# Patient Record
Sex: FEMALE | Race: WHITE | NOT HISPANIC OR LATINO
[De-identification: names, ages, dates, MRNs, and addresses within clinical notes are randomized per-mention and may not be internally consistent; named-entity substitution may affect disease eponyms.]

---

## 2017-04-28 ENCOUNTER — APPOINTMENT (OUTPATIENT)
Dept: OTOLARYNGOLOGY | Facility: CLINIC | Age: 77
End: 2017-04-28

## 2017-04-28 VITALS
OXYGEN SATURATION: 98 % | DIASTOLIC BLOOD PRESSURE: 73 MMHG | HEART RATE: 71 BPM | SYSTOLIC BLOOD PRESSURE: 144 MMHG | TEMPERATURE: 98 F

## 2017-04-28 DIAGNOSIS — Z87.09 PERSONAL HISTORY OF OTHER DISEASES OF THE RESPIRATORY SYSTEM: ICD-10-CM

## 2017-04-28 DIAGNOSIS — J38.3 OTHER DISEASES OF VOCAL CORDS: ICD-10-CM

## 2017-04-28 DIAGNOSIS — R49.0 DYSPHONIA: ICD-10-CM

## 2018-04-06 ENCOUNTER — APPOINTMENT (OUTPATIENT)
Dept: OTOLARYNGOLOGY | Facility: CLINIC | Age: 78
End: 2018-04-06
Payer: MEDICARE

## 2018-04-06 VITALS
OXYGEN SATURATION: 100 % | HEART RATE: 65 BPM | TEMPERATURE: 97.4 F | SYSTOLIC BLOOD PRESSURE: 132 MMHG | DIASTOLIC BLOOD PRESSURE: 83 MMHG

## 2018-04-06 PROCEDURE — 31575 DIAGNOSTIC LARYNGOSCOPY: CPT

## 2018-04-06 PROCEDURE — 99213 OFFICE O/P EST LOW 20 MIN: CPT | Mod: 25

## 2018-07-23 ENCOUNTER — APPOINTMENT (OUTPATIENT)
Dept: ORTHOPEDIC SURGERY | Facility: CLINIC | Age: 78
End: 2018-07-23
Payer: MEDICARE

## 2018-07-23 VITALS
SYSTOLIC BLOOD PRESSURE: 122 MMHG | HEIGHT: 59 IN | HEART RATE: 63 BPM | BODY MASS INDEX: 32.25 KG/M2 | DIASTOLIC BLOOD PRESSURE: 74 MMHG | WEIGHT: 160 LBS

## 2018-07-23 DIAGNOSIS — M51.26 OTHER INTERVERTEBRAL DISC DISPLACEMENT, LUMBAR REGION: ICD-10-CM

## 2018-07-23 PROCEDURE — 72110 X-RAY EXAM L-2 SPINE 4/>VWS: CPT

## 2018-07-23 PROCEDURE — 72170 X-RAY EXAM OF PELVIS: CPT | Mod: 59

## 2018-07-23 PROCEDURE — 99204 OFFICE O/P NEW MOD 45 MIN: CPT

## 2018-08-02 ENCOUNTER — APPOINTMENT (OUTPATIENT)
Dept: INTERNAL MEDICINE | Facility: CLINIC | Age: 78
End: 2018-08-02
Payer: MEDICARE

## 2018-08-02 ENCOUNTER — NON-APPOINTMENT (OUTPATIENT)
Age: 78
End: 2018-08-02

## 2018-08-02 ENCOUNTER — LABORATORY RESULT (OUTPATIENT)
Age: 78
End: 2018-08-02

## 2018-08-02 VITALS
BODY MASS INDEX: 29.84 KG/M2 | WEIGHT: 148 LBS | TEMPERATURE: 97.8 F | HEIGHT: 59 IN | RESPIRATION RATE: 12 BRPM | OXYGEN SATURATION: 97 % | DIASTOLIC BLOOD PRESSURE: 75 MMHG | HEART RATE: 60 BPM | SYSTOLIC BLOOD PRESSURE: 137 MMHG

## 2018-08-02 DIAGNOSIS — Z86.79 PERSONAL HISTORY OF OTHER DISEASES OF THE CIRCULATORY SYSTEM: ICD-10-CM

## 2018-08-02 DIAGNOSIS — R60.0 LOCALIZED EDEMA: ICD-10-CM

## 2018-08-02 DIAGNOSIS — M81.0 AGE-RELATED OSTEOPOROSIS W/OUT CURRENT PATHOLOGICAL FRACTURE: ICD-10-CM

## 2018-08-02 DIAGNOSIS — K55.20 ANGIODYSPLASIA OF COLON W/OUT HEMORRHAGE: ICD-10-CM

## 2018-08-02 DIAGNOSIS — E78.00 PURE HYPERCHOLESTEROLEMIA, UNSPECIFIED: ICD-10-CM

## 2018-08-02 PROCEDURE — 93000 ELECTROCARDIOGRAM COMPLETE: CPT

## 2018-08-02 PROCEDURE — 99205 OFFICE O/P NEW HI 60 MIN: CPT

## 2018-08-02 RX ORDER — METOCLOPRAMIDE HYDROCHLORIDE 5 MG/1
5 TABLET ORAL
Refills: 0 | Status: DISCONTINUED | COMMUNITY
End: 2018-08-02

## 2018-08-02 NOTE — REVIEW OF SYSTEMS
[Heartburn] : heartburn [Joint Pain] : joint pain [Joint Stiffness] : joint stiffness [Back Pain] : back pain [Unsteady Walking] : ataxia [Negative] : Heme/Lymph [Abdominal Pain] : no abdominal pain [Nausea] : no nausea [Constipation] : no constipation [Diarrhea] : diarrhea [Vomiting] : no vomiting [Melena] : no melena [Joint Swelling] : no joint swelling [Muscle Weakness] : no muscle weakness [Muscle Pain] : no muscle pain [Headache] : no headache [Dizziness] : no dizziness [Memory Loss] : no memory loss

## 2018-08-02 NOTE — HEALTH RISK ASSESSMENT
[Poor] : ~his/her~ mood as  poor [No falls in past year] : Patient reported no falls in the past year [0] : 2) Feeling down, depressed, or hopeless: Not at all (0) [] : No

## 2018-08-03 LAB
25(OH)D3 SERPL-MCNC: 38.5 NG/ML
ALBUMIN SERPL ELPH-MCNC: 4.5 G/DL
ALP BLD-CCNC: 62 U/L
ALT SERPL-CCNC: 24 U/L
ANA PAT FLD IF-IMP: ABNORMAL
ANA SER IF-ACNC: ABNORMAL
ANION GAP SERPL CALC-SCNC: 14 MMOL/L
APPEARANCE: CLEAR
AST SERPL-CCNC: 28 U/L
B BURGDOR IGG+IGM SER QL IB: NORMAL
BILIRUB SERPL-MCNC: 0.4 MG/DL
BILIRUBIN URINE: NEGATIVE
BLOOD URINE: NEGATIVE
BUN SERPL-MCNC: 23 MG/DL
CALCIUM SERPL-MCNC: 9.8 MG/DL
CHLORIDE SERPL-SCNC: 106 MMOL/L
CHOLEST SERPL-MCNC: 203 MG/DL
CHOLEST/HDLC SERPL: 4.6 RATIO
CK SERPL-CCNC: 205 U/L
CO2 SERPL-SCNC: 24 MMOL/L
COLOR: YELLOW
CREAT SERPL-MCNC: 1.01 MG/DL
CREAT SPEC-SCNC: 179 MG/DL
CRP SERPL-MCNC: <0.1 MG/DL
ERYTHROCYTE [SEDIMENTATION RATE] IN BLOOD BY WESTERGREN METHOD: 10 MM/HR
FERRITIN SERPL-MCNC: 30 NG/ML
FOLATE SERPL-MCNC: >20 NG/ML
GLUCOSE QUALITATIVE U: NEGATIVE MG/DL
GLUCOSE SERPL-MCNC: 85 MG/DL
GLUCOSE SERPL-MCNC: 92 MG/DL
HBA1C MFR BLD HPLC: 5.3 %
HBV SURFACE AG SER QL: NONREACTIVE
HCV AB SER QL: NONREACTIVE
HCV S/CO RATIO: 0.09 S/CO
HDLC SERPL-MCNC: 44 MG/DL
IRON SERPL-MCNC: 90 UG/DL
KETONES URINE: NEGATIVE
LDLC SERPL CALC-MCNC: 138 MG/DL
LEUKOCYTE ESTERASE URINE: NEGATIVE
MAGNESIUM SERPL-MCNC: 2.1 MG/DL
MICROALBUMIN 24H UR DL<=1MG/L-MCNC: <1.2 MG/DL
MICROALBUMIN/CREAT 24H UR-RTO: NORMAL
NITRITE URINE: NEGATIVE
PH URINE: 5.5
POTASSIUM SERPL-SCNC: 4.3 MMOL/L
PROT SERPL-MCNC: 6.8 G/DL
PROTEIN URINE: ABNORMAL MG/DL
RHEUMATOID FACT SER QL: 13 IU/ML
SODIUM SERPL-SCNC: 144 MMOL/L
SPECIFIC GRAVITY URINE: 1.03
T PALLIDUM AB SER QL IA: NEGATIVE
T4 FREE SERPL-MCNC: 1.1 NG/DL
TRIGL SERPL-MCNC: 105 MG/DL
TSH SERPL-ACNC: 2.21 UIU/ML
URATE SERPL-MCNC: 6.2 MG/DL
UROBILINOGEN URINE: NEGATIVE MG/DL
VIT B12 SERPL-MCNC: 806 PG/ML

## 2018-08-13 LAB
BASOPHILS # BLD AUTO: 0.01 K/UL
BASOPHILS NFR BLD AUTO: 0.2 %
EOSINOPHIL # BLD AUTO: 0.08 K/UL
EOSINOPHIL NFR BLD AUTO: 1.9 %
HCT VFR BLD CALC: 37.3 %
HGB BLD-MCNC: 12.2 G/DL
IMM GRANULOCYTES NFR BLD AUTO: 0.2 %
LYMPHOCYTES # BLD AUTO: 1.47 K/UL
LYMPHOCYTES NFR BLD AUTO: 35.8 %
MAN DIFF?: NORMAL
MCHC RBC-ENTMCNC: 28.6 PG
MCHC RBC-ENTMCNC: 32.7 GM/DL
MCV RBC AUTO: 87.6 FL
MONOCYTES # BLD AUTO: 0.47 K/UL
MONOCYTES NFR BLD AUTO: 11.4 %
NEUTROPHILS # BLD AUTO: 2.07 K/UL
NEUTROPHILS NFR BLD AUTO: 50.5 %
PLATELET # BLD AUTO: 145 K/UL
RBC # BLD: 4.26 M/UL
RBC # FLD: 15 %
WBC # FLD AUTO: 4.11 K/UL

## 2018-08-15 ENCOUNTER — APPOINTMENT (OUTPATIENT)
Dept: SPINE | Facility: CLINIC | Age: 78
End: 2018-08-15
Payer: MEDICARE

## 2018-08-15 VITALS
BODY MASS INDEX: 29.84 KG/M2 | SYSTOLIC BLOOD PRESSURE: 126 MMHG | HEART RATE: 67 BPM | WEIGHT: 148 LBS | HEIGHT: 59 IN | DIASTOLIC BLOOD PRESSURE: 78 MMHG

## 2018-08-15 DIAGNOSIS — Z86.39 PERSONAL HISTORY OF OTHER ENDOCRINE, NUTRITIONAL AND METABOLIC DISEASE: ICD-10-CM

## 2018-08-15 PROCEDURE — 99204 OFFICE O/P NEW MOD 45 MIN: CPT

## 2018-08-27 ENCOUNTER — APPOINTMENT (OUTPATIENT)
Dept: MRI IMAGING | Facility: CLINIC | Age: 78
End: 2018-08-27
Payer: MEDICARE

## 2018-08-27 ENCOUNTER — OUTPATIENT (OUTPATIENT)
Dept: OUTPATIENT SERVICES | Facility: HOSPITAL | Age: 78
LOS: 1 days | End: 2018-08-27
Payer: MEDICARE

## 2018-08-27 ENCOUNTER — APPOINTMENT (OUTPATIENT)
Dept: CT IMAGING | Facility: CLINIC | Age: 78
End: 2018-08-27
Payer: MEDICARE

## 2018-08-27 DIAGNOSIS — Z00.8 ENCOUNTER FOR OTHER GENERAL EXAMINATION: ICD-10-CM

## 2018-08-27 PROCEDURE — 72148 MRI LUMBAR SPINE W/O DYE: CPT

## 2018-08-27 PROCEDURE — 72146 MRI CHEST SPINE W/O DYE: CPT | Mod: 26

## 2018-08-27 PROCEDURE — 72146 MRI CHEST SPINE W/O DYE: CPT

## 2018-08-27 PROCEDURE — 76376 3D RENDER W/INTRP POSTPROCES: CPT | Mod: 26

## 2018-08-27 PROCEDURE — 72148 MRI LUMBAR SPINE W/O DYE: CPT | Mod: 26

## 2018-08-27 PROCEDURE — 72131 CT LUMBAR SPINE W/O DYE: CPT | Mod: 26

## 2018-08-27 PROCEDURE — 76376 3D RENDER W/INTRP POSTPROCES: CPT

## 2018-08-27 PROCEDURE — 72131 CT LUMBAR SPINE W/O DYE: CPT

## 2018-09-05 ENCOUNTER — APPOINTMENT (OUTPATIENT)
Dept: SPINE | Facility: CLINIC | Age: 78
End: 2018-09-05
Payer: MEDICARE

## 2018-09-05 PROCEDURE — 99214 OFFICE O/P EST MOD 30 MIN: CPT

## 2018-09-25 ENCOUNTER — MESSAGE (OUTPATIENT)
Age: 78
End: 2018-09-25

## 2018-09-27 ENCOUNTER — MESSAGE (OUTPATIENT)
Age: 78
End: 2018-09-27

## 2018-10-10 ENCOUNTER — APPOINTMENT (OUTPATIENT)
Dept: RADIOLOGY | Facility: HOSPITAL | Age: 78
End: 2018-10-10

## 2018-10-10 ENCOUNTER — APPOINTMENT (OUTPATIENT)
Dept: SPEECH THERAPY | Facility: HOSPITAL | Age: 78
End: 2018-10-10

## 2018-10-11 ENCOUNTER — APPOINTMENT (OUTPATIENT)
Dept: SPEECH THERAPY | Facility: CLINIC | Age: 78
End: 2018-10-11

## 2018-10-12 ENCOUNTER — MESSAGE (OUTPATIENT)
Age: 78
End: 2018-10-12

## 2018-10-17 ENCOUNTER — INBOUND DOCUMENT (OUTPATIENT)
Age: 78
End: 2018-10-17

## 2018-10-23 ENCOUNTER — APPOINTMENT (OUTPATIENT)
Dept: PULMONOLOGY | Facility: CLINIC | Age: 78
End: 2018-10-23
Payer: MEDICARE

## 2018-10-23 VITALS
WEIGHT: 144 LBS | BODY MASS INDEX: 32.39 KG/M2 | HEART RATE: 62 BPM | SYSTOLIC BLOOD PRESSURE: 108 MMHG | HEIGHT: 56 IN | DIASTOLIC BLOOD PRESSURE: 60 MMHG | OXYGEN SATURATION: 98 %

## 2018-10-23 PROCEDURE — 99203 OFFICE O/P NEW LOW 30 MIN: CPT | Mod: 25

## 2018-10-23 PROCEDURE — 94060 EVALUATION OF WHEEZING: CPT

## 2018-10-23 PROCEDURE — 94727 GAS DIL/WSHOT DETER LNG VOL: CPT

## 2018-10-23 PROCEDURE — 94729 DIFFUSING CAPACITY: CPT

## 2018-11-16 ENCOUNTER — NON-APPOINTMENT (OUTPATIENT)
Age: 78
End: 2018-11-16

## 2018-11-16 ENCOUNTER — APPOINTMENT (OUTPATIENT)
Dept: INTERNAL MEDICINE | Facility: CLINIC | Age: 78
End: 2018-11-16
Payer: MEDICARE

## 2018-11-16 VITALS
DIASTOLIC BLOOD PRESSURE: 73 MMHG | OXYGEN SATURATION: 100 % | BODY MASS INDEX: 31.27 KG/M2 | TEMPERATURE: 97.6 F | HEART RATE: 62 BPM | RESPIRATION RATE: 12 BRPM | SYSTOLIC BLOOD PRESSURE: 136 MMHG | HEIGHT: 56 IN | WEIGHT: 139 LBS

## 2018-11-16 PROCEDURE — 93000 ELECTROCARDIOGRAM COMPLETE: CPT

## 2018-11-16 PROCEDURE — 99215 OFFICE O/P EST HI 40 MIN: CPT | Mod: 25

## 2018-11-16 NOTE — REVIEW OF SYSTEMS
[Negative] : Heme/Lymph [Abdominal Pain] : abdominal pain [Vomiting] : vomiting [Heartburn] : heartburn [Joint Stiffness] : joint stiffness [Back Pain] : back pain [Fainting] : fainting [Nausea] : no nausea [Constipation] : no constipation [Diarrhea] : diarrhea [Melena] : no melena [Joint Pain] : no joint pain [Joint Swelling] : no joint swelling [Muscle Weakness] : no muscle weakness [Muscle Pain] : no muscle pain [Headache] : no headache [Dizziness] : no dizziness [Confusion] : no confusion [Memory Loss] : no memory loss [Unsteady Walking] : no ataxia

## 2018-11-20 LAB
25(OH)D3 SERPL-MCNC: 52.7 NG/ML
ALBUMIN SERPL ELPH-MCNC: 4 G/DL
ALP BLD-CCNC: 69 U/L
ALT SERPL-CCNC: 25 U/L
ANION GAP SERPL CALC-SCNC: 10 MMOL/L
APPEARANCE: CLEAR
AST SERPL-CCNC: 26 U/L
BILIRUB SERPL-MCNC: 0.4 MG/DL
BILIRUBIN URINE: NEGATIVE
BLOOD URINE: NEGATIVE
BUN SERPL-MCNC: 16 MG/DL
CALCIUM SERPL-MCNC: 9.6 MG/DL
CHLORIDE SERPL-SCNC: 104 MMOL/L
CHOLEST SERPL-MCNC: 174 MG/DL
CHOLEST/HDLC SERPL: 4 RATIO
CO2 SERPL-SCNC: 26 MMOL/L
COLOR: YELLOW
CREAT SERPL-MCNC: 1.17 MG/DL
FERRITIN SERPL-MCNC: 83 NG/ML
GLUCOSE QUALITATIVE U: NEGATIVE MG/DL
GLUCOSE SERPL-MCNC: 81 MG/DL
GLUCOSE SERPL-MCNC: 83 MG/DL
HBA1C MFR BLD HPLC: 5.2 %
HDLC SERPL-MCNC: 43 MG/DL
KETONES URINE: NEGATIVE
LDLC SERPL CALC-MCNC: 108 MG/DL
LEUKOCYTE ESTERASE URINE: NEGATIVE
MAGNESIUM SERPL-MCNC: 2.2 MG/DL
NITRITE URINE: NEGATIVE
PH URINE: 5.5
POTASSIUM SERPL-SCNC: 4.1 MMOL/L
PROT SERPL-MCNC: 6.6 G/DL
PROTEIN URINE: NEGATIVE MG/DL
SODIUM SERPL-SCNC: 140 MMOL/L
SPECIFIC GRAVITY URINE: 1.02
TRIGL SERPL-MCNC: 114 MG/DL
TSH SERPL-ACNC: 3.01 UIU/ML
UROBILINOGEN URINE: NEGATIVE MG/DL
VIT B12 SERPL-MCNC: 1453 PG/ML

## 2018-11-23 LAB
BASOPHILS # BLD AUTO: 0.02 K/UL
BASOPHILS NFR BLD AUTO: 0.5 %
EOSINOPHIL # BLD AUTO: 0.07 K/UL
EOSINOPHIL NFR BLD AUTO: 1.7 %
HCT VFR BLD CALC: 36.2 %
HGB BLD-MCNC: 12.2 G/DL
IMM GRANULOCYTES NFR BLD AUTO: 0.2 %
LYMPHOCYTES # BLD AUTO: 1.3 K/UL
LYMPHOCYTES NFR BLD AUTO: 32.4 %
MAN DIFF?: NORMAL
MCHC RBC-ENTMCNC: 31.7 PG
MCHC RBC-ENTMCNC: 33.7 GM/DL
MCV RBC AUTO: 94 FL
MONOCYTES # BLD AUTO: 0.49 K/UL
MONOCYTES NFR BLD AUTO: 12.2 %
NEUTROPHILS # BLD AUTO: 2.12 K/UL
NEUTROPHILS NFR BLD AUTO: 53 %
PLATELET # BLD AUTO: 157 K/UL
RBC # BLD: 3.85 M/UL
RBC # FLD: 13.7 %
WBC # FLD AUTO: 4.01 K/UL

## 2019-01-02 ENCOUNTER — APPOINTMENT (OUTPATIENT)
Dept: SPINE | Facility: CLINIC | Age: 79
End: 2019-01-02
Payer: MEDICARE

## 2019-01-02 VITALS
SYSTOLIC BLOOD PRESSURE: 122 MMHG | HEART RATE: 67 BPM | RESPIRATION RATE: 14 BRPM | BODY MASS INDEX: 31.49 KG/M2 | DIASTOLIC BLOOD PRESSURE: 71 MMHG | WEIGHT: 140 LBS | HEIGHT: 56 IN

## 2019-01-02 PROCEDURE — 99214 OFFICE O/P EST MOD 30 MIN: CPT

## 2019-01-03 ENCOUNTER — OUTPATIENT (OUTPATIENT)
Dept: OUTPATIENT SERVICES | Facility: HOSPITAL | Age: 79
LOS: 1 days | End: 2019-01-03
Payer: MEDICARE

## 2019-01-03 ENCOUNTER — APPOINTMENT (OUTPATIENT)
Dept: CT IMAGING | Facility: CLINIC | Age: 79
End: 2019-01-03
Payer: MEDICARE

## 2019-01-03 DIAGNOSIS — M48.061 SPINAL STENOSIS, LUMBAR REGION WITHOUT NEUROGENIC CLAUDICATION: ICD-10-CM

## 2019-01-03 PROCEDURE — 76376 3D RENDER W/INTRP POSTPROCES: CPT

## 2019-01-03 PROCEDURE — 72131 CT LUMBAR SPINE W/O DYE: CPT

## 2019-01-03 PROCEDURE — 76376 3D RENDER W/INTRP POSTPROCES: CPT | Mod: 26

## 2019-01-03 PROCEDURE — 72131 CT LUMBAR SPINE W/O DYE: CPT | Mod: 26

## 2019-02-02 PROCEDURE — 93010 ELECTROCARDIOGRAM REPORT: CPT

## 2019-02-07 ENCOUNTER — RX RENEWAL (OUTPATIENT)
Age: 79
End: 2019-02-07

## 2019-02-12 ENCOUNTER — OUTPATIENT (OUTPATIENT)
Dept: OUTPATIENT SERVICES | Facility: HOSPITAL | Age: 79
LOS: 1 days | End: 2019-02-12
Payer: MEDICARE

## 2019-02-12 VITALS
HEART RATE: 63 BPM | HEIGHT: 56 IN | SYSTOLIC BLOOD PRESSURE: 115 MMHG | DIASTOLIC BLOOD PRESSURE: 67 MMHG | TEMPERATURE: 98 F | OXYGEN SATURATION: 98 % | RESPIRATION RATE: 16 BRPM | WEIGHT: 141.98 LBS

## 2019-02-12 DIAGNOSIS — G70.00 MYASTHENIA GRAVIS WITHOUT (ACUTE) EXACERBATION: ICD-10-CM

## 2019-02-12 DIAGNOSIS — I10 ESSENTIAL (PRIMARY) HYPERTENSION: ICD-10-CM

## 2019-02-12 DIAGNOSIS — Z01.818 ENCOUNTER FOR OTHER PREPROCEDURAL EXAMINATION: ICD-10-CM

## 2019-02-12 DIAGNOSIS — Z98.890 OTHER SPECIFIED POSTPROCEDURAL STATES: Chronic | ICD-10-CM

## 2019-02-12 DIAGNOSIS — K21.9 GASTRO-ESOPHAGEAL REFLUX DISEASE WITHOUT ESOPHAGITIS: ICD-10-CM

## 2019-02-12 DIAGNOSIS — Z29.9 ENCOUNTER FOR PROPHYLACTIC MEASURES, UNSPECIFIED: ICD-10-CM

## 2019-02-12 DIAGNOSIS — Z96.619 PRESENCE OF UNSPECIFIED ARTIFICIAL SHOULDER JOINT: Chronic | ICD-10-CM

## 2019-02-12 DIAGNOSIS — M54.16 RADICULOPATHY, LUMBAR REGION: ICD-10-CM

## 2019-02-12 DIAGNOSIS — M48.061 SPINAL STENOSIS, LUMBAR REGION WITHOUT NEUROGENIC CLAUDICATION: ICD-10-CM

## 2019-02-12 DIAGNOSIS — Z90.89 ACQUIRED ABSENCE OF OTHER ORGANS: Chronic | ICD-10-CM

## 2019-02-12 LAB
ANION GAP SERPL CALC-SCNC: 11 MMOL/L — SIGNIFICANT CHANGE UP (ref 5–17)
BLD GP AB SCN SERPL QL: NEGATIVE — SIGNIFICANT CHANGE UP
BUN SERPL-MCNC: 24 MG/DL — HIGH (ref 7–23)
CALCIUM SERPL-MCNC: 9 MG/DL — SIGNIFICANT CHANGE UP (ref 8.4–10.5)
CHLORIDE SERPL-SCNC: 104 MMOL/L — SIGNIFICANT CHANGE UP (ref 96–108)
CO2 SERPL-SCNC: 26 MMOL/L — SIGNIFICANT CHANGE UP (ref 22–31)
CREAT SERPL-MCNC: 0.87 MG/DL — SIGNIFICANT CHANGE UP (ref 0.5–1.3)
GLUCOSE SERPL-MCNC: 86 MG/DL — SIGNIFICANT CHANGE UP (ref 70–99)
HCT VFR BLD CALC: 36.7 % — SIGNIFICANT CHANGE UP (ref 34.5–45)
HGB BLD-MCNC: 11.6 G/DL — SIGNIFICANT CHANGE UP (ref 11.5–15.5)
MCHC RBC-ENTMCNC: 29.9 PG — SIGNIFICANT CHANGE UP (ref 27–34)
MCHC RBC-ENTMCNC: 31.6 GM/DL — LOW (ref 32–36)
MCV RBC AUTO: 94.6 FL — SIGNIFICANT CHANGE UP (ref 80–100)
MRSA PCR RESULT.: DETECTED
PLATELET # BLD AUTO: 155 K/UL — SIGNIFICANT CHANGE UP (ref 150–400)
POTASSIUM SERPL-MCNC: 3.8 MMOL/L — SIGNIFICANT CHANGE UP (ref 3.5–5.3)
POTASSIUM SERPL-SCNC: 3.8 MMOL/L — SIGNIFICANT CHANGE UP (ref 3.5–5.3)
RBC # BLD: 3.88 M/UL — SIGNIFICANT CHANGE UP (ref 3.8–5.2)
RBC # FLD: 13.2 % — SIGNIFICANT CHANGE UP (ref 10.3–14.5)
RH IG SCN BLD-IMP: NEGATIVE — SIGNIFICANT CHANGE UP
S AUREUS DNA NOSE QL NAA+PROBE: DETECTED
SODIUM SERPL-SCNC: 141 MMOL/L — SIGNIFICANT CHANGE UP (ref 135–145)
WBC # BLD: 4.75 K/UL — SIGNIFICANT CHANGE UP (ref 3.8–10.5)
WBC # FLD AUTO: 4.75 K/UL — SIGNIFICANT CHANGE UP (ref 3.8–10.5)

## 2019-02-12 PROCEDURE — 86900 BLOOD TYPING SEROLOGIC ABO: CPT

## 2019-02-12 PROCEDURE — 86901 BLOOD TYPING SEROLOGIC RH(D): CPT

## 2019-02-12 PROCEDURE — 80048 BASIC METABOLIC PNL TOTAL CA: CPT

## 2019-02-12 PROCEDURE — 86850 RBC ANTIBODY SCREEN: CPT

## 2019-02-12 PROCEDURE — 87640 STAPH A DNA AMP PROBE: CPT

## 2019-02-12 PROCEDURE — 87641 MR-STAPH DNA AMP PROBE: CPT

## 2019-02-12 PROCEDURE — G0463: CPT

## 2019-02-12 PROCEDURE — 85027 COMPLETE CBC AUTOMATED: CPT

## 2019-02-12 NOTE — H&P PST ADULT - ASSESSMENT
LEONI VTE 2.0 SCORE [CLOT updated 2019]    AGE RELATED RISK FACTORS                                                       MOBILITY RELATED FACTORS  [ ] Age 41-60 years                                            (1 Point)                    [ ] Bed rest                                                        (1 Point)  [ ] Age: 61-74 years                                           (2 Points)                  [ ] Plaster cast                                                   (2 Points)  [x ] Age= 75 years                                              (3 Points)                    [ ] Bed bound for more than 72 hours                 (2 Points)    DISEASE RELATED RISK FACTORS                                               GENDER SPECIFIC FACTORS  [ ] Edema in the lower extremities                       (1 Point)              [ ] Pregnancy                                                     (1 Point)  [ ] Varicose veins                                               (1 Point)                     [ ] Post-partum < 6 weeks                                   (1 Point)             [x ] BMI > 25 Kg/m2                                            (1 Point)                     [ ] Hormonal therapy  or oral contraception          (1 Point)                 [ ] Sepsis (in the previous month)                        (1 Point)               [ ] History of pregnancy complications                 (1 point)  [ ] Pneumonia or serious lung disease                                               [ ] Unexplained or recurrent                     (1 Point)           (in the previous month)                               (1 Point)  [ ] Abnormal pulmonary function test                     (1 Point)                 SURGERY RELATED RISK FACTORS  [ ] Acute myocardial infarction                              (1 Point)               [ ]  Section                                             (1 Point)  [ ] Congestive heart failure (in the previous month)  (1 Point)      [ ] Minor surgery                                                  (1 Point)   [ ] Inflammatory bowel disease                             (1 Point)               [ ] Arthroscopic surgery                                        (2 Points)  [ ] Central venous access                                      (2 Points)                [x ] General surgery lasting more than 45 minutes (2 points)  [ ] Malignancy- Present or previous                   (2 Points)                [ ] Elective arthroplasty                                         (5 points)    [ ] Stroke (in the previous month)                          (5 Points)                                                                                                                                                           HEMATOLOGY RELATED FACTORS                                                 TRAUMA RELATED RISK FACTORS  [ ] Prior episodes of VTE                                     (3 Points)                [ ] Fracture of the hip, pelvis, or leg                       (5 Points)  [ ] Positive family history for VTE                         (3 Points)             [ ] Acute spinal cord injury (in the previous month)  (5 Points)  [ ] Prothrombin 34506 A                                     (3 Points)               [ ] Paralysis  (less than 1 month)                             (5 Points)  [ ] Factor V Leiden                                             (3 Points)                  [ ] Multiple Trauma within 1 month                        (5 Points)  [ ] Lupus anticoagulants                                     (3 Points)                                                           [ ] Anticardiolipin antibodies                               (3 Points)                                                       [ ] High homocysteine in the blood                      (3 Points)                                             [ ] Other congenital or acquired thrombophilia      (3 Points)                                                [ ] Heparin induced thrombocytopenia                  (3 Points)                                     Total Score [       6   ]

## 2019-02-12 NOTE — H&P PST ADULT - HISTORY OF PRESENT ILLNESS
78 year old female with PMH of GERD, HTN, Myasthenia Gravis ( dx 10/2018 stable on Pyridostigmine), HLD with complaints of worsening lower back pain to groin found to have lumbar stenosis planned for L1-2 Lumbar laminectomy discectomy, L1-2 posterior lumbar fusion, L1-3 pedicle screws on 2/26/19.

## 2019-02-12 NOTE — H&P PST ADULT - PMH
Carpal Tunnel Syndrome Right  release 4/10  Gastric Ulcer  nissen fundoplication  Genital Ulcer, Female    GERD (Gastroesophageal Reflux Disease)    Hammertoe Right foot    Hyperlipidemia    Hypertension    Kidney Calculi    Lumbar Radiculopathy    Myasthenia gravis  intermittent voice changes, diagnosed 10/2018: resolved with med Aortic stenosis, mild  asper daughter  Carpal Tunnel Syndrome Right  release 4/10  Diverticulitis large intestine  denies any recent exacerbations  Genital Ulcer, Female    GERD (Gastroesophageal Reflux Disease)    Hammertoe Right foot    Hyperlipidemia    Hypertension    Kidney Calculi    Lumbar Radiculopathy    Lumbar stenosis    Myasthenia gravis  dx 10/2018 symptoms: intermittent loss of voice, diagnosed negative ENT studies, now stable on Pyridostigmine  Near syncope  8/2018 negative ENT work up, negative cardiac work ups as per daughter  Reactive airway disease that is not asthma  mild as per pulm note on [employer], patient and daughter deneis any inhaler use Aortic stenosis, mild  asper daughter  Carpal Tunnel Syndrome Right  release 4/10  Diverticulitis large intestine  denies any recent exacerbations  Genital Ulcer, Female    GERD (Gastroesophageal Reflux Disease)    Hammertoe Right foot    Hyperlipidemia    Hypertension    Kidney Calculi    Lumbar Radiculopathy    Lumbar stenosis    Myasthenia gravis  dx 10/2018 symptoms: intermittent loss of voice/ weakness, negative ENT studies, now stable on Pyridostigmine  Near syncope  8/2018 negative ENT work up, negative cardiac work ups as per daughter  Reactive airway disease that is not asthma  mild as per pulm note on [employer], patient and daughter deneis any inhaler use

## 2019-02-12 NOTE — H&P PST ADULT - PSH
Bilateral Cataracts  removed  Prolapse, Uterovaginal    S/P Appendectomy    S/P Cholecystectomy    S/P Hernia Repair Umbikical    S/P Hysterectomy Partial    S/P Laminectomy with Spinal Fusion cervcical and lumbar  cervical  1998  lumbar 1986,1998,2000  S/P Laparoscopic Fundoplication Bilateral Cataracts  removed  H/O laminectomy  cervical  1998  lumbar 1986,1998,2000  H/O shoulder replacement  b/l 2015/2016  History of tonsillectomy    Prolapse, Uterovaginal  s/p sling  S/P Appendectomy    S/P Cholecystectomy    S/P foot surgery    S/P Hernia Repair Umbikical    S/P Hysterectomy Partial  1974  S/P Laparoscopic Fundoplication Bilateral Cataracts  removed  H/O laminectomy  cervical  1998  lumbar 1986,1998,2000  H/O shoulder replacement  b/l 2015/2016  H/O umbilical hernia repair    History of tonsillectomy    Prolapse, Uterovaginal  s/p sling  S/P Appendectomy    S/P Cholecystectomy    S/P foot surgery    S/P Hysterectomy Partial  1974  S/P Laparoscopic Fundoplication

## 2019-02-12 NOTE — H&P PST ADULT - PROBLEM SELECTOR PLAN 1
planned for L1-2 Lumbar laminectomy discectomy, L1-2 posterior lumbar fusion, L1-3 pedicle screws on 2/26/19.   PST labs send  Preprocedure instructions discussed

## 2019-02-12 NOTE — H&P PST ADULT - PROBLEM SELECTOR PLAN 2
continue Hyzaar am of surgery  continue aspirin for prophylaxis, OK by Dr. Vick as per daughter  Will obtain recent EKG, ECHO, Stress report and cardiac note

## 2019-02-12 NOTE — H&P PST ADULT - NEGATIVE ENMT SYMPTOMS
no throat pain/no dysphagia/no nose bleeds/no hearing difficulty/no sinus symptoms no throat pain/no nose bleeds/no dysphagia/no vertigo/no sinus symptoms/no hearing difficulty

## 2019-02-13 PROBLEM — G70.00 MYASTHENIA GRAVIS WITHOUT (ACUTE) EXACERBATION: Chronic | Status: ACTIVE | Noted: 2019-02-12

## 2019-02-13 RX ORDER — VANCOMYCIN HCL 1 G
1000 VIAL (EA) INTRAVENOUS ONCE
Qty: 0 | Refills: 0 | Status: DISCONTINUED | OUTPATIENT
Start: 2019-02-26 | End: 2019-02-28

## 2019-02-14 ENCOUNTER — OTHER (OUTPATIENT)
Age: 79
End: 2019-02-14

## 2019-02-14 DIAGNOSIS — Z22.321 CARRIER OR SUSPECTED CARRIER OF METHICILLIN SUSCEPTIBLE STAPHYLOCOCCUS AUREUS: ICD-10-CM

## 2019-02-14 DIAGNOSIS — Z22.322 CARRIER OR SUSPECTED CARRIER OF METHICILLIN RESISTANT STAPHYLOCOCCUS AUREUS: ICD-10-CM

## 2019-02-19 ENCOUNTER — APPOINTMENT (OUTPATIENT)
Dept: INTERNAL MEDICINE | Facility: CLINIC | Age: 79
End: 2019-02-19
Payer: MEDICARE

## 2019-02-19 VITALS
WEIGHT: 145 LBS | BODY MASS INDEX: 32.62 KG/M2 | HEIGHT: 56 IN | RESPIRATION RATE: 12 BRPM | HEART RATE: 58 BPM | DIASTOLIC BLOOD PRESSURE: 75 MMHG | SYSTOLIC BLOOD PRESSURE: 135 MMHG | TEMPERATURE: 98.1 F | OXYGEN SATURATION: 99 %

## 2019-02-19 VITALS — DIASTOLIC BLOOD PRESSURE: 66 MMHG | SYSTOLIC BLOOD PRESSURE: 120 MMHG

## 2019-02-19 PROCEDURE — 99215 OFFICE O/P EST HI 40 MIN: CPT

## 2019-02-19 NOTE — REVIEW OF SYSTEMS
[Back Pain] : back pain [Negative] : Heme/Lymph [Unsteady Walking] : ataxia [Joint Pain] : no joint pain [Joint Stiffness] : no joint stiffness [Joint Swelling] : no joint swelling [Muscle Weakness] : no muscle weakness [Muscle Pain] : no muscle pain [Headache] : no headache [Dizziness] : no dizziness [Fainting] : no fainting [Confusion] : no confusion [Memory Loss] : no memory loss

## 2019-02-19 NOTE — ASSESSMENT
[Patient Optimized for Surgery] : Patient optimized for surgery [No Further Testing Recommended] : no further testing recommended [Modify medications prior to procedure] : Modify medications prior to procedure [As per surgery] : as per surgery [FreeTextEntry7] : With sips of water the morning of surgery, the patient was instructed to take Nexium, levothyroxine, and Losartan

## 2019-02-19 NOTE — HISTORY OF PRESENT ILLNESS
[Aortic Stenosis] : aortic stenosis [No Pertinent Pulmonary History] : no history of asthma, COPD, sleep apnea, or smoking [No Adverse Anesthesia Reaction] : no adverse anesthesia reaction in self or family member [(Patient denies any chest pain, claudication, dyspnea on exertion, orthopnea, palpitations or syncope)] : Patient denies any chest pain, claudication, dyspnea on exertion, orthopnea, palpitations or syncope [Atrial Fibrillation] : no atrial fibrillation [Coronary Artery Disease] : no coronary artery disease [Recent Myocardial Infarction] : no recent myocardial infarction [Implantable Device/Pacemaker] : no implantable device/pacemaker [Chronic Anticoagulation] : no chronic anticoagulation [Chronic Kidney Disease] : no chronic kidney disease [Diabetes] : no diabetes [FreeTextEntry1] : LAMINECTOMY [FreeTextEntry2] : 2/26/2019 [FreeTextEntry3] : DR MAYURI FOSTER [FreeTextEntry4] : CLEARED BY CARDIOLOGIST DR. ENGEL

## 2019-02-19 NOTE — PHYSICAL EXAM

## 2019-02-25 ENCOUNTER — TRANSCRIPTION ENCOUNTER (OUTPATIENT)
Age: 79
End: 2019-02-25

## 2019-02-26 ENCOUNTER — INPATIENT (INPATIENT)
Facility: HOSPITAL | Age: 79
LOS: 6 days | Discharge: HOME | DRG: 454 | End: 2019-03-05
Attending: NEUROLOGICAL SURGERY | Admitting: NEUROLOGICAL SURGERY
Payer: MEDICARE

## 2019-02-26 ENCOUNTER — APPOINTMENT (OUTPATIENT)
Dept: SPINE | Facility: HOSPITAL | Age: 79
End: 2019-02-26

## 2019-02-26 VITALS
TEMPERATURE: 97 F | WEIGHT: 141.98 LBS | HEART RATE: 64 BPM | HEIGHT: 56 IN | OXYGEN SATURATION: 100 % | RESPIRATION RATE: 18 BRPM | SYSTOLIC BLOOD PRESSURE: 150 MMHG | DIASTOLIC BLOOD PRESSURE: 80 MMHG

## 2019-02-26 DIAGNOSIS — Z90.89 ACQUIRED ABSENCE OF OTHER ORGANS: Chronic | ICD-10-CM

## 2019-02-26 DIAGNOSIS — M48.061 SPINAL STENOSIS, LUMBAR REGION WITHOUT NEUROGENIC CLAUDICATION: ICD-10-CM

## 2019-02-26 DIAGNOSIS — Z96.619 PRESENCE OF UNSPECIFIED ARTIFICIAL SHOULDER JOINT: Chronic | ICD-10-CM

## 2019-02-26 DIAGNOSIS — Z98.890 OTHER SPECIFIED POSTPROCEDURAL STATES: Chronic | ICD-10-CM

## 2019-02-26 LAB
ANION GAP SERPL CALC-SCNC: 17 MMOL/L — SIGNIFICANT CHANGE UP (ref 5–17)
BASE EXCESS BLDA CALC-SCNC: -4.5 MMOL/L — LOW (ref -2–2)
BASE EXCESS BLDA CALC-SCNC: -5.5 MMOL/L — LOW (ref -2–2)
BUN SERPL-MCNC: 17 MG/DL — SIGNIFICANT CHANGE UP (ref 7–23)
CALCIUM SERPL-MCNC: 7.9 MG/DL — LOW (ref 8.4–10.5)
CHLORIDE SERPL-SCNC: 107 MMOL/L — SIGNIFICANT CHANGE UP (ref 96–108)
CO2 BLDA-SCNC: 21 MMOL/L — LOW (ref 22–30)
CO2 BLDA-SCNC: 24 MMOL/L — SIGNIFICANT CHANGE UP (ref 22–30)
CO2 SERPL-SCNC: 18 MMOL/L — LOW (ref 22–31)
CREAT SERPL-MCNC: 0.78 MG/DL — SIGNIFICANT CHANGE UP (ref 0.5–1.3)
GLUCOSE SERPL-MCNC: 236 MG/DL — HIGH (ref 70–99)
HCO3 BLDA-SCNC: 20 MMOL/L — LOW (ref 21–29)
HCO3 BLDA-SCNC: 22 MMOL/L — SIGNIFICANT CHANGE UP (ref 21–29)
HCT VFR BLD CALC: 29.8 % — LOW (ref 34.5–45)
HGB BLD-MCNC: 10.5 G/DL — LOW (ref 11.5–15.5)
HOROWITZ INDEX BLDA+IHG-RTO: 44 — SIGNIFICANT CHANGE UP
MAGNESIUM SERPL-MCNC: 2 MG/DL — SIGNIFICANT CHANGE UP (ref 1.6–2.6)
MCHC RBC-ENTMCNC: 31.3 PG — SIGNIFICANT CHANGE UP (ref 27–34)
MCHC RBC-ENTMCNC: 35.3 GM/DL — SIGNIFICANT CHANGE UP (ref 32–36)
MCV RBC AUTO: 88.7 FL — SIGNIFICANT CHANGE UP (ref 80–100)
PCO2 BLDA: 39 MMHG — SIGNIFICANT CHANGE UP (ref 32–46)
PCO2 BLDA: 50 MMHG — HIGH (ref 32–46)
PH BLDA: 7.26 — LOW (ref 7.35–7.45)
PH BLDA: 7.32 — LOW (ref 7.35–7.45)
PLATELET # BLD AUTO: 114 K/UL — LOW (ref 150–400)
PO2 BLDA: 151 MMHG — HIGH (ref 74–108)
PO2 BLDA: 294 MMHG — HIGH (ref 74–108)
POTASSIUM SERPL-MCNC: 3.7 MMOL/L — SIGNIFICANT CHANGE UP (ref 3.5–5.3)
POTASSIUM SERPL-SCNC: 3.7 MMOL/L — SIGNIFICANT CHANGE UP (ref 3.5–5.3)
RBC # BLD: 3.36 M/UL — LOW (ref 3.8–5.2)
RBC # FLD: 12.5 % — SIGNIFICANT CHANGE UP (ref 10.3–14.5)
RH IG SCN BLD-IMP: NEGATIVE — SIGNIFICANT CHANGE UP
SAO2 % BLDA: 99 % — HIGH (ref 92–96)
SAO2 % BLDA: 99 % — HIGH (ref 92–96)
SODIUM SERPL-SCNC: 142 MMOL/L — SIGNIFICANT CHANGE UP (ref 135–145)
WBC # BLD: 6.8 K/UL — SIGNIFICANT CHANGE UP (ref 3.8–10.5)
WBC # FLD AUTO: 6.8 K/UL — SIGNIFICANT CHANGE UP (ref 3.8–10.5)

## 2019-02-26 PROCEDURE — 99291 CRITICAL CARE FIRST HOUR: CPT

## 2019-02-26 PROCEDURE — 22842 INSERT SPINE FIXATION DEVICE: CPT

## 2019-02-26 PROCEDURE — 22853 INSJ BIOMECHANICAL DEVICE: CPT

## 2019-02-26 PROCEDURE — 22633 ARTHRD CMBN 1NTRSPC LUMBAR: CPT

## 2019-02-26 PROCEDURE — 61783 SCAN PROC SPINAL: CPT

## 2019-02-26 PROCEDURE — 22614 ARTHRD PST TQ 1NTRSPC EA ADD: CPT

## 2019-02-26 RX ORDER — DEXAMETHASONE 0.5 MG/5ML
4 ELIXIR ORAL EVERY 6 HOURS
Qty: 0 | Refills: 0 | Status: COMPLETED | OUTPATIENT
Start: 2019-02-26 | End: 2019-02-27

## 2019-02-26 RX ORDER — GABAPENTIN 400 MG/1
300 CAPSULE ORAL ONCE
Qty: 0 | Refills: 0 | Status: COMPLETED | OUTPATIENT
Start: 2019-02-26 | End: 2019-02-26

## 2019-02-26 RX ORDER — ATORVASTATIN CALCIUM 80 MG/1
40 TABLET, FILM COATED ORAL AT BEDTIME
Qty: 0 | Refills: 0 | Status: DISCONTINUED | OUTPATIENT
Start: 2019-02-26 | End: 2019-03-05

## 2019-02-26 RX ORDER — ASCORBIC ACID 60 MG
500 TABLET,CHEWABLE ORAL
Qty: 0 | Refills: 0 | Status: COMPLETED | OUTPATIENT
Start: 2019-02-26 | End: 2019-03-01

## 2019-02-26 RX ORDER — IPRATROPIUM/ALBUTEROL SULFATE 18-103MCG
3 AEROSOL WITH ADAPTER (GRAM) INHALATION ONCE
Qty: 0 | Refills: 0 | Status: COMPLETED | OUTPATIENT
Start: 2019-02-26 | End: 2019-02-26

## 2019-02-26 RX ORDER — ACETAMINOPHEN 500 MG
1000 TABLET ORAL ONCE
Qty: 0 | Refills: 0 | Status: COMPLETED | OUTPATIENT
Start: 2019-02-26 | End: 2019-02-26

## 2019-02-26 RX ORDER — PYRIDOSTIGMINE BROMIDE 60 MG/5ML
60 SOLUTION ORAL ONCE
Qty: 0 | Refills: 0 | Status: COMPLETED | OUTPATIENT
Start: 2019-02-26 | End: 2019-02-26

## 2019-02-26 RX ORDER — LEVOTHYROXINE SODIUM 125 MCG
50 TABLET ORAL DAILY
Qty: 0 | Refills: 0 | Status: DISCONTINUED | OUTPATIENT
Start: 2019-02-26 | End: 2019-03-05

## 2019-02-26 RX ORDER — MUPIROCIN 20 MG/G
1 OINTMENT TOPICAL
Qty: 0 | Refills: 0 | Status: DISCONTINUED | OUTPATIENT
Start: 2019-02-26 | End: 2019-03-05

## 2019-02-26 RX ORDER — FAMOTIDINE 10 MG/ML
20 INJECTION INTRAVENOUS
Qty: 0 | Refills: 0 | Status: DISCONTINUED | OUTPATIENT
Start: 2019-02-26 | End: 2019-03-05

## 2019-02-26 RX ORDER — INSULIN LISPRO 100/ML
VIAL (ML) SUBCUTANEOUS EVERY 6 HOURS
Qty: 0 | Refills: 0 | Status: DISCONTINUED | OUTPATIENT
Start: 2019-02-26 | End: 2019-02-27

## 2019-02-26 RX ORDER — ENOXAPARIN SODIUM 100 MG/ML
40 INJECTION SUBCUTANEOUS AT BEDTIME
Qty: 0 | Refills: 0 | Status: DISCONTINUED | OUTPATIENT
Start: 2019-02-27 | End: 2019-03-05

## 2019-02-26 RX ORDER — VANCOMYCIN HCL 1 G
1000 VIAL (EA) INTRAVENOUS EVERY 12 HOURS
Qty: 0 | Refills: 0 | Status: COMPLETED | OUTPATIENT
Start: 2019-02-26 | End: 2019-02-27

## 2019-02-26 RX ORDER — PANTOPRAZOLE SODIUM 20 MG/1
40 TABLET, DELAYED RELEASE ORAL
Qty: 0 | Refills: 0 | Status: DISCONTINUED | OUTPATIENT
Start: 2019-02-26 | End: 2019-03-05

## 2019-02-26 RX ORDER — ACETAMINOPHEN 500 MG
975 TABLET ORAL ONCE
Qty: 0 | Refills: 0 | Status: COMPLETED | OUTPATIENT
Start: 2019-02-26 | End: 2019-02-26

## 2019-02-26 RX ORDER — TRAMADOL HYDROCHLORIDE 50 MG/1
25 TABLET ORAL AT BEDTIME
Qty: 0 | Refills: 0 | Status: DISCONTINUED | OUTPATIENT
Start: 2019-02-26 | End: 2019-02-26

## 2019-02-26 RX ORDER — PYRIDOSTIGMINE BROMIDE 60 MG/5ML
180 SOLUTION ORAL AT BEDTIME
Qty: 0 | Refills: 0 | Status: DISCONTINUED | OUTPATIENT
Start: 2019-02-26 | End: 2019-03-05

## 2019-02-26 RX ORDER — SODIUM CHLORIDE 9 MG/ML
1000 INJECTION, SOLUTION INTRAVENOUS
Qty: 0 | Refills: 0 | Status: DISCONTINUED | OUTPATIENT
Start: 2019-02-26 | End: 2019-02-27

## 2019-02-26 RX ORDER — TRAMADOL HYDROCHLORIDE 50 MG/1
25 TABLET ORAL ONCE
Qty: 0 | Refills: 0 | Status: DISCONTINUED | OUTPATIENT
Start: 2019-02-26 | End: 2019-02-26

## 2019-02-26 RX ORDER — HYDROMORPHONE HYDROCHLORIDE 2 MG/ML
0.25 INJECTION INTRAMUSCULAR; INTRAVENOUS; SUBCUTANEOUS
Qty: 0 | Refills: 0 | Status: DISCONTINUED | OUTPATIENT
Start: 2019-02-26 | End: 2019-02-26

## 2019-02-26 RX ORDER — CELECOXIB 200 MG/1
200 CAPSULE ORAL
Qty: 0 | Refills: 0 | Status: DISCONTINUED | OUTPATIENT
Start: 2019-02-26 | End: 2019-03-05

## 2019-02-26 RX ORDER — HYDROCHLOROTHIAZIDE 25 MG
12.5 TABLET ORAL DAILY
Qty: 0 | Refills: 0 | Status: DISCONTINUED | OUTPATIENT
Start: 2019-02-26 | End: 2019-02-28

## 2019-02-26 RX ORDER — SODIUM CHLORIDE 9 MG/ML
3 INJECTION INTRAMUSCULAR; INTRAVENOUS; SUBCUTANEOUS EVERY 8 HOURS
Qty: 0 | Refills: 0 | Status: DISCONTINUED | OUTPATIENT
Start: 2019-02-26 | End: 2019-02-26

## 2019-02-26 RX ORDER — SENNA PLUS 8.6 MG/1
2 TABLET ORAL AT BEDTIME
Qty: 0 | Refills: 0 | Status: DISCONTINUED | OUTPATIENT
Start: 2019-02-26 | End: 2019-03-05

## 2019-02-26 RX ORDER — DEXTROSE 50 % IN WATER 50 %
15 SYRINGE (ML) INTRAVENOUS ONCE
Qty: 0 | Refills: 0 | Status: DISCONTINUED | OUTPATIENT
Start: 2019-02-26 | End: 2019-02-27

## 2019-02-26 RX ORDER — GABAPENTIN 400 MG/1
300 CAPSULE ORAL THREE TIMES A DAY
Qty: 0 | Refills: 0 | Status: DISCONTINUED | OUTPATIENT
Start: 2019-02-26 | End: 2019-03-05

## 2019-02-26 RX ORDER — GLUCAGON INJECTION, SOLUTION 0.5 MG/.1ML
1 INJECTION, SOLUTION SUBCUTANEOUS ONCE
Qty: 0 | Refills: 0 | Status: DISCONTINUED | OUTPATIENT
Start: 2019-02-26 | End: 2019-02-27

## 2019-02-26 RX ORDER — CEFAZOLIN SODIUM 1 G
2000 VIAL (EA) INJECTION ONCE
Qty: 0 | Refills: 0 | Status: DISCONTINUED | OUTPATIENT
Start: 2019-02-26 | End: 2019-02-26

## 2019-02-26 RX ORDER — PYRIDOSTIGMINE BROMIDE 60 MG/5ML
60 SOLUTION ORAL
Qty: 0 | Refills: 0 | Status: DISCONTINUED | OUTPATIENT
Start: 2019-02-26 | End: 2019-03-05

## 2019-02-26 RX ORDER — HYDROCORTISONE 20 MG
100 TABLET ORAL ONCE
Qty: 0 | Refills: 0 | Status: DISCONTINUED | OUTPATIENT
Start: 2019-02-26 | End: 2019-02-26

## 2019-02-26 RX ORDER — ONDANSETRON 8 MG/1
4 TABLET, FILM COATED ORAL ONCE
Qty: 0 | Refills: 0 | Status: DISCONTINUED | OUTPATIENT
Start: 2019-02-26 | End: 2019-02-27

## 2019-02-26 RX ORDER — DOCUSATE SODIUM 100 MG
100 CAPSULE ORAL THREE TIMES A DAY
Qty: 0 | Refills: 0 | Status: DISCONTINUED | OUTPATIENT
Start: 2019-02-26 | End: 2019-03-05

## 2019-02-26 RX ORDER — OXYCODONE HYDROCHLORIDE 5 MG/1
5 TABLET ORAL ONCE
Qty: 0 | Refills: 0 | Status: DISCONTINUED | OUTPATIENT
Start: 2019-02-26 | End: 2019-02-26

## 2019-02-26 RX ORDER — DEXTROSE 50 % IN WATER 50 %
12.5 SYRINGE (ML) INTRAVENOUS ONCE
Qty: 0 | Refills: 0 | Status: DISCONTINUED | OUTPATIENT
Start: 2019-02-26 | End: 2019-02-27

## 2019-02-26 RX ORDER — ACETAMINOPHEN 500 MG
1000 TABLET ORAL ONCE
Qty: 0 | Refills: 0 | Status: COMPLETED | OUTPATIENT
Start: 2019-02-27 | End: 2019-02-27

## 2019-02-26 RX ORDER — DEXTROSE 50 % IN WATER 50 %
25 SYRINGE (ML) INTRAVENOUS ONCE
Qty: 0 | Refills: 0 | Status: DISCONTINUED | OUTPATIENT
Start: 2019-02-26 | End: 2019-02-27

## 2019-02-26 RX ORDER — LOSARTAN POTASSIUM 100 MG/1
50 TABLET, FILM COATED ORAL DAILY
Qty: 0 | Refills: 0 | Status: DISCONTINUED | OUTPATIENT
Start: 2019-02-26 | End: 2019-02-28

## 2019-02-26 RX ORDER — ONDANSETRON 8 MG/1
4 TABLET, FILM COATED ORAL EVERY 4 HOURS
Qty: 0 | Refills: 0 | Status: DISCONTINUED | OUTPATIENT
Start: 2019-02-26 | End: 2019-03-05

## 2019-02-26 RX ORDER — LIDOCAINE HCL 20 MG/ML
0.2 VIAL (ML) INJECTION ONCE
Qty: 0 | Refills: 0 | Status: DISCONTINUED | OUTPATIENT
Start: 2019-02-26 | End: 2019-02-26

## 2019-02-26 RX ADMIN — Medication 250 MILLIGRAM(S): at 21:14

## 2019-02-26 RX ADMIN — GABAPENTIN 300 MILLIGRAM(S): 400 CAPSULE ORAL at 08:11

## 2019-02-26 RX ADMIN — SODIUM CHLORIDE 75 MILLILITER(S): 9 INJECTION, SOLUTION INTRAVENOUS at 17:49

## 2019-02-26 RX ADMIN — CELECOXIB 200 MILLIGRAM(S): 200 CAPSULE ORAL at 21:38

## 2019-02-26 RX ADMIN — PYRIDOSTIGMINE BROMIDE 60 MILLIGRAM(S): 60 SOLUTION ORAL at 08:22

## 2019-02-26 RX ADMIN — Medication 400 MILLIGRAM(S): at 17:44

## 2019-02-26 RX ADMIN — MUPIROCIN 1 APPLICATION(S): 20 OINTMENT TOPICAL at 19:37

## 2019-02-26 RX ADMIN — Medication 975 MILLIGRAM(S): at 08:11

## 2019-02-26 RX ADMIN — FAMOTIDINE 20 MILLIGRAM(S): 10 INJECTION INTRAVENOUS at 21:08

## 2019-02-26 RX ADMIN — TRAMADOL HYDROCHLORIDE 25 MILLIGRAM(S): 50 TABLET ORAL at 19:18

## 2019-02-26 RX ADMIN — CELECOXIB 200 MILLIGRAM(S): 200 CAPSULE ORAL at 21:08

## 2019-02-26 RX ADMIN — GABAPENTIN 300 MILLIGRAM(S): 400 CAPSULE ORAL at 21:09

## 2019-02-26 RX ADMIN — Medication 3 MILLILITER(S): at 15:50

## 2019-02-26 RX ADMIN — Medication 1000 MILLIGRAM(S): at 18:14

## 2019-02-26 RX ADMIN — PYRIDOSTIGMINE BROMIDE 60 MILLIGRAM(S): 60 SOLUTION ORAL at 19:37

## 2019-02-26 RX ADMIN — Medication 4 MILLIGRAM(S): at 16:45

## 2019-02-26 NOTE — BRIEF OPERATIVE NOTE - OPERATION/FINDINGS
L1, L2 laminectomy   L1-2 TLIF (left sided approach)  Stereotactically guided L1-L3 pedicle screw fixation  Posterolateral fusion

## 2019-02-26 NOTE — PHYSICAL THERAPY INITIAL EVALUATION ADULT - PLANNED THERAPY INTERVENTIONS, PT EVAL
balance training/bed mobility training/strengthening/transfer training/Stair training... GOAL: In 2 weeks pt will negotiate 5 stairs independently with least restrictive device./gait training

## 2019-02-26 NOTE — PHYSICAL THERAPY INITIAL EVALUATION ADULT - GAIT DEVIATIONS NOTED, PT EVAL
decreased step length/decreased stride length/increased time in double stance/increased B/L shift/decreased isaiah/decreased velocity of limb motion/decreased weight-shifting ability

## 2019-02-26 NOTE — CHART NOTE - NSCHARTNOTEFT_GEN_A_CORE
CAPRINI SCORE [CLOT] Score on Admission for     AGE RELATED RISK FACTORS                                                       MOBILITY RELATED FACTORS  [ ] Age 41-60 years                                            (1 Point)                  [ ] Bed rest                                                        (1 Point)  [ ] Age: 61-74 years                                           (2 Points)                 [ ] Plaster cast                                                   (2 Points)  [X ] Age= 75 years                                              (3 Points)               [ ] Bed bound for more than 72 hours                 (2 Points)    DISEASE RELATED RISK FACTORS                                               GENDER SPECIFIC FACTORS  [ ] Edema in the lower extremities                       (1 Point)                  [ ] Pregnancy                                                     (1 Point)  [ ] Varicose veins                                               (1 Point)                  [ ] Post-partum < 6 weeks                                   (1 Point)             [X] BMI > 25 Kg/m2                                            (1 Point)                  [ ] Hormonal therapy  or oral contraception          (1 Point)                 [ ] Sepsis (in the previous month)                        (1 Point)                  [ ] History of pregnancy complications                 (1 point)  [ ] Pneumonia or serious lung disease                                               [ ] Unexplained or recurrent                     (1 Point)           (in the previous month)                               (1 Point)  [ ] Abnormal pulmonary function test                     (1 Point)                 SURGERY RELATED RISK FACTORS (include planned surgeries)  [ ] Acute myocardial infarction                              (1 Point)                 [ ]  Section                                             (1 Point)  [ ] Congestive heart failure (in the previous month)  (1 Point)         [ ] Minor surgery                                                  (1 Point)   [ ] Inflammatory bowel disease                             (1 Point)                 [ ] Arthroscopic surgery                                        (2 Points)  [ ] Central venous access                                      (2 Points)                [X ] General surgery lasting more than 45 minutes   (2 Points)       [ ] Stroke (in the previous month)                          (5 Points)               [ ] Elective arthroplasty                                         (5 Points)            [ ] current or past malignancy                              (2 Points)                                                                                                       HEMATOLOGY RELATED FACTORS                                                 TRAUMA RELATED RISK FACTORS  [ ] Prior episodes of VTE                                     (3 Points)                [ ] Fracture of the hip, pelvis, or leg                       (5 Points)  [ ] Positive family history for VTE                         (3 Points)                 [ ] Acute spinal cord injury (in the previous month)  (5 Points)  [ ] Prothrombin 04823 A                                     (3 Points)                 [ ] Paralysis  (less than 1 month)                             (5 Points)  [ ] Factor V Leiden                                             (3 Points)                  [ ] Multiple Trauma within 1 month                        (5 Points)  [ ] Lupus anticoagulants                                     (3 Points)                                                           [ ] Anticardiolipin antibodies                               (3 Points)                                                       [ ] High homocysteine in the blood                      (3 Points)                                             [ ] Other congenital or acquired thrombophilia      (3 Points)                                                [ ] Heparin induced thrombocytopenia                  (3 Points)                                          Total Score [    6      ]    Risk:  Very low 0   Low 1 to 2   Moderate 3 to 4   High =5       VTE Prophylasix Recommednations:  [X ] mechanical pneumatic compression devices                                      [ ] contraindicated: _____________________  [ ] chemo prophylasix                                                                                   [X] contraindicated _Fresh post operative patient, team will reevaluate Chemical  prophylaxis in AM ____________________    **** HIGH LIKELIHOOD DVT PRESENT ON ADMISSION  [ ] (please order LE dopplers within 24 hours of admission)

## 2019-02-26 NOTE — PRE-ANESTHESIA EVALUATION ADULT - NSRADCARDRESULTSFT_GEN_ALL_CORE
Stress Test (12/12/18): EF=69%, fixed defect, no ischemia    ECHO: EF=55-65%, mild AS, mild MR/TR, diastolic dysfunction    EKG: pending Stress Test (12/12/18): EF=69%, fixed defect, no ischemia    ECHO: EF=55-65%, mild AS, mild MR/TR, diastolic dysfunction    EKG: sinus bob @ 59 bpm, ?anterior infarct    PFTs: not available

## 2019-02-26 NOTE — PRE-ANESTHESIA EVALUATION ADULT - NSANTHADDINFOFT_GEN_ALL_CORE
-will avoid NMDAs  -d/w pt & fam r/b/a of GA in the setting of MG (e.g. continued post-op mechanical ventilation)  -A-line -will avoid NMDAs  -d/w pt & fam r/b/a of GA in the setting of MG (e.g. continued post-op mechanical ventilation)  -disease duration is <6 yrs and pyridostigmine dosage <750 mg (360mg).    -does have unknown reactive airway disease   -is not on steroids.  -pre-op PIP and VC unknown  -pre-op A-line  -recommend post-op PACU o/n then transfer to NSICU vs. Step-Down w/ continuous pulse ox

## 2019-02-26 NOTE — PHYSICAL THERAPY INITIAL EVALUATION ADULT - PERTINENT HX OF CURRENT PROBLEM, REHAB EVAL
79 y/o F with PMH Myasthenia Gravis (dx 20/2018 stable on Pyridostigmine), GERD, presents with c/o worsening lower back pain to groin. Pt found to have lumbar stenosis. Pt now presents s/p L1-L2 laminectomy, L1-L2 TLIF.

## 2019-02-26 NOTE — PRE-ANESTHESIA EVALUATION ADULT - NSANTHPMHFT_GEN_ALL_CORE
PMHx: hypothyroidism  PSHx: s/p C-spine sx x 1, s/p lumbar sx x 3 (last in 2000)  myasthenia gravis - bulbar symptoms - loss of voice, dysphagia, muscle weakness (unknown last exacerbation)  Denies SOB or CP.  <4 METS.  Denies recent URI symptoms. PMHx: hypothyroidism  PSHx: s/p C-spine sx x 1, s/p lumbar sx x 3 (last in 2000)  myasthenia gravis - bulbar symptoms - loss of voice, dysphagia, muscle weakness (unknown last exacerbation - believes shortly after dx in October 2018)  Denies SOB or CP.  <4 METS.  Denies recent URI symptoms.

## 2019-02-26 NOTE — PHYSICAL THERAPY INITIAL EVALUATION ADULT - ADDITIONAL COMMENTS
Pt reports she lives with her  and adult working son in a private ranch-style home with 4-5 steps to enter. Pt reports she was independent with all mobility prior to admit. Pt has no ADs at home

## 2019-02-26 NOTE — PATIENT PROFILE ADULT - NSPRONUTRITIONRISK_GEN_A_NUR
Patient : Keegan Mcgrath Age: 1 year old Sex: male   MRN: [id number] Encounter Date: 4/21/2018      History     Chief Complaint   Patient presents with   • Ear Pain     HPI   Patent is a 1-year-old  male presents to the emergency department with mother who is currently caring for the child. The child's mother is at a wedding shower and sent for treatment was obtained verbally from mother on the phone. Grandmother is concerned regarding sudden onset significant ear pain over the past few hours. Child is cutting molars. He has also had recent URI symptoms with cough and congestion which has been resolving over the past week. Grandmother reports the child has not had a fever and is taking fluids readily without any problems swallowing or managing secretions.    No Known Allergies    Prior to Admission Medications    ALBUTEROL 108 (90 BASE) MCG/ACT INHALER    Inhale 2 puffs into the lungs every 4 hours as needed for Wheezing (OR COUGH).    PREDNISOLONE SOD-PHOS (ORAPRED) 15 MG/5ML SOLUTION    Take 4 ml po daily for 5 days       New Prescriptions    AMOXICILLIN (AMOXIL) 400 MG/5ML SUSPENSION    Take 7 mLs by mouth 2 times daily for 10 days.       No past medical history on file.    Past Surgical History:   Procedure Laterality Date   • CIRCUMCISION, PRIMARY         No family history on file.    Social History   Substance Use Topics   • Smoking status: Never Smoker   • Smokeless tobacco: Never Used   • Alcohol use Not on file       Review of Systems  A complete review of systems is negative or non-contributory except as noted above.      Physical Exam     ED Triage Vitals [04/21/18 1700]   ED Triage Vitals Group      Temp 98.1 °F (36.7 °C)      Heart Rate 129      Resp (!) 32      BP       SpO2 100 %      EtCO2 mmHg       Height       Weight 27 lb 9.6 oz (12.5 kg)      Weight Scale Used ED Actual       Physical Exam   Constitutional: Vital signs are normal. He appears well-developed and well-nourished. He is active.   Non-toxic appearance. No distress.   HENT:   Head: Normocephalic and atraumatic. No facial anomaly.   Right Ear: External ear, pinna and canal normal. No drainage, swelling or tenderness. No mastoid tenderness. Tympanic membrane is abnormal.   Left Ear: External ear, pinna and canal normal. No drainage, swelling or tenderness. No mastoid tenderness. Tympanic membrane is abnormal.   Nose: Nose normal. No nasal discharge.   Mouth/Throat: Mucous membranes are moist. Dentition is normal. No dental caries. No tonsillar exudate. Oropharynx is clear. Pharynx is normal.   Erythematous TMs bilaterally with loss of bony landmark.   Eyes: Conjunctivae, EOM and lids are normal. Pupils are equal, round, and reactive to light.   Neck: Normal range of motion and phonation normal. Neck supple. No neck rigidity or neck adenopathy. No Brudzinski's sign and no Kernig's sign noted.   Pulmonary/Chest: Effort normal and breath sounds normal. No accessory muscle usage, nasal flaring, stridor or grunting. No respiratory distress. He has no wheezes. He has no rhonchi. He has no rales. He exhibits no retraction.   Abdominal: There is no rigidity.   Musculoskeletal: Normal range of motion.   Lymphadenopathy: No anterior cervical adenopathy or posterior cervical adenopathy.   Neurological: He is alert and oriented for age. He has normal strength.   Skin: Skin is warm. Capillary refill takes less than 3 seconds. No petechiae, no purpura and no rash noted. He is not diaphoretic. No cyanosis. No jaundice or pallor.   Nursing note and vitals reviewed.      ED Course     Procedures    Lab Results     No results found for this visit on 04/21/18.      Radiology Results     Imaging Results    None         ED Medication Orders     Start Ordered     Status Ordering Provider    04/21/18 1715 04/21/18 1714  ibuprofen (MOTRIN,ADVIL) 100 MG/5ML suspension 126 mg  ONCE      Acknowledged SEE ALBERTS   TMs are markedly erythematous  bilaterally. Child appears very uncomfortable. He is treated here in the emergency department with ibuprofen. Child is given a prescription for amoxicillin. He has no previous history of ear infections per grandmother. Child is discharged in stable condition with instructions to Administer children's Tylenol or ibuprofen as needed and as directed for fever and discomfort. Amoxicillin as directed until gone and follow-up closely with his pediatrician for reevaluation. Return to the emergency department as needed for new or worsening symptoms.    Clinical Impression     ED Diagnosis   1. Acute suppurative otitis media of both ears without spontaneous rupture of tympanic membranes, recurrence not specified         Disposition        Discharge 4/21/2018  5:14 PM  Keegan Mcgrath discharge to home/self care.                    Anamaria Carter PA-C  04/21/18 1819     No indicators present

## 2019-02-26 NOTE — BRIEF OPERATIVE NOTE - PROCEDURE
<<-----Click on this checkbox to enter Procedure Posterior lumbar fusion using frameless stereotaxy  02/26/2019    Active  [email or web address]  Transforaminal lumbar interbody fusion (TLIF)  02/26/2019    Active  [email or web address]

## 2019-02-26 NOTE — PHYSICAL THERAPY INITIAL EVALUATION ADULT - DIAGNOSIS, PT EVAL
Pt presents with pain, decreased strength, decreased balance, and decreased endurance limiting overall independence with mobility.

## 2019-02-26 NOTE — PROGRESS NOTE ADULT - SUBJECTIVE AND OBJECTIVE BOX
HPI:  78 year old female with PMH of GERD, HTN, Myasthenia Gravis ( dx 10/2018 stable on Pyridostigmine), HLD with complaints of worsening lower back pain to groin found to have lumbar stenosis. Underwent planned L1-2 Lumbar laminectomy discectomy, L1-2 posterior lumbar fusion, L1-3 pedicle screws on 2/26/19. (12 Feb 2019 09:42)    On admission, the patient was:  GCS: 15    VITALS:  T(C): , Max: 36.4 (02-26-19 @ 15:20)  HR:  (64 - 118)  BP:  (107/57 - 150/80)  ABP:  (104/46 - 128/55)  RR:  (18 - 18)  SpO2:  (94% - 100%)  Wt(kg): --      02-26-19 @ 07:01  -  02-26-19 @ 17:42  --------------------------------------------------------  IN: 75 mL / OUT: 190 mL / NET: -115 mL      LABS:  Na: 142 (02-26 @ 16:19)  K: 3.7 (02-26 @ 16:19)  Cl: 107 (02-26 @ 16:19)  CO2: 18 (02-26 @ 16:19)  BUN: 17 (02-26 @ 16:19)  Cr: 0.78 (02-26 @ 16:19)  Glu: 236(02-26 @ 16:19)    Hgb: 10.5 (02-26 @ 16:19)  Hct: 29.8 (02-26 @ 16:19)  WBC: 6.8 (02-26 @ 16:19)  Plt: 114 (02-26 @ 16:19)      IMAGING:   Recent imaging studies were reviewed.    MEDICATIONS:  acetaminophen  IVPB .. 1000 milliGRAM(s) IV Intermittent once  acetaminophen  IVPB .. 1000 milliGRAM(s) IV Intermittent once  ascorbic acid 500 milliGRAM(s) Oral two times a day  atorvastatin 40 milliGRAM(s) Oral at bedtime  celecoxib 200 milliGRAM(s) Oral two times a day  dexamethasone  Injectable 4 milliGRAM(s) IV Push every 6 hours  docusate sodium 100 milliGRAM(s) Oral three times a day  famotidine    Tablet 20 milliGRAM(s) Oral two times a day  gabapentin 300 milliGRAM(s) Oral three times a day  hydrochlorothiazide 12.5 milliGRAM(s) Oral daily  HYDROmorphone  Injectable 0.25 milliGRAM(s) IV Push every 2 hours PRN  HYDROmorphone  Injectable 0.25 milliGRAM(s) IV Push every 30 minutes PRN  lactated ringers. 1000 milliLiter(s) IV Continuous <Continuous>  levothyroxine 50 MICROGram(s) Oral daily  losartan 50 milliGRAM(s) Oral daily  mupirocin 2% Ointment 1 Application(s) Topical two times a day  ondansetron   Disintegrating Tablet 4 milliGRAM(s) Oral every 4 hours PRN  ondansetron Injectable 4 milliGRAM(s) IV Push once PRN  oxyCODONE    IR 5 milliGRAM(s) Oral once PRN  pantoprazole    Tablet 40 milliGRAM(s) Oral before breakfast  pyridostigmine 60 milliGRAM(s) Oral two times a day  pyridostigmine  milliGRAM(s) Oral at bedtime  senna 2 Tablet(s) Oral at bedtime  traMADol 25 milliGRAM(s) Oral once PRN  traMADol 25 milliGRAM(s) Oral at bedtime  vancomycin  IVPB 1000 milliGRAM(s) IV Intermittent every 12 hours  vancomycin  IVPB 1000 milliGRAM(s) IV Intermittent once    EXAMINATION:  General:  calm,cooperative  HEENT:  EOMI, PERRLA, no stridor  Neuro:  awake, alert, oriented x 3, follows commands, moves all extremities, at least 4/5 effort-dependent  Cards:  S1S2 present  Respiratory:  no respiratory distress,   Abdomen:  soft  Extremities:  no edema  Skin:  warm/dry

## 2019-02-26 NOTE — PRE-OP CHECKLIST - BOWEL PREP
n/a Spiral Flap Text: The defect edges were debeveled with a #15 scalpel blade.  Given the location of the defect, shape of the defect and the proximity to free margins a spiral flap was deemed most appropriate.  Using a sterile surgical marker, an appropriate rotation flap was drawn incorporating the defect and placing the expected incisions within the relaxed skin tension lines where possible. The area thus outlined was incised deep to adipose tissue with a #15 scalpel blade.  The skin margins were undermined to an appropriate distance in all directions utilizing iris scissors.

## 2019-02-26 NOTE — PHYSICAL THERAPY INITIAL EVALUATION ADULT - GENERAL OBSERVATIONS, REHAB EVAL
Pt tolerated 45min PT initial evaluation well. Pt POD1 s/p L1-L2 TLIF. Pt rec'd in bed in NAD, +HVAC, IVL. Orthostatic BP (-) see flowsheet for details.

## 2019-02-26 NOTE — PROGRESS NOTE ADULT - ASSESSMENT
ASSESSMENT:   S/p  L1-2 Lumbar laminectomy discectomy, L1-2 posterior lumbar fusion, L1-3 pedicle screws on 2/26/19.    NEURO:  neurochecks q1 hr  MRI post-op, Surgical drains per NSGY,   Pain control, avoid muscle relaxants/opiates  Activity: [] OOB as tolerated [x] Bedrest tonight [] PT [] OT [] PMNR    PULM:  Resp. acidosis - in a pt with MG - BiPAP, reassess in 2-3 hours with ABg    CV:  -150mmHg, d/c a-line in AM if hemodynamically stable    RENAL:  IVF until good PO intake    GI:  Diet: Dysphagia screen and then advance diet as tolerated  GI prophylaxis [x] not indicated [] PPI [] other:  Bowel regimen [x] colace [x] senna [] other:    ENDO:   Goal euglycemia (-180)    HEME/ONC:  VTE prophylaxis: [x] SCDs [] chemoprophylaxis [x] hold chemoprophylaxis due to: fresh postop [] high risk of DVT/PE on admission due to:    ID:  Katelynn-op antibiotics    SOCIAL/FAMILY:  [x] awaiting [] updated at bedside [] family meeting    CODE STATUS:  [x] Full Code [] DNR [] DNI [] Palliative/Comfort Care    DISPOSITION:  [x] ICU [] Stroke Unit [] Floor [] EMU [] RCU [] PCU    [x] Patient is at high risk of neurologic deterioration/death due to: bleeding, hypotension/shock    Time spent: 35 min critical care minutes ASSESSMENT:   S/p  L1-2 Lumbar laminectomy discectomy, L1-2 posterior lumbar fusion, L1-3 pedicle screws on 2/26/19.    NEURO:  neurochecks q1 hr  MRI post-op, Surgical drains per NSGY,   Pain control, avoid muscle relaxants/opiates  Activity: [] OOB as tolerated [x] Bedrest tonight [] PT [] OT [] PMNR    PULM:  Resp. acidosis - in a pt with MG - BiPAP, reassess in 2-3 hours with ABg    CV:  -150mmHg, d/c a-line in AM if hemodynamically stable    RENAL:  IVF until good PO intake    GI:  Diet: Dysphagia screen and then advance diet as tolerated  GI prophylaxis [x] not indicated [] PPI [] other:  Bowel regimen [x] colace [x] senna [] other:    ENDO:   Goal euglycemia (-180)    HEME/ONC:  VTE prophylaxis: [x] SCDs [] chemoprophylaxis [x] hold chemoprophylaxis due to: fresh postop [] high risk of DVT/PE on admission due to:    ID:  Katelynn-op antibiotics    SOCIAL/FAMILY:  [x] awaiting [] updated at bedside [] family meeting    CODE STATUS:  [x] Full Code [] DNR [] DNI [] Palliative/Comfort Care    DISPOSITION:  [x] ICU [] Stroke Unit [] Floor [] EMU [] RCU [] PCU    [x] Patient is at high risk of neurologic deterioration/death due to: post-op bleeding,    Time spent: 35 min critical care minutes

## 2019-02-26 NOTE — PRE-ANESTHESIA EVALUATION ADULT - NSANTHHOMEMEDSFT_GEN_ALL_CORE
Self-administered hyzaar, zantac, nexium, and levothyroxine this AM. Self-administered hyzaar (losartan/hctz), zantac, nexium, and levothyroxine this AM.

## 2019-02-27 LAB
ANION GAP SERPL CALC-SCNC: 12 MMOL/L — SIGNIFICANT CHANGE UP (ref 5–17)
BUN SERPL-MCNC: 21 MG/DL — SIGNIFICANT CHANGE UP (ref 7–23)
CALCIUM SERPL-MCNC: 8.1 MG/DL — LOW (ref 8.4–10.5)
CHLORIDE SERPL-SCNC: 105 MMOL/L — SIGNIFICANT CHANGE UP (ref 96–108)
CO2 SERPL-SCNC: 20 MMOL/L — LOW (ref 22–31)
CREAT SERPL-MCNC: 0.88 MG/DL — SIGNIFICANT CHANGE UP (ref 0.5–1.3)
GAS PNL BLDA: SIGNIFICANT CHANGE UP
GLUCOSE BLDC GLUCOMTR-MCNC: 147 MG/DL — HIGH (ref 70–99)
GLUCOSE BLDC GLUCOMTR-MCNC: 148 MG/DL — HIGH (ref 70–99)
GLUCOSE BLDC GLUCOMTR-MCNC: 198 MG/DL — HIGH (ref 70–99)
GLUCOSE SERPL-MCNC: 184 MG/DL — HIGH (ref 70–99)
HBA1C BLD-MCNC: 5 % — SIGNIFICANT CHANGE UP (ref 4–5.6)
HCT VFR BLD CALC: 26.8 % — LOW (ref 34.5–45)
HGB BLD-MCNC: 9.6 G/DL — LOW (ref 11.5–15.5)
MAGNESIUM SERPL-MCNC: 1.9 MG/DL — SIGNIFICANT CHANGE UP (ref 1.6–2.6)
MCHC RBC-ENTMCNC: 31.4 PG — SIGNIFICANT CHANGE UP (ref 27–34)
MCHC RBC-ENTMCNC: 35.7 GM/DL — SIGNIFICANT CHANGE UP (ref 32–36)
MCV RBC AUTO: 87.9 FL — SIGNIFICANT CHANGE UP (ref 80–100)
PHOSPHATE SERPL-MCNC: 3.7 MG/DL — SIGNIFICANT CHANGE UP (ref 2.5–4.5)
PLATELET # BLD AUTO: 129 K/UL — LOW (ref 150–400)
POTASSIUM SERPL-MCNC: 4.5 MMOL/L — SIGNIFICANT CHANGE UP (ref 3.5–5.3)
POTASSIUM SERPL-SCNC: 4.5 MMOL/L — SIGNIFICANT CHANGE UP (ref 3.5–5.3)
RBC # BLD: 3.05 M/UL — LOW (ref 3.8–5.2)
RBC # FLD: 12.5 % — SIGNIFICANT CHANGE UP (ref 10.3–14.5)
SODIUM SERPL-SCNC: 137 MMOL/L — SIGNIFICANT CHANGE UP (ref 135–145)
WBC # BLD: 7.3 K/UL — SIGNIFICANT CHANGE UP (ref 3.8–10.5)
WBC # FLD AUTO: 7.3 K/UL — SIGNIFICANT CHANGE UP (ref 3.8–10.5)

## 2019-02-27 PROCEDURE — 99024 POSTOP FOLLOW-UP VISIT: CPT

## 2019-02-27 RX ORDER — ASPIRIN/CALCIUM CARB/MAGNESIUM 324 MG
81 TABLET ORAL DAILY
Qty: 0 | Refills: 0 | Status: DISCONTINUED | OUTPATIENT
Start: 2019-02-27 | End: 2019-03-05

## 2019-02-27 RX ORDER — CALCIUM GLUCONATE 100 MG/ML
1 VIAL (ML) INTRAVENOUS ONCE
Qty: 0 | Refills: 0 | Status: COMPLETED | OUTPATIENT
Start: 2019-02-27 | End: 2019-02-27

## 2019-02-27 RX ADMIN — GABAPENTIN 300 MILLIGRAM(S): 400 CAPSULE ORAL at 05:41

## 2019-02-27 RX ADMIN — CELECOXIB 200 MILLIGRAM(S): 200 CAPSULE ORAL at 05:40

## 2019-02-27 RX ADMIN — PYRIDOSTIGMINE BROMIDE 180 MILLIGRAM(S): 60 SOLUTION ORAL at 00:15

## 2019-02-27 RX ADMIN — Medication 4 MILLIGRAM(S): at 00:15

## 2019-02-27 RX ADMIN — CELECOXIB 200 MILLIGRAM(S): 200 CAPSULE ORAL at 06:20

## 2019-02-27 RX ADMIN — LOSARTAN POTASSIUM 50 MILLIGRAM(S): 100 TABLET, FILM COATED ORAL at 12:22

## 2019-02-27 RX ADMIN — GABAPENTIN 300 MILLIGRAM(S): 400 CAPSULE ORAL at 14:58

## 2019-02-27 RX ADMIN — Medication 1000 MILLIGRAM(S): at 17:30

## 2019-02-27 RX ADMIN — ATORVASTATIN CALCIUM 40 MILLIGRAM(S): 80 TABLET, FILM COATED ORAL at 22:11

## 2019-02-27 RX ADMIN — Medication 12.5 MILLIGRAM(S): at 12:21

## 2019-02-27 RX ADMIN — Medication 250 MILLIGRAM(S): at 08:52

## 2019-02-27 RX ADMIN — Medication 400 MILLIGRAM(S): at 07:15

## 2019-02-27 RX ADMIN — Medication 400 MILLIGRAM(S): at 17:01

## 2019-02-27 RX ADMIN — PYRIDOSTIGMINE BROMIDE 60 MILLIGRAM(S): 60 SOLUTION ORAL at 05:43

## 2019-02-27 RX ADMIN — Medication 400 GRAM(S): at 05:00

## 2019-02-27 RX ADMIN — Medication 4 MILLIGRAM(S): at 05:41

## 2019-02-27 RX ADMIN — Medication 1000 MILLIGRAM(S): at 07:45

## 2019-02-27 RX ADMIN — FAMOTIDINE 20 MILLIGRAM(S): 10 INJECTION INTRAVENOUS at 05:41

## 2019-02-27 RX ADMIN — FAMOTIDINE 20 MILLIGRAM(S): 10 INJECTION INTRAVENOUS at 17:24

## 2019-02-27 RX ADMIN — Medication 81 MILLIGRAM(S): at 14:58

## 2019-02-27 RX ADMIN — MUPIROCIN 1 APPLICATION(S): 20 OINTMENT TOPICAL at 17:02

## 2019-02-27 RX ADMIN — ENOXAPARIN SODIUM 40 MILLIGRAM(S): 100 INJECTION SUBCUTANEOUS at 22:11

## 2019-02-27 RX ADMIN — Medication 2: at 00:19

## 2019-02-27 RX ADMIN — PANTOPRAZOLE SODIUM 40 MILLIGRAM(S): 20 TABLET, DELAYED RELEASE ORAL at 06:06

## 2019-02-27 RX ADMIN — Medication 4 MILLIGRAM(S): at 12:36

## 2019-02-27 RX ADMIN — Medication 100 MILLIGRAM(S): at 05:41

## 2019-02-27 RX ADMIN — Medication 100 MILLIGRAM(S): at 22:11

## 2019-02-27 RX ADMIN — Medication 100 MILLIGRAM(S): at 14:58

## 2019-02-27 RX ADMIN — PYRIDOSTIGMINE BROMIDE 180 MILLIGRAM(S): 60 SOLUTION ORAL at 22:11

## 2019-02-27 RX ADMIN — Medication 1000 MILLIGRAM(S): at 00:15

## 2019-02-27 RX ADMIN — SENNA PLUS 2 TABLET(S): 8.6 TABLET ORAL at 22:11

## 2019-02-27 RX ADMIN — MUPIROCIN 1 APPLICATION(S): 20 OINTMENT TOPICAL at 05:42

## 2019-02-27 RX ADMIN — GABAPENTIN 300 MILLIGRAM(S): 400 CAPSULE ORAL at 22:11

## 2019-02-27 RX ADMIN — CELECOXIB 200 MILLIGRAM(S): 200 CAPSULE ORAL at 17:24

## 2019-02-27 RX ADMIN — CELECOXIB 200 MILLIGRAM(S): 200 CAPSULE ORAL at 17:54

## 2019-02-27 RX ADMIN — Medication 50 MICROGRAM(S): at 05:42

## 2019-02-27 RX ADMIN — SODIUM CHLORIDE 75 MILLILITER(S): 9 INJECTION, SOLUTION INTRAVENOUS at 11:04

## 2019-02-27 RX ADMIN — Medication 400 MILLIGRAM(S): at 00:00

## 2019-02-27 RX ADMIN — Medication 500 MILLIGRAM(S): at 17:01

## 2019-02-27 RX ADMIN — Medication 400 MILLIGRAM(S): at 23:09

## 2019-02-27 RX ADMIN — Medication 500 MILLIGRAM(S): at 05:40

## 2019-02-27 NOTE — PROGRESS NOTE ADULT - ASSESSMENT
ASSESSMENT:   S/p  L1-2 Lumbar laminectomy discectomy, L1-2 posterior lumbar fusion, L1-3 pedicle screws on 2/26/19.    NEURO:  neurochecks q4 hr  MRI post-op, Surgical drains per NSGY,   Pain control, avoid muscle relaxants/opiates; check LFTs as getting tylenol frequently   Activity: [] OOB as tolerated [x] Bedrest tonight [] PT [] OT [] PMNR    PULM:  resp acidosis improved   no further distress    CV:  -150mmHg, d/c a-line in AM if hemodynamically stable    RENAL:  IVF until good PO intake    GI:  Diet: Dysphagia screen and then advance diet as tolerated  GI prophylaxis [x] not indicated [] PPI [] other:  Bowel regimen [x] colace [x] senna [] other:    ENDO:   Goal euglycemia (-180)    HEME/ONC:  VTE prophylaxis: [x] SCDs [x] chemoprophylaxis start tonight    ID:  Katelynn-op antibiotics    SOCIAL/FAMILY:  [x] awaiting [] updated at bedside [] family meeting    CODE STATUS:  [x] Full Code [] DNR [] DNI [] Palliative/Comfort Care    transfer to floor

## 2019-02-27 NOTE — PROGRESS NOTE ADULT - ASSESSMENT
78 year old female with PMH of GERD, HTN, Myasthenia Gravis ( dx 10/2018 stable on Pyridostigmine), HLD with complaints of worsening lower back pain to groin found to have lumbar stenosis planned for L1-2 Lumbar laminectomy discectomy, L1-2 posterior lumbar fusion, L1-3 pedicle screws on 2/26/19. (12 Feb 2019 09:42)    PROCEDURE: 2/26 s/p L1-L2 laminectomy and discectomy, L1-L2 fusion, L1-3 pedicle screws     POD#1    PLAN:  Neuro:   - pain control- avoid opioids, magnesium and muscle relaxers  - continue tylenol IV, celebrex, gabapentin  - Myasthenia Gravis- continuous pulse oximetry, avoid opioids, magnesium and muscle relaxers- continue pyridostigmine  - completed decadron 4q6 x 4 doses, will dc fingersticks, A1c is 5.0  - maintain HMV  Respiratory:   - encouraged incentive spirometry  CV:  - HTN- continue HCTZ, losartan, ASA 81, statin  DVT ppx:   - venodynes, sq lovenox  PT/OT:   - PT is pending      Assessment:  Please Check When Present   []  GCS  E   V  M     Heart Failure: []Acute, [] acute on chronic , []chronic  Heart Failure:  [] Diastolic (HFpEF), [] Systolic (HFrEF), []Combined (HFpEF and HFrEF), [] RHF, [] Pulm HTN, [] Other    [] NICO, [] ATN, [] AIN, [] other  [] CKD1, [] CKD2, [] CKD 3, [] CKD 4, [] CKD 5, []ESRD    Encephalopathy: [] Metabolic, [] Hepatic, [] toxic, [] Neurological, [] Other    Abnormal Nurtitional Status: [] malnurtition (see nutrition note), [ ]underweight: BMI < 19, [] morbid obesity: BMI >40, [] Cachexia    [] Sepsis  [] hypovolemic shock,[] cardiogenic shock, [] hemorrhagic shock, [] neuogenic shock  [] Acute Respiratory Failure  []Cerebral edema, [] Brain compression/ herniation,   [] Functional quadriplegia  [] Acute blood loss anemia    Spectralink # [id number]

## 2019-02-27 NOTE — PROGRESS NOTE ADULT - SUBJECTIVE AND OBJECTIVE BOX
HPI:  78 year old female with PMH of GERD, HTN, Myasthenia Gravis ( dx 10/2018 stable on Pyridostigmine), HLD with complaints of worsening lower back pain to groin found to have lumbar stenosis. Underwent planned L1-2 Lumbar laminectomy discectomy, L1-2 posterior lumbar fusion, L1-3 pedicle screws on 2/26/19. (12 Feb 2019 09:42)    On admission, the patient was:  GCS: 15    c/o back pain and leg pain, no SOB, no CP, +thirst    stable respiratory status overnight    EXAMINATION:  General:  calm,cooperative  HEENT:  EOMI, PERRLA, no stridor  Neuro:  awake, alert, oriented x 3, follows commands, BUE no drift, 4+/5 ?effort dependent; BLE 5/5  Cards:  S1S2 present  Respiratory:  no respiratory distress,   Abdomen:  soft  Extremities:  no edema  Skin:  warm/dry

## 2019-02-27 NOTE — PROGRESS NOTE ADULT - ASSESSMENT
78F L1-3 posterior fusion L1-2 TLIF  - Cont icu supportive care overnight  - neurochecks  - pain control  - monitor drain output  - PT, OOB as tolerated

## 2019-02-27 NOTE — PROGRESS NOTE ADULT - SUBJECTIVE AND OBJECTIVE BOX
Visit Summary: Patient seen and evaluated at bedside.    Overnight Events: none    Exam:  T(C): 36.6 (02-26-19 @ 20:00), Max: 36.6 (02-26-19 @ 20:00)  HR: 66 (02-26-19 @ 22:00) (64 - 118)  BP: 109/53 (02-26-19 @ 22:00) (107/57 - 150/80)  RR: 14 (02-26-19 @ 22:00) (14 - 18)  SpO2: 100% (02-26-19 @ 22:00) (94% - 100%)  Wt(kg): --    AOx3, FC, PERRL, EOMI, no facial   5/5 throughout but for proximal LE pain limited 4/5, no drift  SILT  no clonus                          10.5   6.8   )-----------( 114      ( 26 Feb 2019 16:19 )             29.8     02-26    142  |  107  |  17  ----------------------------<  236<H>  3.7   |  18<L>  |  0.78    Ca    7.9<L>      26 Feb 2019 16:19  Mg     2.0     02-26

## 2019-02-27 NOTE — PROGRESS NOTE ADULT - SUBJECTIVE AND OBJECTIVE BOX
SUBJECTIVE: Pt seen and examined in PACU, pt doing well, reports some incisional pain    OVERNIGHT EVENTS: none    Vital Signs Last 24 Hrs  T(C): 37 (27 Feb 2019 08:00), Max: 37.1 (27 Feb 2019 06:00)  T(F): 98.6 (27 Feb 2019 08:00), Max: 98.8 (27 Feb 2019 06:00)  HR: 60 (27 Feb 2019 08:00) (60 - 118)  BP: 100/55 (27 Feb 2019 08:00) (96/55 - 127/67)  BP(mean): 78 (27 Feb 2019 06:00) (71 - 89)  RR: 14 (27 Feb 2019 08:00) (14 - 18)  SpO2: 99% (27 Feb 2019 08:00) (94% - 100%)    PHYSICAL EXAM:    General: No Acute Distress     Neurological: Awake, alert oriented to person, place and time, Following Commands, Speech Fluent, Moving all extremities, b/l UE 4+/5, LE 5/5,  Sensation to Light Touch Intact    Pulmonary: Clear to Auscultation, No Rales, No Rhonchi, No Wheezes     Cardiovascular: S1, S2, Regular Rate and Rhythm     Gastrointestinal: Soft, Nontender, Nondistended     Incision: back dressing c/d/i    LABS:                        9.6    7.3   )-----------( 129      ( 27 Feb 2019 00:11 )             26.8    02-27    137  |  105  |  21  ----------------------------<  184<H>  4.5   |  20<L>  |  0.88    Ca    8.1<L>      27 Feb 2019 00:11  Phos  3.7     02-27  Mg     1.9     02-27      Hemoglobin A1C, Whole Blood: 5.0 % (02-27 @ 10:19)      02-26 @ 07:01  -  02-27 @ 07:00  --------------------------------------------------------  IN: 1370 mL / OUT: 1390 mL / NET: -20 mL    02-27 @ 07:01  - 02-27 @ 12:35  --------------------------------------------------------  IN: 135 mL / OUT: 0 mL / NET: 135 mL      DRAINS: HMV (105) x 24 hrs    MEDICATIONS:  Antibiotics:  vancomycin  IVPB 1000 milliGRAM(s) IV Intermittent once    Neuro:  acetaminophen  IVPB .. 1000 milliGRAM(s) IV Intermittent once  acetaminophen  IVPB .. 1000 milliGRAM(s) IV Intermittent once  celecoxib 200 milliGRAM(s) Oral two times a day  gabapentin 300 milliGRAM(s) Oral three times a day  ondansetron   Disintegrating Tablet 4 milliGRAM(s) Oral every 4 hours PRN Nausea  ondansetron Injectable 4 milliGRAM(s) IV Push once PRN Nausea and/or Vomiting    Cardiac:  hydrochlorothiazide 12.5 milliGRAM(s) Oral daily  losartan 50 milliGRAM(s) Oral daily    Pulm:    GI/:  docusate sodium 100 milliGRAM(s) Oral three times a day  famotidine    Tablet 20 milliGRAM(s) Oral two times a day  pantoprazole    Tablet 40 milliGRAM(s) Oral before breakfast  senna 2 Tablet(s) Oral at bedtime    Other:   ascorbic acid 500 milliGRAM(s) Oral two times a day  aspirin  chewable 81 milliGRAM(s) Oral daily  atorvastatin 40 milliGRAM(s) Oral at bedtime  dexamethasone  Injectable 4 milliGRAM(s) IV Push every 6 hours  dextrose 40% Gel 15 Gram(s) Oral once PRN Blood Glucose LESS THAN 70 milliGRAM(s)/deciliter  dextrose 5%. 1000 milliLiter(s) IV Continuous <Continuous>  dextrose 50% Injectable 12.5 Gram(s) IV Push once  dextrose 50% Injectable 25 Gram(s) IV Push once  dextrose 50% Injectable 25 Gram(s) IV Push once  enoxaparin Injectable 40 milliGRAM(s) SubCutaneous at bedtime  glucagon  Injectable 1 milliGRAM(s) IntraMuscular once PRN Glucose LESS THAN 70 milligrams/deciliter  insulin lispro (HumaLOG) corrective regimen sliding scale   SubCutaneous every 6 hours  lactated ringers. 1000 milliLiter(s) IV Continuous <Continuous>  levothyroxine 50 MICROGram(s) Oral daily  mupirocin 2% Ointment 1 Application(s) Topical two times a day  pyridostigmine 60 milliGRAM(s) Oral two times a day  pyridostigmine  milliGRAM(s) Oral at bedtime    DIET: [] Regular [x] CCD [] Renal [] Puree [] Dysphagia [] Tube Feeds:     IMAGING:

## 2019-02-28 LAB
ALBUMIN SERPL ELPH-MCNC: 2.9 G/DL — LOW (ref 3.3–5)
ALP SERPL-CCNC: 88 U/L — SIGNIFICANT CHANGE UP (ref 40–120)
ALT FLD-CCNC: 487 U/L — HIGH (ref 10–45)
AST SERPL-CCNC: 240 U/L — HIGH (ref 10–40)
BASOPHILS # BLD AUTO: 0 K/UL — SIGNIFICANT CHANGE UP (ref 0–0.2)
BASOPHILS NFR BLD AUTO: 0 % — SIGNIFICANT CHANGE UP (ref 0–2)
BILIRUB DIRECT SERPL-MCNC: 0.1 MG/DL — SIGNIFICANT CHANGE UP (ref 0–0.2)
BILIRUB INDIRECT FLD-MCNC: 0.2 MG/DL — SIGNIFICANT CHANGE UP (ref 0.2–1)
BILIRUB SERPL-MCNC: 0.3 MG/DL — SIGNIFICANT CHANGE UP (ref 0.2–1.2)
BLD GP AB SCN SERPL QL: NEGATIVE — SIGNIFICANT CHANGE UP
EOSINOPHIL # BLD AUTO: 0 K/UL — SIGNIFICANT CHANGE UP (ref 0–0.5)
EOSINOPHIL NFR BLD AUTO: 0 % — SIGNIFICANT CHANGE UP (ref 0–6)
HCT VFR BLD CALC: 21.6 % — LOW (ref 34.5–45)
HCT VFR BLD CALC: 26.5 % — LOW (ref 34.5–45)
HGB BLD-MCNC: 7 G/DL — CRITICAL LOW (ref 11.5–15.5)
HGB BLD-MCNC: 8.8 G/DL — LOW (ref 11.5–15.5)
IMM GRANULOCYTES NFR BLD AUTO: 0.4 % — SIGNIFICANT CHANGE UP (ref 0–1.5)
LYMPHOCYTES # BLD AUTO: 1.01 K/UL — SIGNIFICANT CHANGE UP (ref 1–3.3)
LYMPHOCYTES # BLD AUTO: 13.8 % — SIGNIFICANT CHANGE UP (ref 13–44)
MCHC RBC-ENTMCNC: 29.2 PG — SIGNIFICANT CHANGE UP (ref 27–34)
MCHC RBC-ENTMCNC: 30 PG — SIGNIFICANT CHANGE UP (ref 27–34)
MCHC RBC-ENTMCNC: 32.4 GM/DL — SIGNIFICANT CHANGE UP (ref 32–36)
MCHC RBC-ENTMCNC: 33.3 GM/DL — SIGNIFICANT CHANGE UP (ref 32–36)
MCV RBC AUTO: 87.6 FL — SIGNIFICANT CHANGE UP (ref 80–100)
MCV RBC AUTO: 92.7 FL — SIGNIFICANT CHANGE UP (ref 80–100)
MONOCYTES # BLD AUTO: 0.99 K/UL — HIGH (ref 0–0.9)
MONOCYTES NFR BLD AUTO: 13.6 % — SIGNIFICANT CHANGE UP (ref 2–14)
NEUTROPHILS # BLD AUTO: 5.27 K/UL — SIGNIFICANT CHANGE UP (ref 1.8–7.4)
NEUTROPHILS NFR BLD AUTO: 72.2 % — SIGNIFICANT CHANGE UP (ref 43–77)
PLATELET # BLD AUTO: 108 K/UL — LOW (ref 150–400)
PROT SERPL-MCNC: 5 G/DL — LOW (ref 6–8.3)
RBC # BLD: 2.33 M/UL — LOW (ref 3.8–5.2)
RBC # BLD: 3.02 M/UL — LOW (ref 3.8–5.2)
RBC # FLD: 14 % — SIGNIFICANT CHANGE UP (ref 10.3–14.5)
RBC # FLD: 14.4 % — SIGNIFICANT CHANGE UP (ref 10.3–14.5)
RH IG SCN BLD-IMP: NEGATIVE — SIGNIFICANT CHANGE UP
WBC # BLD: 7.2 K/UL — SIGNIFICANT CHANGE UP (ref 3.8–10.5)
WBC # BLD: 7.3 K/UL — SIGNIFICANT CHANGE UP (ref 3.8–10.5)
WBC # FLD AUTO: 7.2 K/UL — SIGNIFICANT CHANGE UP (ref 3.8–10.5)
WBC # FLD AUTO: 7.3 K/UL — SIGNIFICANT CHANGE UP (ref 3.8–10.5)

## 2019-02-28 RX ORDER — SODIUM CHLORIDE 9 MG/ML
250 INJECTION INTRAMUSCULAR; INTRAVENOUS; SUBCUTANEOUS ONCE
Qty: 0 | Refills: 0 | Status: COMPLETED | OUTPATIENT
Start: 2019-02-28 | End: 2019-02-28

## 2019-02-28 RX ORDER — LOSARTAN POTASSIUM 100 MG/1
50 TABLET, FILM COATED ORAL DAILY
Qty: 0 | Refills: 0 | Status: DISCONTINUED | OUTPATIENT
Start: 2019-02-28 | End: 2019-03-01

## 2019-02-28 RX ORDER — LIDOCAINE 4 G/100G
1 CREAM TOPICAL ONCE
Qty: 0 | Refills: 0 | Status: COMPLETED | OUTPATIENT
Start: 2019-02-28 | End: 2019-03-01

## 2019-02-28 RX ORDER — HYDROCHLOROTHIAZIDE 25 MG
12.5 TABLET ORAL DAILY
Qty: 0 | Refills: 0 | Status: DISCONTINUED | OUTPATIENT
Start: 2019-02-28 | End: 2019-03-05

## 2019-02-28 RX ADMIN — SODIUM CHLORIDE 500 MILLILITER(S): 9 INJECTION INTRAMUSCULAR; INTRAVENOUS; SUBCUTANEOUS at 11:26

## 2019-02-28 RX ADMIN — PYRIDOSTIGMINE BROMIDE 60 MILLIGRAM(S): 60 SOLUTION ORAL at 08:23

## 2019-02-28 RX ADMIN — Medication 81 MILLIGRAM(S): at 12:26

## 2019-02-28 RX ADMIN — GABAPENTIN 300 MILLIGRAM(S): 400 CAPSULE ORAL at 15:21

## 2019-02-28 RX ADMIN — GABAPENTIN 300 MILLIGRAM(S): 400 CAPSULE ORAL at 21:11

## 2019-02-28 RX ADMIN — ENOXAPARIN SODIUM 40 MILLIGRAM(S): 100 INJECTION SUBCUTANEOUS at 21:12

## 2019-02-28 RX ADMIN — Medication 100 MILLIGRAM(S): at 21:11

## 2019-02-28 RX ADMIN — CELECOXIB 200 MILLIGRAM(S): 200 CAPSULE ORAL at 07:20

## 2019-02-28 RX ADMIN — ATORVASTATIN CALCIUM 40 MILLIGRAM(S): 80 TABLET, FILM COATED ORAL at 21:11

## 2019-02-28 RX ADMIN — Medication 12.5 MILLIGRAM(S): at 06:27

## 2019-02-28 RX ADMIN — LOSARTAN POTASSIUM 50 MILLIGRAM(S): 100 TABLET, FILM COATED ORAL at 06:27

## 2019-02-28 RX ADMIN — CELECOXIB 200 MILLIGRAM(S): 200 CAPSULE ORAL at 17:56

## 2019-02-28 RX ADMIN — FAMOTIDINE 20 MILLIGRAM(S): 10 INJECTION INTRAVENOUS at 06:27

## 2019-02-28 RX ADMIN — PANTOPRAZOLE SODIUM 40 MILLIGRAM(S): 20 TABLET, DELAYED RELEASE ORAL at 06:32

## 2019-02-28 RX ADMIN — MUPIROCIN 1 APPLICATION(S): 20 OINTMENT TOPICAL at 06:30

## 2019-02-28 RX ADMIN — PYRIDOSTIGMINE BROMIDE 180 MILLIGRAM(S): 60 SOLUTION ORAL at 22:54

## 2019-02-28 RX ADMIN — CELECOXIB 200 MILLIGRAM(S): 200 CAPSULE ORAL at 06:25

## 2019-02-28 RX ADMIN — Medication 500 MILLIGRAM(S): at 17:56

## 2019-02-28 RX ADMIN — Medication 50 MICROGRAM(S): at 06:26

## 2019-02-28 RX ADMIN — GABAPENTIN 300 MILLIGRAM(S): 400 CAPSULE ORAL at 06:26

## 2019-02-28 RX ADMIN — Medication 1000 MILLIGRAM(S): at 00:00

## 2019-02-28 RX ADMIN — FAMOTIDINE 20 MILLIGRAM(S): 10 INJECTION INTRAVENOUS at 17:56

## 2019-02-28 RX ADMIN — CELECOXIB 200 MILLIGRAM(S): 200 CAPSULE ORAL at 18:30

## 2019-02-28 RX ADMIN — MUPIROCIN 1 APPLICATION(S): 20 OINTMENT TOPICAL at 17:56

## 2019-02-28 RX ADMIN — PYRIDOSTIGMINE BROMIDE 60 MILLIGRAM(S): 60 SOLUTION ORAL at 15:21

## 2019-02-28 RX ADMIN — Medication 100 MILLIGRAM(S): at 06:27

## 2019-02-28 RX ADMIN — ONDANSETRON 4 MILLIGRAM(S): 8 TABLET, FILM COATED ORAL at 17:56

## 2019-02-28 RX ADMIN — Medication 100 MILLIGRAM(S): at 15:20

## 2019-02-28 RX ADMIN — Medication 500 MILLIGRAM(S): at 06:26

## 2019-02-28 RX ADMIN — SENNA PLUS 2 TABLET(S): 8.6 TABLET ORAL at 21:11

## 2019-02-28 NOTE — PROVIDER CONTACT NOTE (CRITICAL VALUE NOTIFICATION) - ACTION/TREATMENT ORDERED:
Type and screen ordered, pt to receive 2 units of PRBCs. CBC to be take post transfusion of 2 units.

## 2019-02-28 NOTE — PROGRESS NOTE ADULT - SUBJECTIVE AND OBJECTIVE BOX
SUBJECTIVE: Patient seen and examined at bedside.  Walking with walker with minimal assistance.  C/O mild incisional pain.      CHIEF COMPLAINT: back pain    OVERNIGHT EVENTS: none    Vital Signs Last 24 Hrs  T(C): 36.9 (28 Feb 2019 04:38), Max: 36.9 (27 Feb 2019 16:18)  T(F): 98.4 (28 Feb 2019 04:38), Max: 98.4 (27 Feb 2019 16:18)  HR: 73 (28 Feb 2019 04:38) (63 - 95)  BP: 93/50 (28 Feb 2019 04:38) (90/50 - 128/60)  BP(mean): 76 (27 Feb 2019 12:00) (76 - 76)  RR: 18 (28 Feb 2019 04:38) (16 - 18)  SpO2: 98% (28 Feb 2019 04:38) (95% - 99%)    DRAINS: + HMV (25)    PHYSICAL EXAM:    Constitutional: No Acute Distress     Neurological: Awake, alert oriented to person, place and time, Following Commands, Speech Fluent, Moving all extremities, b/l UE 4+/5, LE 5/5,  Sensation to Light Touch Intact                                                 Sensation: [X] intact to light touch  [] decreased:     Pulmonary: Clear to Auscultation, No rales, No rhonchi, No wheezes     Cardiovascular: S1, S2, Regular rate and rhythm     Gastrointestinal: Soft, Non-tender, Non-distended     Extremities: No calf tenderness     Incision: +staples, clean and dry; drain d/nemo    LABS:                        7.0    7.30  )-----------( 108      ( 28 Feb 2019 09:29 )             21.6    02-27    137  |  105  |  21  ----------------------------<  184<H>  4.5   |  20<L>  |  0.88    Ca    8.1<L>      27 Feb 2019 00:11  Phos  3.7     02-27  Mg     1.9     02-27    TPro  5.0<L>  /  Alb  2.9<L>  /  TBili  0.3  /  DBili  0.1  /  AST  240<H>  /  ALT  487<H>  /  AlkPhos  88  02-28      MEDICATIONS:  Antibiotics:    Neuro:  celecoxib 200 milliGRAM(s) Oral two times a day  gabapentin 300 milliGRAM(s) Oral three times a day  ondansetron   Disintegrating Tablet 4 milliGRAM(s) Oral every 4 hours PRN Nausea    Cardiac:  hydrochlorothiazide 12.5 milliGRAM(s) Oral daily  losartan 50 milliGRAM(s) Oral daily    Pulm:    GI/:  docusate sodium 100 milliGRAM(s) Oral three times a day  famotidine    Tablet 20 milliGRAM(s) Oral two times a day  pantoprazole    Tablet 40 milliGRAM(s) Oral before breakfast  senna 2 Tablet(s) Oral at bedtime    Other:   ascorbic acid 500 milliGRAM(s) Oral two times a day  aspirin  chewable 81 milliGRAM(s) Oral daily  atorvastatin 40 milliGRAM(s) Oral at bedtime  enoxaparin Injectable 40 milliGRAM(s) SubCutaneous at bedtime  levothyroxine 50 MICROGram(s) Oral daily  mupirocin 2% Ointment 1 Application(s) Topical two times a day  pyridostigmine 60 milliGRAM(s) Oral two times a day  pyridostigmine  milliGRAM(s) Oral at bedtime  sodium chloride 0.9% Bolus 250 milliLiter(s) IV Bolus once    DIET: [X Regular [] CCD [] Renal [] Puree [] Dysphagia [] Tube Feeds:     IMAGING: < from: CT 3D Reconstruct w/o Workstation (01.03.19 @ 11:12) >  IMPRESSION:     1.  Status post laminectomy and facetectomy at L2-3. Status post   laminectomies from L3-4 through L5-S1. Findings are similar to prior   study. Anterior fusion at L2-3 as detailed above.  2.  L2-3: Unchanged soft tissue density in the right lateral recess   likely reflecting scarring or recurrent disc. MRI can be performed for   further evaluation.  3.  L1-2: Anterolisthesis and posterior osseous ridging contributing to   severe right foraminal narrowing.    < end of copied text >

## 2019-02-28 NOTE — PROGRESS NOTE ADULT - ASSESSMENT
HPI:  78 year old female with PMH of GERD, HTN, Myasthenia Gravis ( dx 10/2018 stable on Pyridostigmine), HLD with complaints of worsening lower back pain to groin found to have lumbar stenosis planned for L1-2 Lumbar laminectomy discectomy, L1-2 posterior lumbar fusion, L1-3 pedicle screws on 2/26/19. (12 Feb 2019 09:42)    PROCEDURE: 2/26 s/p L1-L2 laminectomy and discectomy, L1-L2 fusion, L1-3 pedicle screws     POD# 2    PLAN:  NEURO: Myasthenia Gravis- continuous pulse oximetry, avoid opioids, magnesium and muscle relaxers- continue pyridostigmine  cont gabapentin/ celebrex, increase activity as tolerated; drain d/nemo; increase activity as tolerated  PULM: room air, incentive spirometry  CV: H/O HTN- on HCTZ and losartan- hold parameters entered; BP soft, NS bolus 250 cc x 1; cont ASA  ENDO:  cont levothyroxine  HEME/ONC:   H/H low, will transfuse 2 units PRBC and repeat CBC            DVT ppx: [X] SQL [] SQH [X] Venodynes bilaterally   RENAL: IVL, voiding  ID: afebrile  GI: regular diet, bowel regimen  DISCHARGE PLANNING: Awaiting PT recommendations      Assessment:  Please Check When Present   []  GCS  E   V  M     Heart Failure: []Acute, [] acute on chronic , []chronic  Heart Failure:  [] Diastolic (HFpEF), [] Systolic (HFrEF), []Combined (HFpEF and HFrEF), [] RHF, [] Pulm HTN, [] Other    [] NICO, [] ATN, [] AIN, [] other  [] CKD1, [] CKD2, [] CKD 3, [] CKD 4, [] CKD 5, []ESRD    Encephalopathy: [] Metabolic, [] Hepatic, [] toxic, [] Neurological, [] Other    Abnormal Nurtitional Status: [] malnurtition (see nutrition note), [ ]underweight: BMI < 19, [] morbid obesity: BMI >40, [] Cachexia    [] Sepsis  [] hypovolemic shock,[] cardiogenic shock, [] hemorrhagic shock, [] neuogenic shock  [] Acute Respiratory Failure  []Cerebral edema, [] Brain compression/ herniation,   [] Functional quadriplegia  [] Acute blood loss anemia    Will discuss plan with Dr. Vick  Spectra # [id number]

## 2019-03-01 DIAGNOSIS — D69.6 THROMBOCYTOPENIA, UNSPECIFIED: ICD-10-CM

## 2019-03-01 DIAGNOSIS — E78.5 HYPERLIPIDEMIA, UNSPECIFIED: ICD-10-CM

## 2019-03-01 DIAGNOSIS — M48.062 SPINAL STENOSIS, LUMBAR REGION WITH NEUROGENIC CLAUDICATION: ICD-10-CM

## 2019-03-01 DIAGNOSIS — R94.5 ABNORMAL RESULTS OF LIVER FUNCTION STUDIES: ICD-10-CM

## 2019-03-01 LAB
ALBUMIN SERPL ELPH-MCNC: 2.9 G/DL — LOW (ref 3.3–5)
ALP SERPL-CCNC: 81 U/L — SIGNIFICANT CHANGE UP (ref 40–120)
ALT FLD-CCNC: 265 U/L — HIGH (ref 10–45)
ANION GAP SERPL CALC-SCNC: 12 MMOL/L — SIGNIFICANT CHANGE UP (ref 5–17)
AST SERPL-CCNC: 67 U/L — HIGH (ref 10–40)
BASOPHILS # BLD AUTO: 0.01 K/UL — SIGNIFICANT CHANGE UP (ref 0–0.2)
BASOPHILS NFR BLD AUTO: 0.1 % — SIGNIFICANT CHANGE UP (ref 0–2)
BILIRUB DIRECT SERPL-MCNC: 0.2 MG/DL — SIGNIFICANT CHANGE UP (ref 0–0.2)
BILIRUB INDIRECT FLD-MCNC: 0.4 MG/DL — SIGNIFICANT CHANGE UP (ref 0.2–1)
BILIRUB SERPL-MCNC: 0.6 MG/DL — SIGNIFICANT CHANGE UP (ref 0.2–1.2)
BUN SERPL-MCNC: 24 MG/DL — HIGH (ref 7–23)
CALCIUM SERPL-MCNC: 8.5 MG/DL — SIGNIFICANT CHANGE UP (ref 8.4–10.5)
CHLORIDE SERPL-SCNC: 107 MMOL/L — SIGNIFICANT CHANGE UP (ref 96–108)
CO2 SERPL-SCNC: 22 MMOL/L — SIGNIFICANT CHANGE UP (ref 22–31)
CREAT SERPL-MCNC: 1.02 MG/DL — SIGNIFICANT CHANGE UP (ref 0.5–1.3)
EOSINOPHIL # BLD AUTO: 0.06 K/UL — SIGNIFICANT CHANGE UP (ref 0–0.5)
EOSINOPHIL NFR BLD AUTO: 0.8 % — SIGNIFICANT CHANGE UP (ref 0–6)
GLUCOSE SERPL-MCNC: 132 MG/DL — HIGH (ref 70–99)
HCT VFR BLD CALC: 27.2 % — LOW (ref 34.5–45)
HCT VFR BLD CALC: 27.5 % — LOW (ref 34.5–45)
HGB BLD-MCNC: 9.1 G/DL — LOW (ref 11.5–15.5)
HGB BLD-MCNC: 9.1 G/DL — LOW (ref 11.5–15.5)
IMM GRANULOCYTES NFR BLD AUTO: 0.5 % — SIGNIFICANT CHANGE UP (ref 0–1.5)
LYMPHOCYTES # BLD AUTO: 1 K/UL — SIGNIFICANT CHANGE UP (ref 1–3.3)
LYMPHOCYTES # BLD AUTO: 13.5 % — SIGNIFICANT CHANGE UP (ref 13–44)
MCHC RBC-ENTMCNC: 29.5 PG — SIGNIFICANT CHANGE UP (ref 27–34)
MCHC RBC-ENTMCNC: 29.5 PG — SIGNIFICANT CHANGE UP (ref 27–34)
MCHC RBC-ENTMCNC: 33.1 GM/DL — SIGNIFICANT CHANGE UP (ref 32–36)
MCHC RBC-ENTMCNC: 33.5 GM/DL — SIGNIFICANT CHANGE UP (ref 32–36)
MCV RBC AUTO: 88.3 FL — SIGNIFICANT CHANGE UP (ref 80–100)
MCV RBC AUTO: 89.3 FL — SIGNIFICANT CHANGE UP (ref 80–100)
MONOCYTES # BLD AUTO: 0.85 K/UL — SIGNIFICANT CHANGE UP (ref 0–0.9)
MONOCYTES NFR BLD AUTO: 11.4 % — SIGNIFICANT CHANGE UP (ref 2–14)
NEUTROPHILS # BLD AUTO: 5.47 K/UL — SIGNIFICANT CHANGE UP (ref 1.8–7.4)
NEUTROPHILS NFR BLD AUTO: 73.7 % — SIGNIFICANT CHANGE UP (ref 43–77)
PLATELET # BLD AUTO: 110 K/UL — LOW (ref 150–400)
PLATELET # BLD AUTO: 111 K/UL — LOW (ref 150–400)
PLATELET # BLD AUTO: 92 K/UL — LOW (ref 150–400)
POTASSIUM SERPL-MCNC: 3.9 MMOL/L — SIGNIFICANT CHANGE UP (ref 3.5–5.3)
POTASSIUM SERPL-SCNC: 3.9 MMOL/L — SIGNIFICANT CHANGE UP (ref 3.5–5.3)
PROT SERPL-MCNC: 5.4 G/DL — LOW (ref 6–8.3)
RBC # BLD: 3.08 M/UL — LOW (ref 3.8–5.2)
RBC # BLD: 3.08 M/UL — LOW (ref 3.8–5.2)
RBC # FLD: 15.1 % — HIGH (ref 10.3–14.5)
RBC # FLD: 15.2 % — HIGH (ref 10.3–14.5)
SODIUM SERPL-SCNC: 141 MMOL/L — SIGNIFICANT CHANGE UP (ref 135–145)
WBC # BLD: 7.43 K/UL — SIGNIFICANT CHANGE UP (ref 3.8–10.5)
WBC # BLD: 7.55 K/UL — SIGNIFICANT CHANGE UP (ref 3.8–10.5)
WBC # FLD AUTO: 7.43 K/UL — SIGNIFICANT CHANGE UP (ref 3.8–10.5)
WBC # FLD AUTO: 7.55 K/UL — SIGNIFICANT CHANGE UP (ref 3.8–10.5)

## 2019-03-01 PROCEDURE — 99223 1ST HOSP IP/OBS HIGH 75: CPT

## 2019-03-01 PROCEDURE — 76700 US EXAM ABDOM COMPLETE: CPT | Mod: 26

## 2019-03-01 PROCEDURE — 99222 1ST HOSP IP/OBS MODERATE 55: CPT | Mod: GC

## 2019-03-01 RX ORDER — OXYCODONE HYDROCHLORIDE 5 MG/1
5 TABLET ORAL EVERY 4 HOURS
Qty: 0 | Refills: 0 | Status: DISCONTINUED | OUTPATIENT
Start: 2019-03-01 | End: 2019-03-05

## 2019-03-01 RX ORDER — OXYCODONE HYDROCHLORIDE 5 MG/1
10 TABLET ORAL EVERY 4 HOURS
Qty: 0 | Refills: 0 | Status: DISCONTINUED | OUTPATIENT
Start: 2019-03-01 | End: 2019-03-05

## 2019-03-01 RX ADMIN — CELECOXIB 200 MILLIGRAM(S): 200 CAPSULE ORAL at 17:15

## 2019-03-01 RX ADMIN — GABAPENTIN 300 MILLIGRAM(S): 400 CAPSULE ORAL at 14:15

## 2019-03-01 RX ADMIN — ATORVASTATIN CALCIUM 40 MILLIGRAM(S): 80 TABLET, FILM COATED ORAL at 22:07

## 2019-03-01 RX ADMIN — GABAPENTIN 300 MILLIGRAM(S): 400 CAPSULE ORAL at 22:07

## 2019-03-01 RX ADMIN — SENNA PLUS 2 TABLET(S): 8.6 TABLET ORAL at 22:07

## 2019-03-01 RX ADMIN — FAMOTIDINE 20 MILLIGRAM(S): 10 INJECTION INTRAVENOUS at 17:15

## 2019-03-01 RX ADMIN — Medication 50 MICROGRAM(S): at 05:35

## 2019-03-01 RX ADMIN — Medication 100 MILLIGRAM(S): at 05:36

## 2019-03-01 RX ADMIN — FAMOTIDINE 20 MILLIGRAM(S): 10 INJECTION INTRAVENOUS at 05:36

## 2019-03-01 RX ADMIN — Medication 12.5 MILLIGRAM(S): at 05:35

## 2019-03-01 RX ADMIN — PANTOPRAZOLE SODIUM 40 MILLIGRAM(S): 20 TABLET, DELAYED RELEASE ORAL at 06:36

## 2019-03-01 RX ADMIN — LOSARTAN POTASSIUM 50 MILLIGRAM(S): 100 TABLET, FILM COATED ORAL at 05:35

## 2019-03-01 RX ADMIN — CELECOXIB 200 MILLIGRAM(S): 200 CAPSULE ORAL at 18:26

## 2019-03-01 RX ADMIN — GABAPENTIN 300 MILLIGRAM(S): 400 CAPSULE ORAL at 05:35

## 2019-03-01 RX ADMIN — MUPIROCIN 1 APPLICATION(S): 20 OINTMENT TOPICAL at 05:36

## 2019-03-01 RX ADMIN — CELECOXIB 200 MILLIGRAM(S): 200 CAPSULE ORAL at 05:35

## 2019-03-01 RX ADMIN — CELECOXIB 200 MILLIGRAM(S): 200 CAPSULE ORAL at 06:15

## 2019-03-01 RX ADMIN — LIDOCAINE 1 PATCH: 4 CREAM TOPICAL at 06:55

## 2019-03-01 RX ADMIN — PYRIDOSTIGMINE BROMIDE 60 MILLIGRAM(S): 60 SOLUTION ORAL at 14:15

## 2019-03-01 RX ADMIN — ENOXAPARIN SODIUM 40 MILLIGRAM(S): 100 INJECTION SUBCUTANEOUS at 22:08

## 2019-03-01 RX ADMIN — LIDOCAINE 1 PATCH: 4 CREAM TOPICAL at 14:00

## 2019-03-01 RX ADMIN — PYRIDOSTIGMINE BROMIDE 60 MILLIGRAM(S): 60 SOLUTION ORAL at 05:36

## 2019-03-01 RX ADMIN — Medication 500 MILLIGRAM(S): at 05:36

## 2019-03-01 RX ADMIN — Medication 100 MILLIGRAM(S): at 22:07

## 2019-03-01 RX ADMIN — Medication 81 MILLIGRAM(S): at 12:52

## 2019-03-01 RX ADMIN — LIDOCAINE 1 PATCH: 4 CREAM TOPICAL at 02:20

## 2019-03-01 RX ADMIN — PYRIDOSTIGMINE BROMIDE 180 MILLIGRAM(S): 60 SOLUTION ORAL at 22:07

## 2019-03-01 RX ADMIN — MUPIROCIN 1 APPLICATION(S): 20 OINTMENT TOPICAL at 17:15

## 2019-03-01 NOTE — CONSULT NOTE ADULT - SUBJECTIVE AND OBJECTIVE BOX
78yF was admitted on 02-26    Patient is a 78y old  Female who presents with a chief complaint of back pain (28 Feb 2019 11:15)    HPI:  78 year old female with h/o GERD, HTN, Myasthenia Gravis (dx 10/2018 stable on Pyridostigmine), HLD with complaints of worsening lower back pain to groin found to have lumbar stenosis, admitted electively for spine surgery.    Lumbar stenosis with neurogenic claudication s/p   L1, L2 laminectomy  L1-2 TLIF (left sided approach) Stereotactically guided L1-L3 pedicle screw fixation Posterolateral fusion	  with Dr Vick on 2/26/19, EBL ~500cc    Hospital course c/b acute blood loss anemia s/p 2 U pRBCs, Hb 7.0-8.8. Labs also significant for thrombocytopenia and transaminitis.     REVIEW OF SYSTEMS  Constitutional - No fever, No weight loss, No fatigue  HEENT - No eye pain, No visual disturbances, No difficulty hearing, No tinnitus, No vertigo, No neck pain  Respiratory - No cough, No wheezing, No shortness of breath  Cardiovascular - No chest pain, No palpitations  Gastrointestinal - No abdominal pain, No nausea, No vomiting, No diarrhea, No constipation  Genitourinary - No dysuria, No frequency, No hematuria, No incontinence  Neurological - No headaches, No memory loss, No loss of strength, No numbness, No tremors  Skin - No itching, No rashes, No lesions   Endocrine - No temperature intolerance  Musculoskeletal - No joint pain, No joint swelling, No muscle pain  Psychiatric - No depression, No anxiety    PAST MEDICAL & SURGICAL HISTORY  Reactive airway disease that is not asthma  Aortic stenosis, mild  Diverticulitis large intestine  Lumbar stenosis  Myasthenia gravis  Carpal Tunnel Syndrome Right  Gastric Ulcer  Hammertoe Right foot  Kidney Calculi  Lumbar Radiculopathy  GERD (Gastroesophageal Reflux Disease)  Hyperlipidemia  Genital Ulcer, Female  Hypertension  H/O umbilical hernia repair  History of tonsillectomy  S/P foot surgery  H/O shoulder replacement  Prolapse, Uterovaginal  S/P Laparoscopic Fundoplication  S/P Hysterectomy Partial  Bilateral Cataracts  S/P Laminectomy with Spinal Fusion cervcical and lumbar  S/P Appendectomy  S/P Hernia Repair Umbikical  S/P Cholecystectomy    FAMILY HISTORY       SOCIAL HISTORY  Smoking - Denied  EtOH - Denied   Drugs - Denied    FUNCTIONAL HISTORY  Pt reports she lives with her  and adult working son in a private ranch-style home with 5-7 ROBERTO.   Pt reports she was independent with all mobility PTA, no AD.    CURRENT FUNCTIONAL STATUS    PT: Pt performed bed mobility via log roll with min Ax1. Pt performed sit<>stand x2 reps with supervision x1 and RW. Pt amb 100 ft x2 with supervision progress to independent with RW. Pt with antalgic gait, decreased step length, and increased B/L sway.    RECENT LABS/IMAGING  CBC Full  -  ( 28 Feb 2019 23:12 )  WBC Count : 7.2 K/uL  Hemoglobin : 8.8 g/dL  Hematocrit : 26.5 %  Platelet Count - Automated : 92 K/uL  Mean Cell Volume : 87.6 fl  Mean Cell Hemoglobin : 29.2 pg  Mean Cell Hemoglobin Concentration : 33.3 gm/dL  Auto Neutrophil # : x  Auto Lymphocyte # : x  Auto Monocyte # : x  Auto Eosinophil # : x  Auto Basophil # : x  Auto Neutrophil % : x  Auto Lymphocyte % : x  Auto Monocyte % : x  Auto Eosinophil % : x  Auto Basophil % : x        TPro  5.0<L>  /  Alb  2.9<L>  /  TBili  0.3  /  DBili  0.1  /  AST  240<H>  /  ALT  487<H>  /  AlkPhos  88  02-28    ALLERGIES iodine, sellfish    MEDICATIONS  (STANDING):  aspirin  chewable 81 milliGRAM(s) Oral daily  atorvastatin 40 milliGRAM(s) Oral at bedtime  celecoxib 200 milliGRAM(s) Oral two times a day  docusate sodium 100 milliGRAM(s) Oral three times a day  enoxaparin Injectable 40 milliGRAM(s) SubCutaneous at bedtime  famotidine    Tablet 20 milliGRAM(s) Oral two times a day  gabapentin 300 milliGRAM(s) Oral three times a day  hydrochlorothiazide 12.5 milliGRAM(s) Oral daily  levothyroxine 50 MICROGram(s) Oral daily  losartan 50 milliGRAM(s) Oral daily  mupirocin 2% Ointment 1 Application(s) Topical two times a day  pantoprazole    Tablet 40 milliGRAM(s) Oral before breakfast  pyridostigmine 60 milliGRAM(s) Oral two times a day  pyridostigmine  milliGRAM(s) Oral at bedtime  senna 2 Tablet(s) Oral at bedtime    MEDICATIONS  (PRN):  ondansetron   Disintegrating Tablet 4 milliGRAM(s) Oral every 4 hours PRN Nausea    Vital Signs Last 24 Hrs  T(C): 37.2 (01 Mar 2019 05:20), Max: 37.2 (01 Mar 2019 05:20)  T(F): 98.9 (01 Mar 2019 05:20), Max: 98.9 (01 Mar 2019 05:20)  HR: 61 (01 Mar 2019 05:20) (61 - 77)  BP: 115/65 (01 Mar 2019 05:20) (95/58 - 115/65)  BP(mean): --  RR: 18 (01 Mar 2019 05:20) (17 - 18)  SpO2: 96% (01 Mar 2019 05:20) (96% - 98%)      PHYSICAL EXAM  Constitutional - NAD, Comfortable  HEENT - NCAT, EOMI  Neck - Supple, No limited ROM  Chest - CTA bilaterally, No wheeze, No rhonchi, No crackles  Cardiovascular - RRR, S1S2, No murmurs  Abdomen - BS+, Soft, NTND  Extremities - No C/C/E, No calf tenderness   Neurologic Exam -                    Cognitive - Awake, Alert, AAO to self, place, date, year, situation     Communication - Fluent, No dysarthria     Cranial Nerves - CN 2-12 intact     Motor - No focal deficits                    LEFT    UE - ShAB 5/5, EF 5/5, EE 5/5, WE 5/5,  5/5                    RIGHT UE - ShAB 5/5, EF 5/5, EE 5/5, WE 5/5,  5/5                    LEFT    LE - HF 5/5, KE 5/5, DF 5/5, PF 5/5                    RIGHT LE - HF 5/5, KE 5/5, DF 5/5, PF 5/5        Sensory - Intact to LT     Reflexes - DTR Intact, No primitive reflexive     Coordination - FTN intact     OculoVestibular - No saccades, No nystagmus, VOR         Balance - WNL Static  Psychiatric - Mood stable, Affect WNL    ------------------------------------------------------------------------------------------------  ASSESSMENT/PLAN  78yFemale with functional deficits after    Pain - Tylenol  DVT PPX - SCDs  Rehab - 78yF was admitted on 02-26    Patient is a 78y old  Female who presents with a chief complaint of back pain (28 Feb 2019 11:15)    HPI:  78 year old female with h/o GERD, HTN, Myasthenia Gravis (dx 10/2018 stable on Pyridostigmine), HLD with complaints of worsening lower back pain to groin found to have lumbar stenosis, admitted electively for spine surgery.    Lumbar stenosis with neurogenic claudication s/p   L1, L2 laminectomy  L1-2 TLIF (left sided approach) Stereotactically guided L1-L3 pedicle screw fixation Posterolateral fusion	  with Dr Vick on 2/26/19, EBL ~500cc    Hospital course c/b acute blood loss anemia s/p 2 U pRBCs, Hb 7.0-8.8. Labs also significant for thrombocytopenia and transaminitis.     REVIEW OF SYSTEMS  Constitutional - No fever, No weight loss, No fatigue  HEENT - No eye pain, No visual disturbances, No difficulty hearing, No tinnitus, No vertigo, No neck pain  Respiratory - No cough, No wheezing, No shortness of breath  Cardiovascular - No chest pain, No palpitations  Gastrointestinal - No abdominal pain, No nausea, No vomiting, No diarrhea, No constipation  Genitourinary - No dysuria, No frequency, No hematuria, No incontinence  Neurological - No headaches, No memory loss, No loss of strength, No numbness, No tremors  Skin - No itching, No rashes, No lesions   Endocrine - No temperature intolerance  Musculoskeletal - No joint pain, No joint swelling, No muscle pain  Psychiatric - No depression, No anxiety    PAST MEDICAL & SURGICAL HISTORY  Reactive airway disease that is not asthma  Aortic stenosis, mild  Diverticulitis large intestine  Lumbar stenosis  Myasthenia gravis  Carpal Tunnel Syndrome Right  Gastric Ulcer  Hammertoe Right foot  Kidney Calculi  Lumbar Radiculopathy  GERD (Gastroesophageal Reflux Disease)  Hyperlipidemia  Genital Ulcer, Female  Hypertension  H/O umbilical hernia repair  History of tonsillectomy  S/P foot surgery  H/O shoulder replacement  Prolapse, Uterovaginal  S/P Laparoscopic Fundoplication  S/P Hysterectomy Partial  Bilateral Cataracts  S/P Laminectomy with Spinal Fusion cervcical and lumbar  S/P Appendectomy  S/P Hernia Repair Umbikical  S/P Cholecystectomy    FAMILY HISTORY       SOCIAL HISTORY  Smoking - Denied  EtOH - Denied   Drugs - Denied    FUNCTIONAL HISTORY  Pt reports she lives with her  and adult working son in a private ranch-style home with 5-7 ROBERTO.   Pt reports she was independent with all mobility PTA, no AD.    CURRENT FUNCTIONAL STATUS    PT: Pt performed bed mobility via log roll with min Ax1. Pt performed sit<>stand x2 reps with supervision x1 and RW. Pt amb 100 ft x2 with supervision progress to independent with RW. Pt with antalgic gait, decreased step length, and increased B/L sway.    RECENT LABS/IMAGING  CBC Full  -  ( 28 Feb 2019 23:12 )  WBC Count : 7.2 K/uL  Hemoglobin : 8.8 g/dL  Hematocrit : 26.5 %  Platelet Count - Automated : 92 K/uL  Mean Cell Volume : 87.6 fl  Mean Cell Hemoglobin : 29.2 pg  Mean Cell Hemoglobin Concentration : 33.3 gm/dL  Auto Neutrophil # : x  Auto Lymphocyte # : x  Auto Monocyte # : x  Auto Eosinophil # : x  Auto Basophil # : x  Auto Neutrophil % : x  Auto Lymphocyte % : x  Auto Monocyte % : x  Auto Eosinophil % : x  Auto Basophil % : x        TPro  5.0<L>  /  Alb  2.9<L>  /  TBili  0.3  /  DBili  0.1  /  AST  240<H>  /  ALT  487<H>  /  AlkPhos  88  02-28    ALLERGIES iodine, sellfish    MEDICATIONS  (STANDING):  aspirin  chewable 81 milliGRAM(s) Oral daily  atorvastatin 40 milliGRAM(s) Oral at bedtime  celecoxib 200 milliGRAM(s) Oral two times a day  docusate sodium 100 milliGRAM(s) Oral three times a day  enoxaparin Injectable 40 milliGRAM(s) SubCutaneous at bedtime  famotidine    Tablet 20 milliGRAM(s) Oral two times a day  gabapentin 300 milliGRAM(s) Oral three times a day  hydrochlorothiazide 12.5 milliGRAM(s) Oral daily  levothyroxine 50 MICROGram(s) Oral daily  losartan 50 milliGRAM(s) Oral daily  mupirocin 2% Ointment 1 Application(s) Topical two times a day  pantoprazole    Tablet 40 milliGRAM(s) Oral before breakfast  pyridostigmine 60 milliGRAM(s) Oral two times a day  pyridostigmine  milliGRAM(s) Oral at bedtime  senna 2 Tablet(s) Oral at bedtime    MEDICATIONS  (PRN):  ondansetron   Disintegrating Tablet 4 milliGRAM(s) Oral every 4 hours PRN Nausea    Vital Signs Last 24 Hrs  T(C): 37.2 (01 Mar 2019 05:20), Max: 37.2 (01 Mar 2019 05:20)  T(F): 98.9 (01 Mar 2019 05:20), Max: 98.9 (01 Mar 2019 05:20)  HR: 61 (01 Mar 2019 05:20) (61 - 77)  BP: 115/65 (01 Mar 2019 05:20) (95/58 - 115/65)  BP(mean): --  RR: 18 (01 Mar 2019 05:20) (17 - 18)  SpO2: 96% (01 Mar 2019 05:20) (96% - 98%)      PHYSICAL EXAM  Constitutional - NAD, Comfortable  HEENT - NCAT, EOMI  Neck - Supple, No limited ROM  Chest - CTA bilaterally, No wheeze, No rhonchi, No crackles  Cardiovascular - RRR, S1S2, No murmurs  Abdomen - BS+, Soft, NTND  Extremities - No C/C/E, No calf tenderness   Neurologic Exam -                    Cognitive - Awake, Alert, AAO to self, place, date, year, situation     Communication - Fluent, No dysarthria     Cranial Nerves - CN 2-12 intact     Motor - No focal deficits                    LEFT    UE - ShAB 5/5, EF 5/5, EE 5/5, WE 5/5,  5/5                    RIGHT UE - ShAB 5/5, EF 5/5, EE 5/5, WE 5/5,  5/5                    LEFT    LE - HF 5/5, KE 5/5, DF 5/5, PF 5/5                    RIGHT LE - HF 5/5, KE 5/5, DF 5/5, PF 5/5        Sensory - Intact to LT     Reflexes - DTR Intact, No primitive reflexive     Coordination - FTN intact     OculoVestibular - No saccades, No nystagmus, VOR         Balance - WNL Static  Psychiatric - Mood stable, Affect WNL    ------------------------------------------------------------------------------------------------  ASSESSMENT/PLAN  79yo female with GERD, HTN, MG and lumbar stenosis with neurogenic claudication s/p elective s/p L1-L2 laminectomy and discectomy, L1-L2 fusion, L1-3 pedicle screws now with functional deficits.    Medical care as per primary team.    Continue bedside PT/OT, including stairs training.  Precautions: falls, spinal  Pain - Tylenol, celebrex   DVT PPX - SCDs, lovenox   Rehab - 78yF was admitted on 02-26    Patient is a 78y old  Female who presents with a chief complaint of back pain (28 Feb 2019 11:15)    HPI:  78 year old female with h/o GERD, HTN, Myasthenia Gravis (dx 10/2018 stable on Pyridostigmine), HLD with complaints of worsening lower back pain to groin found to have lumbar stenosis, admitted electively for spine surgery.    Lumbar stenosis with neurogenic claudication s/p   L1, L2 laminectomy  L1-2 TLIF (left sided approach) Stereotactically guided L1-L3 pedicle screw fixation Posterolateral fusion	  with Dr Vick on 2/26/19, EBL ~500cc    Hospital course c/b acute blood loss anemia s/p 2 U pRBCs, Hb 7.0-8.8. Labs also significant for thrombocytopenia and transaminitis.     REVIEW OF SYSTEMS  Constitutional - No fever, No weight loss, No fatigue  HEENT - No eye pain, No visual disturbances, No difficulty hearing, No tinnitus, No vertigo, No neck pain  Respiratory - No cough, No wheezing, No shortness of breath  Cardiovascular - No chest pain, No palpitations  Gastrointestinal - No abdominal pain, No nausea, No vomiting, No diarrhea, No constipation  Genitourinary - No dysuria, No frequency, No hematuria, No incontinence  Neurological - No headaches, No memory loss, No loss of strength, No numbness, No tremors  Skin - No itching, No rashes, No lesions   Endocrine - No temperature intolerance  Musculoskeletal - No joint pain, No joint swelling, No muscle pain (+) back pain   Psychiatric - No depression, No anxiety    PAST MEDICAL & SURGICAL HISTORY  Reactive airway disease that is not asthma  Aortic stenosis, mild  Diverticulitis large intestine  Lumbar stenosis  Myasthenia gravis  Carpal Tunnel Syndrome Right  Gastric Ulcer  Hammertoe Right foot  Kidney Calculi  Lumbar Radiculopathy  GERD (Gastroesophageal Reflux Disease)  Hyperlipidemia  Genital Ulcer, Female  Hypertension  H/O umbilical hernia repair  History of tonsillectomy  S/P foot surgery  H/O shoulder replacement  Prolapse, Uterovaginal  S/P Laparoscopic Fundoplication  S/P Hysterectomy Partial  Bilateral Cataracts  S/P Laminectomy with Spinal Fusion cervcical and lumbar  S/P Appendectomy  S/P Hernia Repair Umbikical  S/P Cholecystectomy    FAMILY HISTORY no family history in 1st degree relatives     SOCIAL HISTORY  Smoking - Denied  EtOH - Denied   Drugs - Denied    FUNCTIONAL HISTORY  Pt reports she lives with her  (completely deaf) and adult working son in a private ranch-style home with 6 ROBERTO.   Pt reports she was independent with all mobility PTA, no AD.    CURRENT FUNCTIONAL STATUS    PT: Pt performed bed mobility via log roll with min Ax1. Pt performed sit<>stand x2 reps with supervision x1 and RW. Pt amb 100 ft x2 with supervision progress to independent with RW. Pt with antalgic gait, decreased step length, and increased B/L sway.    RECENT LABS/IMAGING  CBC Full  -  ( 28 Feb 2019 23:12 )  WBC Count : 7.2 K/uL  Hemoglobin : 8.8 g/dL  Hematocrit : 26.5 %  Platelet Count - Automated : 92 K/uL  Mean Cell Volume : 87.6 fl  Mean Cell Hemoglobin : 29.2 pg  Mean Cell Hemoglobin Concentration : 33.3 gm/dL  Auto Neutrophil # : x  Auto Lymphocyte # : x  Auto Monocyte # : x  Auto Eosinophil # : x  Auto Basophil # : x  Auto Neutrophil % : x  Auto Lymphocyte % : x  Auto Monocyte % : x  Auto Eosinophil % : x  Auto Basophil % : x        TPro  5.0<L>  /  Alb  2.9<L>  /  TBili  0.3  /  DBili  0.1  /  AST  240<H>  /  ALT  487<H>  /  AlkPhos  88  02-28    ALLERGIES iodine, sellfish    MEDICATIONS  (STANDING):  aspirin  chewable 81 milliGRAM(s) Oral daily  atorvastatin 40 milliGRAM(s) Oral at bedtime  celecoxib 200 milliGRAM(s) Oral two times a day  docusate sodium 100 milliGRAM(s) Oral three times a day  enoxaparin Injectable 40 milliGRAM(s) SubCutaneous at bedtime  famotidine    Tablet 20 milliGRAM(s) Oral two times a day  gabapentin 300 milliGRAM(s) Oral three times a day  hydrochlorothiazide 12.5 milliGRAM(s) Oral daily  levothyroxine 50 MICROGram(s) Oral daily  losartan 50 milliGRAM(s) Oral daily  mupirocin 2% Ointment 1 Application(s) Topical two times a day  pantoprazole    Tablet 40 milliGRAM(s) Oral before breakfast  pyridostigmine 60 milliGRAM(s) Oral two times a day  pyridostigmine  milliGRAM(s) Oral at bedtime  senna 2 Tablet(s) Oral at bedtime    MEDICATIONS  (PRN):  ondansetron   Disintegrating Tablet 4 milliGRAM(s) Oral every 4 hours PRN Nausea    Vital Signs Last 24 Hrs  T(C): 37.2 (01 Mar 2019 05:20), Max: 37.2 (01 Mar 2019 05:20)  T(F): 98.9 (01 Mar 2019 05:20), Max: 98.9 (01 Mar 2019 05:20)  HR: 61 (01 Mar 2019 05:20) (61 - 77)  BP: 115/65 (01 Mar 2019 05:20) (95/58 - 115/65)  BP(mean): --  RR: 18 (01 Mar 2019 05:20) (17 - 18)  SpO2: 96% (01 Mar 2019 05:20) (96% - 98%)    PHYSICAL EXAM  Constitutional - NAD, Comfortable in bedside recliner   HEENT - NCAT, EOMI  Neck - Supple, No limited ROM  Chest - CTA bilaterally, No wheeze  Cardiovascular - RRR, S1S2, No murmurs  Abdomen - BS+, Soft, NTND  Extremities - No calf tenderness   Neurologic Exam -                    Cognitive - Awake, Alert, Oriented x 4     Communication - Fluent, No dysarthria     Cranial Nerves - EOMI, facial symmetry     Motor -                    LEFT    UE - ShAB 5/5, EF 5/5, EE 5/5, WE 5/5,  5/5                    RIGHT UE - ShAB 5/5, EF 5/5, EE 5/5, WE 5/5,  5/5                    LEFT    LE - HF 5/5, KE 5/5, DF 5/5, PF 5/5                    RIGHT LE - HF 5/5, KE 5/5, DF 5/5, PF 5/5        Sensory - Intact to LT  Psychiatric - Mood stable, Affect WNL    ------------------------------------------------------------------------------------------------  ASSESSMENT/PLAN  79yo female with GERD, HTN, MG and lumbar stenosis with neurogenic claudication s/p elective s/p L1-L2 laminectomy and discectomy, L1-L2 fusion, L1-3 pedicle screws now with functional deficits.    Medical care as per primary team.    Continue bedside PT/OT, including stairs training/bed mobility/transfers/ambulation/ADLs   Precautions: falls, spinal  Pain - Tylenol, celebrex   DVT PPX - SCDs, lovenox   Rehab -   Recommend [facility], patient DOES NOT meet acute inpatient rehabilitation criteria  Will sign off, please reconsult if needed for rehab dispo recommendations.

## 2019-03-01 NOTE — CONSULT NOTE ADULT - ATTENDING COMMENTS
Seen and examined with resident. Agree with note.   Patient with gait dysfunction.  Patient will need subacute rehabilitation when stable.
*** INCOMPLETE. NOTE IN PROGRESS. ***

## 2019-03-01 NOTE — PROGRESS NOTE ADULT - ASSESSMENT
78 year old female with PMH of GERD, HTN, Myasthenia Gravis ( dx 10/2018 stable on Pyridostigmine), HLD with complaints of worsening lower back pain to groin found to have lumbar stenosis planned for L1-2 Lumbar laminectomy discectomy, L1-2 posterior lumbar fusion, L1-3 pedicle screws on 2/26/19.     PROCEDURE:   2/26 s/p L1-L2 laminectomy and discectomy, L1-L2 fusion, L1-3 pedicle screws  POD#3  PLAN:  Neuro:   - pain is difficult to control on current regimen,   - Neuro consult called with Dr Pryor  - pain control- avoid opioids, magnesium and muscle relaxers  - avoid tylenol - elevated liver enzymes, on celebrex 200 bid, gabapentin 300tid  - Myasthenia Gravis- continuous pulse oximetry, avoid opioids, magnesium and muscle relaxers  - continue pyridostigmine  - completed decadron 4q6 x 4 doses, will dc fingersticks, A1c is 5.0  - hypothyroid- continue synthroid  Respiratory:   - encouraged incentive spirometry  CV:  - HTN- continue HCTZ, losartan, ASA 81, statin  DVT ppx:   - venodynes, sq lovenox  PT/OT:   - PT - Home pt vs outpt PT, pt is requesting rehab  - PMR consult called      Assessment:  Please Check When Present   []  GCS  E   V  M     Heart Failure: []Acute, [] acute on chronic , []chronic  Heart Failure:  [] Diastolic (HFpEF), [] Systolic (HFrEF), []Combined (HFpEF and HFrEF), [] RHF, [] Pulm HTN, [] Other    [] NICO, [] ATN, [] AIN, [] other  [] CKD1, [] CKD2, [] CKD 3, [] CKD 4, [] CKD 5, []ESRD    Encephalopathy: [] Metabolic, [] Hepatic, [] toxic, [] Neurological, [] Other    Abnormal Nurtitional Status: [] malnurtition (see nutrition note), [ ]underweight: BMI < 19, [] morbid obesity: BMI >40, [] Cachexia    [] Sepsis  [] hypovolemic shock,[] cardiogenic shock, [] hemorrhagic shock, [] neuogenic shock  [] Acute Respiratory Failure  []Cerebral edema, [] Brain compression/ herniation,   [] Functional quadriplegia  [] Acute blood loss anemia    Spectralink # [id number] 78 year old female with PMH of GERD, HTN, Myasthenia Gravis ( dx 10/2018 stable on Pyridostigmine), HLD with complaints of worsening lower back pain to groin found to have lumbar stenosis planned for L1-2 Lumbar laminectomy discectomy, L1-2 posterior lumbar fusion, L1-3 pedicle screws on 2/26/19.     PROCEDURE:   2/26 s/p L1-L2 laminectomy and discectomy, L1-L2 fusion, L1-3 pedicle screws  POD#3  PLAN:  Neuro:   - pain is difficult to control on current regimen,   - Neuro consult called with Dr Pryor  - pain control- avoid opioids, magnesium and muscle relaxers  - avoid tylenol - elevated liver enzymes, on celebrex 200 bid, gabapentin 300tid  - liver enzymes trending down  - Myasthenia Gravis- continuous pulse oximetry, avoid opioids, magnesium and muscle relaxers  - continue pyridostigmine  - completed decadron 4q6 x 4 doses, will dc fingersticks, A1c is 5.0  - hypothyroid- continue synthroid  Respiratory:   - encouraged incentive spirometry  CV:  - HTN- continue HCTZ, losartan, ASA 81, statin  DVT ppx:   - venodynes, sq lovenox  PT/OT:   - PT - Home pt vs outpt PT, pt is requesting rehab  - PMR consult called      Assessment:  Please Check When Present   []  GCS  E   V  M     Heart Failure: []Acute, [] acute on chronic , []chronic  Heart Failure:  [] Diastolic (HFpEF), [] Systolic (HFrEF), []Combined (HFpEF and HFrEF), [] RHF, [] Pulm HTN, [] Other    [] NICO, [] ATN, [] AIN, [] other  [] CKD1, [] CKD2, [] CKD 3, [] CKD 4, [] CKD 5, []ESRD    Encephalopathy: [] Metabolic, [] Hepatic, [] toxic, [] Neurological, [] Other    Abnormal Nurtitional Status: [] malnurtition (see nutrition note), [ ]underweight: BMI < 19, [] morbid obesity: BMI >40, [] Cachexia    [] Sepsis  [] hypovolemic shock,[] cardiogenic shock, [] hemorrhagic shock, [] neuogenic shock  [] Acute Respiratory Failure  []Cerebral edema, [] Brain compression/ herniation,   [] Functional quadriplegia  [] Acute blood loss anemia    Spectralink # [id number] 78 year old female with PMH of GERD, HTN, Myasthenia Gravis ( dx 10/2018 stable on Pyridostigmine), HLD with complaints of worsening lower back pain to groin found to have lumbar stenosis planned for L1-2 Lumbar laminectomy discectomy, L1-2 posterior lumbar fusion, L1-3 pedicle screws on 2/26/19.     PROCEDURE:   2/26 s/p L1-L2 laminectomy and discectomy, L1-L2 fusion, L1-3 pedicle screws  POD#3  PLAN:  Neuro:   - pain is difficult to control on current regimen,   - per my phone conversation with Dr Pryor for pain management, there is no contraindication for opiods or muscle relaxants due to pt;s Myasthenia Gravis. Dr Pryor spoke with neuromuscular physician Dr Pugh  - oxycodone prn pain  - avoid tylenol - elevated liver enzymes, on celebrex 200 bid, gabapentin 300tid  - liver enzymes trending down  - Myasthenia Gravis- continuous pulse oximetry, avoid opioids, magnesium and muscle relaxers  - continue pyridostigmine  - completed decadron 4q6 x 4 doses, will dc fingersticks, A1c is 5.0  - hypothyroid- continue synthroid  Respiratory:   - encouraged incentive spirometry  CV:  - HTN- continue HCTZ, losartan, ASA 81, statin  DVT ppx:   - venodynes, sq lovenox  PT/OT:   - PT - Home pt vs outpt PT, pt is requesting rehab  - PMR consult called      Assessment:  Please Check When Present   []  GCS  E   V  M     Heart Failure: []Acute, [] acute on chronic , []chronic  Heart Failure:  [] Diastolic (HFpEF), [] Systolic (HFrEF), []Combined (HFpEF and HFrEF), [] RHF, [] Pulm HTN, [] Other    [] NICO, [] ATN, [] AIN, [] other  [] CKD1, [] CKD2, [] CKD 3, [] CKD 4, [] CKD 5, []ESRD    Encephalopathy: [] Metabolic, [] Hepatic, [] toxic, [] Neurological, [] Other    Abnormal Nurtitional Status: [] malnurtition (see nutrition note), [ ]underweight: BMI < 19, [] morbid obesity: BMI >40, [] Cachexia    [] Sepsis  [] hypovolemic shock,[] cardiogenic shock, [] hemorrhagic shock, [] neuogenic shock  [] Acute Respiratory Failure  []Cerebral edema, [] Brain compression/ herniation,   [] Functional quadriplegia  [] Acute blood loss anemia    Spectralink # [id number]

## 2019-03-01 NOTE — CONSULT NOTE ADULT - PROBLEM SELECTOR RECOMMENDATION 3
Continue celebrex and gabapentin. Stopped acetaminophen. Would avoid lidocain patch given MG.   Neurology consulted for pain management.  If okay with neurology, would recommend starting narcotics.

## 2019-03-01 NOTE — PROGRESS NOTE ADULT - SUBJECTIVE AND OBJECTIVE BOX
SUBJECTIVE: Pt seen and examined, pt reports severe post op pain overnight, pain difficult to control secondary to Myasthenia Gravis and limited use of opiods and muscle relaxants    OVERNIGHT EVENTS: post op pain    Vital Signs Last 24 Hrs  T(C): 37.2 (01 Mar 2019 09:13), Max: 37.2 (01 Mar 2019 05:20)  T(F): 98.9 (01 Mar 2019 09:13), Max: 98.9 (01 Mar 2019 05:20)  HR: 62 (01 Mar 2019 09:13) (61 - 77)  BP: 98/58 (01 Mar 2019 09:13) (95/58 - 115/65)  BP(mean): --  RR: 18 (01 Mar 2019 09:13) (17 - 18)  SpO2: 96% (01 Mar 2019 09:13) (96% - 98%)    PHYSICAL EXAM:    General: No Acute Distress     Neurological: Awake, alert oriented to person, place and time, Following Commands, Speech Fluent, Moving all extremities, b/l UE 4+/5, LE 5/5, Sensation to Light Touch Intact    Pulmonary: Clear to Auscultation, No Rales, No Rhonchi, No Wheezes     Cardiovascular: S1, S2, Regular Rate and Rhythm     Gastrointestinal: Soft, Nontender, Nondistended     Incision: back staples c/d/i    LABS:                        8.8    7.2   )-----------( 92       ( 28 Feb 2019 23:12 )             26.5        TPro  5.0<L>  /  Alb  2.9<L>  /  TBili  0.3  /  DBili  0.1  /  AST  240<H>  /  ALT  487<H>  /  AlkPhos  88  02-28    Hemoglobin A1C, Whole Blood: 5.0 % (02-27 @ 10:19)      02-28 @ 07:01  -  03-01 @ 07:00  --------------------------------------------------------  IN: 1270 mL / OUT: 1600 mL / NET: -330 mL      DRAINS: none    MEDICATIONS:  Antibiotics:    Neuro:  celecoxib 200 milliGRAM(s) Oral two times a day  gabapentin 300 milliGRAM(s) Oral three times a day  ondansetron   Disintegrating Tablet 4 milliGRAM(s) Oral every 4 hours PRN Nausea    Cardiac:  hydrochlorothiazide 12.5 milliGRAM(s) Oral daily  losartan 50 milliGRAM(s) Oral daily    Pulm:    GI/:  docusate sodium 100 milliGRAM(s) Oral three times a day  famotidine    Tablet 20 milliGRAM(s) Oral two times a day  pantoprazole    Tablet 40 milliGRAM(s) Oral before breakfast  senna 2 Tablet(s) Oral at bedtime    Other:   aspirin  chewable 81 milliGRAM(s) Oral daily  atorvastatin 40 milliGRAM(s) Oral at bedtime  enoxaparin Injectable 40 milliGRAM(s) SubCutaneous at bedtime  levothyroxine 50 MICROGram(s) Oral daily  mupirocin 2% Ointment 1 Application(s) Topical two times a day  pyridostigmine 60 milliGRAM(s) Oral two times a day  pyridostigmine  milliGRAM(s) Oral at bedtime    DIET: [x] Regular [] CCD [] Renal [] Puree [] Dysphagia [] Tube Feeds:     IMAGING:

## 2019-03-01 NOTE — CONSULT NOTE ADULT - PROBLEM SELECTOR RECOMMENDATION 5
BP 90s/ 50-60s.  hydrochlorothiazide 12.5 milliGRAM(s) Oral daily  losartan 50 milliGRAM(s) Oral daily BP 90s/ 50-60s.  Would continue the HCTZ, but stop the losartan for now.  Monitor vital signs.

## 2019-03-01 NOTE — CONSULT NOTE ADULT - ASSESSMENT
78F with PMH of GERD, HLD, HTN, Myasthenia Gravis (dx 10/2018 stable on Pyridostigmine), HLD with complaints of worsening lower back pain to groin found to have lumbar stenosis. Here s/p elective L1-2 Lumbar laminectomy discectomy, L1-2 posterior lumbar fusion, L1-3 pedicle screws on 2/26/19. 78F with PMH of GERD, HLD, HTN, hypothyroidism, Myasthenia Gravis (dx 10/2018 stable on Pyridostigmine), HLD with complaints of worsening lower back pain to groin found to have lumbar stenosis. Here s/p elective L1-2 Lumbar laminectomy discectomy, L1-2 posterior lumbar fusion, L1-3 pedicle screws on 2/26/19. Currently c/o post-op LBP. Found to have elevated LFTs.

## 2019-03-01 NOTE — CONSULT NOTE ADULT - PROBLEM SELECTOR RECOMMENDATION 2
Etiology is unclear- baseline is ~150 thousand.  Platelets on presentation to the hospital were 114, currently 92.  Continue to monitor labs.

## 2019-03-01 NOTE — CONSULT NOTE ADULT - PROBLEM SELECTOR RECOMMENDATION 4
Neurology consulted. Currently doing well on pyridostigmine.  Avoid medications contraindicated in MG.

## 2019-03-01 NOTE — PROGRESS NOTE ADULT - SUBJECTIVE AND OBJECTIVE BOX
Pt seen and examined.  Sitting up in chair.  C/o incisional back pain and spasm  RAINES well  Incision C & D    Diff managing post-op pain due to limitations for meds b/c of myasthenia gravis.    contacting Dr. Pryor from neurology/pain mgmnt for recommendations.  Pt will benefit from course of inpt rehab

## 2019-03-01 NOTE — CONSULT NOTE ADULT - PROBLEM SELECTOR RECOMMENDATION 9
Outpatient labs reviewed- last LFTs from November 2018 were normal.  Etiology is not clear. Denies use of alcohol and herbal substances.   Possibly due to IV acetaminophen (stopped now). Celebrex might also be contributing, but given that this is a relatively rare complication and patient's pain, would continue it for now.   Continue to monitor LFTs daily (today's labs pending).  Given mild thrombocytopenia, would check US Abdomen- r/o cirrhosis.   Check viral hepatitis panel.

## 2019-03-01 NOTE — CONSULT NOTE ADULT - SUBJECTIVE AND OBJECTIVE BOX
Jesús Pompa MD  ([facility] Hospitalist)  Page [phone]  (During off hours please page [phone])    *** INCOMPLETE. NOTE IN PROGRESS. ***    PMD: Dr. Carrillo Enriquez [phone]  Neurologist Dr. Reyes [phone]  Cardiologist Dr. Nelson [phone]    HPI:  78F with PMH of GERD, HLD, HTN, Myasthenia Gravis (dx 10/2018 stable on Pyridostigmine), HLD with complaints of worsening lower back pain to groin found to have lumbar stenosis. Here s/p elective L1-2 Lumbar laminectomy discectomy, L1-2 posterior lumbar fusion, L1-3 pedicle screws on 2/26/19.       PAST MEDICAL & SURGICAL HISTORY:  Reactive airway disease that is not asthma: mild as per pulm note on [employer], patient and daughter deneis any inhaler use  Near syncope: 8/2018 negative ENT work up, negative cardiac work ups as per daughter  Aortic stenosis, mild: asper daughter  Diverticulitis large intestine: denies any recent exacerbations  Lumbar stenosis  Myasthenia gravis: dx 10/2018 symptoms: intermittent loss of voice/ weakness, negative ENT studies, now stable on Pyridostigmine  Carpal Tunnel Syndrome Right: release 4/10  Hammertoe Right foot  Kidney Calculi  Lumbar Radiculopathy  GERD (Gastroesophageal Reflux Disease)  Hyperlipidemia  Genital Ulcer, Female  Hypertension  H/O umbilical hernia repair  History of tonsillectomy  S/P foot surgery  H/O shoulder replacement: b/l 2015/2016  H/O laminectomy: cervical  1998  lumbar 1986,1998,2000  Prolapse, Uterovaginal: s/p sling  S/P Laparoscopic Fundoplication  S/P Hysterectomy Partial: 1974  Bilateral Cataracts: removed  S/P Appendectomy  S/P Cholecystectomy      REVIEW OF SYMPTOMS:  CONSTITUTIONAL: No fever, weight loss, or fatigue  EYES: No eye pain, visual disturbances, or discharge  ENMT:  No difficulty hearing, tinnitus, vertigo; No sinus or throat pain  NECK: No pain or stiffness  BREASTS: No pain, masses, or nipple discharge  RESPIRATORY: No cough, wheezing, chills or hemoptysis; No shortness of breath  CARDIOVASCULAR: No chest pain, palpitations, dizziness, or leg swelling  GASTROINTESTINAL: No abdominal or epigastric pain. No nausea, vomiting, or hematemesis; No diarrhea or constipation. No melena or hematochezia.  GENITOURINARY: No dysuria, frequency, hematuria, or incontinence  NEUROLOGICAL: No headaches, memory loss, loss of strength, numbness, or tremors  SKIN: No itching, burning, rashes, or lesions   LYMPH NODES: No enlarged glands  ENDOCRINE: No heat or cold intolerance; No hair loss  MUSCULOSKELETAL: No joint pain or swelling; No muscle, back, or extremity pain  PSYCHIATRIC: No depression, anxiety, mood swings, or difficulty sleeping  HEME/LYMPH: No easy bruising, or bleeding gums  ALLERGIC AND IMMUNOLOGIC: No hives or eczema    ALLERGIES:   iodine (Rash)  shellfish (Rash)    SOCIAL HISTORY:   , lives with spouse.   Denies smoking    FAMILY HISTORY:      HOME MEDICATIONS:  Hyzaar 50 mg-12.5 mg daily  Aspirin 81 mg 4 times a week for prophylaxis  Linzess PRN daily  Crestor 10 mg qHS  Zantac 150 mg qHS  NexIUM 40 mg BID  gabapentin 300 mg TID  pyridostigmine 60 mg BID  pyridostigmine 180 mg ER qHS  levothyroxine 50 mcg daily    INPATIENT MEDICATIONS  (STANDING):  aspirin  chewable 81 milliGRAM(s) Oral daily  atorvastatin 40 milliGRAM(s) Oral at bedtime  famotidine    Tablet 20 milliGRAM(s) Oral two times a day  gabapentin 300 milliGRAM(s) Oral three times a day  hydrochlorothiazide 12.5 milliGRAM(s) Oral daily  levothyroxine 50 MICROGram(s) Oral daily  losartan 50 milliGRAM(s) Oral daily  pantoprazole    Tablet 40 milliGRAM(s) Oral before breakfast  pyridostigmine 60 milliGRAM(s) Oral two times a day  pyridostigmine  milliGRAM(s) Oral at bedtime  senna 2 Tablet(s) Oral at bedtime  celecoxib 200 milliGRAM(s) Oral two times a day  docusate sodium 100 milliGRAM(s) Oral three times a day  enoxaparin Injectable 40 milliGRAM(s) SubCutaneous at bedtime  mupirocin 2% Ointment 1 Application(s) Topical two times a day    MEDICATIONS  (PRN):  ondansetron   Disintegrating Tablet 4 milliGRAM(s) Oral every 4 hours PRN Nausea      Vital Signs Last 24 Hrs  T(C): 37.2 (01 Mar 2019 09:13), Max: 37.2 (01 Mar 2019 05:20)  T(F): 98.9 (01 Mar 2019 09:13), Max: 98.9 (01 Mar 2019 05:20)  HR: 62 (01 Mar 2019 09:13) (61 - 77)  BP: 98/58 (01 Mar 2019 09:13) (95/58 - 115/65)  BP(mean): --  RR: 18 (01 Mar 2019 09:13) (17 - 18)  SpO2: 96% (01 Mar 2019 09:13) (96% - 98%)      I&O's Summary  28 Feb 2019 07:01  -  01 Mar 2019 07:00  --------------------------------------------------------  IN: 1270 mL / OUT: 1600 mL / NET: -330 mL        PHYSICAL EXAM:  GENERAL: NAD  HEENT:  Atraumatic, Normocephalic, MMM, neck supple, no JVD  EYES: EOMI, PERRLA, conjunctiva and sclera clear  CHEST/LUNG: Clear to auscultation bilaterally; No wheeze, rhonchi or rales.  HEART: S1, S2, rrr; No murmurs, rubs, or gallops  ABDOMEN: Soft, Nontender, Nondistended; Bowel sounds present  EXTREMITIES:  2+ Peripheral Pulses, No clubbing, cyanosis, or edema  PSYCH: calm  NEUROLOGY: non-focal, AOx3  SKIN: No rashes or lesions    LABS:              8.8    7.2   )-----------( 92       ( 28 Feb 2019 23:12 )             26.5     TPro  5.0<L>  /  Alb  2.9<L>  /  TBili  0.3  /  DBili  0.1  /  AST  240<H>  /  ALT  487<H>  /  AlkPhos  88  02-28      RADIOLOGY & ADDITIONAL TESTS:    Imaging Personally Reviewed:   CT lumbar spine    ECG reviewed and interpreted: Sinus bradycardia at 59 bpm, no ischemia     Consultant(s) Notes Reviewed:  Neurosurgery, Physiatry    Care Discussed with Consultants/Other Providers: Neurosurgery re: elevated LFTs, pain Jesús Pompa MD  ([facility] Hospitalist)  Page [phone]  (During off hours please page [phone])    PMD: Dr. Carrillo Enriquez [phone]  Neurologist Dr. Reyes [phone]  Cardiologist Dr. Nelson [phone]    HPI:  78F with PMH of GERD, HLD, HTN, hypothyroidism, Myasthenia Gravis (dx 10/2018 stable on Pyridostigmine), HLD with complaints of worsening lower back pain to groin found to have lumbar stenosis. Here s/p elective L1-2 Lumbar laminectomy discectomy, L1-2 posterior lumbar fusion, L1-3 pedicle screws on 19. Currently only complaining of post-op lower back pain, that is described and stabbing, 10/10 severity, moving around makes it worse, relaxing helps the pain. She's been receiving acetaminophen IV and celebrex for pain control, but it hasn't helped. Of note, she required 2 units PRBC yesterday for post-op anemia.     PAST MEDICAL & SURGICAL HISTORY:  Reactive airway disease that is not asthma: mild as per pulm note on [employer], patient and daughter deneis any inhaler use  Near syncope: 2018 negative ENT work up, negative cardiac work ups as per daughter  Aortic stenosis, mild: asper daughter  Diverticulitis large intestine: denies any recent exacerbations  Lumbar stenosis  Myasthenia gravis: dx 10/2018 symptoms: intermittent loss of voice/ weakness, negative ENT studies, now stable on Pyridostigmine  Carpal Tunnel Syndrome Right: release 4/10  Hammertoe Right foot  Kidney Calculi  Lumbar Radiculopathy  GERD (Gastroesophageal Reflux Disease)  Hyperlipidemia  Genital Ulcer, Female  Hypertension  H/O umbilical hernia repair  History of tonsillectomy  S/P foot surgery  H/O shoulder replacement: b/l /  H/O laminectomy: cervical  1998  lumbar ,,  Prolapse, Uterovaginal: s/p sling  S/P Laparoscopic Fundoplication  S/P Hysterectomy Partial:   Bilateral Cataracts: removed  S/P Appendectomy  S/P Cholecystectomy    REVIEW OF SYMPTOMS:  CONSTITUTIONAL: No fever, weight loss, or fatigue  EYES: No eye pain, visual disturbances, or discharge  ENMT:  No difficulty hearing, tinnitus, vertigo; No sinus or throat pain  NECK: No pain or stiffness  RESPIRATORY: Mild chronic cough with clear sputum production. No wheezing, chills or hemoptysis; No shortness of breath  CARDIOVASCULAR: No chest pain, palpitations, dizziness, or leg swelling  GASTROINTESTINAL: No abdominal or epigastric pain. No nausea, vomiting, or hematemesis; No diarrhea or constipation. No melena or hematochezia.  GENITOURINARY: No dysuria, frequency, hematuria, or incontinence  NEUROLOGICAL: No headaches, memory loss, loss of strength, numbness, or tremors  SKIN: No itching, burning, rashes, or lesions   MUSCULOSKELETAL: Back pain post-op  PSYCHIATRIC: No depression, anxiety, mood swings, or difficulty sleeping  ALLERGIC AND IMMUNOLOGIC: No hives or eczema    ALLERGIES:   iodine (Rash)  shellfish (Rash)    SOCIAL HISTORY:   , lives with spouse.   Denies smoking    FAMILY HISTORY:  Mother  spontaneously at 58 "broke vein in her head"    HOME MEDICATIONS:  Hyzaar 50 mg-12.5 mg daily  Aspirin 81 mg 4 times a week for prophylaxis  Linzess PRN daily  Crestor 10 mg qHS  Zantac 150 mg qHS  NexIUM 40 mg BID  gabapentin 300 mg TID  pyridostigmine 60 mg BID  pyridostigmine 180 mg ER qHS  levothyroxine 50 mcg daily    INPATIENT MEDICATIONS  (STANDING):  aspirin  chewable 81 milliGRAM(s) Oral daily  atorvastatin 40 milliGRAM(s) Oral at bedtime  famotidine    Tablet 20 milliGRAM(s) Oral two times a day  gabapentin 300 milliGRAM(s) Oral three times a day  hydrochlorothiazide 12.5 milliGRAM(s) Oral daily  levothyroxine 50 MICROGram(s) Oral daily  losartan 50 milliGRAM(s) Oral daily  pantoprazole    Tablet 40 milliGRAM(s) Oral before breakfast  pyridostigmine 60 milliGRAM(s) Oral two times a day  pyridostigmine  milliGRAM(s) Oral at bedtime  senna 2 Tablet(s) Oral at bedtime  celecoxib 200 milliGRAM(s) Oral two times a day  docusate sodium 100 milliGRAM(s) Oral three times a day  enoxaparin Injectable 40 milliGRAM(s) SubCutaneous at bedtime  mupirocin 2% Ointment 1 Application(s) Topical two times a day    MEDICATIONS  (PRN):  ondansetron   Disintegrating Tablet 4 milliGRAM(s) Oral every 4 hours PRN Nausea    Vital Signs Last 24 Hrs  T(C): 37.2 (01 Mar 2019 09:13), Max: 37.2 (01 Mar 2019 05:20)  T(F): 98.9 (01 Mar 2019 09:13), Max: 98.9 (01 Mar 2019 05:20)  HR: 62 (01 Mar 2019 09:13) (61 - 77)  BP: 98/58 (01 Mar 2019 09:13) (95/58 - 115/65)  BP(mean): --  RR: 18 (01 Mar 2019 09:13) (17 - 18)  SpO2: 96% (01 Mar 2019 09:13) (96% - 98%)    I&O's Summary  2019 07:01  -  01 Mar 2019 07:00  --------------------------------------------------------  IN: 1270 mL / OUT: 1600 mL / NET: -330 mL    PHYSICAL EXAM:  GENERAL: Distress due to back pain  HEENT:  Atraumatic, Normocephalic, MMM, neck supple, no JVD  EYES: EOMI, PERRLA, conjunctiva and sclera clear  CHEST/LUNG: Clear to auscultation bilaterally; No wheeze, rhonchi or rales.  HEART: S1, S2, rrr; No murmurs, rubs, or gallops  ABDOMEN: Soft, Nontender, Nondistended; Bowel sounds present  EXTREMITIES:  No clubbing, cyanosis, or edema  PSYCH: calm, cooperative  NEUROLOGY: non-focal, AOx3  SKIN: Normal appearing post-op lumbar surgical changes, staples in place.     LABS:              8.8    7.2   )-----------( 92       ( 2019 23:12 )             26.5     TPro  5.0<L>  /  Alb  2.9<L>  /  TBili  0.3  /  DBili  0.1  /  AST  240<H>  /  ALT  487<H>  /  AlkPhos  88        RADIOLOGY & ADDITIONAL TESTS:    Imaging Personally Reviewed:   CT lumbar spine    ECG reviewed and interpreted: Sinus bradycardia at 59 bpm, no ischemia     Consultant(s) Notes Reviewed:  Neurosurgery, Physiatry    Care Discussed with Consultants/Other Providers: Neurosurgery re: elevated LFTs, pain

## 2019-03-02 LAB
ALBUMIN SERPL ELPH-MCNC: 2.6 G/DL — LOW (ref 3.3–5)
ALP SERPL-CCNC: 74 U/L — SIGNIFICANT CHANGE UP (ref 40–120)
ALT FLD-CCNC: 185 U/L — HIGH (ref 10–45)
ANION GAP SERPL CALC-SCNC: 10 MMOL/L — SIGNIFICANT CHANGE UP (ref 5–17)
AST SERPL-CCNC: 38 U/L — SIGNIFICANT CHANGE UP (ref 10–40)
BASOPHILS # BLD AUTO: 0.01 K/UL — SIGNIFICANT CHANGE UP (ref 0–0.2)
BASOPHILS NFR BLD AUTO: 0.2 % — SIGNIFICANT CHANGE UP (ref 0–2)
BILIRUB SERPL-MCNC: 0.7 MG/DL — SIGNIFICANT CHANGE UP (ref 0.2–1.2)
BUN SERPL-MCNC: 18 MG/DL — SIGNIFICANT CHANGE UP (ref 7–23)
CALCIUM SERPL-MCNC: 8.5 MG/DL — SIGNIFICANT CHANGE UP (ref 8.4–10.5)
CHLORIDE SERPL-SCNC: 104 MMOL/L — SIGNIFICANT CHANGE UP (ref 96–108)
CO2 SERPL-SCNC: 24 MMOL/L — SIGNIFICANT CHANGE UP (ref 22–31)
CREAT SERPL-MCNC: 0.85 MG/DL — SIGNIFICANT CHANGE UP (ref 0.5–1.3)
EOSINOPHIL # BLD AUTO: 0.22 K/UL — SIGNIFICANT CHANGE UP (ref 0–0.5)
EOSINOPHIL NFR BLD AUTO: 3.6 % — SIGNIFICANT CHANGE UP (ref 0–6)
GLUCOSE SERPL-MCNC: 96 MG/DL — SIGNIFICANT CHANGE UP (ref 70–99)
HAV IGM SER-ACNC: SIGNIFICANT CHANGE UP
HBV CORE IGM SER-ACNC: SIGNIFICANT CHANGE UP
HBV SURFACE AG SER-ACNC: SIGNIFICANT CHANGE UP
HCT VFR BLD CALC: 29.9 % — LOW (ref 34.5–45)
HCV AB S/CO SERPL IA: 0.07 S/CO — SIGNIFICANT CHANGE UP (ref 0–0.79)
HCV AB SERPL-IMP: SIGNIFICANT CHANGE UP
HGB BLD-MCNC: 9.4 G/DL — LOW (ref 11.5–15.5)
IMM GRANULOCYTES NFR BLD AUTO: 0.7 % — SIGNIFICANT CHANGE UP (ref 0–1.5)
LYMPHOCYTES # BLD AUTO: 1.02 K/UL — SIGNIFICANT CHANGE UP (ref 1–3.3)
LYMPHOCYTES # BLD AUTO: 16.7 % — SIGNIFICANT CHANGE UP (ref 13–44)
MCHC RBC-ENTMCNC: 28.7 PG — SIGNIFICANT CHANGE UP (ref 27–34)
MCHC RBC-ENTMCNC: 31.4 GM/DL — LOW (ref 32–36)
MCV RBC AUTO: 91.4 FL — SIGNIFICANT CHANGE UP (ref 80–100)
MONOCYTES # BLD AUTO: 0.84 K/UL — SIGNIFICANT CHANGE UP (ref 0–0.9)
MONOCYTES NFR BLD AUTO: 13.7 % — SIGNIFICANT CHANGE UP (ref 2–14)
NEUTROPHILS # BLD AUTO: 3.98 K/UL — SIGNIFICANT CHANGE UP (ref 1.8–7.4)
NEUTROPHILS NFR BLD AUTO: 65.1 % — SIGNIFICANT CHANGE UP (ref 43–77)
PLATELET # BLD AUTO: 113 K/UL — LOW (ref 150–400)
POTASSIUM SERPL-MCNC: 4.2 MMOL/L — SIGNIFICANT CHANGE UP (ref 3.5–5.3)
POTASSIUM SERPL-SCNC: 4.2 MMOL/L — SIGNIFICANT CHANGE UP (ref 3.5–5.3)
PROT SERPL-MCNC: 5.3 G/DL — LOW (ref 6–8.3)
RBC # BLD: 3.27 M/UL — LOW (ref 3.8–5.2)
RBC # FLD: 14.8 % — HIGH (ref 10.3–14.5)
SODIUM SERPL-SCNC: 138 MMOL/L — SIGNIFICANT CHANGE UP (ref 135–145)
WBC # BLD: 6.11 K/UL — SIGNIFICANT CHANGE UP (ref 3.8–10.5)
WBC # FLD AUTO: 6.11 K/UL — SIGNIFICANT CHANGE UP (ref 3.8–10.5)

## 2019-03-02 PROCEDURE — 99232 SBSQ HOSP IP/OBS MODERATE 35: CPT

## 2019-03-02 RX ORDER — ONDANSETRON 8 MG/1
4 TABLET, FILM COATED ORAL ONCE
Qty: 0 | Refills: 0 | Status: COMPLETED | OUTPATIENT
Start: 2019-03-02 | End: 2019-03-05

## 2019-03-02 RX ADMIN — CELECOXIB 200 MILLIGRAM(S): 200 CAPSULE ORAL at 07:17

## 2019-03-02 RX ADMIN — Medication 81 MILLIGRAM(S): at 12:25

## 2019-03-02 RX ADMIN — CELECOXIB 200 MILLIGRAM(S): 200 CAPSULE ORAL at 17:16

## 2019-03-02 RX ADMIN — MUPIROCIN 1 APPLICATION(S): 20 OINTMENT TOPICAL at 06:20

## 2019-03-02 RX ADMIN — Medication 100 MILLIGRAM(S): at 21:57

## 2019-03-02 RX ADMIN — Medication 50 MICROGRAM(S): at 06:18

## 2019-03-02 RX ADMIN — FAMOTIDINE 20 MILLIGRAM(S): 10 INJECTION INTRAVENOUS at 17:16

## 2019-03-02 RX ADMIN — CELECOXIB 200 MILLIGRAM(S): 200 CAPSULE ORAL at 06:18

## 2019-03-02 RX ADMIN — PYRIDOSTIGMINE BROMIDE 60 MILLIGRAM(S): 60 SOLUTION ORAL at 13:44

## 2019-03-02 RX ADMIN — GABAPENTIN 300 MILLIGRAM(S): 400 CAPSULE ORAL at 13:43

## 2019-03-02 RX ADMIN — SENNA PLUS 2 TABLET(S): 8.6 TABLET ORAL at 21:57

## 2019-03-02 RX ADMIN — MUPIROCIN 1 APPLICATION(S): 20 OINTMENT TOPICAL at 17:16

## 2019-03-02 RX ADMIN — GABAPENTIN 300 MILLIGRAM(S): 400 CAPSULE ORAL at 21:57

## 2019-03-02 RX ADMIN — OXYCODONE HYDROCHLORIDE 5 MILLIGRAM(S): 5 TABLET ORAL at 22:43

## 2019-03-02 RX ADMIN — PYRIDOSTIGMINE BROMIDE 180 MILLIGRAM(S): 60 SOLUTION ORAL at 21:57

## 2019-03-02 RX ADMIN — GABAPENTIN 300 MILLIGRAM(S): 400 CAPSULE ORAL at 06:18

## 2019-03-02 RX ADMIN — CELECOXIB 200 MILLIGRAM(S): 200 CAPSULE ORAL at 17:46

## 2019-03-02 RX ADMIN — OXYCODONE HYDROCHLORIDE 5 MILLIGRAM(S): 5 TABLET ORAL at 07:17

## 2019-03-02 RX ADMIN — Medication 100 MILLIGRAM(S): at 13:43

## 2019-03-02 RX ADMIN — FAMOTIDINE 20 MILLIGRAM(S): 10 INJECTION INTRAVENOUS at 06:18

## 2019-03-02 RX ADMIN — OXYCODONE HYDROCHLORIDE 5 MILLIGRAM(S): 5 TABLET ORAL at 23:43

## 2019-03-02 RX ADMIN — Medication 100 MILLIGRAM(S): at 06:18

## 2019-03-02 RX ADMIN — PYRIDOSTIGMINE BROMIDE 60 MILLIGRAM(S): 60 SOLUTION ORAL at 06:18

## 2019-03-02 RX ADMIN — OXYCODONE HYDROCHLORIDE 5 MILLIGRAM(S): 5 TABLET ORAL at 12:25

## 2019-03-02 RX ADMIN — PANTOPRAZOLE SODIUM 40 MILLIGRAM(S): 20 TABLET, DELAYED RELEASE ORAL at 06:18

## 2019-03-02 RX ADMIN — OXYCODONE HYDROCHLORIDE 5 MILLIGRAM(S): 5 TABLET ORAL at 12:55

## 2019-03-02 RX ADMIN — ATORVASTATIN CALCIUM 40 MILLIGRAM(S): 80 TABLET, FILM COATED ORAL at 21:57

## 2019-03-02 RX ADMIN — ONDANSETRON 4 MILLIGRAM(S): 8 TABLET, FILM COATED ORAL at 22:43

## 2019-03-02 RX ADMIN — ENOXAPARIN SODIUM 40 MILLIGRAM(S): 100 INJECTION SUBCUTANEOUS at 21:57

## 2019-03-02 RX ADMIN — OXYCODONE HYDROCHLORIDE 5 MILLIGRAM(S): 5 TABLET ORAL at 06:19

## 2019-03-02 NOTE — PROGRESS NOTE ADULT - PROBLEM SELECTOR PLAN 3
Continue celebrex and gabapentin. Stopped acetaminophen. Would avoid lidocaine patch given MG.   If okay with neurology, would recommend starting narcotics.

## 2019-03-02 NOTE — PROGRESS NOTE ADULT - SUBJECTIVE AND OBJECTIVE BOX
SUBJECTIVE: Pt seen and examined, feels much better on current pain regimen.     OVERNIGHT EVENTS: none    Vital Signs Last 24 Hrs  T(C): 36.7 (02 Mar 2019 08:41), Max: 37 (01 Mar 2019 13:12)  T(F): 98.1 (02 Mar 2019 08:41), Max: 98.6 (01 Mar 2019 13:12)  HR: 64 (02 Mar 2019 08:41) (60 - 72)  BP: 94/49 (02 Mar 2019 08:41) (90/55 - 122/59)  BP(mean): --  RR: 16 (02 Mar 2019 08:41) (16 - 18)  SpO2: 98% (02 Mar 2019 08:41) (96% - 100%)  PHYSICAL EXAM:    General: No Acute Distress     Neurological: Awake, alert oriented to person, place and time, Following Commands, Speech Fluent, Moving all extremities, b/l UE 4+/5, LE 5/5, Sensation to Light Touch Intact    Pulmonary: Clear to Auscultation, No Rales, No Rhonchi, No Wheezes     Cardiovascular: S1, S2, Regular Rate and Rhythm     Gastrointestinal: Soft, Nontender, Nondistended     Incision: back staples c/d/i    LABS:                          9.4    6.11  )-----------( 113      ( 02 Mar 2019 10:13 )             29.9   03-02    138  |  104  |  18  ----------------------------<  96  4.2   |  24  |  0.85    Ca    8.5      02 Mar 2019 06:01    TPro  5.3<L>  /  Alb  2.6<L>  /  TBili  0.7  /  DBili  x   /  AST  38  /  ALT  185<H>  /  AlkPhos  74  03-02      DRAINS: none    MEDICATIONS  (STANDING):  aspirin  chewable 81 milliGRAM(s) Oral daily  atorvastatin 40 milliGRAM(s) Oral at bedtime  celecoxib 200 milliGRAM(s) Oral two times a day  docusate sodium 100 milliGRAM(s) Oral three times a day  enoxaparin Injectable 40 milliGRAM(s) SubCutaneous at bedtime  famotidine    Tablet 20 milliGRAM(s) Oral two times a day  gabapentin 300 milliGRAM(s) Oral three times a day  hydrochlorothiazide 12.5 milliGRAM(s) Oral daily  levothyroxine 50 MICROGram(s) Oral daily  mupirocin 2% Ointment 1 Application(s) Topical two times a day  pantoprazole    Tablet 40 milliGRAM(s) Oral before breakfast  pyridostigmine 60 milliGRAM(s) Oral two times a day  pyridostigmine  milliGRAM(s) Oral at bedtime  senna 2 Tablet(s) Oral at bedtime    MEDICATIONS  (PRN):  ondansetron   Disintegrating Tablet 4 milliGRAM(s) Oral every 4 hours PRN Nausea  oxyCODONE    IR 5 milliGRAM(s) Oral every 4 hours PRN Moderate Pain (4 - 6)  oxyCODONE    IR 10 milliGRAM(s) Oral every 4 hours PRN Severe Pain (7 - 10)    DIET: [x] Regular [] CCD [] Renal [] Puree [] Dysphagia [] Tube Feeds:     IMAGING:

## 2019-03-02 NOTE — PROGRESS NOTE ADULT - ASSESSMENT
78F with PMH of GERD, HLD, HTN, hypothyroidism, Myasthenia Gravis (dx 10/2018 stable on Pyridostigmine), HLD with complaints of worsening lower back pain to groin found to have lumbar stenosis. Here s/p elective L1-2 Lumbar laminectomy discectomy, L1-2 posterior lumbar fusion, L1-3 pedicle screws on 2/26/19. Currently c/o post-op LBP. Found to have elevated LFTs.

## 2019-03-02 NOTE — PROGRESS NOTE ADULT - PROBLEM SELECTOR PLAN 1
Outpatient labs reviewed- last LFTs from November 2018 were normal.  Etiology is not clear, but the LFTs are downtrending/ normalizes.   Possibly due to IV acetaminophen (stopped now).  US Abdomen with "Questionable nodular contour" which may indicate cirrhosis. Recommend outpatient GI follow up.

## 2019-03-02 NOTE — PROGRESS NOTE ADULT - PROBLEM SELECTOR PLAN 2
Etiology is unclear- baseline is ~150 thousand.  Platelets on presentation to the hospital were 114, currently about the same.   Possibly due to cirrhosis.   Continue to monitor labs.

## 2019-03-02 NOTE — PROGRESS NOTE ADULT - ASSESSMENT
78 year old female with PMH of GERD, HTN, Myasthenia Gravis ( dx 10/2018 stable on Pyridostigmine), HLD with complaints of worsening lower back pain to groin found to have lumbar stenosis planned for L1-2 Lumbar laminectomy discectomy, L1-2 posterior lumbar fusion, L1-3 pedicle screws on 2/26/19.     PROCEDURE:   2/26 s/p L1-L2 laminectomy and discectomy, L1-L2 fusion, L1-3 pedicle screws  POD#4  PLAN:  Neuro:   - pain significantly improved  - oxycodone prn pain  - avoid tylenol - elevated liver enzymes, on celebrex 200 bid, gabapentin 300tid  - liver enzymes trending down  - Myasthenia Gravis- continuous pulse oximetry, avoid opioids, magnesium and muscle relaxers  - continue pyridostigmine  - completed decadron 4q6 x 4 doses, will dc fingersticks, A1c is 5.0  - hypothyroid- continue synthroid  Respiratory:   - encouraged incentive spirometry  CV:  - HTN- continue HCTZ, losartan, ASA 81, statin  DVT ppx:   - venodynes, sq lovenox  Dispo- Plan for DC to [facility] rehab Monday

## 2019-03-02 NOTE — PROGRESS NOTE ADULT - SUBJECTIVE AND OBJECTIVE BOX
Jesús Pompa MD  ([facility] Hospitalist)  Pager [phone]  (During off hours please page [phone])      Patient is a 78y old  Female who presents with a chief complaint of low back pain (02 Mar 2019 11:00)      SUBJECTIVE / OVERNIGHT EVENTS: No events overnight. She states that her pain is improving a little. She was able to ambulate this morning.     MEDICATIONS  (STANDING):  aspirin  chewable 81 milliGRAM(s) Oral daily  atorvastatin 40 milliGRAM(s) Oral at bedtime  celecoxib 200 milliGRAM(s) Oral two times a day  docusate sodium 100 milliGRAM(s) Oral three times a day  enoxaparin Injectable 40 milliGRAM(s) SubCutaneous at bedtime  famotidine    Tablet 20 milliGRAM(s) Oral two times a day  gabapentin 300 milliGRAM(s) Oral three times a day  hydrochlorothiazide 12.5 milliGRAM(s) Oral daily  levothyroxine 50 MICROGram(s) Oral daily  mupirocin 2% Ointment 1 Application(s) Topical two times a day  pantoprazole    Tablet 40 milliGRAM(s) Oral before breakfast  pyridostigmine 60 milliGRAM(s) Oral two times a day  pyridostigmine  milliGRAM(s) Oral at bedtime  senna 2 Tablet(s) Oral at bedtime    MEDICATIONS  (PRN):  ondansetron   Disintegrating Tablet 4 milliGRAM(s) Oral every 4 hours PRN Nausea  oxyCODONE    IR 5 milliGRAM(s) Oral every 4 hours PRN Moderate Pain (4 - 6)  oxyCODONE    IR 10 milliGRAM(s) Oral every 4 hours PRN Severe Pain (7 - 10)      Vital Signs Last 24 Hrs  T(C): 36.6 (02 Mar 2019 14:08), Max: 37 (01 Mar 2019 16:49)  T(F): 97.8 (02 Mar 2019 14:08), Max: 98.6 (01 Mar 2019 16:49)  HR: 73 (02 Mar 2019 14:08) (60 - 73)  BP: 103/61 (02 Mar 2019 14:08) (90/55 - 122/59)  BP(mean): --  RR: 16 (02 Mar 2019 14:08) (16 - 18)  SpO2: 100% (02 Mar 2019 14:08) (96% - 100%)      I&O's Summary    01 Mar 2019 07:01  -  02 Mar 2019 07:00  --------------------------------------------------------  IN: 600 mL / OUT: 450 mL / NET: 150 mL    02 Mar 2019 07:01  -  02 Mar 2019 15:17  --------------------------------------------------------  IN: 1220 mL / OUT: 500 mL / NET: 720 mL        PHYSICAL EXAM:  GENERAL: NAD  HEENT:  Atraumatic, Normocephalic, MMM, neck supple, no JVD  EYES: EOMI, conjunctiva and sclera clear  CHEST/LUNG: Clear to auscultation bilaterally; No wheeze, rhonchi or rales.  HEART: S1, S2, rrr; No murmurs, rubs, or gallops  ABDOMEN: Soft, Nontender, Nondistended; Bowel sounds present  EXTREMITIES:  No clubbing, cyanosis, or edema  PSYCH: calm, cooperative  NEUROLOGY: non-focal, AOx3  SKIN: Normal appearing post-op lumbar surgical changes, staples in place.       LABS:                        9.4    6.11  )-----------( 113      ( 02 Mar 2019 10:13 )             29.9     03-02    138  |  104  |  18  ----------------------------<  96  4.2   |  24  |  0.85    Ca    8.5      02 Mar 2019 06:01    TPro  5.3<L>  /  Alb  2.6<L>  /  TBili  0.7  /  DBili  x   /  AST  38  /  ALT  185<H>  /  AlkPhos  74  03-02        RADIOLOGY & ADDITIONAL TESTS:    Consultant(s) Notes Reviewed: Neurosurgery

## 2019-03-03 LAB
ANION GAP SERPL CALC-SCNC: 13 MMOL/L — SIGNIFICANT CHANGE UP (ref 5–17)
APPEARANCE UR: CLEAR — SIGNIFICANT CHANGE UP
BACTERIA # UR AUTO: NEGATIVE — SIGNIFICANT CHANGE UP
BILIRUB UR-MCNC: NEGATIVE — SIGNIFICANT CHANGE UP
BUN SERPL-MCNC: 16 MG/DL — SIGNIFICANT CHANGE UP (ref 7–23)
CALCIUM SERPL-MCNC: 8.7 MG/DL — SIGNIFICANT CHANGE UP (ref 8.4–10.5)
CHLORIDE SERPL-SCNC: 102 MMOL/L — SIGNIFICANT CHANGE UP (ref 96–108)
CO2 SERPL-SCNC: 23 MMOL/L — SIGNIFICANT CHANGE UP (ref 22–31)
COLOR SPEC: YELLOW — SIGNIFICANT CHANGE UP
CREAT SERPL-MCNC: 0.87 MG/DL — SIGNIFICANT CHANGE UP (ref 0.5–1.3)
DIFF PNL FLD: NEGATIVE — SIGNIFICANT CHANGE UP
EPI CELLS # UR: 2 /HPF — SIGNIFICANT CHANGE UP
GLUCOSE SERPL-MCNC: 103 MG/DL — HIGH (ref 70–99)
GLUCOSE UR QL: NEGATIVE — SIGNIFICANT CHANGE UP
HCT VFR BLD CALC: 28.8 % — LOW (ref 34.5–45)
HGB BLD-MCNC: 10.1 G/DL — LOW (ref 11.5–15.5)
HYALINE CASTS # UR AUTO: 0 /LPF — SIGNIFICANT CHANGE UP (ref 0–2)
KETONES UR-MCNC: ABNORMAL
LEUKOCYTE ESTERASE UR-ACNC: NEGATIVE — SIGNIFICANT CHANGE UP
MCHC RBC-ENTMCNC: 30.9 PG — SIGNIFICANT CHANGE UP (ref 27–34)
MCHC RBC-ENTMCNC: 35 GM/DL — SIGNIFICANT CHANGE UP (ref 32–36)
MCV RBC AUTO: 88.3 FL — SIGNIFICANT CHANGE UP (ref 80–100)
NITRITE UR-MCNC: NEGATIVE — SIGNIFICANT CHANGE UP
PH UR: 7 — SIGNIFICANT CHANGE UP (ref 5–8)
PLATELET # BLD AUTO: 145 K/UL — LOW (ref 150–400)
POTASSIUM SERPL-MCNC: 4.5 MMOL/L — SIGNIFICANT CHANGE UP (ref 3.5–5.3)
POTASSIUM SERPL-SCNC: 4.5 MMOL/L — SIGNIFICANT CHANGE UP (ref 3.5–5.3)
PROT UR-MCNC: ABNORMAL
RBC # BLD: 3.26 M/UL — LOW (ref 3.8–5.2)
RBC # FLD: 13.6 % — SIGNIFICANT CHANGE UP (ref 10.3–14.5)
RBC CASTS # UR COMP ASSIST: 1 /HPF — SIGNIFICANT CHANGE UP (ref 0–4)
SODIUM SERPL-SCNC: 138 MMOL/L — SIGNIFICANT CHANGE UP (ref 135–145)
SP GR SPEC: 1.02 — SIGNIFICANT CHANGE UP (ref 1.01–1.02)
UROBILINOGEN FLD QL: ABNORMAL
WBC # BLD: 6.1 K/UL — SIGNIFICANT CHANGE UP (ref 3.8–10.5)
WBC # FLD AUTO: 6.1 K/UL — SIGNIFICANT CHANGE UP (ref 3.8–10.5)
WBC UR QL: 2 /HPF — SIGNIFICANT CHANGE UP (ref 0–5)

## 2019-03-03 PROCEDURE — 99232 SBSQ HOSP IP/OBS MODERATE 35: CPT

## 2019-03-03 PROCEDURE — 71045 X-RAY EXAM CHEST 1 VIEW: CPT | Mod: 26

## 2019-03-03 RX ORDER — ACETAMINOPHEN 500 MG
650 TABLET ORAL EVERY 6 HOURS
Qty: 0 | Refills: 0 | Status: DISCONTINUED | OUTPATIENT
Start: 2019-03-03 | End: 2019-03-05

## 2019-03-03 RX ADMIN — FAMOTIDINE 20 MILLIGRAM(S): 10 INJECTION INTRAVENOUS at 05:52

## 2019-03-03 RX ADMIN — PANTOPRAZOLE SODIUM 40 MILLIGRAM(S): 20 TABLET, DELAYED RELEASE ORAL at 05:53

## 2019-03-03 RX ADMIN — CELECOXIB 200 MILLIGRAM(S): 200 CAPSULE ORAL at 18:15

## 2019-03-03 RX ADMIN — ENOXAPARIN SODIUM 40 MILLIGRAM(S): 100 INJECTION SUBCUTANEOUS at 22:12

## 2019-03-03 RX ADMIN — PYRIDOSTIGMINE BROMIDE 180 MILLIGRAM(S): 60 SOLUTION ORAL at 22:12

## 2019-03-03 RX ADMIN — Medication 650 MILLIGRAM(S): at 18:35

## 2019-03-03 RX ADMIN — CELECOXIB 200 MILLIGRAM(S): 200 CAPSULE ORAL at 17:47

## 2019-03-03 RX ADMIN — Medication 50 MICROGRAM(S): at 05:51

## 2019-03-03 RX ADMIN — PYRIDOSTIGMINE BROMIDE 60 MILLIGRAM(S): 60 SOLUTION ORAL at 13:53

## 2019-03-03 RX ADMIN — MUPIROCIN 1 APPLICATION(S): 20 OINTMENT TOPICAL at 05:52

## 2019-03-03 RX ADMIN — Medication 100 MILLIGRAM(S): at 05:51

## 2019-03-03 RX ADMIN — Medication 100 MILLIGRAM(S): at 22:11

## 2019-03-03 RX ADMIN — CELECOXIB 200 MILLIGRAM(S): 200 CAPSULE ORAL at 06:30

## 2019-03-03 RX ADMIN — FAMOTIDINE 20 MILLIGRAM(S): 10 INJECTION INTRAVENOUS at 17:47

## 2019-03-03 RX ADMIN — CELECOXIB 200 MILLIGRAM(S): 200 CAPSULE ORAL at 05:51

## 2019-03-03 RX ADMIN — MUPIROCIN 1 APPLICATION(S): 20 OINTMENT TOPICAL at 17:48

## 2019-03-03 RX ADMIN — PYRIDOSTIGMINE BROMIDE 60 MILLIGRAM(S): 60 SOLUTION ORAL at 05:52

## 2019-03-03 RX ADMIN — Medication 81 MILLIGRAM(S): at 12:30

## 2019-03-03 RX ADMIN — GABAPENTIN 300 MILLIGRAM(S): 400 CAPSULE ORAL at 13:53

## 2019-03-03 RX ADMIN — GABAPENTIN 300 MILLIGRAM(S): 400 CAPSULE ORAL at 05:51

## 2019-03-03 RX ADMIN — Medication 100 MILLIGRAM(S): at 13:53

## 2019-03-03 RX ADMIN — SENNA PLUS 2 TABLET(S): 8.6 TABLET ORAL at 22:12

## 2019-03-03 RX ADMIN — GABAPENTIN 300 MILLIGRAM(S): 400 CAPSULE ORAL at 22:11

## 2019-03-03 RX ADMIN — ATORVASTATIN CALCIUM 40 MILLIGRAM(S): 80 TABLET, FILM COATED ORAL at 22:13

## 2019-03-03 NOTE — PROGRESS NOTE ADULT - SUBJECTIVE AND OBJECTIVE BOX
Jesús Pompa MD  ([facility] Hospitalist)  Pager [phone]  (During off hours please page [phone])      Patient is a 78y old  Female who presents with a chief complaint of low back pain (02 Mar 2019 15:17)      SUBJECTIVE / OVERNIGHT EVENTS: No events. No new complaints.     MEDICATIONS  (STANDING):  aspirin  chewable 81 milliGRAM(s) Oral daily  atorvastatin 40 milliGRAM(s) Oral at bedtime  celecoxib 200 milliGRAM(s) Oral two times a day  docusate sodium 100 milliGRAM(s) Oral three times a day  enoxaparin Injectable 40 milliGRAM(s) SubCutaneous at bedtime  famotidine    Tablet 20 milliGRAM(s) Oral two times a day  gabapentin 300 milliGRAM(s) Oral three times a day  hydrochlorothiazide 12.5 milliGRAM(s) Oral daily  levothyroxine 50 MICROGram(s) Oral daily  mupirocin 2% Ointment 1 Application(s) Topical two times a day  ondansetron Injectable 4 milliGRAM(s) IV Push once  pantoprazole    Tablet 40 milliGRAM(s) Oral before breakfast  pyridostigmine 60 milliGRAM(s) Oral two times a day  pyridostigmine  milliGRAM(s) Oral at bedtime  senna 2 Tablet(s) Oral at bedtime    MEDICATIONS  (PRN):  ondansetron   Disintegrating Tablet 4 milliGRAM(s) Oral every 4 hours PRN Nausea  oxyCODONE    IR 5 milliGRAM(s) Oral every 4 hours PRN Moderate Pain (4 - 6)  oxyCODONE    IR 10 milliGRAM(s) Oral every 4 hours PRN Severe Pain (7 - 10)      Vital Signs Last 24 Hrs  T(C): 37.9 (03 Mar 2019 14:38), Max: 37.9 (03 Mar 2019 14:38)  T(F): 100.2 (03 Mar 2019 14:38), Max: 100.2 (03 Mar 2019 14:38)  HR: 66 (03 Mar 2019 14:38) (61 - 79)  BP: 108/57 (03 Mar 2019 14:38) (92/52 - 108/57)  BP(mean): --  RR: 18 (03 Mar 2019 14:38) (16 - 18)  SpO2: 97% (03 Mar 2019 14:38) (94% - 100%)  CAPILLARY BLOOD GLUCOSE        I&O's Summary    02 Mar 2019 07:01  -  03 Mar 2019 07:00  --------------------------------------------------------  IN: 1700 mL / OUT: 500 mL / NET: 1200 mL        PHYSICAL EXAM:  GENERAL: NAD  HEENT:  Atraumatic, Normocephalic, MMM, neck supple, no JVD  EYES: EOMI, conjunctiva and sclera clear  CHEST/LUNG: Clear to auscultation bilaterally; No wheeze, rhonchi or rales.  HEART: S1, S2, rrr; No murmurs, rubs, or gallops  ABDOMEN: Soft, Nontender, Nondistended; Bowel sounds present  EXTREMITIES:  No clubbing, cyanosis, or edema  PSYCH: calm, cooperative  NEUROLOGY: non-focal, AOx3  SKIN: Normal appearing post-op lumbar surgical changes, staples in place.       LABS:                        9.4    6.11  )-----------( 113      ( 02 Mar 2019 10:13 )             29.9     03-02    138  |  104  |  18  ----------------------------<  96  4.2   |  24  |  0.85    Ca    8.5      02 Mar 2019 06:01    TPro  5.3<L>  /  Alb  2.6<L>  /  TBili  0.7  /  DBili  x   /  AST  38  /  ALT  185<H>  /  AlkPhos  74  03-02      RADIOLOGY & ADDITIONAL TESTS:    Consultant(s) Notes Reviewed: Neurosurgery

## 2019-03-03 NOTE — PROGRESS NOTE ADULT - ASSESSMENT
78 year old female with PMH of GERD, HTN, Myasthenia Gravis ( dx 10/2018 stable on Pyridostigmine), HLD with complaints of worsening lower back pain to groin found to have lumbar stenosis planned for L1-2 Lumbar laminectomy discectomy, L1-2 posterior lumbar fusion, L1-3 pedicle screws on 2/26/19.     PROCEDURE:   2/26 s/p L1-L2 laminectomy and discectomy, L1-L2 fusion, L1-3 pedicle screws  POD#5  PLAN:  Neuro:   - pain significantly improved  - oxycodone prn pain  - avoid tylenol - elevated liver enzymes, on celebrex 200 bid, gabapentin 300tid  - liver enzymes trending down  - Myasthenia Gravis- continuous pulse oximetry, avoid opioids, magnesium and muscle relaxers  - continue pyridostigmine  - completed decadron 4q6 x 4 doses, will dc fingersticks, A1c is 5.0  - hypothyroid- continue synthroid  Respiratory:   - encouraged incentive spirometry  CV:  - HTN- continue HCTZ, losartan, ASA 81, statin  DVT ppx:   - venodynes, sq lovenox  Dispo- Plan for DC to [facility] rehab Monday

## 2019-03-03 NOTE — PROGRESS NOTE ADULT - PROBLEM SELECTOR PLAN 3
Continue celebrex and gabapentin. Stopped acetaminophen. Would avoid lidocaine patch given MG.   If okay with neurology, would recommend starting narcotics (if needed).

## 2019-03-03 NOTE — PROGRESS NOTE ADULT - PROBLEM SELECTOR PLAN 1
Resolving after stopping IV acetaminophen.  US Abdomen with "Questionable nodular contour" which may indicate cirrhosis. Recommend outpatient GI follow up.

## 2019-03-03 NOTE — PROGRESS NOTE ADULT - SUBJECTIVE AND OBJECTIVE BOX
SUBJECTIVE: non complaints    Vital Signs Last 24 Hrs  T(C): 36.9 (03-03-19 @ 05:15), Max: 37 (03-02-19 @ 21:41)  T(F): 98.5 (03-03-19 @ 05:15), Max: 98.6 (03-02-19 @ 21:41)  HR: 63 (03-03-19 @ 05:15) (63 - 79)  BP: 100/56 (03-03-19 @ 05:15) (92/52 - 103/61)  BP(mean): --  RR: 16 (03-03-19 @ 05:15) (16 - 16)  SpO2: 100% (03-03-19 @ 05:15) (94% - 100%)    PHYSICAL EXAM:    Constitutional: No Acute Distress     Neurological: Awake, alert, oriented to person, place and time, speech clear and fluent, face equal, tongue midline, briskly following commands, no drift, moves all extremities with 5/5 strength, sensation intact to light touch throughout, pupils 4mm and reactive bilaterally, extraocular movements intact, no nystagmus    Incision:     Drains:     Pulmonary: Clear to Auscultation, No rales, No rhonchi, No wheezes     Cardiovascular: S1, S2, Regular rate and rhythm     Gastrointestinal: Soft, Non-tender, Non-distended, +bowel sounds x 4    Extremities: No calf tenderness bilaterally, no cyanosis, clubbing or edema          LABS:                          9.4    6.11  )-----------( 113      ( 02 Mar 2019 10:13 )             29.9    03-02    138  |  104  |  18  ----------------------------<  96  4.2   |  24  |  0.85    Ca    8.5      02 Mar 2019 06:01    TPro  5.3<L>  /  Alb  2.6<L>  /  TBili  0.7  /  DBili  x   /  AST  38  /  ALT  185<H>  /  AlkPhos  74  03-02 03-02 @ 07:01  -  03-03 @ 07:00  --------------------------------------------------------  IN: 1700 mL / OUT: 500 mL / NET: 1200 mL        IMAGING:     MEDICATIONS:  Antibiotics:    Neuro:  celecoxib 200 milliGRAM(s) Oral two times a day  gabapentin 300 milliGRAM(s) Oral three times a day  ondansetron   Disintegrating Tablet 4 milliGRAM(s) Oral every 4 hours PRN Nausea  ondansetron Injectable 4 milliGRAM(s) IV Push once  oxyCODONE    IR 5 milliGRAM(s) Oral every 4 hours PRN Moderate Pain (4 - 6)  oxyCODONE    IR 10 milliGRAM(s) Oral every 4 hours PRN Severe Pain (7 - 10)    Cardiac:  hydrochlorothiazide 12.5 milliGRAM(s) Oral daily    Pulm:    GI/:  docusate sodium 100 milliGRAM(s) Oral three times a day  famotidine    Tablet 20 milliGRAM(s) Oral two times a day  pantoprazole    Tablet 40 milliGRAM(s) Oral before breakfast  senna 2 Tablet(s) Oral at bedtime    Other:   aspirin  chewable 81 milliGRAM(s) Oral daily  atorvastatin 40 milliGRAM(s) Oral at bedtime  enoxaparin Injectable 40 milliGRAM(s) SubCutaneous at bedtime  levothyroxine 50 MICROGram(s) Oral daily  mupirocin 2% Ointment 1 Application(s) Topical two times a day  pyridostigmine 60 milliGRAM(s) Oral two times a day  pyridostigmine  milliGRAM(s) Oral at bedtime        DIET:

## 2019-03-04 ENCOUNTER — RX RENEWAL (OUTPATIENT)
Age: 79
End: 2019-03-04

## 2019-03-04 DIAGNOSIS — R50.82 POSTPROCEDURAL FEVER: ICD-10-CM

## 2019-03-04 PROCEDURE — 99233 SBSQ HOSP IP/OBS HIGH 50: CPT

## 2019-03-04 RX ORDER — SODIUM CHLORIDE 0.65 %
1 AEROSOL, SPRAY (ML) NASAL
Qty: 0 | Refills: 0 | Status: DISCONTINUED | OUTPATIENT
Start: 2019-03-04 | End: 2019-03-05

## 2019-03-04 RX ADMIN — FAMOTIDINE 20 MILLIGRAM(S): 10 INJECTION INTRAVENOUS at 05:36

## 2019-03-04 RX ADMIN — FAMOTIDINE 20 MILLIGRAM(S): 10 INJECTION INTRAVENOUS at 17:10

## 2019-03-04 RX ADMIN — PYRIDOSTIGMINE BROMIDE 60 MILLIGRAM(S): 60 SOLUTION ORAL at 15:00

## 2019-03-04 RX ADMIN — Medication 100 MILLIGRAM(S): at 05:36

## 2019-03-04 RX ADMIN — PYRIDOSTIGMINE BROMIDE 180 MILLIGRAM(S): 60 SOLUTION ORAL at 23:10

## 2019-03-04 RX ADMIN — ATORVASTATIN CALCIUM 40 MILLIGRAM(S): 80 TABLET, FILM COATED ORAL at 23:10

## 2019-03-04 RX ADMIN — Medication 50 MICROGRAM(S): at 05:36

## 2019-03-04 RX ADMIN — GABAPENTIN 300 MILLIGRAM(S): 400 CAPSULE ORAL at 05:36

## 2019-03-04 RX ADMIN — Medication 650 MILLIGRAM(S): at 23:17

## 2019-03-04 RX ADMIN — GABAPENTIN 300 MILLIGRAM(S): 400 CAPSULE ORAL at 15:00

## 2019-03-04 RX ADMIN — Medication 650 MILLIGRAM(S): at 15:01

## 2019-03-04 RX ADMIN — SENNA PLUS 2 TABLET(S): 8.6 TABLET ORAL at 23:10

## 2019-03-04 RX ADMIN — Medication 100 MILLIGRAM(S): at 15:00

## 2019-03-04 RX ADMIN — Medication 81 MILLIGRAM(S): at 11:06

## 2019-03-04 RX ADMIN — CELECOXIB 200 MILLIGRAM(S): 200 CAPSULE ORAL at 17:10

## 2019-03-04 RX ADMIN — CELECOXIB 200 MILLIGRAM(S): 200 CAPSULE ORAL at 17:40

## 2019-03-04 RX ADMIN — Medication 1 SPRAY(S): at 17:10

## 2019-03-04 RX ADMIN — Medication 650 MILLIGRAM(S): at 15:30

## 2019-03-04 RX ADMIN — ENOXAPARIN SODIUM 40 MILLIGRAM(S): 100 INJECTION SUBCUTANEOUS at 23:10

## 2019-03-04 RX ADMIN — CELECOXIB 200 MILLIGRAM(S): 200 CAPSULE ORAL at 05:36

## 2019-03-04 RX ADMIN — PANTOPRAZOLE SODIUM 40 MILLIGRAM(S): 20 TABLET, DELAYED RELEASE ORAL at 05:36

## 2019-03-04 RX ADMIN — MUPIROCIN 1 APPLICATION(S): 20 OINTMENT TOPICAL at 05:35

## 2019-03-04 RX ADMIN — MUPIROCIN 1 APPLICATION(S): 20 OINTMENT TOPICAL at 17:10

## 2019-03-04 RX ADMIN — Medication 100 MILLIGRAM(S): at 23:10

## 2019-03-04 RX ADMIN — GABAPENTIN 300 MILLIGRAM(S): 400 CAPSULE ORAL at 23:10

## 2019-03-04 RX ADMIN — PYRIDOSTIGMINE BROMIDE 60 MILLIGRAM(S): 60 SOLUTION ORAL at 05:36

## 2019-03-04 NOTE — PROGRESS NOTE ADULT - SUBJECTIVE AND OBJECTIVE BOX
Jesús Pompa MD  ([facility] Hospitalist)  Pager [phone]  (During off hours please page [phone])      Patient is a 78y old  Female who presents with a chief complaint of low back pain (03 Mar 2019 15:21)      SUBJECTIVE / OVERNIGHT EVENTS: No events overnight. She had a fever yesterday evening, no complaints.     MEDICATIONS  (STANDING):  aspirin  chewable 81 milliGRAM(s) Oral daily  atorvastatin 40 milliGRAM(s) Oral at bedtime  celecoxib 200 milliGRAM(s) Oral two times a day  docusate sodium 100 milliGRAM(s) Oral three times a day  enoxaparin Injectable 40 milliGRAM(s) SubCutaneous at bedtime  famotidine    Tablet 20 milliGRAM(s) Oral two times a day  gabapentin 300 milliGRAM(s) Oral three times a day  hydrochlorothiazide 12.5 milliGRAM(s) Oral daily  levothyroxine 50 MICROGram(s) Oral daily  mupirocin 2% Ointment 1 Application(s) Topical two times a day  ondansetron Injectable 4 milliGRAM(s) IV Push once  pantoprazole    Tablet 40 milliGRAM(s) Oral before breakfast  pyridostigmine 60 milliGRAM(s) Oral two times a day  pyridostigmine  milliGRAM(s) Oral at bedtime  senna 2 Tablet(s) Oral at bedtime  sodium chloride 0.65% Nasal 1 Spray(s) Both Nostrils two times a day    MEDICATIONS  (PRN):  acetaminophen   Tablet .. 650 milliGRAM(s) Oral every 6 hours PRN Temp greater or equal to 38C (100.4F), Mild Pain (1 - 3)  ondansetron   Disintegrating Tablet 4 milliGRAM(s) Oral every 4 hours PRN Nausea  oxyCODONE    IR 5 milliGRAM(s) Oral every 4 hours PRN Moderate Pain (4 - 6)  oxyCODONE    IR 10 milliGRAM(s) Oral every 4 hours PRN Severe Pain (7 - 10)      Vital Signs Last 24 Hrs  T(C): 36.3 (04 Mar 2019 08:23), Max: 39.3 (03 Mar 2019 16:44)  T(F): 97.4 (04 Mar 2019 08:23), Max: 102.8 (03 Mar 2019 16:44)  HR: 67 (04 Mar 2019 10:01) (60 - 87)  BP: 106/57 (04 Mar 2019 10:01) (93/52 - 117/64)  BP(mean): --  RR: 18 (04 Mar 2019 08:23) (16 - 18)  SpO2: 98% (04 Mar 2019 10:01) (95% - 99%)      PHYSICAL EXAM:  GENERAL: NAD  HEENT:  Atraumatic, Normocephalic, MMM, neck supple, no JVD  EYES: EOMI, conjunctiva and sclera clear  CHEST/LUNG: Clear to auscultation bilaterally; No wheeze, rhonchi or rales.  HEART: S1, S2, rrr; No murmurs, rubs, or gallops  ABDOMEN: Soft, Nontender, Nondistended; Bowel sounds present  EXTREMITIES:  No clubbing, cyanosis. 2+ pitting edema bilateral lower extremities   PSYCH: calm, cooperative  NEUROLOGY: non-focal, AOx3  SKIN: Normal appearing post-op lumbar surgical changes, staples in place.       LABS:                        10.1   6.1   )-----------( 145      ( 03 Mar 2019 17:46 )             28.8     -    138  |  102  |  16  ----------------------------<  103<H>  4.5   |  23  |  0.87    Ca    8.7      03 Mar 2019 17:46      Urinalysis Basic - ( 03 Mar 2019 19:05 )    Color: Yellow / Appearance: Clear / S.020 / pH: x  Gluc: x / Ketone: Small  / Bili: Negative / Urobili: 3 mg/dL   Blood: x / Protein: Trace / Nitrite: Negative   Leuk Esterase: Negative / RBC: 1 /hpf / WBC 2 /HPF   Sq Epi: x / Non Sq Epi: 2 /hpf / Bacteria: Negative      RADIOLOGY & ADDITIONAL TESTS:    Consultant(s) Notes Reviewed: Neurosurgery    Care Discussed with Consultants/Other Providers: Neurosurgery re LE edema, fever

## 2019-03-04 NOTE — DIETITIAN INITIAL EVALUATION ADULT. - NS AS NUTRI INTERV MEALS SNACK
General/healthful diet/Continue to provide current diet order, no therapeutic diet restrictions indicated at this time given decreased po and controlled BP in-patient. Continue to obtain/provide food preferences as feasible.

## 2019-03-04 NOTE — DIETITIAN INITIAL EVALUATION ADULT. - ENERGY NEEDS
Height: 56 inches, Weight: 142 pounds (dosing)  BMI: 31.8 kg/m2 IBW: 80 pounds (+/-10%), %IBW: 178%  Pertinent Info: No edema noted, no pressure injuries noted at this time in nursing flow sheet.  Other pertinent info: Pt is 78yoF with PMH significant for myasthenia gravis (dx 10/2018), HTN, HLD, GERD, AS, lumbar stenosis, s/p planned L1-L2 laminectomy and discectomy, L1-L2 fusion, L1-3 pedicle screws. Post-op anemia, stable. Thrombocytopenia, stable. +fever yesterday.

## 2019-03-04 NOTE — DIETITIAN INITIAL EVALUATION ADULT. - ORAL INTAKE PTA
good/Pt reports good po intake PTA. Pt endorses shellfish food allergy. Pt reports taking a multivitamin, iron and vitamin B3 supplement PTA.

## 2019-03-04 NOTE — PROGRESS NOTE ADULT - SUBJECTIVE AND OBJECTIVE BOX
SUBJECTIVE: patient had a fever to 102.8 yesterday.  fever work up sent     Vital Signs Last 24 Hrs  T(C): 36.4 (04 Mar 2019 04:40), Max: 39.3 (03 Mar 2019 16:44)  T(F): 97.6 (04 Mar 2019 04:40), Max: 102.8 (03 Mar 2019 16:44)  HR: 60 (04 Mar 2019 04:40) (60 - 87)  BP: 106/57 (04 Mar 2019 04:40) (93/52 - 117/64)  BP(mean): --  RR: 18 (04 Mar 2019 04:40) (16 - 18)  SpO2: 99% (04 Mar 2019 04:40) (95% - 99%)    PHYSICAL EXAM:    Constitutional: No Acute Distress     Neurological: Awake, alert oriented to person, place and time, Following Commands, Speech Fluent, Moving all extremities, b/l UE 4+/5, LE 5/5, Sensation to Light Touch Intact    Pulmonary: Clear to Auscultation, No rales, No rhonchi, No wheezes     Cardiovascular: S1, S2, Regular rate and rhythm     Gastrointestinal: Soft, Non-tender, Non-distended     Extremities: No calf tenderness     Incision: c/d/i + staples   LABS:                        10.1   6.1   )-----------( 145      ( 03 Mar 2019 17:46 )             28.8    03-03    138  |  102  |  16  ----------------------------<  103<H>  4.5   |  23  |  0.87    Ca    8.7      03 Mar 2019 17:46    Hemoglobin A1C, Whole Blood: 5.0 % (02-27-19 @ 10:19)      DRAINS:     MEDICATIONS:  Anticoagulation:   aspirin  chewable 81 milliGRAM(s) Oral daily  enoxaparin Injectable 40 milliGRAM(s) SubCutaneous at bedtime    Antibiotics:    Endo:  atorvastatin 40 milliGRAM(s) Oral at bedtime  levothyroxine 50 MICROGram(s) Oral daily    Neuro:  acetaminophen   Tablet .. 650 milliGRAM(s) Oral every 6 hours PRN Temp greater or equal to 38C (100.4F), Mild Pain (1 - 3)  celecoxib 200 milliGRAM(s) Oral two times a day  gabapentin 300 milliGRAM(s) Oral three times a day  ondansetron   Disintegrating Tablet 4 milliGRAM(s) Oral every 4 hours PRN Nausea  ondansetron Injectable 4 milliGRAM(s) IV Push once  oxyCODONE    IR 5 milliGRAM(s) Oral every 4 hours PRN Moderate Pain (4 - 6)  oxyCODONE    IR 10 milliGRAM(s) Oral every 4 hours PRN Severe Pain (7 - 10)    Cardiac:  hydrochlorothiazide 12.5 milliGRAM(s) Oral daily    Pulm:    GI/:  docusate sodium 100 milliGRAM(s) Oral three times a day  famotidine    Tablet 20 milliGRAM(s) Oral two times a day  pantoprazole    Tablet 40 milliGRAM(s) Oral before breakfast  senna 2 Tablet(s) Oral at bedtime    Other:   mupirocin 2% Ointment 1 Application(s) Topical two times a day  pyridostigmine 60 milliGRAM(s) Oral two times a day  pyridostigmine  milliGRAM(s) Oral at bedtime    DIET: regular SUBJECTIVE: patient had a fever to 102.8 yesterday.  fever work up sent , complains of dry nose     Vital Signs Last 24 Hrs  T(C): 36.4 (04 Mar 2019 04:40), Max: 39.3 (03 Mar 2019 16:44)  T(F): 97.6 (04 Mar 2019 04:40), Max: 102.8 (03 Mar 2019 16:44)  HR: 60 (04 Mar 2019 04:40) (60 - 87)  BP: 106/57 (04 Mar 2019 04:40) (93/52 - 117/64)  BP(mean): --  RR: 18 (04 Mar 2019 04:40) (16 - 18)  SpO2: 99% (04 Mar 2019 04:40) (95% - 99%)    PHYSICAL EXAM:    Constitutional: No Acute Distress     Neurological: Awake, alert oriented to person, place and time, Following Commands, Speech Fluent, Moving all extremities, b/l UE 4+/5, LE 5/5, Sensation to Light Touch Intact    Pulmonary: Clear to Auscultation, No rales, No rhonchi, No wheezes     Cardiovascular: S1, S2, Regular rate and rhythm     Gastrointestinal: Soft, Non-tender, Non-distended     Extremities: No calf tenderness     Incision: c/d/i + staples   LABS:                        10.1   6.1   )-----------( 145      ( 03 Mar 2019 17:46 )             28.8    03-03    138  |  102  |  16  ----------------------------<  103<H>  4.5   |  23  |  0.87    Ca    8.7      03 Mar 2019 17:46    Hemoglobin A1C, Whole Blood: 5.0 % (02-27-19 @ 10:19)      DRAINS:     MEDICATIONS:  Anticoagulation:   aspirin  chewable 81 milliGRAM(s) Oral daily  enoxaparin Injectable 40 milliGRAM(s) SubCutaneous at bedtime    Antibiotics:    Endo:  atorvastatin 40 milliGRAM(s) Oral at bedtime  levothyroxine 50 MICROGram(s) Oral daily    Neuro:  acetaminophen   Tablet .. 650 milliGRAM(s) Oral every 6 hours PRN Temp greater or equal to 38C (100.4F), Mild Pain (1 - 3)  celecoxib 200 milliGRAM(s) Oral two times a day  gabapentin 300 milliGRAM(s) Oral three times a day  ondansetron   Disintegrating Tablet 4 milliGRAM(s) Oral every 4 hours PRN Nausea  ondansetron Injectable 4 milliGRAM(s) IV Push once  oxyCODONE    IR 5 milliGRAM(s) Oral every 4 hours PRN Moderate Pain (4 - 6)  oxyCODONE    IR 10 milliGRAM(s) Oral every 4 hours PRN Severe Pain (7 - 10)    Cardiac:  hydrochlorothiazide 12.5 milliGRAM(s) Oral daily    Pulm:    GI/:  docusate sodium 100 milliGRAM(s) Oral three times a day  famotidine    Tablet 20 milliGRAM(s) Oral two times a day  pantoprazole    Tablet 40 milliGRAM(s) Oral before breakfast  senna 2 Tablet(s) Oral at bedtime    Other:   mupirocin 2% Ointment 1 Application(s) Topical two times a day  pyridostigmine 60 milliGRAM(s) Oral two times a day  pyridostigmine  milliGRAM(s) Oral at bedtime    DIET: regular

## 2019-03-04 NOTE — PROGRESS NOTE ADULT - PROBLEM SELECTOR PLAN 3
Etiology is unclear- baseline is ~150 thousand. Stable  Possibly due to cirrhosis. Continue to monitor labs.

## 2019-03-04 NOTE — DIETITIAN INITIAL EVALUATION ADULT. - PHYSICAL APPEARANCE
BMI 31.8, Pt visually appears well nourished with no signs of muscle wasting or fat depletion./well nourished

## 2019-03-04 NOTE — PROGRESS NOTE ADULT - ASSESSMENT
78 year old female with PMH of GERD, HTN, Myasthenia Gravis ( dx 10/2018 stable on Pyridostigmine), HLD with complaints of worsening lower back pain to groin found to have lumbar stenosis planned for L1-2 Lumbar laminectomy discectomy, L1-2 posterior lumbar fusion, L1-3 pedicle screws on 2/26/19.     PROCEDURE:   2/26 s/p L1-L2 laminectomy and discectomy, L1-L2 fusion, L1-3 pedicle screws    PLAN:  Neuro:   - pain significantly improved  - oxycodone prn pain  - avoid tylenol - elevated liver enzymes, on celebrex 200 bid, gabapentin 300tid  - Myasthenia Gravis- avoid magnesium and muscle relaxers  - continue pyridostigmine  - hypothyroid- continue synthroid  Respiratory:   - encouraged incentive spirometry  CV:  - HTN- continue HCTZ, losartan, ASA 81, statin  DVT ppx:   - venodynes, sq lovenox  ID  - fever yesterday, UA negative follow up blood cultures   -cxr shows clear lungs   Dispo- Plan for DC to [facility] once afebrile for 24 hours 78 year old female with PMH of GERD, HTN, Myasthenia Gravis ( dx 10/2018 stable on Pyridostigmine), HLD with complaints of worsening lower back pain to groin found to have lumbar stenosis planned for L1-2 Lumbar laminectomy discectomy, L1-2 posterior lumbar fusion, L1-3 pedicle screws on 2/26/19.     PROCEDURE:   2/26 s/p L1-L2 laminectomy and discectomy, L1-L2 fusion, L1-3 pedicle screws    PLAN:  Neuro:   - pain significantly improved  - oxycodone prn pain  - avoid tylenol - elevated liver enzymes, on celebrex 200 bid, gabapentin 300tid  - Myasthenia Gravis- avoid magnesium and muscle relaxers  - continue pyridostigmine  - hypothyroid- continue synthroid  Respiratory:   - encouraged incentive spirometry  CV:  - HTN- continue HCTZ, losartan, ASA 81, statin  DVT ppx:   - venodynes, sq lovenox  HEME  - post operative anemia due to acute blood loss stable   - thrombocytopenia is stable   ID  - fever yesterday, UA negative follow up blood cultures   -cxr shows clear lungs   Dispo- Plan for DC to [facility] once afebrile for 24 hours 78 year old female with PMH of GERD, HTN, Myasthenia Gravis ( dx 10/2018 stable on Pyridostigmine), HLD with complaints of worsening lower back pain to groin found to have lumbar stenosis planned for L1-2 Lumbar laminectomy discectomy, L1-2 posterior lumbar fusion, L1-3 pedicle screws on 2/26/19.     PROCEDURE:   2/26 s/p L1-L2 laminectomy and discectomy, L1-L2 fusion, L1-3 pedicle screws    PLAN:  Neuro:   - pain significantly improved  - oxycodone prn pain  - avoid tylenol - elevated liver enzymes, on celebrex 200 bid, gabapentin 300tid  - Myasthenia Gravis- avoid magnesium and muscle relaxers  - continue pyridostigmine  - hypothyroid- continue synthroid  Respiratory:   - encouraged incentive spirometry  - ocean spray for the nose   CV:  - HTN- continue HCTZ, losartan, ASA 81, statin  DVT ppx:   - venodynes, sq lovenox  HEME  - post operative anemia due to acute blood loss stable   - thrombocytopenia is stable   ID  - fever yesterday, UA negative follow up blood cultures   -cxr shows clear lungs   Dispo- Plan for DC to [facility] once afebrile for 24 hours

## 2019-03-04 NOTE — PROGRESS NOTE ADULT - PROBLEM SELECTOR PLAN 1
The patient is nontoxic appearing. Denies infectious symptoms.   US negative, CXR clear.  Blood cultures pending.  Given LE edema, will order LE dopplers- r/o acute DVT

## 2019-03-04 NOTE — DIETITIAN INITIAL EVALUATION ADULT. - OTHER INFO
Pt seen for length of stay on [facility]. Pt reports fair po intake in-patient in setting of dislike of some institutional foods and pain. Pt declines all food supplements offered at this time. Food preferences to be updated and honored as feasible. Pt denies chewing/swallowing difficulties. No c/o nausea, vomiting, diarrhea, or constipation at this time. Pt reports UBW fluctuates 142-152 lbs. Current dosing wt noted as 142 lbs. Pt denies any nutrition related questions at this time and declines need for nutrition education.

## 2019-03-05 ENCOUNTER — TRANSCRIPTION ENCOUNTER (OUTPATIENT)
Age: 79
End: 2019-03-05

## 2019-03-05 VITALS
OXYGEN SATURATION: 98 % | TEMPERATURE: 98 F | SYSTOLIC BLOOD PRESSURE: 111 MMHG | HEART RATE: 63 BPM | DIASTOLIC BLOOD PRESSURE: 57 MMHG | RESPIRATION RATE: 18 BRPM

## 2019-03-05 LAB
CULTURE RESULTS: SIGNIFICANT CHANGE UP
SPECIMEN SOURCE: SIGNIFICANT CHANGE UP

## 2019-03-05 PROCEDURE — 86900 BLOOD TYPING SEROLOGIC ABO: CPT

## 2019-03-05 PROCEDURE — 82330 ASSAY OF CALCIUM: CPT

## 2019-03-05 PROCEDURE — 94640 AIRWAY INHALATION TREATMENT: CPT

## 2019-03-05 PROCEDURE — 82803 BLOOD GASES ANY COMBINATION: CPT

## 2019-03-05 PROCEDURE — 97161 PT EVAL LOW COMPLEX 20 MIN: CPT

## 2019-03-05 PROCEDURE — 80053 COMPREHEN METABOLIC PANEL: CPT

## 2019-03-05 PROCEDURE — 93005 ELECTROCARDIOGRAM TRACING: CPT

## 2019-03-05 PROCEDURE — 80076 HEPATIC FUNCTION PANEL: CPT

## 2019-03-05 PROCEDURE — 87086 URINE CULTURE/COLONY COUNT: CPT

## 2019-03-05 PROCEDURE — 82435 ASSAY OF BLOOD CHLORIDE: CPT

## 2019-03-05 PROCEDURE — 86850 RBC ANTIBODY SCREEN: CPT

## 2019-03-05 PROCEDURE — 94660 CPAP INITIATION&MGMT: CPT

## 2019-03-05 PROCEDURE — 83036 HEMOGLOBIN GLYCOSYLATED A1C: CPT

## 2019-03-05 PROCEDURE — 97530 THERAPEUTIC ACTIVITIES: CPT

## 2019-03-05 PROCEDURE — 82947 ASSAY GLUCOSE BLOOD QUANT: CPT

## 2019-03-05 PROCEDURE — 71045 X-RAY EXAM CHEST 1 VIEW: CPT

## 2019-03-05 PROCEDURE — 85014 HEMATOCRIT: CPT

## 2019-03-05 PROCEDURE — 87040 BLOOD CULTURE FOR BACTERIA: CPT

## 2019-03-05 PROCEDURE — 83605 ASSAY OF LACTIC ACID: CPT

## 2019-03-05 PROCEDURE — C1889: CPT

## 2019-03-05 PROCEDURE — 93970 EXTREMITY STUDY: CPT

## 2019-03-05 PROCEDURE — C1713: CPT

## 2019-03-05 PROCEDURE — 80048 BASIC METABOLIC PNL TOTAL CA: CPT

## 2019-03-05 PROCEDURE — 84132 ASSAY OF SERUM POTASSIUM: CPT

## 2019-03-05 PROCEDURE — 82962 GLUCOSE BLOOD TEST: CPT

## 2019-03-05 PROCEDURE — 97110 THERAPEUTIC EXERCISES: CPT

## 2019-03-05 PROCEDURE — 97116 GAIT TRAINING THERAPY: CPT

## 2019-03-05 PROCEDURE — 84100 ASSAY OF PHOSPHORUS: CPT

## 2019-03-05 PROCEDURE — P9016: CPT

## 2019-03-05 PROCEDURE — 85027 COMPLETE CBC AUTOMATED: CPT

## 2019-03-05 PROCEDURE — 81001 URINALYSIS AUTO W/SCOPE: CPT

## 2019-03-05 PROCEDURE — 36430 TRANSFUSION BLD/BLD COMPNT: CPT

## 2019-03-05 PROCEDURE — 76000 FLUOROSCOPY <1 HR PHYS/QHP: CPT

## 2019-03-05 PROCEDURE — 86901 BLOOD TYPING SEROLOGIC RH(D): CPT

## 2019-03-05 PROCEDURE — 83735 ASSAY OF MAGNESIUM: CPT

## 2019-03-05 PROCEDURE — 86923 COMPATIBILITY TEST ELECTRIC: CPT

## 2019-03-05 PROCEDURE — 84295 ASSAY OF SERUM SODIUM: CPT

## 2019-03-05 PROCEDURE — 76700 US EXAM ABDOM COMPLETE: CPT

## 2019-03-05 PROCEDURE — 80074 ACUTE HEPATITIS PANEL: CPT

## 2019-03-05 PROCEDURE — C1769: CPT

## 2019-03-05 PROCEDURE — 93970 EXTREMITY STUDY: CPT | Mod: 26

## 2019-03-05 RX ORDER — ATORVASTATIN CALCIUM 80 MG/1
1 TABLET, FILM COATED ORAL
Qty: 0 | Refills: 0 | DISCHARGE
Start: 2019-03-05

## 2019-03-05 RX ORDER — OXYCODONE HYDROCHLORIDE 5 MG/1
1 TABLET ORAL
Qty: 0 | Refills: 0 | DISCHARGE
Start: 2019-03-05

## 2019-03-05 RX ORDER — RANITIDINE HYDROCHLORIDE 150 MG/1
0 TABLET, FILM COATED ORAL
Qty: 0 | Refills: 0 | COMMUNITY

## 2019-03-05 RX ORDER — ENOXAPARIN SODIUM 100 MG/ML
40 INJECTION SUBCUTANEOUS
Qty: 0 | Refills: 0 | DISCHARGE
Start: 2019-03-05

## 2019-03-05 RX ORDER — DOCUSATE SODIUM 100 MG
1 CAPSULE ORAL
Qty: 0 | Refills: 0 | DISCHARGE
Start: 2019-03-05

## 2019-03-05 RX ORDER — PYRIDOSTIGMINE BROMIDE 60 MG/5ML
0 SOLUTION ORAL
Qty: 0 | Refills: 0 | COMMUNITY

## 2019-03-05 RX ORDER — ASPIRIN/CALCIUM CARB/MAGNESIUM 324 MG
1 TABLET ORAL
Qty: 0 | Refills: 0 | DISCHARGE
Start: 2019-03-05

## 2019-03-05 RX ORDER — LINACLOTIDE 145 UG/1
0 CAPSULE, GELATIN COATED ORAL
Qty: 0 | Refills: 0 | COMMUNITY

## 2019-03-05 RX ORDER — PYRIDOSTIGMINE BROMIDE 60 MG/5ML
1 SOLUTION ORAL
Qty: 0 | Refills: 0 | DISCHARGE
Start: 2019-03-05

## 2019-03-05 RX ORDER — GABAPENTIN 400 MG/1
1 CAPSULE ORAL
Qty: 0 | Refills: 0 | DISCHARGE
Start: 2019-03-05

## 2019-03-05 RX ORDER — LOSARTAN/HYDROCHLOROTHIAZIDE 100MG-25MG
1 TABLET ORAL
Qty: 0 | Refills: 0 | COMMUNITY

## 2019-03-05 RX ORDER — LEVOTHYROXINE SODIUM 125 MCG
1 TABLET ORAL
Qty: 0 | Refills: 0 | COMMUNITY

## 2019-03-05 RX ORDER — ACETAMINOPHEN 500 MG
2 TABLET ORAL
Qty: 0 | Refills: 0 | DISCHARGE
Start: 2019-03-05

## 2019-03-05 RX ORDER — PANTOPRAZOLE SODIUM 20 MG/1
1 TABLET, DELAYED RELEASE ORAL
Qty: 0 | Refills: 0 | DISCHARGE
Start: 2019-03-05

## 2019-03-05 RX ORDER — LEVOTHYROXINE SODIUM 125 MCG
1 TABLET ORAL
Qty: 0 | Refills: 0 | DISCHARGE
Start: 2019-03-05

## 2019-03-05 RX ORDER — CELECOXIB 200 MG/1
1 CAPSULE ORAL
Qty: 0 | Refills: 0 | DISCHARGE
Start: 2019-03-05

## 2019-03-05 RX ORDER — ESOMEPRAZOLE MAGNESIUM 40 MG/1
0 CAPSULE, DELAYED RELEASE ORAL
Qty: 0 | Refills: 0 | COMMUNITY

## 2019-03-05 RX ORDER — ASPIRIN/CALCIUM CARB/MAGNESIUM 324 MG
0 TABLET ORAL
Qty: 0 | Refills: 0 | COMMUNITY

## 2019-03-05 RX ORDER — ROSUVASTATIN CALCIUM 5 MG/1
1 TABLET ORAL
Qty: 0 | Refills: 0 | COMMUNITY

## 2019-03-05 RX ORDER — SENNA PLUS 8.6 MG/1
2 TABLET ORAL
Qty: 0 | Refills: 0 | DISCHARGE
Start: 2019-03-05

## 2019-03-05 RX ORDER — GABAPENTIN 400 MG/1
0 CAPSULE ORAL
Qty: 0 | Refills: 0 | COMMUNITY

## 2019-03-05 RX ADMIN — Medication 1 SPRAY(S): at 06:36

## 2019-03-05 RX ADMIN — PYRIDOSTIGMINE BROMIDE 60 MILLIGRAM(S): 60 SOLUTION ORAL at 13:59

## 2019-03-05 RX ADMIN — PYRIDOSTIGMINE BROMIDE 60 MILLIGRAM(S): 60 SOLUTION ORAL at 06:35

## 2019-03-05 RX ADMIN — FAMOTIDINE 20 MILLIGRAM(S): 10 INJECTION INTRAVENOUS at 06:36

## 2019-03-05 RX ADMIN — Medication 81 MILLIGRAM(S): at 11:21

## 2019-03-05 RX ADMIN — CELECOXIB 200 MILLIGRAM(S): 200 CAPSULE ORAL at 06:35

## 2019-03-05 RX ADMIN — GABAPENTIN 300 MILLIGRAM(S): 400 CAPSULE ORAL at 06:35

## 2019-03-05 RX ADMIN — GABAPENTIN 300 MILLIGRAM(S): 400 CAPSULE ORAL at 14:00

## 2019-03-05 RX ADMIN — Medication 12.5 MILLIGRAM(S): at 06:36

## 2019-03-05 RX ADMIN — MUPIROCIN 1 APPLICATION(S): 20 OINTMENT TOPICAL at 06:36

## 2019-03-05 RX ADMIN — Medication 50 MICROGRAM(S): at 06:35

## 2019-03-05 RX ADMIN — PANTOPRAZOLE SODIUM 40 MILLIGRAM(S): 20 TABLET, DELAYED RELEASE ORAL at 06:35

## 2019-03-05 RX ADMIN — Medication 100 MILLIGRAM(S): at 06:36

## 2019-03-05 RX ADMIN — ONDANSETRON 4 MILLIGRAM(S): 8 TABLET, FILM COATED ORAL at 00:35

## 2019-03-05 NOTE — PROGRESS NOTE ADULT - REASON FOR ADMISSION
low back pain
spinal surgery
Patient was admitted with history of low back pain.
back pain
low back pain
spinal surgery
worsening low back pain
low back pain

## 2019-03-05 NOTE — DISCHARGE NOTE PROVIDER - HOSPITAL COURSE
Post procedure patient was moved to recovery room. In the recovery room patient was given pain meds, ivf and was started on routine home meds. Patient was moved to floor once stable. On the floor patient was seen by physical therapy and was recommended for discharge to rehab. Patient was seen by pain management on the floor along with hospitalist. Patient developed fever on the floor and work up was negative. Surgical drains were removed on the floor. Patient was discharged to rehab with follow up instructions.

## 2019-03-05 NOTE — DISCHARGE NOTE PROVIDER - CARE PROVIDER_API CALL
Ap Vick)  Neurological Surgery  [address] [place] [address], [address]  [place] [address]  Phone: [phone]  Fax: [phone]  Follow Up Time: Ap Vick)  Neurological Surgery  [address] [place] [address], [address]  [place] [address]  Phone: [phone]  Fax: [phone]  Follow Up Time: 2 weeks

## 2019-03-05 NOTE — DISCHARGE NOTE PROVIDER - REASON FOR ADMISSION
Patient was admitted with worsening back pain.   Patient had a L1-L2 laminectomy and discectomy /L1-l2 fusion and L1-L3 pedicle screws.

## 2019-03-05 NOTE — DISCHARGE NOTE NURSING/CASE MANAGEMENT/SOCIAL WORK - NSDCPNDISPN_GEN_ALL_CORE
Education provided on the pain management plan of care/Safe use, storage and disposal of opioids when prescribed/Side effects of pain management treatment

## 2019-03-05 NOTE — PROGRESS NOTE ADULT - SUBJECTIVE AND OBJECTIVE BOX
SUBJECTIVE: Patient was see and evaluated at bedside. Patient is resting in bed and is in no new acute distress.     OVERNIGHT EVENTS: none     Vital Signs Last 24 Hrs  T(C): 36.4 (05 Mar 2019 08:20), Max: 36.8 (04 Mar 2019 16:00)  T(F): 97.5 (05 Mar 2019 08:20), Max: 98.2 (04 Mar 2019 16:00)  HR: 63 (05 Mar 2019 08:20) (56 - 65)  BP: 123/69 (05 Mar 2019 08:20) (95/54 - 126/67)  BP(mean): --  RR: 18 (05 Mar 2019 08:20) (18 - 18)  SpO2: 95% (05 Mar 2019 08:20) (94% - 100%)    PHYSICAL EXAM:    General: No Acute Distress     Neurological: Awake, alert oriented to person, place and time, Following Commands, PERRL, EOMI, Face Symmetrical, Speech Fluent, Moving all extremities, Muscle Strength normal in all four extremities, No Drift, Sensation to Light Touch Intact    Pulmonary: Clear to Auscultation, No Rales, No Rhonchi, No Wheezes     Cardiovascular: S1, S2, Regular Rate and Rhythm     Gastrointestinal: Soft, Nontender, Nondistended     Incision: clean and dry     LABS:                        10.1   6.1   )-----------( 145      ( 03 Mar 2019 17:46 )             28.8    03-03    138  |  102  |  16  ----------------------------<  103<H>  4.5   |  23  |  0.87    Ca    8.7      03 Mar 2019 17:46      Hemoglobin A1C, Whole Blood: 5.0 % (02-27 @ 10:19)      03-04 @ 07:01  -  03-05 @ 07:00  --------------------------------------------------------  IN: 1330 mL / OUT: 0 mL / NET: 1330 mL    03-05 @ 07:01  -  03-05 @ 12:38  --------------------------------------------------------  IN: 200 mL / OUT: 400 mL / NET: -200 mL      DRAINS:     MEDICATIONS:  Antibiotics:    Neuro:  acetaminophen   Tablet .. 650 milliGRAM(s) Oral every 6 hours PRN Temp greater or equal to 38C (100.4F), Mild Pain (1 - 3)  celecoxib 200 milliGRAM(s) Oral two times a day  gabapentin 300 milliGRAM(s) Oral three times a day  ondansetron   Disintegrating Tablet 4 milliGRAM(s) Oral every 4 hours PRN Nausea  oxyCODONE    IR 5 milliGRAM(s) Oral every 4 hours PRN Moderate Pain (4 - 6)  oxyCODONE    IR 10 milliGRAM(s) Oral every 4 hours PRN Severe Pain (7 - 10)    Cardiac:  hydrochlorothiazide 12.5 milliGRAM(s) Oral daily    Pulm:    GI/:  docusate sodium 100 milliGRAM(s) Oral three times a day  famotidine    Tablet 20 milliGRAM(s) Oral two times a day  pantoprazole    Tablet 40 milliGRAM(s) Oral before breakfast  senna 2 Tablet(s) Oral at bedtime    Other:   aspirin  chewable 81 milliGRAM(s) Oral daily  atorvastatin 40 milliGRAM(s) Oral at bedtime  enoxaparin Injectable 40 milliGRAM(s) SubCutaneous at bedtime  levothyroxine 50 MICROGram(s) Oral daily  mupirocin 2% Ointment 1 Application(s) Topical two times a day  pyridostigmine 60 milliGRAM(s) Oral two times a day  pyridostigmine  milliGRAM(s) Oral at bedtime  sodium chloride 0.65% Nasal 1 Spray(s) Both Nostrils two times a day    DIET: [] Regular [] CCD [] Renal [] Puree [] Dysphagia [] Tube Feeds: regular     IMAGING: no new imaging today

## 2019-03-05 NOTE — PROGRESS NOTE ADULT - PROVIDER SPECIALTY LIST ADULT
Hospitalist
NSICU
Neurosurgery
NSICU

## 2019-03-05 NOTE — DISCHARGE NOTE PROVIDER - NSDCCPCAREPLAN_GEN_ALL_CORE_FT
PRINCIPAL DISCHARGE DIAGNOSIS  Problem: Narrowing of lumbar spine  Assessment and Plan of Treatment:

## 2019-03-05 NOTE — DISCHARGE NOTE PROVIDER - NSDCACTIVITY_GEN_ALL_CORE
Showering allowed/Do not make important decisions/Walking - Indoors allowed/No heavy lifting/straining/Do not drive or operate machinery/Stairs allowed/Walking - Outdoors allowed

## 2019-03-05 NOTE — PROGRESS NOTE ADULT - ASSESSMENT
HPI:  78 year old female with PMH of GERD, HTN, Myasthenia Gravis ( dx 10/2018 stable on Pyridostigmine), HLD with complaints of worsening lower back pain to groin found to have lumbar stenosis planned for L1-2 Lumbar laminectomy discectomy, L1-2 posterior lumbar fusion, L1-3 pedicle screws on 2/26/19. (12 Feb 2019 09:42)    PROCEDURE: Transforaminal lumbar interbody fusion (TLIF)  Posterior lumbar fusion using frameless stereotaxy   s/p L1-L2 laminectomy /discectomy L1-L2 fusion and L1-L3 pedicle screws- 2/26   POD#    PLAN:  1 Out of bed   2 continue physical therapy   3 prn pain meds   4 dvt ppx sql and scds   5 continue aspirin   6 continue pyridostigmine for mg   7 gabapentin   8 continue synthroid   9 regular diet   10 stool softeners   11 dispo : rehab today   Assessment:  Please Check When Present   []  GCS  E   V  M     Heart Failure: []Acute, [] acute on chronic , []chronic  Heart Failure:  [] Diastolic (HFpEF), [] Systolic (HFrEF), []Combined (HFpEF and HFrEF), [] RHF, [] Pulm HTN, [] Other    [] NICO, [] ATN, [] AIN, [] other  [] CKD1, [] CKD2, [] CKD 3, [] CKD 4, [] CKD 5, []ESRD    Encephalopathy: [] Metabolic, [] Hepatic, [] toxic, [] Neurological, [] Other    Abnormal Nurtitional Status: [] malnurtition (see nutrition note), [ ]underweight: BMI < 19, [] morbid obesity: BMI >40, [] Cachexia    [] Sepsis  [] hypovolemic shock,[] cardiogenic shock, [] hemorrhagic shock, [] neuogenic shock  [] Acute Respiratory Failure  []Cerebral edema, [] Brain compression/ herniation,   [] Functional quadriplegia  [] Acute blood loss anemia

## 2019-03-09 LAB
CULTURE RESULTS: SIGNIFICANT CHANGE UP
CULTURE RESULTS: SIGNIFICANT CHANGE UP
SPECIMEN SOURCE: SIGNIFICANT CHANGE UP
SPECIMEN SOURCE: SIGNIFICANT CHANGE UP

## 2019-03-12 PROBLEM — K57.32 DIVERTICULITIS OF LARGE INTESTINE WITHOUT PERFORATION OR ABSCESS WITHOUT BLEEDING: Chronic | Status: ACTIVE | Noted: 2019-02-12

## 2019-03-12 PROBLEM — I35.0 NONRHEUMATIC AORTIC (VALVE) STENOSIS: Chronic | Status: ACTIVE | Noted: 2019-02-12

## 2019-03-12 PROBLEM — M48.061 SPINAL STENOSIS, LUMBAR REGION WITHOUT NEUROGENIC CLAUDICATION: Chronic | Status: ACTIVE | Noted: 2019-02-12

## 2019-03-27 ENCOUNTER — APPOINTMENT (OUTPATIENT)
Dept: SPINE | Facility: CLINIC | Age: 79
End: 2019-03-27
Payer: MEDICARE

## 2019-03-27 VITALS
HEART RATE: 55 BPM | BODY MASS INDEX: 32.62 KG/M2 | SYSTOLIC BLOOD PRESSURE: 112 MMHG | HEIGHT: 56 IN | WEIGHT: 145 LBS | DIASTOLIC BLOOD PRESSURE: 64 MMHG

## 2019-03-27 PROCEDURE — 99024 POSTOP FOLLOW-UP VISIT: CPT

## 2019-03-27 PROCEDURE — 20974 ESTIM AID BONE HEALG N-INVAS: CPT | Mod: 58

## 2019-03-27 RX ORDER — MUPIROCIN 20 MG/G
2 OINTMENT TOPICAL
Qty: 1 | Refills: 0 | Status: DISCONTINUED | COMMUNITY
Start: 2019-02-14 | End: 2019-03-27

## 2019-03-27 RX ORDER — GABAPENTIN 300 MG/1
300 CAPSULE ORAL 3 TIMES DAILY
Qty: 90 | Refills: 3 | Status: DISCONTINUED | COMMUNITY
End: 2019-03-27

## 2019-03-29 ENCOUNTER — LABORATORY RESULT (OUTPATIENT)
Age: 79
End: 2019-03-29

## 2019-03-29 ENCOUNTER — APPOINTMENT (OUTPATIENT)
Dept: INTERNAL MEDICINE | Facility: CLINIC | Age: 79
End: 2019-03-29
Payer: MEDICARE

## 2019-03-29 VITALS
HEART RATE: 56 BPM | WEIGHT: 135 LBS | DIASTOLIC BLOOD PRESSURE: 74 MMHG | SYSTOLIC BLOOD PRESSURE: 130 MMHG | TEMPERATURE: 97.5 F | HEIGHT: 56 IN | BODY MASS INDEX: 30.37 KG/M2 | RESPIRATION RATE: 12 BRPM | OXYGEN SATURATION: 100 %

## 2019-03-29 VITALS — DIASTOLIC BLOOD PRESSURE: 65 MMHG | SYSTOLIC BLOOD PRESSURE: 115 MMHG

## 2019-03-29 DIAGNOSIS — Z98.890 OTHER SPECIFIED POSTPROCEDURAL STATES: ICD-10-CM

## 2019-03-29 PROCEDURE — 99215 OFFICE O/P EST HI 40 MIN: CPT

## 2019-03-29 NOTE — REVIEW OF SYSTEMS
[Fatigue] : fatigue [Recent Change In Weight] : ~T recent weight change [Joint Stiffness] : joint stiffness [Back Pain] : back pain [Negative] : Heme/Lymph [Fever] : no fever [Chills] : no chills [Night Sweats] : no night sweats [Joint Pain] : no joint pain [Muscle Pain] : no muscle pain [FreeTextEntry2] : PALLOR

## 2019-03-29 NOTE — PHYSICAL EXAM

## 2019-04-01 LAB
25(OH)D3 SERPL-MCNC: 38.2 NG/ML
ALBUMIN SERPL ELPH-MCNC: 3.7 G/DL
ALP BLD-CCNC: 89 U/L
ALT SERPL-CCNC: 14 U/L
ANION GAP SERPL CALC-SCNC: 12 MMOL/L
APPEARANCE: ABNORMAL
AST SERPL-CCNC: 17 U/L
BASOPHILS # BLD AUTO: 0.01 K/UL
BASOPHILS NFR BLD AUTO: 0.3 %
BILIRUB SERPL-MCNC: 0.3 MG/DL
BILIRUBIN URINE: NEGATIVE
BLOOD URINE: NORMAL
BUN SERPL-MCNC: 17 MG/DL
CALCIUM SERPL-MCNC: 9.1 MG/DL
CHLORIDE SERPL-SCNC: 110 MMOL/L
CHOLEST SERPL-MCNC: 245 MG/DL
CHOLEST/HDLC SERPL: 6.3 RATIO
CO2 SERPL-SCNC: 23 MMOL/L
COLOR: YELLOW
CREAT SERPL-MCNC: 0.86 MG/DL
CREAT SPEC-SCNC: 190 MG/DL
EOSINOPHIL # BLD AUTO: 0.16 K/UL
EOSINOPHIL NFR BLD AUTO: 4.6 %
ERYTHROCYTE [SEDIMENTATION RATE] IN BLOOD BY WESTERGREN METHOD: 25 MM/HR
ESTIMATED AVERAGE GLUCOSE: 94 MG/DL
FERRITIN SERPL-MCNC: 60 NG/ML
FOLATE SERPL-MCNC: 18.7 NG/ML
GGT SERPL-CCNC: 29 U/L
GLUCOSE QUALITATIVE U: NEGATIVE
GLUCOSE SERPL-MCNC: 86 MG/DL
GLUCOSE SERPL-MCNC: 86 MG/DL
HAPTOGLOB SERPL-MCNC: 65 MG/DL
HBA1C MFR BLD HPLC: 4.9 %
HBV SURFACE AG SER QL: NONREACTIVE
HCT VFR BLD CALC: 34.4 %
HCV AB SER QL: NONREACTIVE
HCV S/CO RATIO: 0.09 S/CO
HDLC SERPL-MCNC: 39 MG/DL
HGB BLD-MCNC: 10.8 G/DL
IMM GRANULOCYTES NFR BLD AUTO: 0.3 %
KETONES URINE: NEGATIVE
LDLC SERPL CALC-MCNC: 184 MG/DL
LEUKOCYTE ESTERASE URINE: ABNORMAL
LYMPHOCYTES # BLD AUTO: 0.9 K/UL
LYMPHOCYTES NFR BLD AUTO: 25.7 %
MAGNESIUM SERPL-MCNC: 2.1 MG/DL
MAN DIFF?: NORMAL
MCHC RBC-ENTMCNC: 29.3 PG
MCHC RBC-ENTMCNC: 31.4 GM/DL
MCV RBC AUTO: 93.2 FL
MICROALBUMIN 24H UR DL<=1MG/L-MCNC: 9.2 MG/DL
MICROALBUMIN/CREAT 24H UR-RTO: 48 MG/G
MONOCYTES # BLD AUTO: 0.44 K/UL
MONOCYTES NFR BLD AUTO: 12.6 %
NEUTROPHILS # BLD AUTO: 1.98 K/UL
NEUTROPHILS NFR BLD AUTO: 56.5 %
NITRITE URINE: NEGATIVE
PH URINE: 6
PLATELET # BLD AUTO: 134 K/UL
POTASSIUM SERPL-SCNC: 3.9 MMOL/L
PROT SERPL-MCNC: 6 G/DL
PROTEIN URINE: ABNORMAL
RBC # BLD: 3.69 M/UL
RBC # FLD: 15.1 %
SODIUM SERPL-SCNC: 145 MMOL/L
SPECIFIC GRAVITY URINE: 1.03
TRIGL SERPL-MCNC: 110 MG/DL
TSH SERPL-ACNC: 1.93 UIU/ML
UROBILINOGEN URINE: NORMAL
VIT B12 SERPL-MCNC: 766 PG/ML
WBC # FLD AUTO: 3.5 K/UL

## 2019-04-03 ENCOUNTER — CLINICAL ADVICE (OUTPATIENT)
Age: 79
End: 2019-04-03

## 2019-04-17 ENCOUNTER — OUTPATIENT (OUTPATIENT)
Dept: OUTPATIENT SERVICES | Facility: HOSPITAL | Age: 79
LOS: 1 days | End: 2019-04-17
Payer: MEDICARE

## 2019-04-17 ENCOUNTER — APPOINTMENT (OUTPATIENT)
Dept: RADIOLOGY | Facility: CLINIC | Age: 79
End: 2019-04-17
Payer: MEDICARE

## 2019-04-17 DIAGNOSIS — Z96.619 PRESENCE OF UNSPECIFIED ARTIFICIAL SHOULDER JOINT: Chronic | ICD-10-CM

## 2019-04-17 DIAGNOSIS — Z98.890 OTHER SPECIFIED POSTPROCEDURAL STATES: Chronic | ICD-10-CM

## 2019-04-17 DIAGNOSIS — Z00.8 ENCOUNTER FOR OTHER GENERAL EXAMINATION: ICD-10-CM

## 2019-04-17 DIAGNOSIS — Z90.89 ACQUIRED ABSENCE OF OTHER ORGANS: Chronic | ICD-10-CM

## 2019-04-17 PROCEDURE — 72100 X-RAY EXAM L-S SPINE 2/3 VWS: CPT | Mod: 26

## 2019-04-17 PROCEDURE — 72100 X-RAY EXAM L-S SPINE 2/3 VWS: CPT

## 2019-04-24 ENCOUNTER — APPOINTMENT (OUTPATIENT)
Dept: SPINE | Facility: CLINIC | Age: 79
End: 2019-04-24
Payer: MEDICARE

## 2019-04-24 VITALS
WEIGHT: 135 LBS | HEART RATE: 61 BPM | BODY MASS INDEX: 30.37 KG/M2 | HEIGHT: 56 IN | SYSTOLIC BLOOD PRESSURE: 108 MMHG | DIASTOLIC BLOOD PRESSURE: 71 MMHG

## 2019-04-24 PROCEDURE — 99024 POSTOP FOLLOW-UP VISIT: CPT

## 2019-04-24 NOTE — DATA REVIEWED
[de-identified] : Lumbar Xray: L1-L3 level posterior spinal fusion hardware in place consisting of pedicle \par screws with interconnecting rods and metallic interbody disc spacer implants \par at the respective disc levels without evidence for complication.

## 2019-04-24 NOTE — REASON FOR VISIT
[Follow-Up: _____] : a [unfilled] follow-up visit [FreeTextEntry1] : Patient returns with new lumbar xray. She reports some axial back pain with some mild pain to the right groin. Patient continues to do PT 2 x weekly

## 2019-04-24 NOTE — PHYSICAL EXAM
[General Appearance - Alert] : alert [General Appearance - Well-Appearing] : healthy appearing [General Appearance - Well Nourished] : well nourished [Clean] : clean [Longitudinal] : longitudinal [Well-Healed] : well-healed [Dry] : dry [Intact] : intact [No Drainage] : without drainage [Normal Skin] : normal [Person] : oriented to person [Place] : oriented to place [Time] : oriented to time [Cranial Nerves Optic (II)] : visual acuity intact bilaterally,  pupils equal round and reactive to light [Cranial Nerves Trigeminal (V)] : facial sensation intact symmetrically [Cranial Nerves Oculomotor (III)] : extraocular motion intact [Cranial Nerves Facial (VII)] : face symmetrical [Cranial Nerves Glossopharyngeal (IX)] : tongue and palate midline [Cranial Nerves Accessory (XI - Cranial And Spinal)] : head turning and shoulder shrug symmetric [Cranial Nerves Vestibulocochlear (VIII)] : hearing was intact bilaterally [Motor Tone] : muscle tone was normal in all four extremities [Motor Handedness Right-Handed] : the patient is right hand dominant [4] : L3 quadriceps 4/5 [5] : S1 ankle flexors 5/5 [1+] : Patella left 1+ [2+] : Ankle jerk right 2+ [Plantar Reflex Right Only] : abnormal on the right [No Visual Abnormalities] : no visible abnormailities [Sclera] : the sclera and conjunctiva were normal [Heart Rate And Rhythm] : heart rate was normal and rhythm regular [Abdomen Soft] : soft [Edema] : there was no peripheral edema [Abdomen Tenderness] : non-tender [Skin Color & Pigmentation] : normal skin color and pigmentation [No CVA Tenderness] : no ~M costovertebral angle tenderness [FreeTextEntry1] : lumbar

## 2019-06-04 ENCOUNTER — RX RENEWAL (OUTPATIENT)
Age: 79
End: 2019-06-04

## 2019-06-10 ENCOUNTER — RX RENEWAL (OUTPATIENT)
Age: 79
End: 2019-06-10

## 2019-06-18 ENCOUNTER — RX RENEWAL (OUTPATIENT)
Age: 79
End: 2019-06-18

## 2019-07-11 ENCOUNTER — APPOINTMENT (OUTPATIENT)
Dept: INTERNAL MEDICINE | Facility: CLINIC | Age: 79
End: 2019-07-11
Payer: MEDICARE

## 2019-07-11 ENCOUNTER — LABORATORY RESULT (OUTPATIENT)
Age: 79
End: 2019-07-11

## 2019-07-11 VITALS
RESPIRATION RATE: 12 BRPM | WEIGHT: 143 LBS | BODY MASS INDEX: 32.17 KG/M2 | SYSTOLIC BLOOD PRESSURE: 131 MMHG | OXYGEN SATURATION: 100 % | HEIGHT: 56 IN | DIASTOLIC BLOOD PRESSURE: 73 MMHG | HEART RATE: 66 BPM | TEMPERATURE: 97.4 F

## 2019-07-11 VITALS — DIASTOLIC BLOOD PRESSURE: 75 MMHG | SYSTOLIC BLOOD PRESSURE: 120 MMHG

## 2019-07-11 PROCEDURE — 99214 OFFICE O/P EST MOD 30 MIN: CPT

## 2019-07-11 RX ORDER — OXYCODONE 5 MG/1
5 TABLET ORAL
Refills: 0 | Status: DISCONTINUED | COMMUNITY
End: 2019-07-11

## 2019-07-11 RX ORDER — AMPICILLIN 500 MG/1
500 CAPSULE ORAL
Qty: 21 | Refills: 0 | Status: DISCONTINUED | COMMUNITY
Start: 2019-04-02 | End: 2019-07-11

## 2019-07-11 NOTE — REVIEW OF SYSTEMS
[Joint Stiffness] : joint stiffness [Back Pain] : back pain [Unsteady Walking] : ataxia [Negative] : Psychiatric [Headache] : no headache [Dizziness] : no dizziness [Memory Loss] : no memory loss [FreeTextEntry9] : NECK PAINS

## 2019-07-11 NOTE — PHYSICAL EXAM
[No Acute Distress] : no acute distress [Well Developed] : well developed [Well Nourished] : well nourished [Normal Sclera/Conjunctiva] : normal sclera/conjunctiva [Well-Appearing] : well-appearing [PERRL] : pupils equal round and reactive to light [Normal Oropharynx] : the oropharynx was normal [Normal Outer Ear/Nose] : the outer ears and nose were normal in appearance [EOMI] : extraocular movements intact [No Lymphadenopathy] : no lymphadenopathy [Supple] : supple [No JVD] : no jugular venous distention [No Respiratory Distress] : no respiratory distress  [Thyroid Normal, No Nodules] : the thyroid was normal and there were no nodules present [No Accessory Muscle Use] : no accessory muscle use [Clear to Auscultation] : lungs were clear to auscultation bilaterally [Normal Rate] : normal rate  [Regular Rhythm] : with a regular rhythm [Normal S1, S2] : normal S1 and S2 [II] : a grade 2 [No Abdominal Bruit] : a ~M bruit was not heard ~T in the abdomen [No Carotid Bruits] : no carotid bruits [No Varicosities] : no varicosities [Pedal Pulses Present] : the pedal pulses are present [No Extremity Clubbing/Cyanosis] : no extremity clubbing/cyanosis [No Edema] : there was no peripheral edema [No Palpable Aorta] : no palpable aorta [Non Tender] : non-tender [Soft] : abdomen soft [Non-distended] : non-distended [Normal Bowel Sounds] : normal bowel sounds [No HSM] : no HSM [No Masses] : no abdominal mass palpated [Normal Posterior Cervical Nodes] : no posterior cervical lymphadenopathy [No CVA Tenderness] : no CVA  tenderness [Normal Anterior Cervical Nodes] : no anterior cervical lymphadenopathy [Grossly Normal Strength/Tone] : grossly normal strength/tone [No Spinal Tenderness] : no spinal tenderness [No Joint Swelling] : no joint swelling [No Rash] : no rash [Coordination Grossly Intact] : coordination grossly intact [No Focal Deficits] : no focal deficits [Normal Gait] : normal gait [Deep Tendon Reflexes (DTR)] : deep tendon reflexes were 2+ and symmetric [Normal Affect] : the affect was normal [Normal Insight/Judgement] : insight and judgment were intact

## 2019-07-12 LAB
ALBUMIN SERPL ELPH-MCNC: 4.1 G/DL
ALP BLD-CCNC: 77 U/L
ALT SERPL-CCNC: 25 U/L
ANION GAP SERPL CALC-SCNC: 13 MMOL/L
APPEARANCE: CLEAR
AST SERPL-CCNC: 29 U/L
BILIRUB SERPL-MCNC: 0.4 MG/DL
BILIRUBIN URINE: NEGATIVE
BLOOD URINE: NEGATIVE
BUN SERPL-MCNC: 35 MG/DL
CALCIUM SERPL-MCNC: 9.3 MG/DL
CHLORIDE SERPL-SCNC: 108 MMOL/L
CHOLEST SERPL-MCNC: 181 MG/DL
CHOLEST/HDLC SERPL: 4.1 RATIO
CO2 SERPL-SCNC: 24 MMOL/L
COLOR: ABNORMAL
CREAT SERPL-MCNC: 1.08 MG/DL
CREAT SPEC-SCNC: 217 MG/DL
ESTIMATED AVERAGE GLUCOSE: 105 MG/DL
FERRITIN SERPL-MCNC: 34 NG/ML
FOLATE SERPL-MCNC: >20 NG/ML
GLUCOSE QUALITATIVE U: NEGATIVE
GLUCOSE SERPL-MCNC: 101 MG/DL
GLUCOSE SERPL-MCNC: 99 MG/DL
HBA1C MFR BLD HPLC: 5.3 %
HDLC SERPL-MCNC: 44 MG/DL
IRON SERPL-MCNC: 100 UG/DL
KETONES URINE: NORMAL
LDLC SERPL CALC-MCNC: 100 MG/DL
LEUKOCYTE ESTERASE URINE: NEGATIVE
MICROALBUMIN 24H UR DL<=1MG/L-MCNC: 1.4 MG/DL
MICROALBUMIN/CREAT 24H UR-RTO: 6 MG/G
NITRITE URINE: NEGATIVE
PH URINE: 6
POTASSIUM SERPL-SCNC: 4.3 MMOL/L
PROT SERPL-MCNC: 6.6 G/DL
PROTEIN URINE: ABNORMAL
SODIUM SERPL-SCNC: 145 MMOL/L
SPECIFIC GRAVITY URINE: 1.02
T4 FREE SERPL-MCNC: 1.1 NG/DL
TRIGL SERPL-MCNC: 184 MG/DL
TSH SERPL-ACNC: 1.55 UIU/ML
UROBILINOGEN URINE: NORMAL
VIT B12 SERPL-MCNC: 799 PG/ML

## 2019-07-17 LAB
BASOPHILS # BLD AUTO: 0.02 K/UL
BASOPHILS NFR BLD AUTO: 0.3 %
EOSINOPHIL # BLD AUTO: 0.1 K/UL
EOSINOPHIL NFR BLD AUTO: 1.5 %
HCT VFR BLD CALC: 38.6 %
HGB BLD-MCNC: 12.1 G/DL
IMM GRANULOCYTES NFR BLD AUTO: 0.2 %
LYMPHOCYTES # BLD AUTO: 1.3 K/UL
LYMPHOCYTES NFR BLD AUTO: 19.8 %
MAN DIFF?: NORMAL
MCHC RBC-ENTMCNC: 29.8 PG
MCHC RBC-ENTMCNC: 31.3 GM/DL
MCV RBC AUTO: 95.1 FL
MONOCYTES # BLD AUTO: 0.65 K/UL
MONOCYTES NFR BLD AUTO: 9.9 %
NEUTROPHILS # BLD AUTO: 4.47 K/UL
NEUTROPHILS NFR BLD AUTO: 68.3 %
PLATELET # BLD AUTO: 150 K/UL
RBC # BLD: 4.06 M/UL
RBC # FLD: 14.8 %
WBC # FLD AUTO: 6.55 K/UL

## 2019-07-22 ENCOUNTER — RX RENEWAL (OUTPATIENT)
Age: 79
End: 2019-07-22

## 2019-07-25 ENCOUNTER — APPOINTMENT (OUTPATIENT)
Dept: INTERNAL MEDICINE | Facility: CLINIC | Age: 79
End: 2019-07-25

## 2019-08-02 ENCOUNTER — OUTPATIENT (OUTPATIENT)
Dept: OUTPATIENT SERVICES | Facility: HOSPITAL | Age: 79
LOS: 1 days | End: 2019-08-02
Payer: MEDICARE

## 2019-08-02 ENCOUNTER — APPOINTMENT (OUTPATIENT)
Dept: RADIOLOGY | Facility: CLINIC | Age: 79
End: 2019-08-02
Payer: MEDICARE

## 2019-08-02 DIAGNOSIS — Z90.89 ACQUIRED ABSENCE OF OTHER ORGANS: Chronic | ICD-10-CM

## 2019-08-02 DIAGNOSIS — Z96.619 PRESENCE OF UNSPECIFIED ARTIFICIAL SHOULDER JOINT: Chronic | ICD-10-CM

## 2019-08-02 DIAGNOSIS — Z98.1 ARTHRODESIS STATUS: ICD-10-CM

## 2019-08-02 DIAGNOSIS — Z98.890 OTHER SPECIFIED POSTPROCEDURAL STATES: Chronic | ICD-10-CM

## 2019-08-02 PROCEDURE — 72100 X-RAY EXAM L-S SPINE 2/3 VWS: CPT | Mod: 26

## 2019-08-02 PROCEDURE — 72100 X-RAY EXAM L-S SPINE 2/3 VWS: CPT

## 2019-08-07 ENCOUNTER — APPOINTMENT (OUTPATIENT)
Dept: SPINE | Facility: CLINIC | Age: 79
End: 2019-08-07
Payer: MEDICARE

## 2019-08-07 VITALS
HEIGHT: 58 IN | WEIGHT: 142 LBS | HEART RATE: 58 BPM | DIASTOLIC BLOOD PRESSURE: 71 MMHG | SYSTOLIC BLOOD PRESSURE: 117 MMHG | BODY MASS INDEX: 29.81 KG/M2

## 2019-08-07 DIAGNOSIS — M41.9 SCOLIOSIS, UNSPECIFIED: ICD-10-CM

## 2019-08-07 PROCEDURE — 99214 OFFICE O/P EST MOD 30 MIN: CPT

## 2019-08-26 ENCOUNTER — INPATIENT (INPATIENT)
Facility: HOSPITAL | Age: 79
LOS: 9 days | Discharge: HOME | DRG: 57 | End: 2019-09-05
Attending: INTERNAL MEDICINE | Admitting: HOSPITALIST
Payer: MEDICARE

## 2019-08-26 VITALS
HEART RATE: 67 BPM | TEMPERATURE: 98 F | RESPIRATION RATE: 24 BRPM | SYSTOLIC BLOOD PRESSURE: 145 MMHG | DIASTOLIC BLOOD PRESSURE: 77 MMHG | OXYGEN SATURATION: 100 % | WEIGHT: 141.98 LBS | HEIGHT: 58 IN

## 2019-08-26 DIAGNOSIS — Z98.890 OTHER SPECIFIED POSTPROCEDURAL STATES: Chronic | ICD-10-CM

## 2019-08-26 DIAGNOSIS — Z29.9 ENCOUNTER FOR PROPHYLACTIC MEASURES, UNSPECIFIED: ICD-10-CM

## 2019-08-26 DIAGNOSIS — I10 ESSENTIAL (PRIMARY) HYPERTENSION: ICD-10-CM

## 2019-08-26 DIAGNOSIS — Z96.619 PRESENCE OF UNSPECIFIED ARTIFICIAL SHOULDER JOINT: Chronic | ICD-10-CM

## 2019-08-26 DIAGNOSIS — R74.8 ABNORMAL LEVELS OF OTHER SERUM ENZYMES: ICD-10-CM

## 2019-08-26 DIAGNOSIS — Z90.89 ACQUIRED ABSENCE OF OTHER ORGANS: Chronic | ICD-10-CM

## 2019-08-26 DIAGNOSIS — R06.09 OTHER FORMS OF DYSPNEA: ICD-10-CM

## 2019-08-26 DIAGNOSIS — G70.00 MYASTHENIA GRAVIS WITHOUT (ACUTE) EXACERBATION: ICD-10-CM

## 2019-08-26 LAB
ALBUMIN SERPL ELPH-MCNC: 4.5 G/DL — SIGNIFICANT CHANGE UP (ref 3.3–5)
ALP SERPL-CCNC: 95 U/L — SIGNIFICANT CHANGE UP (ref 40–120)
ALT FLD-CCNC: 25 U/L — SIGNIFICANT CHANGE UP (ref 10–45)
ANION GAP SERPL CALC-SCNC: 12 MMOL/L — SIGNIFICANT CHANGE UP (ref 5–17)
APTT BLD: 33.3 SEC — SIGNIFICANT CHANGE UP (ref 27.5–36.3)
AST SERPL-CCNC: 28 U/L — SIGNIFICANT CHANGE UP (ref 10–40)
BASOPHILS # BLD AUTO: 0 K/UL — SIGNIFICANT CHANGE UP (ref 0–0.2)
BASOPHILS NFR BLD AUTO: 0.7 % — SIGNIFICANT CHANGE UP (ref 0–2)
BILIRUB SERPL-MCNC: 0.2 MG/DL — SIGNIFICANT CHANGE UP (ref 0.2–1.2)
BUN SERPL-MCNC: 18 MG/DL — SIGNIFICANT CHANGE UP (ref 7–23)
CALCIUM SERPL-MCNC: 9.6 MG/DL — SIGNIFICANT CHANGE UP (ref 8.4–10.5)
CHLORIDE SERPL-SCNC: 106 MMOL/L — SIGNIFICANT CHANGE UP (ref 96–108)
CO2 SERPL-SCNC: 25 MMOL/L — SIGNIFICANT CHANGE UP (ref 22–31)
CREAT SERPL-MCNC: 0.88 MG/DL — SIGNIFICANT CHANGE UP (ref 0.5–1.3)
EOSINOPHIL # BLD AUTO: 0.1 K/UL — SIGNIFICANT CHANGE UP (ref 0–0.5)
EOSINOPHIL NFR BLD AUTO: 2.1 % — SIGNIFICANT CHANGE UP (ref 0–6)
GAS PNL BLDV: SIGNIFICANT CHANGE UP
GLUCOSE SERPL-MCNC: 96 MG/DL — SIGNIFICANT CHANGE UP (ref 70–99)
HCT VFR BLD CALC: 40.5 % — SIGNIFICANT CHANGE UP (ref 34.5–45)
HGB BLD-MCNC: 13.5 G/DL — SIGNIFICANT CHANGE UP (ref 11.5–15.5)
INR BLD: 0.92 RATIO — SIGNIFICANT CHANGE UP (ref 0.88–1.16)
LYMPHOCYTES # BLD AUTO: 1.4 K/UL — SIGNIFICANT CHANGE UP (ref 1–3.3)
LYMPHOCYTES # BLD AUTO: 29.6 % — SIGNIFICANT CHANGE UP (ref 13–44)
MAGNESIUM SERPL-MCNC: 2.2 MG/DL — SIGNIFICANT CHANGE UP (ref 1.6–2.6)
MCHC RBC-ENTMCNC: 31.7 PG — SIGNIFICANT CHANGE UP (ref 27–34)
MCHC RBC-ENTMCNC: 33.4 GM/DL — SIGNIFICANT CHANGE UP (ref 32–36)
MCV RBC AUTO: 94.8 FL — SIGNIFICANT CHANGE UP (ref 80–100)
MONOCYTES # BLD AUTO: 0.5 K/UL — SIGNIFICANT CHANGE UP (ref 0–0.9)
MONOCYTES NFR BLD AUTO: 10 % — SIGNIFICANT CHANGE UP (ref 2–14)
NEUTROPHILS # BLD AUTO: 2.7 K/UL — SIGNIFICANT CHANGE UP (ref 1.8–7.4)
NEUTROPHILS NFR BLD AUTO: 57.6 % — SIGNIFICANT CHANGE UP (ref 43–77)
NT-PROBNP SERPL-SCNC: 216 PG/ML — SIGNIFICANT CHANGE UP (ref 0–300)
PHOSPHATE SERPL-MCNC: 3.2 MG/DL — SIGNIFICANT CHANGE UP (ref 2.5–4.5)
PLATELET # BLD AUTO: 154 K/UL — SIGNIFICANT CHANGE UP (ref 150–400)
POTASSIUM SERPL-MCNC: 3.9 MMOL/L — SIGNIFICANT CHANGE UP (ref 3.5–5.3)
POTASSIUM SERPL-SCNC: 3.9 MMOL/L — SIGNIFICANT CHANGE UP (ref 3.5–5.3)
PROT SERPL-MCNC: 7.3 G/DL — SIGNIFICANT CHANGE UP (ref 6–8.3)
PROTHROM AB SERPL-ACNC: 10.5 SEC — SIGNIFICANT CHANGE UP (ref 10–12.9)
RBC # BLD: 4.27 M/UL — SIGNIFICANT CHANGE UP (ref 3.8–5.2)
RBC # FLD: 12.1 % — SIGNIFICANT CHANGE UP (ref 10.3–14.5)
SODIUM SERPL-SCNC: 143 MMOL/L — SIGNIFICANT CHANGE UP (ref 135–145)
T4 AB SER-ACNC: 6.6 UG/DL — SIGNIFICANT CHANGE UP (ref 4.6–12)
T4/T3 UPTAKE INDEX SERPL: 1 TBI — SIGNIFICANT CHANGE UP (ref 0.8–1.3)
TROPONIN T, HIGH SENSITIVITY RESULT: 51 NG/L — SIGNIFICANT CHANGE UP (ref 0–51)
TROPONIN T, HIGH SENSITIVITY RESULT: 59 NG/L — HIGH (ref 0–51)
TSH SERPL-MCNC: 2.98 UIU/ML — SIGNIFICANT CHANGE UP (ref 0.27–4.2)
WBC # BLD: 4.7 K/UL — SIGNIFICANT CHANGE UP (ref 3.8–10.5)
WBC # FLD AUTO: 4.7 K/UL — SIGNIFICANT CHANGE UP (ref 3.8–10.5)

## 2019-08-26 PROCEDURE — 99223 1ST HOSP IP/OBS HIGH 75: CPT

## 2019-08-26 PROCEDURE — 93010 ELECTROCARDIOGRAM REPORT: CPT

## 2019-08-26 PROCEDURE — 71045 X-RAY EXAM CHEST 1 VIEW: CPT | Mod: 26

## 2019-08-26 PROCEDURE — 99285 EMERGENCY DEPT VISIT HI MDM: CPT

## 2019-08-26 RX ORDER — TRAMADOL HYDROCHLORIDE 50 MG/1
50 TABLET ORAL DAILY
Refills: 0 | Status: DISCONTINUED | OUTPATIENT
Start: 2019-08-26 | End: 2019-09-02

## 2019-08-26 RX ORDER — TIOTROPIUM BROMIDE 18 UG/1
1 CAPSULE ORAL; RESPIRATORY (INHALATION) DAILY
Refills: 0 | Status: DISCONTINUED | OUTPATIENT
Start: 2019-08-26 | End: 2019-09-05

## 2019-08-26 RX ORDER — LOSARTAN POTASSIUM 100 MG/1
50 TABLET, FILM COATED ORAL DAILY
Refills: 0 | Status: DISCONTINUED | OUTPATIENT
Start: 2019-08-26 | End: 2019-09-05

## 2019-08-26 RX ORDER — ASPIRIN/CALCIUM CARB/MAGNESIUM 324 MG
324 TABLET ORAL ONCE
Refills: 0 | Status: COMPLETED | OUTPATIENT
Start: 2019-08-26 | End: 2019-08-26

## 2019-08-26 RX ORDER — HYDROCHLOROTHIAZIDE 25 MG
12.5 TABLET ORAL DAILY
Refills: 0 | Status: DISCONTINUED | OUTPATIENT
Start: 2019-08-26 | End: 2019-09-05

## 2019-08-26 RX ORDER — PYRIDOSTIGMINE BROMIDE 60 MG/5ML
180 SOLUTION ORAL ONCE
Refills: 0 | Status: DISCONTINUED | OUTPATIENT
Start: 2019-08-26 | End: 2019-09-05

## 2019-08-26 RX ORDER — CHOLECALCIFEROL (VITAMIN D3) 125 MCG
2000 CAPSULE ORAL DAILY
Refills: 0 | Status: DISCONTINUED | OUTPATIENT
Start: 2019-08-26 | End: 2019-09-05

## 2019-08-26 RX ORDER — LEVOTHYROXINE SODIUM 125 MCG
50 TABLET ORAL DAILY
Refills: 0 | Status: DISCONTINUED | OUTPATIENT
Start: 2019-08-26 | End: 2019-09-05

## 2019-08-26 RX ORDER — ALBUTEROL 90 UG/1
1 AEROSOL, METERED ORAL EVERY 4 HOURS
Refills: 0 | Status: DISCONTINUED | OUTPATIENT
Start: 2019-08-26 | End: 2019-09-05

## 2019-08-26 RX ORDER — ASPIRIN/CALCIUM CARB/MAGNESIUM 324 MG
81 TABLET ORAL DAILY
Refills: 0 | Status: DISCONTINUED | OUTPATIENT
Start: 2019-08-26 | End: 2019-09-05

## 2019-08-26 RX ORDER — PYRIDOSTIGMINE BROMIDE 60 MG/5ML
120 SOLUTION ORAL
Refills: 0 | Status: DISCONTINUED | OUTPATIENT
Start: 2019-08-26 | End: 2019-09-05

## 2019-08-26 RX ORDER — ROSUVASTATIN CALCIUM 5 MG/1
5 TABLET ORAL AT BEDTIME
Refills: 0 | Status: DISCONTINUED | OUTPATIENT
Start: 2019-08-26 | End: 2019-09-05

## 2019-08-26 RX ORDER — RANITIDINE HYDROCHLORIDE 150 MG/1
150 TABLET, FILM COATED ORAL AT BEDTIME
Refills: 0 | Status: DISCONTINUED | OUTPATIENT
Start: 2019-08-26 | End: 2019-09-05

## 2019-08-26 RX ORDER — PYRIDOSTIGMINE BROMIDE 60 MG/5ML
90 SOLUTION ORAL
Refills: 0 | Status: DISCONTINUED | OUTPATIENT
Start: 2019-08-26 | End: 2019-09-05

## 2019-08-26 RX ORDER — ESOMEPRAZOLE MAGNESIUM 40 MG/1
40 CAPSULE, DELAYED RELEASE ORAL
Refills: 0 | Status: DISCONTINUED | OUTPATIENT
Start: 2019-08-26 | End: 2019-08-28

## 2019-08-26 RX ORDER — ENOXAPARIN SODIUM 100 MG/ML
40 INJECTION SUBCUTANEOUS DAILY
Refills: 0 | Status: DISCONTINUED | OUTPATIENT
Start: 2019-08-26 | End: 2019-09-05

## 2019-08-26 RX ORDER — PYRIDOSTIGMINE BROMIDE 60 MG/5ML
180 SOLUTION ORAL
Refills: 0 | Status: DISCONTINUED | OUTPATIENT
Start: 2019-08-26 | End: 2019-09-05

## 2019-08-26 RX ORDER — IPRATROPIUM/ALBUTEROL SULFATE 18-103MCG
3 AEROSOL WITH ADAPTER (GRAM) INHALATION EVERY 6 HOURS
Refills: 0 | Status: DISCONTINUED | OUTPATIENT
Start: 2019-08-26 | End: 2019-09-05

## 2019-08-26 RX ADMIN — Medication 324 MILLIGRAM(S): at 18:07

## 2019-08-26 NOTE — H&P ADULT - HISTORY OF PRESENT ILLNESS
78 F PMH Myasthenia gravis on pyridostigmine, reactive airway disease, GERD, HTN, HLD, hypothyroid, spinal stenosis s/p laminectomy/fusion, p/w reyes.    Vs: 97.2, 66, 129/62, 18, 99% RA. Labs: cbc unrevealing, coags unrevealing, cmp unrevealing, probnp 216, HST delta 59-->51. CXR prelim clear lungs. In Er pt received aspirin 325 x1, pyridostigmine 180 x 1, and eval by neuro, all prior to medicine team involvement. 78 F PMH Myasthenia gravis on pyridostigmine, reactive airway disease, GERD, HTN, HLD, hypothyroid, spinal stenosis s/p laminectomy/fusion, p/w maxwell. Hx obtained from pt and from daughter at bedside (RN at [facility] [employer] [employer] [facility]).  Pt was dx with MG 10/2018 after presenting with sx of dyspnea, dysphagia, and ptosis. Was started on Mestinon and did pretty well according to her daughter.  More recently, pt underwent spinal surgery 2/2019; was intubated for procedure and did ok as well. However, over last few months, pt has been complaining of progressive sob; first started with MAXWELL and was ok at rest, but over last few weeks has intermittent sob at rest as well.  No sig LE edema or orthopnea. No cp. No f/c, URI sx/cough. Pt does note she ran out of her inhaler but feels her sob is more fatigue  related and not necessarily reactive airway in nature, and denies excessive wheezing.  In addition to these c/o dyspnea, pt also endorses progressive/intermittent b/l hand/arm numbness/tingling, UE weakness at times, dysphagia at times, which her o/p neurologist thinks is related to her MG.  Pt had a "cardiac workup" in preparation for her spinal surgery, with ekg/TTE/NST in December 2018 nondiagnostic except mild AS.     Vs: 97.2, 66, 129/62, 18, 99% RA. Labs: cbc unrevealing, coags unrevealing, cmp unrevealing, probnp 216, HST delta 59-->51. CXR prelim clear lungs. In Er pt received aspirin 325 x1, pyridostigmine 180 x 1, and eval by neuro, all prior to medicine team involvement. 78 F PMH Myasthenia gravis on pyridostigmine, reactive airway disease, GERD, HTN, HLD, hypothyroid, spinal stenosis s/p laminectomy/fusion, p/w maxwell. Hx obtained from pt and from daughter at bedside (RN at [facility] [facility]).  Pt was dx with MG 10/2018 after presenting with sx of dyspnea, dysphagia, and ptosis. Was started on Mestinon and did pretty well according to her daughter.  More recently, pt underwent spinal surgery 2/2019; was intubated for procedure and did ok as well. However, over last few months, pt has been complaining of progressive sob; first started with MAXWELL and was ok at rest, but over last few weeks has intermittent sob at rest as well.  No sig LE edema or orthopnea. No cp. No f/c, URI sx/cough. Pt does note she ran out of her inhaler but feels her sob is more fatigue  related and not necessarily reactive airway in nature, and denies excessive wheezing.  In addition to these c/o dyspnea, pt also endorses progressive/intermittent b/l hand/arm numbness/tingling, UE weakness at times, dysphagia at times, which her o/p neurologist thinks is related to her MG.  Pt had a "cardiac workup" in preparation for her spinal surgery, with ekg/TTE/NST in December 2018 nondiagnostic except mild AS.  Pt denies visual changes, dizziness, syncope, falls.     Vs: 97.2, 66, 129/62, 18, 99% RA. Labs: cbc unrevealing, coags unrevealing, cmp unrevealing, probnp 216, HST delta 59-->51. CXR prelim clear lungs. In Er pt received aspirin 325 x1, pyridostigmine 180 x 1, and eval by neuro, all prior to medicine team involvement.

## 2019-08-26 NOTE — CONSULT NOTE ADULT - ASSESSMENT
78F with a history of myasthenia gravis, HLD, HTN, hypothyroidism who presents to the ED with worsening dyspnea on exertion, ongoing for the past couple months but worse in the past week to the point that she cannot walk more than a few feet without fatigue and shortness of breath. She denies worsened bulbar symptoms. She is not in any respiratory distress and is not SOB at rest and is satting well on room air. Troponin elevated to 59 in the ED.     On, her motor strength appears to be at baseline. She has no weakness in neck flexion and no worsening of bulbar symptoms.     Baseline NIF -50, VC 1.5 in the ED.     Impression: MAXWELL more likely to be secondary to cardiac etiology rather than exacerbation of MG    Plan:  Continue home mestinon dosing (60 mg pyridostigmine - 2 tabs AM, 1.5 tabs afternoon, 180 mg ER formulation in the evening)  No indication for steroids at this time  Continue to monitor NIF/VC q4h for 24 hours   Telemetry monitoring  TTE  Trend troponin (initial elevated)

## 2019-08-26 NOTE — ED PROVIDER NOTE - PROGRESS NOTE DETAILS
PATRICIA Langley: paged neuro and discussed with respiratory to do nif and vital capacity attending Jany: pt and daughter informed of positive troponin. Will administer full dose ASA. Neuro at bedside. PATRICIA Langley: neuro feels symptoms not likely 2/2 to myasthenia gravis

## 2019-08-26 NOTE — H&P ADULT - NSHPREVIEWOFSYSTEMS_GEN_ALL_CORE
REVIEW OF SYSTEMS:  CONSTITUTIONAL: No weakness. No fevers. No chills. No weight loss. Good appetite.  EYES: No visual changes. No eye pain.  ENT: No hearing difficulty. No vertigo. No dysphagia. No Sinusitis/rhinorrhea.  NECK: No pain. No stiffness/rigidity.  CARDIAC: No chest pain. No palpitations.  RESPIRATORY: No cough. No SOB. No hemoptysis.  GASTROINTESTINAL: No abdominal pain. No nausea. No vomiting. No hematemesis. No diarrhea. No constipation. No melena. No hematochezia.  GENITOURINARY: No dysuria. No frequency. No hesitancy. No hematuria.  NEUROLOGICAL: No numbness. No focal weakness. No incontinence. No headache.  BACK: No back pain.  EXTREMITIES: No lower extremity edema. Full ROM.  SKIN: No rashes. No itching. No other lesions.  PSYCHIATRIC: No depression. No anxiety. No SI/HI.  ALLERGIC: No lip swelling. No hives.  All other review of systems is negative unless indicated above.  [  ] Unable to assess ROS because REVIEW OF SYSTEMS:  CONSTITUTIONAL: +weakness. No fevers. No chills. No weight loss. Good appetite.  EYES: No visual changes. No eye pain.  ENT: No hearing difficulty. No vertigo. +dysphagia. No Sinusitis/rhinorrhea.  NECK: No pain. No stiffness/rigidity.  CARDIAC: No chest pain. No palpitations.  RESPIRATORY: No cough. +SOB. No hemoptysis.  GASTROINTESTINAL: No abdominal pain. No nausea. No vomiting. No hematemesis. No diarrhea. No constipation. No melena. No hematochezia.  GENITOURINARY: No dysuria. No frequency. No hesitancy. No hematuria.  NEUROLOGICAL: + UE numbness. No focal weakness. No incontinence. No headache.  BACK: No back pain.  EXTREMITIES: No lower extremity edema. Full ROM.  SKIN: No rashes. No itching. No other lesions.  PSYCHIATRIC: No depression. No anxiety. No SI/HI.  ALLERGIC: No lip swelling. No hives.  All other review of systems is negative unless indicated above.

## 2019-08-26 NOTE — H&P ADULT - NSICDXPASTSURGICALHX_GEN_ALL_CORE_FT
PAST SURGICAL HISTORY:  Bilateral Cataracts removed    H/O laminectomy cervical  1998  lumbar 1986,1998,2000    H/O shoulder replacement b/l 2015/2016    H/O umbilical hernia repair     History of tonsillectomy     Prolapse, Uterovaginal s/p sling    S/P Appendectomy     S/P Cholecystectomy     S/P foot surgery     S/P Hysterectomy Partial 1974    S/P Laparoscopic Fundoplication

## 2019-08-26 NOTE — ED ADULT NURSE NOTE - CAS EDN DISCHARGE INTERVENTIONS
rapid recovery please follow up with your OBGYN within 1 week for an incision check Arm band on/IV intact

## 2019-08-26 NOTE — ED ADULT NURSE NOTE - ED CARDIAC RHYTHM
AUA symptom score today is 24.  
Per order of Dr. Milly Poon a post void residual was done via Bladder scanner.  PVR was 45 cc.         
Subjective: 77-year-old status post Urolift. He's had issues with urgency frequency. We started oxybutynin which did not help. He wonders about other options. He does have a history of kidney stones and had a KUB today. He has another appointment and does not want to stay  for cystoscopy    Reviewed Pertinent: Vitals and labs.     Blood pressure 132/84, weight 81.6 kg.  UA:    Office Visit on 06/03/2019   Component Date Value   • COLOR 06/03/2019 yellow    • APPEARANCE 06/03/2019 clear    • GLUCOSE(URINE) 06/03/2019 negative    • BILIRUBIN 06/03/2019 negative    • KETONES 06/03/2019 trace    • SPECIFIC GRAVITY 06/03/2019 1.025    • BLOOD 06/03/2019 trace-intact    • pH 06/03/2019 7.0    • PROTEIN(URINE) 06/03/2019 trace    • UROBILINOGEN 06/03/2019 0.2 E.U./dL    • NITRITE 06/03/2019 negative    • LEUKOCYTE ESTERASE 06/03/2019 negative    • BLDR Scan mLs 06/03/2019 45cc      PVR:    Results for orders placed or performed in visit on 06/03/19   POST VOID RES URINE/BL BY US   Result Value Ref Range    BLDR Scan mLs 45cc        No notes on file    Physical Exam:   Abdomen soft nontender nondistended  No rebound or CVA tenderness  AUA symptom score is 24      Assessment/Plan:  Benign prostatic hyperplasia, unspecified whether lower urinary tract symptoms present  - URINALYSIS DIPSTICK ONLY  - POST VOID RES URINE/BL BY US    Kidney stones  - XR ABDOMEN 1 VW KUB SUPINE; Future  - URINALYSIS DIPSTICK ONLY  - POST VOID RES URINE/BL BY US      77-year-old with BPH status post Urolift. His main complaint is urgency frequency. We'll plan cystoscopy to see if he needs additional clips. KUB today is negative for kidney stones    
regular

## 2019-08-26 NOTE — H&P ADULT - ASSESSMENT
78 F PMH Myasthenia gravis on pyridostigmine, reactive airway disease, GERD, HTN, HLD, hypothyroid, spinal stenosis s/p laminectomy/fusion, p/w reyes.

## 2019-08-26 NOTE — CONSULT NOTE ADULT - SUBJECTIVE AND OBJECTIVE BOX
HPI:    Patient is an 78F with a history of myasthenia gravis, HLD, HTN, hypothyroidism who presents to the ED with shortness of breath. Patient was diagnosed with MG in Oct 2018 by a neurologist Dr. Reyes at [facility]. At that time, she presented to dysarthria, dysphagia, and ptosis. She was started on pyridostigmine and has been stable on this since then. She has never had any exacerbations involving SOB and has never had to be intubated. Patient states that over the past couple months she has been experiencing dyspnea on exertion that is becoming increasingly more severe. She says in the past week it has been much worse and that now she becomes short of breath after walking just a few feet. She does not have shortness of breath at rest. She denies known history of CHF. She has not had worsened dysarthria, dysphagia, diplopia, or any other acute concerns. She does state that in the past week she has been experiencing a sensation of numbness in her hands and feet bilaterally. She follows with Dr. Reyes (office number [phone]).      PAST MEDICAL & SURGICAL HISTORY:  Reactive airway disease that is not asthma: mild as per pulm note on [employer], patient and daughter deneis any inhaler use  Near syncope: 8/2018 negative ENT work up, negative cardiac work ups as per daughter  Aortic stenosis, mild: asper daughter  Diverticulitis large intestine: denies any recent exacerbations  Lumbar stenosis  Myasthenia gravis: dx 10/2018 symptoms: intermittent loss of voice/ weakness, negative ENT studies, now stable on Pyridostigmine  Carpal Tunnel Syndrome Right: release 4/10  Hammertoe Right foot  Kidney Calculi  Lumbar Radiculopathy  GERD (Gastroesophageal Reflux Disease)  Hyperlipidemia  Genital Ulcer, Female  Hypertension  H/O umbilical hernia repair  History of tonsillectomy  S/P foot surgery  H/O shoulder replacement: b/l 2015/2016  H/O laminectomy: cervical  1998  lumbar 1986,1998,2000  Prolapse, Uterovaginal: s/p sling  S/P Laparoscopic Fundoplication  S/P Hysterectomy Partial: 1974  Bilateral Cataracts: removed  S/P Appendectomy  S/P Cholecystectomy    FAMILY HISTORY:    Allergies    iodine (Rash)  shellfish (Rash)      Vital Signs Last 24 Hrs  T(C): 36.6 (26 Aug 2019 16:20), Max: 36.6 (26 Aug 2019 16:20)  T(F): 97.8 (26 Aug 2019 16:20), Max: 97.8 (26 Aug 2019 16:20)  HR: 59 (26 Aug 2019 16:20) (59 - 67)  BP: 104/65 (26 Aug 2019 16:20) (104/65 - 145/77)  BP(mean): --  RR: 18 (26 Aug 2019 16:20) (18 - 24)  SpO2: 100% (26 Aug 2019 16:20) (100% - 100%)    General Exam:   General appearance: No acute distress    Neurological Exam:  Mental Status: Orientated to self, date and place.  Attention intact.  Mild dysarthria. Speech fluent.  Cranial Nerves:   PERRL, EOMI, no ptosis.  CN V1-3 intact to light touch .  No facial asymmetry.    Motor:   Tone: normal.                  Strength:  RUE 4+/5 throughout, LUE 4+/5, LLE 4-/5, RLE 4-/5 (exam pain/effort limited)   Pronator drift: none                 Dysmetria: None to finger-nose-finger  Sensation: intact to light touch      08-26    143  |  106  |  18  ----------------------------<  96  3.9   |  25  |  0.88    Ca    9.6      26 Aug 2019 16:29  Phos  3.2     08-26  Mg     2.2     08-26    TPro  7.3  /  Alb  4.5  /  TBili  0.2  /  DBili  x   /  AST  28  /  ALT  25  /  AlkPhos  95  08-26                            13.5   4.7   )-----------( 154      ( 26 Aug 2019 16:29 )             40.5

## 2019-08-26 NOTE — H&P ADULT - NSHPSOCIALHISTORY_GEN_ALL_CORE
Social History:    Marital Status:  (   )    (   ) Single    (   )    (  )   Occupation:   Lives with: (  ) alone  (  ) children   (  ) spouse   (  ) parents  (  ) other    Substance Use (street drugs): (  ) never used  (  ) other:  Tobacco Usage:  (   ) never smoked   (   ) former smoker   (   ) current smoker  (     ) pack year  (        ) last cigarette date  Alcohol Usage:  Sexual History: Social History:      Occupation: not working  Lives with: (  ) alone  ( x ) children   (  ) spouse   (  ) parents  (  ) other    Substance Use (street drugs): (  x) never used  (  ) other:  Tobacco Usage:  (   x) never smoked   (   ) former smoker   (   ) current smoker  (     ) pack year  (        ) last cigarette date  Alcohol Usage: denies

## 2019-08-26 NOTE — CONSULT NOTE ADULT - ATTENDING COMMENTS
I performed a history and physical examination of the patient and discussed the management of the patient with the resident. I reviewed the resident's note and agree with the documented findings and plan of care with the following additions/exceptions.    DOS 8/27/19    she reported today that she has a few weeks of not feeling well. coughing on mucus that is improved after taking her mestinon. when she chewed yesterday her jaw fatigued. during our conversation her voice was becoming more hoarse over the course of about 10 min. no SOB. reports hands have felt weak also.    she was alert, fluent, hoarse straining quality to voice. not nasal. no diplopia or ptosis on prolonged upgaze. jaw moved well.  palate raised well on 3rd attempt. able to blow piece of paper well but less cooperative with counting numbers in 1 breath. neck flexion had some pain but full strength, full neck extension. deltoid L 4+ R 4, rest arms full with some giveway. legs HF L 5 R 4+ and DF 5 b/l. reflexes 2+UE 1+ patellar.     the history the patient and daughter told resident yesterday sounded less like MG symptoms because she had then only reported SOB with exertion. but with the clarification of symptoms and signs noted above I think this is more c/w myasthenia gravis exacerbation and so I would recommend IVIG x 5 days  in addition rec starting prednisone  please check full MG Ab panel  cont checking nifs and vc  rec continued cardiac w/u as per primary team, especially TTE since want to be cautious about IVIG if there is any heart failure  she is reporting neck pain and hand numbness and weakness, rec MRI C spine without contrast

## 2019-08-26 NOTE — ED ADULT NURSE NOTE - OBJECTIVE STATEMENT
79yo F aaox4 ambulatory presents to ED from her primary Dr, C/o SOB/ Pt states that she started Sob with exertion  2 weeks ago , got  progressive worsening , went to see her PMD and advice her to came to ED. denies CP, abdominal pain, Fever, Chills, N/V/D.  No respiratory distress. PT safety maintained with family at bedside, call bell within reach and bed in the lowest position.

## 2019-08-26 NOTE — H&P ADULT - NSHPPHYSICALEXAM_GEN_ALL_CORE
PHYSICAL EXAM:  GENERAL: NAD, well-groomed, well-developed  HEAD:  Atraumatic, Normocephalic  EYES: EOMI, PERRLA, conjunctiva and sclera clear  ENMT: No tonsillar erythema, exudates, or enlargement; Moist mucous membranes, Good dentition, No lesions  NECK: Supple, No JVD, Normal thyroid  CHEST/LUNG: Clear to percussion bilaterally; No rales, rhonchi, wheezing, or rubs  HEART: Regular rate and rhythm; No murmurs, rubs, or gallops  ABDOMEN: Soft, Nontender, Nondistended; Bowel sounds present  EXTREMITIES:  2+ Peripheral Pulses, No clubbing, cyanosis, or edema  LYMPH: No lymphadenopathy noted  SKIN: No rashes or lesions  NERVOUS SYSTEM:  Alert & Oriented X3, Good concentration; Motor Strength 5/5 B/L upper and lower extremities; DTRs 2+ intact and symmetric PHYSICAL EXAM:  GENERAL: NAD, well-groomed, well-developed  HEAD:  Atraumatic, Normocephalic  EYES: EOMI, PERRLA, conjunctiva and sclera clear  ENMT: No tonsillar erythema, exudates, or enlargement; dry mucous membranes, Good dentition, No lesions  NECK: Supple, No JVD, Normal thyroid  CHEST/LUNG: Clear to percussion bilaterally; No rales, rhonchi, wheezing, or rubs no resp distress or accessory muscle usage.   HEART: Regular rate and rhythm; No murmurs, rubs, or gallops no sig LE edema.  ABDOMEN: Soft, Nontender, Nondistended; Bowel sounds present x 4. no rebound/guarding.  EXTREMITIES:  2+ Peripheral Pulses, No clubbing, cyanosis  LYMPH: No lymphadenopathy noted, no lymphangitis  SKIN: No rashes or lesions, no petechiae  NERVOUS SYSTEM:  Alert & Oriented X3, Good concentration; Motor Strength 5/5 B/L upper and lower extremities. no facial droop. mild dysarthria. sensation grossly intact b/l. FTN intact. PHYSICAL EXAM:    Vital Signs Last 24 Hrs  T(C): 36.4 (26 Aug 2019 21:28), Max: 36.6 (26 Aug 2019 16:20)  T(F): 97.6 (26 Aug 2019 21:28), Max: 97.8 (26 Aug 2019 16:20)  HR: 62 (26 Aug 2019 21:28) (59 - 67)  BP: 168/81 (26 Aug 2019 21:28) (104/65 - 168/81)  BP(mean): --  RR: 18 (26 Aug 2019 21:28) (18 - 24)  SpO2: 99% (26 Aug 2019 21:28) (99% - 100%)    GENERAL: NAD, well-groomed, well-developed  HEAD:  Atraumatic, Normocephalic  EYES: EOMI, PERRLA, conjunctiva and sclera clear  ENMT: No tonsillar erythema, exudates, or enlargement; dry mucous membranes, Good dentition, No lesions  NECK: Supple, No JVD, Normal thyroid  CHEST/LUNG: Clear to percussion bilaterally; No rales, rhonchi, wheezing, or rubs no resp distress or accessory muscle usage.   HEART: Regular rate and rhythm; No murmurs, rubs, or gallops no sig LE edema.  ABDOMEN: Soft, Nontender, Nondistended; Bowel sounds present x 4. no rebound/guarding.  EXTREMITIES:  2+ Peripheral Pulses, No clubbing, cyanosis  LYMPH: No lymphadenopathy noted, no lymphangitis  SKIN: No rashes or lesions, no petechiae  NERVOUS SYSTEM:  Alert & Oriented X3, Good concentration; Motor Strength 5/5 B/L upper and lower extremities. no facial droop. mild dysarthria. sensation grossly intact b/l. FTN intact.

## 2019-08-26 NOTE — ED ADULT NURSE NOTE - CHPI ED NUR SYMPTOMS NEG
no chills/no edema/no fever/no headache/no body aches/no chest pain/no hemoptysis/no wheezing/no cough/no diaphoresis

## 2019-08-26 NOTE — ED PROVIDER NOTE - ATTENDING CONTRIBUTION TO CARE
attending Pollack: 78yF h/o GERD, HLD, HTN, hypothyroidism, Myasthenia Gravis (dx 10/2018 stable on Pyridostigmine), HLD, on PRN Lasix 40mg sent to ED by neurologist for increased/worsening SOB and MAXWELL x1 week concerning for myasthenia gravis crisis. Not currently on steroids. On exam, elderly female, no distress, respiratory rate 20, clear lungs, trace pedal edema b/l. Will obtain ekg, place on tele, labs, NIF/Vital capacity, neuro consult, likely admit

## 2019-08-26 NOTE — H&P ADULT - ATTENDING COMMENTS
Care to be assumed by day hospitalist at 8 am.  This patient was assigned to me by the hospitalist in charge; my involvement in this case has consisted of the initial history, physical, chart review, and management plan.  This patient was previously unknown to me.   Case endorsed to NP/PA covering 4 ivan at 10 pm.  She is aware of outlined plan above.

## 2019-08-26 NOTE — H&P ADULT - NSICDXPASTMEDICALHX_GEN_ALL_CORE_FT
PAST MEDICAL HISTORY:  Aortic stenosis, mild asper daughter    Carpal Tunnel Syndrome Right release 4/10    Diverticulitis large intestine denies any recent exacerbations    Genital Ulcer, Female     GERD (Gastroesophageal Reflux Disease)     Hammertoe Right foot     Hyperlipidemia     Hypertension     Kidney Calculi     Lumbar Radiculopathy     Lumbar stenosis     Myasthenia gravis dx 10/2018 symptoms: intermittent loss of voice/ weakness, negative ENT studies, now stable on Pyridostigmine    Near syncope 8/2018 negative ENT work up, negative cardiac work ups as per daughter    Reactive airway disease that is not asthma mild as per pulm note on [employer], patient and daughter deneis any inhaler use

## 2019-08-26 NOTE — H&P ADULT - NSHPLABSRESULTS_GEN_ALL_CORE
Labs personally reviewed : cbc unrevealing, coags unrevealing, cmp unrevealing, probnp 216, HST delta 59-->51.   Imaging personally reviewed CXR prelim clear lungs. In Er pt received aspirin 325 x1, pyridostigmine 180 x 1, and eval by neuro, all prior to medicine team involvement.   EKG personally reviewed

## 2019-08-26 NOTE — ED ADULT NURSE NOTE - NSIMPLEMENTINTERV_GEN_ALL_ED
Implemented All Universal Safety Interventions:  [place] to call system. Call bell, personal items and telephone within reach. Instruct patient to call for assistance. Room bathroom lighting operational. Non-slip footwear when patient is off stretcher. Physically safe environment: no spills, clutter or unnecessary equipment. Stretcher in lowest position, wheels locked, appropriate side rails in place.

## 2019-08-26 NOTE — H&P ADULT - PROBLEM SELECTOR PLAN 1
-progressive reyes presumed to be multifactorial in nature. DDx includes but not limited to occult cardiac disease (valvulopathy vs. arrthymia vs. acs) +/- reactive airway disease +/- overall deconditioning/obesity +/- MG  -admit to medicine with telemetry  -prn ekg/trops with cp  -check TTE  -check NST  -prn duonebs  -NIF/VC q4 hrs  -f/u further neuro recs in am regarding possible steroids, for now their recommendations are no emergent need for IV steroids. NIF in ER -50.   -CXR reviewed, clear lungs  -pro bnp normal, no LE edema or orthopnea to suggest fluid overload/pulm edema. afeb/no leukocytosis no cough no concern for pna or aspiration at this time

## 2019-08-26 NOTE — H&P ADULT - PROBLEM SELECTOR PLAN 2
-Initial trop 59 -->repeat 51. no chest pain, no ischemic changes on EKG.  -c/w aspirin 81  -c/w statin, arb. no bb given sinus bob on admission.  -ekg/trops prn chest pain  -monitor on tele  -check TTE  -check NST

## 2019-08-26 NOTE — ED PROVIDER NOTE - OBJECTIVE STATEMENT
79 y/o female PMHx GERD, HLD, HTN, hypothyroidism, Myasthenia Gravis (dx 10/2018 stable on Pyridostigmine), HLD, on prn lasix 40mg directed to the ED by neurologist Dr. Long Reyes for increased/worsening SOB and MAXWELL x1 week concerning for myasthenia gravis crisis. Patient stated she feels dyspneic while walking within a few feet. Patient denied CP, abdominal pain, N/V/D, headache, back pain, cough, peripheral edema, palpitations     Meds: cholecalciferol 2000 units, esomeprazole 40mg, lasix 40mg prn, Linzess 145mcg, losartan 50mg-HCTZ 12.5mg, pyridostigmine 180mg BID, pyridostigmine 60mg 1.5 tabs BID, rantidine 150mg, rosuvastatin 10mg, tramadol 50mg 79 y/o female PMHx GERD, HLD, HTN, hypothyroidism, Myasthenia Gravis (dx 10/2018 stable on Pyridostigmine), HLD, on prn lasix 40mg directed to the ED by neurologist Dr. Long Reyes for increased/worsening SOB and MAXWELL x1 week concerning for myasthenia gravis crisis. Patient stated she feels dyspneic while walking within a few feet. Patient denied CP, abdominal pain, N/V/D, headache, back pain, cough, peripheral edema, palpitations     Meds: cholecalciferol 2000 units, esomeprazole 40mg, lasix 40mg prn, Linzess 145mcg, losartan 50mg-HCTZ 12.5mg, pyridostigmine 180mg BID, pyridostigmine 60mg 1.5 tabs BID, rantidine 150mg, rosuvastatin 10mg, tramadol 50mg    Neuro: Dr. Long Reyes [phone] 77 y/o female PMHx GERD, HLD, HTN, hypothyroidism, Myasthenia Gravis (dx 10/2018 stable on Pyridostigmine), on prn lasix 40mg directed to the ED by neurologist Dr. Long Reyes for increased/worsening SOB and MAXWELL x1 week concerning for myasthenia gravis crisis. Patient stated she feels dyspneic while walking within a few feet. Patient denied CP, abdominal pain, N/V/D, headache, back pain, cough, peripheral edema, palpitations     Meds: cholecalciferol 2000 units, esomeprazole 40mg, lasix 40mg prn, Linzess 145mcg, losartan 50mg-HCTZ 12.5mg, pyridostigmine 180mg BID, pyridostigmine 60mg 1.5 tabs BID, rantidine 150mg, rosuvastatin 10mg, tramadol 50mg    Neuro: Dr. Long Reyes [phone]

## 2019-08-26 NOTE — ED PROVIDER NOTE - PMH
Aortic stenosis, mild  asper daughter  Carpal Tunnel Syndrome Right  release 4/10  Diverticulitis large intestine  denies any recent exacerbations  Genital Ulcer, Female    GERD (Gastroesophageal Reflux Disease)    Hammertoe Right foot    Hyperlipidemia    Hypertension    Kidney Calculi    Lumbar Radiculopathy    Lumbar stenosis    Myasthenia gravis  dx 10/2018 symptoms: intermittent loss of voice/ weakness, negative ENT studies, now stable on Pyridostigmine  Near syncope  8/2018 negative ENT work up, negative cardiac work ups as per daughter  Reactive airway disease that is not asthma  mild as per pulm note on [employer], patient and daughter deneis any inhaler use

## 2019-08-27 LAB
ALBUMIN SERPL ELPH-MCNC: 3.7 G/DL — SIGNIFICANT CHANGE UP (ref 3.3–5)
ALP SERPL-CCNC: 66 U/L — SIGNIFICANT CHANGE UP (ref 40–120)
ALT FLD-CCNC: 19 U/L — SIGNIFICANT CHANGE UP (ref 10–45)
ANION GAP SERPL CALC-SCNC: 9 MMOL/L — SIGNIFICANT CHANGE UP (ref 5–17)
AST SERPL-CCNC: 21 U/L — SIGNIFICANT CHANGE UP (ref 10–40)
BASOPHILS # BLD AUTO: 0.01 K/UL — SIGNIFICANT CHANGE UP (ref 0–0.2)
BASOPHILS NFR BLD AUTO: 0.2 % — SIGNIFICANT CHANGE UP (ref 0–2)
BILIRUB SERPL-MCNC: 0.3 MG/DL — SIGNIFICANT CHANGE UP (ref 0.2–1.2)
BUN SERPL-MCNC: 17 MG/DL — SIGNIFICANT CHANGE UP (ref 7–23)
CALCIUM SERPL-MCNC: 9.5 MG/DL — SIGNIFICANT CHANGE UP (ref 8.4–10.5)
CHLORIDE SERPL-SCNC: 109 MMOL/L — HIGH (ref 96–108)
CO2 SERPL-SCNC: 24 MMOL/L — SIGNIFICANT CHANGE UP (ref 22–31)
CREAT SERPL-MCNC: 0.78 MG/DL — SIGNIFICANT CHANGE UP (ref 0.5–1.3)
EOSINOPHIL # BLD AUTO: 0.11 K/UL — SIGNIFICANT CHANGE UP (ref 0–0.5)
EOSINOPHIL NFR BLD AUTO: 2.7 % — SIGNIFICANT CHANGE UP (ref 0–6)
GLUCOSE SERPL-MCNC: 88 MG/DL — SIGNIFICANT CHANGE UP (ref 70–99)
HCT VFR BLD CALC: 36.1 % — SIGNIFICANT CHANGE UP (ref 34.5–45)
HGB BLD-MCNC: 11.9 G/DL — SIGNIFICANT CHANGE UP (ref 11.5–15.5)
IMM GRANULOCYTES NFR BLD AUTO: 0.5 % — SIGNIFICANT CHANGE UP (ref 0–1.5)
LYMPHOCYTES # BLD AUTO: 1.23 K/UL — SIGNIFICANT CHANGE UP (ref 1–3.3)
LYMPHOCYTES # BLD AUTO: 30.7 % — SIGNIFICANT CHANGE UP (ref 13–44)
MAGNESIUM SERPL-MCNC: 2.2 MG/DL — SIGNIFICANT CHANGE UP (ref 1.6–2.6)
MCHC RBC-ENTMCNC: 31.6 PG — SIGNIFICANT CHANGE UP (ref 27–34)
MCHC RBC-ENTMCNC: 33 GM/DL — SIGNIFICANT CHANGE UP (ref 32–36)
MCV RBC AUTO: 95.8 FL — SIGNIFICANT CHANGE UP (ref 80–100)
MONOCYTES # BLD AUTO: 0.53 K/UL — SIGNIFICANT CHANGE UP (ref 0–0.9)
MONOCYTES NFR BLD AUTO: 13.2 % — SIGNIFICANT CHANGE UP (ref 2–14)
NEUTROPHILS # BLD AUTO: 2.11 K/UL — SIGNIFICANT CHANGE UP (ref 1.8–7.4)
NEUTROPHILS NFR BLD AUTO: 52.7 % — SIGNIFICANT CHANGE UP (ref 43–77)
PHOSPHATE SERPL-MCNC: 3.5 MG/DL — SIGNIFICANT CHANGE UP (ref 2.5–4.5)
PLATELET # BLD AUTO: 125 K/UL — LOW (ref 150–400)
POTASSIUM SERPL-MCNC: 3.8 MMOL/L — SIGNIFICANT CHANGE UP (ref 3.5–5.3)
POTASSIUM SERPL-SCNC: 3.8 MMOL/L — SIGNIFICANT CHANGE UP (ref 3.5–5.3)
PROT SERPL-MCNC: 5.9 G/DL — LOW (ref 6–8.3)
RBC # BLD: 3.77 M/UL — LOW (ref 3.8–5.2)
RBC # FLD: 13.1 % — SIGNIFICANT CHANGE UP (ref 10.3–14.5)
SODIUM SERPL-SCNC: 142 MMOL/L — SIGNIFICANT CHANGE UP (ref 135–145)
WBC # BLD: 4.01 K/UL — SIGNIFICANT CHANGE UP (ref 3.8–10.5)
WBC # FLD AUTO: 4.01 K/UL — SIGNIFICANT CHANGE UP (ref 3.8–10.5)

## 2019-08-27 PROCEDURE — 99233 SBSQ HOSP IP/OBS HIGH 50: CPT

## 2019-08-27 PROCEDURE — 93306 TTE W/DOPPLER COMPLETE: CPT | Mod: 26

## 2019-08-27 PROCEDURE — 99223 1ST HOSP IP/OBS HIGH 75: CPT

## 2019-08-27 RX ORDER — IMMUNE GLOBULIN (HUMAN) 10 G/100ML
26 INJECTION INTRAVENOUS; SUBCUTANEOUS DAILY
Refills: 0 | Status: COMPLETED | OUTPATIENT
Start: 2019-08-27 | End: 2019-08-31

## 2019-08-27 RX ORDER — DIPHENHYDRAMINE HCL 50 MG
25 CAPSULE ORAL DAILY
Refills: 0 | Status: DISCONTINUED | OUTPATIENT
Start: 2019-08-27 | End: 2019-09-04

## 2019-08-27 RX ORDER — ACETAMINOPHEN 500 MG
650 TABLET ORAL DAILY
Refills: 0 | Status: DISCONTINUED | OUTPATIENT
Start: 2019-08-27 | End: 2019-09-04

## 2019-08-27 RX ADMIN — Medication 50 MICROGRAM(S): at 06:45

## 2019-08-27 RX ADMIN — RANITIDINE HYDROCHLORIDE 150 MILLIGRAM(S): 150 TABLET, FILM COATED ORAL at 21:28

## 2019-08-27 RX ADMIN — ESOMEPRAZOLE MAGNESIUM 40 MILLIGRAM(S): 40 CAPSULE, DELAYED RELEASE ORAL at 17:28

## 2019-08-27 RX ADMIN — IMMUNE GLOBULIN (HUMAN) 43.33 GRAM(S): 10 INJECTION INTRAVENOUS; SUBCUTANEOUS at 13:53

## 2019-08-27 RX ADMIN — PYRIDOSTIGMINE BROMIDE 120 MILLIGRAM(S): 60 SOLUTION ORAL at 08:05

## 2019-08-27 RX ADMIN — Medication 25 MILLIGRAM(S): at 12:36

## 2019-08-27 RX ADMIN — ESOMEPRAZOLE MAGNESIUM 40 MILLIGRAM(S): 40 CAPSULE, DELAYED RELEASE ORAL at 06:45

## 2019-08-27 RX ADMIN — PYRIDOSTIGMINE BROMIDE 180 MILLIGRAM(S): 60 SOLUTION ORAL at 17:28

## 2019-08-27 RX ADMIN — ROSUVASTATIN CALCIUM 5 MILLIGRAM(S): 5 TABLET ORAL at 21:28

## 2019-08-27 RX ADMIN — Medication 2000 UNIT(S): at 12:36

## 2019-08-27 RX ADMIN — ENOXAPARIN SODIUM 40 MILLIGRAM(S): 100 INJECTION SUBCUTANEOUS at 11:32

## 2019-08-27 RX ADMIN — TRAMADOL HYDROCHLORIDE 50 MILLIGRAM(S): 50 TABLET ORAL at 21:42

## 2019-08-27 RX ADMIN — LOSARTAN POTASSIUM 50 MILLIGRAM(S): 100 TABLET, FILM COATED ORAL at 06:44

## 2019-08-27 RX ADMIN — Medication 650 MILLIGRAM(S): at 12:36

## 2019-08-27 RX ADMIN — Medication 650 MILLIGRAM(S): at 13:30

## 2019-08-27 RX ADMIN — Medication 12.5 MILLIGRAM(S): at 06:44

## 2019-08-27 RX ADMIN — PYRIDOSTIGMINE BROMIDE 90 MILLIGRAM(S): 60 SOLUTION ORAL at 14:03

## 2019-08-27 RX ADMIN — TRAMADOL HYDROCHLORIDE 50 MILLIGRAM(S): 50 TABLET ORAL at 23:00

## 2019-08-27 RX ADMIN — Medication 81 MILLIGRAM(S): at 11:32

## 2019-08-27 RX ADMIN — Medication 10 MILLIGRAM(S): at 11:32

## 2019-08-27 NOTE — PROGRESS NOTE ADULT - ASSESSMENT
79yo F PMH Myasthenia gravis on pyridostigmine, reactive airway disease, GERD, HTN, HLD, hypothyroid, spinal stenosis s/p laminectomy/fusion, p/w dyspnea on exertion concerning for MG crisis starting on prednisone/IVIG per neuro while TTE pending, ruled out for ACS with downtrending trops.

## 2019-08-27 NOTE — PHYSICAL THERAPY INITIAL EVALUATION ADULT - PERTINENT HX OF CURRENT PROBLEM, REHAB EVAL
79 y/o F, PMH Myasthenia gravis on pyridostigmine (dx with MG 10/2018 after p/w dyspnea, dysphagia, and ptosis), reactive airway disease, GERD, HTN, HLD, hypothyroid, spinal stenosis s/p laminectomy/fusion. Pt p/w MAXWELL. Pt has been c/o progressive sob; first started with MAXWELL and was ok at rest, but over last few weeks has intermittent sob at rest as well. No sig LE edema or orthopnea. No cp. No f/c, URI sx/cough.

## 2019-08-27 NOTE — PHYSICAL THERAPY INITIAL EVALUATION ADULT - RISK REDUCTION/PREVENTION, PT EVAL
[facility] Donovan Corral [address]  3/21/2017 risk factors

## 2019-08-27 NOTE — PHYSICAL THERAPY INITIAL EVALUATION ADULT - ADDITIONAL COMMENTS
Prior to admission pt reports being independent of all ADL's & functional mobility without AD. Pt resides in house with spouse and son, 3 steps to enter, + HR, pt resides on first floor. Pt owns RW and SAC if needed. (-) driving.

## 2019-08-27 NOTE — PHYSICAL THERAPY INITIAL EVALUATION ADULT - STRENGTHENING, PT EVAL
GOAL: Pt will improve bilateral LE strength to 5/5, for increased limb stability, to improve gait and facilitate stair negotiation in 2 weeks.

## 2019-08-27 NOTE — PHYSICAL THERAPY INITIAL EVALUATION ADULT - PRECAUTIONS/LIMITATIONS, REHAB EVAL
CONT: Pt does note she ran out of her inhaler but feels her sob is more fatigue related and not necessarily reactive airway in nature, and denies excessive wheezing. In addition to these c/o dyspnea, pt also endorses progressive/intermittent b/l hand/arm numbness/tingling, UE weakness at times, dysphagia at times, which her o/p neurologist thinks is related to her MG.  Pt had a "cardiac workup" in preparation for her spinal surgery, with ekg/TTE/NST in December 2018 nondiagnostic except mild AS. Pt denies visual changes, dizziness, syncope, falls. CXR: (-).

## 2019-08-27 NOTE — CHART NOTE - NSCHARTNOTEFT_GEN_A_CORE
Discussed case with neuromuscular attending Dr. Fitzgerald. Recommended starting prednisone 10 mg daily in addition to mestinon regimen and continue steroids on discharge. Patient should follow-up with Dr. Fitzgerald after discharge. Also ordered acetylcholine receptor binding, blocking, modulating antibodies and MuSK antibody as previous MG work-up was done at [facility] and results not available. Discussed case with neuromuscular attending Dr. Fitzgerald. Recommended starting prednisone 10 mg daily in addition to mestinon regimen and continue steroids on discharge. Patient should follow-up with Dr. Fitzgerald after discharge. Also ordered acetylcholine receptor binding, blocking, modulating antibodies and MuSK antibody as previous MG work-up was done at [facility] and results not available.    Continue to trend NIF/VC q4h today and then if results stable, can change to once daily checks. Discussed case with neuromuscular attending Dr. Fitzgerald. Recommended starting prednisone 10 mg daily in addition to mestinon regimen and continue steroids on discharge. Patient should follow-up with Dr. Fitzgerald after discharge. Also ordered acetylcholine receptor binding, blocking, modulating antibodies and MuSK antibody as previous MG work-up was done at [facility] and results not available. Continue to trend NIF/VC q4h today and then if results stable, can change to once daily checks if results stable. Also starting IVIG today for a total of 5 days (orders placed and primary team updated). Discussed case with neuromuscular attending Dr. Fitzgerald. Recommended starting prednisone 10 mg daily in addition to mestinon regimen and continue steroids on discharge. Patient should follow-up with Dr. Fitzgerald after discharge. Also ordered acetylcholine receptor binding, blocking, modulating antibodies and MuSK antibody as previous MG work-up was done at [facility] and results not available. Continue to trend NIF/VC q4h today and then if results stable, can change to once daily checks if results stable. Also starting IVIG today for a total of 5 days (orders placed and primary team updated).    see my addendum to consult note dated 8/26

## 2019-08-28 LAB
ALBUMIN SERPL ELPH-MCNC: 3.3 G/DL — SIGNIFICANT CHANGE UP (ref 3.3–5)
ALP SERPL-CCNC: 62 U/L — SIGNIFICANT CHANGE UP (ref 40–120)
ALT FLD-CCNC: 17 U/L — SIGNIFICANT CHANGE UP (ref 10–45)
ANION GAP SERPL CALC-SCNC: 11 MMOL/L — SIGNIFICANT CHANGE UP (ref 5–17)
AST SERPL-CCNC: 15 U/L — SIGNIFICANT CHANGE UP (ref 10–40)
BILIRUB SERPL-MCNC: 0.2 MG/DL — SIGNIFICANT CHANGE UP (ref 0.2–1.2)
BUN SERPL-MCNC: 30 MG/DL — HIGH (ref 7–23)
CALCIUM SERPL-MCNC: 9.1 MG/DL — SIGNIFICANT CHANGE UP (ref 8.4–10.5)
CHLORIDE SERPL-SCNC: 108 MMOL/L — SIGNIFICANT CHANGE UP (ref 96–108)
CO2 SERPL-SCNC: 23 MMOL/L — SIGNIFICANT CHANGE UP (ref 22–31)
CREAT SERPL-MCNC: 0.95 MG/DL — SIGNIFICANT CHANGE UP (ref 0.5–1.3)
GLUCOSE SERPL-MCNC: 90 MG/DL — SIGNIFICANT CHANGE UP (ref 70–99)
HCT VFR BLD CALC: 33.2 % — LOW (ref 34.5–45)
HGB BLD-MCNC: 10.9 G/DL — LOW (ref 11.5–15.5)
MCHC RBC-ENTMCNC: 31 PG — SIGNIFICANT CHANGE UP (ref 27–34)
MCHC RBC-ENTMCNC: 32.8 GM/DL — SIGNIFICANT CHANGE UP (ref 32–36)
MCV RBC AUTO: 94.3 FL — SIGNIFICANT CHANGE UP (ref 80–100)
PLATELET # BLD AUTO: 123 K/UL — LOW (ref 150–400)
POTASSIUM SERPL-MCNC: 3.9 MMOL/L — SIGNIFICANT CHANGE UP (ref 3.5–5.3)
POTASSIUM SERPL-SCNC: 3.9 MMOL/L — SIGNIFICANT CHANGE UP (ref 3.5–5.3)
PROT SERPL-MCNC: 6.4 G/DL — SIGNIFICANT CHANGE UP (ref 6–8.3)
RBC # BLD: 3.52 M/UL — LOW (ref 3.8–5.2)
RBC # FLD: 12.8 % — SIGNIFICANT CHANGE UP (ref 10.3–14.5)
SODIUM SERPL-SCNC: 142 MMOL/L — SIGNIFICANT CHANGE UP (ref 135–145)
WBC # BLD: 4.45 K/UL — SIGNIFICANT CHANGE UP (ref 3.8–10.5)
WBC # FLD AUTO: 4.45 K/UL — SIGNIFICANT CHANGE UP (ref 3.8–10.5)

## 2019-08-28 PROCEDURE — 72141 MRI NECK SPINE W/O DYE: CPT | Mod: 26

## 2019-08-28 PROCEDURE — 99232 SBSQ HOSP IP/OBS MODERATE 35: CPT

## 2019-08-28 RX ORDER — PANTOPRAZOLE SODIUM 20 MG/1
40 TABLET, DELAYED RELEASE ORAL
Refills: 0 | Status: DISCONTINUED | OUTPATIENT
Start: 2019-08-28 | End: 2019-09-05

## 2019-08-28 RX ADMIN — ESOMEPRAZOLE MAGNESIUM 40 MILLIGRAM(S): 40 CAPSULE, DELAYED RELEASE ORAL at 06:13

## 2019-08-28 RX ADMIN — Medication 50 MICROGRAM(S): at 06:11

## 2019-08-28 RX ADMIN — ROSUVASTATIN CALCIUM 5 MILLIGRAM(S): 5 TABLET ORAL at 21:14

## 2019-08-28 RX ADMIN — PYRIDOSTIGMINE BROMIDE 180 MILLIGRAM(S): 60 SOLUTION ORAL at 18:48

## 2019-08-28 RX ADMIN — Medication 650 MILLIGRAM(S): at 13:14

## 2019-08-28 RX ADMIN — TRAMADOL HYDROCHLORIDE 50 MILLIGRAM(S): 50 TABLET ORAL at 21:25

## 2019-08-28 RX ADMIN — IMMUNE GLOBULIN (HUMAN) 43.33 GRAM(S): 10 INJECTION INTRAVENOUS; SUBCUTANEOUS at 13:10

## 2019-08-28 RX ADMIN — ENOXAPARIN SODIUM 40 MILLIGRAM(S): 100 INJECTION SUBCUTANEOUS at 13:13

## 2019-08-28 RX ADMIN — Medication 2000 UNIT(S): at 13:13

## 2019-08-28 RX ADMIN — LOSARTAN POTASSIUM 50 MILLIGRAM(S): 100 TABLET, FILM COATED ORAL at 06:11

## 2019-08-28 RX ADMIN — PYRIDOSTIGMINE BROMIDE 90 MILLIGRAM(S): 60 SOLUTION ORAL at 13:11

## 2019-08-28 RX ADMIN — Medication 10 MILLIGRAM(S): at 06:13

## 2019-08-28 RX ADMIN — Medication 650 MILLIGRAM(S): at 14:15

## 2019-08-28 RX ADMIN — PYRIDOSTIGMINE BROMIDE 120 MILLIGRAM(S): 60 SOLUTION ORAL at 06:11

## 2019-08-28 RX ADMIN — TRAMADOL HYDROCHLORIDE 50 MILLIGRAM(S): 50 TABLET ORAL at 22:25

## 2019-08-28 RX ADMIN — Medication 81 MILLIGRAM(S): at 13:14

## 2019-08-28 RX ADMIN — RANITIDINE HYDROCHLORIDE 150 MILLIGRAM(S): 150 TABLET, FILM COATED ORAL at 21:14

## 2019-08-28 RX ADMIN — Medication 12.5 MILLIGRAM(S): at 06:11

## 2019-08-28 RX ADMIN — Medication 25 MILLIGRAM(S): at 13:13

## 2019-08-28 NOTE — PROGRESS NOTE ADULT - ASSESSMENT
Impression: She most likely has a myasthenia gravis exacerbation. On IVIg, improving.     Plan:  -plan for 5 day course of IVIg (started on 8/27)  -c/w prednisone 10 mg PO daily, will increase prednisone to 20 mg on discharge per discussion with Dr. Baron Fitzgerald, neuromuscular specialist.   -NIF/VC q4  -c/w mestinon 120 AM, 90 PM  -f/u AChR antibodies  -telemetry monitoring

## 2019-08-29 LAB
HCT VFR BLD CALC: 34.9 % — SIGNIFICANT CHANGE UP (ref 34.5–45)
HGB BLD-MCNC: 11.6 G/DL — SIGNIFICANT CHANGE UP (ref 11.5–15.5)
MCHC RBC-ENTMCNC: 31.3 PG — SIGNIFICANT CHANGE UP (ref 27–34)
MCHC RBC-ENTMCNC: 33.3 GM/DL — SIGNIFICANT CHANGE UP (ref 32–36)
MCV RBC AUTO: 94 FL — SIGNIFICANT CHANGE UP (ref 80–100)
PLATELET # BLD AUTO: 114 K/UL — LOW (ref 150–400)
RBC # BLD: 3.71 M/UL — LOW (ref 3.8–5.2)
RBC # FLD: 11.9 % — SIGNIFICANT CHANGE UP (ref 10.3–14.5)
WBC # BLD: 4.7 K/UL — SIGNIFICANT CHANGE UP (ref 3.8–10.5)
WBC # FLD AUTO: 4.7 K/UL — SIGNIFICANT CHANGE UP (ref 3.8–10.5)

## 2019-08-29 PROCEDURE — 99232 SBSQ HOSP IP/OBS MODERATE 35: CPT

## 2019-08-29 RX ADMIN — Medication 81 MILLIGRAM(S): at 13:16

## 2019-08-29 RX ADMIN — Medication 10 MILLIGRAM(S): at 05:25

## 2019-08-29 RX ADMIN — ENOXAPARIN SODIUM 40 MILLIGRAM(S): 100 INJECTION SUBCUTANEOUS at 13:16

## 2019-08-29 RX ADMIN — RANITIDINE HYDROCHLORIDE 150 MILLIGRAM(S): 150 TABLET, FILM COATED ORAL at 21:02

## 2019-08-29 RX ADMIN — PYRIDOSTIGMINE BROMIDE 120 MILLIGRAM(S): 60 SOLUTION ORAL at 05:25

## 2019-08-29 RX ADMIN — Medication 2000 UNIT(S): at 13:16

## 2019-08-29 RX ADMIN — PYRIDOSTIGMINE BROMIDE 180 MILLIGRAM(S): 60 SOLUTION ORAL at 18:21

## 2019-08-29 RX ADMIN — Medication 25 MILLIGRAM(S): at 13:17

## 2019-08-29 RX ADMIN — ROSUVASTATIN CALCIUM 5 MILLIGRAM(S): 5 TABLET ORAL at 21:02

## 2019-08-29 RX ADMIN — TRAMADOL HYDROCHLORIDE 50 MILLIGRAM(S): 50 TABLET ORAL at 22:10

## 2019-08-29 RX ADMIN — Medication 12.5 MILLIGRAM(S): at 05:24

## 2019-08-29 RX ADMIN — IMMUNE GLOBULIN (HUMAN) 43.33 GRAM(S): 10 INJECTION INTRAVENOUS; SUBCUTANEOUS at 13:16

## 2019-08-29 RX ADMIN — Medication 50 MICROGRAM(S): at 05:24

## 2019-08-29 RX ADMIN — LOSARTAN POTASSIUM 50 MILLIGRAM(S): 100 TABLET, FILM COATED ORAL at 05:24

## 2019-08-29 RX ADMIN — PYRIDOSTIGMINE BROMIDE 90 MILLIGRAM(S): 60 SOLUTION ORAL at 13:16

## 2019-08-29 RX ADMIN — Medication 650 MILLIGRAM(S): at 14:06

## 2019-08-29 RX ADMIN — Medication 650 MILLIGRAM(S): at 13:17

## 2019-08-29 RX ADMIN — PANTOPRAZOLE SODIUM 40 MILLIGRAM(S): 20 TABLET, DELAYED RELEASE ORAL at 05:25

## 2019-08-29 RX ADMIN — TRAMADOL HYDROCHLORIDE 50 MILLIGRAM(S): 50 TABLET ORAL at 21:05

## 2019-08-29 NOTE — PROGRESS NOTE ADULT - ASSESSMENT
Impression: She most likely has a myasthenia gravis exacerbation. On IVIg, improving.     Plan:  -plan for 5 day course of IVIg (started on 8/27)  -c/w prednisone 10 mg PO daily, will increase prednisone to 20 mg on discharge per discussion with Dr. Baron Fitzgerald, neuromuscular specialist.   -NIF/VC q4  -c/w mestinon 120 AM, 90 PM, 180 timespan  -f/u AChR antibodies  -telemetry monitoring Impression: She most likely has a myasthenia gravis exacerbation. On IVIg, improving.     Plan:  -plan for 5 day course of IVIg (started on 8/27)  -c/w prednisone 10 mg PO daily, will increase prednisone to 20 mg on discharge per discussion with Dr. Baron Fitzgerald, neuromuscular specialist.   -NIF/VC q4  -c/w mestinon 120 AM, 90 PM, 180 timespan  -f/u AChR antibodies  -telemetry monitoring  -cardiology evaluation for bradycardia

## 2019-08-29 NOTE — CONSULT NOTE ADULT - PROBLEM SELECTOR RECOMMENDATION 9
Suspect to be anginal equivalent   Plan for coronary angiography to rule out ischemic etiology   ASA

## 2019-08-29 NOTE — CONSULT NOTE ADULT - SUBJECTIVE AND OBJECTIVE BOX
CHIEF COMPLAINT:  Shortness of breath     HISTORY OF PRESENT ILLNESS:   78F with a history of myasthenia gravis, HLD, HTN, hypothyroidism who presented to the ED with shortness of breath. Patient was diagnosed with MG in Oct 2018 by a neurologist Dr. Reyes at [facility]. At that time, she presented to dysarthria, dysphagia, and ptosis. She was started on pyridostigmine and has been stable on this since then. She has never had any exacerbations involving SOB and has never had to be intubated. Patient states that over the past couple months she has been experiencing dyspnea on exertion that is becoming increasingly more severe. She says in the past week it has been much worse and that now she becomes short of breath after walking just a few feet. She does not have shortness of breath at rest. She denies known history of CHF. She has not had worsened dysarthria, dysphagia, diplopia, or any other acute concerns. She does state that in the past week she has been experiencing a sensation of numbness in her hands and feet bilaterally. She follows with Dr. Reyes (office number [phone]).          PAST MEDICAL & SURGICAL HISTORY:  Reactive airway disease that is not asthma: mild as per pulm note on [employer], patient and daughter deneis any inhaler use  Near syncope: 8/2018 negative ENT work up, negative cardiac work ups as per daughter  Aortic stenosis, mild: asper daughter  Diverticulitis large intestine: denies any recent exacerbations  Lumbar stenosis  Myasthenia gravis: dx 10/2018 symptoms: intermittent loss of voice/ weakness, negative ENT studies, now stable on Pyridostigmine  Carpal Tunnel Syndrome Right: release 4/10  Hammertoe Right foot  Kidney Calculi  Lumbar Radiculopathy  GERD (Gastroesophageal Reflux Disease)  Hyperlipidemia  Genital Ulcer, Female  Hypertension  H/O umbilical hernia repair  History of tonsillectomy  S/P foot surgery  H/O shoulder replacement: b/l 2015/2016  H/O laminectomy: cervical  1998  lumbar 1986,1998,2000  Prolapse, Uterovaginal: s/p sling  S/P Laparoscopic Fundoplication  S/P Hysterectomy Partial: 1974  Bilateral Cataracts: removed  S/P Appendectomy  S/P Cholecystectomy          MEDICATIONS:  aspirin enteric coated 81 milliGRAM(s) Oral daily  enoxaparin Injectable 40 milliGRAM(s) SubCutaneous daily  hydrochlorothiazide 12.5 milliGRAM(s) Oral daily  losartan 50 milliGRAM(s) Oral daily      ALBUTerol    90 MICROgram(s) HFA Inhaler 1 Puff(s) Inhalation every 4 hours  ALBUTerol/ipratropium for Nebulization 3 milliLiter(s) Nebulizer every 6 hours PRN  tiotropium 18 MICROgram(s) Capsule 1 Capsule(s) Inhalation daily    acetaminophen   Tablet .. 650 milliGRAM(s) Oral daily  diphenhydrAMINE 25 milliGRAM(s) Oral daily  traMADol 50 milliGRAM(s) Oral daily PRN    pantoprazole    Tablet 40 milliGRAM(s) Oral before breakfast  ranitidine 150 milliGRAM(s) Oral at bedtime    levothyroxine 50 MICROGram(s) Oral daily  predniSONE   Tablet 10 milliGRAM(s) Oral daily  rosuvastatin 5 milliGRAM(s) Oral at bedtime    cholecalciferol 2000 Unit(s) Oral daily  immune   globulin 10% (GAMMAGARD) IVPB 26 Gram(s) IV Intermittent daily      FAMILY HISTORY:  No pertinent family history in first degree relatives      SOCIAL HISTORY:    [ ] Non-smoker  [ ] Smoker  [ ] Alcohol    Allergies    iodine (Rash)  shellfish (Rash)    Intolerances    	    REVIEW OF SYSTEMS:  CONSTITUTIONAL: No fever, weight loss, + fatigue  EYES: No eye pain, visual disturbances, or discharge  ENMT:  No difficulty hearing, tinnitus, vertigo; No sinus or throat pain  NECK: No pain or stiffness  RESPIRATORY: No cough, wheezing, chills or hemoptysis; + Shortness of Breath  CARDIOVASCULAR: No chest pain, palpitations, passing out, dizziness, or leg swelling  GASTROINTESTINAL: No abdominal or epigastric pain. No nausea, vomiting, or hematemesis; No diarrhea or constipation. No melena or hematochezia.  GENITOURINARY: No dysuria, frequency, hematuria, or incontinence  NEUROLOGICAL: No headaches, memory loss, loss of strength, numbness, or tremors  SKIN: No itching, burning, rashes, or lesions   LYMPH Nodes: No enlarged glands  ENDOCRINE: No heat or cold intolerance; No hair loss  MUSCULOSKELETAL: + joint pain or swelling; No muscle, back, or extremity pain  PSYCHIATRIC: No depression, anxiety, mood swings, or difficulty sleeping  HEME/LYMPH: No easy bruising, or bleeding gums  ALLERY AND IMMUNOLOGIC: No hives or eczema	    [ ] All others negative	  [ ] Unable to obtain    PHYSICAL EXAM:  T(C): 36.7 (08-29-19 @ 12:01), Max: 36.7 (08-29-19 @ 12:01)  HR: 52 (08-29-19 @ 14:04) (50 - 53)  BP: 99/61 (08-29-19 @ 14:04) (99/61 - 117/72)  RR: 18 (08-29-19 @ 12:01) (18 - 18)  SpO2: 99% (08-29-19 @ 12:01) (97% - 99%)  Wt(kg): --  I&O's Summary    28 Aug 2019 07:01  -  29 Aug 2019 07:00  --------------------------------------------------------  IN: 240 mL / OUT: 0 mL / NET: 240 mL        Appearance: NAD  HEENT:   Normal oral mucosa, PERRL, EOMI	  Lymphatic: No lymphadenopathy  Cardiovascular: Normal S1 S2, No JVD, + systolic  murmurs, No edema  Respiratory: Lungs clear to auscultation	  Psychiatry: A & O x 3, Mood & affect appropriate  Gastrointestinal:  Soft, Non-tender, + BS	  Skin: No rashes, No ecchymoses, No cyanosis	  Neurologic: Non-focal  Extremities: Normal range of motion, No clubbing, cyanosis or edema  Vascular: Peripheral pulses palpable 2+ bilaterally    TELEMETRY: 	 SR   ECG:  	Sinus bradycardia, no acute ischemic stt changes   RADIOLOGY:    < from: MR Cervical Spine No Cont (08.28.19 @ 08:44) >    EXAM:  MR SPINE CERVICAL                            PROCEDURE DATE:  08/28/2019            INTERPRETATION:  INDICATION:  Neck pain and bilateral upper extremity   weakness, numbness and tingling. Myasthenia gravis.    TECHNIQUE:  Sagittal T1 weighted, T2 weighted and STIR images of the   cervical spine as well as axial T2 weighted images were obtained.      COMPARISON EXAMINATION: None.    FINDINGS:  VERTEBRAL BODIES AND DISCS: Normal in height. Foci of T1/T2   hyperintensity are seen within the T1 and T4 vertebral bodies likely   representing hemangiomas. The T4 lesion is trace peripheral T1   hypointensity likely representing an atypical hemangioma. Diffuse disc   desiccation and multilevel disc space narrowing is seen.    ALIGNMENT:  No subluxations.    C2-C3 LEVEL:  No significant disc bulge or focal disc herniation.    C3-C4 LEVEL:   No significant disc bulge or focal disc herniation.    C4-C5 LEVEL:  No significant disc bulge or focal disc herniation.    C5-C6 LEVEL:  No significant disc bulge or focal disc herniation.    C6-C7 LEVEL:  No significant disc bulge or focal disc herniation.    C7-T1 LEVEL:   No significant disc bulge or focal disc herniation.    SPINAL CORD: Normal.    SPINAL CANAL:  No other intradural or extradural defects are seen.    MISCELLANEOUS:  None.        IMPRESSION:    Mild multilevel spondylosis without significant spinal stenosis, focal   disc herniation or foraminal stenosis.    No spinal cord abnormality identified.                    KAMRON WANG M.D., ATTENDING RADIOLOGIST  This document has been electronically signed. Aug 28 2019  9:22AM                < end of copied text >      < from: Xray Chest 1 View- PORTABLE-Urgent (08.26.19 @ 16:35) >    EXAM:  XR CHEST PORTABLE URGENT 1V                            PROCEDURE DATE:  08/26/2019            INTERPRETATION:  CLINICAL INFORMATION: Shortness of breath.    EXAM: AP chest radiograph.    COMPARISON: Chest radiograph from 3/3/2019.    FINDINGS:  Patient is status post bilateral shoulder arthroplasty.  The lungs are clear.  The cardiomediastinal silhouette cannot be adequately assessed in this   projection.  The visualized osseous and soft tissue structures demonstrate no acute   pathology.    IMPRESSION:   Clear lungs.                CHRISTINA HAINES M.D., RADIOLOGY RESIDENT  This document has been electronically signed.  LEROY KAM M.D., ATTENDING RADIOLOGIST  This document has been electronically signed. Aug 27 2019 10:01AM                < end of copied text >  < from: Transthoracic Echocardiogram (08.27.19 @ 06:38) >    Patient name: HÉCTOR LYONS  YOB: 1940   Age: 78 (F)   MR#: [id number]  Study Date: 8/27/2019  Location: [address] [place], [place] [id number]: Wanda Montes RDCS  Study quality: Technically fair  Referring Physician: Luc Garcia MD  Blood Pressure: 111/69 mmHg  Height: 147 cm  Weight: 64 kg  BSA: 1.6 m2  ------------------------------------------------------------------------  PROCEDURE: Transthoracic echocardiogram with 2-D, M-Mode  and complete spectral and color flow Doppler.  INDICATION: Dyspnea, unspecified (R06.00)  ------------------------------------------------------------------------  Dimensions:    Normal Values:  LA:     3.7    2.0 - 4.0 cm  Ao:     3.0    2.0 - 3.8 cm  SEPTUM: 1.0    0.6 - 1.2 cm  PWT:    0.9    0.6 - 1.1 cm  LVIDd:  4.1    3.0 - 5.6 cm  LVIDs:  2.6    1.8 - 4.0 cm  Derived variables:  LVMI: 78 g/m2  RWT: 0.43  Fractional short: 37 %  Doppler Peak Velocity (m/sec): AoV=2.2  ------------------------------------------------------------------------  Observations:  Mitral Valve: Calcified mitral valve leaflets with normal  opening. Mild-moderate mitral regurgitation.  Aortic Valve/Aorta: Calcified trileaflet aortic valve with  decreased opening. Peak transaortic valve gradient equals  19 mm Hg,mean transaortic valve gradient equals 10 mm Hg,  estimated aortic valve area equals 1.7 sqcm (by continuity  equation), aortic valve velocity time integral equals 53  cm, consistent with mild aortic stenosis.  Aortic Root: 3 cm.  LVOT diameter: 2.1 cm.  Left Atrium: Normal left atrium.  LA volume index = 27  cc/m2.  Left Ventricle: Normal left ventricular systolic function.  No segmental wall motion abnormalities.  Right Heart: Normal right atrium. The right ventricle is  not well visualized; grossly normal right ventricular  systolic function. Tricuspid valve not well visualized,  probably normal. Pulmonic valve not well visualized.  Pericardium/Pleura: Normal pericardium with no pericardial  effusion.  Hemodynamic: Estimated right atrial pressure is 8 mm Hg.  ------------------------------------------------------------------------  Conclusions:  1. Calcified mitral valve leaflets with normal opening.  Mild-moderate mitral regurgitation.  2. Calcified trileaflet aortic valve with decreased  opening. Peak transaortic valve gradient equals 19 mm Hg,  mean transaortic valve gradient equals 10 mm Hg, estimated  aortic valve area equals 1.7 sqcm (by continuity equation),  aortic valve velocity time integral equals 53 cm,  consistent withmild aortic stenosis.  3. Normal left ventricular systolic function. No segmental  wall motion abnormalities.  4. The right ventricle is not well visualized; grossly  normal right ventricular systolic function.  *** No previous Echo exam.  ------------------------------------------------------------------------  Confirmed on  8/27/2019 - 17:29:35 by Noemi Rubalcava M.D.  ------------------------------------------------------------------------    < end of copied text >    OTHER: 	  	  LABS:	 	    CARDIAC MARKERS:          Troponin T, High Sensitivity (08.26.19 @ 19:50)    Troponin T, High Sensitivity Result: 51: Rapid upward or downward changes in high-sensitivity troponin levels  suggest acute myocardial injury. Renal impairment may cause sustained  troponin elevations.  Normal: <6 - 14 ng/L  Indeterminate: 15-51 ng/L  Elevated: > 51 ng/L  See http://labs/test/TROPTHS on the [employer] for more  information ng/L                            11.6   4.7   )-----------( 114      ( 29 Aug 2019 09:17 )             34.9     08-28    142  |  108  |  30<H>  ----------------------------<  90  3.9   |  23  |  0.95    Ca    9.1      28 Aug 2019 05:48    TPro  6.4  /  Alb  3.3  /  TBili  0.2  /  DBili  x   /  AST  15  /  ALT  17  /  AlkPhos  62  08-28    proBNP:   Lipid Profile:   HgA1c:   TSH:

## 2019-08-30 ENCOUNTER — TRANSCRIPTION ENCOUNTER (OUTPATIENT)
Age: 79
End: 2019-08-30

## 2019-08-30 ENCOUNTER — APPOINTMENT (OUTPATIENT)
Dept: PULMONOLOGY | Facility: CLINIC | Age: 79
End: 2019-08-30

## 2019-08-30 LAB
ANION GAP SERPL CALC-SCNC: 10 MMOL/L — SIGNIFICANT CHANGE UP (ref 5–17)
BUN SERPL-MCNC: 27 MG/DL — HIGH (ref 7–23)
CALCIUM SERPL-MCNC: 8.9 MG/DL — SIGNIFICANT CHANGE UP (ref 8.4–10.5)
CHLORIDE SERPL-SCNC: 104 MMOL/L — SIGNIFICANT CHANGE UP (ref 96–108)
CO2 SERPL-SCNC: 24 MMOL/L — SIGNIFICANT CHANGE UP (ref 22–31)
CREAT SERPL-MCNC: 0.96 MG/DL — SIGNIFICANT CHANGE UP (ref 0.5–1.3)
GLUCOSE SERPL-MCNC: 83 MG/DL — SIGNIFICANT CHANGE UP (ref 70–99)
HCT VFR BLD CALC: 31.2 % — LOW (ref 34.5–45)
HGB BLD-MCNC: 10.8 G/DL — LOW (ref 11.5–15.5)
MAGNESIUM SERPL-MCNC: 1.8 MG/DL — SIGNIFICANT CHANGE UP (ref 1.6–2.6)
MCHC RBC-ENTMCNC: 32.2 PG — SIGNIFICANT CHANGE UP (ref 27–34)
MCHC RBC-ENTMCNC: 34.6 GM/DL — SIGNIFICANT CHANGE UP (ref 32–36)
MCV RBC AUTO: 93.1 FL — SIGNIFICANT CHANGE UP (ref 80–100)
PHOSPHATE SERPL-MCNC: 3.6 MG/DL — SIGNIFICANT CHANGE UP (ref 2.5–4.5)
PLATELET # BLD AUTO: 103 K/UL — LOW (ref 150–400)
POTASSIUM SERPL-MCNC: 3.5 MMOL/L — SIGNIFICANT CHANGE UP (ref 3.5–5.3)
POTASSIUM SERPL-SCNC: 3.5 MMOL/L — SIGNIFICANT CHANGE UP (ref 3.5–5.3)
RBC # BLD: 3.35 M/UL — LOW (ref 3.8–5.2)
RBC # FLD: 12.8 % — SIGNIFICANT CHANGE UP (ref 10.3–14.5)
SODIUM SERPL-SCNC: 138 MMOL/L — SIGNIFICANT CHANGE UP (ref 135–145)
WBC # BLD: 3.4 K/UL — LOW (ref 3.8–10.5)
WBC # FLD AUTO: 3.4 K/UL — LOW (ref 3.8–10.5)

## 2019-08-30 PROCEDURE — 99232 SBSQ HOSP IP/OBS MODERATE 35: CPT

## 2019-08-30 RX ADMIN — Medication 10 MILLIGRAM(S): at 06:16

## 2019-08-30 RX ADMIN — Medication 81 MILLIGRAM(S): at 12:12

## 2019-08-30 RX ADMIN — ENOXAPARIN SODIUM 40 MILLIGRAM(S): 100 INJECTION SUBCUTANEOUS at 12:12

## 2019-08-30 RX ADMIN — LOSARTAN POTASSIUM 50 MILLIGRAM(S): 100 TABLET, FILM COATED ORAL at 06:16

## 2019-08-30 RX ADMIN — TRAMADOL HYDROCHLORIDE 50 MILLIGRAM(S): 50 TABLET ORAL at 21:09

## 2019-08-30 RX ADMIN — ROSUVASTATIN CALCIUM 5 MILLIGRAM(S): 5 TABLET ORAL at 21:10

## 2019-08-30 RX ADMIN — PYRIDOSTIGMINE BROMIDE 90 MILLIGRAM(S): 60 SOLUTION ORAL at 14:08

## 2019-08-30 RX ADMIN — IMMUNE GLOBULIN (HUMAN) 43.33 GRAM(S): 10 INJECTION INTRAVENOUS; SUBCUTANEOUS at 14:07

## 2019-08-30 RX ADMIN — PANTOPRAZOLE SODIUM 40 MILLIGRAM(S): 20 TABLET, DELAYED RELEASE ORAL at 06:16

## 2019-08-30 RX ADMIN — Medication 12.5 MILLIGRAM(S): at 06:16

## 2019-08-30 RX ADMIN — Medication 25 MILLIGRAM(S): at 13:38

## 2019-08-30 RX ADMIN — PYRIDOSTIGMINE BROMIDE 120 MILLIGRAM(S): 60 SOLUTION ORAL at 06:16

## 2019-08-30 RX ADMIN — Medication 650 MILLIGRAM(S): at 15:28

## 2019-08-30 RX ADMIN — RANITIDINE HYDROCHLORIDE 150 MILLIGRAM(S): 150 TABLET, FILM COATED ORAL at 21:10

## 2019-08-30 RX ADMIN — Medication 650 MILLIGRAM(S): at 13:38

## 2019-08-30 RX ADMIN — PYRIDOSTIGMINE BROMIDE 180 MILLIGRAM(S): 60 SOLUTION ORAL at 17:53

## 2019-08-30 RX ADMIN — TRAMADOL HYDROCHLORIDE 50 MILLIGRAM(S): 50 TABLET ORAL at 22:10

## 2019-08-30 RX ADMIN — Medication 50 MICROGRAM(S): at 06:16

## 2019-08-30 RX ADMIN — Medication 2000 UNIT(S): at 12:12

## 2019-08-30 NOTE — DISCHARGE NOTE PROVIDER - CARE PROVIDERS DIRECT ADDRESSES
,[email or web address]

## 2019-08-30 NOTE — DISCHARGE NOTE PROVIDER - HOSPITAL COURSE
78 F PMH Myasthenia gravis on pyridostigmine, reactive airway disease, GERD, HTN, HLD, hypothyroid, spinal stenosis s/p laminectomy/fusion, p/w maxwell. Hx obtained from pt and from daughter at bedside (RN at [facility]).  Pt was dx with MG 10/2018 after presenting with sx of dyspnea, dysphagia, and ptosis. Was started on Mestinon and did pretty well according to her daughter.  More recently, pt underwent spinal surgery 2/2019; was intubated for procedure and did ok as well. However, over last few months, pt has been complaining of progressive sob; first started with MAXWELL and was ok at rest, but over last few weeks has intermittent sob at rest as well.  No sig LE edema or orthopnea. No cp. No f/c, URI sx/cough. Pt does note she ran out of her inhaler but feels her sob is more fatigue  related and not necessarily reactive airway in nature, and denies excessive wheezing.  In addition to these c/o dyspnea, pt also endorses progressive/intermittent b/l hand/arm numbness/tingling, UE weakness at times, dysphagia at times, which her o/p neurologist thinks is related to her MG.  Pt had a "cardiac workup" in preparation for her spinal surgery, with ekg/TTE/NST in December 2018 nondiagnostic except mild AS.  Pt denies visual changes, dizziness, syncope, falls.         Patient was admitted to medicine service for cardiac evaluation. She was seen by neurology and it was deemed that symptoms were most likely due to myasthenia gravis exacerbation. TTE showed mild AS, mild MR, normal LV function. Patient was  transferred to the neurology service and was started on IVIg. She was also started on oral prednisone.  acetylcholine receptor binding, blocking, modulating antibodies and MuSK antibody were sent. She improved on IVIg and completed a course for 5 days. She was also evaluated by cardiology, and there was concern for cardiac ischemia, so she underwent a cardiac catheterization.         She was stabilized and discharged home with 78 F PMH Myasthenia gravis on pyridostigmine, reactive airway disease, GERD, HTN, HLD, hypothyroid, spinal stenosis s/p laminectomy/fusion, p/w maxwell. Hx obtained from pt and from daughter at bedside (RN at [facility]).  Pt was dx with MG 10/2018 after presenting with sx of dyspnea, dysphagia, and ptosis. Was started on Mestinon and did pretty well according to her daughter.  More recently, pt underwent spinal surgery 2/2019; was intubated for procedure and did ok as well. However, over last few months, pt has been complaining of progressive sob; first started with MAXWELL and was ok at rest, but over last few weeks has intermittent sob at rest as well.  No sig LE edema or orthopnea. No cp. No f/c, URI sx/cough. Pt does note she ran out of her inhaler but feels her sob is more fatigue  related and not necessarily reactive airway in nature, and denies excessive wheezing.  In addition to these c/o dyspnea, pt also endorses progressive/intermittent b/l hand/arm numbness/tingling, UE weakness at times, dysphagia at times, which her o/p neurologist thinks is related to her MG.  Pt had a "cardiac workup" in preparation for her spinal surgery, with ekg/TTE/NST in December 2018 nondiagnostic except mild AS.  Pt denies visual changes, dizziness, syncope, falls.         Patient was admitted to medicine service for cardiac evaluation. She was seen by neurology and it was deemed that symptoms were most likely due to myasthenia gravis exacerbation. TTE showed mild AS, mild MR, normal LV function. Patient was  transferred to the neurology service and was started on IVIg. She was also started on oral prednisone.  acetylcholine receptor binding, blocking, modulating antibodies and MuSK antibody were sent. She improved on IVIg and completed a course for 5 days. She was also evaluated by cardiology, and there was concern for cardiac ischemia, so she underwent a cardiac catheterization.         She was stabilized and discharged home with mestinon and prednisone, and to follow up with Dr. Baron Fitzgerald. 78 F PMH Myasthenia gravis on pyridostigmine, reactive airway disease, GERD, HTN, HLD, hypothyroid, spinal stenosis s/p laminectomy/fusion, p/w reyes. Hx obtained from pt and from daughter at bedside (RN at [facility]).  Pt was dx with MG 10/2018 by a neurologist Dr. Reyes at [facility].  after presenting with sx of dyspnea, dysphagia, and ptosis. Was started on Mestinon and did pretty well according to her daughter.  More recently, pt underwent spinal surgery 2/2019; was intubated for procedure and did ok as well. However, over last few months, pt has been complaining of progressive sob; first started with REYES and was ok at rest, but over last few weeks has intermittent sob at rest as well.  No sig LE edema or orthopnea. No cp. No f/c, URI sx/cough. Pt does note she ran out of her inhaler but feels her sob is more fatigue  related and not necessarily reactive airway in nature, and denies excessive wheezing.  In addition to these c/o dyspnea, pt also endorses progressive/intermittent b/l hand/arm numbness/tingling, UE weakness at times, dysphagia at times, which her o/p neurologist thinks is related to her MG.      She was seen by neurology and it was deemed that symptoms were most likely due to myasthenia gravis exacerbation. Patient was  transferred to the neurology service and completed IVIg x 5 days - 8/27 - 8/31 and increased prednisone to 20 mg PO daily after completing IVIG per discussion with Dr. Baron Fitzgerald, neuromuscular specialist. Continued Mestinon 120 7AM, 90 1PM and 180 6PM. Voice strong but continues to sound hoarse when talks more, strength full, ambulated in room well. Follow up outpatient with [address] [place] [address] with Dr. Baron Fitzgerald. Phone: [phone].        Patient was admitted to medicine service for cardiac evaluation with concerns for cardiac ischemia due to elevated troponin and Bradycardia. TTE showed mild AS, mild MR, normal LV function. Underwent a cardiac catheterization-------------------        She was stabilized and discharged home with mestinon and prednisone, and to follow up with Dr. Baron Fitzgerald. 78 F PMH Myasthenia gravis on pyridostigmine, reactive airway disease, GERD, HTN, HLD, hypothyroid, spinal stenosis s/p laminectomy/fusion, p/w reyes. Hx obtained from pt and from daughter at bedside (RN at [facility]).  Pt was dx with MG 10/2018 by a neurologist Dr. Reyes at [facility].  after presenting with sx of dyspnea, dysphagia, and ptosis. Was started on Mestinon and did pretty well according to her daughter.  More recently, pt underwent spinal surgery 2/2019; was intubated for procedure and did ok as well. However, over last few months, pt has been complaining of progressive sob; first started with REYES and was ok at rest, but over last few weeks has intermittent sob at rest as well.  No sig LE edema or orthopnea. No cp. No f/c, URI sx/cough. Pt does note she ran out of her inhaler but feels her sob is more fatigue  related and not necessarily reactive airway in nature, and denies excessive wheezing.  In addition to these c/o dyspnea, pt also endorses progressive/intermittent b/l hand/arm numbness/tingling, UE weakness at times, dysphagia at times, which her o/p neurologist thinks is related to her MG.      She was seen by neurology and it was deemed that symptoms were most likely due to myasthenia gravis exacerbation. Patient was  transferred to the neurology service and completed IVIg x 5 days - 8/27 - 8/31 and increased prednisone to 20 mg PO daily after completing IVIG per discussion with Dr. Baron Fitzgerald, neuromuscular specialist. Continued Mestinon 120 7AM, 90 1PM and 180 6PM. Voice is now strong but continues to sound hoarse when talks more, strength full, ambulated in room well. Follow up outpatient with [address] [place] [address] with Dr. Baron Fitzgerald. Phone: [phone].        Patient was admitted to medicine service for cardiac evaluation with concerns for cardiac ischemia due to elevated troponin and Bradycardia and dyspnea on exertion. TTE showed mild AS, mild MR, normal LV function. Underwent a cardiac catheterization with clean coronaries. Patient with bradycardia and normal chronotropic competence No symptoms. No AV shanique blockers.Cleared by cardiologist to discharge home with close outpatient follow up on Monday 9/9 at 4:30PM.     She was stabilized and discharged home with mestinon and prednisone, and to follow up with Dr. Baron Fitzgerald. 78 F PMH Myasthenia gravis on pyridostigmine, reactive airway disease, GERD, HTN, HLD, hypothyroid, spinal stenosis s/p laminectomy/fusion, p/w reyes. Hx obtained from pt and from daughter at bedside (RN at [facility]).  Pt was dx with MG 10/2018 by a neurologist Dr. Reyes at [facility].  after presenting with sx of dyspnea, dysphagia, and ptosis. Was started on Mestinon and did pretty well according to her daughter.  More recently, pt underwent spinal surgery 2/2019; was intubated for procedure and did ok as well. However, over last few months, pt has been complaining of progressive sob; first started with REYES and was ok at rest, but over last few weeks has intermittent sob at rest as well.  No sig LE edema or orthopnea. No cp. No f/c, URI sx/cough. Pt does note she ran out of her inhaler but feels her sob is more fatigue  related and not necessarily reactive airway in nature, and denies excessive wheezing.  In addition to these c/o dyspnea, pt also endorses progressive/intermittent b/l hand/arm numbness/tingling, UE weakness at times, dysphagia at times, which her o/p neurologist thinks is related to her MG.      She was seen by neurology and it was deemed that symptoms were most likely due to myasthenia gravis exacerbation. Patient was  transferred to the neurology service and completed IVIg x 5 days - 8/27 - 8/31 and increased prednisone to 20 mg PO daily after completing IVIG per discussion with Dr. Baron Fitzgerald, neuromuscular specialist. Continued Mestinon 120 7AM, 90 1PM and 180 6PM. Voice is now strong but continues to sound hoarse when talks more, strength full, ambulated in room well. Follow up outpatient with [address] [place] [address] with Dr. Baron Fitzgerald. Phone: [phone].        Patient was admitted to medicine service for cardiac evaluation with concerns for cardiac ischemia due to elevated troponin and Bradycardia and dyspnea on exertion. TTE showed mild AS, mild MR, normal LV function. Underwent a cardiac catheterization with clean coronaries. Patient with bradycardia and normal chronotropic competence No symptoms. No AV shanique blockers.Cleared by cardiologist to discharge home with close outpatient follow up on Monday 9/9 at 4:30PM.

## 2019-08-30 NOTE — PROGRESS NOTE ADULT - ASSESSMENT
Impression: She most likely has a myasthenia gravis exacerbation. On IVIg, improving.     Plan:  -plan for 5 day course of IVIg (started on 8/27)  -c/w prednisone 10 mg PO daily, will increase prednisone to 20 mg on discharge per discussion with Dr. Baron Fitzgerald, neuromuscular specialist.   -NIF/VC q4  -c/w mestinon 120 AM, 90 PM, 180 timespan  -f/u AChR antibodies  -telemetry monitoring  -cardiology evaluation for bradycardia - no interventions required.  -Left heart cath per cardiology, will f/u recs  -d/c planning for 8/31 or 9/1

## 2019-08-30 NOTE — DISCHARGE NOTE PROVIDER - NSDCCPCAREPLAN_GEN_ALL_CORE_FT
PRINCIPAL DISCHARGE DIAGNOSIS  Diagnosis: Myasthenia gravis  Assessment and Plan of Treatment: We have given you IVIg, and your symptoms improved. Please follow up with Dr. Baron Fitzgerald, neurologist, upon discharge from the hospital. You will need to take the medications mestinon and prednisone as we have directed.      SECONDARY DISCHARGE DIAGNOSES  Diagnosis: Elevated troponin  Assessment and Plan of Treatment: You had seen a cardiologist, Dr. Cummins. He recommended a cardiac catheterization, which you had undergone.    Diagnosis: SOB (shortness of breath)  Assessment and Plan of Treatment: PRINCIPAL DISCHARGE DIAGNOSIS  Diagnosis: Myasthenia gravis  Assessment and Plan of Treatment: We have given you IVIg, and your symptoms improved. Please follow up with Dr. Baron Fitzgerald, neurologist, upon discharge from the hospital. You will need to take the medications mestinon and prednisone as we have directed.      SECONDARY DISCHARGE DIAGNOSES  Diagnosis: MAXWELL (dyspnea on exertion)  Assessment and Plan of Treatment: MAXWELL (dyspnea on exertion)    Diagnosis: Elevated troponin  Assessment and Plan of Treatment: You had seen a cardiologist, Dr. Cummins. He recommended a cardiac catheterization, which you had undergone.    Diagnosis: Essential hypertension  Assessment and Plan of Treatment: Essential hypertension PRINCIPAL DISCHARGE DIAGNOSIS  Diagnosis: Myasthenia gravis  Assessment and Plan of Treatment: We have given you IVIg, and your symptoms improved. Please follow up with Dr. Baron Fitzgerald, neurologist, upon discharge from the hospital. You will need to take the medications mestinon and prednisone as we have directed.  completed IVIg x 5 days - 8/27 - 8/31   Take Prednisone to 20 mg PO daily   Continue Mestinon 120 7AM, 90 1PM and 180 6PM.   Ambulated in hallway well. Follow up outpatient with [address] [place] [address] with Dr. Baron Fitzgerald. Phone: [phone].      SECONDARY DISCHARGE DIAGNOSES  Diagnosis: Sinus bradycardia  Assessment and Plan of Treatment: Echo showed mild AS, mild MR, normal LV function.   Underwent a cardiac catheterizationon 9/5 with clean coronaries.   Your heart rate is low No symptoms. No AV shanique blockers.  Follow up cardiologist - Dr. Cummins on Monday 9/9 at 4:30PM.       Diagnosis: MAXWELL (dyspnea on exertion)  Assessment and Plan of Treatment: Resolved    Diagnosis: Elevated troponin  Assessment and Plan of Treatment: You had seen a cardiologist, Dr. Cummins. He recommended a cardiac catheterization, which you had undergone which showed normal heart vessels    Diagnosis: Essential hypertension  Assessment and Plan of Treatment: Take medications for your blood pressure as recommended.  Eat a heart healthy diet that is low in saturated fats and salt, and includes whole grains, fruits, vegetables and lean protein   Exercise regularly (consult with your physician or cardiologist first); maintain a heart healthy weight.   If you smoke - quit (A resource to help you stop smoking is the [facility] – phone number 367-554-2996.). Continue to follow with your primary physician or cardiologist.   Seek medical help for dizziness, Lightheadedness, Blurry vision, Headache, Chest pain, Shortness of breath  Follow up with your medical doctor to establish long term blood pressure treatment goals. PRINCIPAL DISCHARGE DIAGNOSIS  Diagnosis: Myasthenia gravis  Assessment and Plan of Treatment: We have given you IVIg, and your symptoms improved. Please follow up with Dr. Baron Fitzgerald, neurologist, upon discharge from the hospital. You will need to take the medications mestinon and prednisone as we have directed.  completed IVIg x 5 days - 8/27 - 8/31   Take Prednisone to 20 mg PO daily   Continue Mestinon 120 7AM, 90 1PM and 180 6PM.   Ambulated in hallway well. Follow up outpatient with [address] [place] [address] with Dr. Baron Fitzgerald. Phone: [phone].      SECONDARY DISCHARGE DIAGNOSES  Diagnosis: Sinus bradycardia  Assessment and Plan of Treatment: Echo showed mild AS, mild MR, normal LV function.   Underwent a cardiac catheterizationon 9/5 with clean coronaries.   Your heart rate is low No symptoms. No AV shanique blockers.  Follow up cardiologist - Dr. Cummins on Monday 9/9 at 4:30PM.       Diagnosis: MAXWELL (dyspnea on exertion)  Assessment and Plan of Treatment: Resolved    Diagnosis: Elevated troponin  Assessment and Plan of Treatment: You had seen a cardiologist, Dr. Cummins. He recommended a cardiac catheterization, which you had undergone which showed normal heart vessels    Diagnosis: Essential hypertension  Assessment and Plan of Treatment: Take medications for your blood pressure as recommended.  Eat a heart healthy diet that is low in saturated fats and salt, and includes whole grains, fruits, vegetables and lean protein   Exercise regularly (consult with your physician or cardiologist first); maintain a heart healthy weight.   If you smoke - quit (A resource to help you stop smoking is the [facility] – phone number [phone].). Continue to follow with your primary physician or cardiologist.   Seek medical help for dizziness, Lightheadedness, Blurry vision, Headache, Chest pain, Shortness of breath  Follow up with your medical doctor to establish long term blood pressure treatment goals.

## 2019-08-30 NOTE — DISCHARGE NOTE PROVIDER - PROVIDER TOKENS
PROVIDER:[TOKEN:[[id number]]] PROVIDER:[TOKEN:[[id number]]],PROVIDER:[TOKEN:[[id number]],FOLLOWUP:[1-3 days]]

## 2019-08-30 NOTE — DISCHARGE NOTE PROVIDER - NSDCFUSCHEDAPPT_GEN_ALL_CORE_FT
HÉCTOR LYONS ; 09/10/2019 ; NPP Neuro [address] [facility]  HÉCTOR LYONS ; 11/15/2019 ; NPP Med [facility] [address] HÉCTOR LYONS ; 09/10/2019 ; NPP Neuro [address] [facility]  HÉCTOR LYONS ; 11/15/2019 ; NPP Med [facility] [address] HÉCTOR LYONS ; 09/10/2019 ; NPP Neuro [address] [facility]  HÉCTOR LYONS ; 11/15/2019 ; NPP Med GenInt [address] HÉCTOR LYONS ; 09/10/2019 ; NPP Neuro [address] [facility]  HÉCTOR LYONS ; 11/15/2019 ; NPP Med GenInt [address] HÉCTOR LYONS ; 09/10/2019 ; NPP Neuro [address] [facility]  HÉCTOR LYONS ; 11/15/2019 ; NPP Med GenInt [address] HÉCTOR LYONS ; 09/10/2019 ; NPP Neuro [address] [facility]  HÉCTOR LYONS ; 11/15/2019 ; NPP Med GenInt [address]

## 2019-08-30 NOTE — DISCHARGE NOTE PROVIDER - CARE PROVIDER_API CALL
Baron Fitzgerald)  Neurology  [address] [facility] [address]  [place] [address]  Phone: [phone]  Fax: [phone]  Follow Up Time: Baron Fitzgerald (MD)  Neurology  [address] [facility], [address]  [place] [address]  Phone: [phone]  Fax: [phone]  Follow Up Time:     Arley Cummins ()  Cardiology; Internal Medicine  [address] [facility], [address]  [place] [address]  Phone: [phone]  Fax: [phone]  Follow Up Time: 1-3 days

## 2019-08-31 LAB
ACHR BIND AB SER-ACNC: 33 NMOL/L — HIGH
ACHR BIND AB SER-ACNC: 39 NMOL/L — HIGH
ACHR BLOCK AB SER-ACNC: 75 — HIGH
ALBUMIN SERPL ELPH-MCNC: 3 G/DL — LOW (ref 3.3–5)
ALP SERPL-CCNC: 51 U/L — SIGNIFICANT CHANGE UP (ref 40–120)
ALT FLD-CCNC: 18 U/L — SIGNIFICANT CHANGE UP (ref 10–45)
ANION GAP SERPL CALC-SCNC: 12 MMOL/L — SIGNIFICANT CHANGE UP (ref 5–17)
AST SERPL-CCNC: 16 U/L — SIGNIFICANT CHANGE UP (ref 10–40)
BASOPHILS # BLD AUTO: 0.02 K/UL — SIGNIFICANT CHANGE UP (ref 0–0.2)
BASOPHILS NFR BLD AUTO: 0.6 % — SIGNIFICANT CHANGE UP (ref 0–2)
BILIRUB SERPL-MCNC: 0.3 MG/DL — SIGNIFICANT CHANGE UP (ref 0.2–1.2)
BUN SERPL-MCNC: 25 MG/DL — HIGH (ref 7–23)
CALCIUM SERPL-MCNC: 9 MG/DL — SIGNIFICANT CHANGE UP (ref 8.4–10.5)
CHLORIDE SERPL-SCNC: 105 MMOL/L — SIGNIFICANT CHANGE UP (ref 96–108)
CO2 SERPL-SCNC: 21 MMOL/L — LOW (ref 22–31)
CREAT SERPL-MCNC: 0.89 MG/DL — SIGNIFICANT CHANGE UP (ref 0.5–1.3)
EOSINOPHIL # BLD AUTO: 0.04 K/UL — SIGNIFICANT CHANGE UP (ref 0–0.5)
EOSINOPHIL NFR BLD AUTO: 1.3 % — SIGNIFICANT CHANGE UP (ref 0–6)
GLUCOSE SERPL-MCNC: 80 MG/DL — SIGNIFICANT CHANGE UP (ref 70–99)
HCT VFR BLD CALC: 31.8 % — LOW (ref 34.5–45)
HGB BLD-MCNC: 10.7 G/DL — LOW (ref 11.5–15.5)
IMM GRANULOCYTES NFR BLD AUTO: 0.6 % — SIGNIFICANT CHANGE UP (ref 0–1.5)
LYMPHOCYTES # BLD AUTO: 1.18 K/UL — SIGNIFICANT CHANGE UP (ref 1–3.3)
LYMPHOCYTES # BLD AUTO: 37.7 % — SIGNIFICANT CHANGE UP (ref 13–44)
MAGNESIUM SERPL-MCNC: 1.8 MG/DL — SIGNIFICANT CHANGE UP (ref 1.6–2.6)
MCHC RBC-ENTMCNC: 31.5 PG — SIGNIFICANT CHANGE UP (ref 27–34)
MCHC RBC-ENTMCNC: 33.6 GM/DL — SIGNIFICANT CHANGE UP (ref 32–36)
MCV RBC AUTO: 93.5 FL — SIGNIFICANT CHANGE UP (ref 80–100)
MONOCYTES # BLD AUTO: 0.39 K/UL — SIGNIFICANT CHANGE UP (ref 0–0.9)
MONOCYTES NFR BLD AUTO: 12.5 % — SIGNIFICANT CHANGE UP (ref 2–14)
NEUTROPHILS # BLD AUTO: 1.48 K/UL — LOW (ref 1.8–7.4)
NEUTROPHILS NFR BLD AUTO: 47.3 % — SIGNIFICANT CHANGE UP (ref 43–77)
PHOSPHATE SERPL-MCNC: 3.9 MG/DL — SIGNIFICANT CHANGE UP (ref 2.5–4.5)
PLATELET # BLD AUTO: 101 K/UL — LOW (ref 150–400)
POTASSIUM SERPL-MCNC: 3.4 MMOL/L — LOW (ref 3.5–5.3)
POTASSIUM SERPL-SCNC: 3.4 MMOL/L — LOW (ref 3.5–5.3)
PROT SERPL-MCNC: 7.2 G/DL — SIGNIFICANT CHANGE UP (ref 6–8.3)
RBC # BLD: 3.4 M/UL — LOW (ref 3.8–5.2)
RBC # FLD: 12.8 % — SIGNIFICANT CHANGE UP (ref 10.3–14.5)
SODIUM SERPL-SCNC: 138 MMOL/L — SIGNIFICANT CHANGE UP (ref 135–145)
WBC # BLD: 3.13 K/UL — LOW (ref 3.8–10.5)
WBC # FLD AUTO: 3.13 K/UL — LOW (ref 3.8–10.5)

## 2019-08-31 PROCEDURE — 99232 SBSQ HOSP IP/OBS MODERATE 35: CPT

## 2019-08-31 RX ADMIN — PANTOPRAZOLE SODIUM 40 MILLIGRAM(S): 20 TABLET, DELAYED RELEASE ORAL at 05:52

## 2019-08-31 RX ADMIN — PYRIDOSTIGMINE BROMIDE 180 MILLIGRAM(S): 60 SOLUTION ORAL at 17:25

## 2019-08-31 RX ADMIN — TRAMADOL HYDROCHLORIDE 50 MILLIGRAM(S): 50 TABLET ORAL at 21:49

## 2019-08-31 RX ADMIN — Medication 2000 UNIT(S): at 12:31

## 2019-08-31 RX ADMIN — LOSARTAN POTASSIUM 50 MILLIGRAM(S): 100 TABLET, FILM COATED ORAL at 05:52

## 2019-08-31 RX ADMIN — Medication 650 MILLIGRAM(S): at 12:30

## 2019-08-31 RX ADMIN — Medication 25 MILLIGRAM(S): at 12:31

## 2019-08-31 RX ADMIN — Medication 81 MILLIGRAM(S): at 12:31

## 2019-08-31 RX ADMIN — Medication 10 MILLIGRAM(S): at 05:52

## 2019-08-31 RX ADMIN — ROSUVASTATIN CALCIUM 5 MILLIGRAM(S): 5 TABLET ORAL at 21:48

## 2019-08-31 RX ADMIN — Medication 12.5 MILLIGRAM(S): at 05:51

## 2019-08-31 RX ADMIN — RANITIDINE HYDROCHLORIDE 150 MILLIGRAM(S): 150 TABLET, FILM COATED ORAL at 21:48

## 2019-08-31 RX ADMIN — TRAMADOL HYDROCHLORIDE 50 MILLIGRAM(S): 50 TABLET ORAL at 22:50

## 2019-08-31 RX ADMIN — PYRIDOSTIGMINE BROMIDE 90 MILLIGRAM(S): 60 SOLUTION ORAL at 12:30

## 2019-08-31 RX ADMIN — PYRIDOSTIGMINE BROMIDE 120 MILLIGRAM(S): 60 SOLUTION ORAL at 05:52

## 2019-08-31 RX ADMIN — ENOXAPARIN SODIUM 40 MILLIGRAM(S): 100 INJECTION SUBCUTANEOUS at 12:32

## 2019-08-31 RX ADMIN — IMMUNE GLOBULIN (HUMAN) 43.33 GRAM(S): 10 INJECTION INTRAVENOUS; SUBCUTANEOUS at 13:37

## 2019-08-31 RX ADMIN — Medication 50 MICROGRAM(S): at 05:51

## 2019-08-31 NOTE — PROGRESS NOTE ADULT - ASSESSMENT
Impression:  She most likely has a myasthenia gravis exacerbation. On IVIg, improving.     Plan:  -plan for 5 day course of IVIg (started on 8/27). Last day today. Neurologically stable for discharge s/p IVIg.   -c/w prednisone 10 mg PO daily, will increase prednisone to 20 mg on discharge per discussion with Dr. Baron Fitzgerald, neuromuscular specialist.   -NIF/VC q4  -c/w mestinon 120 AM, 90 PM, 180 timespan  -f/u AChR antibodies  -telemetry monitoring  -cardiology evaluation for bradycardia - no interventions required.  -Left heart cath per cardiology, will f/u recs. Pending Cath.   -d/c planning for today or 9/1 pending Cath.

## 2019-09-01 LAB
ACRM MODULATING ANTIBODY: 123 NMOL/L — HIGH
ACRM MODULATING ANTIBODY: 134 NMOL/L — HIGH
ALBUMIN SERPL ELPH-MCNC: 3.5 G/DL — SIGNIFICANT CHANGE UP (ref 3.3–5)
ALP SERPL-CCNC: 56 U/L — SIGNIFICANT CHANGE UP (ref 40–120)
ALT FLD-CCNC: 22 U/L — SIGNIFICANT CHANGE UP (ref 10–45)
ANION GAP SERPL CALC-SCNC: 12 MMOL/L — SIGNIFICANT CHANGE UP (ref 5–17)
AST SERPL-CCNC: 22 U/L — SIGNIFICANT CHANGE UP (ref 10–40)
BILIRUB SERPL-MCNC: 0.4 MG/DL — SIGNIFICANT CHANGE UP (ref 0.2–1.2)
BUN SERPL-MCNC: 22 MG/DL — SIGNIFICANT CHANGE UP (ref 7–23)
CALCIUM SERPL-MCNC: 9.5 MG/DL — SIGNIFICANT CHANGE UP (ref 8.4–10.5)
CHLORIDE SERPL-SCNC: 102 MMOL/L — SIGNIFICANT CHANGE UP (ref 96–108)
CO2 SERPL-SCNC: 22 MMOL/L — SIGNIFICANT CHANGE UP (ref 22–31)
CREAT SERPL-MCNC: 1.01 MG/DL — SIGNIFICANT CHANGE UP (ref 0.5–1.3)
GLUCOSE SERPL-MCNC: 118 MG/DL — HIGH (ref 70–99)
HCT VFR BLD CALC: 34.4 % — LOW (ref 34.5–45)
HGB BLD-MCNC: 11.6 G/DL — SIGNIFICANT CHANGE UP (ref 11.5–15.5)
MCHC RBC-ENTMCNC: 31.9 PG — SIGNIFICANT CHANGE UP (ref 27–34)
MCHC RBC-ENTMCNC: 33.6 GM/DL — SIGNIFICANT CHANGE UP (ref 32–36)
MCV RBC AUTO: 94.9 FL — SIGNIFICANT CHANGE UP (ref 80–100)
MUSK IGG SER IA-MCNC: 0 NMOL/L — SIGNIFICANT CHANGE UP (ref 0–0.02)
PLATELET # BLD AUTO: 116 K/UL — LOW (ref 150–400)
POTASSIUM SERPL-MCNC: 3.7 MMOL/L — SIGNIFICANT CHANGE UP (ref 3.5–5.3)
POTASSIUM SERPL-SCNC: 3.7 MMOL/L — SIGNIFICANT CHANGE UP (ref 3.5–5.3)
PROT SERPL-MCNC: 8.6 G/DL — HIGH (ref 6–8.3)
RBC # BLD: 3.62 M/UL — LOW (ref 3.8–5.2)
RBC # FLD: 11.8 % — SIGNIFICANT CHANGE UP (ref 10.3–14.5)
SODIUM SERPL-SCNC: 136 MMOL/L — SIGNIFICANT CHANGE UP (ref 135–145)
WBC # BLD: 4.4 K/UL — SIGNIFICANT CHANGE UP (ref 3.8–10.5)
WBC # FLD AUTO: 4.4 K/UL — SIGNIFICANT CHANGE UP (ref 3.8–10.5)

## 2019-09-01 PROCEDURE — 99231 SBSQ HOSP IP/OBS SF/LOW 25: CPT

## 2019-09-01 RX ADMIN — Medication 12.5 MILLIGRAM(S): at 05:50

## 2019-09-01 RX ADMIN — PYRIDOSTIGMINE BROMIDE 180 MILLIGRAM(S): 60 SOLUTION ORAL at 17:39

## 2019-09-01 RX ADMIN — PYRIDOSTIGMINE BROMIDE 120 MILLIGRAM(S): 60 SOLUTION ORAL at 05:50

## 2019-09-01 RX ADMIN — LOSARTAN POTASSIUM 50 MILLIGRAM(S): 100 TABLET, FILM COATED ORAL at 05:50

## 2019-09-01 RX ADMIN — PANTOPRAZOLE SODIUM 40 MILLIGRAM(S): 20 TABLET, DELAYED RELEASE ORAL at 05:50

## 2019-09-01 RX ADMIN — ROSUVASTATIN CALCIUM 5 MILLIGRAM(S): 5 TABLET ORAL at 21:40

## 2019-09-01 RX ADMIN — Medication 10 MILLIGRAM(S): at 05:52

## 2019-09-01 RX ADMIN — Medication 650 MILLIGRAM(S): at 11:56

## 2019-09-01 RX ADMIN — RANITIDINE HYDROCHLORIDE 150 MILLIGRAM(S): 150 TABLET, FILM COATED ORAL at 21:40

## 2019-09-01 RX ADMIN — TRAMADOL HYDROCHLORIDE 50 MILLIGRAM(S): 50 TABLET ORAL at 22:20

## 2019-09-01 RX ADMIN — Medication 50 MICROGRAM(S): at 05:50

## 2019-09-01 RX ADMIN — ENOXAPARIN SODIUM 40 MILLIGRAM(S): 100 INJECTION SUBCUTANEOUS at 11:57

## 2019-09-01 RX ADMIN — Medication 81 MILLIGRAM(S): at 11:56

## 2019-09-01 RX ADMIN — Medication 2000 UNIT(S): at 11:56

## 2019-09-01 RX ADMIN — TRAMADOL HYDROCHLORIDE 50 MILLIGRAM(S): 50 TABLET ORAL at 22:50

## 2019-09-01 RX ADMIN — PYRIDOSTIGMINE BROMIDE 90 MILLIGRAM(S): 60 SOLUTION ORAL at 14:11

## 2019-09-01 NOTE — PROGRESS NOTE ADULT - ATTENDING COMMENTS
Acetylcholine Binding Antibody (08.27.19 @ 21:32)    Acetylcholine Binding Antibody: 39.00: Reference Ranges for Acetylcholine Receptor   Binding Antibody:   Negative: < or =0.30 nmol/L  Equivocal:  0.31-0.49 nmol/L   Positive: > or =0.50 nmol/L   Test(s) performed at:      [facility]      Fidelina Yen M.D., Ph.D., SHENA,       [address] [place] [address]      CLIA #[id number] nmol/L  Acetylcholine Blocking AB (08.27.19 @ 17:32)    Acetylcholine Blocking AB: 75: Test(s) performed at:      [facility]      Fidelina Yen M.D., Ph.D., SHENA,       [address] [place], [place] [address]      CLIA #[id number]    today d5/5 ivig  plan was for discharge but she now said this morning daughter may not be able to pick her up  need to confirm with cardiology if further workup should be inpt vs outpatient, if inpatient should consider transfer back to medicine
this AM she was s/p 1 dose IVIG and had been started on prednisone 10mg  she says her hands feel stronger   denies further chewing fatigue    on exam voice seemed a little louder, still strained hoarseness that worsened during conversation  palate daxa briskly  no ptosis or diplopia or eom abnormality  delts strong  legs strong    tte was unremarkable    txfr to our service today, appreciate medicine team    will cont with 5d IVIG course, today D2/5  MG labs pending
she continues to feel improved  D3/5 IVIG  on exam she had a stronger voice than yesterday, but it still got more strained with longer she spoke  eomi, no ptosis  delts strong  palate raise good    HR now 57 and had diarrhea this morning. mestinon could cause this but shes on same dose shes been on, so this may all be unrelated. will keep same dose for now.  will ask cardiology for reassessment of bradycardia  cont to monitor GI fxn
voice strong but continues to sound hoarse when talks more  strength full  she ambulated in room well  s/p IVIG, inc prednisone to 20mg and keep at that dose   appreciate cardiology, txfred today since now awaiting inpatient cardiac w/u with [facility]  please reconsult with any questions
Beeper: [phone]    Discussed with neuro Dr Torres-if TTE unremarkable and more consistent with MG crisis, will likely transfer to neuro service tomorrow

## 2019-09-01 NOTE — PROGRESS NOTE ADULT - ASSESSMENT
Impression:  She most likely has a myasthenia gravis exacerbation. S/p IVIg. AChR antibodies positive.    Plan:  -plan for 5 day course of IVIg (started on 8/27). Last day today. Neurologically stable for discharge s/p IVIg.   -c/w prednisone 10 mg PO daily, will increase prednisone to 20 mg on discharge per discussion with Dr. Baron Fitzgerald, neuromuscular specialist.   -c/w mestinon 120 AM, 90 PM, 180 timespan  -telemetry monitoring  -cardiology evaluation for bradycardia - no interventions required.  -Left heart cath per cardiology, plan for transfer to the medicine service under Dr. Arley Cummins.    No further neurological workup indicated at this time. Please call with questions.   Patient can follow up outpatient with [address] [place] [address] with Dr. Baron Fitzgerald. Phone: [phone]. Impression:  She most likely has a myasthenia gravis exacerbation. S/p IVIg. AChR antibodies positive.    Plan:  -plan for 5 day course of IVIg (started on 8/27). Last day 8/31. Neurologically stable for discharge s/p IVIg.   -inc prednisone to 20 mg PO daily after completing ivig per discussion with Dr. Baron Fitzgerald, neuromuscular specialist.   -c/w mestinon 120 AM, 90 PM, 180 timespan  -telemetry monitoring  -cardiology evaluation for bradycardia - r/o RCA ischemia  -Left heart cath per cardiology, plan for transfer to the medicine service under Dr. Arley Cummins.    No further neurological workup indicated at this time. Please call with questions.   Patient can follow up outpatient with [address] [place] [address] with Dr. Baron Fitzgerald. Phone: [phone].

## 2019-09-02 RX ORDER — TRAMADOL HYDROCHLORIDE 50 MG/1
50 TABLET ORAL DAILY
Refills: 0 | Status: DISCONTINUED | OUTPATIENT
Start: 2019-09-02 | End: 2019-09-05

## 2019-09-02 RX ADMIN — Medication 12.5 MILLIGRAM(S): at 05:25

## 2019-09-02 RX ADMIN — Medication 20 MILLIGRAM(S): at 05:25

## 2019-09-02 RX ADMIN — PYRIDOSTIGMINE BROMIDE 90 MILLIGRAM(S): 60 SOLUTION ORAL at 12:57

## 2019-09-02 RX ADMIN — ROSUVASTATIN CALCIUM 5 MILLIGRAM(S): 5 TABLET ORAL at 21:14

## 2019-09-02 RX ADMIN — LOSARTAN POTASSIUM 50 MILLIGRAM(S): 100 TABLET, FILM COATED ORAL at 05:25

## 2019-09-02 RX ADMIN — TRAMADOL HYDROCHLORIDE 50 MILLIGRAM(S): 50 TABLET ORAL at 21:16

## 2019-09-02 RX ADMIN — TRAMADOL HYDROCHLORIDE 50 MILLIGRAM(S): 50 TABLET ORAL at 21:46

## 2019-09-02 RX ADMIN — Medication 81 MILLIGRAM(S): at 11:02

## 2019-09-02 RX ADMIN — Medication 50 MICROGRAM(S): at 05:25

## 2019-09-02 RX ADMIN — RANITIDINE HYDROCHLORIDE 150 MILLIGRAM(S): 150 TABLET, FILM COATED ORAL at 21:14

## 2019-09-02 RX ADMIN — ENOXAPARIN SODIUM 40 MILLIGRAM(S): 100 INJECTION SUBCUTANEOUS at 11:02

## 2019-09-02 RX ADMIN — PANTOPRAZOLE SODIUM 40 MILLIGRAM(S): 20 TABLET, DELAYED RELEASE ORAL at 05:25

## 2019-09-02 RX ADMIN — PYRIDOSTIGMINE BROMIDE 180 MILLIGRAM(S): 60 SOLUTION ORAL at 17:23

## 2019-09-02 RX ADMIN — Medication 2000 UNIT(S): at 11:02

## 2019-09-02 RX ADMIN — PYRIDOSTIGMINE BROMIDE 120 MILLIGRAM(S): 60 SOLUTION ORAL at 05:26

## 2019-09-02 NOTE — PROVIDER CONTACT NOTE (OTHER) - RECOMMENDATIONS
Observe patient for worsening Nose bleeds and proceed accordingly. Observe patient for worsening Nose bleeds and proceed accordingly. APPLY Cold compress

## 2019-09-02 NOTE — CHART NOTE - NSCHARTNOTEFT_GEN_A_CORE
Patient: Alanna Mahan  MRN#: [id number]    CC: Nose bleed    RN notified provider that patient was having a nosebleed.  When the patient was seen at bedside, the nosebleed had already spontaneously resolved.     Ravin Louis PA-C  Department of Medicine Patient: Alanna Mahan  MRN#: [id number]    CC: Nose bleed    78 y.o female admitted for myasthenia flare currently on prednisone, notified RN that she was having 1 episode of epistaxis with blood seen on a wash cloth.  Patient was seen at bedside with minimal amount of blood on the cloth with the epistaxis being spontaneously resolved.  Patient is asymptomatic and denies headaches, fevers, dizziness, blurred vision, chest pain, shortness of breath or palpitations.      Vital Signs Last 24 Hrs  T(C): 36.4 (02 Sep 2019 14:45), Max: 36.6 (01 Sep 2019 20:12)  T(F): 97.6 (02 Sep 2019 14:45), Max: 97.8 (01 Sep 2019 20:12)  HR: 45 (02 Sep 2019 14:45) (42 - 51)  BP: 117/63 (02 Sep 2019 14:45) (107/55 - 117/69)  BP(mean): --  RR: 16 (02 Sep 2019 14:45) (16 - 18)  SpO2: 98% (02 Sep 2019 14:45) (96% - 98%)    Physical Exam:        Ravin Louis PA-C  Department of Medicine Patient: Alanna Mahan  MRN#: [id number]    CC: Nose bleed    78 y.o female admitted for myasthenia flare currently on prednisone, notified RN that she was having 1 episode of epistaxis with blood seen on a wash cloth.  Patient was seen at bedside with minimal amount of blood on the cloth with the epistaxis being spontaneously resolved.  Patient is asymptomatic and denies headaches, fevers, dizziness, blurred vision, chest pain, shortness of breath or palpitations.      Vital Signs Last 24 Hrs  T(C): 36.4 (02 Sep 2019 14:45), Max: 36.6 (01 Sep 2019 20:12)  T(F): 97.6 (02 Sep 2019 14:45), Max: 97.8 (01 Sep 2019 20:12)  HR: 45 (02 Sep 2019 14:45) (42 - 51)  BP: 117/63 (02 Sep 2019 14:45) (107/55 - 117/69)  BP(mean): --  RR: 16 (02 Sep 2019 14:45) (16 - 18)  SpO2: 98% (02 Sep 2019 14:45) (96% - 98%)    Physical Exam:    General: Well appearing, in no acute distress  Head: Normocephalic, atraumatic  EENT: Pupils round, reactive to light.  Hearing grossly intact.  Mouth without lesions  Pulmonary:  Breath sound heard bilaterally without wheezes, rhonchi or rales  Cardiovascular: S1 and S2 heard on auscultation  Gastrointestinal: Abdomen soft, non-tender on palpation.  Bowel sounds present  MSK: 4/5 strength of BUE.  5/5 strength of LLE, 3/5 strength of RLE    Assessment/Plan:    RN notified to monitor patient for any other episodes of epistaxis and notify provider if bleeding continues after applying continuous pressure.        Ravin Louis PA-C  Department of Medicine Patient: Alanna Mahan  MRN#: [id number]    CC: Nose bleed    78 y.o female admitted for myasthenia flare currently on prednisone, notified RN that she was having single episode of small amt of epistaxis with blood seen on a wash cloth. Patient was seen at bedside with minimal amount of blood on the cloth with the epistaxis being spontaneously resolved at time of arrival   Patient is asymptomatic and denies headaches, fevers, dizziness, blurred vision, chest pain, shortness of breath or palpitations, BRBPR, no hx/o nose bleeds.  No cough, no Post nasal drip.     Vital Signs Last 24 Hrs  T(C): 36.4 (02 Sep 2019 14:45), Max: 36.6 (01 Sep 2019 20:12)  T(F): 97.6 (02 Sep 2019 14:45), Max: 97.8 (01 Sep 2019 20:12)  HR: 45 (02 Sep 2019 14:45) (42 - 51)  BP: 117/63 (02 Sep 2019 14:45) (107/55 - 117/69)  BP(mean): --  RR: 16 (02 Sep 2019 14:45) (16 - 18)  SpO2: 98% (02 Sep 2019 14:45) (96% - 98%)    Physical Exam:    General: Well appearing, in no acute distress  Head: Normocephalic, atraumatic  EENT: Pupils round, reactive to light.  Hearing grossly intact.  Mouth without lesions  Pulmonary:  Breath sound heard bilaterally without wheezes, rhonchi or rales  Cardiovascular: S1 and S2 heard on auscultation  Gastrointestinal: Abdomen soft, non-tender on palpation.  Bowel sounds present  MSK: 4/5 strength of BUE.  5/5 strength of LLE, 3/5 strength of RLE    Assessment/Plan:     Epistaxis:     RN notified to monitor patient for any other episodes of epistaxis and notify provider if bleeding continues after applying continuous pressure.  May consult ENT if any frequent reoccurrences.         Ravin Johnson PA-C   Department of Medicine

## 2019-09-03 LAB
ACHR BLOCK AB SER-ACNC: 67 — HIGH
ACHR MOD AB SER-ACNC: 95 — HIGH
ACHR MOD AB SER-ACNC: 97 — HIGH
ANION GAP SERPL CALC-SCNC: 9 MMOL/L — SIGNIFICANT CHANGE UP (ref 5–17)
BUN SERPL-MCNC: 25 MG/DL — HIGH (ref 7–23)
CALCIUM SERPL-MCNC: 8.9 MG/DL — SIGNIFICANT CHANGE UP (ref 8.4–10.5)
CHLORIDE SERPL-SCNC: 104 MMOL/L — SIGNIFICANT CHANGE UP (ref 96–108)
CO2 SERPL-SCNC: 22 MMOL/L — SIGNIFICANT CHANGE UP (ref 22–31)
CREAT SERPL-MCNC: 0.92 MG/DL — SIGNIFICANT CHANGE UP (ref 0.5–1.3)
GLUCOSE SERPL-MCNC: 86 MG/DL — SIGNIFICANT CHANGE UP (ref 70–99)
MAGNESIUM SERPL-MCNC: 1.8 MG/DL — SIGNIFICANT CHANGE UP (ref 1.6–2.6)
POTASSIUM SERPL-MCNC: 3.4 MMOL/L — LOW (ref 3.5–5.3)
POTASSIUM SERPL-SCNC: 3.4 MMOL/L — LOW (ref 3.5–5.3)
SODIUM SERPL-SCNC: 135 MMOL/L — SIGNIFICANT CHANGE UP (ref 135–145)

## 2019-09-03 RX ORDER — POTASSIUM CHLORIDE 20 MEQ
40 PACKET (EA) ORAL ONCE
Refills: 0 | Status: COMPLETED | OUTPATIENT
Start: 2019-09-03 | End: 2019-09-03

## 2019-09-03 RX ADMIN — TRAMADOL HYDROCHLORIDE 50 MILLIGRAM(S): 50 TABLET ORAL at 22:08

## 2019-09-03 RX ADMIN — PYRIDOSTIGMINE BROMIDE 180 MILLIGRAM(S): 60 SOLUTION ORAL at 17:36

## 2019-09-03 RX ADMIN — Medication 50 MICROGRAM(S): at 05:30

## 2019-09-03 RX ADMIN — ROSUVASTATIN CALCIUM 5 MILLIGRAM(S): 5 TABLET ORAL at 22:07

## 2019-09-03 RX ADMIN — ENOXAPARIN SODIUM 40 MILLIGRAM(S): 100 INJECTION SUBCUTANEOUS at 13:31

## 2019-09-03 RX ADMIN — Medication 81 MILLIGRAM(S): at 11:15

## 2019-09-03 RX ADMIN — LOSARTAN POTASSIUM 50 MILLIGRAM(S): 100 TABLET, FILM COATED ORAL at 05:30

## 2019-09-03 RX ADMIN — Medication 2000 UNIT(S): at 11:15

## 2019-09-03 RX ADMIN — Medication 50 MILLIGRAM(S): at 22:07

## 2019-09-03 RX ADMIN — Medication 20 MILLIGRAM(S): at 05:30

## 2019-09-03 RX ADMIN — Medication 12.5 MILLIGRAM(S): at 05:30

## 2019-09-03 RX ADMIN — Medication 40 MILLIEQUIVALENT(S): at 17:36

## 2019-09-03 RX ADMIN — PYRIDOSTIGMINE BROMIDE 90 MILLIGRAM(S): 60 SOLUTION ORAL at 12:48

## 2019-09-03 RX ADMIN — PYRIDOSTIGMINE BROMIDE 120 MILLIGRAM(S): 60 SOLUTION ORAL at 05:30

## 2019-09-03 RX ADMIN — RANITIDINE HYDROCHLORIDE 150 MILLIGRAM(S): 150 TABLET, FILM COATED ORAL at 22:06

## 2019-09-03 RX ADMIN — TRAMADOL HYDROCHLORIDE 50 MILLIGRAM(S): 50 TABLET ORAL at 23:37

## 2019-09-03 NOTE — DIETITIAN INITIAL EVALUATION ADULT. - PERTINENT LABORATORY DATA
Na 135 [135 - 145], K+ 3.4 [3.5 - 5.3], BUN 25 [7 - 23], Cr 0.92 [0.50 - 1.30], BG 86 [70 - 99], Phos --, Alk Phos --, AST --, ALT --, Mg 1.8 [1.6 - 2.6], Ca 8.9 [8.4 - 10.5], HbA1c --

## 2019-09-03 NOTE — DIETITIAN INITIAL EVALUATION ADULT. - OTHER INFO
Pt reports good appetite and 100% po intakes of meals, observed 100% intake of breakfast at visit, noted 100% documented per flowsheet. No GI distress, +BM today. Pt denies chewing/swallowing difficulties. Shellfish allergy noted. Pt lives at home with family, reports good appetite and po intakes PTA. Denies wt fluctuation. Per previous RD note, pt wt was 142 pounds (3/4/2019), current wt stable at 141 pounds.

## 2019-09-03 NOTE — DIETITIAN INITIAL EVALUATION ADULT. - PROBLEM SELECTOR PLAN 3
-neuro recs appreciated  -no IV steroids for now  -c/w mestinon: 120 mg 7 am, 90 mg 1 pm, 180 mg ER 6 pm.   -NIF/VC q4 hrs  -aspiration, fall precautions

## 2019-09-03 NOTE — DIETITIAN INITIAL EVALUATION ADULT. - ENERGY NEEDS
Height: 58 inches, Weight: 141 pounds  BMI: 29 kg/m2 IBW: 96 pounds (+/-10%), %IBW: 147%  Pertinent Info: Per chart, 79 y/o female with Myasthenia Gravis admitted with MAXWELL, MG exacerbation and now pending [facility] today. No edema, no pressure ulcers noted at this time.

## 2019-09-03 NOTE — DIETITIAN INITIAL EVALUATION ADULT. - PERTINENT MEDS FT
MEDICATIONS  (STANDING):  acetaminophen   Tablet .. 650 milliGRAM(s) Oral daily  ALBUTerol    90 MICROgram(s) HFA Inhaler 1 Puff(s) Inhalation every 4 hours  aspirin enteric coated 81 milliGRAM(s) Oral daily  cholecalciferol 2000 Unit(s) Oral daily  diphenhydrAMINE 25 milliGRAM(s) Oral daily  enoxaparin Injectable 40 milliGRAM(s) SubCutaneous daily  hydrochlorothiazide 12.5 milliGRAM(s) Oral daily  levothyroxine 50 MICROGram(s) Oral daily  losartan 50 milliGRAM(s) Oral daily  pantoprazole    Tablet 40 milliGRAM(s) Oral before breakfast  predniSONE   Tablet 20 milliGRAM(s) Oral daily  pyridostigmine 120 milliGRAM(s) Oral <User Schedule>  pyridostigmine 90 milliGRAM(s) Oral <User Schedule>  pyridostigmine  milliGRAM(s) Oral <User Schedule>  pyridostigmine  milliGRAM(s) Oral once  ranitidine 150 milliGRAM(s) Oral at bedtime  rosuvastatin 5 milliGRAM(s) Oral at bedtime  tiotropium 18 MICROgram(s) Capsule 1 Capsule(s) Inhalation daily    MEDICATIONS  (PRN):  ALBUTerol/ipratropium for Nebulization 3 milliLiter(s) Nebulizer every 6 hours PRN Shortness of Breath and/or Wheezing  traMADol 50 milliGRAM(s) Oral daily PRN moderate or severe pain

## 2019-09-03 NOTE — DIETITIAN INITIAL EVALUATION ADULT. - REASON INDICATOR FOR ASSESSMENT
Pt seen for initial LOS assessment. Source: EMR, patient, previous RD note Pt seen for initial LOS assessment. Source: EMR, patient, previous RD note.

## 2019-09-03 NOTE — DIETITIAN INITIAL EVALUATION ADULT. - ADD RECOMMEND
1) Continue to monitor weight, lab values, and diet tolerance. 2) Reviewed alternate menu options and menu ordering procedure. 3) RD availability made known to pt.

## 2019-09-04 LAB
ANION GAP SERPL CALC-SCNC: 10 MMOL/L — SIGNIFICANT CHANGE UP (ref 5–17)
BUN SERPL-MCNC: 30 MG/DL — HIGH (ref 7–23)
CALCIUM SERPL-MCNC: 9.7 MG/DL — SIGNIFICANT CHANGE UP (ref 8.4–10.5)
CHLORIDE SERPL-SCNC: 105 MMOL/L — SIGNIFICANT CHANGE UP (ref 96–108)
CO2 SERPL-SCNC: 22 MMOL/L — SIGNIFICANT CHANGE UP (ref 22–31)
CREAT SERPL-MCNC: 0.95 MG/DL — SIGNIFICANT CHANGE UP (ref 0.5–1.3)
GLUCOSE SERPL-MCNC: 123 MG/DL — HIGH (ref 70–99)
HCT VFR BLD CALC: 35 % — SIGNIFICANT CHANGE UP (ref 34.5–45)
HGB BLD-MCNC: 11.8 G/DL — SIGNIFICANT CHANGE UP (ref 11.5–15.5)
MCHC RBC-ENTMCNC: 30.8 PG — SIGNIFICANT CHANGE UP (ref 27–34)
MCHC RBC-ENTMCNC: 33.7 GM/DL — SIGNIFICANT CHANGE UP (ref 32–36)
MCV RBC AUTO: 91.4 FL — SIGNIFICANT CHANGE UP (ref 80–100)
PLATELET # BLD AUTO: 119 K/UL — LOW (ref 150–400)
POTASSIUM SERPL-MCNC: 4.5 MMOL/L — SIGNIFICANT CHANGE UP (ref 3.5–5.3)
POTASSIUM SERPL-SCNC: 4.5 MMOL/L — SIGNIFICANT CHANGE UP (ref 3.5–5.3)
RBC # BLD: 3.83 M/UL — SIGNIFICANT CHANGE UP (ref 3.8–5.2)
RBC # FLD: 12.7 % — SIGNIFICANT CHANGE UP (ref 10.3–14.5)
SODIUM SERPL-SCNC: 137 MMOL/L — SIGNIFICANT CHANGE UP (ref 135–145)
WBC # BLD: 5.15 K/UL — SIGNIFICANT CHANGE UP (ref 3.8–10.5)
WBC # FLD AUTO: 5.15 K/UL — SIGNIFICANT CHANGE UP (ref 3.8–10.5)

## 2019-09-04 PROCEDURE — 93454 CORONARY ARTERY ANGIO S&I: CPT | Mod: 26,GC

## 2019-09-04 PROCEDURE — 99152 MOD SED SAME PHYS/QHP 5/>YRS: CPT | Mod: GC

## 2019-09-04 RX ORDER — DIPHENHYDRAMINE HCL 50 MG
50 CAPSULE ORAL ONCE
Refills: 0 | Status: DISCONTINUED | OUTPATIENT
Start: 2019-09-04 | End: 2019-09-05

## 2019-09-04 RX ORDER — ATROPINE SULFATE 0.1 MG/ML
0.5 SYRINGE (ML) INJECTION ONCE
Refills: 0 | Status: DISCONTINUED | OUTPATIENT
Start: 2019-09-04 | End: 2019-09-05

## 2019-09-04 RX ADMIN — Medication 50 MICROGRAM(S): at 05:43

## 2019-09-04 RX ADMIN — Medication 50 MILLIGRAM(S): at 05:43

## 2019-09-04 RX ADMIN — Medication 81 MILLIGRAM(S): at 11:24

## 2019-09-04 RX ADMIN — PYRIDOSTIGMINE BROMIDE 90 MILLIGRAM(S): 60 SOLUTION ORAL at 14:35

## 2019-09-04 RX ADMIN — LOSARTAN POTASSIUM 50 MILLIGRAM(S): 100 TABLET, FILM COATED ORAL at 05:44

## 2019-09-04 RX ADMIN — PANTOPRAZOLE SODIUM 40 MILLIGRAM(S): 20 TABLET, DELAYED RELEASE ORAL at 05:44

## 2019-09-04 RX ADMIN — Medication 12.5 MILLIGRAM(S): at 05:44

## 2019-09-04 RX ADMIN — RANITIDINE HYDROCHLORIDE 150 MILLIGRAM(S): 150 TABLET, FILM COATED ORAL at 23:17

## 2019-09-04 RX ADMIN — Medication 2000 UNIT(S): at 11:24

## 2019-09-04 RX ADMIN — Medication 50 MILLIGRAM(S): at 11:24

## 2019-09-04 RX ADMIN — PYRIDOSTIGMINE BROMIDE 120 MILLIGRAM(S): 60 SOLUTION ORAL at 05:44

## 2019-09-04 RX ADMIN — ROSUVASTATIN CALCIUM 5 MILLIGRAM(S): 5 TABLET ORAL at 23:17

## 2019-09-04 NOTE — CHART NOTE - NSCHARTNOTEFT_GEN_A_CORE
Called  by  RN  that  pt  has  HR  of  36 and  37  respectively.  Pt  seen  on exam,  asymptomatic  at  this  time.   Pt  baseline  HR  is  usually  in   the  40s .   Discussed  findings  with  Dr Arley Cummins   . Pt  placed  on telemonitoring  at  this  time.   Pacer  pads  in  place  and  Atropine  at the  bedside.

## 2019-09-04 NOTE — CONSULT NOTE ADULT - SUBJECTIVE AND OBJECTIVE BOX
78 F PMH Myasthenia gravis on pyridostigmine, reactive airway disease, GERD, HTN, HLD, hypothyroid, spinal stenosis s/p laminectomy/fusion, p/w maxwell. Hx obtained from pt and from chart.  Pt was dx with MG 10/2018 after presenting with sx of dyspnea, dysphagia, and ptosis. Was started on Mestinon and did pretty well according to her daughter.  More recently, pt underwent spinal surgery 2/2019; was intubated for procedure and did ok as well. However, over last few months, pt has been complaining of progressive sob; first started with MAXWELL and was ok at rest, but over last few weeks has intermittent sob at rest as well.  No sig LE edema or orthopnea. No cp. No f/c, URI sx/cough. Pt does note she ran out of her inhaler but feels her sob is more fatigue  related and not necessarily reactive airway in nature, and denies excessive wheezing.  In addition to these c/o dyspnea, pt also endorses progressive/intermittent b/l hand/arm numbness/tingling, UE weakness at times, dysphagia at times, which her o/p neurologist thinks is related to her MG.  Pt had a "cardiac workup" in preparation for her spinal surgery, with ekg/TTE/NST in December 2018 nondiagnostic except mild AS.  Pt denies visual changes, dizziness, syncope, falls.   She most likely has a myasthenia gravis exacerbation. S/p IVIg. AChR antibodies positive.  pt is scheduled for [facility] today.     PAST MEDICAL & SURGICAL HISTORY:  Reactive airway disease that is not asthma: mild as per pulm note on [employer], patient and daughter deneis any inhaler use  Near syncope: 8/2018 negative ENT work up, negative cardiac work ups as per daughter  Aortic stenosis, mild: asper daughter  Diverticulitis large intestine: denies any recent exacerbations  Lumbar stenosis  Myasthenia gravis: dx 10/2018 symptoms: intermittent loss of voice/ weakness, negative ENT studies, now stable on Pyridostigmine  Carpal Tunnel Syndrome Right: release 4/10  Hammertoe Right foot  Kidney Calculi  Lumbar Radiculopathy  GERD (Gastroesophageal Reflux Disease)  Hyperlipidemia  Genital Ulcer, Female  Hypertension  H/O umbilical hernia repair  History of tonsillectomy  S/P foot surgery  H/O shoulder replacement: b/l 2015/2016  H/O laminectomy: cervical  1998  lumbar 1986,1998,2000  Prolapse, Uterovaginal: s/p sling  S/P Laparoscopic Fundoplication  S/P Hysterectomy Partial: 1974  Bilateral Cataracts: removed  S/P Appendectomy  S/P Cholecystectomy      Review of Systems:   CONSTITUTIONAL: No fever, weight loss, or fatigue  EYES: No eye pain, visual disturbances, or discharge  ENMT:  No difficulty hearing, tinnitus, vertigo; No sinus or throat pain  NECK: No pain or stiffness  BREASTS: No pain, masses, or nipple discharge  RESPIRATORY: No cough, wheezing, chills or hemoptysis; has shortness of breath  CARDIOVASCULAR: No chest pain, palpitations, dizziness, or leg swelling  GASTROINTESTINAL: No abdominal or epigastric pain. No nausea, vomiting, or hematemesis; No diarrhea or constipation. No melena or hematochezia.  GENITOURINARY: No dysuria, frequency, hematuria, or incontinence  NEUROLOGICAL: as above  SKIN: No itching, burning, rashes, or lesions   LYMPH NODES: No enlarged glands  ENDOCRINE: No heat or cold intolerance; No hair loss  MUSCULOSKELETAL: No joint pain or swelling; No muscle, back, or extremity pain  PSYCHIATRIC: No depression, anxiety, mood swings, or difficulty sleeping  HEME/LYMPH: No easy bruising, or bleeding gums  ALLERY AND IMMUNOLOGIC: No hives or eczema    Allergies    iodine (Rash)  shellfish (Rash)    Intolerances        Social History:     FAMILY HISTORY:  No pertinent family history in first degree relatives      MEDICATIONS  (STANDING):  acetaminophen   Tablet .. 650 milliGRAM(s) Oral daily  ALBUTerol    90 MICROgram(s) HFA Inhaler 1 Puff(s) Inhalation every 4 hours  aspirin enteric coated 81 milliGRAM(s) Oral daily  cholecalciferol 2000 Unit(s) Oral daily  diphenhydrAMINE 25 milliGRAM(s) Oral daily  enoxaparin Injectable 40 milliGRAM(s) SubCutaneous daily  hydrochlorothiazide 12.5 milliGRAM(s) Oral daily  levothyroxine 50 MICROGram(s) Oral daily  losartan 50 milliGRAM(s) Oral daily  pantoprazole    Tablet 40 milliGRAM(s) Oral before breakfast  predniSONE   Tablet 50 milliGRAM(s) Oral every 6 hours  predniSONE   Tablet 20 milliGRAM(s) Oral daily  pyridostigmine 120 milliGRAM(s) Oral <User Schedule>  pyridostigmine 90 milliGRAM(s) Oral <User Schedule>  pyridostigmine  milliGRAM(s) Oral <User Schedule>  pyridostigmine  milliGRAM(s) Oral once  ranitidine 150 milliGRAM(s) Oral at bedtime  rosuvastatin 5 milliGRAM(s) Oral at bedtime  tiotropium 18 MICROgram(s) Capsule 1 Capsule(s) Inhalation daily    MEDICATIONS  (PRN):  ALBUTerol/ipratropium for Nebulization 3 milliLiter(s) Nebulizer every 6 hours PRN Shortness of Breath and/or Wheezing  traMADol 50 milliGRAM(s) Oral daily PRN moderate or severe pain      Vital Signs Last 24 Hrs  T(C): 36.4 (04 Sep 2019 04:20), Max: 36.7 (03 Sep 2019 21:05)  T(F): 97.6 (04 Sep 2019 04:20), Max: 98.1 (03 Sep 2019 21:05)  HR: 50 (04 Sep 2019 04:20) (45 - 56)  BP: 137/74 (04 Sep 2019 04:20) (122/64 - 137/74)  BP(mean): --  RR: 18 (04 Sep 2019 04:20) (18 - 19)  SpO2: 97% (04 Sep 2019 04:20) (95% - 98%)  CAPILLARY BLOOD GLUCOSE        I&O's Summary    03 Sep 2019 07:01  -  04 Sep 2019 07:00  --------------------------------------------------------  IN: 240 mL / OUT: 0 mL / NET: 240 mL    04 Sep 2019 07:01  -  04 Sep 2019 11:04  --------------------------------------------------------  IN: 120 mL / OUT: 0 mL / NET: 120 mL        PHYSICAL EXAM:  GENERAL: NAD, well-developed  HEAD:  Atraumatic, Normocephalic  EYES: EOMI, PERRLA, conjunctiva and sclera clear  NECK: Supple, No JVD  CHEST/LUNG: Clear to auscultation bilaterally; No wheeze  HEART: Regular rate and rhythm; No murmurs, rubs, or gallops  ABDOMEN: Soft, Nontender, Nondistended; Bowel sounds present  EXTREMITIES:  2+ Peripheral Pulses, No clubbing, cyanosis, or edema  PSYCH: AAOx3  NEUROLOGY: non-focal  SKIN: No rashes or lesions    LABS:                        11.8   5.15  )-----------( 119      ( 04 Sep 2019 08:57 )             35.0     09-04    137  |  105  |  30<H>  ----------------------------<  123<H>  4.5   |  22  |  0.95    Ca    9.7      04 Sep 2019 07:02  Mg     1.8     09-03                RADIOLOGY & ADDITIONAL TESTS:    Imaging Personally Reviewed:    Consultant(s) Notes Reviewed:      Care Discussed with Consultants/Other Providers:

## 2019-09-04 NOTE — CONSULT NOTE ADULT - ASSESSMENT
77 yo female with MG admitted with shortness of breath     Myasthenia gravis  - prednisone as per neurology  - pyridostigmine    GERD  - protonix and zantac    Pain  - tramadol prn    HTN  - cozaar and HCTZ    hypothyroid  - c/w synthroid  - TSH is normal    HLD  - crestor

## 2019-09-05 ENCOUNTER — TRANSCRIPTION ENCOUNTER (OUTPATIENT)
Age: 79
End: 2019-09-05

## 2019-09-05 VITALS
TEMPERATURE: 98 F | RESPIRATION RATE: 18 BRPM | DIASTOLIC BLOOD PRESSURE: 58 MMHG | HEART RATE: 53 BPM | SYSTOLIC BLOOD PRESSURE: 103 MMHG | OXYGEN SATURATION: 96 %

## 2019-09-05 DIAGNOSIS — R00.1 BRADYCARDIA, UNSPECIFIED: ICD-10-CM

## 2019-09-05 LAB
ANION GAP SERPL CALC-SCNC: 10 MMOL/L — SIGNIFICANT CHANGE UP (ref 5–17)
BUN SERPL-MCNC: 32 MG/DL — HIGH (ref 7–23)
CALCIUM SERPL-MCNC: 9.5 MG/DL — SIGNIFICANT CHANGE UP (ref 8.4–10.5)
CHLORIDE SERPL-SCNC: 102 MMOL/L — SIGNIFICANT CHANGE UP (ref 96–108)
CO2 SERPL-SCNC: 25 MMOL/L — SIGNIFICANT CHANGE UP (ref 22–31)
CREAT SERPL-MCNC: 1.01 MG/DL — SIGNIFICANT CHANGE UP (ref 0.5–1.3)
GLUCOSE SERPL-MCNC: 93 MG/DL — SIGNIFICANT CHANGE UP (ref 70–99)
POTASSIUM SERPL-MCNC: 4.2 MMOL/L — SIGNIFICANT CHANGE UP (ref 3.5–5.3)
POTASSIUM SERPL-SCNC: 4.2 MMOL/L — SIGNIFICANT CHANGE UP (ref 3.5–5.3)
SODIUM SERPL-SCNC: 137 MMOL/L — SIGNIFICANT CHANGE UP (ref 135–145)

## 2019-09-05 RX ORDER — ALBUTEROL 90 UG/1
1 AEROSOL, METERED ORAL
Qty: 1 | Refills: 0
Start: 2019-09-05

## 2019-09-05 RX ORDER — PYRIDOSTIGMINE BROMIDE 60 MG/5ML
1 SOLUTION ORAL
Qty: 0 | Refills: 0 | DISCHARGE
Start: 2019-09-05 | End: 2019-10-04

## 2019-09-05 RX ORDER — PYRIDOSTIGMINE BROMIDE 60 MG/5ML
2 SOLUTION ORAL
Qty: 105 | Refills: 0
Start: 2019-09-05 | End: 2019-10-04

## 2019-09-05 RX ORDER — PYRIDOSTIGMINE BROMIDE 60 MG/5ML
2 SOLUTION ORAL
Qty: 0 | Refills: 0 | DISCHARGE
Start: 2019-09-05 | End: 2019-10-04

## 2019-09-05 RX ORDER — ASPIRIN/CALCIUM CARB/MAGNESIUM 324 MG
1 TABLET ORAL
Qty: 30 | Refills: 0
Start: 2019-09-05 | End: 2019-10-04

## 2019-09-05 RX ORDER — PYRIDOSTIGMINE BROMIDE 60 MG/5ML
2 SOLUTION ORAL
Qty: 0 | Refills: 0 | DISCHARGE

## 2019-09-05 RX ORDER — ASPIRIN/CALCIUM CARB/MAGNESIUM 324 MG
1 TABLET ORAL
Qty: 0 | Refills: 0 | DISCHARGE
Start: 2019-09-05

## 2019-09-05 RX ADMIN — Medication 2000 UNIT(S): at 12:27

## 2019-09-05 RX ADMIN — PYRIDOSTIGMINE BROMIDE 90 MILLIGRAM(S): 60 SOLUTION ORAL at 12:34

## 2019-09-05 RX ADMIN — PANTOPRAZOLE SODIUM 40 MILLIGRAM(S): 20 TABLET, DELAYED RELEASE ORAL at 06:21

## 2019-09-05 RX ADMIN — Medication 20 MILLIGRAM(S): at 06:21

## 2019-09-05 RX ADMIN — Medication 12.5 MILLIGRAM(S): at 06:21

## 2019-09-05 RX ADMIN — Medication 81 MILLIGRAM(S): at 12:26

## 2019-09-05 RX ADMIN — Medication 50 MICROGRAM(S): at 06:21

## 2019-09-05 RX ADMIN — LOSARTAN POTASSIUM 50 MILLIGRAM(S): 100 TABLET, FILM COATED ORAL at 06:21

## 2019-09-05 RX ADMIN — PYRIDOSTIGMINE BROMIDE 120 MILLIGRAM(S): 60 SOLUTION ORAL at 06:21

## 2019-09-05 RX ADMIN — ENOXAPARIN SODIUM 40 MILLIGRAM(S): 100 INJECTION SUBCUTANEOUS at 12:26

## 2019-09-05 NOTE — PROGRESS NOTE ADULT - PROBLEM SELECTOR PLAN 1
for [facility] on Tuesday.    ASA
-progressive reyes presumed to be multifactorial in nature. DDx includes but not limited to occult cardiac disease (valvulopathy vs. arrthymia vs. acs) +/- reactive airway disease +/- overall deconditioning/obesity +/- MG  -telemetry: sinus currently  -EKG on admission: sinus bob @ 58 with TWI in III (unchanged from 2/2019)  -check TTE  -defer stress test while on IVIG has it will cause vasoconstriction per stress lab  -prn duonebs  -NIF/VC q4 hrs  -MG crisis treatment per neuro with IVIG and steroids.  NIF in ER -50.   -CXR reviewed, clear lungs  -pro bnp normal, no LE edema or orthopnea to suggest fluid overload/pulm edema. afeb/no leukocytosis no cough no concern for pna or aspiration at this time
for [facility] today  ASA
for [facility] on Today.  premedicated for contrast allergy   ASA
for [facility] on Tuesday.    ASA
for [facility] on Today.    ASA
s/p LH with normal CORS  ASA

## 2019-09-05 NOTE — PROVIDER CONTACT NOTE (OTHER) - ACTION/TREATMENT ORDERED:
As per NP, place patient back on telemetry monitoring. Place R2 pads and Atropine at bedside. Will continue to monitor.

## 2019-09-05 NOTE — PROGRESS NOTE ADULT - PROVIDER SPECIALTY LIST ADULT
Cardiology
Hospitalist
Neurology
Cardiology
Cardiology
Internal Medicine
Cardiology

## 2019-09-05 NOTE — PROGRESS NOTE ADULT - REASON FOR ADMISSION
dyspnea

## 2019-09-05 NOTE — PROGRESS NOTE ADULT - PROBLEM SELECTOR PLAN 2
neurology follow up  On prednisone
-Initial trop 59 -->repeat 51. no chest pain, no ischemic changes on EKG.  -c/w aspirin 81  -c/w statin, arb. no bb given sinus bob on admission.  -ekg/trops prn chest pain  -monitor on tele  -check TTE
neurology follow up
neurology follow up  On prednisone

## 2019-09-05 NOTE — PROGRESS NOTE ADULT - PROBLEM SELECTOR PROBLEM 1
MAXWELL (dyspnea on exertion)

## 2019-09-05 NOTE — PROGRESS NOTE ADULT - PROBLEM SELECTOR PLAN 3
normal chronotropic competence. I personally walked with her in the halls. No symptoms.   Ok to dc home with close outpatient follow up on monday in my office. I called and discussed with her daughter Matthew   No AV shanique blockers
exam with progressive fatigue noted by neurology attending today, concerning for myasthenia gravis crisis:  IVIG x 5 days (8/27-8/31)  prednisone  check full MG Ab panel  cont checking nifs and vc  rec continued cardiac w/u as per primary team, especially TTE since want to be cautious about IVIG if there is any heart failure  rec MRI C spine without contrast   -c/w mestinon: 120 mg 7 am, 90 mg 1 pm, 180 mg ER 6 pm.   -NIF/VC q4 hrs  -aspiration, fall precautions

## 2019-09-05 NOTE — CHART NOTE - NSCHARTNOTEFT_GEN_A_CORE
Request from Dr. Cummins to facilitate patient discharge. Medication reconciliation reviewed, revised, and resolved with Dr. Cummins who had medically cleared patient for discharge with follow-up as advised. Please refer to discharge note for detailed hospital course. Patient is currently stable for discharge to home at this time.    Contact # [phone]

## 2019-09-05 NOTE — PROGRESS NOTE ADULT - ASSESSMENT
79 yo female with MG admitted with shortness of breath     dyspnea  - s/p LHC  - normal cors    Myasthenia gravis  - prednisone as per neurology  - pyridostigmine    GERD  - protonix and zantac    Pain  - tramadol prn    HTN  - cozaar and HCTZ    hypothyroid  - c/w synthroid  - TSH is normal    HLD  - crestor

## 2019-09-05 NOTE — DISCHARGE NOTE NURSING/CASE MANAGEMENT/SOCIAL WORK - PATIENT PORTAL LINK FT
You can access the FollowMyHealth Patient Portal offered by [facility] by registering at the following website: [email or web address]. By joining [facility]’s FollowMyHealth portal, you will also be able to view your health information using other applications (apps) compatible with our system.

## 2019-09-05 NOTE — PROGRESS NOTE ADULT - SUBJECTIVE AND OBJECTIVE BOX
Patient is a 78y old  Female who presents with a chief complaint of dyspnea (05 Sep 2019 09:46)      SUBJECTIVE / OVERNIGHT EVENTS:  No chest pain. No shortness of breath. No complaints. No events overnight. s/p cardiac cath    MEDICATIONS  (STANDING):  ALBUTerol    90 MICROgram(s) HFA Inhaler 1 Puff(s) Inhalation every 4 hours  aspirin enteric coated 81 milliGRAM(s) Oral daily  atropine Injectable 0.5 milliGRAM(s) IV Push once  cholecalciferol 2000 Unit(s) Oral daily  diphenhydrAMINE   Injectable 50 milliGRAM(s) IV Push once  enoxaparin Injectable 40 milliGRAM(s) SubCutaneous daily  hydrochlorothiazide 12.5 milliGRAM(s) Oral daily  levothyroxine 50 MICROGram(s) Oral daily  losartan 50 milliGRAM(s) Oral daily  pantoprazole    Tablet 40 milliGRAM(s) Oral before breakfast  predniSONE   Tablet 20 milliGRAM(s) Oral daily  pyridostigmine 120 milliGRAM(s) Oral <User Schedule>  pyridostigmine 90 milliGRAM(s) Oral <User Schedule>  pyridostigmine  milliGRAM(s) Oral <User Schedule>  pyridostigmine  milliGRAM(s) Oral once  ranitidine 150 milliGRAM(s) Oral at bedtime  rosuvastatin 5 milliGRAM(s) Oral at bedtime  tiotropium 18 MICROgram(s) Capsule 1 Capsule(s) Inhalation daily    MEDICATIONS  (PRN):  ALBUTerol/ipratropium for Nebulization 3 milliLiter(s) Nebulizer every 6 hours PRN Shortness of Breath and/or Wheezing  traMADol 50 milliGRAM(s) Oral daily PRN moderate or severe pain      Vital Signs Last 24 Hrs  T(C): 36.4 (05 Sep 2019 11:53), Max: 36.6 (04 Sep 2019 20:30)  T(F): 97.6 (05 Sep 2019 11:53), Max: 97.8 (04 Sep 2019 20:30)  HR: 53 (05 Sep 2019 11:53) (37 - 53)  BP: 103/58 (05 Sep 2019 11:53) (103/58 - 131/72)  BP(mean): --  RR: 18 (05 Sep 2019 11:53) (16 - 18)  SpO2: 96% (05 Sep 2019 11:53) (96% - 99%)  CAPILLARY BLOOD GLUCOSE        I&O's Summary    04 Sep 2019 07:01  -  05 Sep 2019 07:00  --------------------------------------------------------  IN: 600 mL / OUT: 0 mL / NET: 600 mL        PHYSICAL EXAM:  GENERAL: NAD, well-developed  HEAD:  Atraumatic, Normocephalic  EYES: EOMI, PERRLA, conjunctiva and sclera clear  NECK: Supple, No JVD  CHEST/LUNG: Clear to auscultation bilaterally; No wheeze  HEART: Regular rate and rhythm; No murmurs, rubs, or gallops  ABDOMEN: Soft, Nontender, Nondistended; Bowel sounds present  EXTREMITIES:  2+ Peripheral Pulses, No clubbing, cyanosis, or edema  PSYCH: AAOx3  NEUROLOGY: non-focal  SKIN: No rashes or lesions    LABS:                        11.8   5.15  )-----------( 119      ( 04 Sep 2019 08:57 )             35.0     09-05    137  |  102  |  32<H>  ----------------------------<  93  4.2   |  25  |  1.01    Ca    9.5      05 Sep 2019 07:04                RADIOLOGY & ADDITIONAL TESTS:    Imaging Personally Reviewed:    Consultant(s) Notes Reviewed:      Care Discussed with Consultants/Other Providers:
Subjective: Patient seen and examined. No new events except as noted.     REVIEW OF SYSTEMS:    CONSTITUTIONAL: No weakness, fevers or chills  EYES/ENT: No visual changes;  No vertigo or throat pain   NECK: No pain or stiffness  RESPIRATORY: No cough, wheezing, hemoptysis; No shortness of breath  CARDIOVASCULAR: No chest pain or palpitations  GASTROINTESTINAL: No abdominal or epigastric pain. No nausea, vomiting, or hematemesis; No diarrhea or constipation. No melena or hematochezia.  GENITOURINARY: No dysuria, frequency or hematuria  NEUROLOGICAL: No numbness or weakness  SKIN: No itching, burning, rashes, or lesions   All other review of systems is negative unless indicated above.    MEDICATIONS:  MEDICATIONS  (STANDING):  acetaminophen   Tablet .. 650 milliGRAM(s) Oral daily  ALBUTerol    90 MICROgram(s) HFA Inhaler 1 Puff(s) Inhalation every 4 hours  aspirin enteric coated 81 milliGRAM(s) Oral daily  cholecalciferol 2000 Unit(s) Oral daily  diphenhydrAMINE 25 milliGRAM(s) Oral daily  enoxaparin Injectable 40 milliGRAM(s) SubCutaneous daily  hydrochlorothiazide 12.5 milliGRAM(s) Oral daily  levothyroxine 50 MICROGram(s) Oral daily  losartan 50 milliGRAM(s) Oral daily  pantoprazole    Tablet 40 milliGRAM(s) Oral before breakfast  predniSONE   Tablet 20 milliGRAM(s) Oral daily  pyridostigmine 120 milliGRAM(s) Oral <User Schedule>  pyridostigmine 90 milliGRAM(s) Oral <User Schedule>  pyridostigmine  milliGRAM(s) Oral <User Schedule>  pyridostigmine  milliGRAM(s) Oral once  ranitidine 150 milliGRAM(s) Oral at bedtime  rosuvastatin 5 milliGRAM(s) Oral at bedtime  tiotropium 18 MICROgram(s) Capsule 1 Capsule(s) Inhalation daily      PHYSICAL EXAM:  T(C): 36.3 (09-02-19 @ 11:36), Max: 36.6 (09-01-19 @ 20:12)  HR: 51 (09-02-19 @ 11:36) (42 - 51)  BP: 116/66 (09-02-19 @ 11:36) (107/55 - 117/69)  RR: 18 (09-02-19 @ 11:36) (18 - 18)  SpO2: 97% (09-02-19 @ 11:36) (96% - 97%)  Wt(kg): --  I&O's Summary    01 Sep 2019 07:01  -  02 Sep 2019 07:00  --------------------------------------------------------  IN: 1080 mL / OUT: 0 mL / NET: 1080 mL          Appearance: NAD	  HEENT:   Normal oral mucosa, PERRL, EOMI	  Lymphatic: No lymphadenopathy , no edema  Cardiovascular: Normal S1 S2, No JVD, No murmurs , Peripheral pulses palpable 2+ bilaterally  Respiratory: Lungs clear to auscultation, normal effort 	  Gastrointestinal:  Soft, Non-tender, + BS	  Skin: No rashes, No ecchymoses, No cyanosis, warm to touch  Musculoskeletal: Normal range of motion, normal strength  Psychiatry:  Mood & affect appropriate  Ext: No edema      LABS:    CARDIAC MARKERS:                                11.6   4.4   )-----------( 116      ( 01 Sep 2019 09:35 )             34.4     09-01    136  |  102  |  22  ----------------------------<  118<H>  3.7   |  22  |  1.01    Ca    9.5      01 Sep 2019 09:35    TPro  8.6<H>  /  Alb  3.5  /  TBili  0.4  /  DBili  x   /  AST  22  /  ALT  22  /  AlkPhos  56  09-01    proBNP:   Lipid Profile:   HgA1c:   TSH:             TELEMETRY: 	  SR,SB   ECG:  	  RADIOLOGY:   DIAGNOSTIC TESTING:  [ ] Echocardiogram:  [ ]  Catheterization:  [ ] Stress Test:    OTHER:
INTERVAL HISTORY: c/o diarrhea this morning.    PAST MEDICAL & SURGICAL HISTORY:  Reactive airway disease that is not asthma: mild as per pulm note on [employer], patient and daughter deneis any inhaler use  Near syncope: 8/2018 negative ENT work up, negative cardiac work ups as per daughter  Aortic stenosis, mild: asper daughter  Diverticulitis large intestine: denies any recent exacerbations  Lumbar stenosis  Myasthenia gravis: dx 10/2018 symptoms: intermittent loss of voice/ weakness, negative ENT studies, now stable on Pyridostigmine  Carpal Tunnel Syndrome Right: release 4/10  Hammertoe Right foot  Kidney Calculi  Lumbar Radiculopathy  GERD (Gastroesophageal Reflux Disease)  Hyperlipidemia  Genital Ulcer, Female  Hypertension  H/O umbilical hernia repair  History of tonsillectomy  S/P foot surgery  H/O shoulder replacement: b/l 2015/2016  H/O laminectomy: cervical  1998  lumbar 1986,1998,2000  Prolapse, Uterovaginal: s/p sling  S/P Laparoscopic Fundoplication  S/P Hysterectomy Partial: 1974  Bilateral Cataracts: removed  S/P Appendectomy  S/P Cholecystectomy    MEDICATIONS (HOME):  Home Medications:  Crestor 5 mg oral tablet: 1 tab(s) orally once a day (26 Aug 2019 22:13)  esomeprazole 40 mg oral delayed release capsule: 1 tab(s) orally 2 times a day (26 Aug 2019 22:13)  lactulose 10 g/15 mL oral syrup: 15 milliliter(s) orally once a day, As Needed (26 Aug 2019 22:13)  Lasix 40 mg oral tablet: 1 tab(s) orally once a day, As Needed (26 Aug 2019 22:13)  levothyroxine 50 mcg (0.05 mg) oral tablet: 1 tab(s) orally once a day (26 Aug 2019 22:13)  Linzess 145 mcg oral capsule: 1 cap(s) orally once a day, As Needed (26 Aug 2019 22:13)  losartan-hydrochlorothiazide 50mg-12.5mg oral tablet: 1 tab(s) orally once a day (26 Aug 2019 22:13)  Mestinon 60 mg oral tablet: 2 tab(s) orally once a day 7 AM, 1.5 tabs 1 PM (26 Aug 2019 22:13)  pyridostigmine 180 mg oral tablet, extended release: 1 tab(s) orally once a day (at bedtime) (26 Aug 2019 22:13)  raNITIdine 150 mg oral tablet: 1 tab(s) orally once a day (at bedtime) (26 Aug 2019 22:13)  traMADol 50 mg oral tablet: 1 tab(s) orally 2 times a day, As Needed (26 Aug 2019 22:13)  Vitamin D3 2000 intl units oral capsule: 1 cap(s) orally once a day (26 Aug 2019 22:13)    MEDICATIONS  (STANDING):  acetaminophen   Tablet .. 650 milliGRAM(s) Oral daily  ALBUTerol    90 MICROgram(s) HFA Inhaler 1 Puff(s) Inhalation every 4 hours  aspirin enteric coated 81 milliGRAM(s) Oral daily  cholecalciferol 2000 Unit(s) Oral daily  diphenhydrAMINE 25 milliGRAM(s) Oral daily  enoxaparin Injectable 40 milliGRAM(s) SubCutaneous daily  hydrochlorothiazide 12.5 milliGRAM(s) Oral daily  immune   globulin 10% (GAMMAGARD) IVPB 26 Gram(s) IV Intermittent daily  levothyroxine 50 MICROGram(s) Oral daily  losartan 50 milliGRAM(s) Oral daily  pantoprazole    Tablet 40 milliGRAM(s) Oral before breakfast  predniSONE   Tablet 10 milliGRAM(s) Oral daily  pyridostigmine 120 milliGRAM(s) Oral <User Schedule>  pyridostigmine 90 milliGRAM(s) Oral <User Schedule>  pyridostigmine  milliGRAM(s) Oral <User Schedule>  pyridostigmine  milliGRAM(s) Oral once  ranitidine 150 milliGRAM(s) Oral at bedtime  rosuvastatin 5 milliGRAM(s) Oral at bedtime  tiotropium 18 MICROgram(s) Capsule 1 Capsule(s) Inhalation daily    MEDICATIONS  (PRN):  ALBUTerol/ipratropium for Nebulization 3 milliLiter(s) Nebulizer every 6 hours PRN Shortness of Breath and/or Wheezing  traMADol 50 milliGRAM(s) Oral daily PRN moderate or severe pain    ALLERGIES/INTOLERANCES:  Allergies  iodine (Rash)  shellfish (Rash)    Intolerances    VITALS & EXAMINATION:  Vital Signs Last 24 Hrs  T(C): 36.7 (29 Aug 2019 12:01), Max: 36.7 (29 Aug 2019 12:01)  T(F): 98 (29 Aug 2019 12:01), Max: 98 (29 Aug 2019 12:01)  HR: 52 (29 Aug 2019 14:04) (48 - 53)  BP: 99/61 (29 Aug 2019 14:04) (99/61 - 125/74)  BP(mean): --  RR: 18 (29 Aug 2019 12:01) (18 - 18)  SpO2: 99% (29 Aug 2019 12:01) (97% - 99%)    Neurological (>12):  MS: Awake, alert, oriented to person, place, situation, time. Normal affect. Follows all commands.    Language: Speech is clear, fluent     CNs: EOMI no nystagmus. No facial asymmetry b/l. no dysarthria.      Motor:               Deltoid	Biceps	Triceps	   R	5	5	5	5 	  L	5	5	5	5    LABORATORY:  CBC                       11.6   4.7   )-----------( 114      ( 29 Aug 2019 09:17 )             34.9     Chem 08-28    142  |  108  |  30<H>  ----------------------------<  90  3.9   |  23  |  0.95    Ca    9.1      28 Aug 2019 05:48    TPro  6.4  /  Alb  3.3  /  TBili  0.2  /  DBili  x   /  AST  15  /  ALT  17  /  AlkPhos  62  08-28    LFTs LIVER FUNCTIONS - ( 28 Aug 2019 05:48 )  Alb: 3.3 g/dL / Pro: 6.4 g/dL / ALK PHOS: 62 U/L / ALT: 17 U/L / AST: 15 U/L / GGT: x           Coagulopathy   Lipid Panel   A1c   Cardiac enzymes     U/A   CSF  Immunological  Other    STUDIES & IMAGING:  Studies (EKG, EEG, EMG, etc):     Radiology (XR, CT, MR, U/S, TTE/MAYURI):
INTERVAL HISTORY: feels that chewing has improved.    PAST MEDICAL & SURGICAL HISTORY:  Reactive airway disease that is not asthma: mild as per pulm note on [employer], patient and daughter deneis any inhaler use  Near syncope: 8/2018 negative ENT work up, negative cardiac work ups as per daughter  Aortic stenosis, mild: asper daughter  Diverticulitis large intestine: denies any recent exacerbations  Lumbar stenosis  Myasthenia gravis: dx 10/2018 symptoms: intermittent loss of voice/ weakness, negative ENT studies, now stable on Pyridostigmine  Carpal Tunnel Syndrome Right: release 4/10  Hammertoe Right foot  Kidney Calculi  Lumbar Radiculopathy  GERD (Gastroesophageal Reflux Disease)  Hyperlipidemia  Genital Ulcer, Female  Hypertension  H/O umbilical hernia repair  History of tonsillectomy  S/P foot surgery  H/O shoulder replacement: b/l 2015/2016  H/O laminectomy: cervical  1998  lumbar 1986,1998,2000  Prolapse, Uterovaginal: s/p sling  S/P Laparoscopic Fundoplication  S/P Hysterectomy Partial: 1974  Bilateral Cataracts: removed  S/P Appendectomy  S/P Cholecystectomy    FAMILY HISTORY:  No pertinent family history in first degree relatives    MEDICATIONS (HOME):  Home Medications:  Crestor 5 mg oral tablet: 1 tab(s) orally once a day (26 Aug 2019 22:13)  esomeprazole 40 mg oral delayed release capsule: 1 tab(s) orally 2 times a day (26 Aug 2019 22:13)  lactulose 10 g/15 mL oral syrup: 15 milliliter(s) orally once a day, As Needed (26 Aug 2019 22:13)  Lasix 40 mg oral tablet: 1 tab(s) orally once a day, As Needed (26 Aug 2019 22:13)  levothyroxine 50 mcg (0.05 mg) oral tablet: 1 tab(s) orally once a day (26 Aug 2019 22:13)  Linzess 145 mcg oral capsule: 1 cap(s) orally once a day, As Needed (26 Aug 2019 22:13)  losartan-hydrochlorothiazide 50mg-12.5mg oral tablet: 1 tab(s) orally once a day (26 Aug 2019 22:13)  Mestinon 60 mg oral tablet: 2 tab(s) orally once a day 7 AM, 1.5 tabs 1 PM (26 Aug 2019 22:13)  pyridostigmine 180 mg oral tablet, extended release: 1 tab(s) orally once a day (at bedtime) (26 Aug 2019 22:13)  raNITIdine 150 mg oral tablet: 1 tab(s) orally once a day (at bedtime) (26 Aug 2019 22:13)  traMADol 50 mg oral tablet: 1 tab(s) orally 2 times a day, As Needed (26 Aug 2019 22:13)  Vitamin D3 2000 intl units oral capsule: 1 cap(s) orally once a day (26 Aug 2019 22:13)    MEDICATIONS  (STANDING):  acetaminophen   Tablet .. 650 milliGRAM(s) Oral daily  ALBUTerol    90 MICROgram(s) HFA Inhaler 1 Puff(s) Inhalation every 4 hours  aspirin enteric coated 81 milliGRAM(s) Oral daily  cholecalciferol 2000 Unit(s) Oral daily  diphenhydrAMINE 25 milliGRAM(s) Oral daily  enoxaparin Injectable 40 milliGRAM(s) SubCutaneous daily  hydrochlorothiazide 12.5 milliGRAM(s) Oral daily  immune   globulin 10% (GAMMAGARD) IVPB 26 Gram(s) IV Intermittent daily  levothyroxine 50 MICROGram(s) Oral daily  losartan 50 milliGRAM(s) Oral daily  pantoprazole    Tablet 40 milliGRAM(s) Oral before breakfast  predniSONE   Tablet 10 milliGRAM(s) Oral daily  pyridostigmine 120 milliGRAM(s) Oral <User Schedule>  pyridostigmine 90 milliGRAM(s) Oral <User Schedule>  pyridostigmine  milliGRAM(s) Oral <User Schedule>  pyridostigmine  milliGRAM(s) Oral once  ranitidine 150 milliGRAM(s) Oral at bedtime  rosuvastatin 5 milliGRAM(s) Oral at bedtime  tiotropium 18 MICROgram(s) Capsule 1 Capsule(s) Inhalation daily    MEDICATIONS  (PRN):  ALBUTerol/ipratropium for Nebulization 3 milliLiter(s) Nebulizer every 6 hours PRN Shortness of Breath and/or Wheezing  traMADol 50 milliGRAM(s) Oral daily PRN moderate or severe pain    ALLERGIES/INTOLERANCES:  Allergies  iodine (Rash)  shellfish (Rash)    Intolerances    VITALS & EXAMINATION:  Vital Signs Last 24 Hrs  T(C): 36.6 (28 Aug 2019 13:54), Max: 36.6 (27 Aug 2019 16:01)  T(F): 97.9 (28 Aug 2019 13:54), Max: 97.9 (28 Aug 2019 13:54)  HR: 51 (28 Aug 2019 13:54) (51 - 60)  BP: 100/60 (28 Aug 2019 13:54) (100/60 - 108/60)  BP(mean): --  RR: 18 (28 Aug 2019 13:54) (18 - 18)  SpO2: 97% (28 Aug 2019 13:54) (97% - 98%)    Neuro Exam:  EOMI. full strength b/l UE  uvula midline      LABORATORY:  CBC                       10.9   4.45  )-----------( 123      ( 28 Aug 2019 09:05 )             33.2     Chem 08-28    142  |  108  |  30<H>  ----------------------------<  90  3.9   |  23  |  0.95    Ca    9.1      28 Aug 2019 05:48  Phos  3.5     08-27  Mg     2.2     08-27    TPro  6.4  /  Alb  3.3  /  TBili  0.2  /  DBili  x   /  AST  15  /  ALT  17  /  AlkPhos  62  08-28    LFTs LIVER FUNCTIONS - ( 28 Aug 2019 05:48 )  Alb: 3.3 g/dL / Pro: 6.4 g/dL / ALK PHOS: 62 U/L / ALT: 17 U/L / AST: 15 U/L / GGT: x           Coagulopathy PT/INR - ( 26 Aug 2019 16:29 )   PT: 10.5 sec;   INR: 0.92 ratio         PTT - ( 26 Aug 2019 16:29 )  PTT:33.3 sec  Lipid Panel   A1c   Cardiac enzymes     U/A   CSF  Immunological  Other    STUDIES & IMAGING:  Studies (EKG, EEG, EMG, etc):     Radiology (XR, CT, MR, U/S, TTE/MAYURI):
Neurology  Progress Note  08-31-19    Name:  HÉCTOR LYONS; 78y    Interval History:  Resting comfortably. No overnight events. Complaining of chronic left sided flank plan. No rash or abnormalities noted. PCP aware.     HPI:  78 F PMH Myasthenia gravis on pyridostigmine, reactive airway disease, GERD, HTN, HLD, hypothyroid, spinal stenosis s/p laminectomy/fusion, p/w maxwell. Hx obtained from pt and from daughter at bedside (RN at [facility]).  Pt was dx with MG 10/2018 after presenting with sx of dyspnea, dysphagia, and ptosis. Was started on Mestinon and did pretty well according to her daughter.  More recently, pt underwent spinal surgery 2/2019; was intubated for procedure and did ok as well. However, over last few months, pt has been complaining of progressive sob; first started with MAXWELL and was ok at rest, but over last few weeks has intermittent sob at rest as well.  No sig LE edema or orthopnea. No cp. No f/c, URI sx/cough. Pt does note she ran out of her inhaler but feels her sob is more fatigue  related and not necessarily reactive airway in nature, and denies excessive wheezing.  In addition to these c/o dyspnea, pt also endorses progressive/intermittent b/l hand/arm numbness/tingling, UE weakness at times, dysphagia at times, which her o/p neurologist thinks is related to her MG.  Pt had a "cardiac workup" in preparation for her spinal surgery, with ekg/TTE/NST in December 2018 nondiagnostic except mild AS.  Pt denies visual changes, dizziness, syncope, falls.     Vs: 97.2, 66, 129/62, 18, 99% RA. Labs: cbc unrevealing, coags unrevealing, cmp unrevealing, probnp 216, HST delta 59-->51. CXR prelim clear lungs. In Er pt received aspirin 325 x1, pyridostigmine 180 x 1, and eval by neuro, all prior to medicine team involvement. (26 Aug 2019 20:42)    REVIEW OF SYSTEMS:  As states in HPI.    Medications:  acetaminophen   Tablet .. 650 milliGRAM(s) Oral daily  ALBUTerol    90 MICROgram(s) HFA Inhaler 1 Puff(s) Inhalation every 4 hours  ALBUTerol/ipratropium for Nebulization 3 milliLiter(s) Nebulizer every 6 hours PRN  aspirin  chewable 324 milliGRAM(s) Oral once  aspirin enteric coated 81 milliGRAM(s) Oral daily  cholecalciferol 2000 Unit(s) Oral daily  diphenhydrAMINE 25 milliGRAM(s) Oral daily  enoxaparin Injectable 40 milliGRAM(s) SubCutaneous daily  hydrochlorothiazide 12.5 milliGRAM(s) Oral daily  immune   globulin 10% (GAMMAGARD) IVPB 26 Gram(s) IV Intermittent daily  levothyroxine 50 MICROGram(s) Oral daily  losartan 50 milliGRAM(s) Oral daily  pantoprazole    Tablet 40 milliGRAM(s) Oral before breakfast  predniSONE   Tablet 10 milliGRAM(s) Oral daily  pyridostigmine 120 milliGRAM(s) Oral <User Schedule>  pyridostigmine 90 milliGRAM(s) Oral <User Schedule>  pyridostigmine  milliGRAM(s) Oral <User Schedule>  pyridostigmine  milliGRAM(s) Oral once  ranitidine 150 milliGRAM(s) Oral at bedtime  rosuvastatin 5 milliGRAM(s) Oral at bedtime  tiotropium 18 MICROgram(s) Capsule 1 Capsule(s) Inhalation daily  traMADol 50 milliGRAM(s) Oral daily PRN    Vitals:  T(C): 36.5 (08-31-19 @ 05:15), Max: 36.7 (08-30-19 @ 14:07)  HR: 48 (08-31-19 @ 05:15) (48 - 60)  BP: 111/56 (08-31-19 @ 05:15) (93/54 - 116/73)  RR: 18 (08-31-19 @ 05:15) (18 - 18)  SpO2: 97% (08-31-19 @ 05:15) (96% - 100%)    Labs:                        10.7   3.13  )-----------( 101      ( 31 Aug 2019 10:17 )             31.8     08-31    138  |  105  |  25<H>  ----------------------------<  80  3.4<L>   |  21<L>  |  0.89    Ca    9.0      31 Aug 2019 06:48  Phos  3.9     08-31  Mg     1.8     08-31    TPro  7.2  /  Alb  3.0<L>  /  TBili  0.3  /  DBili  x   /  AST  16  /  ALT  18  /  AlkPhos  51  08-31    CAPILLARY BLOOD GLUCOSE  LIVER FUNCTIONS - ( 31 Aug 2019 06:48 )  Alb: 3.0 g/dL / Pro: 7.2 g/dL / ALK PHOS: 51 U/L / ALT: 18 U/L / AST: 16 U/L / GGT: x             PHYSICAL EXAMINATION:  General: Well-developed, well nourished, in no acute distress.  MSK: No rashed noted. No costovertebral angle tenderness.   Neurologic:  EOMI  neck flexion 5/5  5/5 delt, triceps, biceps, hand 
Patient is a 78y old  Female who presents with a chief complaint of dyspnea (26 Aug 2019 20:42)      SUBJECTIVE / OVERNIGHT EVENTS:  no acute events overnight  no chest pain  has some right sided stomach pain, worse after eating.  No associated nausea/vomiting  Dyspnea with exertion as well    tele reviewed: sinus bob    MEDICATIONS  (STANDING):  acetaminophen   Tablet .. 650 milliGRAM(s) Oral daily  ALBUTerol    90 MICROgram(s) HFA Inhaler 1 Puff(s) Inhalation every 4 hours  aspirin enteric coated 81 milliGRAM(s) Oral daily  cholecalciferol 2000 Unit(s) Oral daily  diphenhydrAMINE 25 milliGRAM(s) Oral daily  enoxaparin Injectable 40 milliGRAM(s) SubCutaneous daily  esOMEPRAZOLE 40 milliGRAM(s) Oral two times a day before meals  hydrochlorothiazide 12.5 milliGRAM(s) Oral daily  immune   globulin 10% (GAMMAGARD) IVPB 26 Gram(s) IV Intermittent daily  levothyroxine 50 MICROGram(s) Oral daily  losartan 50 milliGRAM(s) Oral daily  predniSONE   Tablet 10 milliGRAM(s) Oral daily  pyridostigmine 120 milliGRAM(s) Oral <User Schedule>  pyridostigmine 90 milliGRAM(s) Oral <User Schedule>  pyridostigmine  milliGRAM(s) Oral <User Schedule>  pyridostigmine  milliGRAM(s) Oral once  ranitidine 150 milliGRAM(s) Oral at bedtime  rosuvastatin 5 milliGRAM(s) Oral at bedtime  tiotropium 18 MICROgram(s) Capsule 1 Capsule(s) Inhalation daily    MEDICATIONS  (PRN):  ALBUTerol/ipratropium for Nebulization 3 milliLiter(s) Nebulizer every 6 hours PRN Shortness of Breath and/or Wheezing  traMADol 50 milliGRAM(s) Oral daily PRN moderate or severe pain      Vital Signs Last 24 Hrs  T(C): 36.4 (27 Aug 2019 14:20), Max: 36.6 (26 Aug 2019 16:20)  T(F): 97.5 (27 Aug 2019 14:20), Max: 97.8 (26 Aug 2019 16:20)  HR: 60 (27 Aug 2019 14:20) (50 - 66)  BP: 108/67 (27 Aug 2019 14:20) (102/65 - 168/81)  BP(mean): --  RR: 18 (27 Aug 2019 14:20) (18 - 18)  SpO2: 98% (27 Aug 2019 14:20) (96% - 100%)  CAPILLARY BLOOD GLUCOSE        I&O's Summary      PHYSICAL EXAM:  	GENERAL: NAD, well-groomed, well-developed, sitting up in chair at time of my exam  	HEAD:  Atraumatic, Normocephalic  	EYES: EOMI, PERRLA, conjunctiva and sclera clear  	ENMT: No tonsillar erythema, exudates, or enlargement; dry mucous membranes, Good dentition, No lesions  	NECK: Supple, No JVD, Normal thyroid  	CHEST/LUNG: Clear to percussion bilaterally; No rales, rhonchi, wheezing, or rubs no resp distress or accessory muscle usage.   	HEART: Regular rate and rhythm; No murmurs, rubs, or gallops no sig LE edema.  	ABDOMEN: Soft, Nontender, Nondistended; Bowel sounds present x 4. no rebound/guarding.  	EXTREMITIES:  2+ Peripheral Pulses, No clubbing, cyanosis  	LYMPH: No lymphadenopathy noted, no lymphangitis  	SKIN: No rashes or lesions, no petechiae  NERVOUS SYSTEM:  Alert & Oriented X3, Good concentration; Motor Strength 5/5 B/L upper and lower extremities. no facial droop.sensation grossly intact b/l.    LABS:                        11.9   4.01  )-----------( 125      ( 27 Aug 2019 08:40 )             36.1     08-27    142  |  109<H>  |  17  ----------------------------<  88  3.8   |  24  |  0.78    Ca    9.5      27 Aug 2019 06:02  Phos  3.5     08-27  Mg     2.2     08-27    TPro  5.9<L>  /  Alb  3.7  /  TBili  0.3  /  DBili  x   /  AST  21  /  ALT  19  /  AlkPhos  66  08-27    PT/INR - ( 26 Aug 2019 16:29 )   PT: 10.5 sec;   INR: 0.92 ratio         PTT - ( 26 Aug 2019 16:29 )  PTT:33.3 sec          RADIOLOGY & ADDITIONAL TESTS:    Imaging Personally Reviewed:    Consultant(s) Notes Reviewed:  neurology    Care Discussed with Consultants/Other Providers: neuro res Dr Torres
SUBJECTIVE: no diarrhea episodes today.    PAST MEDICAL & SURGICAL HISTORY:  Reactive airway disease that is not asthma: mild as per pulm note on [employer], patient and daughter deneis any inhaler use  Near syncope: 8/2018 negative ENT work up, negative cardiac work ups as per daughter  Aortic stenosis, mild: asper daughter  Diverticulitis large intestine: denies any recent exacerbations  Lumbar stenosis  Myasthenia gravis: dx 10/2018 symptoms: intermittent loss of voice/ weakness, negative ENT studies, now stable on Pyridostigmine  Carpal Tunnel Syndrome Right: release 4/10  Hammertoe Right foot  Kidney Calculi  Lumbar Radiculopathy  GERD (Gastroesophageal Reflux Disease)  Hyperlipidemia  Genital Ulcer, Female  Hypertension  H/O umbilical hernia repair  History of tonsillectomy  S/P foot surgery  H/O shoulder replacement: b/l 2015/2016  H/O laminectomy: cervical  1998  lumbar 1986,1998,2000  Prolapse, Uterovaginal: s/p sling  S/P Laparoscopic Fundoplication  S/P Hysterectomy Partial: 1974  Bilateral Cataracts: removed  S/P Appendectomy  S/P Cholecystectomy    FAMILY HISTORY:  No pertinent family history in first degree relatives    SOCIAL HISTORY:   T/E/D:   Occupation:   Lives with:     MEDICATIONS (HOME):  Home Medications:  Crestor 5 mg oral tablet: 1 tab(s) orally once a day (26 Aug 2019 22:13)  esomeprazole 40 mg oral delayed release capsule: 1 tab(s) orally 2 times a day (26 Aug 2019 22:13)  lactulose 10 g/15 mL oral syrup: 15 milliliter(s) orally once a day, As Needed (26 Aug 2019 22:13)  Lasix 40 mg oral tablet: 1 tab(s) orally once a day, As Needed (26 Aug 2019 22:13)  levothyroxine 50 mcg (0.05 mg) oral tablet: 1 tab(s) orally once a day (26 Aug 2019 22:13)  Linzess 145 mcg oral capsule: 1 cap(s) orally once a day, As Needed (26 Aug 2019 22:13)  losartan-hydrochlorothiazide 50mg-12.5mg oral tablet: 1 tab(s) orally once a day (26 Aug 2019 22:13)  Mestinon 60 mg oral tablet: 2 tab(s) orally once a day 7 AM, 1.5 tabs 1 PM (26 Aug 2019 22:13)  pyridostigmine 180 mg oral tablet, extended release: 1 tab(s) orally once a day (at bedtime) (26 Aug 2019 22:13)  raNITIdine 150 mg oral tablet: 1 tab(s) orally once a day (at bedtime) (26 Aug 2019 22:13)  traMADol 50 mg oral tablet: 1 tab(s) orally 2 times a day, As Needed (26 Aug 2019 22:13)  Vitamin D3 2000 intl units oral capsule: 1 cap(s) orally once a day (26 Aug 2019 22:13)    MEDICATIONS  (STANDING):  acetaminophen   Tablet .. 650 milliGRAM(s) Oral daily  ALBUTerol    90 MICROgram(s) HFA Inhaler 1 Puff(s) Inhalation every 4 hours  aspirin enteric coated 81 milliGRAM(s) Oral daily  cholecalciferol 2000 Unit(s) Oral daily  diphenhydrAMINE 25 milliGRAM(s) Oral daily  enoxaparin Injectable 40 milliGRAM(s) SubCutaneous daily  hydrochlorothiazide 12.5 milliGRAM(s) Oral daily  immune   globulin 10% (GAMMAGARD) IVPB 26 Gram(s) IV Intermittent daily  levothyroxine 50 MICROGram(s) Oral daily  losartan 50 milliGRAM(s) Oral daily  pantoprazole    Tablet 40 milliGRAM(s) Oral before breakfast  predniSONE   Tablet 10 milliGRAM(s) Oral daily  pyridostigmine 120 milliGRAM(s) Oral <User Schedule>  pyridostigmine 90 milliGRAM(s) Oral <User Schedule>  pyridostigmine  milliGRAM(s) Oral <User Schedule>  pyridostigmine  milliGRAM(s) Oral once  ranitidine 150 milliGRAM(s) Oral at bedtime  rosuvastatin 5 milliGRAM(s) Oral at bedtime  tiotropium 18 MICROgram(s) Capsule 1 Capsule(s) Inhalation daily    MEDICATIONS  (PRN):  ALBUTerol/ipratropium for Nebulization 3 milliLiter(s) Nebulizer every 6 hours PRN Shortness of Breath and/or Wheezing  traMADol 50 milliGRAM(s) Oral daily PRN moderate or severe pain    ALLERGIES/INTOLERANCES:  Allergies  iodine (Rash)  shellfish (Rash)    Intolerances    VITALS & EXAMINATION:  Vital Signs Last 24 Hrs  T(C): 36.6 (30 Aug 2019 11:59), Max: 36.6 (30 Aug 2019 11:59)  T(F): 97.8 (30 Aug 2019 11:59), Max: 97.8 (30 Aug 2019 11:59)  HR: 51 (30 Aug 2019 11:59) (50 - 57)  BP: 93/54 (30 Aug 2019 11:59) (93/54 - 113/51)  BP(mean): --  RR: 18 (30 Aug 2019 11:59) (18 - 18)  SpO2: 98% (30 Aug 2019 11:59) (97% - 98%)      Neuro Exam:  EOMI  neck flexion 5/5  5/5 delt, triceps, biceps, hand     LABORATORY:  CBC                       10.8   3.40  )-----------( 103      ( 30 Aug 2019 09:38 )             31.2     Chem 08-30    138  |  104  |  27<H>  ----------------------------<  83  3.5   |  24  |  0.96    Ca    8.9      30 Aug 2019 07:21  Phos  3.6     08-30  Mg     1.8     08-30      LFTs   Coagulopathy   Lipid Panel   A1c   Cardiac enzymes     U/A   CSF  Immunological  Other    STUDIES & IMAGING:  Studies (EKG, EEG, EMG, etc):     Radiology (XR, CT, MR, U/S, TTE/MAYURI):
SUBJECTIVE: no events o/n. denies n/v/d.    PAST MEDICAL & SURGICAL HISTORY:  Reactive airway disease that is not asthma: mild as per pulm note on [employer], patient and daughter deneis any inhaler use  Near syncope: 8/2018 negative ENT work up, negative cardiac work ups as per daughter  Aortic stenosis, mild: asper daughter  Diverticulitis large intestine: denies any recent exacerbations  Lumbar stenosis  Myasthenia gravis: dx 10/2018 symptoms: intermittent loss of voice/ weakness, negative ENT studies, now stable on Pyridostigmine  Carpal Tunnel Syndrome Right: release 4/10  Hammertoe Right foot  Kidney Calculi  Lumbar Radiculopathy  GERD (Gastroesophageal Reflux Disease)  Hyperlipidemia  Genital Ulcer, Female  Hypertension  H/O umbilical hernia repair  History of tonsillectomy  S/P foot surgery  H/O shoulder replacement: b/l 2015/2016  H/O laminectomy: cervical  1998  lumbar 1986,1998,2000  Prolapse, Uterovaginal: s/p sling  S/P Laparoscopic Fundoplication  S/P Hysterectomy Partial: 1974  Bilateral Cataracts: removed  S/P Appendectomy  S/P Cholecystectomy    FAMILY HISTORY:  No pertinent family history in first degree relatives    SOCIAL HISTORY:   T/E/D:   Occupation:   Lives with:     MEDICATIONS (HOME):  Home Medications:  Crestor 5 mg oral tablet: 1 tab(s) orally once a day (26 Aug 2019 22:13)  esomeprazole 40 mg oral delayed release capsule: 1 tab(s) orally 2 times a day (26 Aug 2019 22:13)  lactulose 10 g/15 mL oral syrup: 15 milliliter(s) orally once a day, As Needed (26 Aug 2019 22:13)  Lasix 40 mg oral tablet: 1 tab(s) orally once a day, As Needed (26 Aug 2019 22:13)  levothyroxine 50 mcg (0.05 mg) oral tablet: 1 tab(s) orally once a day (26 Aug 2019 22:13)  Linzess 145 mcg oral capsule: 1 cap(s) orally once a day, As Needed (26 Aug 2019 22:13)  losartan-hydrochlorothiazide 50mg-12.5mg oral tablet: 1 tab(s) orally once a day (26 Aug 2019 22:13)  Mestinon 60 mg oral tablet: 2 tab(s) orally once a day 7 AM, 1.5 tabs 1 PM (26 Aug 2019 22:13)  pyridostigmine 180 mg oral tablet, extended release: 1 tab(s) orally once a day (at bedtime) (26 Aug 2019 22:13)  raNITIdine 150 mg oral tablet: 1 tab(s) orally once a day (at bedtime) (26 Aug 2019 22:13)  traMADol 50 mg oral tablet: 1 tab(s) orally 2 times a day, As Needed (26 Aug 2019 22:13)  Vitamin D3 2000 intl units oral capsule: 1 cap(s) orally once a day (26 Aug 2019 22:13)    MEDICATIONS  (STANDING):  acetaminophen   Tablet .. 650 milliGRAM(s) Oral daily  ALBUTerol    90 MICROgram(s) HFA Inhaler 1 Puff(s) Inhalation every 4 hours  aspirin enteric coated 81 milliGRAM(s) Oral daily  cholecalciferol 2000 Unit(s) Oral daily  diphenhydrAMINE 25 milliGRAM(s) Oral daily  enoxaparin Injectable 40 milliGRAM(s) SubCutaneous daily  hydrochlorothiazide 12.5 milliGRAM(s) Oral daily  levothyroxine 50 MICROGram(s) Oral daily  losartan 50 milliGRAM(s) Oral daily  pantoprazole    Tablet 40 milliGRAM(s) Oral before breakfast  predniSONE   Tablet 10 milliGRAM(s) Oral daily  pyridostigmine 120 milliGRAM(s) Oral <User Schedule>  pyridostigmine 90 milliGRAM(s) Oral <User Schedule>  pyridostigmine  milliGRAM(s) Oral <User Schedule>  pyridostigmine  milliGRAM(s) Oral once  ranitidine 150 milliGRAM(s) Oral at bedtime  rosuvastatin 5 milliGRAM(s) Oral at bedtime  tiotropium 18 MICROgram(s) Capsule 1 Capsule(s) Inhalation daily    MEDICATIONS  (PRN):  ALBUTerol/ipratropium for Nebulization 3 milliLiter(s) Nebulizer every 6 hours PRN Shortness of Breath and/or Wheezing  traMADol 50 milliGRAM(s) Oral daily PRN moderate or severe pain    ALLERGIES/INTOLERANCES:  Allergies  iodine (Rash)  shellfish (Rash)    Intolerances    VITALS & EXAMINATION:  Vital Signs Last 24 Hrs  T(C): 36.6 (01 Sep 2019 12:22), Max: 36.6 (01 Sep 2019 12:22)  T(F): 97.8 (01 Sep 2019 12:22), Max: 97.8 (01 Sep 2019 12:22)  HR: 52 (01 Sep 2019 12:22) (44 - 52)  BP: 123/66 (01 Sep 2019 12:22) (112/59 - 126/66)  BP(mean): --  RR: 18 (01 Sep 2019 12:22) (18 - 18)  SpO2: 98% (01 Sep 2019 12:22) (95% - 100%)    Neuro Exam:  EOMI  neck flexion 5/5  5/5 delt, triceps, biceps    LABORATORY:  CBC                       11.6   4.4   )-----------( 116      ( 01 Sep 2019 09:35 )             34.4     Chem 09-01    136  |  102  |  22  ----------------------------<  118<H>  3.7   |  22  |  1.01    Ca    9.5      01 Sep 2019 09:35  Phos  3.9     08-31  Mg     1.8     08-31    TPro  8.6<H>  /  Alb  3.5  /  TBili  0.4  /  DBili  x   /  AST  22  /  ALT  22  /  AlkPhos  56  09-01    LFTs LIVER FUNCTIONS - ( 01 Sep 2019 09:35 )  Alb: 3.5 g/dL / Pro: 8.6 g/dL / ALK PHOS: 56 U/L / ALT: 22 U/L / AST: 22 U/L / GGT: x           Coagulopathy   Lipid Panel   A1c   Cardiac enzymes     U/A   CSF  Immunological  Other    STUDIES & IMAGING:  Studies (EKG, EEG, EMG, etc):     Radiology (XR, CT, MR, U/S, TTE/MAYURI):
Subjective: Patient seen and examined. No new events except as noted.   resting comfortably       REVIEW OF SYSTEMS:    CONSTITUTIONAL:+ weakness, fevers or chills  EYES/ENT: No visual changes;  No vertigo or throat pain   NECK: No pain or stiffness  RESPIRATORY: No cough, wheezing, hemoptysis; No shortness of breath  CARDIOVASCULAR: No chest pain or palpitations  GASTROINTESTINAL: No abdominal or epigastric pain. No nausea, vomiting, or hematemesis; No diarrhea or constipation. No melena or hematochezia.  GENITOURINARY: No dysuria, frequency or hematuria  NEUROLOGICAL: No numbness or weakness  SKIN: No itching, burning, rashes, or lesions   All other review of systems is negative unless indicated above.    MEDICATIONS:  MEDICATIONS  (STANDING):  acetaminophen   Tablet .. 650 milliGRAM(s) Oral daily  ALBUTerol    90 MICROgram(s) HFA Inhaler 1 Puff(s) Inhalation every 4 hours  aspirin enteric coated 81 milliGRAM(s) Oral daily  cholecalciferol 2000 Unit(s) Oral daily  diphenhydrAMINE 25 milliGRAM(s) Oral daily  enoxaparin Injectable 40 milliGRAM(s) SubCutaneous daily  hydrochlorothiazide 12.5 milliGRAM(s) Oral daily  immune   globulin 10% (GAMMAGARD) IVPB 26 Gram(s) IV Intermittent daily  levothyroxine 50 MICROGram(s) Oral daily  losartan 50 milliGRAM(s) Oral daily  pantoprazole    Tablet 40 milliGRAM(s) Oral before breakfast  predniSONE   Tablet 10 milliGRAM(s) Oral daily  pyridostigmine 120 milliGRAM(s) Oral <User Schedule>  pyridostigmine 90 milliGRAM(s) Oral <User Schedule>  pyridostigmine  milliGRAM(s) Oral <User Schedule>  pyridostigmine  milliGRAM(s) Oral once  ranitidine 150 milliGRAM(s) Oral at bedtime  rosuvastatin 5 milliGRAM(s) Oral at bedtime  tiotropium 18 MICROgram(s) Capsule 1 Capsule(s) Inhalation daily      PHYSICAL EXAM:  T(C): 36.4 (08-30-19 @ 04:34), Max: 36.7 (08-29-19 @ 12:01)  HR: 50 (08-30-19 @ 04:34) (50 - 57)  BP: 109/57 (08-30-19 @ 04:34) (99/61 - 117/72)  RR: 18 (08-30-19 @ 04:34) (18 - 18)  SpO2: 97% (08-30-19 @ 04:34) (97% - 99%)  Wt(kg): --  I&O's Summary        Appearance: NAD	  HEENT:   Normal oral mucosa, PERRL, EOMI	  Lymphatic: No lymphadenopathy , no edema  Cardiovascular: Normal S1 S2, No JVD, +murmurs , Peripheral pulses palpable 2+ bilaterally  Respiratory: Lungs clear to auscultation, normal effort 	  Gastrointestinal:  Soft, Non-tender, + BS	  Skin: No rashes, No ecchymoses, No cyanosis, warm to touch  Musculoskeletal: Normal range of motion, normal strength  Psychiatry:  Mood & affect appropriate  Ext: No edema      LABS:    CARDIAC MARKERS:                                10.8   3.40  )-----------( 103      ( 30 Aug 2019 09:38 )             31.2     08-30    138  |  104  |  27<H>  ----------------------------<  83  3.5   |  24  |  0.96    Ca    8.9      30 Aug 2019 07:21  Phos  3.6     08-30  Mg     1.8     08-30      proBNP:   Lipid Profile:   HgA1c:   TSH:             TELEMETRY: 	  SR  ECG:  	  RADIOLOGY:   DIAGNOSTIC TESTING:  [ ] Echocardiogram:  [ ]  Catheterization:  [ ] Stress Test:    OTHER:
Subjective: Patient seen and examined. No new events except as noted.   resting comfortably   no cp or sob   cath not done yesterday as she has contrast allergy and needed to be premedicated   REVIEW OF SYSTEMS:    CONSTITUTIONAL: + weakness, fevers or chills  EYES/ENT: No visual changes;  No vertigo or throat pain   NECK: No pain or stiffness  RESPIRATORY: No cough, wheezing, hemoptysis; No shortness of breath  CARDIOVASCULAR: No chest pain or palpitations  GASTROINTESTINAL: No abdominal or epigastric pain. No nausea, vomiting, or hematemesis; No diarrhea or constipation. No melena or hematochezia.  GENITOURINARY: No dysuria, frequency or hematuria  NEUROLOGICAL: No numbness or weakness  SKIN: No itching, burning, rashes, or lesions   All other review of systems is negative unless indicated above.    MEDICATIONS:  MEDICATIONS  (STANDING):  ALBUTerol    90 MICROgram(s) HFA Inhaler 1 Puff(s) Inhalation every 4 hours  aspirin enteric coated 81 milliGRAM(s) Oral daily  cholecalciferol 2000 Unit(s) Oral daily  diphenhydrAMINE   Injectable 50 milliGRAM(s) IV Push once  enoxaparin Injectable 40 milliGRAM(s) SubCutaneous daily  hydrochlorothiazide 12.5 milliGRAM(s) Oral daily  levothyroxine 50 MICROGram(s) Oral daily  losartan 50 milliGRAM(s) Oral daily  pantoprazole    Tablet 40 milliGRAM(s) Oral before breakfast  predniSONE   Tablet 20 milliGRAM(s) Oral daily  pyridostigmine 120 milliGRAM(s) Oral <User Schedule>  pyridostigmine 90 milliGRAM(s) Oral <User Schedule>  pyridostigmine  milliGRAM(s) Oral <User Schedule>  pyridostigmine  milliGRAM(s) Oral once  ranitidine 150 milliGRAM(s) Oral at bedtime  rosuvastatin 5 milliGRAM(s) Oral at bedtime  tiotropium 18 MICROgram(s) Capsule 1 Capsule(s) Inhalation daily      PHYSICAL EXAM:  T(C): 36.3 (09-04-19 @ 11:21), Max: 36.7 (09-03-19 @ 21:05)  HR: 51 (09-04-19 @ 11:21) (45 - 51)  BP: 103/51 (09-04-19 @ 11:21) (103/51 - 137/74)  RR: 18 (09-04-19 @ 11:21) (18 - 18)  SpO2: 98% (09-04-19 @ 11:21) (95% - 98%)  Wt(kg): --  I&O's Summary    03 Sep 2019 07:01  -  04 Sep 2019 07:00  --------------------------------------------------------  IN: 240 mL / OUT: 0 mL / NET: 240 mL    04 Sep 2019 07:01  -  04 Sep 2019 20:13  --------------------------------------------------------  IN: 360 mL / OUT: 0 mL / NET: 360 mL          Appearance: NAD  HEENT: dry Oral mucosa, PERRL, EOMI	  Lymphatic: No lymphadenopathy , no edema  Cardiovascular: Normal S1 S2, No JVD, No murmurs , Peripheral pulses palpable 2+ bilaterally  Respiratory: Lungs clear to auscultation, normal effort 	  Gastrointestinal:  Soft, Non-tender, + BS	  Skin: No rashes, No ecchymoses, No cyanosis, warm to touch  Musculoskeletal: Normal range of motion, normal strength  Psychiatry:  Mood & affect appropriate  Ext: No edema      LABS:    CARDIAC MARKERS:                                11.8   5.15  )-----------( 119      ( 04 Sep 2019 08:57 )             35.0     09-04    137  |  105  |  30<H>  ----------------------------<  123<H>  4.5   |  22  |  0.95    Ca    9.7      04 Sep 2019 07:02  Mg     1.8     09-03      proBNP:   Lipid Profile:   HgA1c:   TSH:             TELEMETRY: 	    ECG:  	  RADIOLOGY:   DIAGNOSTIC TESTING:  [ ] Echocardiogram:  [ ]  Catheterization:  [ ] Stress Test:    OTHER:
Subjective: Patient seen and examined. No new events except as noted.   resting comfortably in chair at bedside   no cp or sob   Completed IVIG   REVIEW OF SYSTEMS:    CONSTITUTIONAL: + weakness, fevers or chills  EYES/ENT: No visual changes;  No vertigo or throat pain   NECK: No pain or stiffness  RESPIRATORY: No cough, wheezing, hemoptysis; No shortness of breath  CARDIOVASCULAR: No chest pain or palpitations  GASTROINTESTINAL: No abdominal or epigastric pain. No nausea, vomiting, or hematemesis; No diarrhea or constipation. No melena or hematochezia.  GENITOURINARY: No dysuria, frequency or hematuria  NEUROLOGICAL: No numbness or weakness  SKIN: No itching, burning, rashes, or lesions   All other review of systems is negative unless indicated above.    MEDICATIONS:  MEDICATIONS  (STANDING):  acetaminophen   Tablet .. 650 milliGRAM(s) Oral daily  ALBUTerol    90 MICROgram(s) HFA Inhaler 1 Puff(s) Inhalation every 4 hours  aspirin enteric coated 81 milliGRAM(s) Oral daily  cholecalciferol 2000 Unit(s) Oral daily  diphenhydrAMINE 25 milliGRAM(s) Oral daily  enoxaparin Injectable 40 milliGRAM(s) SubCutaneous daily  hydrochlorothiazide 12.5 milliGRAM(s) Oral daily  levothyroxine 50 MICROGram(s) Oral daily  losartan 50 milliGRAM(s) Oral daily  pantoprazole    Tablet 40 milliGRAM(s) Oral before breakfast  predniSONE   Tablet 10 milliGRAM(s) Oral daily  pyridostigmine 120 milliGRAM(s) Oral <User Schedule>  pyridostigmine 90 milliGRAM(s) Oral <User Schedule>  pyridostigmine  milliGRAM(s) Oral <User Schedule>  pyridostigmine  milliGRAM(s) Oral once  ranitidine 150 milliGRAM(s) Oral at bedtime  rosuvastatin 5 milliGRAM(s) Oral at bedtime  tiotropium 18 MICROgram(s) Capsule 1 Capsule(s) Inhalation daily      PHYSICAL EXAM:  T(C): 36.4 (09-01-19 @ 04:25), Max: 36.7 (08-31-19 @ 13:36)  HR: 44 (09-01-19 @ 04:25) (44 - 54)  BP: 126/66 (09-01-19 @ 04:25) (100/55 - 126/69)  RR: 18 (09-01-19 @ 04:25) (18 - 18)  SpO2: 95% (09-01-19 @ 04:25) (95% - 100%)  Wt(kg): --  I&O's Summary    31 Aug 2019 07:01  -  01 Sep 2019 07:00  --------------------------------------------------------  IN: 440 mL / OUT: 0 mL / NET: 440 mL          Appearance: NAD	  HEENT:   Normal oral mucosa, PERRL, EOMI	  Lymphatic: No lymphadenopathy , no edema  Cardiovascular: Normal S1 S2, No JVD, No murmurs , Peripheral pulses palpable 2+ bilaterally  Respiratory: Lungs clear to auscultation, normal effort 	  Gastrointestinal:  Soft, Non-tender, + BS	  Skin: No rashes, No ecchymoses, No cyanosis, warm to touch  Musculoskeletal: Normal range of motion, normal strength  Psychiatry:  Mood & affect appropriate  Ext: No edema      LABS:    CARDIAC MARKERS:                                11.6   4.4   )-----------( 116      ( 01 Sep 2019 09:35 )             34.4     09-01    136  |  102  |  22  ----------------------------<  118<H>  3.7   |  22  |  1.01    Ca    9.5      01 Sep 2019 09:35  Phos  3.9     08-31  Mg     1.8     08-31    TPro  8.6<H>  /  Alb  3.5  /  TBili  0.4  /  DBili  x   /  AST  22  /  ALT  22  /  AlkPhos  56  09-01    proBNP:   Lipid Profile:   HgA1c:   TSH:             TELEMETRY: 	  SR,SB   ECG:  	  RADIOLOGY:   DIAGNOSTIC TESTING:  [ ] Echocardiogram:  [ ]  Catheterization:  [ ] Stress Test:    OTHER:
Subjective: Patient seen and examined. No new events except as noted.   resting comfortably     REVIEW OF SYSTEMS:    CONSTITUTIONAL:+ weakness, fevers or chills  EYES/ENT: No visual changes;  No vertigo or throat pain   NECK: No pain or stiffness  RESPIRATORY: No cough, wheezing, hemoptysis; No shortness of breath  CARDIOVASCULAR: No chest pain or palpitations  GASTROINTESTINAL: No abdominal or epigastric pain. No nausea, vomiting, or hematemesis; No diarrhea or constipation. No melena or hematochezia.  GENITOURINARY: No dysuria, frequency or hematuria  NEUROLOGICAL: No numbness or weakness  SKIN: No itching, burning, rashes, or lesions   All other review of systems is negative unless indicated above.    MEDICATIONS:  MEDICATIONS  (STANDING):  acetaminophen   Tablet .. 650 milliGRAM(s) Oral daily  ALBUTerol    90 MICROgram(s) HFA Inhaler 1 Puff(s) Inhalation every 4 hours  aspirin enteric coated 81 milliGRAM(s) Oral daily  cholecalciferol 2000 Unit(s) Oral daily  diphenhydrAMINE 25 milliGRAM(s) Oral daily  enoxaparin Injectable 40 milliGRAM(s) SubCutaneous daily  hydrochlorothiazide 12.5 milliGRAM(s) Oral daily  levothyroxine 50 MICROGram(s) Oral daily  losartan 50 milliGRAM(s) Oral daily  pantoprazole    Tablet 40 milliGRAM(s) Oral before breakfast  predniSONE   Tablet 20 milliGRAM(s) Oral daily  pyridostigmine 120 milliGRAM(s) Oral <User Schedule>  pyridostigmine 90 milliGRAM(s) Oral <User Schedule>  pyridostigmine  milliGRAM(s) Oral <User Schedule>  pyridostigmine  milliGRAM(s) Oral once  ranitidine 150 milliGRAM(s) Oral at bedtime  rosuvastatin 5 milliGRAM(s) Oral at bedtime  tiotropium 18 MICROgram(s) Capsule 1 Capsule(s) Inhalation daily      PHYSICAL EXAM:  T(C): 36.4 (09-03-19 @ 04:30), Max: 36.7 (09-02-19 @ 20:59)  HR: 43 (09-03-19 @ 04:30) (43 - 51)  BP: 116/58 (09-03-19 @ 04:30) (116/58 - 128/67)  RR: 18 (09-03-19 @ 04:30) (16 - 18)  SpO2: 94% (09-03-19 @ 04:30) (94% - 98%)  Wt(kg): --  I&O's Summary    02 Sep 2019 07:01  -  03 Sep 2019 07:00  --------------------------------------------------------  IN: 1100 mL / OUT: 0 mL / NET: 1100 mL          Appearance: NAD  HEENT:   Normal oral mucosa, PERRL, EOMI	  Lymphatic: No lymphadenopathy , no edema  Cardiovascular: Normal S1 S2, No JVD, No murmurs , Peripheral pulses palpable 2+ bilaterally  Respiratory: Lungs clear to auscultation, normal effort 	  Gastrointestinal:  Soft, Non-tender, + BS	  Skin: No rashes, No ecchymoses, No cyanosis, warm to touch  Musculoskeletal: Normal range of motion, normal strength  Psychiatry:  Mood & affect appropriate  Ext: No edema      LABS:    CARDIAC MARKERS:            09-03    135  |  104  |  25<H>  ----------------------------<  86  3.4<L>   |  22  |  0.92    Ca    8.9      03 Sep 2019 05:52  Mg     1.8     09-03      proBNP:   Lipid Profile:   HgA1c:   TSH:             TELEMETRY: 	  SB  ECG:  	  RADIOLOGY:   DIAGNOSTIC TESTING:  [ ] Echocardiogram:  [ ]  Catheterization:  [ ] Stress Test:    OTHER:
Subjective: Patient seen and examined. No new events except as noted.   s/p LHC yesterday   feel leana   transient episode of bradycardia to 33 after cath   no cp or sob or dizziness       REVIEW OF SYSTEMS:    CONSTITUTIONAL: +weakness, fevers or chills  EYES/ENT: No visual changes;  No vertigo or throat pain   NECK: No pain or stiffness  RESPIRATORY: No cough, wheezing, hemoptysis; No shortness of breath  CARDIOVASCULAR: No chest pain or palpitations  GASTROINTESTINAL: No abdominal or epigastric pain. No nausea, vomiting, or hematemesis; No diarrhea or constipation. No melena or hematochezia.  GENITOURINARY: No dysuria, frequency or hematuria  NEUROLOGICAL: No numbness or weakness  SKIN: No itching, burning, rashes, or lesions   All other review of systems is negative unless indicated above.    MEDICATIONS:  MEDICATIONS  (STANDING):  ALBUTerol    90 MICROgram(s) HFA Inhaler 1 Puff(s) Inhalation every 4 hours  aspirin enteric coated 81 milliGRAM(s) Oral daily  atropine Injectable 0.5 milliGRAM(s) IV Push once  cholecalciferol 2000 Unit(s) Oral daily  diphenhydrAMINE   Injectable 50 milliGRAM(s) IV Push once  enoxaparin Injectable 40 milliGRAM(s) SubCutaneous daily  hydrochlorothiazide 12.5 milliGRAM(s) Oral daily  levothyroxine 50 MICROGram(s) Oral daily  losartan 50 milliGRAM(s) Oral daily  pantoprazole    Tablet 40 milliGRAM(s) Oral before breakfast  predniSONE   Tablet 20 milliGRAM(s) Oral daily  pyridostigmine 120 milliGRAM(s) Oral <User Schedule>  pyridostigmine 90 milliGRAM(s) Oral <User Schedule>  pyridostigmine  milliGRAM(s) Oral <User Schedule>  pyridostigmine  milliGRAM(s) Oral once  ranitidine 150 milliGRAM(s) Oral at bedtime  rosuvastatin 5 milliGRAM(s) Oral at bedtime  tiotropium 18 MICROgram(s) Capsule 1 Capsule(s) Inhalation daily      PHYSICAL EXAM:  T(C): 36.4 (09-05-19 @ 04:32), Max: 36.6 (09-04-19 @ 20:30)  HR: 41 (09-05-19 @ 04:32) (37 - 51)  BP: 131/72 (09-05-19 @ 04:32) (103/51 - 131/72)  RR: 18 (09-05-19 @ 04:32) (16 - 18)  SpO2: 97% (09-05-19 @ 04:32) (97% - 99%)  Wt(kg): --  I&O's Summary    04 Sep 2019 07:01  -  05 Sep 2019 07:00  --------------------------------------------------------  IN: 600 mL / OUT: 0 mL / NET: 600 mL          Appearance: NAD	  HEENT:   Normal oral mucosa, PERRL, EOMI	  Lymphatic: No lymphadenopathy , no edema  Cardiovascular: Normal S1 S2, No JVD, No murmurs , Peripheral pulses palpable 2+ bilaterally  Respiratory: Lungs clear to auscultation, normal effort 	  Gastrointestinal:  Soft, Non-tender, + BS	  Skin: No rashes, No ecchymoses, No cyanosis, warm to touch  Musculoskeletal: Normal range of motion, normal strength  Psychiatry:  Mood & affect appropriate  Ext: No edema      LABS:    CARDIAC MARKERS:                                11.8   5.15  )-----------( 119      ( 04 Sep 2019 08:57 )             35.0     09-05    137  |  102  |  32<H>  ----------------------------<  93  4.2   |  25  |  1.01    Ca    9.5      05 Sep 2019 07:04      proBNP:   Lipid Profile:   HgA1c:   TSH:             TELEMETRY: 	  briefly down to 33, now ranging 44-50s  ECG:  	  RADIOLOGY:   DIAGNOSTIC TESTING:  [ ] Echocardiogram:  [ ]  Catheterization:    [ ] Stress Test:    OTHER:

## 2019-09-05 NOTE — PROGRESS NOTE ADULT - PROBLEM SELECTOR PROBLEM 2
Myasthenia gravis
Elevated troponin
Myasthenia gravis

## 2019-09-06 NOTE — H&P PST ADULT - HEART RATE (BEATS/MIN)
SUBJECTIVE:   Chief Complaint   Patient presents with     Urgent Care     Rash     c/o rash for 2 weeks     Milan Escobar is a 41 year old male who presents for evaluation of multiple areas of redness, tenderness, swelling and warmth of the skin that developed on the  bilateral, entire  Arms, thighs, chest, abdomen, neck, beard.  He had MRSA exposure from his wife, but he also has tendency to pick at pustules and wounds and scabs.  Patient has had skin erythema (reddened skin), tenderness and open sores especially worse for 2 weeks.  -  But has had similar condition in the past  Precipitating event was - he says it worsened after having sex with wife with MRSA pustules in axilla,  .    Therapies tried: none.    Past Medical History:   Diagnosis Date     Acne      Asthma         ALLERGIES:  Patient has no known allergies.      Current Outpatient Medications on File Prior to Visit:  ALBUTEROL SULFATE 108 (90 BASE) MCG/ACT IN AERS 2 puff q 4-6 hours PRN wheezing (Patient not taking: No sig reported)   Finasteride, [id number], (PROPECIA PO) Take  by mouth.   isotretinoin (ACCUTANE) 40 MG capsule Take 2 capsules by mouth daily.   neomycin-bacitracin-polymyxin (NEOSPORIN) ophthalmic ointment Apply to L eye before bed (Patient not taking: Reported on 2019)   [] sulfamethoxazole-trimethoprim (BACTRIM DS/SEPTRA DS) 800-160 MG tablet Take 1 tablet by mouth 2 times daily for 21 days     No current facility-administered medications on file prior to visit.     Social History     Tobacco Use     Smoking status: Former Smoker     Last attempt to quit: 2012     Years since quittin.6     Smokeless tobacco: Never Used   Substance Use Topics     Alcohol use: Not on file     Family History:  Non-contributory,  No associated family health conditions      ROS:  CONSTITUTIONAL:NEGATIVE for fever, chills,    EYES: NEGATIVE for vision changes or irritation  ENT/MOUTH: NEGATIVE for ear, mouth and throat  problems  RESP:NEGATIVE for significant cough or SOB  GI: NEGATIVE for nausea, abdominal pain,  or change in bowel habits    OBJECTIVE:  /81   Pulse 88   Temp 98.1  F (36.7  C) (Oral)   Wt 89.8 kg (198 lb)   SpO2 99%   BMI 26.85 kg/m      SKIN: area involved is about 50 areas of ulcerations, scabs, infected nodules that are picked   Most about 2 cm by 3 cm on the bilateral,  entire arms, thighs, abdomen, chest, neck, beard.   The affected skin appear erythematous, moderately swollen, with tenderness and warmth to the touch with open sores, some scabbed, some with mild oozing       GENERAL:  Alert, mild distress  EYES: EOMI,   conjunctiva clear  HENT: External ears with no swelling or lesions   Nose and lips without  Swelling, ulcers, erythema or lesions  NECK: normal pain free ROM  RESP: no labored respirations, no tachypnea  EXTREMITIES:   Full ROM without expression of pain or limitation x 4 extremities  NEURO: Normal strength and tone, ambulation without difficulty,   normal speech and mentation  PSYCH: mentation and affect appears normal and patient appearance--appropriately groomed     ASSESSMENT:  Folliculitis     - doxycycline hyclate (VIBRAMYCIN) 100 MG capsule; Take 1 capsule (100 mg) by mouth 2 times daily  - mupirocin (BACTROBAN) 2 % external ointment; Apply topically 3 times daily    MRSA exposure    patient to monitor for signs or symptoms of worsening/ spreading infection.  Go to Urgent care or Emergency Department if worsening fevers/chills and/or spreading infection.  Warm, moist packs or towels can be applied to the region to aid in resolution of the infection.  Use Tylenol or ibuprofen for pain or fever.   try to avoid picking as much as possible  Given a bottle of antiseptic wash   63

## 2019-09-10 ENCOUNTER — APPOINTMENT (OUTPATIENT)
Dept: NEUROLOGY | Facility: CLINIC | Age: 79
End: 2019-09-10
Payer: MEDICARE

## 2019-09-10 VITALS
DIASTOLIC BLOOD PRESSURE: 66 MMHG | SYSTOLIC BLOOD PRESSURE: 116 MMHG | BODY MASS INDEX: 29.18 KG/M2 | WEIGHT: 139 LBS | HEART RATE: 76 BPM | HEIGHT: 58 IN

## 2019-09-10 PROCEDURE — 99213 OFFICE O/P EST LOW 20 MIN: CPT

## 2019-09-10 NOTE — HISTORY OF PRESENT ILLNESS
[FreeTextEntry1] : Patient presents for evaluation of myasthenia gravis.  She and daughter state that about a year ago started having hoarse voice, choking on food and fatigue with chewing, these symptoms would come and go, some arm and leg weakness, no SOB, and had left ptosis but no diplopia.  Saw ENT and she was referred to a neurologist.  She was tested and had positive AchR antibodies and was diagnosed with MG.  She was started on mestinon 60mg BID and it helped a lot.  Gets head drop lately\par \par Sometime about June she started get SOB with exertion.  Chest CT negative for thymoma per daughter.  SOB progressed to at rest by August and was admitted to [facility].  She was treated with IVIg, started prednisone 10mg and was DC'd on 20mg prednisone and mestinon 120mg/90mg/180mg timespan QHS.  \par \par Tramadol helps for chronic neck pain.   No FH of myasthenia gravis

## 2019-09-10 NOTE — ASSESSMENT
[FreeTextEntry1] : Patient has seropositive generalized MG, which is not currently well controlled on steroids and after one cycle of IVIg.  \par \par Would continue prednisone 20mg QD, change mestinon to 180mg timespan QAM, and 60mg TID\par \par She is an ideal candidate for Soliris and will get copy of the AchR ab levels, and refer to ID for vaccination against meningococcus. \par \par Tramadol 50mg QHS for neck pain. \par \par Will return two weeks after first Soliris.

## 2019-09-10 NOTE — PHYSICAL EXAM
[General Appearance - Alert] : alert [General Appearance - In No Acute Distress] : in no acute distress [Person] : oriented to person [Place] : oriented to place [Time] : oriented to time [Short Term Intact] : short term memory intact [Remote Intact] : remote memory intact [Registration Intact] : recent registration memory intact [Concentration Intact] : normal concentrating ability [Visual Intact] : visual attention was ~T not ~L decreased [Naming Objects] : no difficulty naming common objects [Repeating Phrases] : no difficulty repeating a phrase [Writing A Sentence] : no difficulty writing a sentence [Fluency] : fluency intact [Comprehension] : comprehension intact [Reading] : reading intact [Past History] : adequate knowledge of personal past history [Cranial Nerves Optic (II)] : visual acuity intact bilaterally,  visual fields full to confrontation, pupils equal round and reactive to light [Cranial Nerves Oculomotor (III)] : extraocular motion intact [Cranial Nerves Trigeminal (V)] : facial sensation intact symmetrically [Cranial Nerves Facial (VII)] : face symmetrical [Cranial Nerves Vestibulocochlear (VIII)] : hearing was intact bilaterally [Cranial Nerves Glossopharyngeal (IX)] : tongue and palate midline [Cranial Nerves Accessory (XI - Cranial And Spinal)] : head turning and shoulder shrug symmetric [Cranial Nerves Hypoglossal (XII)] : there was no tongue deviation with protrusion [Motor Tone] : muscle tone was normal in all four extremities [No Muscle Atrophy] : normal bulk in all four extremities [Motor Handedness Right-Handed] : the patient is right hand dominant [Hand Weakness Right] : the hand  was weak [Hand Weakness Left] : normal hand  [4] : hip flexion 4/5 [+4] : knee flexion +4/5 [5] : ankle inversion 5/5 [Abnormal Walk] : normal gait [Sensation Tactile Decrease] : light touch was intact [Balance] : balance was intact [Past-pointing] : there was no past-pointing [Tremor] : no tremor present [2+] : Ankle jerk left 2+ [Plantar Reflex Right Only] : normal on the right [Plantar Reflex Left Only] : normal on the left [FreeTextEntry6] : MG-ADL 7

## 2019-09-11 ENCOUNTER — APPOINTMENT (OUTPATIENT)
Dept: INFECTIOUS DISEASE | Facility: CLINIC | Age: 79
End: 2019-09-11
Payer: MEDICARE

## 2019-09-11 VITALS
OXYGEN SATURATION: 96 % | HEART RATE: 62 BPM | TEMPERATURE: 97.3 F | SYSTOLIC BLOOD PRESSURE: 116 MMHG | BODY MASS INDEX: 29.39 KG/M2 | HEIGHT: 58 IN | WEIGHT: 140 LBS | DIASTOLIC BLOOD PRESSURE: 68 MMHG

## 2019-09-11 DIAGNOSIS — D84.1 DEFECTS IN THE COMPLEMENT SYSTEM: ICD-10-CM

## 2019-09-11 DIAGNOSIS — D84.8: ICD-10-CM

## 2019-09-11 DIAGNOSIS — T50.905A: ICD-10-CM

## 2019-09-11 PROCEDURE — 90734 MENACWYD/MENACWYCRM VACC IM: CPT | Mod: GY

## 2019-09-11 PROCEDURE — 90471 IMMUNIZATION ADMIN: CPT | Mod: GY

## 2019-09-11 PROCEDURE — 90620 MENB-4C VACCINE IM: CPT | Mod: GY

## 2019-09-11 PROCEDURE — 90472 IMMUNIZATION ADMIN EACH ADD: CPT | Mod: GY

## 2019-09-11 PROCEDURE — 99203 OFFICE O/P NEW LOW 30 MIN: CPT | Mod: 25

## 2019-09-11 NOTE — CONSULT LETTER
[Dear  ___] : Dear  [unfilled], [( Thank you for referring [unfilled] for consultation for _____ )] : Thank you for referring [unfilled] for consultation for [unfilled] [Please see my note below.] : Please see my note below. [FreeTextEntry2] : Dr. Baron Fitzgerald [FreeTextEntry3] : Thank you for allowing me to participate in the care of this patient.  Please feel free to contact me with any questions.\par \par Sincerely, \par Michael I. Oppenheim, MD\par \par

## 2019-09-11 NOTE — ASSESSMENT
[FreeTextEntry1] : Myesthenia gravis to be treated with Soliris:\par \par To administer both Meningococcal 4-valent (ACWY) and meningococcal B first vaccination today.  Repeat meningococcal B in 1 month, 4-valent in 2 months.\par \par Conjugate and Polysaccharide pneuomococcal vaccines already administered\par \par No exposure to unvaccinated children - no need to vaccinate for haemophilus influenza.\par \par Two week period prior to Soliris planned.  No need for prophylactic antibiotics at start of treatment.

## 2019-09-11 NOTE — HISTORY OF PRESENT ILLNESS
[FreeTextEntry1] : 78 year old with Mysethenia Gravis originally diagnosed in 2018, recently in hospital for myasthenia gravis exacerbation, got IVIg, discharged 9/5.  Hospitalization had been complicated by need for cardiac workup b/o bradycardia and mildly elevated troponin; normal cardiac catheterization.  Seen by cardiologist as outpatient visit and awaiting holter.\par \par Referred from hospital to Dr. Fitzgerald and is being considered for Soliris; plan to await 2 weeks after vaccination then being treatment. \par \par Had been receiving pneumonia vaccine q5 years (although hasn't had in a while) - likely the polysaccaride per daughter (nurse).  Record show conjugate vaccine (Prevnar) in 2016.\par \par Got Zostavax in 2017.\par \par No fever, chills.  No rash.  \par \par Lives with  and son, own home.  No small children in house.  No exposure to unvaccinated children.

## 2019-09-11 NOTE — PHYSICAL EXAM
[General Appearance - Alert] : alert [Sclera] : the sclera and conjunctiva were normal [General Appearance - In No Acute Distress] : in no acute distress [PERRL With Normal Accommodation] : pupils were equal in size, round, reactive to light [Outer Ear] : the ears and nose were normal in appearance [Oropharynx] : the oropharynx was normal with no thrush [Neck Appearance] : the appearance of the neck was normal [Neck Cervical Mass (___cm)] : no neck mass was observed [Heart Rate And Rhythm] : heart rate was normal and rhythm regular [Auscultation Breath Sounds / Voice Sounds] : lungs were clear to auscultation bilaterally [Heart Sounds Gallop] : no gallops [Heart Sounds] : normal S1 and S2 [Heart Sounds Pericardial Friction Rub] : no pericardial rub [Murmurs] : no murmurs [Bowel Sounds] : normal bowel sounds [Abdomen Soft] : soft [Abdomen Tenderness] : non-tender [Abdomen Mass (___ Cm)] : no abdominal mass palpated [No Palpable Adenopathy] : no palpable adenopathy [Skin Color & Pigmentation] : normal skin color and pigmentation [] : no rash [Skin Lesions] : no skin lesions

## 2019-09-11 NOTE — REASON FOR VISIT
[Consultation] : a consultation visit [Family Member] : family member [FreeTextEntry1] : Evaluation for vaccination prior to Soliris

## 2019-09-11 NOTE — REVIEW OF SYSTEMS
[Constipation] : constipation [Diarrhea] : diarrhea [Body Aches] : body aches [Feeling Tired] : feeling tired [Normal Appetite] : normal appetite [Shortness Of Breath] : shortness of breath [Fever] : no fever [Chills] : no chills [Eye Pain] : no eye pain [Red Eyes] : eyes not red [Discharge From Eyes] : no purulent discharge from the eyes [Earache] : no earache [Loss Of Hearing] : no hearing loss [Nasal Discharge] : no nasal discharge [Chest Pain] : no chest pain [Palpitations] : no palpitations [Lower Ext Edema] : no lower extremity edema [Wheezing] : no wheezing [Cough] : no cough [Dysuria] : no dysuria [Skin Lesions] : no skin lesions [Confused] : no confusion [Skin Wound] : no skin wound [Easy Bleeding] : no tendency for easy bleeding [Easy Bruising] : no tendency for easy bruising [FreeTextEntry6] : Nighttime throat clearing, white mucus, when lays down or sites back.  No cough.  [FreeTextEntry7] : diarrhea with mestinon.  Constipation x years

## 2019-09-13 ENCOUNTER — MEDICATION RENEWAL (OUTPATIENT)
Age: 79
End: 2019-09-13

## 2019-09-19 PROCEDURE — 93005 ELECTROCARDIOGRAM TRACING: CPT

## 2019-09-19 PROCEDURE — 82330 ASSAY OF CALCIUM: CPT

## 2019-09-19 PROCEDURE — 97116 GAIT TRAINING THERAPY: CPT

## 2019-09-19 PROCEDURE — 86041 ACETYLCHOLN RCPTR BNDNG ANTB: CPT

## 2019-09-19 PROCEDURE — 82435 ASSAY OF BLOOD CHLORIDE: CPT

## 2019-09-19 PROCEDURE — 86366 MUSCLE-SPECIFIC KINASE ANTB: CPT

## 2019-09-19 PROCEDURE — 82947 ASSAY GLUCOSE BLOOD QUANT: CPT

## 2019-09-19 PROCEDURE — 97161 PT EVAL LOW COMPLEX 20 MIN: CPT

## 2019-09-19 PROCEDURE — 85027 COMPLETE CBC AUTOMATED: CPT

## 2019-09-19 PROCEDURE — 84443 ASSAY THYROID STIM HORMONE: CPT

## 2019-09-19 PROCEDURE — 84100 ASSAY OF PHOSPHORUS: CPT

## 2019-09-19 PROCEDURE — C1887: CPT

## 2019-09-19 PROCEDURE — 93454 CORONARY ARTERY ANGIO S&I: CPT

## 2019-09-19 PROCEDURE — 80053 COMPREHEN METABOLIC PANEL: CPT

## 2019-09-19 PROCEDURE — 93306 TTE W/DOPPLER COMPLETE: CPT

## 2019-09-19 PROCEDURE — 82803 BLOOD GASES ANY COMBINATION: CPT

## 2019-09-19 PROCEDURE — 94150 VITAL CAPACITY TEST: CPT

## 2019-09-19 PROCEDURE — C1769: CPT

## 2019-09-19 PROCEDURE — 99285 EMERGENCY DEPT VISIT HI MDM: CPT | Mod: 25

## 2019-09-19 PROCEDURE — 84132 ASSAY OF SERUM POTASSIUM: CPT

## 2019-09-19 PROCEDURE — 99152 MOD SED SAME PHYS/QHP 5/>YRS: CPT

## 2019-09-19 PROCEDURE — 84295 ASSAY OF SERUM SODIUM: CPT

## 2019-09-19 PROCEDURE — 84479 ASSAY OF THYROID (T3 OR T4): CPT

## 2019-09-19 PROCEDURE — 86042 ACETYLCHOLN RCPTR BLCKG ANTB: CPT

## 2019-09-19 PROCEDURE — 84436 ASSAY OF TOTAL THYROXINE: CPT

## 2019-09-19 PROCEDURE — 97110 THERAPEUTIC EXERCISES: CPT

## 2019-09-19 PROCEDURE — 84238 ASSAY NONENDOCRINE RECEPTOR: CPT

## 2019-09-19 PROCEDURE — 83735 ASSAY OF MAGNESIUM: CPT

## 2019-09-19 PROCEDURE — 85730 THROMBOPLASTIN TIME PARTIAL: CPT

## 2019-09-19 PROCEDURE — 72141 MRI NECK SPINE W/O DYE: CPT

## 2019-09-19 PROCEDURE — 83605 ASSAY OF LACTIC ACID: CPT

## 2019-09-19 PROCEDURE — 85610 PROTHROMBIN TIME: CPT

## 2019-09-19 PROCEDURE — C1894: CPT

## 2019-09-19 PROCEDURE — 85014 HEMATOCRIT: CPT

## 2019-09-19 PROCEDURE — 84484 ASSAY OF TROPONIN QUANT: CPT

## 2019-09-19 PROCEDURE — 80048 BASIC METABOLIC PNL TOTAL CA: CPT

## 2019-09-19 PROCEDURE — 83880 ASSAY OF NATRIURETIC PEPTIDE: CPT

## 2019-09-19 PROCEDURE — 71045 X-RAY EXAM CHEST 1 VIEW: CPT

## 2019-09-19 PROCEDURE — 86043 ACETYLCHOLN RCPTR MODLG ANTB: CPT

## 2019-09-27 ENCOUNTER — APPOINTMENT (OUTPATIENT)
Dept: NEUROLOGY | Facility: CLINIC | Age: 79
End: 2019-09-27
Payer: MEDICARE

## 2019-09-27 VITALS — RESPIRATION RATE: 16 BRPM | HEART RATE: 62 BPM | SYSTOLIC BLOOD PRESSURE: 97 MMHG | DIASTOLIC BLOOD PRESSURE: 55 MMHG

## 2019-09-27 PROCEDURE — 96413 CHEMO IV INFUSION 1 HR: CPT

## 2019-09-27 PROCEDURE — 96415 CHEMO IV INFUSION ADDL HR: CPT

## 2019-09-27 RX ORDER — ECULIZUMAB 300 MG/30ML
300 INJECTION, SOLUTION, CONCENTRATE INTRAVENOUS
Refills: 0 | Status: COMPLETED | OUTPATIENT
Start: 2019-09-27

## 2019-09-27 RX ADMIN — ECULIZUMAB 0 MG/30ML: 300 INJECTION, SOLUTION, CONCENTRATE INTRAVENOUS at 00:00

## 2019-10-03 ENCOUNTER — APPOINTMENT (OUTPATIENT)
Dept: NEUROLOGY | Facility: CLINIC | Age: 79
End: 2019-10-03
Payer: MEDICARE

## 2019-10-03 PROCEDURE — 96415 CHEMO IV INFUSION ADDL HR: CPT

## 2019-10-03 PROCEDURE — 96413 CHEMO IV INFUSION 1 HR: CPT

## 2019-10-09 ENCOUNTER — APPOINTMENT (OUTPATIENT)
Dept: NEUROLOGY | Facility: CLINIC | Age: 79
End: 2019-10-09
Payer: MEDICARE

## 2019-10-09 VITALS — SYSTOLIC BLOOD PRESSURE: 103 MMHG | DIASTOLIC BLOOD PRESSURE: 56 MMHG | HEART RATE: 52 BPM | RESPIRATION RATE: 16 BRPM

## 2019-10-09 PROCEDURE — 96413 CHEMO IV INFUSION 1 HR: CPT

## 2019-10-09 RX ORDER — ECULIZUMAB 300 MG/30ML
300 INJECTION, SOLUTION, CONCENTRATE INTRAVENOUS
Refills: 0 | Status: COMPLETED | OUTPATIENT
Start: 2019-10-09

## 2019-10-09 RX ADMIN — ECULIZUMAB 0 MG/30ML: 300 INJECTION, SOLUTION, CONCENTRATE INTRAVENOUS at 00:00

## 2019-10-16 ENCOUNTER — APPOINTMENT (OUTPATIENT)
Dept: INFECTIOUS DISEASE | Facility: CLINIC | Age: 79
End: 2019-10-16

## 2019-10-16 ENCOUNTER — MED ADMIN CHARGE (OUTPATIENT)
Age: 79
End: 2019-10-16

## 2019-10-17 ENCOUNTER — APPOINTMENT (OUTPATIENT)
Dept: NEUROLOGY | Facility: CLINIC | Age: 79
End: 2019-10-17
Payer: MEDICARE

## 2019-10-17 VITALS — RESPIRATION RATE: 16 BRPM | HEART RATE: 59 BPM | SYSTOLIC BLOOD PRESSURE: 105 MMHG | DIASTOLIC BLOOD PRESSURE: 55 MMHG

## 2019-10-17 PROCEDURE — 96413 CHEMO IV INFUSION 1 HR: CPT

## 2019-10-17 RX ORDER — ECULIZUMAB 300 MG/30ML
300 INJECTION, SOLUTION, CONCENTRATE INTRAVENOUS
Refills: 0 | Status: COMPLETED | OUTPATIENT
Start: 2019-10-17

## 2019-10-17 RX ADMIN — ECULIZUMAB 0 MG/30ML: 300 INJECTION, SOLUTION, CONCENTRATE INTRAVENOUS at 00:00

## 2019-10-23 ENCOUNTER — APPOINTMENT (OUTPATIENT)
Dept: NEUROLOGY | Facility: CLINIC | Age: 79
End: 2019-10-23
Payer: MEDICARE

## 2019-10-23 VITALS — SYSTOLIC BLOOD PRESSURE: 106 MMHG | HEART RATE: 51 BPM | DIASTOLIC BLOOD PRESSURE: 59 MMHG | RESPIRATION RATE: 16 BRPM

## 2019-10-23 PROCEDURE — 96413 CHEMO IV INFUSION 1 HR: CPT

## 2019-10-23 RX ORDER — ECULIZUMAB 300 MG/30ML
300 INJECTION, SOLUTION, CONCENTRATE INTRAVENOUS
Refills: 0 | Status: COMPLETED | OUTPATIENT
Start: 2019-10-23

## 2019-10-23 RX ADMIN — ECULIZUMAB 0 MG/30ML: 300 INJECTION, SOLUTION, CONCENTRATE INTRAVENOUS at 00:00

## 2019-11-08 ENCOUNTER — APPOINTMENT (OUTPATIENT)
Dept: NEUROLOGY | Facility: CLINIC | Age: 79
End: 2019-11-08
Payer: MEDICARE

## 2019-11-08 VITALS — RESPIRATION RATE: 16 BRPM | HEART RATE: 65 BPM | DIASTOLIC BLOOD PRESSURE: 57 MMHG | SYSTOLIC BLOOD PRESSURE: 108 MMHG

## 2019-11-08 PROCEDURE — 96413 CHEMO IV INFUSION 1 HR: CPT

## 2019-11-08 RX ORDER — ECULIZUMAB 300 MG/30ML
300 INJECTION, SOLUTION, CONCENTRATE INTRAVENOUS
Refills: 0 | Status: COMPLETED | OUTPATIENT
Start: 2019-11-08

## 2019-11-08 RX ADMIN — ECULIZUMAB 0 MG/30ML: 300 INJECTION, SOLUTION, CONCENTRATE INTRAVENOUS at 00:00

## 2019-11-14 ENCOUNTER — APPOINTMENT (OUTPATIENT)
Dept: INFECTIOUS DISEASE | Facility: CLINIC | Age: 79
End: 2019-11-14
Payer: MEDICARE

## 2019-11-14 PROCEDURE — 90471 IMMUNIZATION ADMIN: CPT

## 2019-11-14 PROCEDURE — 90734 MENACWYD/MENACWYCRM VACC IM: CPT | Mod: GY

## 2019-11-15 ENCOUNTER — APPOINTMENT (OUTPATIENT)
Dept: INTERNAL MEDICINE | Facility: CLINIC | Age: 79
End: 2019-11-15
Payer: MEDICARE

## 2019-11-15 ENCOUNTER — LABORATORY RESULT (OUTPATIENT)
Age: 79
End: 2019-11-15

## 2019-11-15 VITALS
OXYGEN SATURATION: 99 % | HEIGHT: 58 IN | WEIGHT: 142 LBS | BODY MASS INDEX: 29.81 KG/M2 | DIASTOLIC BLOOD PRESSURE: 73 MMHG | HEART RATE: 59 BPM | SYSTOLIC BLOOD PRESSURE: 144 MMHG | TEMPERATURE: 98.2 F | RESPIRATION RATE: 14 BRPM

## 2019-11-15 VITALS — DIASTOLIC BLOOD PRESSURE: 70 MMHG | SYSTOLIC BLOOD PRESSURE: 110 MMHG

## 2019-11-15 DIAGNOSIS — M16.9 OSTEOARTHRITIS OF HIP, UNSPECIFIED: ICD-10-CM

## 2019-11-15 PROCEDURE — 99214 OFFICE O/P EST MOD 30 MIN: CPT

## 2019-11-15 NOTE — COUNSELING
[Fall prevention counseling provided] : Fall prevention counseling provided [Use recommended devices] : Use recommended devices [None] : None [Good understanding] : Patient has a good understanding of lifestyle changes and steps needed to achieve self management goal

## 2019-11-16 NOTE — PHYSICAL EXAM
[No Acute Distress] : no acute distress [Well Nourished] : well nourished [Well Developed] : well developed [Well-Appearing] : well-appearing [Normal Sclera/Conjunctiva] : normal sclera/conjunctiva [PERRL] : pupils equal round and reactive to light [EOMI] : extraocular movements intact [Normal Outer Ear/Nose] : the outer ears and nose were normal in appearance [No JVD] : no jugular venous distention [Normal Oropharynx] : the oropharynx was normal [No Lymphadenopathy] : no lymphadenopathy [Supple] : supple [Thyroid Normal, No Nodules] : the thyroid was normal and there were no nodules present [No Respiratory Distress] : no respiratory distress  [No Accessory Muscle Use] : no accessory muscle use [Clear to Auscultation] : lungs were clear to auscultation bilaterally [Normal Rate] : normal rate  [Regular Rhythm] : with a regular rhythm [Normal S1, S2] : normal S1 and S2 [No Murmur] : no murmur heard [No Abdominal Bruit] : a ~M bruit was not heard ~T in the abdomen [No Carotid Bruits] : no carotid bruits [No Varicosities] : no varicosities [Pedal Pulses Present] : the pedal pulses are present [No Edema] : there was no peripheral edema [No Palpable Aorta] : no palpable aorta [Soft] : abdomen soft [No Extremity Clubbing/Cyanosis] : no extremity clubbing/cyanosis [Non Tender] : non-tender [Non-distended] : non-distended [No Masses] : no abdominal mass palpated [No HSM] : no HSM [Normal Bowel Sounds] : normal bowel sounds [Normal Posterior Cervical Nodes] : no posterior cervical lymphadenopathy [Normal Anterior Cervical Nodes] : no anterior cervical lymphadenopathy [No CVA Tenderness] : no CVA  tenderness [No Spinal Tenderness] : no spinal tenderness [No Joint Swelling] : no joint swelling [Grossly Normal Strength/Tone] : grossly normal strength/tone [No Rash] : no rash [Coordination Grossly Intact] : coordination grossly intact [No Focal Deficits] : no focal deficits [Normal Insight/Judgement] : insight and judgment were intact [Normal Affect] : the affect was normal [Alert and Oriented x3] : oriented to person, place, and time [de-identified] : PAIN WITH ROM RIGHT HIP [de-identified] : NEEDS ASSISTANCE

## 2019-11-16 NOTE — REVIEW OF SYSTEMS
[Joint Pain] : joint pain [Joint Stiffness] : joint stiffness [Unsteady Walking] : ataxia [Negative] : Heme/Lymph [Anxiety] : anxiety [Joint Swelling] : no joint swelling [Muscle Weakness] : no muscle weakness [Muscle Pain] : no muscle pain [Back Pain] : no back pain [Dizziness] : no dizziness [Headache] : no headache [Fainting] : no fainting [Confusion] : no confusion [Memory Loss] : no memory loss [Suicidal] : not suicidal [Depression] : no depression [FreeTextEntry9] : RIGHT HIP [de-identified] : NEEDS ASSISTANCE

## 2019-11-17 LAB
25(OH)D3 SERPL-MCNC: 54.6 NG/ML
ALBUMIN SERPL ELPH-MCNC: 3.7 G/DL
ALP BLD-CCNC: 51 U/L
ALT SERPL-CCNC: 18 U/L
ANION GAP SERPL CALC-SCNC: 12 MMOL/L
APPEARANCE: CLEAR
AST SERPL-CCNC: 19 U/L
B BURGDOR IGG+IGM SER QL IB: NORMAL
BILIRUB SERPL-MCNC: 0.6 MG/DL
BILIRUBIN URINE: NEGATIVE
BLOOD URINE: NEGATIVE
BUN SERPL-MCNC: 20 MG/DL
CALCIUM SERPL-MCNC: 9.4 MG/DL
CHLORIDE SERPL-SCNC: 103 MMOL/L
CHOLEST SERPL-MCNC: 193 MG/DL
CHOLEST/HDLC SERPL: 3.4 RATIO
CO2 SERPL-SCNC: 26 MMOL/L
COLOR: YELLOW
CREAT SERPL-MCNC: 0.98 MG/DL
CREAT SPEC-SCNC: 192 MG/DL
ERYTHROCYTE [SEDIMENTATION RATE] IN BLOOD BY WESTERGREN METHOD: 46 MM/HR
ESTIMATED AVERAGE GLUCOSE: 114 MG/DL
FERRITIN SERPL-MCNC: 41 NG/ML
FOLATE SERPL-MCNC: >20 NG/ML
GLUCOSE QUALITATIVE U: NEGATIVE
GLUCOSE SERPL-MCNC: 82 MG/DL
GLUCOSE SERPL-MCNC: 89 MG/DL
HBA1C MFR BLD HPLC: 5.6 %
HDLC SERPL-MCNC: 57 MG/DL
KETONES URINE: NEGATIVE
LDLC SERPL CALC-MCNC: 102 MG/DL
LEUKOCYTE ESTERASE URINE: NEGATIVE
MICROALBUMIN 24H UR DL<=1MG/L-MCNC: <1.2 MG/DL
MICROALBUMIN/CREAT 24H UR-RTO: NORMAL MG/G
NITRITE URINE: NEGATIVE
PH URINE: 6.5
POTASSIUM SERPL-SCNC: 3.5 MMOL/L
PROT SERPL-MCNC: 6.2 G/DL
PROTEIN URINE: NORMAL
SODIUM SERPL-SCNC: 141 MMOL/L
SPECIFIC GRAVITY URINE: 1.03
T4 FREE SERPL-MCNC: 1.5 NG/DL
TRIGL SERPL-MCNC: 170 MG/DL
TSH SERPL-ACNC: 1.15 UIU/ML
UROBILINOGEN URINE: NORMAL
VIT B12 SERPL-MCNC: 1097 PG/ML

## 2019-11-22 ENCOUNTER — APPOINTMENT (OUTPATIENT)
Dept: NEUROLOGY | Facility: CLINIC | Age: 79
End: 2019-11-22
Payer: MEDICARE

## 2019-11-22 PROCEDURE — 96413 CHEMO IV INFUSION 1 HR: CPT

## 2019-11-25 LAB
BASOPHILS # BLD AUTO: 0.02 K/UL
BASOPHILS NFR BLD AUTO: 0.3 %
EOSINOPHIL # BLD AUTO: 0.07 K/UL
EOSINOPHIL NFR BLD AUTO: 0.9 %
HCT VFR BLD CALC: 36.7 %
HGB BLD-MCNC: 11.8 G/DL
IMM GRANULOCYTES NFR BLD AUTO: 0.8 %
LYMPHOCYTES # BLD AUTO: 1.42 K/UL
LYMPHOCYTES NFR BLD AUTO: 18.3 %
MAN DIFF?: NORMAL
MCHC RBC-ENTMCNC: 30.3 PG
MCHC RBC-ENTMCNC: 32.2 GM/DL
MCV RBC AUTO: 94.3 FL
MONOCYTES # BLD AUTO: 0.83 K/UL
MONOCYTES NFR BLD AUTO: 10.7 %
NEUTROPHILS # BLD AUTO: 5.34 K/UL
NEUTROPHILS NFR BLD AUTO: 69 %
PLATELET # BLD AUTO: 153 K/UL
RBC # BLD: 3.89 M/UL
RBC # FLD: 13.6 %
WBC # FLD AUTO: 7.74 K/UL

## 2019-12-02 ENCOUNTER — APPOINTMENT (OUTPATIENT)
Dept: ORTHOPEDIC SURGERY | Facility: CLINIC | Age: 79
End: 2019-12-02
Payer: MEDICARE

## 2019-12-02 VITALS — HEIGHT: 58 IN | WEIGHT: 142 LBS | BODY MASS INDEX: 29.81 KG/M2

## 2019-12-02 PROCEDURE — 99214 OFFICE O/P EST MOD 30 MIN: CPT

## 2019-12-02 PROCEDURE — 73502 X-RAY EXAM HIP UNI 2-3 VIEWS: CPT | Mod: RT

## 2019-12-03 ENCOUNTER — APPOINTMENT (OUTPATIENT)
Dept: ORTHOPEDIC SURGERY | Facility: CLINIC | Age: 79
End: 2019-12-03
Payer: MEDICARE

## 2019-12-03 VITALS
HEIGHT: 58 IN | DIASTOLIC BLOOD PRESSURE: 68 MMHG | HEART RATE: 56 BPM | BODY MASS INDEX: 29.81 KG/M2 | SYSTOLIC BLOOD PRESSURE: 111 MMHG | WEIGHT: 142 LBS

## 2019-12-03 PROCEDURE — 99214 OFFICE O/P EST MOD 30 MIN: CPT

## 2019-12-05 ENCOUNTER — APPOINTMENT (OUTPATIENT)
Dept: NEUROLOGY | Facility: CLINIC | Age: 79
End: 2019-12-05
Payer: MEDICARE

## 2019-12-05 PROCEDURE — 96413 CHEMO IV INFUSION 1 HR: CPT

## 2019-12-05 RX ORDER — ECULIZUMAB 300 MG/30ML
300 INJECTION, SOLUTION, CONCENTRATE INTRAVENOUS
Refills: 0 | Status: COMPLETED | OUTPATIENT
Start: 2019-12-05

## 2019-12-05 RX ADMIN — ECULIZUMAB 0 MG/30ML: 300 INJECTION, SOLUTION, CONCENTRATE INTRAVENOUS at 00:00

## 2019-12-11 ENCOUNTER — APPOINTMENT (OUTPATIENT)
Dept: NEUROLOGY | Facility: CLINIC | Age: 79
End: 2019-12-11
Payer: MEDICARE

## 2019-12-11 ENCOUNTER — MOBILE ON CALL (OUTPATIENT)
Age: 79
End: 2019-12-11

## 2019-12-11 VITALS
HEIGHT: 58 IN | HEART RATE: 64 BPM | BODY MASS INDEX: 30.64 KG/M2 | WEIGHT: 146 LBS | SYSTOLIC BLOOD PRESSURE: 127 MMHG | DIASTOLIC BLOOD PRESSURE: 62 MMHG

## 2019-12-11 PROCEDURE — 99213 OFFICE O/P EST LOW 20 MIN: CPT

## 2019-12-11 NOTE — HISTORY OF PRESENT ILLNESS
[FreeTextEntry1] : Patient doing very well on Soliris.  She complains of ankle swelling from the prednisone. \par \par Prednisone 20mg QD, mestinon 180mg BID, 60mg TID

## 2019-12-11 NOTE — PHYSICAL EXAM
[Person] : oriented to person [Place] : oriented to place [Time] : oriented to time [Short Term Intact] : short term memory intact [Remote Intact] : remote memory intact [Registration Intact] : recent registration memory intact [Concentration Intact] : normal concentrating ability [Visual Intact] : visual attention was ~T not ~L decreased [Naming Objects] : no difficulty naming common objects [Repeating Phrases] : no difficulty repeating a phrase [Writing A Sentence] : no difficulty writing a sentence [Fluency] : fluency intact [Comprehension] : comprehension intact [Reading] : reading intact [Past History] : adequate knowledge of personal past history [Cranial Nerves Optic (II)] : visual acuity intact bilaterally,  visual fields full to confrontation, pupils equal round and reactive to light [Cranial Nerves Oculomotor (III)] : extraocular motion intact [Cranial Nerves Trigeminal (V)] : facial sensation intact symmetrically [Cranial Nerves Facial (VII)] : face symmetrical [Cranial Nerves Glossopharyngeal (IX)] : tongue and palate midline [Cranial Nerves Accessory (XI - Cranial And Spinal)] : head turning and shoulder shrug symmetric [Cranial Nerves Vestibulocochlear (VIII)] : hearing was intact bilaterally [Cranial Nerves Hypoglossal (XII)] : there was no tongue deviation with protrusion [Motor Tone] : muscle tone was normal in all four extremities [Motor Strength] : muscle strength was normal in all four extremities [No Muscle Atrophy] : normal bulk in all four extremities [Motor Handedness Right-Handed] : the patient is right hand dominant [Hand Weakness Right] : normal hand  [Hand Weakness Left] : normal hand  [4] : hip flexion 4/5 [+4] : knee extension +4/5 [5] : ankle inversion 5/5 [Sensation Tactile Decrease] : light touch was intact [Abnormal Walk] : normal gait [Balance] : balance was intact [Past-pointing] : there was no past-pointing [Tremor] : no tremor present [2+] : Ankle jerk right 2+ [Plantar Reflex Right Only] : normal on the right [Plantar Reflex Left Only] : normal on the left [FreeTextEntry6] : MG-ADL 0

## 2019-12-11 NOTE — ASSESSMENT
[FreeTextEntry1] : Patient will excellent response to Soliris, now minimal manifestation of disease. \par \par Cont Soliris.  Decrease prednisone to 15mg QD for a month and then 10mg QD, f/u with me 4 months. \par \par Stop night dose of 180mg mestinon

## 2019-12-19 ENCOUNTER — APPOINTMENT (OUTPATIENT)
Dept: NEUROLOGY | Facility: CLINIC | Age: 79
End: 2019-12-19
Payer: MEDICARE

## 2019-12-19 VITALS — SYSTOLIC BLOOD PRESSURE: 114 MMHG | DIASTOLIC BLOOD PRESSURE: 54 MMHG | RESPIRATION RATE: 16 BRPM | HEART RATE: 58 BPM

## 2019-12-19 PROCEDURE — 96413 CHEMO IV INFUSION 1 HR: CPT

## 2019-12-19 RX ORDER — ECULIZUMAB 300 MG/30ML
300 INJECTION, SOLUTION, CONCENTRATE INTRAVENOUS
Refills: 0 | Status: COMPLETED | OUTPATIENT
Start: 2019-12-19

## 2019-12-19 RX ADMIN — ECULIZUMAB 0 MG/30ML: 300 INJECTION, SOLUTION, CONCENTRATE INTRAVENOUS at 00:00

## 2019-12-20 ENCOUNTER — OUTPATIENT (OUTPATIENT)
Dept: OUTPATIENT SERVICES | Facility: HOSPITAL | Age: 79
LOS: 1 days | End: 2019-12-20
Payer: MEDICARE

## 2019-12-20 VITALS
SYSTOLIC BLOOD PRESSURE: 110 MMHG | OXYGEN SATURATION: 100 % | HEART RATE: 61 BPM | TEMPERATURE: 98 F | RESPIRATION RATE: 15 BRPM | HEIGHT: 57.25 IN | DIASTOLIC BLOOD PRESSURE: 60 MMHG | WEIGHT: 145.51 LBS

## 2019-12-20 DIAGNOSIS — Z98.890 OTHER SPECIFIED POSTPROCEDURAL STATES: Chronic | ICD-10-CM

## 2019-12-20 DIAGNOSIS — G70.00 MYASTHENIA GRAVIS WITHOUT (ACUTE) EXACERBATION: ICD-10-CM

## 2019-12-20 DIAGNOSIS — Z98.1 ARTHRODESIS STATUS: ICD-10-CM

## 2019-12-20 DIAGNOSIS — Z01.818 ENCOUNTER FOR OTHER PREPROCEDURAL EXAMINATION: ICD-10-CM

## 2019-12-20 DIAGNOSIS — M25.551 PAIN IN RIGHT HIP: ICD-10-CM

## 2019-12-20 DIAGNOSIS — Z86.79 PERSONAL HISTORY OF OTHER DISEASES OF THE CIRCULATORY SYSTEM: ICD-10-CM

## 2019-12-20 DIAGNOSIS — Z92.241 PERSONAL HISTORY OF SYSTEMIC STEROID THERAPY: ICD-10-CM

## 2019-12-20 DIAGNOSIS — Z90.89 ACQUIRED ABSENCE OF OTHER ORGANS: Chronic | ICD-10-CM

## 2019-12-20 DIAGNOSIS — Z96.619 PRESENCE OF UNSPECIFIED ARTIFICIAL SHOULDER JOINT: Chronic | ICD-10-CM

## 2019-12-20 DIAGNOSIS — M16.11 UNILATERAL PRIMARY OSTEOARTHRITIS, RIGHT HIP: ICD-10-CM

## 2019-12-20 LAB
ALBUMIN SERPL ELPH-MCNC: 3 G/DL — LOW (ref 3.3–5)
ALP SERPL-CCNC: 58 U/L — SIGNIFICANT CHANGE UP (ref 30–120)
ALT FLD-CCNC: 41 U/L DA — SIGNIFICANT CHANGE UP (ref 10–60)
ANION GAP SERPL CALC-SCNC: 3 MMOL/L — LOW (ref 5–17)
APTT BLD: 27.4 SEC — LOW (ref 28.5–37)
AST SERPL-CCNC: 19 U/L — SIGNIFICANT CHANGE UP (ref 10–40)
BILIRUB SERPL-MCNC: 0.4 MG/DL — SIGNIFICANT CHANGE UP (ref 0.2–1.2)
BUN SERPL-MCNC: 30 MG/DL — HIGH (ref 7–23)
CALCIUM SERPL-MCNC: 8.7 MG/DL — SIGNIFICANT CHANGE UP (ref 8.4–10.5)
CHLORIDE SERPL-SCNC: 106 MMOL/L — SIGNIFICANT CHANGE UP (ref 96–108)
CO2 SERPL-SCNC: 32 MMOL/L — HIGH (ref 22–31)
CREAT SERPL-MCNC: 0.92 MG/DL — SIGNIFICANT CHANGE UP (ref 0.5–1.3)
GLUCOSE SERPL-MCNC: 130 MG/DL — HIGH (ref 70–99)
HCT VFR BLD CALC: 37 % — SIGNIFICANT CHANGE UP (ref 34.5–45)
HGB BLD-MCNC: 11.8 G/DL — SIGNIFICANT CHANGE UP (ref 11.5–15.5)
INR BLD: 1.01 RATIO — SIGNIFICANT CHANGE UP (ref 0.88–1.16)
MCHC RBC-ENTMCNC: 29.7 PG — SIGNIFICANT CHANGE UP (ref 27–34)
MCHC RBC-ENTMCNC: 31.9 GM/DL — LOW (ref 32–36)
MCV RBC AUTO: 93.2 FL — SIGNIFICANT CHANGE UP (ref 80–100)
MRSA PCR RESULT.: SIGNIFICANT CHANGE UP
NRBC # BLD: 0 /100 WBCS — SIGNIFICANT CHANGE UP (ref 0–0)
PLATELET # BLD AUTO: 181 K/UL — SIGNIFICANT CHANGE UP (ref 150–400)
POTASSIUM SERPL-MCNC: 4 MMOL/L — SIGNIFICANT CHANGE UP (ref 3.5–5.3)
POTASSIUM SERPL-SCNC: 4 MMOL/L — SIGNIFICANT CHANGE UP (ref 3.5–5.3)
PROT SERPL-MCNC: 6.5 G/DL — SIGNIFICANT CHANGE UP (ref 6–8.3)
PROTHROM AB SERPL-ACNC: 11 SEC — SIGNIFICANT CHANGE UP (ref 10–12.9)
RBC # BLD: 3.97 M/UL — SIGNIFICANT CHANGE UP (ref 3.8–5.2)
RBC # FLD: 13.7 % — SIGNIFICANT CHANGE UP (ref 10.3–14.5)
S AUREUS DNA NOSE QL NAA+PROBE: SIGNIFICANT CHANGE UP
SODIUM SERPL-SCNC: 141 MMOL/L — SIGNIFICANT CHANGE UP (ref 135–145)
WBC # BLD: 7.45 K/UL — SIGNIFICANT CHANGE UP (ref 3.8–10.5)
WBC # FLD AUTO: 7.45 K/UL — SIGNIFICANT CHANGE UP (ref 3.8–10.5)

## 2019-12-20 PROCEDURE — G0463: CPT

## 2019-12-20 RX ORDER — ESOMEPRAZOLE MAGNESIUM 40 MG/1
1 CAPSULE, DELAYED RELEASE ORAL
Qty: 0 | Refills: 0 | DISCHARGE

## 2019-12-20 RX ORDER — FUROSEMIDE 40 MG
1 TABLET ORAL
Qty: 0 | Refills: 0 | DISCHARGE

## 2019-12-20 RX ORDER — PYRIDOSTIGMINE BROMIDE 60 MG/5ML
1 SOLUTION ORAL
Qty: 0 | Refills: 0 | DISCHARGE

## 2019-12-20 RX ORDER — TRAMADOL HYDROCHLORIDE 50 MG/1
1 TABLET ORAL
Qty: 0 | Refills: 0 | DISCHARGE

## 2019-12-20 RX ORDER — LACTULOSE 10 G/15ML
15 SOLUTION ORAL
Qty: 0 | Refills: 0 | DISCHARGE

## 2019-12-20 NOTE — H&P PST ADULT - NSICDXPROBLEM_GEN_ALL_CORE_FT
PROBLEM DIAGNOSES  Problem: History of bradycardia  Assessment and Plan: staff to be aware     Problem: S/P cervical spinal fusion  Assessment and Plan: anesthesia to be aware    Problem: Myasthenia gravis  Assessment and Plan: needs neuro clearance    Problem: History of recent steroid use  Assessment and Plan: will need stress dose of steroids    Problem: Right hip pain  Assessment and Plan: right total hip replacement, anterior approach, preop instructions given; to have medical, cardio and neuro clearance done

## 2019-12-20 NOTE — H&P PST ADULT - NSICDXPASTSURGICALHX_GEN_ALL_CORE_FT
PAST SURGICAL HISTORY:  Bilateral Cataracts removed    H/O laminectomy cervical  1998  lumbar 1986,1998,2000, 2/19 with fusion    H/O shoulder replacement b/l 2015/2016    H/O umbilical hernia repair     History of tonsillectomy     Prolapse, Uterovaginal s/p sling    S/P Appendectomy     S/P Cholecystectomy     S/P foot surgery     S/P Hysterectomy Partial 1974    S/P Laparoscopic Fundoplication

## 2019-12-20 NOTE — H&P PST ADULT - HISTORY OF PRESENT ILLNESS
this is a 80 y/o female who has had right hip, low back, groin and leg pain for several years; tests show bone on bone; takes tramadol for pain; to have right hip replacement

## 2019-12-20 NOTE — H&P PST ADULT - NSICDXFAMILYHX_GEN_ALL_CORE_FT
FAMILY HISTORY:  Family history of bladder cancer, brother   Family history of stroke, father   FH: early death, mother  age 50s

## 2019-12-20 NOTE — H&P PST ADULT - NSICDXPASTMEDICALHX_GEN_ALL_CORE_FT
PAST MEDICAL HISTORY:  Aortic stenosis, mild asper daughter    Carpal Tunnel Syndrome Right release 4/10 and left    Diverticulitis large intestine denies any recent exacerbations    Genital Ulcer, Female     GERD (Gastroesophageal Reflux Disease) hiatal hernia    Hammertoe Right foot     Hyperlipidemia     Hypertension     Hypothyroid     Kidney Calculi     Lumbar Radiculopathy     Lumbar stenosis     Myasthenia gravis dx 10/2018 symptoms: intermittent loss of voice/ weakness, negative ENT studies, now stable on Pyridostigmine    Near syncope 8/2018 negative ENT work up, negative cardiac work ups as per daughter    Osteoarthritis     Reactive airway disease that is not asthma mild as per pulm note on [employer], patient and daughter denies any inhaler use; thought to be related to myasthena gravis

## 2019-12-23 ENCOUNTER — MOBILE ON CALL (OUTPATIENT)
Age: 79
End: 2019-12-23

## 2019-12-23 ENCOUNTER — APPOINTMENT (OUTPATIENT)
Dept: INTERNAL MEDICINE | Facility: CLINIC | Age: 79
End: 2019-12-23
Payer: MEDICARE

## 2019-12-23 VITALS
SYSTOLIC BLOOD PRESSURE: 120 MMHG | WEIGHT: 146 LBS | OXYGEN SATURATION: 98 % | RESPIRATION RATE: 12 BRPM | TEMPERATURE: 98.6 F | DIASTOLIC BLOOD PRESSURE: 68 MMHG | HEART RATE: 69 BPM | BODY MASS INDEX: 30.64 KG/M2 | HEIGHT: 58 IN

## 2019-12-23 PROBLEM — R09.89 OTHER SPECIFIED SYMPTOMS AND SIGNS INVOLVING THE CIRCULATORY AND RESPIRATORY SYSTEMS: Chronic | Status: ACTIVE | Noted: 2019-02-12

## 2019-12-23 PROBLEM — E03.9 HYPOTHYROIDISM, UNSPECIFIED: Chronic | Status: ACTIVE | Noted: 2019-12-20

## 2019-12-23 PROCEDURE — 99214 OFFICE O/P EST MOD 30 MIN: CPT

## 2019-12-23 RX ORDER — PYRIDOSTIGMINE BROMIDE 60 MG/1
60 TABLET ORAL TWICE DAILY
Qty: 180 | Refills: 3 | Status: DISCONTINUED | COMMUNITY
End: 2019-12-23

## 2019-12-23 RX ORDER — PREDNISONE 20 MG/1
20 TABLET ORAL
Qty: 30 | Refills: 3 | Status: DISCONTINUED | COMMUNITY
Start: 2019-09-10 | End: 2019-12-23

## 2019-12-23 RX ORDER — PYRIDOSTIGMINE BROMIDE 180 MG/1
180 TABLET ORAL TWICE DAILY
Qty: 180 | Refills: 3 | Status: DISCONTINUED | COMMUNITY
End: 2019-12-23

## 2019-12-23 NOTE — ASSESSMENT
[Patient Optimized for Surgery] : Patient optimized for surgery [No Further Testing Recommended] : no further testing recommended [As per surgery] : as per surgery [Modify medications prior to procedure] : Modify medications prior to procedure

## 2019-12-23 NOTE — REVIEW OF SYSTEMS
[Joint Stiffness] : joint stiffness [Joint Pain] : joint pain [Anxiety] : anxiety [Unsteady Walking] : ataxia [Negative] : Heme/Lymph [Joint Swelling] : no joint swelling [Muscle Weakness] : no muscle weakness [Muscle Pain] : no muscle pain [Back Pain] : no back pain [Dizziness] : no dizziness [Headache] : no headache [Fainting] : no fainting [Confusion] : no confusion [Memory Loss] : no memory loss [Suicidal] : not suicidal [Depression] : no depression [FreeTextEntry9] : RIGHT HIP [de-identified] : NEEDS ASSISTANCE

## 2019-12-23 NOTE — PHYSICAL EXAM
[Well Nourished] : well nourished [Well Developed] : well developed [No Acute Distress] : no acute distress [Well-Appearing] : well-appearing [Normal Sclera/Conjunctiva] : normal sclera/conjunctiva [Normal Outer Ear/Nose] : the outer ears and nose were normal in appearance [PERRL] : pupils equal round and reactive to light [EOMI] : extraocular movements intact [Normal Oropharynx] : the oropharynx was normal [No JVD] : no jugular venous distention [Thyroid Normal, No Nodules] : the thyroid was normal and there were no nodules present [No Lymphadenopathy] : no lymphadenopathy [Supple] : supple [Clear to Auscultation] : lungs were clear to auscultation bilaterally [No Accessory Muscle Use] : no accessory muscle use [No Respiratory Distress] : no respiratory distress  [Normal Rate] : normal rate  [Regular Rhythm] : with a regular rhythm [Normal S1, S2] : normal S1 and S2 [No Abdominal Bruit] : a ~M bruit was not heard ~T in the abdomen [No Carotid Bruits] : no carotid bruits [No Murmur] : no murmur heard [No Varicosities] : no varicosities [No Edema] : there was no peripheral edema [Pedal Pulses Present] : the pedal pulses are present [No Extremity Clubbing/Cyanosis] : no extremity clubbing/cyanosis [No Palpable Aorta] : no palpable aorta [Non Tender] : non-tender [Soft] : abdomen soft [Non-distended] : non-distended [No HSM] : no HSM [Normal Bowel Sounds] : normal bowel sounds [No Masses] : no abdominal mass palpated [No CVA Tenderness] : no CVA  tenderness [Normal Posterior Cervical Nodes] : no posterior cervical lymphadenopathy [Normal Anterior Cervical Nodes] : no anterior cervical lymphadenopathy [Grossly Normal Strength/Tone] : grossly normal strength/tone [No Joint Swelling] : no joint swelling [No Spinal Tenderness] : no spinal tenderness [No Focal Deficits] : no focal deficits [No Rash] : no rash [Coordination Grossly Intact] : coordination grossly intact [Normal Insight/Judgement] : insight and judgment were intact [Alert and Oriented x3] : oriented to person, place, and time [Normal Affect] : the affect was normal [de-identified] : NEEDS ASSISTANCE [de-identified] : PAIN WITH ROM RIGHT HIP

## 2019-12-23 NOTE — HISTORY OF PRESENT ILLNESS
[No Pertinent Cardiac History] : no history of aortic stenosis, atrial fibrillation, coronary artery disease, recent myocardial infarction, or implantable device/pacemaker [No Adverse Anesthesia Reaction] : no adverse anesthesia reaction in self or family member [No Pertinent Pulmonary History] : no history of asthma, COPD, sleep apnea, or smoking [(Patient denies any chest pain, claudication, dyspnea on exertion, orthopnea, palpitations or syncope)] : Patient denies any chest pain, claudication, dyspnea on exertion, orthopnea, palpitations or syncope [Chronic Anticoagulation] : no chronic anticoagulation [Chronic Kidney Disease] : no chronic kidney disease [Diabetes] : no diabetes [FreeTextEntry1] : RIGHT THR [FreeTextEntry2] : 12/30/19 [FreeTextEntry3] : DR OROZCO

## 2019-12-23 NOTE — RESULTS/DATA
[No Acute Ischemia] : No acute ischemia [] : results reviewed [ECG Reviewed] : reviewed [Poor R Wave Progression] : poor R-wave progresion [NSR] : normal sinus rhythm [No Interval Change] : no interval change

## 2019-12-26 RX ORDER — SODIUM CHLORIDE 9 MG/ML
1000 INJECTION, SOLUTION INTRAVENOUS
Refills: 0 | Status: DISCONTINUED | OUTPATIENT
Start: 2019-12-30 | End: 2019-12-31

## 2019-12-26 RX ORDER — ONDANSETRON 8 MG/1
4 TABLET, FILM COATED ORAL EVERY 6 HOURS
Refills: 0 | Status: DISCONTINUED | OUTPATIENT
Start: 2019-12-30 | End: 2020-01-02

## 2019-12-26 RX ORDER — POLYETHYLENE GLYCOL 3350 17 G/17G
17 POWDER, FOR SOLUTION ORAL DAILY
Refills: 0 | Status: DISCONTINUED | OUTPATIENT
Start: 2019-12-30 | End: 2020-01-02

## 2019-12-26 RX ORDER — PANTOPRAZOLE SODIUM 20 MG/1
40 TABLET, DELAYED RELEASE ORAL
Refills: 0 | Status: DISCONTINUED | OUTPATIENT
Start: 2019-12-30 | End: 2020-01-02

## 2019-12-26 RX ORDER — MAGNESIUM HYDROXIDE 400 MG/1
30 TABLET, CHEWABLE ORAL ONCE
Refills: 0 | Status: DISCONTINUED | OUTPATIENT
Start: 2019-12-30 | End: 2020-01-02

## 2019-12-27 NOTE — PATIENT PROFILE ADULT - BRADEN MOISTURE
(4) rarely moist Consent was obtained from the patient. The risks and benefits to therapy were discussed in detail. Specifically, the risks of infection, scarring, bleeding, prolonged wound healing, incomplete removal, allergy to anesthesia, nerve injury and recurrence were addressed. Prior to the procedure, the treatment site was clearly identified and confirmed by the patient. All components of Universal Protocol/PAUSE Rule completed.

## 2019-12-30 ENCOUNTER — RESULT REVIEW (OUTPATIENT)
Age: 79
End: 2019-12-30

## 2019-12-30 ENCOUNTER — TRANSCRIPTION ENCOUNTER (OUTPATIENT)
Age: 79
End: 2019-12-30

## 2019-12-30 ENCOUNTER — INPATIENT (INPATIENT)
Facility: HOSPITAL | Age: 79
LOS: 2 days | Discharge: HOME | DRG: 470 | End: 2020-01-02
Attending: ORTHOPAEDIC SURGERY | Admitting: ORTHOPAEDIC SURGERY
Payer: MEDICARE

## 2019-12-30 ENCOUNTER — APPOINTMENT (OUTPATIENT)
Dept: ORTHOPEDIC SURGERY | Facility: HOSPITAL | Age: 79
End: 2019-12-30

## 2019-12-30 VITALS
TEMPERATURE: 98 F | HEIGHT: 69 IN | OXYGEN SATURATION: 100 % | RESPIRATION RATE: 14 BRPM | DIASTOLIC BLOOD PRESSURE: 70 MMHG | WEIGHT: 143.3 LBS | SYSTOLIC BLOOD PRESSURE: 150 MMHG | HEART RATE: 45 BPM

## 2019-12-30 DIAGNOSIS — Z98.890 OTHER SPECIFIED POSTPROCEDURAL STATES: Chronic | ICD-10-CM

## 2019-12-30 DIAGNOSIS — Z96.619 PRESENCE OF UNSPECIFIED ARTIFICIAL SHOULDER JOINT: Chronic | ICD-10-CM

## 2019-12-30 DIAGNOSIS — Z90.89 ACQUIRED ABSENCE OF OTHER ORGANS: Chronic | ICD-10-CM

## 2019-12-30 DIAGNOSIS — M16.11 UNILATERAL PRIMARY OSTEOARTHRITIS, RIGHT HIP: ICD-10-CM

## 2019-12-30 LAB
ANION GAP SERPL CALC-SCNC: 7 MMOL/L — SIGNIFICANT CHANGE UP (ref 5–17)
BUN SERPL-MCNC: 24 MG/DL — HIGH (ref 7–23)
CALCIUM SERPL-MCNC: 8 MG/DL — LOW (ref 8.4–10.5)
CHLORIDE SERPL-SCNC: 103 MMOL/L — SIGNIFICANT CHANGE UP (ref 96–108)
CO2 SERPL-SCNC: 30 MMOL/L — SIGNIFICANT CHANGE UP (ref 22–31)
CREAT SERPL-MCNC: 0.98 MG/DL — SIGNIFICANT CHANGE UP (ref 0.5–1.3)
GLUCOSE SERPL-MCNC: 124 MG/DL — HIGH (ref 70–99)
HCT VFR BLD CALC: 29.1 % — LOW (ref 34.5–45)
HGB BLD-MCNC: 9.2 G/DL — LOW (ref 11.5–15.5)
MCHC RBC-ENTMCNC: 29.1 PG — SIGNIFICANT CHANGE UP (ref 27–34)
MCHC RBC-ENTMCNC: 31.6 GM/DL — LOW (ref 32–36)
MCV RBC AUTO: 92.1 FL — SIGNIFICANT CHANGE UP (ref 80–100)
NRBC # BLD: 0 /100 WBCS — SIGNIFICANT CHANGE UP (ref 0–0)
PLATELET # BLD AUTO: 145 K/UL — LOW (ref 150–400)
POTASSIUM SERPL-MCNC: 4.2 MMOL/L — SIGNIFICANT CHANGE UP (ref 3.5–5.3)
POTASSIUM SERPL-SCNC: 4.2 MMOL/L — SIGNIFICANT CHANGE UP (ref 3.5–5.3)
RBC # BLD: 3.16 M/UL — LOW (ref 3.8–5.2)
RBC # FLD: 13.7 % — SIGNIFICANT CHANGE UP (ref 10.3–14.5)
SODIUM SERPL-SCNC: 140 MMOL/L — SIGNIFICANT CHANGE UP (ref 135–145)
WBC # BLD: 12.49 K/UL — HIGH (ref 3.8–10.5)
WBC # FLD AUTO: 12.49 K/UL — HIGH (ref 3.8–10.5)

## 2019-12-30 PROCEDURE — 27130 TOTAL HIP ARTHROPLASTY: CPT | Mod: RT

## 2019-12-30 PROCEDURE — 88305 TISSUE EXAM BY PATHOLOGIST: CPT | Mod: 26

## 2019-12-30 PROCEDURE — 88311 DECALCIFY TISSUE: CPT | Mod: 26

## 2019-12-30 PROCEDURE — 99223 1ST HOSP IP/OBS HIGH 75: CPT

## 2019-12-30 RX ORDER — HYDROMORPHONE HYDROCHLORIDE 2 MG/ML
0.5 INJECTION INTRAMUSCULAR; INTRAVENOUS; SUBCUTANEOUS
Refills: 0 | Status: DISCONTINUED | OUTPATIENT
Start: 2019-12-30 | End: 2020-01-02

## 2019-12-30 RX ORDER — MAGNESIUM HYDROXIDE 400 MG/1
30 TABLET, CHEWABLE ORAL DAILY
Refills: 0 | Status: DISCONTINUED | OUTPATIENT
Start: 2019-12-30 | End: 2020-01-02

## 2019-12-30 RX ORDER — PYRIDOSTIGMINE BROMIDE 60 MG/5ML
180 SOLUTION ORAL DAILY
Refills: 0 | Status: DISCONTINUED | OUTPATIENT
Start: 2019-12-30 | End: 2020-01-02

## 2019-12-30 RX ORDER — ACETAMINOPHEN 500 MG
1000 TABLET ORAL EVERY 6 HOURS
Refills: 0 | Status: COMPLETED | OUTPATIENT
Start: 2019-12-30 | End: 2019-12-31

## 2019-12-30 RX ORDER — SODIUM CHLORIDE 9 MG/ML
1000 INJECTION, SOLUTION INTRAVENOUS
Refills: 0 | Status: DISCONTINUED | OUTPATIENT
Start: 2019-12-30 | End: 2019-12-31

## 2019-12-30 RX ORDER — SENNA PLUS 8.6 MG/1
2 TABLET ORAL
Qty: 0 | Refills: 0 | DISCHARGE
Start: 2019-12-30

## 2019-12-30 RX ORDER — ONDANSETRON 8 MG/1
4 TABLET, FILM COATED ORAL EVERY 6 HOURS
Refills: 0 | Status: DISCONTINUED | OUTPATIENT
Start: 2019-12-30 | End: 2019-12-30

## 2019-12-30 RX ORDER — ASPIRIN/CALCIUM CARB/MAGNESIUM 324 MG
81 TABLET ORAL EVERY 12 HOURS
Refills: 0 | Status: DISCONTINUED | OUTPATIENT
Start: 2019-12-31 | End: 2020-01-02

## 2019-12-30 RX ORDER — CEFAZOLIN SODIUM 1 G
2000 VIAL (EA) INJECTION EVERY 8 HOURS
Refills: 0 | Status: COMPLETED | OUTPATIENT
Start: 2019-12-30 | End: 2019-12-30

## 2019-12-30 RX ORDER — POLYETHYLENE GLYCOL 3350 17 G/17G
17 POWDER, FOR SOLUTION ORAL DAILY
Refills: 0 | Status: DISCONTINUED | OUTPATIENT
Start: 2019-12-30 | End: 2019-12-30

## 2019-12-30 RX ORDER — HYDROCORTISONE 20 MG
50 TABLET ORAL EVERY 8 HOURS
Refills: 0 | Status: COMPLETED | OUTPATIENT
Start: 2019-12-30 | End: 2019-12-30

## 2019-12-30 RX ORDER — ACETAMINOPHEN 500 MG
1000 TABLET ORAL ONCE
Refills: 0 | Status: COMPLETED | OUTPATIENT
Start: 2019-12-30 | End: 2019-12-30

## 2019-12-30 RX ORDER — FAMOTIDINE 10 MG/ML
20 INJECTION INTRAVENOUS DAILY
Refills: 0 | Status: DISCONTINUED | OUTPATIENT
Start: 2019-12-30 | End: 2020-01-02

## 2019-12-30 RX ORDER — LOSARTAN POTASSIUM 100 MG/1
50 TABLET, FILM COATED ORAL DAILY
Refills: 0 | Status: DISCONTINUED | OUTPATIENT
Start: 2020-01-01 | End: 2020-01-02

## 2019-12-30 RX ORDER — HYDROMORPHONE HYDROCHLORIDE 2 MG/ML
0.5 INJECTION INTRAMUSCULAR; INTRAVENOUS; SUBCUTANEOUS
Refills: 0 | Status: DISCONTINUED | OUTPATIENT
Start: 2019-12-30 | End: 2019-12-30

## 2019-12-30 RX ORDER — CEFAZOLIN SODIUM 1 G
2000 VIAL (EA) INJECTION ONCE
Refills: 0 | Status: COMPLETED | OUTPATIENT
Start: 2019-12-30 | End: 2019-12-30

## 2019-12-30 RX ORDER — OXYCODONE HYDROCHLORIDE 5 MG/1
5 TABLET ORAL
Refills: 0 | Status: DISCONTINUED | OUTPATIENT
Start: 2019-12-30 | End: 2020-01-02

## 2019-12-30 RX ORDER — OXYCODONE HYDROCHLORIDE 5 MG/1
5 TABLET ORAL ONCE
Refills: 0 | Status: DISCONTINUED | OUTPATIENT
Start: 2019-12-30 | End: 2019-12-30

## 2019-12-30 RX ORDER — ATORVASTATIN CALCIUM 80 MG/1
20 TABLET, FILM COATED ORAL AT BEDTIME
Refills: 0 | Status: DISCONTINUED | OUTPATIENT
Start: 2019-12-30 | End: 2020-01-02

## 2019-12-30 RX ORDER — TRANEXAMIC ACID 100 MG/ML
1000 INJECTION, SOLUTION INTRAVENOUS ONCE
Refills: 0 | Status: COMPLETED | OUTPATIENT
Start: 2019-12-30 | End: 2019-12-30

## 2019-12-30 RX ORDER — CHLORHEXIDINE GLUCONATE 213 G/1000ML
1 SOLUTION TOPICAL ONCE
Refills: 0 | Status: COMPLETED | OUTPATIENT
Start: 2019-12-30 | End: 2019-12-30

## 2019-12-30 RX ORDER — ASPIRIN/CALCIUM CARB/MAGNESIUM 324 MG
1 TABLET ORAL
Qty: 84 | Refills: 0
Start: 2019-12-30 | End: 2020-02-09

## 2019-12-30 RX ORDER — SENNA PLUS 8.6 MG/1
2 TABLET ORAL AT BEDTIME
Refills: 0 | Status: DISCONTINUED | OUTPATIENT
Start: 2019-12-30 | End: 2020-01-02

## 2019-12-30 RX ORDER — CELECOXIB 200 MG/1
200 CAPSULE ORAL EVERY 12 HOURS
Refills: 0 | Status: DISCONTINUED | OUTPATIENT
Start: 2019-12-30 | End: 2020-01-02

## 2019-12-30 RX ORDER — SODIUM CHLORIDE 9 MG/ML
1000 INJECTION, SOLUTION INTRAVENOUS
Refills: 0 | Status: DISCONTINUED | OUTPATIENT
Start: 2019-12-30 | End: 2019-12-30

## 2019-12-30 RX ORDER — LEVOTHYROXINE SODIUM 125 MCG
50 TABLET ORAL DAILY
Refills: 0 | Status: DISCONTINUED | OUTPATIENT
Start: 2019-12-30 | End: 2020-01-02

## 2019-12-30 RX ORDER — ACETAMINOPHEN 500 MG
1000 TABLET ORAL EVERY 8 HOURS
Refills: 0 | Status: DISCONTINUED | OUTPATIENT
Start: 2019-12-31 | End: 2020-01-02

## 2019-12-30 RX ORDER — APREPITANT 80 MG/1
40 CAPSULE ORAL ONCE
Refills: 0 | Status: COMPLETED | OUTPATIENT
Start: 2019-12-30 | End: 2019-12-30

## 2019-12-30 RX ORDER — POLYETHYLENE GLYCOL 3350 17 G/17G
17 POWDER, FOR SOLUTION ORAL
Qty: 0 | Refills: 0 | DISCHARGE
Start: 2019-12-30

## 2019-12-30 RX ORDER — SENNA PLUS 8.6 MG/1
2 TABLET ORAL AT BEDTIME
Refills: 0 | Status: DISCONTINUED | OUTPATIENT
Start: 2019-12-30 | End: 2019-12-30

## 2019-12-30 RX ORDER — PYRIDOSTIGMINE BROMIDE 60 MG/5ML
120 SOLUTION ORAL THREE TIMES A DAY
Refills: 0 | Status: DISCONTINUED | OUTPATIENT
Start: 2019-12-30 | End: 2019-12-31

## 2019-12-30 RX ORDER — LINACLOTIDE 145 UG/1
145 CAPSULE, GELATIN COATED ORAL
Refills: 0 | Status: DISCONTINUED | OUTPATIENT
Start: 2019-12-30 | End: 2020-01-02

## 2019-12-30 RX ORDER — PANTOPRAZOLE SODIUM 20 MG/1
40 TABLET, DELAYED RELEASE ORAL
Refills: 0 | Status: DISCONTINUED | OUTPATIENT
Start: 2019-12-30 | End: 2019-12-30

## 2019-12-30 RX ORDER — CELECOXIB 200 MG/1
200 CAPSULE ORAL EVERY 12 HOURS
Refills: 0 | Status: DISCONTINUED | OUTPATIENT
Start: 2019-12-30 | End: 2019-12-30

## 2019-12-30 RX ORDER — CELECOXIB 200 MG/1
1 CAPSULE ORAL
Qty: 60 | Refills: 0
Start: 2019-12-30 | End: 2020-01-28

## 2019-12-30 RX ORDER — ONDANSETRON 8 MG/1
4 TABLET, FILM COATED ORAL ONCE
Refills: 0 | Status: DISCONTINUED | OUTPATIENT
Start: 2019-12-30 | End: 2019-12-30

## 2019-12-30 RX ORDER — OXYCODONE HYDROCHLORIDE 5 MG/1
10 TABLET ORAL
Refills: 0 | Status: DISCONTINUED | OUTPATIENT
Start: 2019-12-30 | End: 2020-01-02

## 2019-12-30 RX ADMIN — OXYCODONE HYDROCHLORIDE 5 MILLIGRAM(S): 5 TABLET ORAL at 14:05

## 2019-12-30 RX ADMIN — Medication 400 MILLIGRAM(S): at 22:10

## 2019-12-30 RX ADMIN — APREPITANT 40 MILLIGRAM(S): 80 CAPSULE ORAL at 06:59

## 2019-12-30 RX ADMIN — CHLORHEXIDINE GLUCONATE 1 APPLICATION(S): 213 SOLUTION TOPICAL at 07:17

## 2019-12-30 RX ADMIN — SODIUM CHLORIDE 100 MILLILITER(S): 9 INJECTION, SOLUTION INTRAVENOUS at 10:05

## 2019-12-30 RX ADMIN — PYRIDOSTIGMINE BROMIDE 180 MILLIGRAM(S): 60 SOLUTION ORAL at 22:10

## 2019-12-30 RX ADMIN — HYDROMORPHONE HYDROCHLORIDE 0.5 MILLIGRAM(S): 2 INJECTION INTRAMUSCULAR; INTRAVENOUS; SUBCUTANEOUS at 11:30

## 2019-12-30 RX ADMIN — Medication 50 MILLIGRAM(S): at 15:43

## 2019-12-30 RX ADMIN — SODIUM CHLORIDE 75 MILLILITER(S): 9 INJECTION, SOLUTION INTRAVENOUS at 14:05

## 2019-12-30 RX ADMIN — ATORVASTATIN CALCIUM 20 MILLIGRAM(S): 80 TABLET, FILM COATED ORAL at 22:10

## 2019-12-30 RX ADMIN — Medication 100 MILLIGRAM(S): at 23:33

## 2019-12-30 RX ADMIN — OXYCODONE HYDROCHLORIDE 10 MILLIGRAM(S): 5 TABLET ORAL at 20:20

## 2019-12-30 RX ADMIN — PYRIDOSTIGMINE BROMIDE 120 MILLIGRAM(S): 60 SOLUTION ORAL at 22:13

## 2019-12-30 RX ADMIN — OXYCODONE HYDROCHLORIDE 5 MILLIGRAM(S): 5 TABLET ORAL at 15:00

## 2019-12-30 RX ADMIN — HYDROMORPHONE HYDROCHLORIDE 0.5 MILLIGRAM(S): 2 INJECTION INTRAMUSCULAR; INTRAVENOUS; SUBCUTANEOUS at 11:45

## 2019-12-30 RX ADMIN — Medication 50 MILLIGRAM(S): at 22:12

## 2019-12-30 RX ADMIN — HYDROMORPHONE HYDROCHLORIDE 0.5 MILLIGRAM(S): 2 INJECTION INTRAMUSCULAR; INTRAVENOUS; SUBCUTANEOUS at 23:59

## 2019-12-30 RX ADMIN — Medication 30 MILLILITER(S): at 19:49

## 2019-12-30 RX ADMIN — CELECOXIB 200 MILLIGRAM(S): 200 CAPSULE ORAL at 22:10

## 2019-12-30 RX ADMIN — HYDROMORPHONE HYDROCHLORIDE 0.5 MILLIGRAM(S): 2 INJECTION INTRAMUSCULAR; INTRAVENOUS; SUBCUTANEOUS at 10:19

## 2019-12-30 RX ADMIN — Medication 1000 MILLIGRAM(S): at 15:45

## 2019-12-30 RX ADMIN — Medication 400 MILLIGRAM(S): at 15:11

## 2019-12-30 RX ADMIN — OXYCODONE HYDROCHLORIDE 10 MILLIGRAM(S): 5 TABLET ORAL at 19:49

## 2019-12-30 RX ADMIN — HYDROMORPHONE HYDROCHLORIDE 0.5 MILLIGRAM(S): 2 INJECTION INTRAMUSCULAR; INTRAVENOUS; SUBCUTANEOUS at 10:35

## 2019-12-30 RX ADMIN — Medication 100 MILLIGRAM(S): at 15:44

## 2019-12-30 NOTE — DISCHARGE NOTE PROVIDER - HOSPITAL COURSE
This patient was admitted to [facility] with a history of severe degenerative joint disease of the right hip.  Patient went to Pre-Surgical Testing at [facility] and was medically cleared to undergo elective procedure. Patient underwent right anterior total hip replacement by Dr. Dinero. Procedure was well tolerated.  No operative or zonia-operative complications arose during patients hospital course.  Patient received antibiotic according to SCIP guidelines for infection prevention.  Aspirin 81mg  was given for DVT prophylaxis.  Anesthesia, Medical Hospitalist, Physical Therapy and Occupational Therapy were consulted. Patient is stable for discharge with a good prognosis.  Appropriate discharge instructions and medications are provided in this document. This patient was admitted to [facility] with a history of severe degenerative joint disease of the right hip.  Patient went to Pre-Surgical Testing at [facility] and was medically cleared to undergo elective procedure. Patient underwent right anterior total hip replacement by Dr. Dinero. Procedure was well tolerated.  No operative or zonia-operative complications arose during patients hospital course.  Patient received antibiotic according to SCIP guidelines for infection prevention.  Aspirin 81mg  was given for DVT prophylaxis.  Anesthesia, Medical Hospitalist, Physical Therapy and Occupational Therapy were consulted. Patient is stable for discharge with a good prognosis.  Appropriate discharge instructions and medications are provided in this document.

## 2019-12-30 NOTE — DISCHARGE NOTE PROVIDER - NSDCMRMEDTOKEN_GEN_ALL_CORE_FT
3:1 Commode: Dx: s/p Right Anterior THR  aspirin 81 mg oral delayed release tablet: 1 tab(s) orally once a day  Crestor 5 mg oral tablet: 1 tab(s) orally once a day  esomeprazole 40 mg oral delayed release capsule: 1 tab(s) orally once a day  levothyroxine 50 mcg (0.05 mg) oral tablet: 1 tab(s) orally once a day  Linzess 145 mcg oral capsule: 1 cap(s) orally once a day, As Needed  losartan-hydrochlorothiazide 50mg-12.5mg oral tablet: 1 tab(s) orally once a day  Mestinon 60 mg oral tablet: 2 tab(s) orally 3 times a day  polyethylene glycol 3350 oral powder for reconstitution: 17 gram(s) orally once a day  predniSONE: 15 milligram(s) orally once a day  pyridostigmine 180 mg oral tablet, extended release: 1 tab(s) orally once a day  raNITIdine 150 mg oral tablet: 1 tab(s) orally once a day (at bedtime)  Rolling walker with 5 inch wheels: Dx: s/p Right Anterior THR  senna oral tablet: 2 tab(s) orally once a day (at bedtime), As needed, Constipation  solaris infusion: 1 dose(s) by continuous intravenous infusion every 2 weeks  traMADol 50 mg oral tablet: 1 tab(s) orally once a day (at bedtime), As Needed  Vitamin D3 2000 intl units oral capsule: 1 cap(s) orally once a day 3:1 Commode: Dx: s/p Right Anterior THR  acetaminophen 500 mg oral tablet: 2 tab(s) orally every 12 hours  Crestor 5 mg oral tablet: 1 tab(s) orally once a day  esomeprazole 40 mg oral delayed release capsule: 1 tab(s) orally once a day  levothyroxine 50 mcg (0.05 mg) oral tablet: 1 tab(s) orally once a day  Linzess 145 mcg oral capsule: 1 cap(s) orally once a day, As Needed  losartan-hydrochlorothiazide 50mg-12.5mg oral tablet: 1 tab(s) orally once a day  Mestinon 60 mg oral tablet: 2 tab(s) orally 3 times a day  polyethylene glycol 3350 oral powder for reconstitution: 17 gram(s) orally once a day  predniSONE: 15 milligram(s) orally once a day  pyridostigmine 180 mg oral tablet, extended release: 1 tab(s) orally once a day  raNITIdine 150 mg oral tablet: 1 tab(s) orally once a day (at bedtime)  Rolling walker with 5 inch wheels: Dx: s/p Right Anterior THR  senna oral tablet: 2 tab(s) orally once a day (at bedtime), As needed, Constipation  solaris infusion: 1 dose(s) by continuous intravenous infusion every 2 weeks  traMADol 50 mg oral tablet: 1 tab(s) orally once a day (at bedtime), As Needed  Vitamin D3 2000 intl units oral capsule: 1 cap(s) orally once a day 3:1 Commode: Dx: s/p Right Anterior THR  acetaminophen 500 mg oral tablet: 2 tab(s) orally every 12 hours  aspirin 81 mg oral delayed release tablet: 1 tab orally every 12 hours. Take 2 hours before Celebrex  celecoxib 200 mg oral capsule: 1 cap orally every 12 hours (21 days for HO ppx). Take 2 hours after Aspirin.   Crestor 5 mg oral tablet: 1 tab(s) orally once a day  esomeprazole 40 mg oral delayed release capsule: 1 tab(s) orally once a day  levothyroxine 50 mcg (0.05 mg) oral tablet: 1 tab(s) orally once a day  Linzess 145 mcg oral capsule: 1 cap(s) orally once a day, As Needed  losartan-hydrochlorothiazide 50mg-12.5mg oral tablet: 1 tab(s) orally once a day  Mestinon 60 mg oral tablet: 1 tab(s) orally 3 times a day  oxyCODONE 5 mg oral tablet: 1-2 tab(s) orally every 4 hours AsNeeded for pain MDD:8   [id number]  polyethylene glycol 3350 oral powder for reconstitution: 17 gram(s) orally once a day  predniSONE: 15 milligram(s) orally once a day  pyridostigmine 180 mg oral tablet, extended release: 1 tab(s) orally once a day  raNITIdine 150 mg oral tablet: 1 tab(s) orally once a day (at bedtime)  Rolling walker with 5 inch wheels: Dx: s/p Right Anterior THR  senna oral tablet: 2 tab(s) orally once a day (at bedtime), As needed, Constipation  solaris infusion: 1 dose(s) by continuous intravenous infusion every 2 weeks  traMADol 50 mg oral tablet: 1 tab(s) orally once a day (at bedtime), As Needed for pain.  may take in place of oxycodone.  Vitamin D3 2000 intl units oral capsule: 1 cap(s) orally once a day 3:1 Commode: Dx: s/p Right Anterior THR  acetaminophen 500 mg oral tablet: 2 tab(s) orally every 12 hours  aspirin 81 mg oral delayed release tablet: 1 tab orally every 12 hours. Take 2 hours before Celebrex  celecoxib 200 mg oral capsule: 1 cap orally every 12 hours (21 days for HO ppx). Take 2 hours after Aspirin.   Crestor 5 mg oral tablet: 1 tab(s) orally once a day  esomeprazole 40 mg oral delayed release capsule: 1 tab(s) orally once a day  levothyroxine 50 mcg (0.05 mg) oral tablet: 1 tab(s) orally once a day  Linzess 145 mcg oral capsule: 1 cap(s) orally once a day, As Needed  losartan-hydrochlorothiazide 50mg-12.5mg oral tablet: 1 tab(s) orally once a day  Mestinon 60 mg oral tablet: 1 tab(s) orally 3 times a day  oxyCODONE 5 mg oral tablet: 1-2 tab(s) orally every 4 hours AsNeeded for pain MDD:8   [id number]  polyethylene glycol 3350 oral powder for reconstitution: 17 gram(s) orally once a day  predniSONE: 15 milligram(s) orally once a day  pyridostigmine 180 mg oral tablet, extended release: 1 tab(s) orally once a day  raNITIdine 150 mg oral tablet: 1 tab(s) orally once a day (at bedtime)  Rolling walker with 5 inch wheels: Dx: s/p Right Anterior THR  senna oral tablet: 2 tab(s) orally once a day (at bedtime), As needed, Constipation  solaris infusion: 1 dose(s) by continuous intravenous infusion every 2 weeks  traMADol 50 mg oral tablet: 1 tab(s) orally once a day (at bedtime), As Needed for pain.  may take in place of oxycodone.  Vitamin D3 2000 intl units oral capsule: 1 cap(s) orally once a day 3:1 Commode: Dx: s/p Right Anterior THR  acetaminophen 500 mg oral tablet: 2 tab(s) orally every 12 hours  aspirin 81 mg oral delayed release tablet: 1 tab orally every 12 hours. Take 2 hours before Celebrex  celecoxib 200 mg oral capsule: 1 cap orally every 12 hours (21 days for HO ppx). Take 2 hours after Aspirin.   Crestor 5 mg oral tablet: 1 tab(s) orally once a day  esomeprazole 40 mg oral delayed release capsule: 1 tab(s) orally once a day  levothyroxine 50 mcg (0.05 mg) oral tablet: 1 tab(s) orally once a day  Linzess 145 mcg oral capsule: 1 cap(s) orally once a day, As Needed  losartan-hydrochlorothiazide 50mg-12.5mg oral tablet: 1 tab(s) orally once a day  Mestinon 60 mg oral tablet: 1 tab(s) orally 3 times a day  oxyCODONE 5 mg oral tablet: 1-2 tab(s) orally every 4 hours AsNeeded for pain MDD:8   [id number]  polyethylene glycol 3350 oral powder for reconstitution: 17 gram(s) orally once a day  predniSONE: 15 milligram(s) orally once a day  pyridostigmine 180 mg oral tablet, extended release: 1 tab(s) orally once a day  raNITIdine 150 mg oral tablet: 1 tab(s) orally once a day (at bedtime)  Rolling walker with 5 inch wheels: Dx: s/p Right Anterior THR  senna oral tablet: 2 tab(s) orally once a day (at bedtime), As needed, Constipation  solaris infusion: 1 dose(s) by continuous intravenous infusion every 2 weeks  traMADol 50 mg oral tablet: 1 tab(s) orally once a day (at bedtime), As Needed for pain.  may take in place of oxycodone.  Vitamin D3 2000 intl units oral capsule: 1 cap(s) orally once a day

## 2019-12-30 NOTE — CONSULT NOTE ADULT - ASSESSMENT
Aftercare following surgery    pain meds as per anesthesia.  PT/OT.  DVT prophylaxis.  DVT ppx: [x ]ASA81 [ ] [id number] [ ] Lovenox [ ] Coumadin   [ ] Eliquis [  ] Heparin  Dispo:     Home [ ]     Acute Rehab [ ]     MEENU [ ]     TBD [x ]    HTN/dyslipidemia  losartan with parameter.    Hypothyroidism  synthroid.    myasthenia gravis Aftercare following Right THR 12/30    pain meds oxycodone and dilaudid. Monitor for respiratory depression and lethargy.    PT/OT.  DVT prophylaxis.  DVT ppx: [x ]ASA81 [ ] [id number] [ ] Lovenox [ ] Coumadin   [ ] Eliquis [  ] Heparin  Dispo:     Home [ ]     Acute Rehab [ ]     MEENU [ ]     TBD [x ]    HTN/dyslipidemia  losartan with parameter.    Hypothyroidism  synthroid.    myasthenia gravis  Pyridostigmin.    Plan of care was discuss with patient, all questions were answered, seems understand and in agreement.

## 2019-12-30 NOTE — DISCHARGE NOTE NURSING/CASE MANAGEMENT/SOCIAL WORK - NSDCDMENAME_GEN_ALL_CORE_FT
[facility] [place]   [address] [place] [address]

## 2019-12-30 NOTE — PROGRESS NOTE ADULT - SUBJECTIVE AND OBJECTIVE BOX
Procedure: Right Anterior THR  POD#: 0    S: Patient complaining of some hip pain, stat dose of oxycodone ordered. Denies SOB,CP, N/V. Otherwise doing well.      O: General: Patient is awake and alert, appears comfortable  VS: Vital Signs Last 24 Hrs  T(C): 36.6 (30 Dec 2019 12:11), Max: 36.9 (30 Dec 2019 06:05)  T(F): 97.8 (30 Dec 2019 12:11), Max: 98.4 (30 Dec 2019 06:05)  HR: 54 (30 Dec 2019 12:11) (45 - 85)  BP: 116/55 (30 Dec 2019 12:11) (105/44 - 150/70)  BP(mean): --  RR: 16 (30 Dec 2019 12:11) (13 - 16)  SpO2: 95% (30 Dec 2019 12:11) (95% - 100%)      Hip Dressing clean, dry, & intact  Neurologic: Sensation to light touch intact bilateral lower extremities  Vascular: Extremities wwp, DP 2+ bilaterally.   Compartments soft, non-tender bilaterally  Motor: TA/GS/EHL 5/5 bilaterally, quads and hams firing    VTEP: On Bilat. Venodynes +         Primary Orthopedic Assessment:  • Orthopedically stable post-op  • Neurovascular exam baseline        Plan:   - Continue:  PT/OT/Weightbearing as tolerated with assistance of a walker  - Anterior hip precautions  - Continue Aspirin 81 mg q 12 hr for DVT prophylaxis as well as compression devices  - Continue Pain Rx  - Encourage ICS  - Bowel regimen  - Follow up medical note  - Discharge planning: Home Wednesday when cleared by PT/OT and medicine

## 2019-12-30 NOTE — DISCHARGE NOTE PROVIDER - NSDCFUSCHEDAPPT_GEN_ALL_CORE_FT
[facility] ; [facility] [address] [facility] ; 02/26/2020 ; [facility] Sp[facility] [address] [facility] ; 03/03/2020 ; [facility] [address] [facility] ; [facility] [address] [facility] ; 02/26/2020 ; [facility] Sp[facility] [address] [facility] ; 03/03/2020 ; [facility] [address] [facility] ; 01/[facility]/2020 ; [facility] [facility] ; 02/26/2020 ; [facility] Sp[facility]tr [address] [facility] ; 03/03/2020 ; [facility] ; 03/31/2020 ; [facility] Neuro [address] [facility] ; 01/13/2020 ; [facility] ; 02/26/2020 ; [facility] ; 03/03/2020 ; [facility] ; 03/31/2020 ; [facility] Neuro [facility] ; 01/13/2020 ; [facility] Orth[facility] [address] [facility] ; 02/26/2020 ; [facility] ; 03/03/2020 ; [facility] ; 03/31/2020 ; NPP Neuro [facility]

## 2019-12-30 NOTE — DISCHARGE NOTE PROVIDER - PROVIDER TOKENS
PROVIDER:[TOKEN:[[id number]]] PROVIDER:[TOKEN:[[id number]],SCHEDULEDAPPT:[01/13/2020],SCHEDULEDAPPTTIME:[10:00 AM],ESTABLISHEDPATIENT:[T]]

## 2019-12-30 NOTE — DISCHARGE NOTE PROVIDER - CARE PROVIDER_API CALL
Nathanael Dinero)  Orthopedics  [address] [facility] [facility] [address]  [place] [address]  Phone: [phone]  Fax: [phone]  Follow Up Time: Nathanael Dinero)  Orthopedics  [address] [facility], [address]  [place] [address]  Phone: [phone]  Fax: [phone]  Established Patient  Scheduled Appointment: 01/13/2020 10:00 AM

## 2019-12-30 NOTE — DISCHARGE NOTE NURSING/CASE MANAGEMENT/SOCIAL WORK - PATIENT PORTAL LINK FT
You can access the FollowMyHealth Patient Portal offered by [facility] by registering at the following website: [email or web address]. By joining [employer]’s FollowMyHealth portal, you will also be able to view your health information using other applications (apps) compatible with our system.

## 2019-12-30 NOTE — DISCHARGE NOTE PROVIDER - NSDCCPTREATMENT_GEN_ALL_CORE_FT
PRINCIPAL PROCEDURE  Procedure: Right total hip replacement  Findings and Treatment: severe right hip DJD

## 2019-12-30 NOTE — DISCHARGE NOTE PROVIDER - NSDCCPCAREPLAN_GEN_ALL_CORE_FT
PRINCIPAL DISCHARGE DIAGNOSIS  Diagnosis: Osteoarthritis of right hip  Assessment and Plan of Treatment: PT/OT Total Hip Protocol; Ambulation, transfers, stairs, & ADLs  Weight bearing allowed as tolerated both legs; Walker/cane use as instructed by PT/OT  Anterior THR precautions for 4 weeks: No straight leg raise; No external rotation of hip when extended-standing or lying flat; No hyperextension of hip when standing (kickback)  Ice packs to hip for 30 minutes every 3 hours  & after Physical therapy  Dressing:  remove if no drainage present in incision or drain puncture site.  May allow operative hip to get wet briefly in shower on POD #5 if no drainage present from incision or drain puncture site.  Aspirin 81mg twice daily for 40 days more. Heterotopic Bone Protocol post THR: Celebrex 200mg bid for 19 days more  Prineo tape/suture removal on/near after Post Op Day # 14 in Surgeon's office.  Notify Surgeon for incisional drainage, increased redness, severe hip pain, fever, fall/injury to operative leg

## 2019-12-30 NOTE — OCCUPATIONAL THERAPY INITIAL EVALUATION ADULT - ADDITIONAL COMMENTS
Lives with spouse and son in  home with 4 steps with handrail. No steps inside. Stall shower. has a cane

## 2019-12-30 NOTE — DISCHARGE NOTE NURSING/CASE MANAGEMENT/SOCIAL WORK - NSSCTYPOFSERV_GEN_ALL_CORE
Visiting Nurse, Physical and Occupational Therapist.  Nurse to visit day after discharge, Physical Therapist to follow.  If you do not hear from them, please contact them at the above number

## 2019-12-30 NOTE — DISCHARGE NOTE PROVIDER - INSTRUCTIONS
Regular diet  Pain medicine has been prescribed for you, as needed, and it often causes constipation.    For Constipation :   • Increase your water intake. Drink at least 8 glasses of water daily.  • Try adding fiber to your diet by eating fruits, vegetables and foods that are rich in grains.  • If you do experience constipation, you may take an over-the-counter stool softener/laxative such as Colace, Senokot or  Milk of Magnesia.

## 2019-12-31 LAB
ANION GAP SERPL CALC-SCNC: 7 MMOL/L — SIGNIFICANT CHANGE UP (ref 5–17)
BUN SERPL-MCNC: 22 MG/DL — SIGNIFICANT CHANGE UP (ref 7–23)
CALCIUM SERPL-MCNC: 7.9 MG/DL — LOW (ref 8.4–10.5)
CHLORIDE SERPL-SCNC: 104 MMOL/L — SIGNIFICANT CHANGE UP (ref 96–108)
CO2 SERPL-SCNC: 28 MMOL/L — SIGNIFICANT CHANGE UP (ref 22–31)
CREAT SERPL-MCNC: 0.91 MG/DL — SIGNIFICANT CHANGE UP (ref 0.5–1.3)
GLUCOSE SERPL-MCNC: 105 MG/DL — HIGH (ref 70–99)
HCT VFR BLD CALC: 26.5 % — LOW (ref 34.5–45)
HGB BLD-MCNC: 8.5 G/DL — LOW (ref 11.5–15.5)
MCHC RBC-ENTMCNC: 29.3 PG — SIGNIFICANT CHANGE UP (ref 27–34)
MCHC RBC-ENTMCNC: 32.1 GM/DL — SIGNIFICANT CHANGE UP (ref 32–36)
MCV RBC AUTO: 91.4 FL — SIGNIFICANT CHANGE UP (ref 80–100)
NRBC # BLD: 0 /100 WBCS — SIGNIFICANT CHANGE UP (ref 0–0)
PLATELET # BLD AUTO: 129 K/UL — LOW (ref 150–400)
POTASSIUM SERPL-MCNC: 4.2 MMOL/L — SIGNIFICANT CHANGE UP (ref 3.5–5.3)
POTASSIUM SERPL-SCNC: 4.2 MMOL/L — SIGNIFICANT CHANGE UP (ref 3.5–5.3)
RBC # BLD: 2.9 M/UL — LOW (ref 3.8–5.2)
RBC # FLD: 13.8 % — SIGNIFICANT CHANGE UP (ref 10.3–14.5)
SODIUM SERPL-SCNC: 139 MMOL/L — SIGNIFICANT CHANGE UP (ref 135–145)
WBC # BLD: 8.95 K/UL — SIGNIFICANT CHANGE UP (ref 3.8–10.5)
WBC # FLD AUTO: 8.95 K/UL — SIGNIFICANT CHANGE UP (ref 3.8–10.5)

## 2019-12-31 PROCEDURE — 99024 POSTOP FOLLOW-UP VISIT: CPT

## 2019-12-31 PROCEDURE — 99233 SBSQ HOSP IP/OBS HIGH 50: CPT

## 2019-12-31 RX ORDER — ACETAMINOPHEN 500 MG
2 TABLET ORAL
Qty: 0 | Refills: 0 | DISCHARGE
Start: 2019-12-31

## 2019-12-31 RX ORDER — ASPIRIN/CALCIUM CARB/MAGNESIUM 324 MG
1 TABLET ORAL
Qty: 84 | Refills: 0
Start: 2019-12-31 | End: 2020-02-10

## 2019-12-31 RX ORDER — ASPIRIN/CALCIUM CARB/MAGNESIUM 324 MG
1 TABLET ORAL
Qty: 30 | Refills: 0
Start: 2019-12-31 | End: 2020-01-29

## 2019-12-31 RX ORDER — CELECOXIB 200 MG/1
1 CAPSULE ORAL
Qty: 60 | Refills: 0
Start: 2019-12-31 | End: 2020-01-29

## 2019-12-31 RX ORDER — PYRIDOSTIGMINE BROMIDE 60 MG/5ML
60 SOLUTION ORAL THREE TIMES A DAY
Refills: 0 | Status: DISCONTINUED | OUTPATIENT
Start: 2019-12-31 | End: 2020-01-02

## 2019-12-31 RX ORDER — TRAMADOL HYDROCHLORIDE 50 MG/1
1 TABLET ORAL
Qty: 0 | Refills: 0 | DISCHARGE

## 2019-12-31 RX ORDER — OXYCODONE HYDROCHLORIDE 5 MG/1
2 TABLET ORAL
Qty: 50 | Refills: 0
Start: 2019-12-31 | End: 2020-01-06

## 2019-12-31 RX ORDER — ASPIRIN/CALCIUM CARB/MAGNESIUM 324 MG
1 TABLET ORAL
Qty: 0 | Refills: 0 | DISCHARGE
Start: 2019-12-31 | End: 2020-02-10

## 2019-12-31 RX ADMIN — Medication 81 MILLIGRAM(S): at 05:33

## 2019-12-31 RX ADMIN — Medication 1000 MILLIGRAM(S): at 17:57

## 2019-12-31 RX ADMIN — PYRIDOSTIGMINE BROMIDE 60 MILLIGRAM(S): 60 SOLUTION ORAL at 14:38

## 2019-12-31 RX ADMIN — Medication 400 MILLIGRAM(S): at 05:33

## 2019-12-31 RX ADMIN — PANTOPRAZOLE SODIUM 40 MILLIGRAM(S): 20 TABLET, DELAYED RELEASE ORAL at 05:33

## 2019-12-31 RX ADMIN — Medication 81 MILLIGRAM(S): at 17:57

## 2019-12-31 RX ADMIN — HYDROMORPHONE HYDROCHLORIDE 0.5 MILLIGRAM(S): 2 INJECTION INTRAMUSCULAR; INTRAVENOUS; SUBCUTANEOUS at 00:15

## 2019-12-31 RX ADMIN — CELECOXIB 200 MILLIGRAM(S): 200 CAPSULE ORAL at 08:35

## 2019-12-31 RX ADMIN — Medication 1000 MILLIGRAM(S): at 08:33

## 2019-12-31 RX ADMIN — PYRIDOSTIGMINE BROMIDE 120 MILLIGRAM(S): 60 SOLUTION ORAL at 05:34

## 2019-12-31 RX ADMIN — HYDROMORPHONE HYDROCHLORIDE 0.5 MILLIGRAM(S): 2 INJECTION INTRAMUSCULAR; INTRAVENOUS; SUBCUTANEOUS at 05:50

## 2019-12-31 RX ADMIN — Medication 15 MILLIGRAM(S): at 05:33

## 2019-12-31 RX ADMIN — OXYCODONE HYDROCHLORIDE 10 MILLIGRAM(S): 5 TABLET ORAL at 21:17

## 2019-12-31 RX ADMIN — ATORVASTATIN CALCIUM 20 MILLIGRAM(S): 80 TABLET, FILM COATED ORAL at 21:15

## 2019-12-31 RX ADMIN — POLYETHYLENE GLYCOL 3350 17 GRAM(S): 17 POWDER, FOR SOLUTION ORAL at 21:15

## 2019-12-31 RX ADMIN — PYRIDOSTIGMINE BROMIDE 180 MILLIGRAM(S): 60 SOLUTION ORAL at 12:53

## 2019-12-31 RX ADMIN — CELECOXIB 200 MILLIGRAM(S): 200 CAPSULE ORAL at 08:34

## 2019-12-31 RX ADMIN — PYRIDOSTIGMINE BROMIDE 60 MILLIGRAM(S): 60 SOLUTION ORAL at 21:16

## 2019-12-31 RX ADMIN — CELECOXIB 200 MILLIGRAM(S): 200 CAPSULE ORAL at 21:20

## 2019-12-31 RX ADMIN — Medication 1000 MILLIGRAM(S): at 08:34

## 2019-12-31 RX ADMIN — FAMOTIDINE 20 MILLIGRAM(S): 10 INJECTION INTRAVENOUS at 12:54

## 2019-12-31 RX ADMIN — OXYCODONE HYDROCHLORIDE 10 MILLIGRAM(S): 5 TABLET ORAL at 21:47

## 2019-12-31 RX ADMIN — Medication 50 MICROGRAM(S): at 05:33

## 2019-12-31 RX ADMIN — CELECOXIB 200 MILLIGRAM(S): 200 CAPSULE ORAL at 21:16

## 2019-12-31 RX ADMIN — HYDROMORPHONE HYDROCHLORIDE 0.5 MILLIGRAM(S): 2 INJECTION INTRAMUSCULAR; INTRAVENOUS; SUBCUTANEOUS at 05:33

## 2019-12-31 NOTE — PHARMACOTHERAPY INTERVENTION NOTE - COMMENTS
Patient takes Pyridostigmine 60mg TID at home. Entered in PST H&P as 120mg TID and carried over to admission orders. Primary prescriber clearance indicates dose is 60mg TID which was confirmed with patient's daughter who manages her mom's home medications. Patient received 2 doses of 120mg before discrepancy was identified by admission medication reconciliation by clinical pharmacist on POD1.
Admission medication reconciliation POD1
Transition of Care education at bedside - medication calendar given to patient. Pharmacy fill confirmed.

## 2019-12-31 NOTE — PROGRESS NOTE ADULT - SUBJECTIVE AND OBJECTIVE BOX
Patient seen and examined at bedside. tristan reports back pain, doesn't like the bed.     Review of Systems:  General:denies fever chills, headache, weakness  HEENT: denies blurry vision,diffculty swallowing, difficulty hearing, tinnitus  Cardiovascular: denies chest pain  ,palpitations  Pulmonary:denies shortness of breath, cough, wheezing, hemoptysis  Gastrointestinal: denies abdominal pain, constipation, diarrhea,nausea , vomiting, hematochezia  : denies hematuria, dysuria, or incontinence  Neurological: denies weakness, numbness , tingling, dizziness, tremors  MSK: denies muscle pain, difficulty ambulating, swelling, back pain  skin: denies skin rash, itching, burning, or  skin lesions  Psychiatrical: denies mood disturbances, anxierty, feeling depressed, depression , or difficulty sleeping    Objective:  Vitals  T(C): 36.4 (12-31-19 @ 11:33), Max: 36.9 (12-30-19 @ 19:17)  HR: 63 (12-31-19 @ 11:33) (54 - 63)  BP: 105/60 (12-31-19 @ 11:33) (105/60 - 129/58)  RR: 17 (12-31-19 @ 11:33) (16 - 17)  SpO2: 94% (12-31-19 @ 11:33) (94% - 100%)    Physical Exam:  General: comfortable, no acute distress, well nourished  HEENT: Atraumatic, no LAD, trachea midline, PERRLA  Cardiovascular: normal s1s2, no murmurs, gallops or fricition rubs  Pulmonary: clear to ausculation Bilaterally, no wheezing , rhonchi  Gastrointestinal: soft non tender non distended, no masses felt, no organomegally  Muscloskeletal: no lower extremity edema, intact bilateral lower extremity pulses  Neurological: CN II-12 intact. No focal weakness  Psychiatrical: normal mood, cooperative  SKIN: no rash, lesions or ulcers              Labs:                          8.5    8.95  )-----------( 129      ( 31 Dec 2019 07:12 )             26.5     12-31    139  |  104  |  22  ----------------------------<  105<H>  4.2   |  28  |  0.91    Ca    7.9<L>      31 Dec 2019 07:12              Active Medications  MEDICATIONS  (STANDING):  acetaminophen   Tablet .. 1000 milliGRAM(s) Oral every 8 hours  aspirin enteric coated 81 milliGRAM(s) Oral every 12 hours  atorvastatin 20 milliGRAM(s) Oral at bedtime  celecoxib 200 milliGRAM(s) Oral every 12 hours  famotidine    Tablet 20 milliGRAM(s) Oral daily  lactated ringers. 1000 milliLiter(s) (75 mL/Hr) IV Continuous <Continuous>  lactated ringers. 1000 milliLiter(s) (75 mL/Hr) IV Continuous <Continuous>  levothyroxine 50 MICROGram(s) Oral daily  pantoprazole    Tablet 40 milliGRAM(s) Oral before breakfast  polyethylene glycol 3350 17 Gram(s) Oral daily  predniSONE   Tablet 15 milliGRAM(s) Oral daily  pyridostigmine 120 milliGRAM(s) Oral three times a day  pyridostigmine  milliGRAM(s) Oral daily    MEDICATIONS  (PRN):  aluminum hydroxide/magnesium hydroxide/simethicone Suspension 30 milliLiter(s) Oral four times a day PRN Indigestion  aluminum hydroxide/magnesium hydroxide/simethicone Suspension 30 milliLiter(s) Oral four times a day PRN Indigestion  HYDROmorphone  Injectable 0.5 milliGRAM(s) IV Push every 3 hours PRN Severe Pain (7 - 10)  linaclotide 145 MICROGram(s) Oral before breakfast PRN Constipation  magnesium hydroxide Suspension 30 milliLiter(s) Oral once PRN Constipation  magnesium hydroxide Suspension 30 milliLiter(s) Oral daily PRN Constipation  ondansetron Injectable 4 milliGRAM(s) IV Push every 6 hours PRN Nausea and/or Vomiting  oxyCODONE    IR 5 milliGRAM(s) Oral every 3 hours PRN Mild Pain (1 - 3)  oxyCODONE    IR 10 milliGRAM(s) Oral every 3 hours PRN Moderate Pain (4 - 6)  senna 2 Tablet(s) Oral at bedtime PRN Constipation

## 2019-12-31 NOTE — PROGRESS NOTE ADULT - SUBJECTIVE AND OBJECTIVE BOX
Procedure: Right  Anterior THR  POD#: 1    S: Pt without complaints. No SOB,CP, N/V. Tolerated Fluids / Diet well.   Pain comfortable on Interval Rx  + Tylenol + Celebrex. No BM yet  + flatus, No abdominal pain.  Pain Rx:   acetaminophen   Tablet .. 1000 milliGRAM(s) Oral every 8 hours  celecoxib 200 milliGRAM(s) Oral every 12 hours  HYDROmorphone  Injectable 0.5 milliGRAM(s) IV Push every 3 hours PRN  ondansetron Injectable 4 milliGRAM(s) IV Push every 6 hours PRN  oxyCODONE    IR 5 milliGRAM(s) Oral every 3 hours PRN  oxyCODONE    IR 10 milliGRAM(s) Oral every 3 hours PRN    O: General: Pt Alert and oriented, On exam NAD,   VS: Vital Signs Last 24 Hrs  T(C): 36.7 (31 Dec 2019 07:20), Max: 36.9 (30 Dec 2019 19:17)  T(F): 98.1 (31 Dec 2019 07:20), Max: 98.4 (30 Dec 2019 19:17)  HR: 55 (31 Dec 2019 07:20) (53 - 85)  BP: 121/59 (31 Dec 2019 07:20) (105/44 - 136/49)  RR: 17 (31 Dec 2019 07:20) (13 - 17)  SpO2: 98% (31 Dec 2019 07:20) (95% - 100%)  Heart: RRR, 1/6 syst murmur  Lungs: BS clear bilat.  Abdomen: Soft; no distention, benign exam    Ext: Right Ant. Hip: ABD  Dressing  clean, dry, & intact, thigh soft  Neurologic: Has sensation bilat. feet & toes ;  Full AROM bilat feet & toes. EHL / AT  = Bilat: 5/5 ; Sensation Present mid lateral thigh  Vascular: Feet toes warm, pink. DP = 2+. Calves soft ; w/o tenderness bilat..  VTEP: On Bilat. Venodynes +   aspirin enteric coated 81 milliGRAM(s) Oral every 12 hours    H.O Prophylaxis: Celebrex 200mg BID - Goal 21 Days   Activity in PT Noted.  Walked 5', limited by pain                          8.5    8.95  )-----------( 129      ( 31 Dec 2019 07:12 )             26.5     12-31    139  |  104  |  22  ----------------------------<  105<H>  4.2   |  28  |  0.91    Ca    7.9<L>      31 Dec 2019 07:12        Hospitalist input noted    Primary Orthopedic Assessment:  • Stable from Orthopedic perspective  • Neuro motor exam stable:   • Labs: post op anemia, well tolerated      Plan:   • Continue:  PT/OT/Weightbearing as tolerated with assistance of a walker/Anterior THR precautions/Ice to hip/          Incentive spirometry encouraged / Celebrex for HO PPX  • Continue DVT prophylaxis as prescribed, including use of compression devices and ankle pumps  • Continue Pain Rx  • Anterior hip precautions reviewed with patient  • Plans per Medicine   • Discharge planning – anticipated discharge is Home D/C with home care & home PT  when medically stable & cleared by PT/OT--wed or thurs.

## 2020-01-01 LAB
ANION GAP SERPL CALC-SCNC: 7 MMOL/L — SIGNIFICANT CHANGE UP (ref 5–17)
BUN SERPL-MCNC: 27 MG/DL — HIGH (ref 7–23)
CALCIUM SERPL-MCNC: 8 MG/DL — LOW (ref 8.4–10.5)
CHLORIDE SERPL-SCNC: 106 MMOL/L — SIGNIFICANT CHANGE UP (ref 96–108)
CO2 SERPL-SCNC: 26 MMOL/L — SIGNIFICANT CHANGE UP (ref 22–31)
CREAT SERPL-MCNC: 1.06 MG/DL — SIGNIFICANT CHANGE UP (ref 0.5–1.3)
GLUCOSE SERPL-MCNC: 78 MG/DL — SIGNIFICANT CHANGE UP (ref 70–99)
HCT VFR BLD CALC: 27 % — LOW (ref 34.5–45)
HGB BLD-MCNC: 8.4 G/DL — LOW (ref 11.5–15.5)
MCHC RBC-ENTMCNC: 28.9 PG — SIGNIFICANT CHANGE UP (ref 27–34)
MCHC RBC-ENTMCNC: 31.1 GM/DL — LOW (ref 32–36)
MCV RBC AUTO: 92.8 FL — SIGNIFICANT CHANGE UP (ref 80–100)
NRBC # BLD: 0 /100 WBCS — SIGNIFICANT CHANGE UP (ref 0–0)
PLATELET # BLD AUTO: 108 K/UL — LOW (ref 150–400)
POTASSIUM SERPL-MCNC: 4.1 MMOL/L — SIGNIFICANT CHANGE UP (ref 3.5–5.3)
POTASSIUM SERPL-SCNC: 4.1 MMOL/L — SIGNIFICANT CHANGE UP (ref 3.5–5.3)
RBC # BLD: 2.91 M/UL — LOW (ref 3.8–5.2)
RBC # FLD: 14.1 % — SIGNIFICANT CHANGE UP (ref 10.3–14.5)
SODIUM SERPL-SCNC: 139 MMOL/L — SIGNIFICANT CHANGE UP (ref 135–145)
WBC # BLD: 6.65 K/UL — SIGNIFICANT CHANGE UP (ref 3.8–10.5)
WBC # FLD AUTO: 6.65 K/UL — SIGNIFICANT CHANGE UP (ref 3.8–10.5)

## 2020-01-01 PROCEDURE — 99232 SBSQ HOSP IP/OBS MODERATE 35: CPT

## 2020-01-01 RX ADMIN — PYRIDOSTIGMINE BROMIDE 60 MILLIGRAM(S): 60 SOLUTION ORAL at 14:03

## 2020-01-01 RX ADMIN — CELECOXIB 200 MILLIGRAM(S): 200 CAPSULE ORAL at 08:45

## 2020-01-01 RX ADMIN — CELECOXIB 200 MILLIGRAM(S): 200 CAPSULE ORAL at 21:45

## 2020-01-01 RX ADMIN — Medication 1000 MILLIGRAM(S): at 17:09

## 2020-01-01 RX ADMIN — POLYETHYLENE GLYCOL 3350 17 GRAM(S): 17 POWDER, FOR SOLUTION ORAL at 21:52

## 2020-01-01 RX ADMIN — PYRIDOSTIGMINE BROMIDE 180 MILLIGRAM(S): 60 SOLUTION ORAL at 11:52

## 2020-01-01 RX ADMIN — CELECOXIB 200 MILLIGRAM(S): 200 CAPSULE ORAL at 08:44

## 2020-01-01 RX ADMIN — OXYCODONE HYDROCHLORIDE 5 MILLIGRAM(S): 5 TABLET ORAL at 09:21

## 2020-01-01 RX ADMIN — FAMOTIDINE 20 MILLIGRAM(S): 10 INJECTION INTRAVENOUS at 11:52

## 2020-01-01 RX ADMIN — OXYCODONE HYDROCHLORIDE 5 MILLIGRAM(S): 5 TABLET ORAL at 08:51

## 2020-01-01 RX ADMIN — Medication 15 MILLIGRAM(S): at 05:39

## 2020-01-01 RX ADMIN — CELECOXIB 200 MILLIGRAM(S): 200 CAPSULE ORAL at 21:52

## 2020-01-01 RX ADMIN — PYRIDOSTIGMINE BROMIDE 60 MILLIGRAM(S): 60 SOLUTION ORAL at 05:39

## 2020-01-01 RX ADMIN — PANTOPRAZOLE SODIUM 40 MILLIGRAM(S): 20 TABLET, DELAYED RELEASE ORAL at 05:39

## 2020-01-01 RX ADMIN — Medication 50 MICROGRAM(S): at 05:39

## 2020-01-01 RX ADMIN — ATORVASTATIN CALCIUM 20 MILLIGRAM(S): 80 TABLET, FILM COATED ORAL at 21:52

## 2020-01-01 RX ADMIN — Medication 81 MILLIGRAM(S): at 17:08

## 2020-01-01 RX ADMIN — Medication 1000 MILLIGRAM(S): at 05:39

## 2020-01-01 RX ADMIN — Medication 1000 MILLIGRAM(S): at 05:43

## 2020-01-01 RX ADMIN — Medication 1000 MILLIGRAM(S): at 17:08

## 2020-01-01 RX ADMIN — PYRIDOSTIGMINE BROMIDE 60 MILLIGRAM(S): 60 SOLUTION ORAL at 21:52

## 2020-01-01 RX ADMIN — Medication 81 MILLIGRAM(S): at 05:39

## 2020-01-01 NOTE — PROGRESS NOTE ADULT - SUBJECTIVE AND OBJECTIVE BOX
Patient is a 79y old  Female who presents with a chief complaint of right hip pain-- for right anterior CHELE (31 Dec 2019 08:44)      INTERVAL HPI/OVERNIGHT EVENTS:  Pt is seen and examined.  Pain is controlled.    Pain Location & Control:     MEDICATIONS  (STANDING):  acetaminophen   Tablet .. 1000 milliGRAM(s) Oral every 8 hours  aspirin enteric coated 81 milliGRAM(s) Oral every 12 hours  atorvastatin 20 milliGRAM(s) Oral at bedtime  celecoxib 200 milliGRAM(s) Oral every 12 hours  famotidine    Tablet 20 milliGRAM(s) Oral daily  levothyroxine 50 MICROGram(s) Oral daily  losartan 50 milliGRAM(s) Oral daily  pantoprazole    Tablet 40 milliGRAM(s) Oral before breakfast  polyethylene glycol 3350 17 Gram(s) Oral daily  predniSONE   Tablet 15 milliGRAM(s) Oral daily  pyridostigmine 60 milliGRAM(s) Oral three times a day  pyridostigmine  milliGRAM(s) Oral daily    MEDICATIONS  (PRN):  aluminum hydroxide/magnesium hydroxide/simethicone Suspension 30 milliLiter(s) Oral four times a day PRN Indigestion  aluminum hydroxide/magnesium hydroxide/simethicone Suspension 30 milliLiter(s) Oral four times a day PRN Indigestion  bisacodyl Suppository 10 milliGRAM(s) Rectal daily PRN If no bowel movement by POD#2  bisacodyl Suppository 10 milliGRAM(s) Rectal daily PRN If no bowel movement by POD#2  HYDROmorphone  Injectable 0.5 milliGRAM(s) IV Push every 3 hours PRN Severe Pain (7 - 10)  linaclotide 145 MICROGram(s) Oral before breakfast PRN Constipation  magnesium hydroxide Suspension 30 milliLiter(s) Oral once PRN Constipation  magnesium hydroxide Suspension 30 milliLiter(s) Oral daily PRN Constipation  ondansetron Injectable 4 milliGRAM(s) IV Push every 6 hours PRN Nausea and/or Vomiting  oxyCODONE    IR 5 milliGRAM(s) Oral every 3 hours PRN Mild Pain (1 - 3)  oxyCODONE    IR 10 milliGRAM(s) Oral every 3 hours PRN Moderate Pain (4 - 6)  senna 2 Tablet(s) Oral at bedtime PRN Constipation      Allergies    iodine (Rash)  shellfish (Rash)    Intolerances      Vital Signs Last 24 Hrs  T(C): 36.7 (01 Jan 2020 07:59), Max: 36.9 (31 Dec 2019 15:38)  T(F): 98.1 (01 Jan 2020 07:59), Max: 98.4 (31 Dec 2019 15:38)  HR: 61 (01 Jan 2020 07:59) (59 - 93)  BP: 103/61 (01 Jan 2020 07:59) (93/51 - 103/61)  BP(mean): --  RR: 16 (01 Jan 2020 07:59) (14 - 18)  SpO2: 94% (01 Jan 2020 07:59) (94% - 96%)        LABS:                        8.4    6.65  )-----------( 108      ( 01 Jan 2020 07:14 )             27.0     01 Jan 2020 07:14    139    |  106    |  27     ----------------------------<  78     4.1     |  26     |  1.06     Ca    8.0        01 Jan 2020 07:14        RADIOLOGY & ADDITIONAL TESTS:    Imaging Personally Reviewed:  [ ] YES  [ ] NO    Consultant(s) Notes Reviewed:  [ ] YES  [ ] NO    Care Discussed with Consultants/Other Providers [ x] YES  [ ] NO

## 2020-01-02 VITALS
HEART RATE: 61 BPM | RESPIRATION RATE: 16 BRPM | DIASTOLIC BLOOD PRESSURE: 67 MMHG | SYSTOLIC BLOOD PRESSURE: 116 MMHG | OXYGEN SATURATION: 96 % | TEMPERATURE: 98 F

## 2020-01-02 LAB
ALBUMIN SERPL ELPH-MCNC: 2.4 G/DL — LOW (ref 3.3–5)
ALP SERPL-CCNC: 43 U/L — SIGNIFICANT CHANGE UP (ref 30–120)
ALT FLD-CCNC: 20 U/L DA — SIGNIFICANT CHANGE UP (ref 10–60)
ANION GAP SERPL CALC-SCNC: 6 MMOL/L — SIGNIFICANT CHANGE UP (ref 5–17)
AST SERPL-CCNC: 17 U/L — SIGNIFICANT CHANGE UP (ref 10–40)
BILIRUB DIRECT SERPL-MCNC: 0.1 MG/DL — SIGNIFICANT CHANGE UP (ref 0–0.2)
BILIRUB INDIRECT FLD-MCNC: 0.2 MG/DL — SIGNIFICANT CHANGE UP (ref 0.2–1)
BILIRUB SERPL-MCNC: 0.3 MG/DL — SIGNIFICANT CHANGE UP (ref 0.2–1.2)
BUN SERPL-MCNC: 25 MG/DL — HIGH (ref 7–23)
CALCIUM SERPL-MCNC: 7.9 MG/DL — LOW (ref 8.4–10.5)
CHLORIDE SERPL-SCNC: 109 MMOL/L — HIGH (ref 96–108)
CO2 SERPL-SCNC: 26 MMOL/L — SIGNIFICANT CHANGE UP (ref 22–31)
CREAT SERPL-MCNC: 0.88 MG/DL — SIGNIFICANT CHANGE UP (ref 0.5–1.3)
GLUCOSE SERPL-MCNC: 82 MG/DL — SIGNIFICANT CHANGE UP (ref 70–99)
HCT VFR BLD CALC: 26.2 % — LOW (ref 34.5–45)
HGB BLD-MCNC: 8 G/DL — LOW (ref 11.5–15.5)
HGB BLD-MCNC: 8.7 G/DL — LOW (ref 11.5–15.5)
LACTATE SERPL-SCNC: 1 MMOL/L — SIGNIFICANT CHANGE UP (ref 0.7–2)
LIDOCAIN IGE QN: 143 U/L — SIGNIFICANT CHANGE UP (ref 73–393)
MCHC RBC-ENTMCNC: 28.8 PG — SIGNIFICANT CHANGE UP (ref 27–34)
MCHC RBC-ENTMCNC: 30.5 GM/DL — LOW (ref 32–36)
MCV RBC AUTO: 94.2 FL — SIGNIFICANT CHANGE UP (ref 80–100)
NRBC # BLD: 0 /100 WBCS — SIGNIFICANT CHANGE UP (ref 0–0)
PLATELET # BLD AUTO: 107 K/UL — LOW (ref 150–400)
POTASSIUM SERPL-MCNC: 4.1 MMOL/L — SIGNIFICANT CHANGE UP (ref 3.5–5.3)
POTASSIUM SERPL-SCNC: 4.1 MMOL/L — SIGNIFICANT CHANGE UP (ref 3.5–5.3)
PROT SERPL-MCNC: 6 G/DL — SIGNIFICANT CHANGE UP (ref 6–8.3)
RBC # BLD: 2.78 M/UL — LOW (ref 3.8–5.2)
RBC # FLD: 14.4 % — SIGNIFICANT CHANGE UP (ref 10.3–14.5)
SODIUM SERPL-SCNC: 141 MMOL/L — SIGNIFICANT CHANGE UP (ref 135–145)
WBC # BLD: 6.18 K/UL — SIGNIFICANT CHANGE UP (ref 3.8–10.5)
WBC # FLD AUTO: 6.18 K/UL — SIGNIFICANT CHANGE UP (ref 3.8–10.5)

## 2020-01-02 PROCEDURE — 80076 HEPATIC FUNCTION PANEL: CPT

## 2020-01-02 PROCEDURE — 97116 GAIT TRAINING THERAPY: CPT

## 2020-01-02 PROCEDURE — 76705 ECHO EXAM OF ABDOMEN: CPT

## 2020-01-02 PROCEDURE — 99238 HOSP IP/OBS DSCHRG MGMT 30/<: CPT

## 2020-01-02 PROCEDURE — 76000 FLUOROSCOPY <1 HR PHYS/QHP: CPT

## 2020-01-02 PROCEDURE — 85027 COMPLETE CBC AUTOMATED: CPT

## 2020-01-02 PROCEDURE — 74176 CT ABD & PELVIS W/O CONTRAST: CPT | Mod: 26

## 2020-01-02 PROCEDURE — 85018 HEMOGLOBIN: CPT

## 2020-01-02 PROCEDURE — 80048 BASIC METABOLIC PNL TOTAL CA: CPT

## 2020-01-02 PROCEDURE — 74176 CT ABD & PELVIS W/O CONTRAST: CPT

## 2020-01-02 PROCEDURE — 83690 ASSAY OF LIPASE: CPT

## 2020-01-02 PROCEDURE — 88305 TISSUE EXAM BY PATHOLOGIST: CPT

## 2020-01-02 PROCEDURE — 97110 THERAPEUTIC EXERCISES: CPT

## 2020-01-02 PROCEDURE — 97165 OT EVAL LOW COMPLEX 30 MIN: CPT

## 2020-01-02 PROCEDURE — 97535 SELF CARE MNGMENT TRAINING: CPT

## 2020-01-02 PROCEDURE — 97161 PT EVAL LOW COMPLEX 20 MIN: CPT

## 2020-01-02 PROCEDURE — C1713: CPT

## 2020-01-02 PROCEDURE — 36415 COLL VENOUS BLD VENIPUNCTURE: CPT

## 2020-01-02 PROCEDURE — 97530 THERAPEUTIC ACTIVITIES: CPT

## 2020-01-02 PROCEDURE — 76705 ECHO EXAM OF ABDOMEN: CPT | Mod: 26

## 2020-01-02 PROCEDURE — 88311 DECALCIFY TISSUE: CPT

## 2020-01-02 PROCEDURE — C1776: CPT

## 2020-01-02 PROCEDURE — 83605 ASSAY OF LACTIC ACID: CPT

## 2020-01-02 RX ADMIN — OXYCODONE HYDROCHLORIDE 5 MILLIGRAM(S): 5 TABLET ORAL at 08:14

## 2020-01-02 RX ADMIN — CELECOXIB 200 MILLIGRAM(S): 200 CAPSULE ORAL at 08:17

## 2020-01-02 RX ADMIN — PYRIDOSTIGMINE BROMIDE 60 MILLIGRAM(S): 60 SOLUTION ORAL at 15:53

## 2020-01-02 RX ADMIN — Medication 50 MICROGRAM(S): at 06:32

## 2020-01-02 RX ADMIN — PANTOPRAZOLE SODIUM 40 MILLIGRAM(S): 20 TABLET, DELAYED RELEASE ORAL at 06:32

## 2020-01-02 RX ADMIN — Medication 1000 MILLIGRAM(S): at 06:32

## 2020-01-02 RX ADMIN — Medication 1000 MILLIGRAM(S): at 06:46

## 2020-01-02 RX ADMIN — Medication 15 MILLIGRAM(S): at 06:32

## 2020-01-02 RX ADMIN — POLYETHYLENE GLYCOL 3350 17 GRAM(S): 17 POWDER, FOR SOLUTION ORAL at 08:17

## 2020-01-02 RX ADMIN — CELECOXIB 200 MILLIGRAM(S): 200 CAPSULE ORAL at 08:16

## 2020-01-02 RX ADMIN — Medication 81 MILLIGRAM(S): at 16:32

## 2020-01-02 RX ADMIN — OXYCODONE HYDROCHLORIDE 5 MILLIGRAM(S): 5 TABLET ORAL at 09:00

## 2020-01-02 RX ADMIN — Medication 81 MILLIGRAM(S): at 06:32

## 2020-01-02 RX ADMIN — PYRIDOSTIGMINE BROMIDE 60 MILLIGRAM(S): 60 SOLUTION ORAL at 06:32

## 2020-01-02 RX ADMIN — FAMOTIDINE 20 MILLIGRAM(S): 10 INJECTION INTRAVENOUS at 08:16

## 2020-01-02 NOTE — PROGRESS NOTE ADULT - ASSESSMENT
80 yo F admitted to  for Aftercare following Right THR 12/30    osteoarthritis of right hip  Aftercare following Right Total Hip  WBAT  PT/OT  Early ambulation  Pain management  Incentive Spirometry  DVT ppx as per ortho    HTN/dyslipidemia  losartan with parameter.    Hypothyroidism  synthroid.    myasthenia gravis  Pyridostigmine.    Dispo planning as per PT and ortho
80 yo F admitted to  for Aftercare following Right THR 12/30    osteoarthritis of right hip  Aftercare following Right Total Hip  WBAT  PT/OT  Early ambulation  Pain management  Incentive Spirometry  DVT ppx as per ortho    Right Upper quadrant pain  CT abdomen non con + lipase + lactic acid  cannot give contrast due to iodine allergy    HTN/dyslipidemia  losartan with parameter.    Hypothyroidism  synthroid.    myasthenia gravis  Pyridostigmine.    Post op anemia due to acute blood loss  monitor cbc.  asymptomatics.    CKD3  avoid nephrotoxic meds.    Dispo planning as per PT and ortho
78 yo F admitted to  for Aftercare following Right THR 12/30    osteoarthritis of right hip  Aftercare following Right Total Hip  WBAT  PT/OT  Early ambulation  Pain management  Incentive Spirometry  DVT ppx as per ortho    HTN/dyslipidemia  losartan with parameter.    Hypothyroidism  synthroid.    myasthenia gravis  Pyridostigmine.    Post op anemia due to acute blood loss  monitor cbc.  asymptomatics.    CKD3  avoid nephrotoxic meds.    Dispo planning as per PT and ortho

## 2020-01-02 NOTE — PROGRESS NOTE ADULT - SUBJECTIVE AND OBJECTIVE BOX
Procedure: Right Anterior THR  POD#: 3    S: Pt with complaints of right-sided abd pain.  Has some right hip pain as well.  No SOB,CP, N/V. Tolerated Fluids / Diet well.   Pain comfortable on interval + Tylenol + Celebrex. No BM yet  + flatus, No abdominal pain.  Pain Rx:   acetaminophen   Tablet .. 1000 milliGRAM(s) Oral every 8 hours  celecoxib 200 milliGRAM(s) Oral every 12 hours  HYDROmorphone  Injectable 0.5 milliGRAM(s) IV Push every 3 hours PRN  ondansetron Injectable 4 milliGRAM(s) IV Push every 6 hours PRN  oxyCODONE    IR 5 milliGRAM(s) Oral every 3 hours PRN  oxyCODONE    IR 10 milliGRAM(s) Oral every 3 hours PRN    O: General: Pt Alert and oriented, On exam NAD,   VS: Vital Signs Last 24 Hrs  T(C): 36.8 (02 Jan 2020 07:42), Max: 37 (01 Jan 2020 15:26)  T(F): 98.2 (02 Jan 2020 07:42), Max: 98.6 (01 Jan 2020 15:26)  HR: 63 (02 Jan 2020 07:42) (54 - 72)  BP: 109/61 (02 Jan 2020 07:42) (90/49 - 120/65)  BP(mean): --  RR: 16 (02 Jan 2020 07:42) (16 - 17)  SpO2: 97% (02 Jan 2020 07:42) (96% - 97%)  Heart: RRR 1/6 syst murmur  Lungs: BS clear bilat.  Abdomen: Soft; no distention  Hypoactive BS.  tenderness to deep palpation.  No masses present    Ext: Right Ant. Hip prineo clean, dry, & intact, softly swollen  Neurologic: Has sensation bilat. feet & toes ;  Full AROM bilat feet & toes. EHL / AT  = Bilat: 5/5 ; Sensation Present mid lateral thigh  Vascular: Feet toes warm, pink. DP = 2+. Calves soft ; w/o tenderness bilat..  VTEP: On Bilat. Venodynes +   aspirin enteric coated 81 milliGRAM(s) Oral every 12 hours    H.O Prophylaxis: Celebrex 200mg BID - Goal 21 Days   Activity in PT Noted.  Walked 150'                          8.0    6.18  )-----------( 107      ( 02 Jan 2020 07:03 )             26.2     01-02    141  |  109<H>  |  25<H>  ----------------------------<  82  4.1   |  26  |  0.88    Ca    7.9<L>      02 Jan 2020 07:03        Hospitalist input noted    Primary Orthopedic Assessment:  • Stable from Orthopedic perspective  • Neuro motor exam stable:   • Labs: stable.  post op anemia, well tolerated      Plan:   • Continue:  PT/OT/Weightbearing as tolerated with assistance of a walker/Anterior THR precautions/Ice to hip/          Incentive spirometry encouraged / Celebrex for HO PPX  • Continue DVT prophylaxis as prescribed, including use of compression devices and ankle pumps  • Continue Pain Rx  • Anterior hip precautions reviewed with patient  • Plans per Medicine  • Discharge planning – anticipated discharge is Home D/C with home care & home PT  when medically stable & cleared by PT/OT Procedure: Right Anterior THR  POD#: 3    S: Pt with complaints of right-sided abd pain.  Has some right hip pain as well.  No SOB,CP, N/V. Tolerated Fluids / Diet well.   Pain comfortable on interval + Tylenol + Celebrex. Small BM, no blood or melena.  + flatus.  Pain Rx:   acetaminophen   Tablet .. 1000 milliGRAM(s) Oral every 8 hours  celecoxib 200 milliGRAM(s) Oral every 12 hours  HYDROmorphone  Injectable 0.5 milliGRAM(s) IV Push every 3 hours PRN  ondansetron Injectable 4 milliGRAM(s) IV Push every 6 hours PRN  oxyCODONE    IR 5 milliGRAM(s) Oral every 3 hours PRN  oxyCODONE    IR 10 milliGRAM(s) Oral every 3 hours PRN    O: General: Pt Alert and oriented, On exam NAD,   VS: Vital Signs Last 24 Hrs  T(C): 36.8 (02 Jan 2020 07:42), Max: 37 (01 Jan 2020 15:26)  T(F): 98.2 (02 Jan 2020 07:42), Max: 98.6 (01 Jan 2020 15:26)  HR: 63 (02 Jan 2020 07:42) (54 - 72)  BP: 109/61 (02 Jan 2020 07:42) (90/49 - 120/65)  BP(mean): --  RR: 16 (02 Jan 2020 07:42) (16 - 17)  SpO2: 97% (02 Jan 2020 07:42) (96% - 97%)  Heart: RRR 1/6 syst murmur  Lungs: BS clear bilat.  Abdomen: Soft; no distention  Hypoactive BS.  tenderness to deep palpation.  No masses present    Ext: Right Ant. Hip prineo clean, dry, & intact, softly swollen  Neurologic: Has sensation bilat. feet & toes ;  Full AROM bilat feet & toes. EHL / AT  = Bilat: 5/5 ; Sensation Present mid lateral thigh  Vascular: Feet toes warm, pink. DP = 2+. Calves soft ; w/o tenderness bilat..  VTEP: On Bilat. Venodynes +   aspirin enteric coated 81 milliGRAM(s) Oral every 12 hours    H.O Prophylaxis: Celebrex 200mg BID - Goal 21 Days   Activity in PT Noted.  Walked 150'                          8.0    6.18  )-----------( 107      ( 02 Jan 2020 07:03 )             26.2     01-02    141  |  109<H>  |  25<H>  ----------------------------<  82  4.1   |  26  |  0.88    Ca    7.9<L>      02 Jan 2020 07:03        Hospitalist input noted    Primary Orthopedic Assessment:  • Stable from Orthopedic perspective  • Neuro motor exam stable:   • Labs: stable.  post op anemia, well tolerated      Plan:   • Continue:  PT/OT/Weightbearing as tolerated with assistance of a walker/Anterior THR precautions/Ice to hip/          Incentive spirometry encouraged / Celebrex for HO PPX  • Continue DVT prophylaxis as prescribed, including use of compression devices and ankle pumps  • Continue Pain Rx  • Anterior hip precautions reviewed with patient  • Plans per Medicine  • Discharge planning – anticipated discharge is Home D/C with home care & home PT  when medically stable & cleared by PT/OT

## 2020-01-02 NOTE — PROGRESS NOTE ADULT - SUBJECTIVE AND OBJECTIVE BOX
Patient seen and examined at bedside. patient today reports she is having Right Upper Quadrant and right sided lower rib pain.  + nausea  denies fever chills, DENIES BACK pain.    Hb today 8.0 . vitals otherwise stable      Review of Systems:  General:denies fever chills, headache, weakness  HEENT: denies blurry vision,diffculty swallowing,  Cardiovascular: denies chest pain  ,palpitatins  Pulmonary:denies shortness of breath, cough, wheezing  Gastrointestinal: ++Right upper quadrant. abdominal pain, -constipation, --diarrhea  : denies hematuria, dysuria  Neurological: denies weakness, numbness , tingling, dizziness  skin: denies skin rash  Psychiatrical: denies mood disturbances      Objective:  Vitals  T(C): 36.8 (01-02-20 @ 07:42), Max: 37 (01-01-20 @ 15:26)  HR: 63 (01-02-20 @ 07:42) (54 - 72)  BP: 109/61 (01-02-20 @ 07:42) (97/56 - 120/65)  RR: 16 (01-02-20 @ 07:42) (16 - 17)  SpO2: 97% (01-02-20 @ 07:42) (96% - 97%)    Physical Exam:  General: comfortable, no acute distress, well nourished  HEENT: no LAD, trachea midline  Cardiovascular: normal s1s2, no mrg  Pulmonary: CTA Bilaterally, no wheezing , rhonchi  Gastrointestinal: RUQ discomfort,  non tender non distended, no masses felt  Muscloskeletal: no lower extremity edema  Neurological: CN II-12 intact. No focal weakness  Psychiatrical: normal mood, cooperative    Labs:                          8.0    6.18  )-----------( 107      ( 02 Jan 2020 07:03 )             26.2     01-02    141  |  109<H>  |  25<H>  ----------------------------<  82  4.1   |  26  |  0.88    Ca    7.9<L>      02 Jan 2020 07:03              Active Medications  MEDICATIONS  (STANDING):  acetaminophen   Tablet .. 1000 milliGRAM(s) Oral every 8 hours  aspirin enteric coated 81 milliGRAM(s) Oral every 12 hours  atorvastatin 20 milliGRAM(s) Oral at bedtime  celecoxib 200 milliGRAM(s) Oral every 12 hours  famotidine    Tablet 20 milliGRAM(s) Oral daily  levothyroxine 50 MICROGram(s) Oral daily  losartan 50 milliGRAM(s) Oral daily  pantoprazole    Tablet 40 milliGRAM(s) Oral before breakfast  polyethylene glycol 3350 17 Gram(s) Oral daily  predniSONE   Tablet 15 milliGRAM(s) Oral daily  pyridostigmine 60 milliGRAM(s) Oral three times a day  pyridostigmine  milliGRAM(s) Oral daily    MEDICATIONS  (PRN):  aluminum hydroxide/magnesium hydroxide/simethicone Suspension 30 milliLiter(s) Oral four times a day PRN Indigestion  aluminum hydroxide/magnesium hydroxide/simethicone Suspension 30 milliLiter(s) Oral four times a day PRN Indigestion  bisacodyl Suppository 10 milliGRAM(s) Rectal daily PRN If no bowel movement by POD#2  bisacodyl Suppository 10 milliGRAM(s) Rectal daily PRN If no bowel movement by POD#2  HYDROmorphone  Injectable 0.5 milliGRAM(s) IV Push every 3 hours PRN Severe Pain (7 - 10)  magnesium hydroxide Suspension 30 milliLiter(s) Oral once PRN Constipation  magnesium hydroxide Suspension 30 milliLiter(s) Oral daily PRN Constipation  ondansetron Injectable 4 milliGRAM(s) IV Push every 6 hours PRN Nausea and/or Vomiting  oxyCODONE    IR 5 milliGRAM(s) Oral every 3 hours PRN Mild Pain (1 - 3)  oxyCODONE    IR 10 milliGRAM(s) Oral every 3 hours PRN Moderate Pain (4 - 6)  senna 2 Tablet(s) Oral at bedtime PRN Constipation

## 2020-01-02 NOTE — CHART NOTE - NSCHARTNOTEFT_GEN_A_CORE
LFt negative  lipase negative  lactate negative  sono with 6mm CBD.. secondary to cholecystectomy  ct with severe diverticulosis    nothing to explain symptoms  spoke to daughter who Is nurse .agreeable for dc

## 2020-01-03 ENCOUNTER — APPOINTMENT (OUTPATIENT)
Dept: NEUROLOGY | Facility: CLINIC | Age: 80
End: 2020-01-03
Payer: MEDICARE

## 2020-01-03 VITALS — HEART RATE: 69 BPM | SYSTOLIC BLOOD PRESSURE: 129 MMHG | RESPIRATION RATE: 16 BRPM | DIASTOLIC BLOOD PRESSURE: 60 MMHG

## 2020-01-03 PROCEDURE — 96413 CHEMO IV INFUSION 1 HR: CPT

## 2020-01-03 RX ORDER — ECULIZUMAB 300 MG/30ML
300 INJECTION, SOLUTION, CONCENTRATE INTRAVENOUS
Refills: 0 | Status: COMPLETED | OUTPATIENT
Start: 2020-01-03

## 2020-01-03 RX ADMIN — ECULIZUMAB 0 MG/30ML: 300 INJECTION, SOLUTION, CONCENTRATE INTRAVENOUS at 00:00

## 2020-01-13 ENCOUNTER — APPOINTMENT (OUTPATIENT)
Dept: ORTHOPEDIC SURGERY | Facility: CLINIC | Age: 80
End: 2020-01-13
Payer: MEDICARE

## 2020-01-13 VITALS — TEMPERATURE: 97.7 F | SYSTOLIC BLOOD PRESSURE: 99 MMHG | HEART RATE: 84 BPM | DIASTOLIC BLOOD PRESSURE: 62 MMHG

## 2020-01-13 PROCEDURE — 73502 X-RAY EXAM HIP UNI 2-3 VIEWS: CPT | Mod: 26,RT

## 2020-01-13 PROCEDURE — 99024 POSTOP FOLLOW-UP VISIT: CPT

## 2020-01-13 RX ORDER — ALBUTEROL SULFATE 90 UG/1
108 (90 BASE) AEROSOL, METERED RESPIRATORY (INHALATION)
Qty: 1 | Refills: 1 | Status: DISCONTINUED | COMMUNITY
Start: 2018-10-23 | End: 2020-01-13

## 2020-01-16 ENCOUNTER — APPOINTMENT (OUTPATIENT)
Dept: NEUROLOGY | Facility: CLINIC | Age: 80
End: 2020-01-16
Payer: MEDICARE

## 2020-01-16 VITALS — HEART RATE: 76 BPM | DIASTOLIC BLOOD PRESSURE: 48 MMHG | RESPIRATION RATE: 16 BRPM | SYSTOLIC BLOOD PRESSURE: 86 MMHG

## 2020-01-16 PROCEDURE — 96413 CHEMO IV INFUSION 1 HR: CPT

## 2020-01-16 RX ORDER — ECULIZUMAB 300 MG/30ML
300 INJECTION, SOLUTION, CONCENTRATE INTRAVENOUS
Refills: 0 | Status: COMPLETED | OUTPATIENT
Start: 2020-01-16

## 2020-01-16 RX ADMIN — ECULIZUMAB 0 MG/30ML: 300 INJECTION, SOLUTION, CONCENTRATE INTRAVENOUS at 00:00

## 2020-01-30 ENCOUNTER — APPOINTMENT (OUTPATIENT)
Dept: NEUROLOGY | Facility: CLINIC | Age: 80
End: 2020-01-30
Payer: MEDICARE

## 2020-01-30 VITALS — DIASTOLIC BLOOD PRESSURE: 50 MMHG | HEART RATE: 68 BPM | RESPIRATION RATE: 16 BRPM | SYSTOLIC BLOOD PRESSURE: 99 MMHG

## 2020-01-30 PROCEDURE — 96413 CHEMO IV INFUSION 1 HR: CPT

## 2020-01-30 RX ORDER — ECULIZUMAB 300 MG/30ML
300 INJECTION, SOLUTION, CONCENTRATE INTRAVENOUS
Refills: 0 | Status: COMPLETED | OUTPATIENT
Start: 2020-01-30

## 2020-01-30 RX ADMIN — ECULIZUMAB 0 MG/30ML: 300 INJECTION, SOLUTION, CONCENTRATE INTRAVENOUS at 00:00

## 2020-02-07 ENCOUNTER — APPOINTMENT (OUTPATIENT)
Dept: ORTHOPEDIC SURGERY | Facility: CLINIC | Age: 80
End: 2020-02-07
Payer: MEDICARE

## 2020-02-07 VITALS — HEART RATE: 75 BPM | DIASTOLIC BLOOD PRESSURE: 61 MMHG | TEMPERATURE: 98.1 F | SYSTOLIC BLOOD PRESSURE: 111 MMHG

## 2020-02-07 PROCEDURE — 99024 POSTOP FOLLOW-UP VISIT: CPT

## 2020-02-11 ENCOUNTER — APPOINTMENT (OUTPATIENT)
Dept: ORTHOPEDIC SURGERY | Facility: CLINIC | Age: 80
End: 2020-02-11
Payer: MEDICARE

## 2020-02-11 VITALS
DIASTOLIC BLOOD PRESSURE: 66 MMHG | HEART RATE: 93 BPM | HEIGHT: 58 IN | WEIGHT: 146 LBS | SYSTOLIC BLOOD PRESSURE: 118 MMHG | BODY MASS INDEX: 30.64 KG/M2

## 2020-02-11 PROCEDURE — 99024 POSTOP FOLLOW-UP VISIT: CPT

## 2020-02-11 PROCEDURE — 73502 X-RAY EXAM HIP UNI 2-3 VIEWS: CPT | Mod: 26,RT

## 2020-02-12 ENCOUNTER — OUTPATIENT (OUTPATIENT)
Dept: OUTPATIENT SERVICES | Facility: HOSPITAL | Age: 80
LOS: 1 days | End: 2020-02-12
Payer: MEDICARE

## 2020-02-12 ENCOUNTER — APPOINTMENT (OUTPATIENT)
Dept: CT IMAGING | Facility: CLINIC | Age: 80
End: 2020-02-12
Payer: MEDICARE

## 2020-02-12 DIAGNOSIS — Z96.619 PRESENCE OF UNSPECIFIED ARTIFICIAL SHOULDER JOINT: Chronic | ICD-10-CM

## 2020-02-12 DIAGNOSIS — Z90.89 ACQUIRED ABSENCE OF OTHER ORGANS: Chronic | ICD-10-CM

## 2020-02-12 DIAGNOSIS — M48.061 SPINAL STENOSIS, LUMBAR REGION WITHOUT NEUROGENIC CLAUDICATION: ICD-10-CM

## 2020-02-12 DIAGNOSIS — Z98.1 ARTHRODESIS STATUS: ICD-10-CM

## 2020-02-12 DIAGNOSIS — Z98.890 OTHER SPECIFIED POSTPROCEDURAL STATES: Chronic | ICD-10-CM

## 2020-02-12 PROCEDURE — 72131 CT LUMBAR SPINE W/O DYE: CPT | Mod: 26

## 2020-02-12 PROCEDURE — 72131 CT LUMBAR SPINE W/O DYE: CPT

## 2020-02-12 PROCEDURE — 76376 3D RENDER W/INTRP POSTPROCES: CPT

## 2020-02-12 PROCEDURE — 76376 3D RENDER W/INTRP POSTPROCES: CPT | Mod: 26

## 2020-02-14 ENCOUNTER — APPOINTMENT (OUTPATIENT)
Dept: NEUROLOGY | Facility: CLINIC | Age: 80
End: 2020-02-14
Payer: MEDICARE

## 2020-02-14 PROCEDURE — 96413 CHEMO IV INFUSION 1 HR: CPT

## 2020-02-19 ENCOUNTER — APPOINTMENT (OUTPATIENT)
Dept: ORTHOPEDIC SURGERY | Facility: CLINIC | Age: 80
End: 2020-02-19
Payer: MEDICARE

## 2020-02-19 VITALS
SYSTOLIC BLOOD PRESSURE: 119 MMHG | TEMPERATURE: 98.2 F | HEART RATE: 79 BPM | DIASTOLIC BLOOD PRESSURE: 71 MMHG | WEIGHT: 146 LBS | BODY MASS INDEX: 30.64 KG/M2 | HEIGHT: 58 IN

## 2020-02-19 PROCEDURE — 99024 POSTOP FOLLOW-UP VISIT: CPT

## 2020-02-26 ENCOUNTER — APPOINTMENT (OUTPATIENT)
Dept: SPINE | Facility: CLINIC | Age: 80
End: 2020-02-26
Payer: MEDICARE

## 2020-02-26 VITALS
WEIGHT: 146 LBS | HEIGHT: 58 IN | TEMPERATURE: 98.1 F | HEART RATE: 75 BPM | DIASTOLIC BLOOD PRESSURE: 69 MMHG | OXYGEN SATURATION: 97 % | SYSTOLIC BLOOD PRESSURE: 121 MMHG | BODY MASS INDEX: 30.64 KG/M2

## 2020-02-26 DIAGNOSIS — Z98.1 ARTHRODESIS STATUS: ICD-10-CM

## 2020-02-26 PROCEDURE — 99214 OFFICE O/P EST MOD 30 MIN: CPT

## 2020-02-26 RX ORDER — AMOXICILLIN 500 MG/1
500 CAPSULE ORAL
Qty: 40 | Refills: 4 | Status: DISCONTINUED | COMMUNITY
Start: 2020-01-13 | End: 2020-02-26

## 2020-02-26 RX ORDER — CEPHALEXIN 500 MG/1
500 CAPSULE ORAL 3 TIMES DAILY
Qty: 15 | Refills: 0 | Status: DISCONTINUED | COMMUNITY
Start: 2020-02-11 | End: 2020-02-26

## 2020-02-26 RX ORDER — CEPHALEXIN 500 MG/1
500 CAPSULE ORAL 3 TIMES DAILY
Qty: 21 | Refills: 0 | Status: DISCONTINUED | COMMUNITY
Start: 2020-02-07 | End: 2020-02-26

## 2020-02-27 ENCOUNTER — APPOINTMENT (OUTPATIENT)
Dept: ORTHOPEDIC SURGERY | Facility: CLINIC | Age: 80
End: 2020-02-27
Payer: MEDICARE

## 2020-02-27 ENCOUNTER — APPOINTMENT (OUTPATIENT)
Dept: NEUROLOGY | Facility: CLINIC | Age: 80
End: 2020-02-27
Payer: MEDICARE

## 2020-02-27 VITALS
HEART RATE: 75 BPM | HEIGHT: 58 IN | TEMPERATURE: 97.8 F | WEIGHT: 145 LBS | BODY MASS INDEX: 30.44 KG/M2 | DIASTOLIC BLOOD PRESSURE: 61 MMHG | SYSTOLIC BLOOD PRESSURE: 102 MMHG

## 2020-02-27 PROCEDURE — 99024 POSTOP FOLLOW-UP VISIT: CPT

## 2020-02-27 PROCEDURE — 96413 CHEMO IV INFUSION 1 HR: CPT

## 2020-03-03 ENCOUNTER — APPOINTMENT (OUTPATIENT)
Dept: ORTHOPEDIC SURGERY | Facility: CLINIC | Age: 80
End: 2020-03-03
Payer: MEDICARE

## 2020-03-03 VITALS — SYSTOLIC BLOOD PRESSURE: 99 MMHG | HEART RATE: 72 BPM | DIASTOLIC BLOOD PRESSURE: 63 MMHG

## 2020-03-03 DIAGNOSIS — M16.11 UNILATERAL PRIMARY OSTEOARTHRITIS, RIGHT HIP: ICD-10-CM

## 2020-03-03 PROCEDURE — 99024 POSTOP FOLLOW-UP VISIT: CPT | Mod: PD

## 2020-03-03 NOTE — HISTORY OF PRESENT ILLNESS
[Healed] : not healed [Dehiscence] : dehisced [de-identified] : Status post anterior total hip arthroplasty right hip on December 30, 2019\par Presents for wound check [de-identified] : Examination of the right hip: Leg lengths are  grossly equal. Skin is warm and dry there is an Opteform dressing in place this was removed for exam there is an area of wound dehiscence proximally and another area distally of the wound that has Aquacel dressing which was removed there is significant yellow slough noted. There is minimal erythema surrounding the wound no significant tenderness to palpation no significant fluid collection noted there is no active drainage during the exam. [de-identified] : 79-year-old female presents for wound check she is status post right anterior total hip arthroplasty December 30, 2019. She was seen February 27 secondary to draining wound and wound dehiscence. She started on antibiotics she did take antibiotics as prescribed she does note continued drainage from the wound she was doing daily dressing changes with Aquasol dressing. She denies fever chills or night sweats she is having is 5/10 right hip pain worse with activity improves with rest. Examiner without assistive devices. [de-identified] : No images were obtained today [de-identified] : Treatment options were reviewed with the patient at this time not not responding to conservative treatment and daily wound care with regards to surgical incision. The patient is also on chronic prednisone secondary to her myasthenia gravis which might be delayed wound healing. At this time would recommend incisional debridement and irrigation in the operating room. Risks and alternatives to incisional debridement of the right surgical wound were reviewed with the patient the patient was instructed she might require a wound VAC after the procedure. At this time we feel that the wound dehiscence is superficial however there is a possibility that this extends deep into the surgical incision and fracture the implants and may require a full revision secondary to infection. The patient understands the time of surgery this may require and go onto full revision surgery with possible antibiotic spacers the preoperative operative the postoperative course were reviewed with the patient in detail and the patient's daughter. At this time recommend patient proceed to the emergency room for admission for surgery [de-identified] : 79-year-old female status post right anterior total hip arthroplasty \par Surgical wound dehiscence\par

## 2020-03-04 ENCOUNTER — TRANSCRIPTION ENCOUNTER (OUTPATIENT)
Age: 80
End: 2020-03-04

## 2020-03-05 ENCOUNTER — INPATIENT (INPATIENT)
Facility: HOSPITAL | Age: 80
LOS: 1 days | Discharge: HOME | DRG: 857 | End: 2020-03-07
Attending: ORTHOPAEDIC SURGERY | Admitting: ORTHOPAEDIC SURGERY
Payer: MEDICARE

## 2020-03-05 VITALS
HEART RATE: 60 BPM | DIASTOLIC BLOOD PRESSURE: 67 MMHG | HEIGHT: 58 IN | SYSTOLIC BLOOD PRESSURE: 125 MMHG | TEMPERATURE: 98 F | OXYGEN SATURATION: 100 % | RESPIRATION RATE: 16 BRPM | WEIGHT: 145.06 LBS

## 2020-03-05 DIAGNOSIS — T81.49XA INFECTION FOLLOWING A PROCEDURE, OTHER SURGICAL SITE, INITIAL ENCOUNTER: ICD-10-CM

## 2020-03-05 DIAGNOSIS — Z98.890 OTHER SPECIFIED POSTPROCEDURAL STATES: Chronic | ICD-10-CM

## 2020-03-05 DIAGNOSIS — Z90.89 ACQUIRED ABSENCE OF OTHER ORGANS: Chronic | ICD-10-CM

## 2020-03-05 DIAGNOSIS — Z96.619 PRESENCE OF UNSPECIFIED ARTIFICIAL SHOULDER JOINT: Chronic | ICD-10-CM

## 2020-03-05 LAB
ALBUMIN SERPL ELPH-MCNC: 3.4 G/DL — SIGNIFICANT CHANGE UP (ref 3.3–5)
ALP SERPL-CCNC: 60 U/L — SIGNIFICANT CHANGE UP (ref 30–120)
ALT FLD-CCNC: 28 U/L DA — SIGNIFICANT CHANGE UP (ref 10–60)
ANION GAP SERPL CALC-SCNC: 8 MMOL/L — SIGNIFICANT CHANGE UP (ref 5–17)
ANION GAP SERPL CALC-SCNC: 9 MMOL/L — SIGNIFICANT CHANGE UP (ref 5–17)
APPEARANCE UR: CLEAR — SIGNIFICANT CHANGE UP
AST SERPL-CCNC: 20 U/L — SIGNIFICANT CHANGE UP (ref 10–40)
BACTERIA # UR AUTO: ABNORMAL
BASOPHILS # BLD AUTO: 0.02 K/UL — SIGNIFICANT CHANGE UP (ref 0–0.2)
BASOPHILS NFR BLD AUTO: 0.3 % — SIGNIFICANT CHANGE UP (ref 0–2)
BILIRUB SERPL-MCNC: 0.3 MG/DL — SIGNIFICANT CHANGE UP (ref 0.2–1.2)
BILIRUB UR-MCNC: NEGATIVE — SIGNIFICANT CHANGE UP
BLD GP AB SCN SERPL QL: SIGNIFICANT CHANGE UP
BUN SERPL-MCNC: 29 MG/DL — HIGH (ref 7–23)
BUN SERPL-MCNC: 36 MG/DL — HIGH (ref 7–23)
CALCIUM SERPL-MCNC: 8.3 MG/DL — LOW (ref 8.4–10.5)
CALCIUM SERPL-MCNC: 8.8 MG/DL — SIGNIFICANT CHANGE UP (ref 8.4–10.5)
CHLORIDE SERPL-SCNC: 103 MMOL/L — SIGNIFICANT CHANGE UP (ref 96–108)
CHLORIDE SERPL-SCNC: 104 MMOL/L — SIGNIFICANT CHANGE UP (ref 96–108)
CO2 SERPL-SCNC: 26 MMOL/L — SIGNIFICANT CHANGE UP (ref 22–31)
CO2 SERPL-SCNC: 27 MMOL/L — SIGNIFICANT CHANGE UP (ref 22–31)
COLOR SPEC: YELLOW — SIGNIFICANT CHANGE UP
CREAT SERPL-MCNC: 1.24 MG/DL — SIGNIFICANT CHANGE UP (ref 0.5–1.3)
CREAT SERPL-MCNC: 1.25 MG/DL — SIGNIFICANT CHANGE UP (ref 0.5–1.3)
DIFF PNL FLD: ABNORMAL
EOSINOPHIL # BLD AUTO: 0.04 K/UL — SIGNIFICANT CHANGE UP (ref 0–0.5)
EOSINOPHIL NFR BLD AUTO: 0.7 % — SIGNIFICANT CHANGE UP (ref 0–6)
GLUCOSE SERPL-MCNC: 154 MG/DL — HIGH (ref 70–99)
GLUCOSE SERPL-MCNC: 83 MG/DL — SIGNIFICANT CHANGE UP (ref 70–99)
GLUCOSE UR QL: NEGATIVE MG/DL — SIGNIFICANT CHANGE UP
HCT VFR BLD CALC: 27.2 % — LOW (ref 34.5–45)
HCT VFR BLD CALC: 27.3 % — LOW (ref 34.5–45)
HGB BLD-MCNC: 7.8 G/DL — LOW (ref 11.5–15.5)
HGB BLD-MCNC: 8.2 G/DL — LOW (ref 11.5–15.5)
IMM GRANULOCYTES NFR BLD AUTO: 0.3 % — SIGNIFICANT CHANGE UP (ref 0–1.5)
KETONES UR-MCNC: NEGATIVE — SIGNIFICANT CHANGE UP
LEUKOCYTE ESTERASE UR-ACNC: NEGATIVE — SIGNIFICANT CHANGE UP
LYMPHOCYTES # BLD AUTO: 1.96 K/UL — SIGNIFICANT CHANGE UP (ref 1–3.3)
LYMPHOCYTES # BLD AUTO: 34 % — SIGNIFICANT CHANGE UP (ref 13–44)
MCHC RBC-ENTMCNC: 23.7 PG — LOW (ref 27–34)
MCHC RBC-ENTMCNC: 24.6 PG — LOW (ref 27–34)
MCHC RBC-ENTMCNC: 28.7 GM/DL — LOW (ref 32–36)
MCHC RBC-ENTMCNC: 30 GM/DL — LOW (ref 32–36)
MCV RBC AUTO: 82 FL — SIGNIFICANT CHANGE UP (ref 80–100)
MCV RBC AUTO: 82.7 FL — SIGNIFICANT CHANGE UP (ref 80–100)
MONOCYTES # BLD AUTO: 0.75 K/UL — SIGNIFICANT CHANGE UP (ref 0–0.9)
MONOCYTES NFR BLD AUTO: 13 % — SIGNIFICANT CHANGE UP (ref 2–14)
NEUTROPHILS # BLD AUTO: 2.97 K/UL — SIGNIFICANT CHANGE UP (ref 1.8–7.4)
NEUTROPHILS NFR BLD AUTO: 51.7 % — SIGNIFICANT CHANGE UP (ref 43–77)
NITRITE UR-MCNC: NEGATIVE — SIGNIFICANT CHANGE UP
NRBC # BLD: 0 /100 WBCS — SIGNIFICANT CHANGE UP (ref 0–0)
NRBC # BLD: 0 /100 WBCS — SIGNIFICANT CHANGE UP (ref 0–0)
PH UR: 6.5 — SIGNIFICANT CHANGE UP (ref 5–8)
PLATELET # BLD AUTO: 169 K/UL — SIGNIFICANT CHANGE UP (ref 150–400)
PLATELET # BLD AUTO: 212 K/UL — SIGNIFICANT CHANGE UP (ref 150–400)
POTASSIUM SERPL-MCNC: 3.9 MMOL/L — SIGNIFICANT CHANGE UP (ref 3.5–5.3)
POTASSIUM SERPL-MCNC: 4.8 MMOL/L — SIGNIFICANT CHANGE UP (ref 3.5–5.3)
POTASSIUM SERPL-SCNC: 3.9 MMOL/L — SIGNIFICANT CHANGE UP (ref 3.5–5.3)
POTASSIUM SERPL-SCNC: 4.8 MMOL/L — SIGNIFICANT CHANGE UP (ref 3.5–5.3)
PROT SERPL-MCNC: 6.8 G/DL — SIGNIFICANT CHANGE UP (ref 6–8.3)
PROT UR-MCNC: NEGATIVE MG/DL — SIGNIFICANT CHANGE UP
RBC # BLD: 3.29 M/UL — LOW (ref 3.8–5.2)
RBC # BLD: 3.33 M/UL — LOW (ref 3.8–5.2)
RBC # FLD: 15.2 % — HIGH (ref 10.3–14.5)
RBC # FLD: 15.2 % — HIGH (ref 10.3–14.5)
SODIUM SERPL-SCNC: 138 MMOL/L — SIGNIFICANT CHANGE UP (ref 135–145)
SODIUM SERPL-SCNC: 139 MMOL/L — SIGNIFICANT CHANGE UP (ref 135–145)
SP GR SPEC: 1.01 — SIGNIFICANT CHANGE UP (ref 1.01–1.02)
UROBILINOGEN FLD QL: NEGATIVE MG/DL — SIGNIFICANT CHANGE UP
WBC # BLD: 5.15 K/UL — SIGNIFICANT CHANGE UP (ref 3.8–10.5)
WBC # BLD: 5.76 K/UL — SIGNIFICANT CHANGE UP (ref 3.8–10.5)
WBC # FLD AUTO: 5.15 K/UL — SIGNIFICANT CHANGE UP (ref 3.8–10.5)
WBC # FLD AUTO: 5.76 K/UL — SIGNIFICANT CHANGE UP (ref 3.8–10.5)
WBC UR QL: SIGNIFICANT CHANGE UP

## 2020-03-05 PROCEDURE — 93010 ELECTROCARDIOGRAM REPORT: CPT

## 2020-03-05 PROCEDURE — 73502 X-RAY EXAM HIP UNI 2-3 VIEWS: CPT | Mod: 26,RT

## 2020-03-05 PROCEDURE — 99223 1ST HOSP IP/OBS HIGH 75: CPT

## 2020-03-05 PROCEDURE — 13121 CMPLX RPR S/A/L 2.6-7.5 CM: CPT | Mod: 78,RT

## 2020-03-05 PROCEDURE — 99285 EMERGENCY DEPT VISIT HI MDM: CPT

## 2020-03-05 PROCEDURE — 71045 X-RAY EXAM CHEST 1 VIEW: CPT | Mod: 26

## 2020-03-05 RX ORDER — LEVOTHYROXINE SODIUM 125 MCG
50 TABLET ORAL DAILY
Refills: 0 | Status: DISCONTINUED | OUTPATIENT
Start: 2020-03-05 | End: 2020-03-07

## 2020-03-05 RX ORDER — ACETAMINOPHEN 500 MG
1000 TABLET ORAL EVERY 6 HOURS
Refills: 0 | Status: COMPLETED | OUTPATIENT
Start: 2020-03-05 | End: 2020-03-06

## 2020-03-05 RX ORDER — HYDROMORPHONE HYDROCHLORIDE 2 MG/ML
0.5 INJECTION INTRAMUSCULAR; INTRAVENOUS; SUBCUTANEOUS
Refills: 0 | Status: DISCONTINUED | OUTPATIENT
Start: 2020-03-05 | End: 2020-03-07

## 2020-03-05 RX ORDER — ONDANSETRON 8 MG/1
4 TABLET, FILM COATED ORAL EVERY 6 HOURS
Refills: 0 | Status: DISCONTINUED | OUTPATIENT
Start: 2020-03-05 | End: 2020-03-07

## 2020-03-05 RX ORDER — HYDROMORPHONE HYDROCHLORIDE 2 MG/ML
1 INJECTION INTRAMUSCULAR; INTRAVENOUS; SUBCUTANEOUS
Refills: 0 | Status: DISCONTINUED | OUTPATIENT
Start: 2020-03-05 | End: 2020-03-05

## 2020-03-05 RX ORDER — SODIUM CHLORIDE 9 MG/ML
1000 INJECTION INTRAMUSCULAR; INTRAVENOUS; SUBCUTANEOUS
Refills: 0 | Status: DISCONTINUED | OUTPATIENT
Start: 2020-03-05 | End: 2020-03-05

## 2020-03-05 RX ORDER — VANCOMYCIN HCL 1 G
1000 VIAL (EA) INTRAVENOUS ONCE
Refills: 0 | Status: DISCONTINUED | OUTPATIENT
Start: 2020-03-05 | End: 2020-03-05

## 2020-03-05 RX ORDER — RANITIDINE HYDROCHLORIDE 150 MG/1
1 TABLET, FILM COATED ORAL
Qty: 0 | Refills: 0 | DISCHARGE

## 2020-03-05 RX ORDER — CEFTRIAXONE 500 MG/1
INJECTION, POWDER, FOR SOLUTION INTRAMUSCULAR; INTRAVENOUS
Refills: 0 | Status: DISCONTINUED | OUTPATIENT
Start: 2020-03-05 | End: 2020-03-05

## 2020-03-05 RX ORDER — ACETAMINOPHEN 500 MG
1000 TABLET ORAL EVERY 8 HOURS
Refills: 0 | Status: DISCONTINUED | OUTPATIENT
Start: 2020-03-06 | End: 2020-03-07

## 2020-03-05 RX ORDER — CEFTRIAXONE 500 MG/1
1000 INJECTION, POWDER, FOR SOLUTION INTRAMUSCULAR; INTRAVENOUS ONCE
Refills: 0 | Status: DISCONTINUED | OUTPATIENT
Start: 2020-03-05 | End: 2020-03-05

## 2020-03-05 RX ORDER — VANCOMYCIN HCL 1 G
VIAL (EA) INTRAVENOUS
Refills: 0 | Status: DISCONTINUED | OUTPATIENT
Start: 2020-03-05 | End: 2020-03-05

## 2020-03-05 RX ORDER — CEFAZOLIN SODIUM 1 G
2000 VIAL (EA) INJECTION EVERY 8 HOURS
Refills: 0 | Status: DISCONTINUED | OUTPATIENT
Start: 2020-03-05 | End: 2020-03-05

## 2020-03-05 RX ORDER — MAGNESIUM HYDROXIDE 400 MG/1
30 TABLET, CHEWABLE ORAL ONCE
Refills: 0 | Status: DISCONTINUED | OUTPATIENT
Start: 2020-03-05 | End: 2020-03-07

## 2020-03-05 RX ORDER — PYRIDOSTIGMINE BROMIDE 60 MG/5ML
60 SOLUTION ORAL THREE TIMES A DAY
Refills: 0 | Status: DISCONTINUED | OUTPATIENT
Start: 2020-03-05 | End: 2020-03-07

## 2020-03-05 RX ORDER — CHLORHEXIDINE GLUCONATE 213 G/1000ML
1 SOLUTION TOPICAL ONCE
Refills: 0 | Status: DISCONTINUED | OUTPATIENT
Start: 2020-03-05 | End: 2020-03-07

## 2020-03-05 RX ORDER — LOSARTAN POTASSIUM 100 MG/1
50 TABLET, FILM COATED ORAL DAILY
Refills: 0 | Status: DISCONTINUED | OUTPATIENT
Start: 2020-03-07 | End: 2020-03-07

## 2020-03-05 RX ORDER — ACETAMINOPHEN 500 MG
1000 TABLET ORAL ONCE
Refills: 0 | Status: COMPLETED | OUTPATIENT
Start: 2020-03-05 | End: 2020-03-05

## 2020-03-05 RX ORDER — HYDROCORTISONE 20 MG
50 TABLET ORAL EVERY 12 HOURS
Refills: 0 | Status: COMPLETED | OUTPATIENT
Start: 2020-03-05 | End: 2020-03-06

## 2020-03-05 RX ORDER — PYRIDOSTIGMINE BROMIDE 60 MG/5ML
180 SOLUTION ORAL DAILY
Refills: 0 | Status: DISCONTINUED | OUTPATIENT
Start: 2020-03-05 | End: 2020-03-07

## 2020-03-05 RX ORDER — ONDANSETRON 8 MG/1
4 TABLET, FILM COATED ORAL ONCE
Refills: 0 | Status: DISCONTINUED | OUTPATIENT
Start: 2020-03-05 | End: 2020-03-05

## 2020-03-05 RX ORDER — APREPITANT 80 MG/1
40 CAPSULE ORAL ONCE
Refills: 0 | Status: COMPLETED | OUTPATIENT
Start: 2020-03-05 | End: 2020-03-05

## 2020-03-05 RX ORDER — SODIUM CHLORIDE 9 MG/ML
1000 INJECTION, SOLUTION INTRAVENOUS
Refills: 0 | Status: DISCONTINUED | OUTPATIENT
Start: 2020-03-05 | End: 2020-03-05

## 2020-03-05 RX ORDER — POLYETHYLENE GLYCOL 3350 17 G/17G
17 POWDER, FOR SOLUTION ORAL DAILY
Refills: 0 | Status: DISCONTINUED | OUTPATIENT
Start: 2020-03-05 | End: 2020-03-07

## 2020-03-05 RX ORDER — OXYCODONE HYDROCHLORIDE 5 MG/1
5 TABLET ORAL
Refills: 0 | Status: DISCONTINUED | OUTPATIENT
Start: 2020-03-05 | End: 2020-03-07

## 2020-03-05 RX ORDER — CEFAZOLIN SODIUM 1 G
2000 VIAL (EA) INJECTION ONCE
Refills: 0 | Status: COMPLETED | OUTPATIENT
Start: 2020-03-05 | End: 2020-03-05

## 2020-03-05 RX ORDER — OXYCODONE HYDROCHLORIDE 5 MG/1
10 TABLET ORAL
Refills: 0 | Status: DISCONTINUED | OUTPATIENT
Start: 2020-03-05 | End: 2020-03-07

## 2020-03-05 RX ORDER — SODIUM CHLORIDE 9 MG/ML
1000 INJECTION, SOLUTION INTRAVENOUS
Refills: 0 | Status: DISCONTINUED | OUTPATIENT
Start: 2020-03-05 | End: 2020-03-07

## 2020-03-05 RX ORDER — CEFAZOLIN SODIUM 1 G
2000 VIAL (EA) INJECTION EVERY 8 HOURS
Refills: 0 | Status: COMPLETED | OUTPATIENT
Start: 2020-03-05 | End: 2020-03-06

## 2020-03-05 RX ORDER — PANTOPRAZOLE SODIUM 20 MG/1
40 TABLET, DELAYED RELEASE ORAL
Refills: 0 | Status: DISCONTINUED | OUTPATIENT
Start: 2020-03-05 | End: 2020-03-07

## 2020-03-05 RX ORDER — ASPIRIN/CALCIUM CARB/MAGNESIUM 324 MG
81 TABLET ORAL
Refills: 0 | Status: DISCONTINUED | OUTPATIENT
Start: 2020-03-06 | End: 2020-03-07

## 2020-03-05 RX ORDER — ATORVASTATIN CALCIUM 80 MG/1
20 TABLET, FILM COATED ORAL AT BEDTIME
Refills: 0 | Status: DISCONTINUED | OUTPATIENT
Start: 2020-03-05 | End: 2020-03-07

## 2020-03-05 RX ORDER — SENNA PLUS 8.6 MG/1
2 TABLET ORAL AT BEDTIME
Refills: 0 | Status: DISCONTINUED | OUTPATIENT
Start: 2020-03-05 | End: 2020-03-07

## 2020-03-05 RX ADMIN — SODIUM CHLORIDE 50 MILLILITER(S): 9 INJECTION, SOLUTION INTRAVENOUS at 15:51

## 2020-03-05 RX ADMIN — Medication 1000 MILLIGRAM(S): at 20:12

## 2020-03-05 RX ADMIN — ATORVASTATIN CALCIUM 20 MILLIGRAM(S): 80 TABLET, FILM COATED ORAL at 21:06

## 2020-03-05 RX ADMIN — Medication 400 MILLIGRAM(S): at 20:10

## 2020-03-05 RX ADMIN — SODIUM CHLORIDE 100 MILLILITER(S): 9 INJECTION INTRAMUSCULAR; INTRAVENOUS; SUBCUTANEOUS at 08:00

## 2020-03-05 RX ADMIN — HYDROMORPHONE HYDROCHLORIDE 0.5 MILLIGRAM(S): 2 INJECTION INTRAMUSCULAR; INTRAVENOUS; SUBCUTANEOUS at 15:40

## 2020-03-05 RX ADMIN — PYRIDOSTIGMINE BROMIDE 60 MILLIGRAM(S): 60 SOLUTION ORAL at 21:06

## 2020-03-05 RX ADMIN — Medication 100 MILLIGRAM(S): at 21:07

## 2020-03-05 RX ADMIN — Medication 50 MILLIGRAM(S): at 19:37

## 2020-03-05 RX ADMIN — PYRIDOSTIGMINE BROMIDE 180 MILLIGRAM(S): 60 SOLUTION ORAL at 21:06

## 2020-03-05 RX ADMIN — APREPITANT 40 MILLIGRAM(S): 80 CAPSULE ORAL at 10:25

## 2020-03-05 RX ADMIN — SODIUM CHLORIDE 80 MILLILITER(S): 9 INJECTION, SOLUTION INTRAVENOUS at 10:26

## 2020-03-05 NOTE — ED ADULT NURSE NOTE - OBJECTIVE STATEMENT
Amb to ED. Pt had rt hip replacement done on 12/30/19. Wound has opened & is draining. Being followed by Dr Cardoza & finishing up Bactrim. Pt c/o pain @ site Advised to come to ED today - scheduled for OR later today to clean out wound. O/E color fair. Skin warm & dry to touch. Lungs clear. Rt hip drainage noted.

## 2020-03-05 NOTE — ED ADULT TRIAGE NOTE - CHIEF COMPLAINT QUOTE
"I had hip replacement on 12/30/19 & my wound is open & draining" I have pain & Dr Cardoza told me to come in"

## 2020-03-05 NOTE — H&P ADULT - HISTORY OF PRESENT ILLNESS
78 Y/O F presents to ED for urgent planned I&D of Right THR incisional dehiscence. She had Right Ant THR 12/30/19. Had sutured incision per daughter.  Had wound drainage from upper pole site after OV, placed on Keflex, which diminished drainage.   Past 2 weeks had aquacel CE dressing daily, placed on bactrim last 1 week, no improvement last OV 3/3. No fever, chill, hip injury. No URI or UTI.  Planning for I&D of Right ASI wound and possible Revision THR. Currently on Prednison 5mg daily for Myestenia Gravis

## 2020-03-05 NOTE — ED PROVIDER NOTE - OBJECTIVE STATEMENT
80 yo Female had rt hip replacement 2 months ago with Dr Dinero, co rt hip wound infection. Small opening in rt hip surgical wound with drainage of yellow fluid for past 2 weeks. Has been on Keflex and now Bactrim without improvement. Seen in Ortho office this week and was told to come for surgery today to wash out wound. +pain in rt hip . Denies fever, cough SOB or other symptom.  Hx of myasthenia on chronic steroid therapy.  PCP in [place].

## 2020-03-05 NOTE — ED PROVIDER NOTE - PMH
Aortic stenosis, mild  asper daughter  Carpal Tunnel Syndrome Right  release 4/10 and left  Diverticulitis large intestine  denies any recent exacerbations  Genital Ulcer, Female    GERD (Gastroesophageal Reflux Disease)  hiatal hernia  Hammertoe Right foot    Hyperlipidemia    Hypertension    Hypothyroid    Kidney Calculi    Lumbar Radiculopathy    Lumbar stenosis    Myasthenia gravis  dx 10/2018 symptoms: intermittent loss of voice/ weakness, negative ENT studies, now stable on Pyridostigmine  Near syncope  8/2018 negative ENT work up, negative cardiac work ups as per daughter  Osteoarthritis    Reactive airway disease that is not asthma  mild as per pulm note on [employer], patient and daughter denies any inhaler use; thought to be related to myasthena gravis

## 2020-03-05 NOTE — H&P ADULT - NSHPPOASURGSITEINCISION_GEN_ALL_CORE
Patient is being review at [facility]  Patient has been accepted however, they are reviewing insurance and will follow up today  Patient has 3 different insurances at this time  Cm will continue to follow pt  yes

## 2020-03-05 NOTE — PRE-OP CHECKLIST - ADVANCE DIRECTIVE ADDRESSED/READDRESSED
Received a faxed approval from [employer] for Zaleplon 10 mg capsule from 9/5/19 until further notice. Approved under the member's Medicare Part D benefit.  I faxed the approval to the nurse pool.     done

## 2020-03-05 NOTE — ED CLERICAL - NS ED CLERK NOTE PRE-ARRIVAL INFORMATION; ADDITIONAL PRE-ARRIVAL INFORMATION
This patient is enrolled in the comprehensive joint replacement (CJR) program and has active care navigation.  This patient can be followed up by the care navigation team within 24 hours. To arrange close follow-up or to obtain additional clinical information about this patient, please call the contact number above.

## 2020-03-05 NOTE — CONSULT NOTE ADULT - ASSESSMENT
Right Hip Wound Dehiscence  - at least partially related to chronic steroid therapy  - Plan for OR  today, I&D, possible revision if wound found to be deep  - will get ID consult if needed depending on OR findings  - Patient has had limited mobility due to hip replacement 12/30/19 with subsequent impaired wound healing  - Cath in Sep 2019 - no significant coronary disease seen  - Echo Aug 2019 - mild AS, normal RV and LV systolic function  - Would start stress dose steroids given she was on 20mg Prednisone over much of past year with recent tapering to 10mg and then 5mg  - Persistent anemia since surgery, PRBCs on call to OR  - No medical contraindications to planned procedure    Myasthenia Gravis  - continue prednisone 5mg starting 3/6  - stress dose steroids Hydrocortisone 50mg IV q8 x 3 doses - give one dose pre-op  - continue pyridostigmine  - anesthesia to decide on proper agents given MG    CKD Stage 3  - dose meds renally  - avoid NSAIDS    Mild Aortic Stenosis  - has murmur  - seen on echo  - monitor fluid balance post-op    HTN  - hold hctz during hospitalization  - resume ARB on POD#2    HLD  - continue statin    Hypothyroidism  - continue levothyroxine

## 2020-03-05 NOTE — ED PROVIDER NOTE - PSH
Bilateral Cataracts  removed  H/O laminectomy  cervical  1998  lumbar 1986,1998,2000, 2/19 with fusion  H/O shoulder replacement  b/l 2015/2016  H/O umbilical hernia repair    History of tonsillectomy    Prolapse, Uterovaginal  s/p sling  S/P Appendectomy    S/P Cholecystectomy    S/P foot surgery    S/P Hysterectomy Partial  1974  S/P Laparoscopic Fundoplication

## 2020-03-05 NOTE — CONSULT NOTE ADULT - SUBJECTIVE AND OBJECTIVE BOX
Information Obtained from:  EHR, Physical Chart, Patient at bedside (relevant EHR and Chart information verified with patient)    HPI:  78 y/o F presents to ED for urgent planned I&D of Right THR incisional dehiscence. She had Right Ant THR 19. Has had wound drainage surgical site, placed on Keflex, which diminished drainage.    Past 2 weeks had aquacel CE dressing daily, placed on bactrim over last 1 week, minimal improvement. No fevers, intermittent chills, no dizziness, HA, cp, sob, n/v/d, abd pain, or calf tenderness.  On steroids for myasthenia gravis over much of past year, 20mg, tapered down to 5mg over last 2-3 months.  Also on solaris infusions and pyridostigmine.    Since surgery, she has been able to go up a flight of stairs without any cp/sob.  Also has been able to walk around the grocery store without difficulty.    REVIEW OF SYSTEMS:  see HPI, all others negative    PAST MEDICAL & SURGICAL HISTORY:  Osteoarthritis  Hypothyroid  Reactive airway disease that is not asthma: mild as per pulm note on [employer], patient and daughter denies any inhaler use; thought to be related to myasthena gravis  Near syncope: 2018 negative ENT work up, negative cardiac work ups as per daughter  Aortic stenosis, mild: asper daughter  Diverticulitis large intestine: denies any recent exacerbations  Lumbar stenosis  Myasthenia gravis: dx 10/2018 symptoms: intermittent loss of voice/ weakness, negative ENT studies, now stable on Pyridostigmine  Carpal Tunnel Syndrome Right: release 4/10 and left  Hammertoe Right foot  Kidney Calculi  Lumbar Radiculopathy  GERD (Gastroesophageal Reflux Disease): hiatal hernia  Hyperlipidemia  Genital Ulcer, Female  Hypertension  H/O umbilical hernia repair  History of tonsillectomy  S/P foot surgery  H/O shoulder replacement: b/l   H/O laminectomy: cervical  1998  lumbar ,,,  with fusion  Prolapse, Uterovaginal: s/p sling  S/P Laparoscopic Fundoplication  S/P Hysterectomy Partial:   Bilateral Cataracts: removed  S/P Appendectomy  S/P Cholecystectomy    SOCIAL HISTORY:  Lives: relative  Smoking Hx: none  ETOH consumption: none  Illicit Drug Use:  None    Allergies    IODINE (Rash)  No Known Drug Allergies  shellfish (Rash)    Intolerances    Home Medications:  acetaminophen 500 mg oral tablet: 2 tab(s) orally every 12 hours (05 Mar 2020 07:07)  aspirin 81 mg oral delayed release tablet: 1 tab(s) orally once a day (05 Mar 2020 07:51)  Crestor 5 mg oral tablet: 1 tab(s) orally once a day (05 Mar 2020 07:07)  esomeprazole 40 mg oral delayed release capsule: 1 tab(s) orally once a day (05 Mar 2020 07:07)  levothyroxine 50 mcg (0.05 mg) oral tablet: 1 tab(s) orally once a day (05 Mar 2020 07:07)  Linzess 145 mcg oral capsule: 1 cap(s) orally once a day, As Needed (05 Mar 2020 07:07)  losartan-hydrochlorothiazide 50mg-12.5mg oral tablet: 1 tab(s) orally once a day (05 Mar 2020 07:07)  Mestinon 60 mg oral tablet: 1 tab(s) orally 3 times a day (05 Mar 2020 07:07)  polyethylene glycol 3350 oral powder for reconstitution: 17 gram(s) orally once a day (05 Mar 2020 07:07)  predniSONE: 5 milligram(s) orally once a day (05 Mar 2020 07:51)  pyridostigmine 180 mg oral tablet, extended release: 1 tab(s) orally once a day (05 Mar 2020 07:07)  senna oral tablet: 2 tab(s) orally once a day (at bedtime), As needed, Constipation (05 Mar 2020 07:07)  solaris infusion: 1 dose(s) by continuous intravenous infusion every 2 weeks (05 Mar 2020 07:07)  traMADol 50 mg oral tablet: 1 tab(s) orally once a day (at bedtime), As Needed for pain.  may take in place of oxycodone. (05 Mar 2020 07:07)  Vitamin D3 2000 intl units oral capsule: 1 cap(s) orally once a day (05 Mar 2020 07:07)    FAMILY HISTORY:  Family history of bladder cancer: brother   FH: early death: mother  age 50s  Family history of stroke: father     PHYSICAL EXAM:  Vital Signs Last 24 Hrs  T(C): 36.9 (05 Mar 2020 09:20), Max: 36.9 (05 Mar 2020 08:35)  T(F): 98.4 (05 Mar 2020 09:20), Max: 98.4 (05 Mar 2020 08:35)  HR: 62 (05 Mar 2020 09:20) (60 - 62)  BP: 114/63 (05 Mar 2020 09:20) (114/63 - 125/67)  BP(mean): --  RR: 16 (05 Mar 2020 09:20) (16 - 16)  SpO2: 99% (05 Mar 2020 09:20) (99% - 100%)    GENERAL: NAD, well-groomed, well-developed, awake, alert, oriented x 3, fluent and coherent speech, daughter at bedside  EYES: EOMI, PERRLA, conjunctiva and sclera clear  ENMT: No tonsillar erythema, exudates, or enlargement; Moist mucous membranes,   No lesions  NECK: Supple, No JVD, No Cervical LAD, No thyromegaly, No thyroid nodules  NERVOUS SYSTEM:  Good concentration; Moving all 4 extremities; No gross sensory deficits, No facial droop  CHEST/LUNG: Clear to auscultation bilaterally; No rales, rhonchi, wheezing, or rubs  HEART: Regular rate and rhythm; 2-3/6 systolic murmur LSB  ABDOMEN: Soft, Nontender, Nondistended, Bowel sounds present, No palpable masses or organomegaly, No bruits  EXTREMITIES:  2+ Peripheral Pulses, No clubbing, cyanosis, or edema  WOUND:  quarter sized wound over right hip, no drainage, mild erythema around wound with induration, no purulent material seen, + granulation tissue    LABS:                        7.8    5.76  )-----------( 212      ( 05 Mar 2020 08:07 )             27.2     03-05    139  |  103  |  36<H>  ----------------------------<  83  3.9   |  27  |  1.25    Ca    8.8      05 Mar 2020 08:07    TPro  6.8  /  Alb  3.4  /  TBili  0.3  /  DBili  x   /  AST  20  /  ALT  28  /  AlkPhos  60  03-05    [id number]    Urinalysis Basic - ( 05 Mar 2020 07:52 )    Color: Yellow / Appearance: Clear / S.015 / pH: x  Gluc: x / Ketone: Negative  / Bili: Negative / Urobili: Negative mg/dL   Blood: x / Protein: Negative mg/dL / Nitrite: Negative   Leuk Esterase: Negative / RBC: x / WBC 0-2   Sq Epi: x / Non Sq Epi: x / Bacteria: Few        EKG (was personally reviewed):  yes, NSR

## 2020-03-06 ENCOUNTER — TRANSCRIPTION ENCOUNTER (OUTPATIENT)
Age: 80
End: 2020-03-06

## 2020-03-06 LAB
ANION GAP SERPL CALC-SCNC: 7 MMOL/L — SIGNIFICANT CHANGE UP (ref 5–17)
BUN SERPL-MCNC: 24 MG/DL — HIGH (ref 7–23)
CALCIUM SERPL-MCNC: 8.8 MG/DL — SIGNIFICANT CHANGE UP (ref 8.4–10.5)
CHLORIDE SERPL-SCNC: 106 MMOL/L — SIGNIFICANT CHANGE UP (ref 96–108)
CO2 SERPL-SCNC: 25 MMOL/L — SIGNIFICANT CHANGE UP (ref 22–31)
CREAT SERPL-MCNC: 1.24 MG/DL — SIGNIFICANT CHANGE UP (ref 0.5–1.3)
FERRITIN SERPL-MCNC: 13 NG/ML — LOW (ref 15–150)
FOLATE SERPL-MCNC: >20 NG/ML — SIGNIFICANT CHANGE UP
GLUCOSE SERPL-MCNC: 98 MG/DL — SIGNIFICANT CHANGE UP (ref 70–99)
GRAM STN FLD: SIGNIFICANT CHANGE UP
HCT VFR BLD CALC: 27 % — LOW (ref 34.5–45)
HGB BLD-MCNC: 8.2 G/DL — LOW (ref 11.5–15.5)
IRON SATN MFR SERPL: 144 UG/DL — SIGNIFICANT CHANGE UP (ref 30–160)
IRON SATN MFR SERPL: 45 % — SIGNIFICANT CHANGE UP (ref 14–50)
MCHC RBC-ENTMCNC: 24.9 PG — LOW (ref 27–34)
MCHC RBC-ENTMCNC: 30.4 GM/DL — LOW (ref 32–36)
MCV RBC AUTO: 82.1 FL — SIGNIFICANT CHANGE UP (ref 80–100)
NRBC # BLD: 0 /100 WBCS — SIGNIFICANT CHANGE UP (ref 0–0)
PLATELET # BLD AUTO: 180 K/UL — SIGNIFICANT CHANGE UP (ref 150–400)
POTASSIUM SERPL-MCNC: 4.2 MMOL/L — SIGNIFICANT CHANGE UP (ref 3.5–5.3)
POTASSIUM SERPL-SCNC: 4.2 MMOL/L — SIGNIFICANT CHANGE UP (ref 3.5–5.3)
RBC # BLD: 3.29 M/UL — LOW (ref 3.8–5.2)
RBC # FLD: 15.2 % — HIGH (ref 10.3–14.5)
SODIUM SERPL-SCNC: 138 MMOL/L — SIGNIFICANT CHANGE UP (ref 135–145)
SPECIMEN SOURCE: SIGNIFICANT CHANGE UP
TIBC SERPL-MCNC: 323 UG/DL — SIGNIFICANT CHANGE UP (ref 220–430)
UIBC SERPL-MCNC: 178 UG/DL — SIGNIFICANT CHANGE UP (ref 110–370)
VIT B12 SERPL-MCNC: 556 PG/ML — SIGNIFICANT CHANGE UP (ref 232–1245)
WBC # BLD: 5.79 K/UL — SIGNIFICANT CHANGE UP (ref 3.8–10.5)
WBC # FLD AUTO: 5.79 K/UL — SIGNIFICANT CHANGE UP (ref 3.8–10.5)

## 2020-03-06 PROCEDURE — 99233 SBSQ HOSP IP/OBS HIGH 50: CPT

## 2020-03-06 PROCEDURE — 74019 RADEX ABDOMEN 2 VIEWS: CPT | Mod: 26

## 2020-03-06 RX ORDER — CYCLOBENZAPRINE HYDROCHLORIDE 10 MG/1
5 TABLET, FILM COATED ORAL ONCE
Refills: 0 | Status: COMPLETED | OUTPATIENT
Start: 2020-03-06 | End: 2020-03-06

## 2020-03-06 RX ORDER — BENZOYL PEROXIDE MICRONIZED 5.8 %
1 TOWELETTE (EA) TOPICAL
Qty: 60 | Refills: 1
Start: 2020-03-06 | End: 2020-05-04

## 2020-03-06 RX ORDER — CELECOXIB 200 MG/1
1 CAPSULE ORAL
Qty: 12 | Refills: 0
Start: 2020-03-06 | End: 2020-03-11

## 2020-03-06 RX ORDER — OXYCODONE HYDROCHLORIDE 5 MG/1
1 TABLET ORAL
Qty: 0 | Refills: 0 | DISCHARGE
Start: 2020-03-06

## 2020-03-06 RX ORDER — MINOCYCLINE HYDROCHLORIDE 45 MG/1
1 TABLET, EXTENDED RELEASE ORAL
Qty: 20 | Refills: 0
Start: 2020-03-06 | End: 2020-03-15

## 2020-03-06 RX ORDER — ASPIRIN/CALCIUM CARB/MAGNESIUM 324 MG
1 TABLET ORAL
Qty: 82 | Refills: 0
Start: 2020-03-06 | End: 2020-04-15

## 2020-03-06 RX ORDER — CELECOXIB 200 MG/1
100 CAPSULE ORAL
Refills: 0 | Status: DISCONTINUED | OUTPATIENT
Start: 2020-03-06 | End: 2020-03-07

## 2020-03-06 RX ADMIN — PYRIDOSTIGMINE BROMIDE 180 MILLIGRAM(S): 60 SOLUTION ORAL at 12:23

## 2020-03-06 RX ADMIN — SODIUM CHLORIDE 60 MILLILITER(S): 9 INJECTION, SOLUTION INTRAVENOUS at 16:29

## 2020-03-06 RX ADMIN — PYRIDOSTIGMINE BROMIDE 60 MILLIGRAM(S): 60 SOLUTION ORAL at 05:59

## 2020-03-06 RX ADMIN — PYRIDOSTIGMINE BROMIDE 60 MILLIGRAM(S): 60 SOLUTION ORAL at 21:25

## 2020-03-06 RX ADMIN — Medication 30 MILLILITER(S): at 11:32

## 2020-03-06 RX ADMIN — Medication 1000 MILLIGRAM(S): at 13:29

## 2020-03-06 RX ADMIN — PYRIDOSTIGMINE BROMIDE 60 MILLIGRAM(S): 60 SOLUTION ORAL at 13:28

## 2020-03-06 RX ADMIN — Medication 400 MILLIGRAM(S): at 02:08

## 2020-03-06 RX ADMIN — Medication 1000 MILLIGRAM(S): at 21:27

## 2020-03-06 RX ADMIN — Medication 1000 MILLIGRAM(S): at 21:25

## 2020-03-06 RX ADMIN — SODIUM CHLORIDE 60 MILLILITER(S): 9 INJECTION, SOLUTION INTRAVENOUS at 21:25

## 2020-03-06 RX ADMIN — OXYCODONE HYDROCHLORIDE 5 MILLIGRAM(S): 5 TABLET ORAL at 18:08

## 2020-03-06 RX ADMIN — OXYCODONE HYDROCHLORIDE 5 MILLIGRAM(S): 5 TABLET ORAL at 05:45

## 2020-03-06 RX ADMIN — Medication 81 MILLIGRAM(S): at 05:58

## 2020-03-06 RX ADMIN — Medication 50 MILLIGRAM(S): at 06:00

## 2020-03-06 RX ADMIN — Medication 81 MILLIGRAM(S): at 17:18

## 2020-03-06 RX ADMIN — OXYCODONE HYDROCHLORIDE 5 MILLIGRAM(S): 5 TABLET ORAL at 17:17

## 2020-03-06 RX ADMIN — PANTOPRAZOLE SODIUM 40 MILLIGRAM(S): 20 TABLET, DELAYED RELEASE ORAL at 05:59

## 2020-03-06 RX ADMIN — CELECOXIB 100 MILLIGRAM(S): 200 CAPSULE ORAL at 21:27

## 2020-03-06 RX ADMIN — CELECOXIB 100 MILLIGRAM(S): 200 CAPSULE ORAL at 21:25

## 2020-03-06 RX ADMIN — Medication 100 MILLIGRAM(S): at 05:58

## 2020-03-06 RX ADMIN — Medication 1000 MILLIGRAM(S): at 09:55

## 2020-03-06 RX ADMIN — OXYCODONE HYDROCHLORIDE 5 MILLIGRAM(S): 5 TABLET ORAL at 09:22

## 2020-03-06 RX ADMIN — Medication 50 MICROGRAM(S): at 05:58

## 2020-03-06 RX ADMIN — Medication 1000 MILLIGRAM(S): at 02:09

## 2020-03-06 RX ADMIN — Medication 1000 MILLIGRAM(S): at 13:55

## 2020-03-06 RX ADMIN — Medication 400 MILLIGRAM(S): at 09:21

## 2020-03-06 RX ADMIN — ATORVASTATIN CALCIUM 20 MILLIGRAM(S): 80 TABLET, FILM COATED ORAL at 21:25

## 2020-03-06 RX ADMIN — OXYCODONE HYDROCHLORIDE 5 MILLIGRAM(S): 5 TABLET ORAL at 10:15

## 2020-03-06 RX ADMIN — CYCLOBENZAPRINE HYDROCHLORIDE 5 MILLIGRAM(S): 10 TABLET, FILM COATED ORAL at 17:49

## 2020-03-06 RX ADMIN — OXYCODONE HYDROCHLORIDE 5 MILLIGRAM(S): 5 TABLET ORAL at 06:15

## 2020-03-06 RX ADMIN — POLYETHYLENE GLYCOL 3350 17 GRAM(S): 17 POWDER, FOR SOLUTION ORAL at 12:23

## 2020-03-06 RX ADMIN — Medication 5 MILLIGRAM(S): at 05:58

## 2020-03-06 NOTE — PROGRESS NOTE ADULT - SUBJECTIVE AND OBJECTIVE BOX
POST OPERATIVE DAY #: 1    79y Female  s/p  Right surgical incision I&D                SUBJECTIVE: Patient seen and examined at bedside. c/o productive cough, no CP    Pain:  well controlled    Pain scale:  6 /10  Denies CP, SOB, N/V/D, weakness, numbness   No new complains     OBJECTIVE:     Vital Signs Last 24 Hrs  T(C): 36.8 (06 Mar 2020 07:41), Max: 36.9 (05 Mar 2020 08:35)  T(F): 98.2 (06 Mar 2020 07:41), Max: 98.4 (05 Mar 2020 08:35)  HR: 54 (06 Mar 2020 07:41) (51 - 70)  BP: 122/48 (06 Mar 2020 07:41) (91/46 - 122/48)  BP(mean): --  RR: 17 (06 Mar 2020 07:41) (12 - 18)  SpO2: 98% (06 Mar 2020 07:41) (96% - 100%)    Affected extremity: RLE         Dressing: TORITO, clean/dry/intact                             Sensation: intact to light touch          Motor exam:   5/ 5 Tibialis Anterior/Gastrocnemius-Soleus, EHL/FHL         warm, well-perfused; capillary refill < 3 seconds         negative calf tenderness B/L LE    LABS:                        8.2    5.79  )-----------( 180      ( 06 Mar 2020 07:04 )             27.0     03-06    138  |  106  |  24<H>  ----------------------------<  98  4.2   |  25  |  1.24    Ca    8.8      06 Mar 2020 07:04    TPro  6.8  /  Alb  3.4  /  TBili  0.3  /  DBili  x   /  AST  20  /  ALT  28  /  AlkPhos  60  03-05      I&O's Detail    05 Mar 2020 07:01  -  06 Mar 2020 07:00  --------------------------------------------------------  IN:    lactated ringers.: 700 mL    Packed Red Blood Cells: 300 mL  Total IN: 1000 mL    OUT:    Estimated Blood Loss: 100 mL    Voided: 900 mL  Total OUT: 1000 mL    Total NET: 0 mL          MEDICATIONS:    Pain management:  acetaminophen   Tablet .. 1000 milliGRAM(s) Oral every 8 hours  acetaminophen  IVPB .. 1000 milliGRAM(s) IV Intermittent every 6 hours  HYDROmorphone  Injectable 0.5 milliGRAM(s) IV Push every 10 minutes PRN  HYDROmorphone  Injectable 0.5 milliGRAM(s) IV Push every 3 hours PRN  ondansetron Injectable 4 milliGRAM(s) IV Push every 6 hours PRN  oxyCODONE    IR 5 milliGRAM(s) Oral every 3 hours PRN  oxyCODONE    IR 10 milliGRAM(s) Oral every 3 hours PRN    DVT prophylaxis:   aspirin enteric coated 81 milliGRAM(s) Oral two times a day      RADIOLOGY & ADDITIONAL STUDIES:    ASSESSMENT AND PLAN:   - Anemia: s/p 1 U PRBC intraop, H/H stable. asymptomatic at this time. will continue to monitor   - Ancef x 8 doses for infection prophylaxis, will follow up on OR cultures, to decide further antibiotic requirement   - TORITO dressing   - Analgesic pain control  - DVT prophylaxis: ASA 81 mg twice a day       SCDs       - PT/OT: Weight Bearing Status:  Weight bearing as tolerated, OOBTC       -  Incentive spirometry  - IVF  - Advance diet as tolerated  - Hospitalist is following  -  Follow up labs  -  Disposition: Home

## 2020-03-06 NOTE — DISCHARGE NOTE PROVIDER - NSDCFUSCHEDAPPT_GEN_ALL_CORE_FT
HÉCTOR LYONS ; 03/31/2020 ; NPP Neuro [address] [facility]  HÉCTOR LYONS ; 04/03/2020 ; NPP Med GenInt [address] [facility] [address] HÉCTOR LYONS ; 03/31/2020 ; NPP Neuro [address] [facility]  HÉCTOR LYONS ; 04/03/2020 ; NPP Med GenInt [address] [facility] [address] HÉCTOR LYONS ; 03/31/2020 ; NPP Neuro [address] [facility]  HÉCTOR LYONS ; 04/03/2020 ; NPP Med GenInt [address] [facility] [address] HÉCTOR LYONS ; 03/31/2020 ; NPP Neuro [address] [facility]  HÉCTOR LYONS ; 04/03/2020 ; NPP Med GenInt [address] [facility] [address] HÉCTOR LYONS ; 03/31/2020 ; NPP Neuro [address] [facility]  HÉCTOR LYONS ; 04/03/2020 ; NPP Med GenInt [address] [facility] [address]

## 2020-03-06 NOTE — PHYSICAL THERAPY INITIAL EVALUATION ADULT - ADDITIONAL COMMENTS
Pt states lives in house with 5 steps to enter with handrail. Will stay on first floor. Pt states  /daughter/son will be caregivers. Pt states has RW and cane.

## 2020-03-06 NOTE — DISCHARGE NOTE PROVIDER - CARE PROVIDERS DIRECT ADDRESSES
[email or web address]

## 2020-03-06 NOTE — PROGRESS NOTE ADULT - SUBJECTIVE AND OBJECTIVE BOX
INTERVAL HPI/OVERNIGHT EVENTS:   Patient seen and examined.  C/o pain at surgical site.  No fevers, chills, sweats, dizziness, HA, changes in vision, cp, palpitations, sob, persistent cough, n/v/d, abd pain, dysuria, focal weakness, or calf pain.   Eating, voiding, +flatus, no BM yet.    REVIEW OF SYSTEMS:  See HPI,  all others negative    PHYSICAL EXAM:  Vital Signs Last 24 Hrs  T(C): 36.8 (06 Mar 2020 07:41), Max: 36.8 (06 Mar 2020 07:41)  T(F): 98.2 (06 Mar 2020 07:41), Max: 98.2 (06 Mar 2020 07:41)  HR: 54 (06 Mar 2020 07:41) (51 - 70)  BP: 122/48 (06 Mar 2020 07:41) (91/46 - 122/48)  BP(mean): --  RR: 17 (06 Mar 2020 07:41) (12 - 18)  SpO2: 98% (06 Mar 2020 07:41) (96% - 100%)    GENERAL: NAD, well-groomed, well-developed, awake, alert, oriented x 3, fluent and coherent speech  EYES: EOMI, PERRLA, conjunctiva and sclera clear  ENMT: No tonsillar erythema, exudates, or enlargement; Moist mucous membranes, Good dentition, No lesions  NECK: Supple, No JVD, No Cervical LAD, No thyromegaly, No thyroid nodules felt  NERVOUS SYSTEM:  Good concentration; Moving all 4 extremities against gravity and resistance; No gross sensory deficits, No facial droop  CHEST/LUNG: Clear to auscultation bilaterally; No rales, rhonchi, wheezing, or rubs  HEART: Regular rate and rhythm; No murmurs, rubs, or gallops  ABDOMEN: Soft, Nontender, Nondistended, Bowel sounds present, No palpable masses or organomegaly, No bruits  EXTREMITIES:  2+ Peripheral Pulses, No clubbing, cyanosis, or edema  WOUND: TORITO dressing on, quarter sized area of blood on dressing, minimal periwound edema    Diagnostic Testin.2    5.79  )-----------( 180      ( 06 Mar 2020 07:04 )             27.0     06 Mar 2020 07:04    138    |  106    |  24     ----------------------------<  98     4.2     |  25     |  1.24     Ca    8.8        06 Mar 2020 07:04        Urinalysis Basic - ( 05 Mar 2020 07:52 )    Color: Yellow / Appearance: Clear / S.015 / pH: x  Gluc: x / Ketone: Negative  / Bili: Negative / Urobili: Negative mg/dL   Blood: x / Protein: Negative mg/dL / Nitrite: Negative   Leuk Esterase: Negative / RBC: x / WBC 0-2   Sq Epi: x / Non Sq Epi: x / Bacteria: Few      Culture - Tissue with Gram Stain (collected 06 Mar 2020 00:24)  Source: .Tissue Other  Gram Stain (06 Mar 2020 04:08):    Moderate polymorphonuclear leukocytes seen per low power field    No organisms seen per oil power field

## 2020-03-06 NOTE — PHYSICAL THERAPY INITIAL EVALUATION ADULT - ACTIVE RANGE OF MOTION EXAMINATION, REHAB EVAL
Right LE Decreased due to sx./isauro. upper extremity Active ROM was WNL (within normal limits)/LLE Active ROM was WNL (within normal limits)/deficits as listed below

## 2020-03-06 NOTE — DISCHARGE NOTE PROVIDER - CARE PROVIDER_API CALL
Nathanael Dinero)  Orthopedics  [address] [facility] [address]  [place] [address]  Phone: [phone]  Fax: [phone]  Established Patient  Follow Up Time: 2 weeks

## 2020-03-06 NOTE — DISCHARGE NOTE PROVIDER - NSDCACTIVITY_GEN_ALL_CORE
No heavy lifting/straining/Walking - Outdoors allowed/Showering allowed/Walking - Indoors allowed/Stairs allowed/Do not drive or operate machinery

## 2020-03-06 NOTE — DISCHARGE NOTE PROVIDER - NSDCCPTREATMENT_GEN_ALL_CORE_FT
PRINCIPAL PROCEDURE  Procedure: Incision and drainage of wound of hip  Findings and Treatment: scar revision.  Superficial infection and wound dehiscence Right Anterior THR incision.

## 2020-03-06 NOTE — CHART NOTE - NSCHARTNOTEFT_GEN_A_CORE
Called by RN to evaluate patient with abdominal pain. Pt states she has sharp pain in epigastric area, wrapping around to her right side. She feels some relief when standing up. Passing gas. Voiding well. States that she had pains earlier but it was coming and going. Per RN, was given maalox earlier as thought to be gas pains. She appears uncomfortable. No nausea or vomiting.    T(C): 36.6 (06 Mar 2020 11:20), Max: 36.8 (06 Mar 2020 07:41)  T(F): 97.9 (06 Mar 2020 11:20), Max: 98.2 (06 Mar 2020 07:41)  HR: 63 (06 Mar 2020 11:20) (51 - 63)  BP: 113/59 (06 Mar 2020 11:20) (91/46 - 122/48)  RR: 18 (06 Mar 2020 11:20) (14 - 18)  SpO2: 98% (06 Mar 2020 11:20) (96% - 100%)    Physical Exam  Gen: in mild distress  Cardio: S1S2 RRR  Pulm: CTAB  Abd: tight abdominal musculature, tenderness to palpation, nondistended, bowel sounds present  Ext: no calf edema/tenderness    Impression: Abdominal Pain vs ?MSK pain  - pt is voiding. No nausea or vomiting. had BM today and is passing gas.  - pain is positional - feeling some relief with standing up. ?muscle spasms  - check AXR 2 views - if negative, trial muscle relaxer.   - pain medication - oxycodone on board PRN pain  - will sign out to night team

## 2020-03-06 NOTE — DISCHARGE NOTE PROVIDER - HOSPITAL COURSE
This patient was admitted to [facility] through the ED with right anterior THR wound dehiscence and suprficial infection on 3/5/20.  Patient was medically cleared by hospitalist to undergo procedure. Patient underwent I & D, scar revision Right anterior THR incision by Dr. Nathanael Dinero on 3/5/20. Procedure was well tolerated.  No operative or zonia-operative complications arose during patients hospital course.  Patient received antibiotic according to SCIP guidelines for infection prevention.  Aspirin 81mg q 12h was given for DVT prophylaxis, in addition to the use of SCDs.  Anesthesia, Medical Hospitalist, Physical Therapy and Occupational Therapy were consulted. Patient is stable for discharge with a good prognosis.  Appropriate discharge instructions and medications are provided in this document. This patient was admitted to [facility] through the ED with right anterior THR wound dehiscence and suprficial infection on 3/5/20.  Patient was medically cleared by hospitalist to undergo procedure. Patient underwent I & D, scar revision Right anterior THR incision by Dr. Nathanael Dinero on 3/5/20. Procedure was well tolerated.  No operative or zonia-operative complications arose during patients hospital course.  Patient received antibiotic according to SCIP guidelines for infection prevention.  Aspirin 81mg q 12h was given for DVT prophylaxis, in addition to the use of SCDs.  Anesthesia, Medical Hospitalist, Physical Therapy and Occupational Therapy were consulted. Patient is stable for discharge with a good prognosis.  Appropriate discharge instructions and medications are provided in this document.         Addendum Dr. Bhatti: Pt seen and examined on day of discharge. Feels better. States she has chronic abdominal issues. Feels better when she takes nexium at home. No current abdominal complaints. Advised to follow up with PMD for persistent normocytic anemia.        PHYSICAL EXAM:    T(C): 36.6 (03-07-20 @ 07:58), Max: 36.6 (03-06-20 @ 11:20)    HR: 49 (03-07-20 @ 07:58) (49 - 63)    BP: 117/66 (03-07-20 @ 07:58) (98/59 - 117/66)    RR: 16 (03-07-20 @ 07:58) (16 - 18)    SpO2: 97% (03-07-20 @ 07:58) (97% - 98%)        GENERAL: NAD, well-groomed, well-developed    HEAD:  Atraumatic, Normocephalic    EYES: EOMI, PERRLA, conjunctiva and sclera clear    ENMT: No tonsillar erythema, exudates, or enlargement; Moist mucous membranes, Good dentition, No lesions    NECK: Supple, No JVD, Normal thyroid    NERVOUS SYSTEM:  Alert & Oriented X3, Good concentration; sensation to light touch intact    CHEST/LUNG: Clear to auscultation bilaterally; No rales, rhonchi, wheezing, or rubs    HEART: Regular rate and rhythm; soft systolic murmur; no rubs, or gallops    ABDOMEN: Soft, Nontender, Nondistended; Bowel sounds present    EXTREMITIES:  2+ Peripheral Pulses, No clubbing, cyanosis, or edema    LYMPH: No lymphadenopathy noted    SKIN: warm, well perfused; TORITO dressing intact        I, the attending physician, was physically present for the key portions of the evaluation and management (E/M) service provided.  I agree with the above findings which I have reviewed and edited where appropriate.  The total amount of time spent preparing the discharge and follow up plan was 31 minutes.

## 2020-03-06 NOTE — PROGRESS NOTE ADULT - ASSESSMENT
Right Hip Wound Dehiscence, POD#1 s/p I&D  - f/u cultures, wound not thought to be infected, no IV antibiotics for now per Dr. Dinero  - at least partially related to chronic steroid therapy  - stress dose steroids given she was on 20mg Prednisone over much of past year with recent tapering to 10mg and then 5mg  - resume Prednisone 5mg today  - Persistent anemia since surgery, recommended balanced iron rich diet  - Pain control  - Bowel regimen  - PT/OT  - VTE PPx per Ortho    Myasthenia Gravis  - continue prednisone 5mg starting 3/6  - s/p stress dose steroids  - continue pyridostigmine at home dosing  - respiratory status stable    CKD Stage 3  - dose meds renally  - patient wants to minimize narcotic use, she is still having significant pain at surgical site  - will order short course of celebrex    Mild Aortic Stenosis  - seen on echo  - has murmur  - monitor fluid balance post-op    HTN  - hold hctz during hospitalization  - resume ARB on POD#2  - Cath in Sep 2019 - no significant coronary disease seen  - Echo Aug 2019 - mild AS, normal RV and LV systolic function    HLD  - continue statin    Hypothyroidism  - continue levothyroxine Right Hip Wound Dehiscence, POD#1 s/p I&D  - f/u cultures, wound not thought to be infected, no IV antibiotics for now per Dr. Dinero  - at least partially related to chronic steroid therapy  - stress dose steroids given she was on 20mg Prednisone over much of past year with recent tapering to 10mg and then 5mg  - resume Prednisone 5mg today  - Persistent anemia since surgery, asymptomatic, recommended balanced iron rich diet, f/u with Surgery or PCP for repeat CBC in 2-3 weeks  - Pain control  - Bowel regimen  - PT/OT  - VTE PPx per Ortho    Myasthenia Gravis  - continue prednisone 5mg starting 3/6  - s/p stress dose steroids  - continue pyridostigmine at home dosing  - respiratory status stable    CKD Stage 3  - dose meds renally  - patient wants to minimize narcotic use, she is still having significant pain at surgical site  - will order short course of celebrex    Mild Aortic Stenosis  - seen on echo  - has murmur  - monitor fluid balance post-op    HTN  - hold hctz during hospitalization  - resume ARB on POD#2  - Cath in Sep 2019 - no significant coronary disease seen  - Echo Aug 2019 - mild AS, normal RV and LV systolic function    HLD  - continue statin    Hypothyroidism  - continue levothyroxine Right Hip Wound Dehiscence, POD#1 s/p I&D  - f/u cultures, wound not thought to be infected, no IV antibiotics for now per Dr. Dinero  - at least partially related to chronic steroid therapy  - stress dose steroids given she was on 20mg Prednisone over much of past year with recent tapering to 10mg and then 5mg  - resume Prednisone 5mg today  - Persistent normocytic anemia since surgery, asymptomatic, will get iron/ferritin, recommended balanced iron rich diet, f/u with Surgery or PCP for repeat CBC in 2-3 weeks  - Pain control  - Bowel regimen  - PT/OT  - VTE PPx per Ortho    Myasthenia Gravis  - continue prednisone 5mg starting 3/6  - s/p stress dose steroids  - continue pyridostigmine at home dosing  - respiratory status stable    CKD Stage 3  - dose meds renally  - patient wants to minimize narcotic use, she is still having significant pain at surgical site  - will order short course of celebrex    Mild Aortic Stenosis  - seen on echo  - has murmur  - monitor fluid balance post-op    HTN  - hold hctz during hospitalization  - resume ARB on POD#2  - Cath in Sep 2019 - no significant coronary disease seen  - Echo Aug 2019 - mild AS, normal RV and LV systolic function    HLD  - continue statin    Hypothyroidism  - continue levothyroxine

## 2020-03-06 NOTE — DISCHARGE NOTE PROVIDER - NSDCMRMEDTOKEN_GEN_ALL_CORE_FT
acetaminophen 500 mg oral tablet: 2 tab(s) orally every 12 hours  aspirin 81 mg oral delayed release tablet: 1 tab(s) orally once a day  Crestor 5 mg oral tablet: 1 tab(s) orally once a day  esomeprazole 40 mg oral delayed release capsule: 1 tab(s) orally once a day  levothyroxine 50 mcg (0.05 mg) oral tablet: 1 tab(s) orally once a day  Linzess 145 mcg oral capsule: 1 cap(s) orally once a day, As Needed  losartan-hydrochlorothiazide 50mg-12.5mg oral tablet: 1 tab(s) orally once a day  Mestinon 60 mg oral tablet: 1 tab(s) orally 3 times a day  polyethylene glycol 3350 oral powder for reconstitution: 17 gram(s) orally once a day  predniSONE: 5 milligram(s) orally once a day  pyridostigmine 180 mg oral tablet, extended release: 1 tab(s) orally once a day  senna oral tablet: 2 tab(s) orally once a day (at bedtime), As needed, Constipation  solaris infusion: 1 dose(s) by continuous intravenous infusion every 2 weeks  traMADol 50 mg oral tablet: 1 tab(s) orally once a day (at bedtime), As Needed for pain.  may take in place of oxycodone.  Vitamin D3 2000 intl units oral capsule: 1 cap(s) orally once a day acetaminophen 500 mg oral tablet: 2 tab(s) orally every 12 hours  aspirin 81 mg oral delayed release tablet: 1 tab(s) orally once a day  aspirin 81 mg oral delayed release tablet: 1 tab orally 2 times a day  celecoxib 100 mg oral capsule: 1 cap orally 2 times a day  Crestor 5 mg oral tablet: 1 tab(s) orally once a day  esomeprazole 40 mg oral delayed release capsule: 1 tab(s) orally once a day  Gentle Iron oral capsule: 1 cap(s) orally 2 times a day   levothyroxine 50 mcg (0.05 mg) oral tablet: 1 tab(s) orally once a day  Linzess 145 mcg oral capsule: 1 cap(s) orally once a day, As Needed  losartan-hydrochlorothiazide 50mg-12.5mg oral tablet: 1 tab(s) orally once a day  Mestinon 60 mg oral tablet: 1 tab(s) orally 3 times a day  minocycline 100 mg oral capsule: 1 cap(s) orally every 12 hours   oxyCODONE 5 mg oral tablet: 1 tab(s) orally every 3 hours, As needed, Mild Pain (1 - 3)  polyethylene glycol 3350 oral powder for reconstitution: 17 gram(s) orally once a day  predniSONE: 5 milligram(s) orally once a day  pyridostigmine 180 mg oral tablet, extended release: 1 tab(s) orally once a day  senna oral tablet: 2 tab(s) orally once a day (at bedtime), As needed, Constipation  solaris infusion: 1 dose(s) by continuous intravenous infusion every 2 weeks  traMADol 50 mg oral tablet: 1 tab(s) orally once a day (at bedtime), As Needed for pain.  may take in place of oxycodone.  Vitamin D3 2000 intl units oral capsule: 1 cap(s) orally once a day acetaminophen 500 mg oral tablet: 2 tab(s) orally every 12 hours  aspirin 81 mg oral delayed release tablet: 1 tab orally 2 times a day  celecoxib 100 mg oral capsule: 1 cap orally 2 times a day  Crestor 5 mg oral tablet: 1 tab(s) orally once a day  esomeprazole 40 mg oral delayed release capsule: 1 tab(s) orally once a day  Gentle Iron oral capsule: 1 cap(s) orally 2 times a day   Linzess 145 mcg oral capsule: 1 cap(s) orally once a day, As Needed  losartan-hydrochlorothiazide 50mg-12.5mg oral tablet: 1 tab(s) orally once a day  Mestinon 60 mg oral tablet: 1 tab(s) orally 3 times a day  minocycline 100 mg oral capsule: 1 cap(s) orally every 12 hours   oxyCODONE 5 mg oral tablet: 1 tab(s) orally every 3 hours, As needed, Mild Pain (1 - 3)  polyethylene glycol 3350 oral powder for reconstitution: 17 gram(s) orally once a day  predniSONE: 5 milligram(s) orally once a day  pyridostigmine 180 mg oral tablet, extended release: 1 tab(s) orally once a day  senna oral tablet: 2 tab(s) orally once a day (at bedtime), As needed, Constipation  solaris infusion: 1 dose(s) by continuous intravenous infusion every 2 weeks  Vitamin D3 2000 intl units oral capsule: 1 cap(s) orally once a day

## 2020-03-07 ENCOUNTER — TRANSCRIPTION ENCOUNTER (OUTPATIENT)
Age: 80
End: 2020-03-07

## 2020-03-07 VITALS
RESPIRATION RATE: 16 BRPM | DIASTOLIC BLOOD PRESSURE: 65 MMHG | HEART RATE: 56 BPM | SYSTOLIC BLOOD PRESSURE: 119 MMHG | TEMPERATURE: 99 F | OXYGEN SATURATION: 98 %

## 2020-03-07 LAB
-  AMIKACIN: SIGNIFICANT CHANGE UP
-  AMIKACIN: SIGNIFICANT CHANGE UP
-  AMOXICILLIN/CLAVULANIC ACID: SIGNIFICANT CHANGE UP
-  AMOXICILLIN/CLAVULANIC ACID: SIGNIFICANT CHANGE UP
-  AMPICILLIN/SULBACTAM: SIGNIFICANT CHANGE UP
-  AMPICILLIN/SULBACTAM: SIGNIFICANT CHANGE UP
-  AMPICILLIN: SIGNIFICANT CHANGE UP
-  AMPICILLIN: SIGNIFICANT CHANGE UP
-  AZTREONAM: SIGNIFICANT CHANGE UP
-  AZTREONAM: SIGNIFICANT CHANGE UP
-  CEFAZOLIN: SIGNIFICANT CHANGE UP
-  CEFAZOLIN: SIGNIFICANT CHANGE UP
-  CEFEPIME: SIGNIFICANT CHANGE UP
-  CEFEPIME: SIGNIFICANT CHANGE UP
-  CEFOXITIN: SIGNIFICANT CHANGE UP
-  CEFOXITIN: SIGNIFICANT CHANGE UP
-  CEFTRIAXONE: SIGNIFICANT CHANGE UP
-  CEFTRIAXONE: SIGNIFICANT CHANGE UP
-  CIPROFLOXACIN: SIGNIFICANT CHANGE UP
-  CIPROFLOXACIN: SIGNIFICANT CHANGE UP
-  ERTAPENEM: SIGNIFICANT CHANGE UP
-  ERTAPENEM: SIGNIFICANT CHANGE UP
-  GENTAMICIN: SIGNIFICANT CHANGE UP
-  GENTAMICIN: SIGNIFICANT CHANGE UP
-  IMIPENEM: SIGNIFICANT CHANGE UP
-  IMIPENEM: SIGNIFICANT CHANGE UP
-  LEVOFLOXACIN: SIGNIFICANT CHANGE UP
-  LEVOFLOXACIN: SIGNIFICANT CHANGE UP
-  MEROPENEM: SIGNIFICANT CHANGE UP
-  MEROPENEM: SIGNIFICANT CHANGE UP
-  PIPERACILLIN/TAZOBACTAM: SIGNIFICANT CHANGE UP
-  PIPERACILLIN/TAZOBACTAM: SIGNIFICANT CHANGE UP
-  TOBRAMYCIN: SIGNIFICANT CHANGE UP
-  TOBRAMYCIN: SIGNIFICANT CHANGE UP
-  TRIMETHOPRIM/SULFAMETHOXAZOLE: SIGNIFICANT CHANGE UP
-  TRIMETHOPRIM/SULFAMETHOXAZOLE: SIGNIFICANT CHANGE UP
CULTURE RESULTS: SIGNIFICANT CHANGE UP
METHOD TYPE: SIGNIFICANT CHANGE UP
METHOD TYPE: SIGNIFICANT CHANGE UP
ORGANISM # SPEC MICROSCOPIC CNT: SIGNIFICANT CHANGE UP
ORGANISM # SPEC MICROSCOPIC CNT: SIGNIFICANT CHANGE UP
SPECIMEN SOURCE: SIGNIFICANT CHANGE UP

## 2020-03-07 PROCEDURE — 86900 BLOOD TYPING SEROLOGIC ABO: CPT

## 2020-03-07 PROCEDURE — 97165 OT EVAL LOW COMPLEX 30 MIN: CPT

## 2020-03-07 PROCEDURE — 36415 COLL VENOUS BLD VENIPUNCTURE: CPT

## 2020-03-07 PROCEDURE — 86850 RBC ANTIBODY SCREEN: CPT

## 2020-03-07 PROCEDURE — 97116 GAIT TRAINING THERAPY: CPT

## 2020-03-07 PROCEDURE — 82607 VITAMIN B-12: CPT

## 2020-03-07 PROCEDURE — 82728 ASSAY OF FERRITIN: CPT

## 2020-03-07 PROCEDURE — 86923 COMPATIBILITY TEST ELECTRIC: CPT

## 2020-03-07 PROCEDURE — 83540 ASSAY OF IRON: CPT

## 2020-03-07 PROCEDURE — 74019 RADEX ABDOMEN 2 VIEWS: CPT

## 2020-03-07 PROCEDURE — 97161 PT EVAL LOW COMPLEX 20 MIN: CPT

## 2020-03-07 PROCEDURE — 81001 URINALYSIS AUTO W/SCOPE: CPT

## 2020-03-07 PROCEDURE — 71045 X-RAY EXAM CHEST 1 VIEW: CPT

## 2020-03-07 PROCEDURE — 99285 EMERGENCY DEPT VISIT HI MDM: CPT | Mod: 25

## 2020-03-07 PROCEDURE — 86901 BLOOD TYPING SEROLOGIC RH(D): CPT

## 2020-03-07 PROCEDURE — 87075 CULTR BACTERIA EXCEPT BLOOD: CPT

## 2020-03-07 PROCEDURE — 80053 COMPREHEN METABOLIC PANEL: CPT

## 2020-03-07 PROCEDURE — 36430 TRANSFUSION BLD/BLD COMPNT: CPT

## 2020-03-07 PROCEDURE — P9016: CPT

## 2020-03-07 PROCEDURE — 85027 COMPLETE CBC AUTOMATED: CPT

## 2020-03-07 PROCEDURE — 87186 SC STD MICRODIL/AGAR DIL: CPT

## 2020-03-07 PROCEDURE — 93005 ELECTROCARDIOGRAM TRACING: CPT

## 2020-03-07 PROCEDURE — 87070 CULTURE OTHR SPECIMN AEROBIC: CPT

## 2020-03-07 PROCEDURE — 73502 X-RAY EXAM HIP UNI 2-3 VIEWS: CPT

## 2020-03-07 PROCEDURE — 97535 SELF CARE MNGMENT TRAINING: CPT

## 2020-03-07 PROCEDURE — 83550 IRON BINDING TEST: CPT

## 2020-03-07 PROCEDURE — 99239 HOSP IP/OBS DSCHRG MGMT >30: CPT

## 2020-03-07 PROCEDURE — 82746 ASSAY OF FOLIC ACID SERUM: CPT

## 2020-03-07 PROCEDURE — 97530 THERAPEUTIC ACTIVITIES: CPT

## 2020-03-07 PROCEDURE — 80048 BASIC METABOLIC PNL TOTAL CA: CPT

## 2020-03-07 RX ORDER — TRAMADOL HYDROCHLORIDE 50 MG/1
1 TABLET ORAL
Qty: 0 | Refills: 0 | DISCHARGE

## 2020-03-07 RX ADMIN — Medication 1000 MILLIGRAM(S): at 14:03

## 2020-03-07 RX ADMIN — CELECOXIB 100 MILLIGRAM(S): 200 CAPSULE ORAL at 08:26

## 2020-03-07 RX ADMIN — POLYETHYLENE GLYCOL 3350 17 GRAM(S): 17 POWDER, FOR SOLUTION ORAL at 12:36

## 2020-03-07 RX ADMIN — Medication 1000 MILLIGRAM(S): at 05:34

## 2020-03-07 RX ADMIN — PANTOPRAZOLE SODIUM 40 MILLIGRAM(S): 20 TABLET, DELAYED RELEASE ORAL at 05:34

## 2020-03-07 RX ADMIN — PYRIDOSTIGMINE BROMIDE 60 MILLIGRAM(S): 60 SOLUTION ORAL at 14:03

## 2020-03-07 RX ADMIN — Medication 50 MICROGRAM(S): at 05:34

## 2020-03-07 RX ADMIN — PYRIDOSTIGMINE BROMIDE 180 MILLIGRAM(S): 60 SOLUTION ORAL at 12:36

## 2020-03-07 RX ADMIN — Medication 1000 MILLIGRAM(S): at 05:35

## 2020-03-07 RX ADMIN — Medication 81 MILLIGRAM(S): at 05:34

## 2020-03-07 RX ADMIN — PYRIDOSTIGMINE BROMIDE 60 MILLIGRAM(S): 60 SOLUTION ORAL at 05:34

## 2020-03-07 RX ADMIN — Medication 5 MILLIGRAM(S): at 05:34

## 2020-03-07 NOTE — PROGRESS NOTE ADULT - ASSESSMENT
Right Hip Wound Dehiscence, POD#2 s/p I&D  - f/u cultures, wound not thought to be infected, no IV antibiotics for now per Dr. Dinero  - at least partially related to chronic steroid therapy  - stress dose steroids given she was on 20mg Prednisone over much of past year with recent tapering to 10mg and then 5mg  - resume Prednisone 5mg  - Persistent normocytic anemia since surgery, asymptomatic, will get iron/ferritin, recommended balanced iron rich diet, f/u with Surgery or PCP for repeat CBC in 2-3 weeks  - Pain control  - Bowel regimen  - PT/OT  - VTE PPx per Ortho    GERD/Chronic Abdominal Pain  - Abdominal discomfort resolved  - on nexium BID at home - continue PPI  - to follow up with PMD    Myasthenia Gravis  - continue prednisone 5mg  - s/p stress dose steroids  - continue pyridostigmine at home dosing  - respiratory status stable    CKD Stage 3  - dose meds renally  - patient wants to minimize narcotic use, she is still having significant pain at surgical site  - short course of celebrex    Mild Aortic Stenosis  - seen on echo  - has murmur  - monitor fluid balance post-op    HTN  - hold hctz during hospitalization  - resume ARB  - Cath in Sep 2019 - no significant coronary disease seen  - Echo Aug 2019 - mild AS, normal RV and LV systolic function    HLD  - continue statin    Hypothyroidism  - continue levothyroxine Right Hip Wound Dehiscence, POD#2 s/p I&D  - f/u cultures, wound not thought to be infected, no IV antibiotics for now per Dr. Dinero  - at least partially related to chronic steroid therapy  - stress dose steroids given she was on 20mg Prednisone over much of past year with recent tapering to 10mg and then 5mg  - resume Prednisone 5mg  - Persistent normocytic anemia since surgery, asymptomatic, will get iron/ferritin, recommended balanced iron rich diet, f/u with Surgery or PCP for repeat CBC in 2-3 weeks  - Pain control  - Bowel regimen  - PT/OT  - VTE PPx per Ortho  - medically stable for discharge home today. wants to go home before 2PM.  - to be discharged on PO minocycline per ortho    GERD/Chronic Abdominal Pain  - Abdominal discomfort from 3/6 resolved  - AXR unremarkable  - on nexium BID at home - continue PPI  - to follow up with PMD    Myasthenia Gravis  - continue prednisone 5mg  - s/p stress dose steroids  - continue pyridostigmine at home dosing  - respiratory status stable    CKD Stage 3  - dose meds renally  - patient wants to minimize narcotic use, she is still having significant pain at surgical site  - short course of celebrex    Mild Aortic Stenosis  - seen on echo  - has murmur  - monitor fluid balance post-op    HTN  - hold hctz during hospitalization  - resume ARB  - Cath in Sep 2019 - no significant coronary disease seen  - Echo Aug 2019 - mild AS, normal RV and LV systolic function  - monitor hemodynamics    HLD  - continue statin    Hypothyroidism  - continue levothyroxine

## 2020-03-07 NOTE — PROGRESS NOTE ADULT - SUBJECTIVE AND OBJECTIVE BOX
ORTHOPEDIC PA PROGRESS NOTE  HÉCTOR LYONS      79y Female                                                                                                                               POD #    2    STATUS POST:               Pre-Op Dx: Wound drainage    Post-Op Dx:  Wound drainage    Procedure: Incision and drainage, wound, hip                                                Pain (0-10):   Current Pain Management:  [ ] PCA   [x ] Po Analgesics [ ] IM /IV Anagesics     T(F): --  HR: 58  BP: 109/56  RR: --  SpO2: --                        8.2    5.79  )-----------( 180      ( 06 Mar 2020 07:04 )             27.0                     03-06    138  |  106  |  24<H>  ----------------------------<  98  4.2   |  25  |  1.24    Ca    8.8      06 Mar 2020 07:04    TPro  6.8  /  Alb  3.4  /  TBili  0.3  /  DBili  x   /  AST  20  /  ALT  28  /  AlkPhos  60  03-05    Physical Exam :    -   TORITO sterile.   -   Wound C/D/I.   -   Distal Neurvascular status intact grossly.   -   Warm well perfused; capillary refill <3 seconds   -   (+)EHL/FHL 5/5  -   (+) Sensation to light touch  -   (-) Calf tenderness Bilaterally    A/P: 79y Female s/p Wound drainage    -   Ortho Stable  -   Pain control   -   Medicine to follow  -   DVT ppx:     [x]SCDs     [x] ASA     [ ] Eliquis     [ ] Lovenox  -   Weight bearing status:  WBAT [x]        PWB    [ ]     TTWB  [ ]      NWB  [ ]   -  Dispo:     Home [x ]     Acute Rehab [ ]     MEENU [ ]     TBD [ ]  -  Patient states she had lower abdominal pain last night which has resolved, possible gas.  Patient is passing gas and has had a bowel movement, currently asymptomatic  -As per Dr. Dinero patient is to be discharged home on minocycline for 10days, already sent to pharmacy along with pain medication.  Patient requests to be discharged home today prior to 2pm.  Discharge complete, pending PT/Medical clearance  -

## 2020-03-07 NOTE — DISCHARGE NOTE NURSING/CASE MANAGEMENT/SOCIAL WORK - PATIENT PORTAL LINK FT
You can access the FollowMyHealth Patient Portal offered by [facility] by registering at the following website: [email or web address]. By joining [facility]’s FollowMyHealth portal, you will also be able to view your health information using other applications (apps) compatible with our system.

## 2020-03-07 NOTE — PROGRESS NOTE ADULT - SUBJECTIVE AND OBJECTIVE BOX
Patient is a 79y old  Female who presents with a chief complaint of Right hip incisional wound dehiscence & drainage (07 Mar 2020 07:51)      FROM ADMISSION H+P:   HPI:  78 Y/O F presents to ED for urgent planned I&D of Right THR incisional dehiscence. She had Right Ant THR 12/30/19. Had sutured incision per daughter.  Had wound drainage from upper pole site after OV, placed on Keflex, which diminished drainage.   Past 2 weeks had aquacel CE dressing daily, placed on bactrim last 1 week, no improvement last OV 3/3. No fever, chill, hip injury. No URI or UTI.  Planning for I&D of Right ASI wound and possible Revision THR. Currently on Prednison 5mg daily for Myestenia Gravis (05 Mar 2020 08:33)      INTERVAL HPI/OVERNIGHT EVENTS: had a bout of abdominal pain overnight - states this is chronic, takes nexium twice a day at home. admits to reflux. Feels 5/10 pain in her right hip. She is eager to go home, states that she wants to be discharged before 2PM. Had BM yesterday. Passing flatus. No chest pain, abdominal pain, palpitations, headache, dizziness, melena, hematochezia, n/v/d. She states she will follow up with her PMD. To work with PT.    I&O's Summary      LABS:                        8.2    5.79  )-----------( 180      ( 06 Mar 2020 07:04 )             27.0     03-06    138  |  106  |  24<H>  ----------------------------<  98  4.2   |  25  |  1.24    Ca    8.8      06 Mar 2020 07:04          CAPILLARY BLOOD GLUCOSE                MEDICATIONS  (STANDING):  acetaminophen   Tablet .. 1000 milliGRAM(s) Oral every 8 hours  aspirin enteric coated 81 milliGRAM(s) Oral two times a day  atorvastatin 20 milliGRAM(s) Oral at bedtime  celecoxib 100 milliGRAM(s) Oral two times a day  chlorhexidine 2% Cloths 1 Application(s) Topical once  lactated ringers. 1000 milliLiter(s) (60 mL/Hr) IV Continuous <Continuous>  levothyroxine 50 MICROGram(s) Oral daily  losartan 50 milliGRAM(s) Oral daily  pantoprazole    Tablet 40 milliGRAM(s) Oral before breakfast  polyethylene glycol 3350 17 Gram(s) Oral daily  predniSONE   Tablet 5 milliGRAM(s) Oral daily  pyridostigmine 60 milliGRAM(s) Oral three times a day  pyridostigmine  milliGRAM(s) Oral daily    MEDICATIONS  (PRN):  aluminum hydroxide/magnesium hydroxide/simethicone Suspension 30 milliLiter(s) Oral four times a day PRN Indigestion  bisacodyl Suppository 10 milliGRAM(s) Rectal daily PRN If no bowel movement by POD#2  HYDROmorphone  Injectable 0.5 milliGRAM(s) IV Push every 10 minutes PRN Moderate Pain (4 - 6)  HYDROmorphone  Injectable 0.5 milliGRAM(s) IV Push every 3 hours PRN Severe Pain (7 - 10)  magnesium hydroxide Suspension 30 milliLiter(s) Oral once PRN Constipation  ondansetron Injectable 4 milliGRAM(s) IV Push every 6 hours PRN Nausea and/or Vomiting  oxyCODONE    IR 5 milliGRAM(s) Oral every 3 hours PRN Mild Pain (1 - 3)  oxyCODONE    IR 10 milliGRAM(s) Oral every 3 hours PRN Moderate Pain (4 - 6)  senna 2 Tablet(s) Oral at bedtime PRN Constipation      REVIEW OF SYSTEMS:  CONSTITUTIONAL: No fever, weight loss, or fatigue  EYES: No eye pain, visual disturbances, or discharge  ENMT:  No difficulty hearing, tinnitus, vertigo; No sinus or throat pain  NECK: No pain or stiffness  RESPIRATORY: No cough, wheezing, chills or hemoptysis; No shortness of breath  CARDIOVASCULAR: No chest pain, palpitations, dizziness, or leg swelling  GASTROINTESTINAL: No nausea, vomiting, or hematemesis; No diarrhea or constipation. No melena or hematochezia. Chronic abdominal discomfort and reflux  GENITOURINARY: No dysuria, frequency, hematuria, or incontinence  NEUROLOGICAL: No headaches, memory loss, loss of strength, numbness, or tremors  SKIN: No itching, burning, rashes, or lesions   LYMPH NODES: No enlarged glands  ENDOCRINE: No heat or cold intolerance; No hair loss  MUSCULOSKELETAL: admits right hip pain  PSYCHIATRIC: No depression, anxiety, mood swings, or difficulty sleeping  HEME/LYMPH: No easy bruising, or bleeding gums  ALLERGY AND IMMUNOLOGIC: No hives or eczema    RADIOLOGY & ADDITIONAL TESTS:  < from: Xray Abdomen 2 View PORTABLE -Urgent (03.06.20 @ 17:43) >    EXAM:  XR ABDOMEN PORTABLE URGENT 2V                            PROCEDURE DATE:  03/06/2020        INTERPRETATION:  History: Abdominal pain. Postop.     Supine and upright portable radiographs are performed. There is no recent study for comparison    There are postoperative changes in the upper lumbar spine with pedicle screws. There is a nonobstructive bowel gas pattern. There is no evidence of free intraperitoneal air.    Cholecystectomy clips are seen.    Right total hip replacement is noted      Impression: Nonobstructive bowel gas pattern      JOSUÉ MANLEY M.D.,ATTENDING RADIOLOGIST  This document has been electronically signed. Mar  7 2020  6:34AM        < end of copied text >    Imaging Personally Reviewed:  [x] YES  [ ] NO    Consultant(s) Notes Reviewed:  [x] YES  [ ] NO    PHYSICAL EXAM:  T(C): 36.6 (03-07-20 @ 07:58), Max: 36.6 (03-06-20 @ 11:20)  HR: 49 (03-07-20 @ 07:58) (49 - 63)  BP: 117/66 (03-07-20 @ 07:58) (98/59 - 117/66)  RR: 16 (03-07-20 @ 07:58) (16 - 18)  SpO2: 97% (03-07-20 @ 07:58) (97% - 98%)    GENERAL: NAD, well-groomed, well-developed  HEAD:  Atraumatic, Normocephalic  EYES: EOMI, PERRLA, conjunctiva and sclera clear  ENMT: No tonsillar erythema, exudates, or enlargement; Moist mucous membranes, Good dentition, No lesions  NECK: Supple, No JVD, Normal thyroid  NERVOUS SYSTEM:  Alert & Oriented X3, Good concentration; sensation to light touch intact  CHEST/LUNG: Clear to auscultation bilaterally; No rales, rhonchi, wheezing, or rubs  HEART: Regular rate and rhythm; soft systolic murmur; no rubs, or gallops  ABDOMEN: Soft, Nontender, Nondistended; Bowel sounds present  EXTREMITIES:  2+ Peripheral Pulses, No clubbing, cyanosis, or edema  LYMPH: No lymphadenopathy noted  SKIN: warm, well perfused; TORITO dressing intact    Care Discussed with Consultants/Other Providers [x] YES  [ ] NO

## 2020-03-07 NOTE — PROGRESS NOTE ADULT - REASON FOR ADMISSION
Right hip incisional wound dehiscence & drainage

## 2020-03-12 ENCOUNTER — APPOINTMENT (OUTPATIENT)
Dept: NEUROLOGY | Facility: CLINIC | Age: 80
End: 2020-03-12
Payer: MEDICARE

## 2020-03-12 PROCEDURE — 96413 CHEMO IV INFUSION 1 HR: CPT

## 2020-03-17 ENCOUNTER — APPOINTMENT (OUTPATIENT)
Dept: ORTHOPEDIC SURGERY | Facility: CLINIC | Age: 80
End: 2020-03-17
Payer: MEDICARE

## 2020-03-17 VITALS — DIASTOLIC BLOOD PRESSURE: 75 MMHG | SYSTOLIC BLOOD PRESSURE: 132 MMHG | HEART RATE: 71 BPM | TEMPERATURE: 97.7 F

## 2020-03-17 PROCEDURE — 73502 X-RAY EXAM HIP UNI 2-3 VIEWS: CPT | Mod: RT

## 2020-03-17 PROCEDURE — 99024 POSTOP FOLLOW-UP VISIT: CPT

## 2020-03-19 NOTE — H&P PST ADULT - RESPIRATORY RATE (BREATHS/MIN)
[Normal Breath Sounds] : Normal breath sounds [Normal Heart Sounds] : normal heart sounds [Normal Rate and Rhythm] : normal rate and rhythm [Abdominal Masses] : No abdominal masses [Abdomen Tenderness] : ~T ~M No abdominal tenderness [No HSM] : no hepatosplenomegaly [de-identified] : Soft and not distended. No hernias noted. Midline incision is clean and closed with no drainage or other problems noted. Colostomy stoma appears healthy with no peristomal excoriation and no obvious parastomal hernia. 15

## 2020-03-22 LAB
CULTURE RESULTS: SIGNIFICANT CHANGE UP
ORGANISM # SPEC MICROSCOPIC CNT: SIGNIFICANT CHANGE UP
ORGANISM # SPEC MICROSCOPIC CNT: SIGNIFICANT CHANGE UP
SPECIMEN SOURCE: SIGNIFICANT CHANGE UP

## 2020-03-28 DIAGNOSIS — J20.9 ACUTE BRONCHITIS, UNSPECIFIED: ICD-10-CM

## 2020-03-30 ENCOUNTER — INPATIENT (INPATIENT)
Facility: HOSPITAL | Age: 80
LOS: 49 days | Discharge: NURSING, REHAB OR LONG-TERM CARE | DRG: 871 | End: 2020-05-19
Attending: INTERNAL MEDICINE | Admitting: STUDENT IN AN ORGANIZED HEALTH CARE EDUCATION/TRAINING PROGRAM
Payer: MEDICARE

## 2020-03-30 VITALS
HEART RATE: 119 BPM | OXYGEN SATURATION: 98 % | TEMPERATURE: 100 F | SYSTOLIC BLOOD PRESSURE: 169 MMHG | RESPIRATION RATE: 36 BRPM | DIASTOLIC BLOOD PRESSURE: 83 MMHG | HEIGHT: 57 IN | WEIGHT: 145.95 LBS

## 2020-03-30 DIAGNOSIS — Z96.619 PRESENCE OF UNSPECIFIED ARTIFICIAL SHOULDER JOINT: Chronic | ICD-10-CM

## 2020-03-30 DIAGNOSIS — Z90.89 ACQUIRED ABSENCE OF OTHER ORGANS: Chronic | ICD-10-CM

## 2020-03-30 DIAGNOSIS — Z98.890 OTHER SPECIFIED POSTPROCEDURAL STATES: Chronic | ICD-10-CM

## 2020-03-30 LAB
ALBUMIN SERPL ELPH-MCNC: 3.8 G/DL — SIGNIFICANT CHANGE UP (ref 3.3–5)
ALP SERPL-CCNC: 66 U/L — SIGNIFICANT CHANGE UP (ref 40–120)
ALT FLD-CCNC: 18 U/L — SIGNIFICANT CHANGE UP (ref 10–45)
ANION GAP SERPL CALC-SCNC: 17 MMOL/L — SIGNIFICANT CHANGE UP (ref 5–17)
APTT BLD: 31.2 SEC — SIGNIFICANT CHANGE UP (ref 27.5–36.3)
AST SERPL-CCNC: 43 U/L — HIGH (ref 10–40)
BASE EXCESS BLDV CALC-SCNC: -1.5 MMOL/L — SIGNIFICANT CHANGE UP (ref -2–2)
BASOPHILS # BLD AUTO: 0.01 K/UL — SIGNIFICANT CHANGE UP (ref 0–0.2)
BASOPHILS NFR BLD AUTO: 0.1 % — SIGNIFICANT CHANGE UP (ref 0–2)
BILIRUB SERPL-MCNC: 0.2 MG/DL — SIGNIFICANT CHANGE UP (ref 0.2–1.2)
BUN SERPL-MCNC: 35 MG/DL — HIGH (ref 7–23)
CA-I SERPL-SCNC: 1.25 MMOL/L — SIGNIFICANT CHANGE UP (ref 1.12–1.3)
CALCIUM SERPL-MCNC: 9.3 MG/DL — SIGNIFICANT CHANGE UP (ref 8.4–10.5)
CHLORIDE BLDV-SCNC: 107 MMOL/L — SIGNIFICANT CHANGE UP (ref 96–108)
CHLORIDE SERPL-SCNC: 97 MMOL/L — SIGNIFICANT CHANGE UP (ref 96–108)
CO2 BLDV-SCNC: 25 MMOL/L — SIGNIFICANT CHANGE UP (ref 22–30)
CO2 SERPL-SCNC: 21 MMOL/L — LOW (ref 22–31)
CREAT SERPL-MCNC: 1.29 MG/DL — SIGNIFICANT CHANGE UP (ref 0.5–1.3)
D DIMER BLD IA.RAPID-MCNC: 868 NG/ML DDU — HIGH
EOSINOPHIL # BLD AUTO: 0 K/UL — SIGNIFICANT CHANGE UP (ref 0–0.5)
EOSINOPHIL NFR BLD AUTO: 0 % — SIGNIFICANT CHANGE UP (ref 0–6)
ERYTHROCYTE [SEDIMENTATION RATE] IN BLOOD: 106 MM/HR — HIGH (ref 0–20)
GAS PNL BLDV: 141 MMOL/L — SIGNIFICANT CHANGE UP (ref 135–145)
GAS PNL BLDV: SIGNIFICANT CHANGE UP
GAS PNL BLDV: SIGNIFICANT CHANGE UP
GLUCOSE BLDV-MCNC: 143 MG/DL — HIGH (ref 70–99)
GLUCOSE SERPL-MCNC: 146 MG/DL — HIGH (ref 70–99)
HCO3 BLDV-SCNC: 24 MMOL/L — SIGNIFICANT CHANGE UP (ref 21–29)
HCT VFR BLD CALC: 30.8 % — LOW (ref 34.5–45)
HCT VFR BLDA CALC: 31 % — LOW (ref 39–50)
HGB BLD CALC-MCNC: 10.1 G/DL — LOW (ref 11.5–15.5)
HGB BLD-MCNC: 9.5 G/DL — LOW (ref 11.5–15.5)
IMM GRANULOCYTES NFR BLD AUTO: 0.8 % — SIGNIFICANT CHANGE UP (ref 0–1.5)
INR BLD: 1.08 RATIO — SIGNIFICANT CHANGE UP (ref 0.88–1.16)
LACTATE BLDV-MCNC: 2.2 MMOL/L — HIGH (ref 0.7–2)
LYMPHOCYTES # BLD AUTO: 0.67 K/UL — LOW (ref 1–3.3)
LYMPHOCYTES # BLD AUTO: 8.9 % — LOW (ref 13–44)
MCHC RBC-ENTMCNC: 24.4 PG — LOW (ref 27–34)
MCHC RBC-ENTMCNC: 30.8 GM/DL — LOW (ref 32–36)
MCV RBC AUTO: 79.2 FL — LOW (ref 80–100)
MONOCYTES # BLD AUTO: 0.32 K/UL — SIGNIFICANT CHANGE UP (ref 0–0.9)
MONOCYTES NFR BLD AUTO: 4.3 % — SIGNIFICANT CHANGE UP (ref 2–14)
NEUTROPHILS # BLD AUTO: 6.44 K/UL — SIGNIFICANT CHANGE UP (ref 1.8–7.4)
NEUTROPHILS NFR BLD AUTO: 85.9 % — HIGH (ref 43–77)
NRBC # BLD: 0 /100 WBCS — SIGNIFICANT CHANGE UP (ref 0–0)
NT-PROBNP SERPL-SCNC: 282 PG/ML — SIGNIFICANT CHANGE UP (ref 0–300)
OTHER CELLS CSF MANUAL: 4 ML/DL — LOW (ref 18–22)
PCO2 BLDV: 46 MMHG — SIGNIFICANT CHANGE UP (ref 35–50)
PH BLDV: 7.34 — LOW (ref 7.35–7.45)
PLATELET # BLD AUTO: 248 K/UL — SIGNIFICANT CHANGE UP (ref 150–400)
PO2 BLDV: 24 MMHG — LOW (ref 25–45)
POTASSIUM BLDV-SCNC: 3.3 MMOL/L — LOW (ref 3.5–5.3)
POTASSIUM SERPL-MCNC: 3.1 MMOL/L — LOW (ref 3.5–5.3)
POTASSIUM SERPL-SCNC: 3.1 MMOL/L — LOW (ref 3.5–5.3)
PROCALCITONIN SERPL-MCNC: 3.4 NG/ML — HIGH (ref 0.02–0.1)
PROT SERPL-MCNC: 8.3 G/DL — SIGNIFICANT CHANGE UP (ref 6–8.3)
PROTHROM AB SERPL-ACNC: 12.5 SEC — SIGNIFICANT CHANGE UP (ref 10–12.9)
RBC # BLD: 3.89 M/UL — SIGNIFICANT CHANGE UP (ref 3.8–5.2)
RBC # FLD: 18.5 % — HIGH (ref 10.3–14.5)
SAO2 % BLDV: 32 % — LOW (ref 67–88)
SARS-COV-2 RNA SPEC QL NAA+PROBE: DETECTED
SODIUM SERPL-SCNC: 135 MMOL/L — SIGNIFICANT CHANGE UP (ref 135–145)
TROPONIN T, HIGH SENSITIVITY RESULT: 34 NG/L — SIGNIFICANT CHANGE UP (ref 0–51)
WBC # BLD: 7.5 K/UL — SIGNIFICANT CHANGE UP (ref 3.8–10.5)
WBC # FLD AUTO: 7.5 K/UL — SIGNIFICANT CHANGE UP (ref 3.8–10.5)

## 2020-03-30 PROCEDURE — 93010 ELECTROCARDIOGRAM REPORT: CPT

## 2020-03-30 PROCEDURE — 99285 EMERGENCY DEPT VISIT HI MDM: CPT

## 2020-03-30 RX ORDER — ACETAMINOPHEN 500 MG
650 TABLET ORAL ONCE
Refills: 0 | Status: COMPLETED | OUTPATIENT
Start: 2020-03-30 | End: 2020-03-30

## 2020-03-30 RX ADMIN — Medication 650 MILLIGRAM(S): at 21:41

## 2020-03-30 NOTE — ED PROVIDER NOTE - ATTENDING CONTRIBUTION TO CARE
78yo female pmh COPD?, HTN, myasthenia gravis p/w dyspnea. Hypoxic to 83% on RA, saturating low 90s on 4L NC. Bibasilar ronchi. Tachycardic no murmur. No calf swelling or pain. Denies fevers, recent travel. Labs, CXR, EKG.

## 2020-03-30 NOTE — ED ADULT NURSE NOTE - OBJECTIVE STATEMENT
79 YOF A&OX3 with pmh of GERD, myasthenia gravis, HTN, HLD, reactive airway, spinal stenosis (s/p laminectomy) hypothyroidism presents to ED for SOB. Pt states has been SOB for 7 days. Patient states has been having chills & cough. pt states is short of breath when she walks around. pt's tachypneic in ED, pt provided 2L NC, pt states has some relief. pt maintaining oxygen saturation above 95%. pt denies chest pain, n/v/d, headaches, dizziness, blurry vision. safety maintained.

## 2020-03-30 NOTE — ED PROVIDER NOTE - OBJECTIVE STATEMENT
79 F PMH Myasthenia gravis on pyridostigmine, reactive airway disease, GERD, HTN, HLD, hypothyroid, spinal stenosis s/p laminectomy/fusion, coming in with day 7 of difficulty breathing and not feeling well.  + chills, no documented fevers at home.  No chest pain.  Shortness of breath worse when she tries to walk around or speak.  Mild runny nose and sore throat.  No abd pain, no nausea or vomiting, no diarrhea.  No one is sick at home.  Spoke with her PCP that started her on azithromycin 3 days ago without improvement.  Has also used home nebs without improvement.

## 2020-03-30 NOTE — ED ADULT NURSE NOTE - NSIMPLEMENTINTERV_GEN_ALL_ED
Implemented All Fall Risk Interventions:  [place] to call system. Call bell, personal items and telephone within reach. Instruct patient to call for assistance. Room bathroom lighting operational. Non-slip footwear when patient is off stretcher. Physically safe environment: no spills, clutter or unnecessary equipment. Stretcher in lowest position, wheels locked, appropriate side rails in place. Provide visual cue, wrist band, yellow gown, etc. Monitor gait and stability. Monitor for mental status changes and reorient to person, place, and time. Review medications for side effects contributing to fall risk. Reinforce activity limits and safety measures with patient and family.

## 2020-03-31 ENCOUNTER — APPOINTMENT (OUTPATIENT)
Dept: ORTHOPEDIC SURGERY | Facility: CLINIC | Age: 80
End: 2020-03-31

## 2020-03-31 ENCOUNTER — APPOINTMENT (OUTPATIENT)
Dept: NEUROLOGY | Facility: CLINIC | Age: 80
End: 2020-03-31

## 2020-03-31 DIAGNOSIS — Z29.9 ENCOUNTER FOR PROPHYLACTIC MEASURES, UNSPECIFIED: ICD-10-CM

## 2020-03-31 DIAGNOSIS — Z71.89 OTHER SPECIFIED COUNSELING: ICD-10-CM

## 2020-03-31 DIAGNOSIS — E03.9 HYPOTHYROIDISM, UNSPECIFIED: ICD-10-CM

## 2020-03-31 DIAGNOSIS — J96.90 RESPIRATORY FAILURE, UNSPECIFIED, UNSPECIFIED WHETHER WITH HYPOXIA OR HYPERCAPNIA: ICD-10-CM

## 2020-03-31 DIAGNOSIS — I10 ESSENTIAL (PRIMARY) HYPERTENSION: ICD-10-CM

## 2020-03-31 DIAGNOSIS — M19.90 UNSPECIFIED OSTEOARTHRITIS, UNSPECIFIED SITE: ICD-10-CM

## 2020-03-31 DIAGNOSIS — G70.00 MYASTHENIA GRAVIS WITHOUT (ACUTE) EXACERBATION: ICD-10-CM

## 2020-03-31 DIAGNOSIS — B34.2 CORONAVIRUS INFECTION, UNSPECIFIED: ICD-10-CM

## 2020-03-31 LAB
CRP SERPL-MCNC: 18.48 MG/DL — HIGH (ref 0–0.4)
FERRITIN SERPL-MCNC: 368 NG/ML — HIGH (ref 15–150)

## 2020-03-31 PROCEDURE — 99223 1ST HOSP IP/OBS HIGH 75: CPT

## 2020-03-31 PROCEDURE — 93010 ELECTROCARDIOGRAM REPORT: CPT

## 2020-03-31 PROCEDURE — 99222 1ST HOSP IP/OBS MODERATE 55: CPT | Mod: GC

## 2020-03-31 PROCEDURE — 71045 X-RAY EXAM CHEST 1 VIEW: CPT | Mod: 26

## 2020-03-31 RX ORDER — LOSARTAN POTASSIUM 100 MG/1
50 TABLET, FILM COATED ORAL DAILY
Refills: 0 | Status: DISCONTINUED | OUTPATIENT
Start: 2020-03-31 | End: 2020-04-14

## 2020-03-31 RX ORDER — LINACLOTIDE 145 UG/1
1 CAPSULE, GELATIN COATED ORAL
Qty: 0 | Refills: 0 | DISCHARGE

## 2020-03-31 RX ORDER — LANOLIN ALCOHOL/MO/W.PET/CERES
5 CREAM (GRAM) TOPICAL AT BEDTIME
Refills: 0 | Status: DISCONTINUED | OUTPATIENT
Start: 2020-03-31 | End: 2020-05-19

## 2020-03-31 RX ORDER — POTASSIUM CHLORIDE 20 MEQ
40 PACKET (EA) ORAL ONCE
Refills: 0 | Status: COMPLETED | OUTPATIENT
Start: 2020-03-31 | End: 2020-03-31

## 2020-03-31 RX ORDER — THIAMINE MONONITRATE (VIT B1) 100 MG
200 TABLET ORAL
Refills: 0 | Status: DISCONTINUED | OUTPATIENT
Start: 2020-03-31 | End: 2020-03-31

## 2020-03-31 RX ORDER — PANTOPRAZOLE SODIUM 20 MG/1
40 TABLET, DELAYED RELEASE ORAL
Refills: 0 | Status: DISCONTINUED | OUTPATIENT
Start: 2020-03-31 | End: 2020-05-19

## 2020-03-31 RX ORDER — PYRIDOSTIGMINE BROMIDE 60 MG/5ML
60 SOLUTION ORAL THREE TIMES A DAY
Refills: 0 | Status: DISCONTINUED | OUTPATIENT
Start: 2020-03-31 | End: 2020-05-19

## 2020-03-31 RX ORDER — ACETAMINOPHEN 500 MG
650 TABLET ORAL EVERY 4 HOURS
Refills: 0 | Status: DISCONTINUED | OUTPATIENT
Start: 2020-03-31 | End: 2020-05-19

## 2020-03-31 RX ORDER — ENOXAPARIN SODIUM 100 MG/ML
40 INJECTION SUBCUTANEOUS DAILY
Refills: 0 | Status: DISCONTINUED | OUTPATIENT
Start: 2020-03-31 | End: 2020-04-20

## 2020-03-31 RX ORDER — ACETAMINOPHEN 500 MG
650 TABLET ORAL ONCE
Refills: 0 | Status: COMPLETED | OUTPATIENT
Start: 2020-03-31 | End: 2020-03-31

## 2020-03-31 RX ORDER — HYDROXYCHLOROQUINE SULFATE 200 MG
TABLET ORAL
Refills: 0 | Status: COMPLETED | OUTPATIENT
Start: 2020-03-31 | End: 2020-04-04

## 2020-03-31 RX ORDER — THIAMINE MONONITRATE (VIT B1) 100 MG
200 TABLET ORAL
Refills: 0 | Status: DISCONTINUED | OUTPATIENT
Start: 2020-03-31 | End: 2020-04-02

## 2020-03-31 RX ORDER — SODIUM CHLORIDE 9 MG/ML
500 INJECTION, SOLUTION INTRAVENOUS ONCE
Refills: 0 | Status: COMPLETED | OUTPATIENT
Start: 2020-03-31 | End: 2020-03-31

## 2020-03-31 RX ORDER — ZINC SULFATE TAB 220 MG (50 MG ZINC EQUIVALENT) 220 (50 ZN) MG
220 TAB ORAL DAILY
Refills: 0 | Status: DISCONTINUED | OUTPATIENT
Start: 2020-03-31 | End: 2020-04-06

## 2020-03-31 RX ORDER — ASCORBIC ACID 60 MG
500 TABLET,CHEWABLE ORAL DAILY
Refills: 0 | Status: DISCONTINUED | OUTPATIENT
Start: 2020-03-31 | End: 2020-04-06

## 2020-03-31 RX ORDER — ALBUTEROL 90 UG/1
2 AEROSOL, METERED ORAL EVERY 4 HOURS
Refills: 0 | Status: DISCONTINUED | OUTPATIENT
Start: 2020-03-31 | End: 2020-05-19

## 2020-03-31 RX ORDER — SENNA PLUS 8.6 MG/1
2 TABLET ORAL AT BEDTIME
Refills: 0 | Status: DISCONTINUED | OUTPATIENT
Start: 2020-03-31 | End: 2020-05-08

## 2020-03-31 RX ORDER — PYRIDOSTIGMINE BROMIDE 60 MG/5ML
180 SOLUTION ORAL DAILY
Refills: 0 | Status: DISCONTINUED | OUTPATIENT
Start: 2020-03-31 | End: 2020-05-07

## 2020-03-31 RX ORDER — HYDROXYCHLOROQUINE SULFATE 200 MG
200 TABLET ORAL EVERY 12 HOURS
Refills: 0 | Status: COMPLETED | OUTPATIENT
Start: 2020-04-01 | End: 2020-04-04

## 2020-03-31 RX ORDER — LEVOTHYROXINE SODIUM 125 MCG
50 TABLET ORAL DAILY
Refills: 0 | Status: DISCONTINUED | OUTPATIENT
Start: 2020-03-31 | End: 2020-05-19

## 2020-03-31 RX ORDER — POLYETHYLENE GLYCOL 3350 17 G/17G
17 POWDER, FOR SOLUTION ORAL DAILY
Refills: 0 | Status: DISCONTINUED | OUTPATIENT
Start: 2020-03-31 | End: 2020-04-23

## 2020-03-31 RX ORDER — CHOLECALCIFEROL (VITAMIN D3) 125 MCG
2000 CAPSULE ORAL DAILY
Refills: 0 | Status: DISCONTINUED | OUTPATIENT
Start: 2020-03-31 | End: 2020-05-19

## 2020-03-31 RX ORDER — ATORVASTATIN CALCIUM 80 MG/1
20 TABLET, FILM COATED ORAL AT BEDTIME
Refills: 0 | Status: DISCONTINUED | OUTPATIENT
Start: 2020-03-31 | End: 2020-05-19

## 2020-03-31 RX ORDER — PIPERACILLIN AND TAZOBACTAM 4; .5 G/20ML; G/20ML
3.38 INJECTION, POWDER, LYOPHILIZED, FOR SOLUTION INTRAVENOUS EVERY 8 HOURS
Refills: 0 | Status: DISCONTINUED | OUTPATIENT
Start: 2020-03-31 | End: 2020-04-06

## 2020-03-31 RX ORDER — HYDROXYCHLOROQUINE SULFATE 200 MG
400 TABLET ORAL EVERY 12 HOURS
Refills: 0 | Status: COMPLETED | OUTPATIENT
Start: 2020-03-31 | End: 2020-03-31

## 2020-03-31 RX ORDER — PIPERACILLIN AND TAZOBACTAM 4; .5 G/20ML; G/20ML
3.38 INJECTION, POWDER, LYOPHILIZED, FOR SOLUTION INTRAVENOUS ONCE
Refills: 0 | Status: COMPLETED | OUTPATIENT
Start: 2020-03-31 | End: 2020-03-31

## 2020-03-31 RX ADMIN — ATORVASTATIN CALCIUM 20 MILLIGRAM(S): 80 TABLET, FILM COATED ORAL at 20:34

## 2020-03-31 RX ADMIN — SODIUM CHLORIDE 1000 MILLILITER(S): 9 INJECTION, SOLUTION INTRAVENOUS at 08:48

## 2020-03-31 RX ADMIN — PIPERACILLIN AND TAZOBACTAM 25 GRAM(S): 4; .5 INJECTION, POWDER, LYOPHILIZED, FOR SOLUTION INTRAVENOUS at 20:34

## 2020-03-31 RX ADMIN — PIPERACILLIN AND TAZOBACTAM 200 GRAM(S): 4; .5 INJECTION, POWDER, LYOPHILIZED, FOR SOLUTION INTRAVENOUS at 09:21

## 2020-03-31 RX ADMIN — ENOXAPARIN SODIUM 40 MILLIGRAM(S): 100 INJECTION SUBCUTANEOUS at 11:47

## 2020-03-31 RX ADMIN — Medication 50 MICROGRAM(S): at 11:48

## 2020-03-31 RX ADMIN — Medication 40 MILLIEQUIVALENT(S): at 06:30

## 2020-03-31 RX ADMIN — ZINC SULFATE TAB 220 MG (50 MG ZINC EQUIVALENT) 220 MILLIGRAM(S): 220 (50 ZN) TAB at 15:31

## 2020-03-31 RX ADMIN — PANTOPRAZOLE SODIUM 40 MILLIGRAM(S): 20 TABLET, DELAYED RELEASE ORAL at 11:49

## 2020-03-31 RX ADMIN — PYRIDOSTIGMINE BROMIDE 60 MILLIGRAM(S): 60 SOLUTION ORAL at 20:34

## 2020-03-31 RX ADMIN — Medication 5 MILLIGRAM(S): at 23:11

## 2020-03-31 RX ADMIN — Medication 2000 UNIT(S): at 11:48

## 2020-03-31 RX ADMIN — PIPERACILLIN AND TAZOBACTAM 25 GRAM(S): 4; .5 INJECTION, POWDER, LYOPHILIZED, FOR SOLUTION INTRAVENOUS at 15:30

## 2020-03-31 RX ADMIN — Medication 102 MILLIGRAM(S): at 17:27

## 2020-03-31 RX ADMIN — Medication 400 MILLIGRAM(S): at 20:34

## 2020-03-31 RX ADMIN — Medication 500 MILLIGRAM(S): at 15:30

## 2020-03-31 RX ADMIN — Medication 650 MILLIGRAM(S): at 06:30

## 2020-03-31 RX ADMIN — Medication 400 MILLIGRAM(S): at 11:48

## 2020-03-31 NOTE — H&P ADULT - PROBLEM SELECTOR PLAN 3
Patient at high risk for MG crisis  -continue pyridostigmine ER 180mg po daily, mestinon 60mg po tid  - check NIFs  - Dr. Fitzgerald consulted, awaiting recs

## 2020-03-31 NOTE — CHART NOTE - NSCHARTNOTEFT_GEN_A_CORE
Discussed with RT Hossein re: need to perform NIF/VC on patient due to hx of MG. Hossein advised me that currently RT is unable to perform NIF and VC on COVID+ patients due to risk of transmission with current equipment. He recommends using clinical judgement to guide therapy/intubation. Will discuss with neurology.

## 2020-03-31 NOTE — ED ADULT NURSE REASSESSMENT NOTE - NS ED NURSE REASSESS COMMENT FT1
pt ambulated with 1 assist to restroom, pt uses cane for ambulation. pt states has leg pain and feeling cold, pt provided hot packs. pt admitted, awaiting bed. safety maintained.

## 2020-03-31 NOTE — PROGRESS NOTE ADULT - PROBLEM SELECTOR PLAN 8
Daughter Patsy is [facility] ([phone]). Pt would want trial of intubation for now. Will continue [facility] pending clinical progress.

## 2020-03-31 NOTE — H&P ADULT - PROBLEM SELECTOR PLAN 2
Severe given o2 requirements  - will start hydroxychloroquine protocol, check EKG for QTC q48 hours  - HQC and mestinon can lower seizure threshold, monitor neuro checks  - supplemental o2 as above. Avoid HFNC, bilevel- continue GOC pending clinical progress  - high dose vit C and thiamine

## 2020-03-31 NOTE — CONSULT NOTE ADULT - SUBJECTIVE AND OBJECTIVE BOX
HPI:  Patient is an 79YF with PMH Myasthenia gravis on Pyridostigmine (180 ER QD, 60 TID) and Solaris, hypothyroidism, reactive airway disease, GERD, HTN, HLD, spinal stenosis s/p laminectomy/fusion who presented with difficulty breathing, chills, congestion, sore throat and general malaise for the past 1 week. Was on Zpack outpatient with no relief. Found to be COVID positive with elevated , CRP 18.4, Ddimer 868, Ferritin 368, , Procal 3.4. She was diagnosed with myasthenia 10/2018 with presenting symptoms of dyspnea, dysphagia and ptosis. She was intubated twice for surgery and tolerated intubation ok. Last myasthenic exacerbation was 2019 with improvement s/p IVIG. She was doing well on mestinon and solaris infusions with minimal disease symptoms. No recorded NIFs/VC at the time of my exam, currently requiring 4L NC at 97% O2.       MEDICATIONS  (STANDING):  atorvastatin 20 milliGRAM(s) Oral at bedtime  cholecalciferol 2000 Unit(s) Oral daily  enoxaparin Injectable 40 milliGRAM(s) SubCutaneous daily  hydroxychloroquine   Oral   hydroxychloroquine 400 milliGRAM(s) Oral every 12 hours  levothyroxine 50 MICROGram(s) Oral daily  losartan 50 milliGRAM(s) Oral daily  pantoprazole    Tablet 40 milliGRAM(s) Oral before breakfast  piperacillin/tazobactam IVPB.. 3.375 Gram(s) IV Intermittent every 8 hours  pyridostigmine 60 milliGRAM(s) Oral three times a day  pyridostigmine  milliGRAM(s) Oral daily  zinc sulfate 220 milliGRAM(s) Oral daily    MEDICATIONS  (PRN):  acetaminophen   Tablet .. 650 milliGRAM(s) Oral every 4 hours PRN Temp greater or equal to 38C (100.4F), Moderate Pain (4 - 6)  ALBUTerol    90 MICROgram(s) HFA Inhaler 2 Puff(s) Inhalation every 4 hours PRN Shortness of Breath and/or Wheezing  polyethylene glycol 3350 17 Gram(s) Oral daily PRN Constipation  senna 2 Tablet(s) Oral at bedtime PRN Constipation    PAST MEDICAL & SURGICAL HISTORY:  Osteoarthritis  Hypothyroid  Reactive airway disease that is not asthma: mild as per pulm note on [employer], patient and daughter denies any inhaler use; thought to be related to myasthena gravis  Near syncope: 2018 negative ENT work up, negative cardiac work ups as per daughter  Aortic stenosis, mild: asper daughter  Diverticulitis large intestine: denies any recent exacerbations  Lumbar stenosis  Myasthenia gravis: dx 10/2018 symptoms: intermittent loss of voice/ weakness, negative ENT studies, now stable on Pyridostigmine  Carpal Tunnel Syndrome Right: release 4/10 and left  Hammertoe Right foot  Kidney Calculi  Lumbar Radiculopathy  GERD (Gastroesophageal Reflux Disease): hiatal hernia  Hyperlipidemia  Genital Ulcer, Female  Hypertension  H/O umbilical hernia repair  History of tonsillectomy  S/P foot surgery  H/O shoulder replacement: b/l   H/O laminectomy: cervical  1998  lumbar ,,,  with fusion  Prolapse, Uterovaginal: s/p sling  S/P Laparoscopic Fundoplication  S/P Hysterectomy Partial:   Bilateral Cataracts: removed  S/P Appendectomy  S/P Cholecystectomy    FAMILY HISTORY:  Family history of bladder cancer: brother   FH: early death: mother  age 50s  Family history of stroke: father     Allergies    IODINE (Rash)  No Known Drug Allergies  shellfish (Rash)    Intolerances        SHx - No smoking, No ETOH, No drug abuse    Review of Systems:  A 10 system ROS was performed and is negative except for those mentioned in HPI      Vital Signs Last 24 Hrs  T(C): 38.9 (31 Mar 2020 09:00), Max: 39.6 (30 Mar 2020 21:10)  T(F): 102.1 (31 Mar 2020 09:00), Max: 103.3 (30 Mar 2020 21:10)  HR: 104 (31 Mar 2020 09:00) (66 - 121)  BP: 126/82 (31 Mar 2020 09:00) (96/59 - 169/83)  BP(mean): 96 (31 Mar 2020 09:00) (96 - 96)  RR: 21 (31 Mar 2020 09:00) (20 - 36)  SpO2: 97% (31 Mar 2020 09:00) (94% - 100%)    General:    Neurological (>12):  MS: Awake, alert, oriented to person, place, situation, time. Normal affect. Follows all commands.    Language: Speech is clear, fluent with good repetition & comprehension     CNs: PERRLA (R = 3mm, L = 3mm). VFF. EOMI no nystagmus, no diplopia. V1-3 intact to LT/pinprick, well developed masseter muscles b/l. No facial asymmetry b/l, full eye closure strength b/l. Hearing grossly normal (rubbing fingers) b/l. Symmetric palate elevation in midline. Gag reflex deferred. Head turning & shoulder shrug intact b/l. Tongue midline, normal movements, no atrophy.      Motor: Normal muscle bulk & tone. No noticeable tremor or seizure. No pronator drift.              Deltoid	Biceps	Triceps	Wrist	Finger ABd	   R	5	5	5	5	5		5 	  L	5	5	5	5	5		5    	H-Flex	H-Ext	H-ABd	H-ADd	K-Flex	K-Ext	D-Flex	P-Flex  R	5	5	5	5	5	5	5	5 	   L	5	5	5	5	5	5	5	5	     Sensation: Intact to LT/PP/Temp/Vibration/Position b/l throughout.     Reflexes:              Biceps(C5)       BR(C6)     Triceps(C7)               Patellar(L4)    Achilles(S1)    Plantar Resp  R	2	          2	             2		        2		    2		Down   L	2	          2	             2		        2		    2		Down     Coordination: intact rapid-alt movements. No dysmetria to FTN/HTS    Gait: deferred            135  |  97  |  35<H>  ----------------------------<  146<H>  3.1<L>   |  21<L>  |  1.29    Ca    9.3      30 Mar 2020 21:48    TPro  8.3  /  Alb  3.8  /  TBili  0.2  /  DBili  x   /  AST  43<H>  /  ALT  18  /  AlkPhos      135  |  97  |  35<H>  ----------------------------<  146<H>  3.1<L>   |  21<L>  |  1.29    Ca    9.3      30 Mar 2020 21:48    TPro  8.3  /  Alb  3.8  /  TBili  0.2  /  DBili  x   /  AST  43<H>  /  ALT  18  /  AlkPhos                            9.5    7.50  )-----------( 248      ( 30 Mar 2020 21:48 )             30.8       Radiology HPI:  Patient is an 79YF with PMH Myasthenia gravis (AchR Ab+) on Pyridostigmine (180 ER QD, 60 TID) and Solaris, hypothyroidism, reactive airway disease, GERD, HTN, HLD, spinal stenosis s/p laminectomy/fusion who presented with difficulty breathing, chills, congestion, sore throat and general malaise for the past 1 week. [facility] Found to be COVID positive with elevated , CRP 18.4, Ddimer 868, Ferritin 368, , Procal 3.4. She was diagnosed with myasthenia 10/2018 with presenting symptoms of dyspnea, dysphagia and ptosis. She was intubated multiple times for surgery and tolerated intubation ok. Last myasthenic exacerbation was 2019 (weakness, dysphagia, SOB) with improvement s/p IVIG. She was doing well on mestinon and solaris infusions with minimal disease symptoms. No recorded NIFs/VC at the time of my exam, currently requiring 4L NC at 97% O2.       MEDICATIONS  (STANDING):  atorvastatin 20 milliGRAM(s) Oral at bedtime  cholecalciferol 2000 Unit(s) Oral daily  enoxaparin Injectable 40 milliGRAM(s) SubCutaneous daily  hydroxychloroquine   Oral   hydroxychloroquine 400 milliGRAM(s) Oral every 12 hours  levothyroxine 50 MICROGram(s) Oral daily  losartan 50 milliGRAM(s) Oral daily  pantoprazole    Tablet 40 milliGRAM(s) Oral before breakfast  piperacillin/tazobactam IVPB.. 3.375 Gram(s) IV Intermittent every 8 hours  pyridostigmine 60 milliGRAM(s) Oral three times a day  pyridostigmine  milliGRAM(s) Oral daily  zinc sulfate 220 milliGRAM(s) Oral daily    MEDICATIONS  (PRN):  acetaminophen   Tablet .. 650 milliGRAM(s) Oral every 4 hours PRN Temp greater or equal to 38C (100.4F), Moderate Pain (4 - 6)  ALBUTerol    90 MICROgram(s) HFA Inhaler 2 Puff(s) Inhalation every 4 hours PRN Shortness of Breath and/or Wheezing  polyethylene glycol 3350 17 Gram(s) Oral daily PRN Constipation  senna 2 Tablet(s) Oral at bedtime PRN Constipation    PAST MEDICAL & SURGICAL HISTORY:  Osteoarthritis  Hypothyroid  Reactive airway disease that is not asthma: mild as per pulm note on [employer], patient and daughter denies any inhaler use; thought to be related to myasthena gravis  Near syncope: 2018 negative ENT work up, negative cardiac work ups as per daughter  Aortic stenosis, mild: asper daughter  Diverticulitis large intestine: denies any recent exacerbations  Lumbar stenosis  Myasthenia gravis: dx 10/2018 symptoms: intermittent loss of voice/ weakness, negative ENT studies, now stable on Pyridostigmine  Carpal Tunnel Syndrome Right: release 4/10 and left  Hammertoe Right foot  Kidney Calculi  Lumbar Radiculopathy  GERD (Gastroesophageal Reflux Disease): hiatal hernia  Hyperlipidemia  Genital Ulcer, Female  Hypertension  H/O umbilical hernia repair  History of tonsillectomy  S/P foot surgery  H/O shoulder replacement: b/l   H/O laminectomy: cervical  1998  lumbar ,,,  with fusion  Prolapse, Uterovaginal: s/p sling  S/P Laparoscopic Fundoplication  S/P Hysterectomy Partial:   Bilateral Cataracts: removed  S/P Appendectomy  S/P Cholecystectomy    FAMILY HISTORY:  Family history of bladder cancer: brother   FH: early death: mother  age 50s  Family history of stroke: father     Allergies    IODINE (Rash)  No Known Drug Allergies  shellfish (Rash)    Intolerances        SHx - No smoking, No ETOH, No drug abuse    Review of Systems:  A 10 system ROS was performed and is negative except for those mentioned in HPI      Vital Signs Last 24 Hrs  T(C): 38.9 (31 Mar 2020 09:00), Max: 39.6 (30 Mar 2020 21:10)  T(F): 102.1 (31 Mar 2020 09:00), Max: 103.3 (30 Mar 2020 21:10)  HR: 104 (31 Mar 2020 09:00) (66 - 121)  BP: 126/82 (31 Mar 2020 09:00) (96/59 - 169/83)  BP(mean): 96 (31 Mar 2020 09:00) (96 - 96)  RR: 21 (31 Mar 2020 09:00) (20 - 36)  SpO2: 97% (31 Mar 2020 09:00) (94% - 100%)    General: Comfortable, on nasal cannula     Neurological (>12): Exam limited due to COVID +  MS: Awake, alert, oriented to person, place, situation, time. Normal affect. Follows all commands.    Language: Speech is clear, no hypophonia, fluent and speaking in full sentences.      CNs: PERRLA (R = 3mm, L = 3mm). VFF. EOMI no nystagmus, no ptosis, no diplopia. V1-3 intact to LT/pinprick, well developed masseter muscles b/l. No facial asymmetry b/l, full eye closure strength b/l. Hearing grossly normal (rubbing fingers) b/l. Symmetric palate elevation in midline. Gag reflex deferred. Head turning & shoulder shrug intact b/l. Tongue midline, normal movements, no atrophy.    Motor: Normal muscle bulk & tone. Generalized weakness throughout, no overt proximal weakness.  Neck flexion/extension 5/5                Deltoid	Biceps	Triceps	   R	4	4	4	5 	  L	4	4	4	5    LE movement limited due to pain in R hip and L knee with 5/5 in DF and PF	     Sensation: Intact to LT throughout    Gait: deferred            135  |  97  |  35<H>  ----------------------------<  146<H>  3.1<L>   |  21<L>  |  1.29    Ca    9.3      30 Mar 2020 21:48    TPro  8.3  /  Alb  3.8  /  TBili  0.2  /  DBili  x   /  AST  43<H>  /  ALT  18  /  AlkPhos  66      135  |  97  |  35<H>  ----------------------------<  146<H>  3.1<L>   |  21<L>  |  1.29    Ca    9.3      30 Mar 2020 21:48    TPro  8.3  /  Alb  3.8  /  TBili  0.2  /  DBili  x   /  AST  43<H>  /  ALT  18  /  AlkPhos  66                            9.5    7.50  )-----------( 248      ( 30 Mar 2020 21:48 )             30.8

## 2020-03-31 NOTE — CONSULT NOTE ADULT - ATTENDING COMMENTS
78 y/o woman with hx of MG on mestinon and Soliris, p/w cough and SOB, found to be COVID +. Neurology consulted for possible myasthenia exacerbation due to viral infection.   Pt is current speaking normally, no shortness of breath, no tachypnea. She denies any difficulty swallowing, voice changes, double vision, weakness which she can get with her myasthenia.   She is full strength in neck flexion and extension, no ptosis, EOMI, no facial weakness, she has generalized weakness (somewhat poor effort), no drift in the arms and legs.     Pt does not appear to be having any exacerbation of her Myasthenia Gravis at this time.   -Would continue to monitor her respiratory status closely, NIF/VC q8hrs of possible.  Watch for neck weakness, change in voice as signs of possible impending respiratory compromise.   -Continue with home dose of Mestinon, IF PT IS INTUBATED, change mestinon to IV 2mg TID.   -Can hold off prednisone for now given COVID19 infection, unless clinically warranted for treatment.  - Care as per primary team for viral infection  - PT  - Avoid neuromuscular blocking agents if sedation needed.

## 2020-03-31 NOTE — H&P ADULT - HISTORY OF PRESENT ILLNESS
HPI gleaned from chart. 79 F PMH Myasthenia gravis on pyridostigmine, reactive airway disease, GERD, HTN, HLD, hypothyroid, spinal stenosis s/p laminectomy/fusion, coming in with day 7 of difficulty breathing and not feeling well.  + chills, no documented fevers at home.  No chest pain.  Shortness of breath worse when she tries to walk around or speak.  Mild runny nose and sore throat.  No abd pain, no nausea or vomiting, no diarrhea.  No one is sick at home. Per daughter patient had been started on z-pack as OP with minimal relief. Has had prior intubations for elective surgeries but no reported emergency intubations for MG crises. Per daughter patient would want trial of intubation.    In ED: 100.2, 119/169/53, 36, 98 2L NC    given KCl 40meq po x 1, tylenol x 2

## 2020-03-31 NOTE — H&P ADULT - ASSESSMENT
79f hx MG on pyridostigmine, HTN, hypothyroidism, recent hip replacement presenting with acute hypoxic respiratory failure and severe sepsis 2/2 r sided bacterial PNA aspiration vs. community acquired and concomitant severe COVID19 PNA, now on supplemental o2

## 2020-03-31 NOTE — H&P ADULT - PROBLEM SELECTOR PLAN 6
on hyzaar at home. Hold HCTZ component for now  - continue losartan 50mg po daily with hold parameters

## 2020-03-31 NOTE — H&P ADULT - NSHPLABSRESULTS_GEN_ALL_CORE
.  LABS:                         9.5    7.50  )-----------( 248      ( 30 Mar 2020 21:48 )             30.8     03-30    135  |  97  |  35<H>  ----------------------------<  146<H>  3.1<L>   |  21<L>  |  1.29    Ca    9.3      30 Mar 2020 21:48    TPro  8.3  /  Alb  3.8  /  TBili  0.2  /  DBili  x   /  AST  43<H>  /  ALT  18  /  AlkPhos  66  03-30    PT/INR - ( 30 Mar 2020 21:48 )   PT: 12.5 sec;   INR: 1.08 ratio         PTT - ( 30 Mar 2020 21:48 )  PTT:31.2 sec    Serum Pro-Brain Natriuretic Peptide: 282 pg/mL (03-30 @ 21:48)        RADIOLOGY, EKG & ADDITIONAL TESTS: Reviewed.     CXR personally reviewed- appearance of RLL consolidative opacity and B/L reticularopacities  noted to be covid19 positive

## 2020-03-31 NOTE — H&P ADULT - PROBLEM SELECTOR PLAN 7
Daughter Patsy is HCP. Pt would want trial of intubation for now. Will continue GOC pending clinical progress.

## 2020-03-31 NOTE — ED ADULT NURSE REASSESSMENT NOTE - NS ED NURSE REASSESS COMMENT FT1
BRIAN Dillon made aware pt requires tylenol. awaiting order. pt admitted, awaiting bed. safety maintained.

## 2020-03-31 NOTE — PROGRESS NOTE ADULT - PROBLEM SELECTOR PLAN 1
2/2 R-sided bacterial pna given consolidation seen on CXR and elevated procalcitonin 3.40 with concomitant COVID19 viral pneumonia, at high risk for MG exacerbation  - zosyn 3.375gm iv q8 hours for aspiration pna vs. HAP. Low suspicion for MRSA pna, will hold off on vanc for now  - management of COVID as below  - NC --> NRB. Avoid HFNC and bilevel. MICU consult and intubation if needs higher respiratory support.  - per daughter (HCP), patient would want trial of intubation, however will need to continue addressing GOC pending clinical progress 2/2 R-sided bacterial pna given consolidation seen on CXR and elevated procalcitonin 3.40 with concomitant COVID19 viral pneumonia, at high risk for MG exacerbation, on nasal cannula, while not hypoxic, respiratory rate in the 30s when arrived to ED  - zosyn 3.375gm iv q8 hours for aspiration pna vs. HAP. Low suspicion for MRSA pna, will hold off on vanc for now  - management of COVID as below  - per daughter (HCP), patient would want trial of intubation, however will need to continue addressing GOC pending clinical progress
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
## 2020-03-31 NOTE — H&P ADULT - PROBLEM SELECTOR PLAN 1
2/2 R-sided bacterial pna given consolidation seen on CXR and elevated procalcitonin 3.40 with concomitant COVID19 viral pneumonia given positive swab and B/L GGOs also seen on CXR, at high risk for MG exacerbation  - zosyn 3.375gm iv q8 hours for aspiration pna vs. HAP. Low suspicion for MRSA pna, will hold off on vanc for now  - management of COVID as below  - NC --> NRB. Avoid HFNC and bilevel  - per daughter (HCP), patient would want trial of intubation, however will need to continue addressing GOC pending clinical progress 2/2 R-sided bacterial pna given consolidation seen on CXR and elevated procalcitonin 3.40 with concomitant COVID19 viral pneumonia given positive swab and B/L GGOs also seen on CXR, at high risk for MG exacerbation  - zosyn 3.375gm iv q8 hours for aspiration pna vs. HAP. Low suspicion for MRSA pna, will hold off on vanc for now  - management of COVID as below  - NC --> NRB. Avoid HFNC and bilevel. MICU consult and intubation if needs higher respiratory support.  - per daughter (HCP), patient would want trial of intubation, however will need to continue addressing GOC pending clinical progress

## 2020-03-31 NOTE — CONSULT NOTE ADULT - ASSESSMENT
Recommend:      Seen and discussed with Neuro attending, Dr. Espino     *INCOMPLETE* 79YF with PMH Myasthenia gravis (AchR Ab+) on Pyridostigmine (180 ER QD, 60 TID) and Solaris, hypothyroidism, reactive airway disease, GERD, HTN, HLD, spinal stenosis s/p laminectomy/fusion currently admitted for COVID infection +/- bacterial pneumonia requiring supplemental O2. On exam, patient does not have any evidence of proximal muscle weakness, hoarseness, ptosis or diplopia. She also denies dysphagia. Patient was recently hospitalized in Aug 2019 for myasthenic exacerbation requiring IVIG and denies symptoms that she felt at that time. She is currently comfortable on 4L NC however no NIFs/VC have been documented at the time of my exam. Do not suspect that she is currently in myasthenic exacerbation and therefor there is no indication for IVIG at this time. Will need to monitor her respiratory status closely as she is at risk for decompensating.      Recommend:  - NIFs/VC Q8H for now (do not rely on O2 sat or ABGs to assess respiratory status)  - Avoid abx such as: aminoglycosides, erythromycin, azithromycin, tetracylcine's, cipro, clindamycin. Avoid: dilantin, lithium, beta-blockers, procainamide, quinidine, Mg  - continue home mestinon 180mg ER QD, 60mg TID   - unable to do Solaris infusion as inpatient, will have this done as outpatient upon discharge  - unclear if patient is on steroids at home, would hold for now given COVID + and defer to primary team for management and add if necessary for COVID treatment   - please have patient follow up with Dr. Fitzgerald at [address] [place] [address] [phone] upon discharge     Seen and discussed with Neuro attending, Dr. Espino

## 2020-03-31 NOTE — PROGRESS NOTE ADULT - PROBLEM SELECTOR PLAN 3
Patient at high risk for MG crisis  -continue pyridostigmine ER 180mg po daily, mestinon (pyridostigmine) 60mg po tid  - unable to get NIFs and VC q8H given COVID +, will continue to monitor clinically and call neurology if deterioration  - neurology following Patient at high risk for MG crisis  -continue pyridostigmine ER 180mg po daily, mestinon (pyridostigmine) 60mg po tid  - unable to get NIFs and VC q8H given COVID +, will monitor q4 neuro checks specifically looking at neck flexion, voice quality, difficulty swallowing, and call neurology if deterioration  - neurology following

## 2020-03-31 NOTE — PROGRESS NOTE ADULT - SUBJECTIVE AND OBJECTIVE BOX
PROGRESS NOTE:     Patient is a 79y old  Female who presents with a chief complaint of Shortness of breath, fevers (31 Mar 2020 10:42)      SUBJECTIVE / OVERNIGHT EVENTS: Pt seen and examined. No acute overnight events. Pt reported no fever, chills, CP, SOB, abdominal pain, N/V, urinary or bowel issues, or new joint aches.     ADDITIONAL REVIEW OF SYSTEMS: Otherwise negative    MEDICATIONS  (STANDING):  atorvastatin 20 milliGRAM(s) Oral at bedtime  cholecalciferol 2000 Unit(s) Oral daily  enoxaparin Injectable 40 milliGRAM(s) SubCutaneous daily  hydroxychloroquine   Oral   hydroxychloroquine 400 milliGRAM(s) Oral every 12 hours  levothyroxine 50 MICROGram(s) Oral daily  losartan 50 milliGRAM(s) Oral daily  pantoprazole    Tablet 40 milliGRAM(s) Oral before breakfast  piperacillin/tazobactam IVPB.. 3.375 Gram(s) IV Intermittent every 8 hours  pyridostigmine 60 milliGRAM(s) Oral three times a day  pyridostigmine  milliGRAM(s) Oral daily  zinc sulfate 220 milliGRAM(s) Oral daily    MEDICATIONS  (PRN):  acetaminophen   Tablet .. 650 milliGRAM(s) Oral every 4 hours PRN Temp greater or equal to 38C (100.4F), Moderate Pain (4 - 6)  ALBUTerol    90 MICROgram(s) HFA Inhaler 2 Puff(s) Inhalation every 4 hours PRN Shortness of Breath and/or Wheezing  polyethylene glycol 3350 17 Gram(s) Oral daily PRN Constipation  senna 2 Tablet(s) Oral at bedtime PRN Constipation      CAPILLARY BLOOD GLUCOSE        I&O's Summary      PHYSICAL EXAM:  Vital Signs Last 24 Hrs  T(C): 37.2 (31 Mar 2020 13:06), Max: 39.6 (30 Mar 2020 21:10)  T(F): 99 (31 Mar 2020 13:06), Max: 103.3 (30 Mar 2020 21:10)  HR: 86 (31 Mar 2020 13:06) (66 - 121)  BP: 112/62 (31 Mar 2020 13:06) (96/59 - 169/83)  BP(mean): 96 (31 Mar 2020 09:00) (96 - 96)  RR: 22 (31 Mar 2020 13:06) (20 - 36)  SpO2: 97% (31 Mar 2020 13:06) (94% - 100%)    CONSTITUTIONAL: NAD, well-developed  RESPIRATORY: Normal respiratory effort; lungs are clear to auscultation bilaterally  CARDIOVASCULAR: Regular rate and rhythm, normal S1 and S2, no murmur/rub/gallop  ABDOMEN: Nontender to palpation, normoactive bowel sounds, no rebound/guarding; No hepatosplenomegaly  EXTREMITIES: No lower extremity edema; Peripheral pulses are 2+ bilaterally  MUSCLOSKELETAL: no clubbing or cyanosis of digits; no joint swelling or tenderness to palpation  PSYCH: A+O to person, place, and time; affect appropriate    LABS:                        9.5    7.50  )-----------( 248      ( 30 Mar 2020 21:48 )             30.8     03-30    135  |  97  |  35<H>  ----------------------------<  146<H>  3.1<L>   |  21<L>  |  1.29    Ca    9.3      30 Mar 2020 21:48    TPro  8.3  /  Alb  3.8  /  TBili  0.2  /  DBili  x   /  AST  43<H>  /  ALT  18  /  AlkPhos  66  03-30    PT/INR - ( 30 Mar 2020 21:48 )   PT: 12.5 sec;   INR: 1.08 ratio         PTT - ( 30 Mar 2020 21:48 )  PTT:31.2 sec            RADIOLOGY & ADDITIONAL TESTS:  Results Reviewed:   Imaging Personally Reviewed:  Electrocardiogram Personally Reviewed:    COORDINATION OF CARE:  Care Discussed with Consultants/Other Providers [Y/N]:  Prior or Outpatient Records Reviewed [Y/N]:

## 2020-03-31 NOTE — PROGRESS NOTE ADULT - ASSESSMENT
79F hx MG on pyridostigmine, HTN, hypothyroidism, recent hip replacement presenting with acute hypoxic respiratory failure and severe sepsis 2/2 r. sided bacterial PNA aspiration vs. HAP and concomitant severe COVID19 PNA, now on supplemental O2

## 2020-03-31 NOTE — PROGRESS NOTE ADULT - PROBLEM SELECTOR PLAN 2
on nasal cannula, while not hypoxic, respiratory rate in the 30s when arrived to ED  - will start hydroxychloroquine protocol, check EKG for QTC daily  - hydroxychloroquine and pyridostigmine can lower seizure threshold, monitor neuro checks  - supplemental o2 as above  - high dose vit C and thiamine on nasal cannula, while not hypoxic, respiratory rate in the 30s when arrived to ED  - will start hydroxychloroquine protocol, check EKG for QTC daily  - hydroxychloroquine and pyridostigmine can lower seizure threshold, monitor neuro checks q4H  - supplemental o2 as above  - high dose vit C and thiamine

## 2020-04-01 LAB
ALBUMIN SERPL ELPH-MCNC: 2.8 G/DL — LOW (ref 3.3–5)
ALP SERPL-CCNC: 46 U/L — SIGNIFICANT CHANGE UP (ref 40–120)
ALT FLD-CCNC: 12 U/L — SIGNIFICANT CHANGE UP (ref 10–45)
ANION GAP SERPL CALC-SCNC: 12 MMOL/L — SIGNIFICANT CHANGE UP (ref 5–17)
AST SERPL-CCNC: 35 U/L — SIGNIFICANT CHANGE UP (ref 10–40)
BASOPHILS # BLD AUTO: 0.01 K/UL — SIGNIFICANT CHANGE UP (ref 0–0.2)
BASOPHILS NFR BLD AUTO: 0.2 % — SIGNIFICANT CHANGE UP (ref 0–2)
BILIRUB SERPL-MCNC: 0.2 MG/DL — SIGNIFICANT CHANGE UP (ref 0.2–1.2)
BUN SERPL-MCNC: 20 MG/DL — SIGNIFICANT CHANGE UP (ref 7–23)
CALCIUM SERPL-MCNC: 8.9 MG/DL — SIGNIFICANT CHANGE UP (ref 8.4–10.5)
CHLORIDE SERPL-SCNC: 103 MMOL/L — SIGNIFICANT CHANGE UP (ref 96–108)
CO2 SERPL-SCNC: 23 MMOL/L — SIGNIFICANT CHANGE UP (ref 22–31)
CREAT SERPL-MCNC: 1.05 MG/DL — SIGNIFICANT CHANGE UP (ref 0.5–1.3)
CRP SERPL-MCNC: 18.51 MG/DL — HIGH (ref 0–0.4)
EOSINOPHIL # BLD AUTO: 0.02 K/UL — SIGNIFICANT CHANGE UP (ref 0–0.5)
EOSINOPHIL NFR BLD AUTO: 0.3 % — SIGNIFICANT CHANGE UP (ref 0–6)
FERRITIN SERPL-MCNC: 401 NG/ML — HIGH (ref 15–150)
GLUCOSE SERPL-MCNC: 107 MG/DL — HIGH (ref 70–99)
HCT VFR BLD CALC: 27.1 % — LOW (ref 34.5–45)
HGB BLD-MCNC: 8 G/DL — LOW (ref 11.5–15.5)
IMM GRANULOCYTES NFR BLD AUTO: 1.7 % — HIGH (ref 0–1.5)
LACTATE BLDV-MCNC: 1.6 MMOL/L — SIGNIFICANT CHANGE UP (ref 0.7–2)
LYMPHOCYTES # BLD AUTO: 0.84 K/UL — LOW (ref 1–3.3)
LYMPHOCYTES # BLD AUTO: 14 % — SIGNIFICANT CHANGE UP (ref 13–44)
MAGNESIUM SERPL-MCNC: 2 MG/DL — SIGNIFICANT CHANGE UP (ref 1.6–2.6)
MCHC RBC-ENTMCNC: 23.8 PG — LOW (ref 27–34)
MCHC RBC-ENTMCNC: 29.5 GM/DL — LOW (ref 32–36)
MCV RBC AUTO: 80.7 FL — SIGNIFICANT CHANGE UP (ref 80–100)
MONOCYTES # BLD AUTO: 0.48 K/UL — SIGNIFICANT CHANGE UP (ref 0–0.9)
MONOCYTES NFR BLD AUTO: 8 % — SIGNIFICANT CHANGE UP (ref 2–14)
NEUTROPHILS # BLD AUTO: 4.53 K/UL — SIGNIFICANT CHANGE UP (ref 1.8–7.4)
NEUTROPHILS NFR BLD AUTO: 75.8 % — SIGNIFICANT CHANGE UP (ref 43–77)
NRBC # BLD: 0 /100 WBCS — SIGNIFICANT CHANGE UP (ref 0–0)
PHOSPHATE SERPL-MCNC: 2.7 MG/DL — SIGNIFICANT CHANGE UP (ref 2.5–4.5)
PLATELET # BLD AUTO: 209 K/UL — SIGNIFICANT CHANGE UP (ref 150–400)
POTASSIUM SERPL-MCNC: 3.1 MMOL/L — LOW (ref 3.5–5.3)
POTASSIUM SERPL-SCNC: 3.1 MMOL/L — LOW (ref 3.5–5.3)
PROCALCITONIN SERPL-MCNC: 3.6 NG/ML — HIGH (ref 0.02–0.1)
PROT SERPL-MCNC: 6.8 G/DL — SIGNIFICANT CHANGE UP (ref 6–8.3)
RBC # BLD: 3.36 M/UL — LOW (ref 3.8–5.2)
RBC # FLD: 19 % — HIGH (ref 10.3–14.5)
SODIUM SERPL-SCNC: 138 MMOL/L — SIGNIFICANT CHANGE UP (ref 135–145)
WBC # BLD: 5.98 K/UL — SIGNIFICANT CHANGE UP (ref 3.8–10.5)
WBC # FLD AUTO: 5.98 K/UL — SIGNIFICANT CHANGE UP (ref 3.8–10.5)

## 2020-04-01 PROCEDURE — 99222 1ST HOSP IP/OBS MODERATE 55: CPT | Mod: CS

## 2020-04-01 RX ORDER — POTASSIUM CHLORIDE 20 MEQ
40 PACKET (EA) ORAL EVERY 4 HOURS
Refills: 0 | Status: COMPLETED | OUTPATIENT
Start: 2020-04-01 | End: 2020-04-01

## 2020-04-01 RX ORDER — POTASSIUM CHLORIDE 20 MEQ
40 PACKET (EA) ORAL ONCE
Refills: 0 | Status: DISCONTINUED | OUTPATIENT
Start: 2020-04-01 | End: 2020-04-01

## 2020-04-01 RX ORDER — ACETAMINOPHEN 500 MG
650 TABLET ORAL ONCE
Refills: 0 | Status: COMPLETED | OUTPATIENT
Start: 2020-04-01 | End: 2020-04-01

## 2020-04-01 RX ORDER — ECULIZUMAB 300 MG/30ML
1200 INJECTION, SOLUTION, CONCENTRATE INTRAVENOUS ONCE
Refills: 0 | Status: COMPLETED | OUTPATIENT
Start: 2020-04-01 | End: 2020-04-01

## 2020-04-01 RX ADMIN — PYRIDOSTIGMINE BROMIDE 60 MILLIGRAM(S): 60 SOLUTION ORAL at 05:28

## 2020-04-01 RX ADMIN — ENOXAPARIN SODIUM 40 MILLIGRAM(S): 100 INJECTION SUBCUTANEOUS at 10:55

## 2020-04-01 RX ADMIN — Medication 40 MILLIEQUIVALENT(S): at 18:20

## 2020-04-01 RX ADMIN — Medication 200 MILLIGRAM(S): at 18:20

## 2020-04-01 RX ADMIN — Medication 500 MILLIGRAM(S): at 13:47

## 2020-04-01 RX ADMIN — ALBUTEROL 2 PUFF(S): 90 AEROSOL, METERED ORAL at 10:56

## 2020-04-01 RX ADMIN — Medication 102 MILLIGRAM(S): at 18:20

## 2020-04-01 RX ADMIN — PIPERACILLIN AND TAZOBACTAM 25 GRAM(S): 4; .5 INJECTION, POWDER, LYOPHILIZED, FOR SOLUTION INTRAVENOUS at 05:28

## 2020-04-01 RX ADMIN — PIPERACILLIN AND TAZOBACTAM 25 GRAM(S): 4; .5 INJECTION, POWDER, LYOPHILIZED, FOR SOLUTION INTRAVENOUS at 13:51

## 2020-04-01 RX ADMIN — Medication 5 MILLIGRAM(S): at 21:03

## 2020-04-01 RX ADMIN — Medication 260 MILLIGRAM(S): at 04:29

## 2020-04-01 RX ADMIN — ECULIZUMAB 617.14 MILLIGRAM(S): 300 INJECTION, SOLUTION, CONCENTRATE INTRAVENOUS at 13:47

## 2020-04-01 RX ADMIN — Medication 650 MILLIGRAM(S): at 10:56

## 2020-04-01 RX ADMIN — ALBUTEROL 2 PUFF(S): 90 AEROSOL, METERED ORAL at 21:03

## 2020-04-01 RX ADMIN — Medication 50 MICROGRAM(S): at 05:25

## 2020-04-01 RX ADMIN — PYRIDOSTIGMINE BROMIDE 60 MILLIGRAM(S): 60 SOLUTION ORAL at 13:52

## 2020-04-01 RX ADMIN — Medication 102 MILLIGRAM(S): at 05:29

## 2020-04-01 RX ADMIN — LOSARTAN POTASSIUM 50 MILLIGRAM(S): 100 TABLET, FILM COATED ORAL at 05:25

## 2020-04-01 RX ADMIN — PYRIDOSTIGMINE BROMIDE 60 MILLIGRAM(S): 60 SOLUTION ORAL at 21:03

## 2020-04-01 RX ADMIN — PIPERACILLIN AND TAZOBACTAM 25 GRAM(S): 4; .5 INJECTION, POWDER, LYOPHILIZED, FOR SOLUTION INTRAVENOUS at 21:03

## 2020-04-01 RX ADMIN — Medication 40 MILLIEQUIVALENT(S): at 13:52

## 2020-04-01 RX ADMIN — ZINC SULFATE TAB 220 MG (50 MG ZINC EQUIVALENT) 220 MILLIGRAM(S): 220 (50 ZN) TAB at 13:48

## 2020-04-01 RX ADMIN — Medication 2000 UNIT(S): at 13:51

## 2020-04-01 RX ADMIN — PANTOPRAZOLE SODIUM 40 MILLIGRAM(S): 20 TABLET, DELAYED RELEASE ORAL at 05:28

## 2020-04-01 RX ADMIN — PYRIDOSTIGMINE BROMIDE 180 MILLIGRAM(S): 60 SOLUTION ORAL at 10:58

## 2020-04-01 RX ADMIN — Medication 200 MILLIGRAM(S): at 05:24

## 2020-04-01 RX ADMIN — ATORVASTATIN CALCIUM 20 MILLIGRAM(S): 80 TABLET, FILM COATED ORAL at 21:03

## 2020-04-01 RX ADMIN — Medication 650 MILLIGRAM(S): at 15:45

## 2020-04-01 NOTE — PROGRESS NOTE ADULT - SUBJECTIVE AND OBJECTIVE BOX
PROGRESS NOTE:     Patient is a 79y old  Female who presents with a chief complaint of Shortness of breath, fevers (31 Mar 2020 14:33)      SUBJECTIVE / OVERNIGHT EVENTS: Pt seen and examined. No acute overnight events. Pt reported no fever, chills, CP, SOB, abdominal pain, N/V, urinary or bowel issues, or new joint aches.     ADDITIONAL REVIEW OF SYSTEMS: Otherwise negative    MEDICATIONS  (STANDING):  ascorbic acid 500 milliGRAM(s) Oral daily  atorvastatin 20 milliGRAM(s) Oral at bedtime  cholecalciferol 2000 Unit(s) Oral daily  enoxaparin Injectable 40 milliGRAM(s) SubCutaneous daily  hydroxychloroquine   Oral   hydroxychloroquine 200 milliGRAM(s) Oral every 12 hours  levothyroxine 50 MICROGram(s) Oral daily  losartan 50 milliGRAM(s) Oral daily  melatonin 5 milliGRAM(s) Oral at bedtime  pantoprazole    Tablet 40 milliGRAM(s) Oral before breakfast  piperacillin/tazobactam IVPB.. 3.375 Gram(s) IV Intermittent every 8 hours  pyridostigmine 60 milliGRAM(s) Oral three times a day  pyridostigmine  milliGRAM(s) Oral daily  thiamine IVPB 200 milliGRAM(s) IV Intermittent <User Schedule>  zinc sulfate 220 milliGRAM(s) Oral daily    MEDICATIONS  (PRN):  acetaminophen   Tablet .. 650 milliGRAM(s) Oral every 4 hours PRN Temp greater or equal to 38C (100.4F), Moderate Pain (4 - 6)  ALBUTerol    90 MICROgram(s) HFA Inhaler 2 Puff(s) Inhalation every 4 hours PRN Shortness of Breath and/or Wheezing  polyethylene glycol 3350 17 Gram(s) Oral daily PRN Constipation  senna 2 Tablet(s) Oral at bedtime PRN Constipation      CAPILLARY BLOOD GLUCOSE        I&O's Summary    31 Mar 2020 07:01  -  01 Apr 2020 07:00  --------------------------------------------------------  IN: 465 mL / OUT: 200 mL / NET: 265 mL        PHYSICAL EXAM:  Vital Signs Last 24 Hrs  T(C): 36.8 (01 Apr 2020 04:53), Max: 38.9 (31 Mar 2020 09:00)  T(F): 98.2 (01 Apr 2020 04:53), Max: 102.1 (31 Mar 2020 09:00)  HR: 87 (01 Apr 2020 04:53) (80 - 104)  BP: 112/70 (01 Apr 2020 04:53) (96/59 - 126/82)  BP(mean): 96 (31 Mar 2020 09:00) (96 - 96)  RR: 19 (01 Apr 2020 04:53) (19 - 22)  SpO2: 100% (01 Apr 2020 04:53) (96% - 100%)    CONSTITUTIONAL: NAD, well-developed  RESPIRATORY: Normal respiratory effort; lungs are clear to auscultation bilaterally  CARDIOVASCULAR: Regular rate and rhythm, normal S1 and S2, no murmur/rub/gallop  ABDOMEN: Nontender to palpation, normoactive bowel sounds, no rebound/guarding; No hepatosplenomegaly  EXTREMITIES: No lower extremity edema; Peripheral pulses are 2+ bilaterally  MUSCLOSKELETAL: no clubbing or cyanosis of digits; no joint swelling or tenderness to palpation  PSYCH: A+O to person, place, and time; affect appropriate    LABS:                        9.5    7.50  )-----------( 248      ( 30 Mar 2020 21:48 )             30.8     03-30    135  |  97  |  35<H>  ----------------------------<  146<H>  3.1<L>   |  21<L>  |  1.29    Ca    9.3      30 Mar 2020 21:48    TPro  8.3  /  Alb  3.8  /  TBili  0.2  /  DBili  x   /  AST  43<H>  /  ALT  18  /  AlkPhos  66  03-30    PT/INR - ( 30 Mar 2020 21:48 )   PT: 12.5 sec;   INR: 1.08 ratio         PTT - ( 30 Mar 2020 21:48 )  PTT:31.2 sec          Culture - Blood (collected 31 Mar 2020 03:13)  Source: .Blood Blood-Peripheral  Preliminary Report (01 Apr 2020 04:01):    No growth to date.    Culture - Blood (collected 31 Mar 2020 03:13)  Source: .Blood Blood-Peripheral  Preliminary Report (01 Apr 2020 04:01):    No growth to date.        RADIOLOGY & ADDITIONAL TESTS:  Results Reviewed:   Imaging Personally Reviewed:  Electrocardiogram Personally Reviewed:    COORDINATION OF CARE:  Care Discussed with Consultants/Other Providers [Y/N]:  Prior or Outpatient Records Reviewed [Y/N]: PROGRESS NOTE:     Patient is a 79y old  Female who presents with a chief complaint of Shortness of breath, fevers (31 Mar 2020 14:33)      SUBJECTIVE / OVERNIGHT EVENTS: Pt seen and examined. No acute overnight events. Pt reported no fever, chills, CP, SOB, abdominal pain, N/V, urinary or bowel issues, or new joint aches.     ADDITIONAL REVIEW OF SYSTEMS: Otherwise negative    MEDICATIONS  (STANDING):  ascorbic acid 500 milliGRAM(s) Oral daily  atorvastatin 20 milliGRAM(s) Oral at bedtime  cholecalciferol 2000 Unit(s) Oral daily  enoxaparin Injectable 40 milliGRAM(s) SubCutaneous daily  hydroxychloroquine   Oral   hydroxychloroquine 200 milliGRAM(s) Oral every 12 hours  levothyroxine 50 MICROGram(s) Oral daily  losartan 50 milliGRAM(s) Oral daily  melatonin 5 milliGRAM(s) Oral at bedtime  pantoprazole    Tablet 40 milliGRAM(s) Oral before breakfast  piperacillin/tazobactam IVPB.. 3.375 Gram(s) IV Intermittent every 8 hours  pyridostigmine 60 milliGRAM(s) Oral three times a day  pyridostigmine  milliGRAM(s) Oral daily  thiamine IVPB 200 milliGRAM(s) IV Intermittent <User Schedule>  zinc sulfate 220 milliGRAM(s) Oral daily    MEDICATIONS  (PRN):  acetaminophen   Tablet .. 650 milliGRAM(s) Oral every 4 hours PRN Temp greater or equal to 38C (100.4F), Moderate Pain (4 - 6)  ALBUTerol    90 MICROgram(s) HFA Inhaler 2 Puff(s) Inhalation every 4 hours PRN Shortness of Breath and/or Wheezing  polyethylene glycol 3350 17 Gram(s) Oral daily PRN Constipation  senna 2 Tablet(s) Oral at bedtime PRN Constipation      CAPILLARY BLOOD GLUCOSE        I&O's Summary    31 Mar 2020 07:01  -  01 Apr 2020 07:00  --------------------------------------------------------  IN: 465 mL / OUT: 200 mL / NET: 265 mL        PHYSICAL EXAM:  Vital Signs Last 24 Hrs  T(C): 36.8 (01 Apr 2020 04:53), Max: 38.9 (31 Mar 2020 09:00)  T(F): 98.2 (01 Apr 2020 04:53), Max: 102.1 (31 Mar 2020 09:00)  HR: 87 (01 Apr 2020 04:53) (80 - 104)  BP: 112/70 (01 Apr 2020 04:53) (96/59 - 126/82)  BP(mean): 96 (31 Mar 2020 09:00) (96 - 96)  RR: 19 (01 Apr 2020 04:53) (19 - 22)  SpO2: 100% (01 Apr 2020 04:53) (96% - 100%)    Constitutional: Elderly woman seen lying in bed in NAD  Eyes: Sclera clear  ENMT: MMM, NC in place  Neck: Supple, strength 5/5 neck flexion  Respiratory: CTAB, no rales, rhonchi or wheezes  Cardiovascular: RRR, no m/g/r  Gastrointestinal: +BS, soft, NT/ND  Neurological: AAOx3  Psychiatric: Appropriate affect and mood  Extremities: No LE edema    LABS:                        9.5    7.50  )-----------( 248      ( 30 Mar 2020 21:48 )             30.8     03-30    135  |  97  |  35<H>  ----------------------------<  146<H>  3.1<L>   |  21<L>  |  1.29    Ca    9.3      30 Mar 2020 21:48    TPro  8.3  /  Alb  3.8  /  TBili  0.2  /  DBili  x   /  AST  43<H>  /  ALT  18  /  AlkPhos  66  03-30    PT/INR - ( 30 Mar 2020 21:48 )   PT: 12.5 sec;   INR: 1.08 ratio         PTT - ( 30 Mar 2020 21:48 )  PTT:31.2 sec          Culture - Blood (collected 31 Mar 2020 03:13)  Source: .Blood Blood-Peripheral  Preliminary Report (01 Apr 2020 04:01):    No growth to date.    Culture - Blood (collected 31 Mar 2020 03:13)  Source: .Blood Blood-Peripheral  Preliminary Report (01 Apr 2020 04:01):    No growth to date.        RADIOLOGY & ADDITIONAL TESTS:  Results Reviewed:   Imaging Personally Reviewed:  Electrocardiogram Personally Reviewed:    COORDINATION OF CARE:  Care Discussed with Consultants/Other Providers [Y/N]:  Prior or Outpatient Records Reviewed [Y/N]: PROGRESS NOTE:     Patient is a 79y old  Female who presents with a chief complaint of Shortness of breath, fevers (31 Mar 2020 14:33)      SUBJECTIVE / OVERNIGHT EVENTS: Pt seen and examined. SOB overnight, no signs of MG. Pt otherwise reported no fever, chills, CP, abdominal pain, N/V, urinary or bowel issues, or new joint aches.     ADDITIONAL REVIEW OF SYSTEMS: Otherwise negative    MEDICATIONS  (STANDING):  ascorbic acid 500 milliGRAM(s) Oral daily  atorvastatin 20 milliGRAM(s) Oral at bedtime  cholecalciferol 2000 Unit(s) Oral daily  enoxaparin Injectable 40 milliGRAM(s) SubCutaneous daily  hydroxychloroquine   Oral   hydroxychloroquine 200 milliGRAM(s) Oral every 12 hours  levothyroxine 50 MICROGram(s) Oral daily  losartan 50 milliGRAM(s) Oral daily  melatonin 5 milliGRAM(s) Oral at bedtime  pantoprazole    Tablet 40 milliGRAM(s) Oral before breakfast  piperacillin/tazobactam IVPB.. 3.375 Gram(s) IV Intermittent every 8 hours  pyridostigmine 60 milliGRAM(s) Oral three times a day  pyridostigmine  milliGRAM(s) Oral daily  thiamine IVPB 200 milliGRAM(s) IV Intermittent <User Schedule>  zinc sulfate 220 milliGRAM(s) Oral daily    MEDICATIONS  (PRN):  acetaminophen   Tablet .. 650 milliGRAM(s) Oral every 4 hours PRN Temp greater or equal to 38C (100.4F), Moderate Pain (4 - 6)  ALBUTerol    90 MICROgram(s) HFA Inhaler 2 Puff(s) Inhalation every 4 hours PRN Shortness of Breath and/or Wheezing  polyethylene glycol 3350 17 Gram(s) Oral daily PRN Constipation  senna 2 Tablet(s) Oral at bedtime PRN Constipation      CAPILLARY BLOOD GLUCOSE        I&O's Summary    31 Mar 2020 07:01  -  01 Apr 2020 07:00  --------------------------------------------------------  IN: 465 mL / OUT: 200 mL / NET: 265 mL        PHYSICAL EXAM:  Vital Signs Last 24 Hrs  T(C): 36.8 (01 Apr 2020 04:53), Max: 38.9 (31 Mar 2020 09:00)  T(F): 98.2 (01 Apr 2020 04:53), Max: 102.1 (31 Mar 2020 09:00)  HR: 87 (01 Apr 2020 04:53) (80 - 104)  BP: 112/70 (01 Apr 2020 04:53) (96/59 - 126/82)  BP(mean): 96 (31 Mar 2020 09:00) (96 - 96)  RR: 19 (01 Apr 2020 04:53) (19 - 22)  SpO2: 100% (01 Apr 2020 04:53) (96% - 100%)    Constitutional: Elderly woman seen lying in bed in NAD  Eyes: Sclera clear  ENMT: MMM, NC in place  Neck: Supple, strength 5/5 neck flexion  Respiratory: CTAB, no rales, rhonchi or wheezes  Cardiovascular: RRR, no m/g/r  Gastrointestinal: +BS, soft, NT/ND  Neurological: AAOx3  Psychiatric: Appropriate affect and mood  Extremities: No LE edema    LABS:                        9.5    7.50  )-----------( 248      ( 30 Mar 2020 21:48 )             30.8     03-30    135  |  97  |  35<H>  ----------------------------<  146<H>  3.1<L>   |  21<L>  |  1.29    Ca    9.3      30 Mar 2020 21:48    TPro  8.3  /  Alb  3.8  /  TBili  0.2  /  DBili  x   /  AST  43<H>  /  ALT  18  /  AlkPhos  66  03-30    PT/INR - ( 30 Mar 2020 21:48 )   PT: 12.5 sec;   INR: 1.08 ratio         PTT - ( 30 Mar 2020 21:48 )  PTT:31.2 sec          Culture - Blood (collected 31 Mar 2020 03:13)  Source: .Blood Blood-Peripheral  Preliminary Report (01 Apr 2020 04:01):    No growth to date.    Culture - Blood (collected 31 Mar 2020 03:13)  Source: .Blood Blood-Peripheral  Preliminary Report (01 Apr 2020 04:01):    No growth to date.        RADIOLOGY & ADDITIONAL TESTS:  Results Reviewed:   Imaging Personally Reviewed:  Electrocardiogram Personally Reviewed:    COORDINATION OF CARE:  Care Discussed with Consultants/Other Providers [Y/N]:  Prior or Outpatient Records Reviewed [Y/N]:

## 2020-04-01 NOTE — SWALLOW BEDSIDE ASSESSMENT ADULT - ASR SWALLOW ASPIRATION MONITOR
gurgly voice/upper respiratory infection/fever/pneumonia/change of breathing pattern/cough/throat clearing

## 2020-04-01 NOTE — PROGRESS NOTE ADULT - PROBLEM SELECTOR PLAN 8
Daughter Patsy is [facility] ([phone]). Pt would want trial of intubation for now. Will continue [facility] pending clinical progress.

## 2020-04-01 NOTE — SWALLOW BEDSIDE ASSESSMENT ADULT - SWALLOW EVAL: RECOMMENDED DIET
NPO, with non-oral nutrition, hydration, medications is recommended based upon the results of this examination.  If non-oral means of nutrition/hydration are not medically feasible or within patient family wishes, suggest most conservative oral diet (Dysphagia 1 with Honey-thick liquids). NPO, with non-oral nutrition, hydration, medications is recommended based upon the results of this examination.  If non-oral means of nutrition/hydration are not medically feasible or within patient family wishes, suggest most conservative oral diet of Dysphagia 1 with Honey-thick liquids. NPO, with non-oral nutrition, hydration, medications is recommended based upon the results of this examination. If non-oral means of nutrition/hydration are not medically feasible or within patient family wishes, then the most conservative oral diet (assuming risk of aspiration) would be Dysphagia 1 with Honey-thick liquids.

## 2020-04-01 NOTE — PROGRESS NOTE ADULT - PROBLEM SELECTOR PLAN 7
DVT ppx: lovenox QD  Diet: DASH  Dispo: pending DVT ppx: lovenox QD  Diet: S&S assessment, difficult to accurately determine given cough, on dysphagia 1 diet for now  Dispo: pending DVT ppx: lovenox QD  Diet: S&S assessment, recommendation for NPO, asked patient her thoughts about her diet and risk for aspiration and she stated that she does not care about having a diet or not having a diet, so will initiate NPO except medications w/ water for now  Dispo: pending

## 2020-04-01 NOTE — SWALLOW BEDSIDE ASSESSMENT ADULT - PHARYNGEAL PHASE
Multiple swallows/Intermittent coughing, incoordination between deglutition and respiration, suspected delayed pharyngeal swallow Intermittent coughing, reduced coordination of respiration for deglutition, suspected delayed pharyngeal swallow/Multiple swallows

## 2020-04-01 NOTE — SWALLOW BEDSIDE ASSESSMENT ADULT - COMMENTS
History Continued: Neuro consulted for possible myasthenia exacerbation due to viral infection. Last myasthenic exacerbation was 8/2019 (weakness, dysphagia, SOB) with improvement s/p IVIG. Pt comfortable on 4L NC, +NIFs/VC. Denies any difficulty swallowing, voice changes, double vision, weakness; No suspicion of myasthenic exacerbation. 4/1: pt with SOB overnight desat to 82-86 w/ 10LNC., placed on NRB. Per chart, pt on 3l/min at 94%.   Chest x-ray 3/31: Findings: The heart size cannot be adequately assessed on this single view. There is a consolidation in the medial aspect of the right lower lobe. There are patchy airspace opacities within the left lung. The hilar and mediastinal structures appear unremarkable. Surgical hardware is present in the bilateral shoulders. Impression: Focal pneumonia in the medial aspect of the right lung base and patchy airspace opacities throughout the left lung consistent with viral pneumonia such as COVID 19. History Continued: Neuro consulted for MG. Last myasthenic exacerbation was 8/2019 (weakness, dysphagia, SOB) with improvement s/p IVIG. Pt comfortable on 4L NC, +NIFs/VC. Denies any difficulty swallowing, voice changes, double vision, weakness; No suspicion of myasthenic exacerbation. 4/1: pt with SOB overnight desat to 82-86 w/ 10LNC., placed on NRB. Per chart, pt on 3l/min at 94%.   Chest x-ray 3/31: Findings: The heart size cannot be adequately assessed on this single view. There is a consolidation in the medial aspect of the right lower lobe. There are patchy airspace opacities within the left lung. The hilar and mediastinal structures appear unremarkable. Surgical hardware is present in the bilateral shoulders. Impression: Focal pneumonia in the medial aspect of the right lung base and patchy airspace opacities throughout the left lung consistent with viral pneumonia such as COVID 19.

## 2020-04-01 NOTE — SWALLOW BEDSIDE ASSESSMENT ADULT - SLP PERTINENT HISTORY OF CURRENT PROBLEM
Pt admitted with acute hypoxic respiratory failure and severe sepsis 2/2 r sided bacterial PNA aspiration vs. community acquired and concomitant severe COVID19 PNA, now on supplemental o2 and high risk for MG exacerbation. PMHx MG on pyridostigmine, reactive airway disease, GERD, HTN, HLD, hypothyroid, spinal stenosis s/p laminectomy/fusion, coming in with day 7 of difficulty breathing and not feeling well. + chills, no documented fevers at home. No chest pain. Shortness of breath worse when she tries to walk around or speak. Mild runny nose and sore throat. No abd pain, no nausea or vomiting, no diarrhea. Per daughter patient had been started on z-pack as OP with minimal relief. Has had prior intubations for elective surgeries but no reported emergency intubations for MG crises. Per daughter patient would want trial of intubation. In ED: 100.2, 119/169/53, 36, 98 2L NC; given KCl 40meq po x 1, tylenol x 2. Pt admitted with acute hypoxic respiratory failure and severe sepsis 2/2 R sided bacterial PNA aspiration vs. community acquired and concomitant severe COVID19 PNA, now on supplemental o2 and high risk for MG exacerbation. PMHx MG on pyridostigmine, reactive airway disease, GERD, HTN, HLD, hypothyroid, spinal stenosis s/p laminectomy/fusion, coming in with day 7 of difficulty breathing and not feeling well. + chills, no documented fevers at home. No chest pain. Shortness of breath worse when she tries to walk around or speak. Mild runny nose and sore throat. No abd pain, no nausea or vomiting, no diarrhea. Per daughter patient had been started on z-pack as OP with minimal relief. Has had prior intubations for elective surgeries but no reported emergency intubations for MG crises. Per daughter patient would want trial of intubation. In ED: 100.2, 119/169/53, 36, 98 2L NC; given KCl 40meq po x 1, tylenol x 2.

## 2020-04-01 NOTE — CONSULT NOTE ADULT - SUBJECTIVE AND OBJECTIVE BOX
Patient is a 79y old  Female who presents with a chief complaint of Shortness of breath, fevers (2020 07:08)      HPI:  Patient well known to me, c/o sore throat last week, daughter brought her to ED Monday morning c/o SOB.  She is stable on O2 NC.  Patient tested positive for COVID-19    PAST MEDICAL & SURGICAL HISTORY:  Osteoarthritis  Hypothyroid  Reactive airway disease that is not asthma: mild as per pulm note on [employer], patient and daughter denies any inhaler use; thought to be related to myasthena gravis  Near syncope: 2018 negative ENT work up, negative cardiac work ups as per daughter  Aortic stenosis, mild: asper daughter  Diverticulitis large intestine: denies any recent exacerbations  Lumbar stenosis  Myasthenia gravis: dx 10/2018 symptoms: intermittent loss of voice/ weakness, negative ENT studies, now stable on Pyridostigmine  Carpal Tunnel Syndrome Right: release 4/10 and left  Hammertoe Right foot  Kidney Calculi  Lumbar Radiculopathy  GERD (Gastroesophageal Reflux Disease): hiatal hernia  Hyperlipidemia  Genital Ulcer, Female  Hypertension  H/O umbilical hernia repair  History of tonsillectomy  S/P foot surgery  H/O shoulder replacement: b/l   H/O laminectomy: cervical  1998  lumbar ,,,  with fusion  Prolapse, Uterovaginal: s/p sling  S/P Laparoscopic Fundoplication  S/P Hysterectomy Partial:   Bilateral Cataracts: removed  S/P Appendectomy  S/P Cholecystectomy      FAMILY HISTORY:  Family history of bladder cancer: brother   FH: early death: mother  age 50s  Family history of stroke: father         Social Hx:  Nonsmoker, no drug or alcohol use    MEDICATIONS  (STANDING):  ascorbic acid 500 milliGRAM(s) Oral daily  atorvastatin 20 milliGRAM(s) Oral at bedtime  cholecalciferol 2000 Unit(s) Oral daily  enoxaparin Injectable 40 milliGRAM(s) SubCutaneous daily  hydroxychloroquine   Oral   hydroxychloroquine 200 milliGRAM(s) Oral every 12 hours  levothyroxine 50 MICROGram(s) Oral daily  losartan 50 milliGRAM(s) Oral daily  melatonin 5 milliGRAM(s) Oral at bedtime  pantoprazole    Tablet 40 milliGRAM(s) Oral before breakfast  piperacillin/tazobactam IVPB.. 3.375 Gram(s) IV Intermittent every 8 hours  pyridostigmine 60 milliGRAM(s) Oral three times a day  pyridostigmine  milliGRAM(s) Oral daily  thiamine IVPB 200 milliGRAM(s) IV Intermittent <User Schedule>  zinc sulfate 220 milliGRAM(s) Oral daily       Allergies    IODINE (Rash)  No Known Drug Allergies  shellfish (Rash)    Intolerances        ROS: Pertinent positives in HPI, all other ROS were reviewed and are negative.      Vital Signs Last 24 Hrs  T(C): 37.2 (2020 05:21), Max: 37.2 (2020 20:45)  T(F): 98.9 (2020 05:21), Max: 99 (2020 20:45)  HR: 84 (2020 06:03) (84 - 135)  BP: 100/60 (2020 06:03) (100/60 - 169/89)  BP(mean): --  RR: 22 (2020 05:21) (22 - 50)  SpO2: 94% (2020 05:21) (85% - 100%)        Constitutional: awake and alert.      Neurological exam:  speech normal, neck flex 5/5 UE and LE grossly 5/5  resp rate 16      Labs:                        8.0    5.98  )-----------( 209      ( 2020 07:38 )             27.1     04-01    138  |  103  |  20  ----------------------------<  107<H>  3.1<L>   |  23  |  1.05    Ca    8.9      2020 07:35  Phos  2.7     04-01  Mg     2.0     04-    TPro  6.8  /  Alb  2.8<L>  /  TBili  0.2  /  DBili  x   /  AST  35  /  ALT  12  /  AlkPhos  46  04-01            A/P:  COVID-19 in setting of gMG on Soliris treatment - she was due for dose last week, I brought dose today and had it labeled by pharmacy and gave to nurse for infusion.   cont mestinon TID, hold prednisone  supportive care; currently patient has a good prognosis

## 2020-04-01 NOTE — PROGRESS NOTE ADULT - ATTENDING COMMENTS
Patient with MG, admitted with COVID-19.   Will monitor respiratory status as best as possible with serial neuro examination (voice changes, neck flexion, etc).   Started on Plaquenil.   On O2 via NC. Will titrate as tolerated.

## 2020-04-01 NOTE — PROGRESS NOTE ADULT - PROBLEM SELECTOR PLAN 3
Patient at high risk for MG crisis  -continue pyridostigmine ER 180mg po daily, mestinon (pyridostigmine) 60mg po tid  - unable to get NIFs and VC q8H given COVID +, will monitor q4 neuro checks specifically looking at neck flexion, voice quality, difficulty swallowing, and call neurology if deterioration  - neurology following Patient at high risk for MG crisis  -continue pyridostigmine ER 180mg po daily, mestinon (pyridostigmine) 60mg po tid  - unable to get NIFs and VC q8H given COVID +, will monitor q4 neuro checks specifically looking at neck flexion, voice quality, difficulty swallowing, and call neurology if deterioration  -outpatient neurology to deliver Solaris dose to inpatient setting  - neurology following, recs greatly appreciated

## 2020-04-01 NOTE — SWALLOW BEDSIDE ASSESSMENT ADULT - SWALLOW EVAL: DIAGNOSIS
Pt seen for clinical bedside swallow evaluation s/p being admitted +COVID and history of MG. Pt with baseline coughing noted when obtaining history. Today, pt presented with 1) oral and suspected pharyngeal dysphagia superimposed upon respiratory deficits. Dysphagia characterized by increased oral transit time, suspected delayed pharyngeal swallow, incoordination between respiration and deglutition, and intermittent coughing with conservative PO diets. Pt seen for clinical bedside swallow evaluation s/p being admitted +COVID with history of MG. Pt with baseline coughing noted when obtaining history. Today, pt presented with 1) suspected pharyngeal dysphagia superimposed upon respiratory deficits. Dysphagia characterized by suspected delayed pharyngeal swallow, incoordination between respiration and deglutition, multiple swallows per teaspoon amount, and intermittent coughing with conservative textures. Pt seen for clinical bedside swallow evaluation s/p being admitted +COVID with history of MG. Pt with baseline coughing noted when obtaining history. Today, pt presented with 1) suspected pharyngeal dysphagia superimposed upon respiratory deficits. Dysphagia characterized by suspected delayed pharyngeal swallow, reduced coordination of respiration for deglutition, multiple swallows per teaspoon amount, and intermittent coughing with conservative textures. Baseline cough makes behavioral analysis of swallow function difficult to a degree.

## 2020-04-01 NOTE — SWALLOW BEDSIDE ASSESSMENT ADULT - SLP GENERAL OBSERVATIONS
Pt encountered awake and alert, reclined in bed on O2 via NC at 5l/min O2 sat 100%. A&OX2. Able to verbally express wants/needs and follow directives. Vocal quality and speech intelligibility judged WNL. Pt with increased work of breathing and intermittent coughing at baseline; RR 26. Pt reported difficulty with regular texture diet, endorsed “coughing with steak.”

## 2020-04-01 NOTE — PROGRESS NOTE ADULT - PROBLEM SELECTOR PLAN 1
2/2 R-sided bacterial pna given consolidation seen on CXR and elevated procalcitonin 3.40 with concomitant COVID19 viral pneumonia, at high risk for MG exacerbation, on nasal cannula, while not hypoxic, respiratory rate in the 30s when arrived to ED  - zosyn 3.375gm iv q8 hours for aspiration pna vs. HAP. Low suspicion for MRSA pna, will hold off on vanc for now  - management of COVID as below  - per daughter (HCP), patient would want trial of intubation, however will need to continue addressing GOC pending clinical progress

## 2020-04-01 NOTE — PROGRESS NOTE ADULT - PROBLEM SELECTOR PLAN 2
on nasal cannula, while not hypoxic, respiratory rate in the 30s when arrived to ED  - will start hydroxychloroquine protocol, check EKG for QTC daily  - hydroxychloroquine and pyridostigmine can lower seizure threshold, monitor neuro checks q4H  - supplemental o2 as above  - high dose vit C and thiamine

## 2020-04-02 ENCOUNTER — APPOINTMENT (OUTPATIENT)
Dept: NEUROLOGY | Facility: CLINIC | Age: 80
End: 2020-04-02

## 2020-04-02 LAB
ALBUMIN SERPL ELPH-MCNC: 2.7 G/DL — LOW (ref 3.3–5)
ALP SERPL-CCNC: 46 U/L — SIGNIFICANT CHANGE UP (ref 40–120)
ALT FLD-CCNC: 19 U/L — SIGNIFICANT CHANGE UP (ref 10–45)
ANION GAP SERPL CALC-SCNC: 12 MMOL/L — SIGNIFICANT CHANGE UP (ref 5–17)
AST SERPL-CCNC: 38 U/L — SIGNIFICANT CHANGE UP (ref 10–40)
BASOPHILS # BLD AUTO: 0.01 K/UL — SIGNIFICANT CHANGE UP (ref 0–0.2)
BASOPHILS NFR BLD AUTO: 0.2 % — SIGNIFICANT CHANGE UP (ref 0–2)
BILIRUB SERPL-MCNC: 0.3 MG/DL — SIGNIFICANT CHANGE UP (ref 0.2–1.2)
BUN SERPL-MCNC: 18 MG/DL — SIGNIFICANT CHANGE UP (ref 7–23)
CALCIUM SERPL-MCNC: 9 MG/DL — SIGNIFICANT CHANGE UP (ref 8.4–10.5)
CHLORIDE SERPL-SCNC: 105 MMOL/L — SIGNIFICANT CHANGE UP (ref 96–108)
CO2 SERPL-SCNC: 22 MMOL/L — SIGNIFICANT CHANGE UP (ref 22–31)
CREAT SERPL-MCNC: 1.06 MG/DL — SIGNIFICANT CHANGE UP (ref 0.5–1.3)
EOSINOPHIL # BLD AUTO: 0.03 K/UL — SIGNIFICANT CHANGE UP (ref 0–0.5)
EOSINOPHIL NFR BLD AUTO: 0.5 % — SIGNIFICANT CHANGE UP (ref 0–6)
FIBRINOGEN AG PPP IA-MCNC: 729 MG/DL — HIGH
GLUCOSE SERPL-MCNC: 110 MG/DL — HIGH (ref 70–99)
HCT VFR BLD CALC: 25.3 % — LOW (ref 34.5–45)
HGB BLD-MCNC: 7.3 G/DL — LOW (ref 11.5–15.5)
IMM GRANULOCYTES NFR BLD AUTO: 1.1 % — SIGNIFICANT CHANGE UP (ref 0–1.5)
LYMPHOCYTES # BLD AUTO: 0.86 K/UL — LOW (ref 1–3.3)
LYMPHOCYTES # BLD AUTO: 13.2 % — SIGNIFICANT CHANGE UP (ref 13–44)
MAGNESIUM SERPL-MCNC: 2 MG/DL — SIGNIFICANT CHANGE UP (ref 1.6–2.6)
MCHC RBC-ENTMCNC: 23.7 PG — LOW (ref 27–34)
MCHC RBC-ENTMCNC: 28.9 GM/DL — LOW (ref 32–36)
MCV RBC AUTO: 82.1 FL — SIGNIFICANT CHANGE UP (ref 80–100)
MONOCYTES # BLD AUTO: 0.56 K/UL — SIGNIFICANT CHANGE UP (ref 0–0.9)
MONOCYTES NFR BLD AUTO: 8.6 % — SIGNIFICANT CHANGE UP (ref 2–14)
NEUTROPHILS # BLD AUTO: 4.99 K/UL — SIGNIFICANT CHANGE UP (ref 1.8–7.4)
NEUTROPHILS NFR BLD AUTO: 76.4 % — SIGNIFICANT CHANGE UP (ref 43–77)
NRBC # BLD: 0 /100 WBCS — SIGNIFICANT CHANGE UP (ref 0–0)
PHOSPHATE SERPL-MCNC: 2 MG/DL — LOW (ref 2.5–4.5)
PLATELET # BLD AUTO: 235 K/UL — SIGNIFICANT CHANGE UP (ref 150–400)
POTASSIUM SERPL-MCNC: 4.6 MMOL/L — SIGNIFICANT CHANGE UP (ref 3.5–5.3)
POTASSIUM SERPL-SCNC: 4.6 MMOL/L — SIGNIFICANT CHANGE UP (ref 3.5–5.3)
PROT SERPL-MCNC: 6.6 G/DL — SIGNIFICANT CHANGE UP (ref 6–8.3)
RBC # BLD: 3.08 M/UL — LOW (ref 3.8–5.2)
RBC # FLD: 19.3 % — HIGH (ref 10.3–14.5)
SODIUM SERPL-SCNC: 139 MMOL/L — SIGNIFICANT CHANGE UP (ref 135–145)
WBC # BLD: 6.52 K/UL — SIGNIFICANT CHANGE UP (ref 3.8–10.5)
WBC # FLD AUTO: 6.52 K/UL — SIGNIFICANT CHANGE UP (ref 3.8–10.5)

## 2020-04-02 PROCEDURE — 93010 ELECTROCARDIOGRAM REPORT: CPT

## 2020-04-02 RX ADMIN — ATORVASTATIN CALCIUM 20 MILLIGRAM(S): 80 TABLET, FILM COATED ORAL at 22:26

## 2020-04-02 RX ADMIN — PIPERACILLIN AND TAZOBACTAM 25 GRAM(S): 4; .5 INJECTION, POWDER, LYOPHILIZED, FOR SOLUTION INTRAVENOUS at 22:25

## 2020-04-02 RX ADMIN — Medication 50 MICROGRAM(S): at 05:09

## 2020-04-02 RX ADMIN — Medication 2000 UNIT(S): at 12:14

## 2020-04-02 RX ADMIN — Medication 102 MILLIGRAM(S): at 05:09

## 2020-04-02 RX ADMIN — Medication 200 MILLIGRAM(S): at 17:02

## 2020-04-02 RX ADMIN — PANTOPRAZOLE SODIUM 40 MILLIGRAM(S): 20 TABLET, DELAYED RELEASE ORAL at 05:35

## 2020-04-02 RX ADMIN — PYRIDOSTIGMINE BROMIDE 60 MILLIGRAM(S): 60 SOLUTION ORAL at 22:25

## 2020-04-02 RX ADMIN — Medication 200 MILLIGRAM(S): at 05:09

## 2020-04-02 RX ADMIN — ZINC SULFATE TAB 220 MG (50 MG ZINC EQUIVALENT) 220 MILLIGRAM(S): 220 (50 ZN) TAB at 12:15

## 2020-04-02 RX ADMIN — PIPERACILLIN AND TAZOBACTAM 25 GRAM(S): 4; .5 INJECTION, POWDER, LYOPHILIZED, FOR SOLUTION INTRAVENOUS at 14:22

## 2020-04-02 RX ADMIN — PYRIDOSTIGMINE BROMIDE 180 MILLIGRAM(S): 60 SOLUTION ORAL at 12:18

## 2020-04-02 RX ADMIN — PYRIDOSTIGMINE BROMIDE 60 MILLIGRAM(S): 60 SOLUTION ORAL at 14:22

## 2020-04-02 RX ADMIN — LOSARTAN POTASSIUM 50 MILLIGRAM(S): 100 TABLET, FILM COATED ORAL at 05:22

## 2020-04-02 RX ADMIN — ENOXAPARIN SODIUM 40 MILLIGRAM(S): 100 INJECTION SUBCUTANEOUS at 12:15

## 2020-04-02 RX ADMIN — Medication 5 MILLIGRAM(S): at 22:25

## 2020-04-02 RX ADMIN — Medication 500 MILLIGRAM(S): at 12:14

## 2020-04-02 RX ADMIN — PYRIDOSTIGMINE BROMIDE 60 MILLIGRAM(S): 60 SOLUTION ORAL at 05:09

## 2020-04-02 RX ADMIN — PIPERACILLIN AND TAZOBACTAM 25 GRAM(S): 4; .5 INJECTION, POWDER, LYOPHILIZED, FOR SOLUTION INTRAVENOUS at 05:22

## 2020-04-02 NOTE — PROGRESS NOTE ADULT - ATTENDING COMMENTS
Patient with MG, admitted with COVID-19.   Will monitor respiratory status as best as possible with serial neuro examination (voice changes, neck flexion, etc).   On Plaquenil.   On O2 via NC yesterday, but desaturated and was escalated to NRB 10L overnight/this AM. Will titrate as tolerated.   Will need to readdress with patient re: diet status; currently NPO.   Discussed with ID, not candidate for other trials, etc. given her MG.

## 2020-04-02 NOTE — PROGRESS NOTE ADULT - PROBLEM SELECTOR PLAN 2
on nasal cannula, while not hypoxic, respiratory rate in the 30s when arrived to ED  - on hydroxychloroquine protocol, check EKG for QTC daily  - hydroxychloroquine and pyridostigmine can lower seizure threshold, monitor neuro checks q4H  - supplemental o2 as above  - high dose vit C and thiamine on nasal cannula, while not hypoxic, respiratory rate in the 30s when arrived to ED  - on hydroxychloroquine protocol, check EKG for QTC daily  - hydroxychloroquine and pyridostigmine can lower seizure threshold, monitor neuro checks q4H  - supplemental o2 as above  - high dose vit C, thiamine, zinc

## 2020-04-02 NOTE — PROGRESS NOTE ADULT - SUBJECTIVE AND OBJECTIVE BOX
PROGRESS NOTE:     Patient is a 79y old  Female who presents with a chief complaint of Shortness of breath, fevers (01 Apr 2020 07:18)      SUBJECTIVE / OVERNIGHT EVENTS: Pt seen and examined. No acute overnight events. Pt reported no fever, chills, CP, SOB, abdominal pain, N/V, urinary or bowel issues, or new joint aches.     ADDITIONAL REVIEW OF SYSTEMS: Otherwise negative    MEDICATIONS  (STANDING):  ascorbic acid 500 milliGRAM(s) Oral daily  atorvastatin 20 milliGRAM(s) Oral at bedtime  cholecalciferol 2000 Unit(s) Oral daily  enoxaparin Injectable 40 milliGRAM(s) SubCutaneous daily  hydroxychloroquine   Oral   hydroxychloroquine 200 milliGRAM(s) Oral every 12 hours  levothyroxine 50 MICROGram(s) Oral daily  losartan 50 milliGRAM(s) Oral daily  melatonin 5 milliGRAM(s) Oral at bedtime  pantoprazole    Tablet 40 milliGRAM(s) Oral before breakfast  piperacillin/tazobactam IVPB.. 3.375 Gram(s) IV Intermittent every 8 hours  pyridostigmine 60 milliGRAM(s) Oral three times a day  pyridostigmine  milliGRAM(s) Oral daily  thiamine IVPB 200 milliGRAM(s) IV Intermittent <User Schedule>  zinc sulfate 220 milliGRAM(s) Oral daily    MEDICATIONS  (PRN):  acetaminophen   Tablet .. 650 milliGRAM(s) Oral every 4 hours PRN Temp greater or equal to 38C (100.4F), Moderate Pain (4 - 6)  ALBUTerol    90 MICROgram(s) HFA Inhaler 2 Puff(s) Inhalation every 4 hours PRN Shortness of Breath and/or Wheezing  polyethylene glycol 3350 17 Gram(s) Oral daily PRN Constipation  senna 2 Tablet(s) Oral at bedtime PRN Constipation      CAPILLARY BLOOD GLUCOSE        I&O's Summary    01 Apr 2020 07:01  -  02 Apr 2020 07:00  --------------------------------------------------------  IN: 1080 mL / OUT: 550 mL / NET: 530 mL        PHYSICAL EXAM:  Vital Signs Last 24 Hrs  T(C): 37.2 (02 Apr 2020 05:21), Max: 37.2 (01 Apr 2020 20:45)  T(F): 98.9 (02 Apr 2020 05:21), Max: 99 (01 Apr 2020 20:45)  HR: 84 (02 Apr 2020 06:03) (75 - 135)  BP: 100/60 (02 Apr 2020 06:03) (100/60 - 169/89)  BP(mean): --  RR: 22 (02 Apr 2020 05:21) (22 - 50)  SpO2: 94% (02 Apr 2020 05:21) (85% - 100%)    CONSTITUTIONAL: NAD, well-developed  RESPIRATORY: Normal respiratory effort; lungs are clear to auscultation bilaterally  CARDIOVASCULAR: Regular rate and rhythm, normal S1 and S2, no murmur/rub/gallop  ABDOMEN: Nontender to palpation, normoactive bowel sounds, no rebound/guarding; No hepatosplenomegaly  EXTREMITIES: No lower extremity edema; Peripheral pulses are 2+ bilaterally  MUSCLOSKELETAL: no clubbing or cyanosis of digits; no joint swelling or tenderness to palpation  PSYCH: A+O to person, place, and time; affect appropriate    LABS:                        8.0    5.98  )-----------( 209      ( 01 Apr 2020 07:38 )             27.1     04-01    138  |  103  |  20  ----------------------------<  107<H>  3.1<L>   |  23  |  1.05    Ca    8.9      01 Apr 2020 07:35  Phos  2.7     04-01  Mg     2.0     04-01    TPro  6.8  /  Alb  2.8<L>  /  TBili  0.2  /  DBili  x   /  AST  35  /  ALT  12  /  AlkPhos  46  04-01        Culture - Blood (collected 31 Mar 2020 03:13)  Source: .Blood Blood-Peripheral  Preliminary Report (01 Apr 2020 04:01):    No growth to date.    Culture - Blood (collected 31 Mar 2020 03:13)  Source: .Blood Blood-Peripheral  Preliminary Report (01 Apr 2020 04:01):    No growth to date.        RADIOLOGY & ADDITIONAL TESTS:  Results Reviewed:   Imaging Personally Reviewed:  Electrocardiogram Personally Reviewed:    COORDINATION OF CARE:  Care Discussed with Consultants/Other Providers [Y/N]:  Prior or Outpatient Records Reviewed [Y/N]: PROGRESS NOTE:     Patient is a 79y old  Female who presents with a chief complaint of Shortness of breath, fevers (01 Apr 2020 07:18)      SUBJECTIVE / OVERNIGHT EVENTS: Pt seen and examined. Acute desaturation w/ NC overnight to 85% w/ tachycardia, started NRB, spontaneously resolved. Pt reported no fever, chills, CP, abdominal pain, N/V, urinary or bowel issues, or new joint aches.     ADDITIONAL REVIEW OF SYSTEMS: Otherwise negative    MEDICATIONS  (STANDING):  ascorbic acid 500 milliGRAM(s) Oral daily  atorvastatin 20 milliGRAM(s) Oral at bedtime  cholecalciferol 2000 Unit(s) Oral daily  enoxaparin Injectable 40 milliGRAM(s) SubCutaneous daily  hydroxychloroquine   Oral   hydroxychloroquine 200 milliGRAM(s) Oral every 12 hours  levothyroxine 50 MICROGram(s) Oral daily  losartan 50 milliGRAM(s) Oral daily  melatonin 5 milliGRAM(s) Oral at bedtime  pantoprazole    Tablet 40 milliGRAM(s) Oral before breakfast  piperacillin/tazobactam IVPB.. 3.375 Gram(s) IV Intermittent every 8 hours  pyridostigmine 60 milliGRAM(s) Oral three times a day  pyridostigmine  milliGRAM(s) Oral daily  thiamine IVPB 200 milliGRAM(s) IV Intermittent <User Schedule>  zinc sulfate 220 milliGRAM(s) Oral daily    MEDICATIONS  (PRN):  acetaminophen   Tablet .. 650 milliGRAM(s) Oral every 4 hours PRN Temp greater or equal to 38C (100.4F), Moderate Pain (4 - 6)  ALBUTerol    90 MICROgram(s) HFA Inhaler 2 Puff(s) Inhalation every 4 hours PRN Shortness of Breath and/or Wheezing  polyethylene glycol 3350 17 Gram(s) Oral daily PRN Constipation  senna 2 Tablet(s) Oral at bedtime PRN Constipation      CAPILLARY BLOOD GLUCOSE        I&O's Summary    01 Apr 2020 07:01  -  02 Apr 2020 07:00  --------------------------------------------------------  IN: 1080 mL / OUT: 550 mL / NET: 530 mL        PHYSICAL EXAM:  Vital Signs Last 24 Hrs  T(C): 37.2 (02 Apr 2020 05:21), Max: 37.2 (01 Apr 2020 20:45)  T(F): 98.9 (02 Apr 2020 05:21), Max: 99 (01 Apr 2020 20:45)  HR: 84 (02 Apr 2020 06:03) (75 - 135)  BP: 100/60 (02 Apr 2020 06:03) (100/60 - 169/89)  BP(mean): --  RR: 22 (02 Apr 2020 05:21) (22 - 50)  SpO2: 94% (02 Apr 2020 05:21) (85% - 100%)    CONSTITUTIONAL: NAD, well-developed  RESPIRATORY: Normal respiratory effort; lungs are clear to auscultation bilaterally  CARDIOVASCULAR: Regular rate and rhythm, normal S1 and S2, no murmur/rub/gallop  ABDOMEN: Nontender to palpation, normoactive bowel sounds, no rebound/guarding; No hepatosplenomegaly  EXTREMITIES: No lower extremity edema; Peripheral pulses are 2+ bilaterally  MUSCLOSKELETAL: no clubbing or cyanosis of digits; no joint swelling or tenderness to palpation  PSYCH: A+O to person, place, and time; affect appropriate    LABS:                        8.0    5.98  )-----------( 209      ( 01 Apr 2020 07:38 )             27.1     04-01    138  |  103  |  20  ----------------------------<  107<H>  3.1<L>   |  23  |  1.05    Ca    8.9      01 Apr 2020 07:35  Phos  2.7     04-01  Mg     2.0     04-01    TPro  6.8  /  Alb  2.8<L>  /  TBili  0.2  /  DBili  x   /  AST  35  /  ALT  12  /  AlkPhos  46  04-01        Culture - Blood (collected 31 Mar 2020 03:13)  Source: .Blood Blood-Peripheral  Preliminary Report (01 Apr 2020 04:01):    No growth to date.    Culture - Blood (collected 31 Mar 2020 03:13)  Source: .Blood Blood-Peripheral  Preliminary Report (01 Apr 2020 04:01):    No growth to date.        RADIOLOGY & ADDITIONAL TESTS:  Results Reviewed:   Imaging Personally Reviewed:  Electrocardiogram Personally Reviewed:    COORDINATION OF CARE:  Care Discussed with Consultants/Other Providers [Y/N]:  Prior or Outpatient Records Reviewed [Y/N]: PROGRESS NOTE:     Patient is a 79y old  Female who presents with a chief complaint of Shortness of breath, fevers (01 Apr 2020 07:18)      SUBJECTIVE / OVERNIGHT EVENTS: Pt seen and examined. Acute desaturation w/ NC overnight to 85% w/ tachycardia, started NRB, spontaneously resolved. Pt reported SOB and diarrhea, no fever, chills, CP, abdominal pain, N/V, urinary or other bowel issues, or new joint aches.     ADDITIONAL REVIEW OF SYSTEMS: Otherwise negative    MEDICATIONS  (STANDING):  ascorbic acid 500 milliGRAM(s) Oral daily  atorvastatin 20 milliGRAM(s) Oral at bedtime  cholecalciferol 2000 Unit(s) Oral daily  enoxaparin Injectable 40 milliGRAM(s) SubCutaneous daily  hydroxychloroquine   Oral   hydroxychloroquine 200 milliGRAM(s) Oral every 12 hours  levothyroxine 50 MICROGram(s) Oral daily  losartan 50 milliGRAM(s) Oral daily  melatonin 5 milliGRAM(s) Oral at bedtime  pantoprazole    Tablet 40 milliGRAM(s) Oral before breakfast  piperacillin/tazobactam IVPB.. 3.375 Gram(s) IV Intermittent every 8 hours  pyridostigmine 60 milliGRAM(s) Oral three times a day  pyridostigmine  milliGRAM(s) Oral daily  thiamine IVPB 200 milliGRAM(s) IV Intermittent <User Schedule>  zinc sulfate 220 milliGRAM(s) Oral daily    MEDICATIONS  (PRN):  acetaminophen   Tablet .. 650 milliGRAM(s) Oral every 4 hours PRN Temp greater or equal to 38C (100.4F), Moderate Pain (4 - 6)  ALBUTerol    90 MICROgram(s) HFA Inhaler 2 Puff(s) Inhalation every 4 hours PRN Shortness of Breath and/or Wheezing  polyethylene glycol 3350 17 Gram(s) Oral daily PRN Constipation  senna 2 Tablet(s) Oral at bedtime PRN Constipation      CAPILLARY BLOOD GLUCOSE        I&O's Summary    01 Apr 2020 07:01  -  02 Apr 2020 07:00  --------------------------------------------------------  IN: 1080 mL / OUT: 550 mL / NET: 530 mL        PHYSICAL EXAM:  Vital Signs Last 24 Hrs  T(C): 37.2 (02 Apr 2020 05:21), Max: 37.2 (01 Apr 2020 20:45)  T(F): 98.9 (02 Apr 2020 05:21), Max: 99 (01 Apr 2020 20:45)  HR: 84 (02 Apr 2020 06:03) (75 - 135)  BP: 100/60 (02 Apr 2020 06:03) (100/60 - 169/89)  BP(mean): --  RR: 22 (02 Apr 2020 05:21) (22 - 50)  SpO2: 94% (02 Apr 2020 05:21) (85% - 100%)    CONSTITUTIONAL: NAD, well-developed  RESPIRATORY: Normal respiratory effort; lungs are clear to auscultation bilaterally  CARDIOVASCULAR: Regular rate and rhythm, normal S1 and S2, no murmur/rub/gallop  ABDOMEN: Nontender to palpation, normoactive bowel sounds, no rebound/guarding; No hepatosplenomegaly  EXTREMITIES: No lower extremity edema; Peripheral pulses are 2+ bilaterally  MUSCLOSKELETAL: no clubbing or cyanosis of digits; no joint swelling or tenderness to palpation  PSYCH: A+O to person, place, and time; affect appropriate    LABS:                        8.0    5.98  )-----------( 209      ( 01 Apr 2020 07:38 )             27.1     04-01    138  |  103  |  20  ----------------------------<  107<H>  3.1<L>   |  23  |  1.05    Ca    8.9      01 Apr 2020 07:35  Phos  2.7     04-01  Mg     2.0     04-01    TPro  6.8  /  Alb  2.8<L>  /  TBili  0.2  /  DBili  x   /  AST  35  /  ALT  12  /  AlkPhos  46  04-01        Culture - Blood (collected 31 Mar 2020 03:13)  Source: .Blood Blood-Peripheral  Preliminary Report (01 Apr 2020 04:01):    No growth to date.    Culture - Blood (collected 31 Mar 2020 03:13)  Source: .Blood Blood-Peripheral  Preliminary Report (01 Apr 2020 04:01):    No growth to date.        RADIOLOGY & ADDITIONAL TESTS:  Results Reviewed:   Imaging Personally Reviewed:  Electrocardiogram Personally Reviewed:    COORDINATION OF CARE:  Care Discussed with Consultants/Other Providers [Y/N]:  Prior or Outpatient Records Reviewed [Y/N]:

## 2020-04-02 NOTE — PROGRESS NOTE ADULT - PROBLEM SELECTOR PLAN 3
Patient at high risk for MG crisis  -continue pyridostigmine ER 180mg po daily, mestinon (pyridostigmine) 60mg po tid  - unable to get NIFs and VC q8H given COVID +, will monitor q4 neuro checks specifically looking at neck flexion, voice quality, difficulty swallowing, and call neurology if deterioration  -s/p 1 dose soliris (eculizumab) outpatient neurology (on mestinon and soliris for MG outpatient)  - neurology following, recs greatly appreciated

## 2020-04-02 NOTE — PROGRESS NOTE ADULT - PROBLEM SELECTOR PLAN 8
Daughter Patsy is [facility] ([phone]). Pt would want trial of intubation for now. Will continue [facility] pending clinical progress.

## 2020-04-02 NOTE — PROGRESS NOTE ADULT - PROBLEM SELECTOR PLAN 7
DVT ppx: lovenox QD  Diet: S&S assessment, recommendation for NPO, asked patient her thoughts about her diet and risk for aspiration and she stated that she does not care about having a diet or not having a diet, so will initiate NPO except medications w/ water for now  Dispo: pending

## 2020-04-03 LAB
ALBUMIN SERPL ELPH-MCNC: 2.5 G/DL — LOW (ref 3.3–5)
ALP SERPL-CCNC: 51 U/L — SIGNIFICANT CHANGE UP (ref 40–120)
ALT FLD-CCNC: 19 U/L — SIGNIFICANT CHANGE UP (ref 10–45)
ANION GAP SERPL CALC-SCNC: 17 MMOL/L — SIGNIFICANT CHANGE UP (ref 5–17)
AST SERPL-CCNC: 39 U/L — SIGNIFICANT CHANGE UP (ref 10–40)
BASOPHILS # BLD AUTO: 0.02 K/UL — SIGNIFICANT CHANGE UP (ref 0–0.2)
BASOPHILS NFR BLD AUTO: 0.2 % — SIGNIFICANT CHANGE UP (ref 0–2)
BILIRUB SERPL-MCNC: 0.2 MG/DL — SIGNIFICANT CHANGE UP (ref 0.2–1.2)
BUN SERPL-MCNC: 22 MG/DL — SIGNIFICANT CHANGE UP (ref 7–23)
CALCIUM SERPL-MCNC: 9.1 MG/DL — SIGNIFICANT CHANGE UP (ref 8.4–10.5)
CHLORIDE SERPL-SCNC: 106 MMOL/L — SIGNIFICANT CHANGE UP (ref 96–108)
CO2 SERPL-SCNC: 18 MMOL/L — LOW (ref 22–31)
CREAT SERPL-MCNC: 1.02 MG/DL — SIGNIFICANT CHANGE UP (ref 0.5–1.3)
CRP SERPL-MCNC: 27.92 MG/DL — HIGH (ref 0–0.4)
EOSINOPHIL # BLD AUTO: 0.06 K/UL — SIGNIFICANT CHANGE UP (ref 0–0.5)
EOSINOPHIL NFR BLD AUTO: 0.7 % — SIGNIFICANT CHANGE UP (ref 0–6)
FERRITIN SERPL-MCNC: 550 NG/ML — HIGH (ref 15–150)
GLUCOSE SERPL-MCNC: 84 MG/DL — SIGNIFICANT CHANGE UP (ref 70–99)
HCT VFR BLD CALC: 25.3 % — LOW (ref 34.5–45)
HGB BLD-MCNC: 7.4 G/DL — LOW (ref 11.5–15.5)
IMM GRANULOCYTES NFR BLD AUTO: 1.8 % — HIGH (ref 0–1.5)
LYMPHOCYTES # BLD AUTO: 0.86 K/UL — LOW (ref 1–3.3)
LYMPHOCYTES # BLD AUTO: 9.5 % — LOW (ref 13–44)
MAGNESIUM SERPL-MCNC: 2 MG/DL — SIGNIFICANT CHANGE UP (ref 1.6–2.6)
MCHC RBC-ENTMCNC: 24.3 PG — LOW (ref 27–34)
MCHC RBC-ENTMCNC: 29.2 GM/DL — LOW (ref 32–36)
MCV RBC AUTO: 83 FL — SIGNIFICANT CHANGE UP (ref 80–100)
MONOCYTES # BLD AUTO: 0.67 K/UL — SIGNIFICANT CHANGE UP (ref 0–0.9)
MONOCYTES NFR BLD AUTO: 7.4 % — SIGNIFICANT CHANGE UP (ref 2–14)
NEUTROPHILS # BLD AUTO: 7.33 K/UL — SIGNIFICANT CHANGE UP (ref 1.8–7.4)
NEUTROPHILS NFR BLD AUTO: 80.4 % — HIGH (ref 43–77)
NRBC # BLD: 0 /100 WBCS — SIGNIFICANT CHANGE UP (ref 0–0)
PHOSPHATE SERPL-MCNC: 3.3 MG/DL — SIGNIFICANT CHANGE UP (ref 2.5–4.5)
PLATELET # BLD AUTO: 268 K/UL — SIGNIFICANT CHANGE UP (ref 150–400)
POTASSIUM SERPL-MCNC: 4.4 MMOL/L — SIGNIFICANT CHANGE UP (ref 3.5–5.3)
POTASSIUM SERPL-SCNC: 4.4 MMOL/L — SIGNIFICANT CHANGE UP (ref 3.5–5.3)
PROCALCITONIN SERPL-MCNC: 1.02 NG/ML — HIGH (ref 0.02–0.1)
PROT SERPL-MCNC: 6.7 G/DL — SIGNIFICANT CHANGE UP (ref 6–8.3)
RBC # BLD: 3.05 M/UL — LOW (ref 3.8–5.2)
RBC # FLD: 19.6 % — HIGH (ref 10.3–14.5)
SODIUM SERPL-SCNC: 141 MMOL/L — SIGNIFICANT CHANGE UP (ref 135–145)
WBC # BLD: 9.1 K/UL — SIGNIFICANT CHANGE UP (ref 3.8–10.5)
WBC # FLD AUTO: 9.1 K/UL — SIGNIFICANT CHANGE UP (ref 3.8–10.5)

## 2020-04-03 PROCEDURE — 93010 ELECTROCARDIOGRAM REPORT: CPT

## 2020-04-03 RX ORDER — THIAMINE MONONITRATE (VIT B1) 100 MG
500 TABLET ORAL DAILY
Refills: 0 | Status: DISCONTINUED | OUTPATIENT
Start: 2020-04-03 | End: 2020-04-03

## 2020-04-03 RX ADMIN — ATORVASTATIN CALCIUM 20 MILLIGRAM(S): 80 TABLET, FILM COATED ORAL at 21:12

## 2020-04-03 RX ADMIN — Medication 2000 UNIT(S): at 11:50

## 2020-04-03 RX ADMIN — Medication 200 MILLIGRAM(S): at 05:52

## 2020-04-03 RX ADMIN — PYRIDOSTIGMINE BROMIDE 60 MILLIGRAM(S): 60 SOLUTION ORAL at 21:13

## 2020-04-03 RX ADMIN — PIPERACILLIN AND TAZOBACTAM 25 GRAM(S): 4; .5 INJECTION, POWDER, LYOPHILIZED, FOR SOLUTION INTRAVENOUS at 15:25

## 2020-04-03 RX ADMIN — LOSARTAN POTASSIUM 50 MILLIGRAM(S): 100 TABLET, FILM COATED ORAL at 05:52

## 2020-04-03 RX ADMIN — Medication 500 MILLIGRAM(S): at 11:50

## 2020-04-03 RX ADMIN — ENOXAPARIN SODIUM 40 MILLIGRAM(S): 100 INJECTION SUBCUTANEOUS at 11:50

## 2020-04-03 RX ADMIN — PYRIDOSTIGMINE BROMIDE 60 MILLIGRAM(S): 60 SOLUTION ORAL at 15:25

## 2020-04-03 RX ADMIN — Medication 40 MILLIGRAM(S): at 18:30

## 2020-04-03 RX ADMIN — ALBUTEROL 2 PUFF(S): 90 AEROSOL, METERED ORAL at 15:25

## 2020-04-03 RX ADMIN — Medication 5 MILLIGRAM(S): at 21:13

## 2020-04-03 RX ADMIN — PANTOPRAZOLE SODIUM 40 MILLIGRAM(S): 20 TABLET, DELAYED RELEASE ORAL at 05:52

## 2020-04-03 RX ADMIN — PIPERACILLIN AND TAZOBACTAM 25 GRAM(S): 4; .5 INJECTION, POWDER, LYOPHILIZED, FOR SOLUTION INTRAVENOUS at 21:13

## 2020-04-03 RX ADMIN — Medication 50 MICROGRAM(S): at 05:52

## 2020-04-03 RX ADMIN — Medication 200 MILLIGRAM(S): at 18:30

## 2020-04-03 RX ADMIN — PYRIDOSTIGMINE BROMIDE 180 MILLIGRAM(S): 60 SOLUTION ORAL at 11:50

## 2020-04-03 RX ADMIN — Medication 650 MILLIGRAM(S): at 15:25

## 2020-04-03 RX ADMIN — PIPERACILLIN AND TAZOBACTAM 25 GRAM(S): 4; .5 INJECTION, POWDER, LYOPHILIZED, FOR SOLUTION INTRAVENOUS at 05:53

## 2020-04-03 RX ADMIN — ZINC SULFATE TAB 220 MG (50 MG ZINC EQUIVALENT) 220 MILLIGRAM(S): 220 (50 ZN) TAB at 11:51

## 2020-04-03 RX ADMIN — PYRIDOSTIGMINE BROMIDE 60 MILLIGRAM(S): 60 SOLUTION ORAL at 05:53

## 2020-04-03 NOTE — PROGRESS NOTE ADULT - PROBLEM SELECTOR PLAN 2
on nasal cannula, while not hypoxic, respiratory rate in the 30s when arrived to ED  - on hydroxychloroquine protocol, check EKG for QTC daily  - hydroxychloroquine and pyridostigmine can lower seizure threshold, monitor neuro checks q4H  - supplemental o2 as above  - high dose vit C, thiamine, zinc

## 2020-04-03 NOTE — PROGRESS NOTE ADULT - ATTENDING COMMENTS
Patient with MG, admitted with COVID-19.  Will monitor respiratory status as best as possible with serial neuro examination (voice changes, neck flexion, etc).   On Plaquenil.   On O2 via combination NRB/NC this AM, able to downtitrate to just NC 10L later this morning/early afternoon.   Attempted rediscussion with patient re: code status; she does not contribute much and per prior discussions with daughter, patient would want trial of intubation. Given her age, comorbidities, patient unlikely to benefit from intubation trial. If patient/family continue to request full code status and all aggressive measures, will consult palliative care to assist in GOC discussions.   Discussed with ID, not candidate for other trials, etc. given her MG. However, can trial steroids as unlikely to cause further harm per discussion with Dr. Valle.

## 2020-04-03 NOTE — PROGRESS NOTE ADULT - PROBLEM SELECTOR PLAN 8
Daughter Patsy is [facility] ([phone]). Pt would want trial of intubation for now. Will continue [facility] pending clinical progress. Daughter Patsy is HCP ([phone]). Per discussion w/ daughter, she would like her mom to be DNR/DNI given the risk of prolonged suffering w/ intubation and small chance of recovery. Spoke to patient directly regarding this matter but she could not recite back what she heard or vocalize her own opinion on the matter and does not appear to be emotionally ready to comprehend the situation; will defer to daughter's wishes for the time being. Daughter Patsy is HCP ([phone]). Per discussion w/ daughter, she would like her mom to be DNR/DNI given the risk of prolonged suffering w/ intubation and small chance of recovery. Spoke to patient directly regarding this matter but she could not vocalize her own opinion on the matter and does not appear to be emotionally ready to comprehend the situation; will defer to daughter's wishes for the time being. Daughter Patsy is HCP ([phone]). Per discussion w/ daughter Patsy, she would like her mom to be DNR/DNI given the risk of prolonged suffering w/ intubation and small chance of recovery. Spoke to patient directly regarding this matter but she could not vocalize her own opinion on the matter and does not appear to be emotionally ready to comprehend the situation; will defer to daughter's wishes for the time being. Daughter Patsy is HCP ([phone]). Per discussion w/ daughter Patsy, she would like her mom to be DNR/DNI given the risk of prolonged suffering w/ intubation and small chance of recovery and she states she spoke to her mom about this. Spoke to patient directly regarding this matter but she does not appear to be emotionally ready to comprehend the situation; will defer to daughter's wishes for the time being.

## 2020-04-03 NOTE — PROGRESS NOTE ADULT - SUBJECTIVE AND OBJECTIVE BOX
PROGRESS NOTE:     Patient is a 79y old  Female who presents with a chief complaint of Shortness of breath, fevers (02 Apr 2020 07:08)      SUBJECTIVE / OVERNIGHT EVENTS: Pt seen and examined. No acute overnight events. Pt reported no fever, chills, CP, SOB, abdominal pain, N/V, urinary or bowel issues, or new joint aches.     ADDITIONAL REVIEW OF SYSTEMS: Otherwise negative    MEDICATIONS  (STANDING):  ascorbic acid 500 milliGRAM(s) Oral daily  atorvastatin 20 milliGRAM(s) Oral at bedtime  cholecalciferol 2000 Unit(s) Oral daily  enoxaparin Injectable 40 milliGRAM(s) SubCutaneous daily  hydroxychloroquine   Oral   hydroxychloroquine 200 milliGRAM(s) Oral every 12 hours  levothyroxine 50 MICROGram(s) Oral daily  losartan 50 milliGRAM(s) Oral daily  melatonin 5 milliGRAM(s) Oral at bedtime  pantoprazole    Tablet 40 milliGRAM(s) Oral before breakfast  piperacillin/tazobactam IVPB.. 3.375 Gram(s) IV Intermittent every 8 hours  pyridostigmine 60 milliGRAM(s) Oral three times a day  pyridostigmine  milliGRAM(s) Oral daily  zinc sulfate 220 milliGRAM(s) Oral daily    MEDICATIONS  (PRN):  acetaminophen   Tablet .. 650 milliGRAM(s) Oral every 4 hours PRN Temp greater or equal to 38C (100.4F), Moderate Pain (4 - 6)  ALBUTerol    90 MICROgram(s) HFA Inhaler 2 Puff(s) Inhalation every 4 hours PRN Shortness of Breath and/or Wheezing  polyethylene glycol 3350 17 Gram(s) Oral daily PRN Constipation  senna 2 Tablet(s) Oral at bedtime PRN Constipation      CAPILLARY BLOOD GLUCOSE        I&O's Summary    02 Apr 2020 07:01  -  03 Apr 2020 07:00  --------------------------------------------------------  IN: 0 mL / OUT: 650 mL / NET: -650 mL        PHYSICAL EXAM:  Vital Signs Last 24 Hrs  T(C): 37.2 (03 Apr 2020 05:51), Max: 37.9 (02 Apr 2020 18:03)  T(F): 99 (03 Apr 2020 05:51), Max: 100.2 (02 Apr 2020 18:03)  HR: 80 (03 Apr 2020 05:51) (71 - 80)  BP: 120/68 (03 Apr 2020 05:51) (102/60 - 120/68)  BP(mean): --  RR: 22 (03 Apr 2020 05:51) (20 - 22)  SpO2: 100% (03 Apr 2020 05:51) (93% - 100%)    CONSTITUTIONAL: NAD  RESPIRATORY: Normal respiratory effort; lungs are clear to auscultation bilaterally  CARDIOVASCULAR: Regular rate and rhythm, normal S1 and S2, no murmur/rub/gallop  ABDOMEN: Nontender to palpation, normoactive bowel sounds, no rebound/guarding; No hepatosplenomegaly  EXTREMITIES: No lower extremity edema; Peripheral pulses are 2+ bilaterally  MUSCLOSKELETAL: no clubbing or cyanosis of digits; no joint swelling or tenderness to palpation  PSYCH: A+O to person, place, and time; affect appropriate    LABS:                        7.3    6.52  )-----------( 235      ( 02 Apr 2020 09:55 )             25.3     04-02    139  |  105  |  18  ----------------------------<  110<H>  4.6   |  22  |  1.06    Ca    9.0      02 Apr 2020 09:55  Phos  2.0     04-02  Mg     2.0     04-02    TPro  6.6  /  Alb  2.7<L>  /  TBili  0.3  /  DBili  x   /  AST  38  /  ALT  19  /  AlkPhos  46  04-02                RADIOLOGY & ADDITIONAL TESTS:  Results Reviewed:   Imaging Personally Reviewed:  Electrocardiogram Personally Reviewed:    COORDINATION OF CARE:  Care Discussed with Consultants/Other Providers [Y/N]:  Prior or Outpatient Records Reviewed [Y/N]: PROGRESS NOTE:     Patient is a 79y old  Female who presents with a chief complaint of Shortness of breath, fevers (02 Apr 2020 07:08)      SUBJECTIVE / OVERNIGHT EVENTS: Pt seen and examined. No acute overnight events. Pt when seen this morning desaturated on 15L NRB and 10L NC and NC titrated up to 15L and PCA was notified to help prone patient or put in lateral position. Pt reported SOB and cough and diarrhea, no fever, chills, CP, abdominal pain, N/V, urinary issues, or new joint aches.     ADDITIONAL REVIEW OF SYSTEMS: Otherwise negative    MEDICATIONS  (STANDING):  ascorbic acid 500 milliGRAM(s) Oral daily  atorvastatin 20 milliGRAM(s) Oral at bedtime  cholecalciferol 2000 Unit(s) Oral daily  enoxaparin Injectable 40 milliGRAM(s) SubCutaneous daily  hydroxychloroquine   Oral   hydroxychloroquine 200 milliGRAM(s) Oral every 12 hours  levothyroxine 50 MICROGram(s) Oral daily  losartan 50 milliGRAM(s) Oral daily  melatonin 5 milliGRAM(s) Oral at bedtime  pantoprazole    Tablet 40 milliGRAM(s) Oral before breakfast  piperacillin/tazobactam IVPB.. 3.375 Gram(s) IV Intermittent every 8 hours  pyridostigmine 60 milliGRAM(s) Oral three times a day  pyridostigmine  milliGRAM(s) Oral daily  zinc sulfate 220 milliGRAM(s) Oral daily    MEDICATIONS  (PRN):  acetaminophen   Tablet .. 650 milliGRAM(s) Oral every 4 hours PRN Temp greater or equal to 38C (100.4F), Moderate Pain (4 - 6)  ALBUTerol    90 MICROgram(s) HFA Inhaler 2 Puff(s) Inhalation every 4 hours PRN Shortness of Breath and/or Wheezing  polyethylene glycol 3350 17 Gram(s) Oral daily PRN Constipation  senna 2 Tablet(s) Oral at bedtime PRN Constipation      CAPILLARY BLOOD GLUCOSE        I&O's Summary    02 Apr 2020 07:01  -  03 Apr 2020 07:00  --------------------------------------------------------  IN: 0 mL / OUT: 650 mL / NET: -650 mL        PHYSICAL EXAM:  Vital Signs Last 24 Hrs  T(C): 37.2 (03 Apr 2020 05:51), Max: 37.9 (02 Apr 2020 18:03)  T(F): 99 (03 Apr 2020 05:51), Max: 100.2 (02 Apr 2020 18:03)  HR: 80 (03 Apr 2020 05:51) (71 - 80)  BP: 120/68 (03 Apr 2020 05:51) (102/60 - 120/68)  BP(mean): --  RR: 22 (03 Apr 2020 05:51) (20 - 22)  SpO2: 100% (03 Apr 2020 05:51) (93% - 100%)    CONSTITUTIONAL: NAD  RESPIRATORY: Normal respiratory effort; lungs are clear to auscultation bilaterally  CARDIOVASCULAR: Regular rate and rhythm, normal S1 and S2, no murmur/rub/gallop  ABDOMEN: Nontender to palpation, normoactive bowel sounds, no rebound/guarding; No hepatosplenomegaly  EXTREMITIES: No lower extremity edema; Peripheral pulses are 2+ bilaterally  MUSCLOSKELETAL: no clubbing or cyanosis of digits; no joint swelling or tenderness to palpation  PSYCH: A+O to person, place, and time; affect appropriate    LABS:                        7.3    6.52  )-----------( 235      ( 02 Apr 2020 09:55 )             25.3     04-02    139  |  105  |  18  ----------------------------<  110<H>  4.6   |  22  |  1.06    Ca    9.0      02 Apr 2020 09:55  Phos  2.0     04-02  Mg     2.0     04-02    TPro  6.6  /  Alb  2.7<L>  /  TBili  0.3  /  DBili  x   /  AST  38  /  ALT  19  /  AlkPhos  46  04-02                RADIOLOGY & ADDITIONAL TESTS:  Results Reviewed:   Imaging Personally Reviewed:  Electrocardiogram Personally Reviewed:    COORDINATION OF CARE:  Care Discussed with Consultants/Other Providers [Y/N]:  Prior or Outpatient Records Reviewed [Y/N]: PROGRESS NOTE:     Patient is a 79y old  Female who presents with a chief complaint of Shortness of breath, fevers (02 Apr 2020 07:08)      SUBJECTIVE / OVERNIGHT EVENTS: Pt seen and examined. No acute overnight events. Pt when seen this morning desaturated on 15L NRB and 10L NC and NC titrated up to 15L and PCA was notified to help prone patient or put in lateral position. Pt reported SOB and cough and diarrhea, no fever, chills, CP, abdominal pain, N/V, urinary issues, or new joint aches.     ADDITIONAL REVIEW OF SYSTEMS: Otherwise negative    MEDICATIONS  (STANDING):  ascorbic acid 500 milliGRAM(s) Oral daily  atorvastatin 20 milliGRAM(s) Oral at bedtime  cholecalciferol 2000 Unit(s) Oral daily  enoxaparin Injectable 40 milliGRAM(s) SubCutaneous daily  hydroxychloroquine   Oral   hydroxychloroquine 200 milliGRAM(s) Oral every 12 hours  levothyroxine 50 MICROGram(s) Oral daily  losartan 50 milliGRAM(s) Oral daily  melatonin 5 milliGRAM(s) Oral at bedtime  pantoprazole    Tablet 40 milliGRAM(s) Oral before breakfast  piperacillin/tazobactam IVPB.. 3.375 Gram(s) IV Intermittent every 8 hours  pyridostigmine 60 milliGRAM(s) Oral three times a day  pyridostigmine  milliGRAM(s) Oral daily  zinc sulfate 220 milliGRAM(s) Oral daily    MEDICATIONS  (PRN):  acetaminophen   Tablet .. 650 milliGRAM(s) Oral every 4 hours PRN Temp greater or equal to 38C (100.4F), Moderate Pain (4 - 6)  ALBUTerol    90 MICROgram(s) HFA Inhaler 2 Puff(s) Inhalation every 4 hours PRN Shortness of Breath and/or Wheezing  polyethylene glycol 3350 17 Gram(s) Oral daily PRN Constipation  senna 2 Tablet(s) Oral at bedtime PRN Constipation      CAPILLARY BLOOD GLUCOSE        I&O's Summary    02 Apr 2020 07:01  -  03 Apr 2020 07:00  --------------------------------------------------------  IN: 0 mL / OUT: 650 mL / NET: -650 mL        PHYSICAL EXAM:  Vital Signs Last 24 Hrs  T(C): 37.2 (03 Apr 2020 05:51), Max: 37.9 (02 Apr 2020 18:03)  T(F): 99 (03 Apr 2020 05:51), Max: 100.2 (02 Apr 2020 18:03)  HR: 80 (03 Apr 2020 05:51) (71 - 80)  BP: 120/68 (03 Apr 2020 05:51) (102/60 - 120/68)  BP(mean): --  RR: 22 (03 Apr 2020 05:51) (20 - 22)  SpO2: 100% (03 Apr 2020 05:51) (93% - 100%)    CONSTITUTIONAL: NAD  RESPIRATORY: Normal respiratory effort; lungs are clear to auscultation bilaterally  CARDIOVASCULAR: Regular rate and rhythm, normal S1 and S2, no murmur/rub/gallop  ABDOMEN: Nontender to palpation, normoactive bowel sounds, no rebound/guarding; No hepatosplenomegaly  EXTREMITIES: No lower extremity edema; Peripheral pulses are 2+ bilaterally  MUSCLOSKELETAL: no clubbing or cyanosis of digits; no joint swelling or tenderness to palpation  PSYCH: A+O to person, place, and time; affect appropriate    LABS:                        7.3    6.52  )-----------( 235      ( 02 Apr 2020 09:55 )             25.3     04-02    139  |  105  |  18  ----------------------------<  110<H>  4.6   |  22  |  1.06    Ca    9.0      02 Apr 2020 09:55  Phos  2.0     04-02  Mg     2.0     04-02    TPro  6.6  /  Alb  2.7<L>  /  TBili  0.3  /  DBili  x   /  AST  38  /  ALT  19  /  AlkPhos  46  04-02

## 2020-04-03 NOTE — PROGRESS NOTE ADULT - PROBLEM SELECTOR PLAN 1
2/2 R-sided bacterial pna given consolidation seen on CXR and elevated procalcitonin 3.40 with concomitant COVID19 viral pneumonia, at high risk for MG exacerbation, on nasal cannula, while not hypoxic, respiratory rate in the 30s when arrived to ED  - zosyn 3.375gm iv q8 hours for aspiration pna vs. HAP. Low suspicion for MRSA pna, will hold off on vanc for now  - management of COVID as below  - per daughter (HCP), patient would want trial of intubation, however will need to continue addressing GOC pending clinical progress 2/2 R-sided bacterial pna given consolidation seen on CXR and elevated procalcitonin 3.40 with concomitant COVID19 viral pneumonia, at high risk for MG exacerbation, on nasal cannula, while not hypoxic, respiratory rate in the 30s when arrived to ED  - zosyn 3.375gm iv q8 hours for aspiration pna vs. HAP. Low suspicion for MRSA pna, will hold off on vanc for now  - discussed with ID Dr. Valle - will trial course of steroids as may benefit, unlikely to worsen status  - management of COVID as below  - per daughter (HCP), patient would want trial of intubation, however will need to continue addressing GOC pending clinical progress

## 2020-04-03 NOTE — PROGRESS NOTE ADULT - PROBLEM SELECTOR PLAN 7
DVT ppx: lovenox QD  Diet: S&S assessment, recommendation for NPO, asked patient her thoughts about her diet and risk for aspiration and she stated that she does not care about having a diet or not having a diet, so will initiate NPO except medications w/ water for now  Dispo: pending DVT ppx: lovenox QD  Diet: Reguar (S&S assessment, recommendation for NPO, discussed with patient the risk of aspiration and contributing to a PNA, patient states she understands the risk and would like a full diet)  Dispo: pending DVT ppx: lovenox QD  Diet: Regular (S&S assessment, recommendation for NPO, discussed with patient the risk of aspiration and contributing to a PNA, patient states she understands the risk and would like a full diet)  Dispo: pending DVT ppx: lovenox QD  Diet: Regular (S&S assessment, recommendation for NPO, discussed with patient the risk of aspiration and contributing to a PNA, patient states she understands the risk and would like a full diet)  Dispo: pending  Code: DNR, DNI

## 2020-04-03 NOTE — PROGRESS NOTE ADULT - PROBLEM SELECTOR PLAN 3
Patient at high risk for MG crisis  -continue pyridostigmine ER 180mg po daily, mestinon (pyridostigmine) 60mg po tid  - unable to get NIFs and VC q8H given COVID +, will monitor q4 neuro checks specifically looking at neck flexion, voice quality, difficulty swallowing, and call neurology if deterioration  -s/p 1 dose soliris (eculizumab) outpatient neurology (on mestinon and soliris for MG outpatient)  - neurology following, recs greatly appreciated Patient at high risk for MG crisis  - continue pyridostigmine ER 180mg po daily, mestinon (pyridostigmine) 60mg po tid  - unable to get NIFs and VC q8H given COVID +, will monitor q4 neuro checks specifically looking at neck flexion, voice quality, difficulty swallowing, and call neurology if deterioration  - s/p 1 dose Soliris (eculizumab) outpatient neurology (on mestinon and Soliris for MG outpatient)  - neurology following, recs greatly appreciated

## 2020-04-04 LAB
ALBUMIN SERPL ELPH-MCNC: 2.7 G/DL — LOW (ref 3.3–5)
ALP SERPL-CCNC: 57 U/L — SIGNIFICANT CHANGE UP (ref 40–120)
ALT FLD-CCNC: 35 U/L — SIGNIFICANT CHANGE UP (ref 10–45)
ANION GAP SERPL CALC-SCNC: 16 MMOL/L — SIGNIFICANT CHANGE UP (ref 5–17)
AST SERPL-CCNC: 59 U/L — HIGH (ref 10–40)
BILIRUB SERPL-MCNC: 0.2 MG/DL — SIGNIFICANT CHANGE UP (ref 0.2–1.2)
BUN SERPL-MCNC: 32 MG/DL — HIGH (ref 7–23)
CALCIUM SERPL-MCNC: 8.8 MG/DL — SIGNIFICANT CHANGE UP (ref 8.4–10.5)
CHLORIDE SERPL-SCNC: 105 MMOL/L — SIGNIFICANT CHANGE UP (ref 96–108)
CO2 SERPL-SCNC: 19 MMOL/L — LOW (ref 22–31)
CREAT SERPL-MCNC: 1.04 MG/DL — SIGNIFICANT CHANGE UP (ref 0.5–1.3)
CULTURE RESULTS: SIGNIFICANT CHANGE UP
CULTURE RESULTS: SIGNIFICANT CHANGE UP
GLUCOSE SERPL-MCNC: 171 MG/DL — HIGH (ref 70–99)
HCT VFR BLD CALC: 27.3 % — LOW (ref 34.5–45)
HGB BLD-MCNC: 8 G/DL — LOW (ref 11.5–15.5)
MAGNESIUM SERPL-MCNC: 2.2 MG/DL — SIGNIFICANT CHANGE UP (ref 1.6–2.6)
MCHC RBC-ENTMCNC: 23.7 PG — LOW (ref 27–34)
MCHC RBC-ENTMCNC: 29.3 GM/DL — LOW (ref 32–36)
MCV RBC AUTO: 81 FL — SIGNIFICANT CHANGE UP (ref 80–100)
NRBC # BLD: 0 /100 WBCS — SIGNIFICANT CHANGE UP (ref 0–0)
PHOSPHATE SERPL-MCNC: 4.4 MG/DL — SIGNIFICANT CHANGE UP (ref 2.5–4.5)
PLATELET # BLD AUTO: 314 K/UL — SIGNIFICANT CHANGE UP (ref 150–400)
POTASSIUM SERPL-MCNC: 4.1 MMOL/L — SIGNIFICANT CHANGE UP (ref 3.5–5.3)
POTASSIUM SERPL-SCNC: 4.1 MMOL/L — SIGNIFICANT CHANGE UP (ref 3.5–5.3)
PROT SERPL-MCNC: 7.4 G/DL — SIGNIFICANT CHANGE UP (ref 6–8.3)
RBC # BLD: 3.37 M/UL — LOW (ref 3.8–5.2)
RBC # FLD: 19.6 % — HIGH (ref 10.3–14.5)
SODIUM SERPL-SCNC: 140 MMOL/L — SIGNIFICANT CHANGE UP (ref 135–145)
SPECIMEN SOURCE: SIGNIFICANT CHANGE UP
SPECIMEN SOURCE: SIGNIFICANT CHANGE UP
WBC # BLD: 7.37 K/UL — SIGNIFICANT CHANGE UP (ref 3.8–10.5)
WBC # FLD AUTO: 7.37 K/UL — SIGNIFICANT CHANGE UP (ref 3.8–10.5)

## 2020-04-04 PROCEDURE — 93010 ELECTROCARDIOGRAM REPORT: CPT

## 2020-04-04 RX ORDER — SODIUM CHLORIDE 9 MG/ML
250 INJECTION INTRAMUSCULAR; INTRAVENOUS; SUBCUTANEOUS ONCE
Refills: 0 | Status: COMPLETED | OUTPATIENT
Start: 2020-04-04 | End: 2020-04-04

## 2020-04-04 RX ORDER — ZINC OXIDE 200 MG/G
1 OINTMENT TOPICAL DAILY
Refills: 0 | Status: DISCONTINUED | OUTPATIENT
Start: 2020-04-04 | End: 2020-05-19

## 2020-04-04 RX ORDER — LOPERAMIDE HCL 2 MG
4 TABLET ORAL ONCE
Refills: 0 | Status: COMPLETED | OUTPATIENT
Start: 2020-04-04 | End: 2020-04-04

## 2020-04-04 RX ORDER — LOPERAMIDE HCL 2 MG
2 TABLET ORAL
Refills: 0 | Status: DISCONTINUED | OUTPATIENT
Start: 2020-04-04 | End: 2020-04-08

## 2020-04-04 RX ADMIN — ENOXAPARIN SODIUM 40 MILLIGRAM(S): 100 INJECTION SUBCUTANEOUS at 13:44

## 2020-04-04 RX ADMIN — Medication 2000 UNIT(S): at 14:32

## 2020-04-04 RX ADMIN — Medication 40 MILLIGRAM(S): at 16:20

## 2020-04-04 RX ADMIN — PYRIDOSTIGMINE BROMIDE 60 MILLIGRAM(S): 60 SOLUTION ORAL at 04:31

## 2020-04-04 RX ADMIN — Medication 100 MILLIGRAM(S): at 20:38

## 2020-04-04 RX ADMIN — Medication 4 MILLIGRAM(S): at 14:34

## 2020-04-04 RX ADMIN — Medication 200 MILLIGRAM(S): at 16:21

## 2020-04-04 RX ADMIN — Medication 100 MILLIGRAM(S): at 14:32

## 2020-04-04 RX ADMIN — SODIUM CHLORIDE 125 MILLILITER(S): 9 INJECTION INTRAMUSCULAR; INTRAVENOUS; SUBCUTANEOUS at 04:57

## 2020-04-04 RX ADMIN — PYRIDOSTIGMINE BROMIDE 180 MILLIGRAM(S): 60 SOLUTION ORAL at 14:32

## 2020-04-04 RX ADMIN — Medication 50 MICROGRAM(S): at 04:30

## 2020-04-04 RX ADMIN — Medication 200 MILLIGRAM(S): at 04:30

## 2020-04-04 RX ADMIN — Medication 5 MILLIGRAM(S): at 20:37

## 2020-04-04 RX ADMIN — Medication 40 MILLIGRAM(S): at 04:30

## 2020-04-04 RX ADMIN — PIPERACILLIN AND TAZOBACTAM 25 GRAM(S): 4; .5 INJECTION, POWDER, LYOPHILIZED, FOR SOLUTION INTRAVENOUS at 14:32

## 2020-04-04 RX ADMIN — ATORVASTATIN CALCIUM 20 MILLIGRAM(S): 80 TABLET, FILM COATED ORAL at 20:37

## 2020-04-04 RX ADMIN — Medication 2 MILLIGRAM(S): at 20:37

## 2020-04-04 RX ADMIN — PYRIDOSTIGMINE BROMIDE 60 MILLIGRAM(S): 60 SOLUTION ORAL at 20:37

## 2020-04-04 RX ADMIN — PIPERACILLIN AND TAZOBACTAM 25 GRAM(S): 4; .5 INJECTION, POWDER, LYOPHILIZED, FOR SOLUTION INTRAVENOUS at 20:37

## 2020-04-04 RX ADMIN — PIPERACILLIN AND TAZOBACTAM 25 GRAM(S): 4; .5 INJECTION, POWDER, LYOPHILIZED, FOR SOLUTION INTRAVENOUS at 04:31

## 2020-04-04 RX ADMIN — PYRIDOSTIGMINE BROMIDE 60 MILLIGRAM(S): 60 SOLUTION ORAL at 14:32

## 2020-04-04 RX ADMIN — Medication 500 MILLIGRAM(S): at 14:31

## 2020-04-04 RX ADMIN — PANTOPRAZOLE SODIUM 40 MILLIGRAM(S): 20 TABLET, DELAYED RELEASE ORAL at 04:31

## 2020-04-04 RX ADMIN — ZINC SULFATE TAB 220 MG (50 MG ZINC EQUIVALENT) 220 MILLIGRAM(S): 220 (50 ZN) TAB at 14:32

## 2020-04-04 NOTE — CHART NOTE - NSCHARTNOTEFT_GEN_A_CORE
Pt observed to have low urine output during shift, bladder scan shows scant fluid. Given 250 cc NS bolus challenge. Pt also bradycardic to 50s, was sleeping, but still bradycardic after waking up. /60. Asymptomatic. EKG shows sinus bradycardia. Will continue to monitor for now.

## 2020-04-04 NOTE — PROGRESS NOTE ADULT - PROBLEM SELECTOR PLAN 3
Patient at high risk for MG crisis  - continue pyridostigmine ER 180mg po daily, mestinon (pyridostigmine) 60mg po tid  - unable to get NIFs and VC q8H given COVID +, will monitor q4 neuro checks specifically looking at neck flexion, voice quality, difficulty swallowing, and call neurology if deterioration  - s/p 1 dose Soliris (eculizumab) outpatient neurology (on mestinon and Soliris for MG outpatient)  - neurology following, recs greatly appreciated Patient at high risk for MG crisis  - continue pyridostigmine ER 180mg po daily, Mestinon (pyridostigmine) 60mg po tid  - unable to get NIFs and VC q8H given COVID +, will monitor q4 neuro checks specifically looking at neck flexion, voice quality, difficulty swallowing, and call neurology if deterioration  - s/p 1 dose Soliris (eculizumab) outpatient neurology (on mestinon and Soliris for MG outpatient)  - neurology following, recs greatly appreciated

## 2020-04-04 NOTE — PROGRESS NOTE ADULT - PROBLEM SELECTOR PLAN 8
Daughter Patsy is HCP ([phone]). Per discussion w/ daughter Patsy, she would like her mom to be DNR/DNI given the risk of prolonged suffering w/ intubation and small chance of recovery and she states she spoke to her mom about this. Spoke to patient directly regarding this matter but she does not appear to be emotionally ready to comprehend the situation; will defer to daughter's wishes for the time being.

## 2020-04-04 NOTE — PROGRESS NOTE ADULT - ATTENDING COMMENTS
Patient with MG, admitted with COVID-19.  Will monitor respiratory status as best as possible with serial neuro examination (voice changes, neck flexion, etc).   On Plaquenil, broad spectrum antibiotics for possible superinfection. Started on trial of steroids.   On O2 via NRB. Will titrate as tolerated.   Patient now DNR/DNI after discussion with daughter yesterday.   Discussed previously with ID, not candidate for other trials, etc. given her MG. However, can trial steroids as unlikely to cause further harm per discussion with Dr. Valle.

## 2020-04-04 NOTE — PROGRESS NOTE ADULT - PROBLEM SELECTOR PLAN 1
2/2 R-sided bacterial pna given consolidation seen on CXR and elevated procalcitonin 3.40 with concomitant COVID19 viral pneumonia, at high risk for MG exacerbation, on nasal cannula, while not hypoxic, respiratory rate in the 30s when arrived to ED  - zosyn 3.375gm iv q8 hours for aspiration pna vs. HAP. Low suspicion for MRSA pna, will hold off on vanc for now  - discussed with ID Dr. Valle - will trial course of steroids as may benefit, unlikely to worsen status  - management of COVID as below  - per daughter (HCP), patient would want trial of intubation, however will need to continue addressing GOC pending clinical progress

## 2020-04-04 NOTE — PROGRESS NOTE ADULT - PROBLEM SELECTOR PLAN 7
DVT ppx: lovenox QD  Diet: Regular (S&S assessment, recommendation for NPO, discussed with patient the risk of aspiration and contributing to a PNA, patient states she understands the risk and would like a full diet)  Dispo: pending  Code: DNR, DNI

## 2020-04-04 NOTE — PROGRESS NOTE ADULT - SUBJECTIVE AND OBJECTIVE BOX
PROGRESS NOTE:     Patient is a 79y old  Female who presents with a chief complaint of Shortness of breath, fevers (03 Apr 2020 07:17)      SUBJECTIVE / OVERNIGHT EVENTS: Pt seen and examined. No acute overnight events. Pt reported no fever, chills, CP, SOB, abdominal pain, N/V, urinary or bowel issues, or new joint aches.     ADDITIONAL REVIEW OF SYSTEMS: Otherwise negative    MEDICATIONS  (STANDING):  ascorbic acid 500 milliGRAM(s) Oral daily  atorvastatin 20 milliGRAM(s) Oral at bedtime  cholecalciferol 2000 Unit(s) Oral daily  enoxaparin Injectable 40 milliGRAM(s) SubCutaneous daily  hydroxychloroquine   Oral   hydroxychloroquine 200 milliGRAM(s) Oral every 12 hours  levothyroxine 50 MICROGram(s) Oral daily  losartan 50 milliGRAM(s) Oral daily  melatonin 5 milliGRAM(s) Oral at bedtime  methylPREDNISolone sodium succinate Injectable 40 milliGRAM(s) IV Push every 12 hours  pantoprazole    Tablet 40 milliGRAM(s) Oral before breakfast  piperacillin/tazobactam IVPB.. 3.375 Gram(s) IV Intermittent every 8 hours  pyridostigmine 60 milliGRAM(s) Oral three times a day  pyridostigmine  milliGRAM(s) Oral daily  zinc sulfate 220 milliGRAM(s) Oral daily    MEDICATIONS  (PRN):  acetaminophen   Tablet .. 650 milliGRAM(s) Oral every 4 hours PRN Temp greater or equal to 38C (100.4F), Moderate Pain (4 - 6)  ALBUTerol    90 MICROgram(s) HFA Inhaler 2 Puff(s) Inhalation every 4 hours PRN Shortness of Breath and/or Wheezing  polyethylene glycol 3350 17 Gram(s) Oral daily PRN Constipation  senna 2 Tablet(s) Oral at bedtime PRN Constipation      CAPILLARY BLOOD GLUCOSE        I&O's Summary    03 Apr 2020 07:01  -  04 Apr 2020 07:00  --------------------------------------------------------  IN: 440 mL / OUT: 450 mL / NET: -10 mL        PHYSICAL EXAM:  Vital Signs Last 24 Hrs  T(C): 36.4 (04 Apr 2020 04:06), Max: 37.4 (03 Apr 2020 22:23)  T(F): 97.6 (04 Apr 2020 04:06), Max: 99.3 (03 Apr 2020 22:23)  HR: 51 (04 Apr 2020 04:06) (51 - 83)  BP: 104/60 (04 Apr 2020 04:06) (97/54 - 111/53)  BP(mean): --  RR: 20 (04 Apr 2020 04:06) (20 - 21)  SpO2: 100% (04 Apr 2020 04:06) (87% - 100%)    CONSTITUTIONAL: NAD  RESPIRATORY: Normal respiratory effort; lungs are clear to auscultation bilaterally  CARDIOVASCULAR: Regular rate and rhythm, normal S1 and S2, no murmur/rub/gallop  ABDOMEN: Nontender to palpation, normoactive bowel sounds, no rebound/guarding; No hepatosplenomegaly  EXTREMITIES: No lower extremity edema; Peripheral pulses are 2+ bilaterally  MUSCLOSKELETAL: no clubbing or cyanosis of digits; no joint swelling or tenderness to palpation  PSYCH: A+O to person, place, and time; affect appropriate    LABS:                        7.4    9.10  )-----------( 268      ( 03 Apr 2020 07:05 )             25.3     04-03    141  |  106  |  22  ----------------------------<  84  4.4   |  18<L>  |  1.02    Ca    9.1      03 Apr 2020 07:12  Phos  3.3     04-03  Mg     2.0     04-03    TPro  6.7  /  Alb  2.5<L>  /  TBili  0.2  /  DBili  x   /  AST  39  /  ALT  19  /  AlkPhos  51  04-03          RADIOLOGY & ADDITIONAL TESTS:  Results Reviewed:   Imaging Personally Reviewed:  Electrocardiogram Personally Reviewed:    COORDINATION OF CARE:  Care Discussed with Consultants/Other Providers [Y/N]:  Prior or Outpatient Records Reviewed [Y/N]: PROGRESS NOTE:     Patient is a 79y old  Female who presents with a chief complaint of Shortness of breath, fevers (03 Apr 2020 07:17)      SUBJECTIVE / OVERNIGHT EVENTS: Pt seen and examined. Pt observed to have low urine output during night shift, bladder scan shows scant fluid. Given 250 cc NS bolus challenge. Pt also bradycardic to 50s, was sleeping, but still bradycardic after waking up. /60. Asymptomatic. EKG shows sinus bradycardia. Will continue to monitor for now. Pt reported some CP, cough. Denies fever, chills, abdominal pain, N/V, urinary or bowel issues, or new joint aches.     ADDITIONAL REVIEW OF SYSTEMS: Otherwise negative    MEDICATIONS  (STANDING):  ascorbic acid 500 milliGRAM(s) Oral daily  atorvastatin 20 milliGRAM(s) Oral at bedtime  cholecalciferol 2000 Unit(s) Oral daily  enoxaparin Injectable 40 milliGRAM(s) SubCutaneous daily  hydroxychloroquine   Oral   hydroxychloroquine 200 milliGRAM(s) Oral every 12 hours  levothyroxine 50 MICROGram(s) Oral daily  losartan 50 milliGRAM(s) Oral daily  melatonin 5 milliGRAM(s) Oral at bedtime  methylPREDNISolone sodium succinate Injectable 40 milliGRAM(s) IV Push every 12 hours  pantoprazole    Tablet 40 milliGRAM(s) Oral before breakfast  piperacillin/tazobactam IVPB.. 3.375 Gram(s) IV Intermittent every 8 hours  pyridostigmine 60 milliGRAM(s) Oral three times a day  pyridostigmine  milliGRAM(s) Oral daily  zinc sulfate 220 milliGRAM(s) Oral daily    MEDICATIONS  (PRN):  acetaminophen   Tablet .. 650 milliGRAM(s) Oral every 4 hours PRN Temp greater or equal to 38C (100.4F), Moderate Pain (4 - 6)  ALBUTerol    90 MICROgram(s) HFA Inhaler 2 Puff(s) Inhalation every 4 hours PRN Shortness of Breath and/or Wheezing  polyethylene glycol 3350 17 Gram(s) Oral daily PRN Constipation  senna 2 Tablet(s) Oral at bedtime PRN Constipation      CAPILLARY BLOOD GLUCOSE        I&O's Summary    03 Apr 2020 07:01  -  04 Apr 2020 07:00  --------------------------------------------------------  IN: 440 mL / OUT: 450 mL / NET: -10 mL        PHYSICAL EXAM:  Vital Signs Last 24 Hrs  T(C): 36.4 (04 Apr 2020 04:06), Max: 37.4 (03 Apr 2020 22:23)  T(F): 97.6 (04 Apr 2020 04:06), Max: 99.3 (03 Apr 2020 22:23)  HR: 51 (04 Apr 2020 04:06) (51 - 83)  BP: 104/60 (04 Apr 2020 04:06) (97/54 - 111/53)  BP(mean): --  RR: 20 (04 Apr 2020 04:06) (20 - 21)  SpO2: 100% (04 Apr 2020 04:06) (87% - 100%)    CONSTITUTIONAL: NAD  RESPIRATORY: Normal respiratory effort  CARDIOVASCULAR: Regular rate and rhythm  ABDOMEN: Nontender to palpation, no rebound/guarding; No hepatosplenomegaly  EXTREMITIES: No lower extremity edema; Peripheral pulses are 2+ bilaterally  MUSCLOSKELETAL: no clubbing or cyanosis of digits; no joint swelling or tenderness to palpation  PSYCH: A+O to person, place, and time; affect appropriate    LABS:                        7.4    9.10  )-----------( 268      ( 03 Apr 2020 07:05 )             25.3     04-03    141  |  106  |  22  ----------------------------<  84  4.4   |  18<L>  |  1.02    Ca    9.1      03 Apr 2020 07:12  Phos  3.3     04-03  Mg     2.0     04-03    TPro  6.7  /  Alb  2.5<L>  /  TBili  0.2  /  DBili  x   /  AST  39  /  ALT  19  /  AlkPhos  51  04-03          RADIOLOGY & ADDITIONAL TESTS:  Results Reviewed:   Imaging Personally Reviewed:  Electrocardiogram Personally Reviewed:    COORDINATION OF CARE:  Care Discussed with Consultants/Other Providers [Y/N]:  Prior or Outpatient Records Reviewed [Y/N]:

## 2020-04-05 LAB
ALBUMIN SERPL ELPH-MCNC: 2.5 G/DL — LOW (ref 3.3–5)
ALP SERPL-CCNC: 197 U/L — HIGH (ref 40–120)
ALT FLD-CCNC: 80 U/L — HIGH (ref 10–45)
ANION GAP SERPL CALC-SCNC: 10 MMOL/L — SIGNIFICANT CHANGE UP (ref 5–17)
AST SERPL-CCNC: 145 U/L — HIGH (ref 10–40)
BILIRUB SERPL-MCNC: 0.3 MG/DL — SIGNIFICANT CHANGE UP (ref 0.2–1.2)
BUN SERPL-MCNC: 46 MG/DL — HIGH (ref 7–23)
CALCIUM SERPL-MCNC: 8.9 MG/DL — SIGNIFICANT CHANGE UP (ref 8.4–10.5)
CHLORIDE SERPL-SCNC: 104 MMOL/L — SIGNIFICANT CHANGE UP (ref 96–108)
CO2 SERPL-SCNC: 23 MMOL/L — SIGNIFICANT CHANGE UP (ref 22–31)
CREAT SERPL-MCNC: 1.22 MG/DL — SIGNIFICANT CHANGE UP (ref 0.5–1.3)
CRP SERPL-MCNC: 12.71 MG/DL — HIGH (ref 0–0.4)
FERRITIN SERPL-MCNC: 590 NG/ML — HIGH (ref 15–150)
GLUCOSE SERPL-MCNC: 133 MG/DL — HIGH (ref 70–99)
HCT VFR BLD CALC: 26.4 % — LOW (ref 34.5–45)
HGB BLD-MCNC: 7.8 G/DL — LOW (ref 11.5–15.5)
MAGNESIUM SERPL-MCNC: 2.5 MG/DL — SIGNIFICANT CHANGE UP (ref 1.6–2.6)
MCHC RBC-ENTMCNC: 23.6 PG — LOW (ref 27–34)
MCHC RBC-ENTMCNC: 29.5 GM/DL — LOW (ref 32–36)
MCV RBC AUTO: 80 FL — SIGNIFICANT CHANGE UP (ref 80–100)
NRBC # BLD: 0 /100 WBCS — SIGNIFICANT CHANGE UP (ref 0–0)
PHOSPHATE SERPL-MCNC: 4.2 MG/DL — SIGNIFICANT CHANGE UP (ref 2.5–4.5)
PLATELET # BLD AUTO: 345 K/UL — SIGNIFICANT CHANGE UP (ref 150–400)
POTASSIUM SERPL-MCNC: 4 MMOL/L — SIGNIFICANT CHANGE UP (ref 3.5–5.3)
POTASSIUM SERPL-SCNC: 4 MMOL/L — SIGNIFICANT CHANGE UP (ref 3.5–5.3)
PROCALCITONIN SERPL-MCNC: 0.78 NG/ML — HIGH (ref 0.02–0.1)
PROT SERPL-MCNC: 6.6 G/DL — SIGNIFICANT CHANGE UP (ref 6–8.3)
RBC # BLD: 3.3 M/UL — LOW (ref 3.8–5.2)
RBC # FLD: 19.7 % — HIGH (ref 10.3–14.5)
SODIUM SERPL-SCNC: 137 MMOL/L — SIGNIFICANT CHANGE UP (ref 135–145)
WBC # BLD: 10.6 K/UL — HIGH (ref 3.8–10.5)
WBC # FLD AUTO: 10.6 K/UL — HIGH (ref 3.8–10.5)

## 2020-04-05 RX ADMIN — PIPERACILLIN AND TAZOBACTAM 25 GRAM(S): 4; .5 INJECTION, POWDER, LYOPHILIZED, FOR SOLUTION INTRAVENOUS at 20:08

## 2020-04-05 RX ADMIN — PYRIDOSTIGMINE BROMIDE 60 MILLIGRAM(S): 60 SOLUTION ORAL at 20:08

## 2020-04-05 RX ADMIN — ZINC SULFATE TAB 220 MG (50 MG ZINC EQUIVALENT) 220 MILLIGRAM(S): 220 (50 ZN) TAB at 14:59

## 2020-04-05 RX ADMIN — PYRIDOSTIGMINE BROMIDE 60 MILLIGRAM(S): 60 SOLUTION ORAL at 18:30

## 2020-04-05 RX ADMIN — Medication 650 MILLIGRAM(S): at 20:08

## 2020-04-05 RX ADMIN — PIPERACILLIN AND TAZOBACTAM 25 GRAM(S): 4; .5 INJECTION, POWDER, LYOPHILIZED, FOR SOLUTION INTRAVENOUS at 12:07

## 2020-04-05 RX ADMIN — PYRIDOSTIGMINE BROMIDE 60 MILLIGRAM(S): 60 SOLUTION ORAL at 05:26

## 2020-04-05 RX ADMIN — ZINC OXIDE 1 APPLICATION(S): 200 OINTMENT TOPICAL at 14:58

## 2020-04-05 RX ADMIN — LOSARTAN POTASSIUM 50 MILLIGRAM(S): 100 TABLET, FILM COATED ORAL at 06:09

## 2020-04-05 RX ADMIN — Medication 40 MILLIGRAM(S): at 18:30

## 2020-04-05 RX ADMIN — Medication 650 MILLIGRAM(S): at 01:13

## 2020-04-05 RX ADMIN — PYRIDOSTIGMINE BROMIDE 180 MILLIGRAM(S): 60 SOLUTION ORAL at 18:31

## 2020-04-05 RX ADMIN — Medication 50 MICROGRAM(S): at 05:27

## 2020-04-05 RX ADMIN — Medication 100 MILLIGRAM(S): at 20:08

## 2020-04-05 RX ADMIN — PIPERACILLIN AND TAZOBACTAM 25 GRAM(S): 4; .5 INJECTION, POWDER, LYOPHILIZED, FOR SOLUTION INTRAVENOUS at 05:26

## 2020-04-05 RX ADMIN — ATORVASTATIN CALCIUM 20 MILLIGRAM(S): 80 TABLET, FILM COATED ORAL at 20:08

## 2020-04-05 RX ADMIN — Medication 5 MILLIGRAM(S): at 20:08

## 2020-04-05 RX ADMIN — Medication 500 MILLIGRAM(S): at 11:53

## 2020-04-05 RX ADMIN — Medication 40 MILLIGRAM(S): at 05:27

## 2020-04-05 RX ADMIN — Medication 2000 UNIT(S): at 12:08

## 2020-04-05 RX ADMIN — Medication 650 MILLIGRAM(S): at 05:27

## 2020-04-05 RX ADMIN — PANTOPRAZOLE SODIUM 40 MILLIGRAM(S): 20 TABLET, DELAYED RELEASE ORAL at 05:27

## 2020-04-05 RX ADMIN — ENOXAPARIN SODIUM 40 MILLIGRAM(S): 100 INJECTION SUBCUTANEOUS at 11:53

## 2020-04-05 NOTE — PROGRESS NOTE ADULT - PROBLEM SELECTOR PLAN 2
On NRB the past few days with sats in mid-90s but intermittently tachypnec.  - Downtitrate O2 to NC: satting 95% on 10L NC  - on hydroxychloroquine protocol, check EKG for QTC daily  - hydroxychloroquine and pyridostigmine can lower seizure threshold, monitor neuro checks q4H  - supplemental o2 as above  - high dose vit C, thiamine, zinc On NRB the past few days with sats in mid-90s but intermittently tachypneic.  - Downtitrate O2 to NC: satting 95% on 10L NC  - on hydroxychloroquine protocol, check EKG for QTC daily  - hydroxychloroquine and pyridostigmine can lower seizure threshold, monitor neuro checks q4H  - supplemental o2 as above  - high dose vit C, thiamine, zinc

## 2020-04-05 NOTE — PROGRESS NOTE ADULT - PROBLEM SELECTOR PLAN 3
Patient at high risk for MG crisis  - continue pyridostigmine ER 180mg po daily, Mestinon (pyridostigmine) 60mg po tid  - unable to get NIFs and VC q8H given COVID +, will monitor q4 neuro checks specifically looking at neck flexion, voice quality, difficulty swallowing, and call neurology if deterioration  - s/p 1 dose Soliris (eculizumab) outpatient neurology (on mestinon and Soliris for MG outpatient)  - neurology following, recs greatly appreciated Patient at high risk for MG crisis  - C/w pyridostigmine ER 180mg po daily, Mestinon (pyridostigmine) 60mg po tid  - unable to get NIFs and VC q8H given COVID+, will monitor q4 neuro checks specifically looking at neck flexion, voice quality, difficulty swallowing, and call neurology if deterioration  - s/p 1 dose Soliris (eculizumab) outpatient neurology (on mestinon and Soliris for MG outpatient)  - neurology following, recs greatly appreciated

## 2020-04-05 NOTE — PROGRESS NOTE ADULT - PROBLEM SELECTOR PLAN 8
Daughter Patsy is HCP ([phone]). Per discussion w/ daughter Patsy, she would like her mom to be DNR/DNI given the risk of prolonged suffering w/ intubation and small chance of recovery and she states she spoke to her mom about this. Spoke to patient directly regarding this matter but she does not appear to be emotionally ready to comprehend the situation; will defer to daughter's wishes for the time being.

## 2020-04-05 NOTE — PROGRESS NOTE ADULT - ATTENDING COMMENTS
Patient with MG, admitted with COVID-19.  Will monitor respiratory status as best as possible with serial neuro examination (voice changes, neck flexion, etc).   On Plaquenil, broad spectrum antibiotics for possible superinfection. Also on trial of steroids.   Able to downtitrate from NRB to 10L NC this AM. Will titrate as tolerated.   Patient DNR/DNI after discussion with daughter yesterday.   Discussed previously with ID, not candidate for other trials, etc. given her MG. However, will trial steroids as unlikely to cause further harm per discussion with Dr. Valle.

## 2020-04-05 NOTE — PROGRESS NOTE ADULT - ASSESSMENT
79F hx MG on pyridostigmine, HTN, hypothyroidism, recent hip replacement presenting with acute hypoxic respiratory failure and severe sepsis 2/2 r. sided bacterial PNA aspiration vs. HAP and concomitant severe COVID19 PNA, now on supplemental O2.

## 2020-04-05 NOTE — PROGRESS NOTE ADULT - PROBLEM SELECTOR PLAN 1
2/2 R-sided bacterial pna given consolidation seen on CXR and elevated procalcitonin 3.40 with concomitant COVID19 viral pneumonia. Also high risk for MG exacerbation.  - Zosyn 3.375 g IV q8h for aspiration pna vs. HAP (day 5/7)  - C/w methylprednisolone 40mg IV q12h (day 3/5)  - management of COVID as below  - DNR/DNI per daughter (HCP)

## 2020-04-05 NOTE — PROGRESS NOTE ADULT - SUBJECTIVE AND OBJECTIVE BOX
Aislinn Valdes MD  Team 2  [phone]    Patient is a 79y old  Female who presents with a chief complaint of Shortness of breath, fevers (04 Apr 2020 07:16)     INTERVAL HPI/OVERNIGHT EVENTS:  No acute events overnight. Pt on NRB 15L feeling symptoms are slowly improving. Diarrhea improved on medications.    MEDICATIONS  (STANDING):  ascorbic acid 500 milliGRAM(s) Oral daily  atorvastatin 20 milliGRAM(s) Oral at bedtime  cholecalciferol 2000 Unit(s) Oral daily  enoxaparin Injectable 40 milliGRAM(s) SubCutaneous daily  levothyroxine 50 MICROGram(s) Oral daily  losartan 50 milliGRAM(s) Oral daily  melatonin 5 milliGRAM(s) Oral at bedtime  methylPREDNISolone sodium succinate Injectable 40 milliGRAM(s) IV Push every 12 hours  pantoprazole    Tablet 40 milliGRAM(s) Oral before breakfast  piperacillin/tazobactam IVPB.. 3.375 Gram(s) IV Intermittent every 8 hours  pyridostigmine 60 milliGRAM(s) Oral three times a day  pyridostigmine  milliGRAM(s) Oral daily  zinc oxide 40% Ointment 1 Application(s) Topical daily  zinc sulfate 220 milliGRAM(s) Oral daily    MEDICATIONS  (PRN):  acetaminophen   Tablet .. 650 milliGRAM(s) Oral every 4 hours PRN Temp greater or equal to 38C (100.4F), Moderate Pain (4 - 6)  ALBUTerol    90 MICROgram(s) HFA Inhaler 2 Puff(s) Inhalation every 4 hours PRN Shortness of Breath and/or Wheezing  benzonatate 100 milliGRAM(s) Oral three times a day PRN Cough  loperamide 2 milliGRAM(s) Oral five times a day PRN loose stool  polyethylene glycol 3350 17 Gram(s) Oral daily PRN Constipation  senna 2 Tablet(s) Oral at bedtime PRN Constipation    Allergies:  IODINE (Rash)  No Known Drug Allergies  shellfish (Rash)      REVIEW OF SYSTEMS:  Constitutional: Denies fever, weight loss  Resp: +SOB, cough  CV: Denies chest pain, palpitations  GI: Denies abdominal pain, N/V/D/C  : Denies dysuria, increased frequency, hematuria  Neuro: Denies HA, weakness, numbness  Skin: Denies rashes, lesions  Lymph Nodes: Denies enlarged glands  MSK: Denies joint pain, swelling    VITAL SIGNS:  T(C): 36.4 (04-05-20 @ 06:02), Max: 36.8 (04-04-20 @ 16:39)  HR: 52 (04-05-20 @ 06:02) (52 - 87)  BP: 116/65 (04-05-20 @ 06:02) (109/65 - 128/70)  RR: 20 (04-05-20 @ 06:02) (20 - 20)  SpO2: 96% (04-05-20 @ 06:02) (96% - 96%)    PHYSICAL EXAM:  General: Elderly woman appearing younger than stated age, NAD  Eyes: Conjunctiva and sclera clear  ENMT: Moist mucous membranes  Neck: Supple  Chest: Clear to auscultation bilaterally; no rales, rhonchi, or wheezing  Heart: Regular rate and rhythm; no murmurs, rubs, or gallops  Abd: +BS, soft, nontender, nondistended  Nervous System: AAOX3  Psych: Appropriate affect and mood  Ext:  no LE edema    LABS:                        7.8    10.60 )-----------( 345      ( 05 Apr 2020 08:37 )             26.4     05 Apr 2020 08:37    137    |  104    |  46     ----------------------------<  133    4.0     |  23     |  1.22     Ca    8.9        05 Apr 2020 08:37  Phos  4.2       05 Apr 2020 08:37  Mg     2.5       05 Apr 2020 08:37    TPro  6.6    /  Alb  2.5    /  TBili  0.3    /  DBili  x      /  AST  145    /  ALT  80     /  AlkPhos  197    05 Apr 2020 08:37    RADIOLOGY & ADDITIONAL TESTS:    Imaging Personally Reviewed:  [ ] YES     Consultant(s) Notes Reviewed:      Care Discussed with Consultants/Other Providers:

## 2020-04-06 DIAGNOSIS — R79.89 OTHER SPECIFIED ABNORMAL FINDINGS OF BLOOD CHEMISTRY: ICD-10-CM

## 2020-04-06 LAB
ALBUMIN SERPL ELPH-MCNC: 3 G/DL — LOW (ref 3.3–5)
ALP SERPL-CCNC: 169 U/L — HIGH (ref 40–120)
ALT FLD-CCNC: 54 U/L — HIGH (ref 10–45)
ANION GAP SERPL CALC-SCNC: 12 MMOL/L — SIGNIFICANT CHANGE UP (ref 5–17)
AST SERPL-CCNC: 48 U/L — HIGH (ref 10–40)
BASOPHILS # BLD AUTO: 0.01 K/UL — SIGNIFICANT CHANGE UP (ref 0–0.2)
BASOPHILS NFR BLD AUTO: 0.1 % — SIGNIFICANT CHANGE UP (ref 0–2)
BILIRUB SERPL-MCNC: 0.2 MG/DL — SIGNIFICANT CHANGE UP (ref 0.2–1.2)
BUN SERPL-MCNC: 43 MG/DL — HIGH (ref 7–23)
CALCIUM SERPL-MCNC: 9.3 MG/DL — SIGNIFICANT CHANGE UP (ref 8.4–10.5)
CHLORIDE SERPL-SCNC: 107 MMOL/L — SIGNIFICANT CHANGE UP (ref 96–108)
CO2 SERPL-SCNC: 24 MMOL/L — SIGNIFICANT CHANGE UP (ref 22–31)
CREAT SERPL-MCNC: 1.04 MG/DL — SIGNIFICANT CHANGE UP (ref 0.5–1.3)
EOSINOPHIL # BLD AUTO: 0 K/UL — SIGNIFICANT CHANGE UP (ref 0–0.5)
EOSINOPHIL NFR BLD AUTO: 0 % — SIGNIFICANT CHANGE UP (ref 0–6)
GLUCOSE SERPL-MCNC: 135 MG/DL — HIGH (ref 70–99)
HCT VFR BLD CALC: 30.1 % — LOW (ref 34.5–45)
HGB BLD-MCNC: 8.8 G/DL — LOW (ref 11.5–15.5)
IMM GRANULOCYTES NFR BLD AUTO: 1.8 % — HIGH (ref 0–1.5)
LYMPHOCYTES # BLD AUTO: 0.38 K/UL — LOW (ref 1–3.3)
LYMPHOCYTES # BLD AUTO: 3.3 % — LOW (ref 13–44)
MAGNESIUM SERPL-MCNC: 2.5 MG/DL — SIGNIFICANT CHANGE UP (ref 1.6–2.6)
MCHC RBC-ENTMCNC: 23.5 PG — LOW (ref 27–34)
MCHC RBC-ENTMCNC: 29.2 GM/DL — LOW (ref 32–36)
MCV RBC AUTO: 80.5 FL — SIGNIFICANT CHANGE UP (ref 80–100)
MONOCYTES # BLD AUTO: 0.76 K/UL — SIGNIFICANT CHANGE UP (ref 0–0.9)
MONOCYTES NFR BLD AUTO: 6.6 % — SIGNIFICANT CHANGE UP (ref 2–14)
NEUTROPHILS # BLD AUTO: 10.07 K/UL — HIGH (ref 1.8–7.4)
NEUTROPHILS NFR BLD AUTO: 88.2 % — HIGH (ref 43–77)
NRBC # BLD: 0 /100 WBCS — SIGNIFICANT CHANGE UP (ref 0–0)
PHOSPHATE SERPL-MCNC: 3 MG/DL — SIGNIFICANT CHANGE UP (ref 2.5–4.5)
PLATELET # BLD AUTO: 462 K/UL — HIGH (ref 150–400)
POTASSIUM SERPL-MCNC: 4 MMOL/L — SIGNIFICANT CHANGE UP (ref 3.5–5.3)
POTASSIUM SERPL-SCNC: 4 MMOL/L — SIGNIFICANT CHANGE UP (ref 3.5–5.3)
PROT SERPL-MCNC: 7.4 G/DL — SIGNIFICANT CHANGE UP (ref 6–8.3)
RBC # BLD: 3.74 M/UL — LOW (ref 3.8–5.2)
RBC # FLD: 19.6 % — HIGH (ref 10.3–14.5)
SODIUM SERPL-SCNC: 143 MMOL/L — SIGNIFICANT CHANGE UP (ref 135–145)
WBC # BLD: 11.43 K/UL — HIGH (ref 3.8–10.5)
WBC # FLD AUTO: 11.43 K/UL — HIGH (ref 3.8–10.5)

## 2020-04-06 RX ADMIN — ZINC OXIDE 1 APPLICATION(S): 200 OINTMENT TOPICAL at 12:22

## 2020-04-06 RX ADMIN — Medication 50 MICROGRAM(S): at 04:16

## 2020-04-06 RX ADMIN — PANTOPRAZOLE SODIUM 40 MILLIGRAM(S): 20 TABLET, DELAYED RELEASE ORAL at 04:16

## 2020-04-06 RX ADMIN — Medication 100 MILLIGRAM(S): at 04:16

## 2020-04-06 RX ADMIN — ATORVASTATIN CALCIUM 20 MILLIGRAM(S): 80 TABLET, FILM COATED ORAL at 21:27

## 2020-04-06 RX ADMIN — PIPERACILLIN AND TAZOBACTAM 25 GRAM(S): 4; .5 INJECTION, POWDER, LYOPHILIZED, FOR SOLUTION INTRAVENOUS at 04:15

## 2020-04-06 RX ADMIN — LOSARTAN POTASSIUM 50 MILLIGRAM(S): 100 TABLET, FILM COATED ORAL at 05:45

## 2020-04-06 RX ADMIN — Medication 40 MILLIGRAM(S): at 04:16

## 2020-04-06 RX ADMIN — PYRIDOSTIGMINE BROMIDE 60 MILLIGRAM(S): 60 SOLUTION ORAL at 04:16

## 2020-04-06 RX ADMIN — PYRIDOSTIGMINE BROMIDE 180 MILLIGRAM(S): 60 SOLUTION ORAL at 12:22

## 2020-04-06 RX ADMIN — Medication 5 MILLIGRAM(S): at 21:27

## 2020-04-06 RX ADMIN — ENOXAPARIN SODIUM 40 MILLIGRAM(S): 100 INJECTION SUBCUTANEOUS at 12:21

## 2020-04-06 RX ADMIN — PYRIDOSTIGMINE BROMIDE 60 MILLIGRAM(S): 60 SOLUTION ORAL at 21:28

## 2020-04-06 RX ADMIN — Medication 650 MILLIGRAM(S): at 04:17

## 2020-04-06 RX ADMIN — Medication 40 MILLIGRAM(S): at 18:49

## 2020-04-06 RX ADMIN — Medication 500 MILLIGRAM(S): at 12:20

## 2020-04-06 RX ADMIN — Medication 2000 UNIT(S): at 12:21

## 2020-04-06 NOTE — PROGRESS NOTE ADULT - ATTENDING COMMENTS
Karen Ramirez MD  Pager [phone] ([facility])/ [phone] ([facility]) [please provide 10 digit call back number]

## 2020-04-06 NOTE — PROGRESS NOTE ADULT - PROBLEM SELECTOR PLAN 7
DVT ppx: lovenox QD  Diet: Regular (S&S assessment, recommendation for NPO, discussed with patient the risk of aspiration and contributing to a PNA, patient states she understands the risk and would like a full diet)  Dispo: pending  Code: DNR, DNI DVT ppx: lovenox QD  Diet: dysphagia 1 w/ honey thick liquids (S&S assessment, recommendation for NPO, pending repeat S&S assessment, spoke w/ daughter HCP and she states would like dysphagia diet for now)  Dispo: pending  Code: DNR, DNI on hyzaar at home. Hold HCTZ component for now  - continue losartan 50mg po daily with hold parameters. low threshold to hold if with lower end SBP

## 2020-04-06 NOTE — PROGRESS NOTE ADULT - PROBLEM SELECTOR PLAN 4
Hold aspirin, celecoxib for now LFTS elevated, will monitor, likely due to underlaying covid   if increasing consider to stop statin tx

## 2020-04-06 NOTE — PROGRESS NOTE ADULT - PROBLEM SELECTOR PLAN 6
on hyzaar at home. Hold HCTZ component for now  - continue losartan 50mg po daily with hold parameters on hyzaar at home. Hold HCTZ component for now  - continue losartan 50mg po daily with hold parameters. low threshold to hold if with lower end SBP continue synthroid  check TSH outpt (will be altered with high dose steroids)

## 2020-04-06 NOTE — PROGRESS NOTE ADULT - PROBLEM SELECTOR PLAN 3
Patient at high risk for MG crisis  - C/w pyridostigmine ER 180mg po daily, Mestinon (pyridostigmine) 60mg po tid  - unable to get NIFs and VC q8H given COVID+, will monitor q4 neuro checks specifically looking at neck flexion, voice quality, difficulty swallowing, and call neurology if deterioration  - s/p 1 dose Soliris (eculizumab) outpatient neurology (on mestinon and Soliris for MG outpatient)  - neurology following Patient at high risk for MG crisis  - C/w pyridostigmine ER 180mg po daily, Mestinon (pyridostigmine) 60mg po tid  - unable to get NIFs and VC q8H given COVID+, will monitor q4 neuro checks specifically looking at neck flexion, voice quality, difficulty swallowing, and call neurology if deterioration  - s/p 1 dose Soliris (eculizumab) outpatient neurology (on mestinon and Soliris for MG outpatient), will continue w/ methylprednisolone (complete 4/8) and touch base w/ neurology after course is completed for possible trial of prednisone  - neurology following Patient at high risk for MG crisis  - C/w pyridostigmine ER 180mg po daily, Mestinon (pyridostigmine) 60mg po tid  - unable to get NIFs and VC q8H given COVID+, will monitor q4 neuro checks specifically looking at neck flexion, voice quality, difficulty swallowing, and call neurology if deterioration  - s/p 1 dose Soliris (eculizumab) outpatient neurology (on mestinon and Soliris for MG outpatient), will continue w/ methylprednisolone (complete 4/8) and touch base w/ neurology after course is completed for possible trial of prednisone  -- hydroxychloroquine and pyridostigmine can lower seizure threshold, monitor neuro checks q4H, currently hydroxycholoruine is now compelted  - neurology following Patient at high risk for MG crisis  - C/w pyridostigmine ER 180mg po daily, Mestinon (pyridostigmine) 60mg po tid  - unable to get NIFs and VC q8H given COVID+, will monitor q4 neuro checks specifically looking at neck flexion, voice quality, difficulty swallowing, and call neurology if deterioration  - s/p 1 dose Soliris (eculizumab) outpatient neurology (on mestinon and Soliris for MG outpatient), will continue w/ methylprednisolone (complete 4/8) and touch base w/ neurology after course is completed for possible trial of prednisone  -- hydroxychloroquine and pyridostigmine can lower seizure threshold, monitor neuro checks q4H, currently hydroxycholoruine is now compelted  - neurology following  -contact neuro on 4/8 for plan for steroids

## 2020-04-06 NOTE — PROGRESS NOTE ADULT - PROBLEM SELECTOR PLAN 1
2/2 R-sided bacterial pna given consolidation seen on CXR and elevated procalcitonin 3.40 with concomitant COVID19 viral pneumonia. Also high risk for MG exacerbation.  - Zosyn 3.375 g IV q8h for aspiration pna vs. HAP (day 5/7)  - C/w methylprednisolone 40mg IV q12h (day 3/5)  - management of COVID as below  - DNR/DNI per daughter (HCP) 2/2 COVID19 viral pneumonia, ID curbsided, less likely to have overlying superimposed PNA, while procalcitonin was high, could be 2/2 CKD/NICO and is already done w/ 5 day course zosyn so DC further abx. Also high risk for MG exacerbation.  - C/w methylprednisolone 40mg IV q12h (day 4/5)  - management of COVID as below  - DNR/DNI per daughter (HCP) 2/2 COVID19 viral pneumonia, ID curbsided, less likely to have overlying superimposed PNA, while procalcitonin was high, could be 2/2 CKD/NICO and is already done w/ 5 day course zosyn so DC further abx. Also high risk for MG exacerbation.  - C/w methylprednisolone 40mg IV q12h (course complete 4/8)  - management of COVID as below  - DNR/DNI per daughter (HCP) 2/2 COVID19 viral pneumonia, ID curbsided, less likely to have overlying superimposed PNA, while procalcitonin was high, could be 2/2 CKD/NICO and is already done w/ 5 day course zosyn so DC further abx.   Also high risk for MG exacerbation.  - C/w methylprednisolone 40mg IV q12h (course complete 4/8)  - management of COVID as below  - DNR/DNI per daughter (HCP) 2/2 COVID19 viral pneumonia, ID curbsided, less likely to have overlying superimposed PNA, while procalcitonin was high, could be 2/2 CKD/NICO and is already done w/ 5 day course zosyn so DC further abx.   Also high risk for MG exacerbation  pt is on NRB for hypoxia  s/p plaquenil course (completed)  - C/w methylprednisolone 40mg IV q12h (course complete 4/8)  - management of COVID as below

## 2020-04-06 NOTE — PROGRESS NOTE ADULT - PROBLEM SELECTOR PLAN 9
Daughter Patsy is HCP ([phone]). Per discussion w/ daughter Patsy, she would like her mom to be DNR/DNI given the risk of prolonged suffering w/ intubation and small chance of recovery and she states she spoke to her mom about this. Spoke to patient directly regarding this matter but she does not appear to be emotionally ready to comprehend the situation; will defer to daughter's wishes for the time being.

## 2020-04-06 NOTE — PROGRESS NOTE ADULT - PROBLEM SELECTOR PLAN 2
On NRB the past few days with sats in mid-90s but intermittently tachypneic.  - Downtitrate O2 to NC: satting 95% on 10L NC  - on hydroxychloroquine protocol, check EKG for QTC daily  - hydroxychloroquine and pyridostigmine can lower seizure threshold, monitor neuro checks q4H  - supplemental o2 as above  - high dose vit C, thiamine, zinc On NRB with sats in mid-90s but intermittently tachypneic/SOB  - on hydroxychloroquine protocol, EKG<500, no need repeat EKG  - hydroxychloroquine and pyridostigmine can lower seizure threshold, monitor neuro checks q4H  - high dose vit C, thiamine, zinc On NRB with sats in mid-90s but intermittently tachypneic/SOB  - s/p hydroxychloroquine protocol, EKG<500, no need repeat EKG. course completed april 4th  - can dc high dose vit C, thiamine, zinc at this time

## 2020-04-06 NOTE — PROGRESS NOTE ADULT - PROBLEM SELECTOR PLAN 8
Daughter Patsy is HCP ([phone]). Per discussion w/ daughter Patsy, she would like her mom to be DNR/DNI given the risk of prolonged suffering w/ intubation and small chance of recovery and she states she spoke to her mom about this. Spoke to patient directly regarding this matter but she does not appear to be emotionally ready to comprehend the situation; will defer to daughter's wishes for the time being. DVT ppx: lovenox QD  Diet: dysphagia 1 w/ honey thick liquids (S&S assessment, recommendation for NPO, pending repeat S&S assessment, spoke w/ daughter HCP and she states would like dysphagia diet for now)  Dispo: pending  Code: DNR, DNI DVT ppx: lovenox QD  Diet: dysphagia 1 w/ honey thick liquids (S&S assessment, recommendation for NPO, pending repeat S&S assessment, spoke w/ daughter HCP and she states would like dysphagia diet for now knowing that pt is at risk for aspiration, HCP expressed that she would like pt to be on diet)  Dispo: pending  Code: DNR, DNI DVT ppx: lovenox QD  Diet: dysphagia 1 w/ honey thick liquids (S&S assessment, recommendation for NPO, pending repeat S&S assessment, spoke w/ daughter HCP and she states would like dysphagia diet for now knowing that pt is at risk for aspiration, HCP expressed that she would like pt to be on diet), on aspiration precautions  Dispo: pending  Code: DNR, DNI

## 2020-04-06 NOTE — PROGRESS NOTE ADULT - PROBLEM SELECTOR PLAN 5
continue synthroid continue synthroid  check TSH outpt (will be altered with high dose steroids) Hold aspirin, celecoxib for now

## 2020-04-06 NOTE — PROGRESS NOTE ADULT - SUBJECTIVE AND OBJECTIVE BOX
PROGRESS NOTE:     Patient is a 79y old  Female who presents with a chief complaint of Shortness of breath, fevers (05 Apr 2020 10:48)    SUBJECTIVE / OVERNIGHT EVENTS: Pt seen and examined. No acute overnight events. In the morning, pt reported no fever, chills, CP, SOB, abdominal pain, N/V, urinary or bowel issues, or new joint aches.     ADDITIONAL REVIEW OF SYSTEMS: Otherwise negative    MEDICATIONS  (STANDING):  ascorbic acid 500 milliGRAM(s) Oral daily  atorvastatin 20 milliGRAM(s) Oral at bedtime  cholecalciferol 2000 Unit(s) Oral daily  enoxaparin Injectable 40 milliGRAM(s) SubCutaneous daily  levothyroxine 50 MICROGram(s) Oral daily  losartan 50 milliGRAM(s) Oral daily  melatonin 5 milliGRAM(s) Oral at bedtime  methylPREDNISolone sodium succinate Injectable 40 milliGRAM(s) IV Push every 12 hours  pantoprazole    Tablet 40 milliGRAM(s) Oral before breakfast  piperacillin/tazobactam IVPB.. 3.375 Gram(s) IV Intermittent every 8 hours  pyridostigmine 60 milliGRAM(s) Oral three times a day  pyridostigmine  milliGRAM(s) Oral daily  zinc oxide 40% Ointment 1 Application(s) Topical daily  zinc sulfate 220 milliGRAM(s) Oral daily    MEDICATIONS  (PRN):  acetaminophen   Tablet .. 650 milliGRAM(s) Oral every 4 hours PRN Temp greater or equal to 38C (100.4F), Moderate Pain (4 - 6)  ALBUTerol    90 MICROgram(s) HFA Inhaler 2 Puff(s) Inhalation every 4 hours PRN Shortness of Breath and/or Wheezing  benzonatate 100 milliGRAM(s) Oral three times a day PRN Cough  loperamide 2 milliGRAM(s) Oral five times a day PRN loose stool  polyethylene glycol 3350 17 Gram(s) Oral daily PRN Constipation  senna 2 Tablet(s) Oral at bedtime PRN Constipation      CAPILLARY BLOOD GLUCOSE        I&O's Summary    05 Apr 2020 07:01  -  06 Apr 2020 07:00  --------------------------------------------------------  IN: 380 mL / OUT: 800 mL / NET: -420 mL        PHYSICAL EXAM:  Vital Signs Last 24 Hrs  T(C): 36.1 (06 Apr 2020 05:38), Max: 37 (05 Apr 2020 21:20)  T(F): 97 (06 Apr 2020 05:38), Max: 98.6 (05 Apr 2020 21:20)  HR: 50 (06 Apr 2020 05:38) (48 - 59)  BP: 112/65 (06 Apr 2020 05:38) (102/58 - 137/54)  BP(mean): --  RR: 20 (06 Apr 2020 05:38) (20 - 20)  SpO2: 91% (06 Apr 2020 05:38) (89% - 100%)    CONSTITUTIONAL: NAD  RESPIRATORY: Normal respiratory effort; lungs are clear to auscultation bilaterally  CARDIOVASCULAR: Regular rate and rhythm, normal S1 and S2, no murmur/rub/gallop  ABDOMEN: Nontender to palpation, normoactive bowel sounds, no rebound/guarding; No hepatosplenomegaly  EXTREMITIES: No lower extremity edema; Peripheral pulses are 2+ bilaterally  MUSCLOSKELETAL: no clubbing or cyanosis of digits; no joint swelling or tenderness to palpation  PSYCH: A+O to person, place, and time; affect appropriate    LABS:                        7.8    10.60 )-----------( 345      ( 05 Apr 2020 08:37 )             26.4     04-05    137  |  104  |  46<H>  ----------------------------<  133<H>  4.0   |  23  |  1.22    Ca    8.9      05 Apr 2020 08:37  Phos  4.2     04-05  Mg     2.5     04-05    TPro  6.6  /  Alb  2.5<L>  /  TBili  0.3  /  DBili  x   /  AST  145<H>  /  ALT  80<H>  /  AlkPhos  197<H>  04-05        RADIOLOGY & ADDITIONAL TESTS:  Results Reviewed:   Imaging Personally Reviewed:  Electrocardiogram Personally Reviewed:    COORDINATION OF CARE:  Care Discussed with Consultants/Other Providers [Y/N]:  Prior or Outpatient Records Reviewed [Y/N]: PROGRESS NOTE:     Patient is a 79y old  Female who presents with a chief complaint of Shortness of breath, fevers (05 Apr 2020 10:48)    SUBJECTIVE / OVERNIGHT EVENTS: Pt seen and examined. No acute overnight events. In the morning, pt reported no fever, chills, CP, SOB, abdominal pain, N/V, urinary or bowel issues, or new joint aches.     ADDITIONAL REVIEW OF SYSTEMS: Otherwise negative    MEDICATIONS  (STANDING):  ascorbic acid 500 milliGRAM(s) Oral daily  atorvastatin 20 milliGRAM(s) Oral at bedtime  cholecalciferol 2000 Unit(s) Oral daily  enoxaparin Injectable 40 milliGRAM(s) SubCutaneous daily  levothyroxine 50 MICROGram(s) Oral daily  losartan 50 milliGRAM(s) Oral daily  melatonin 5 milliGRAM(s) Oral at bedtime  methylPREDNISolone sodium succinate Injectable 40 milliGRAM(s) IV Push every 12 hours  pantoprazole    Tablet 40 milliGRAM(s) Oral before breakfast  piperacillin/tazobactam IVPB.. 3.375 Gram(s) IV Intermittent every 8 hours  pyridostigmine 60 milliGRAM(s) Oral three times a day  pyridostigmine  milliGRAM(s) Oral daily  zinc oxide 40% Ointment 1 Application(s) Topical daily  zinc sulfate 220 milliGRAM(s) Oral daily    MEDICATIONS  (PRN):  acetaminophen   Tablet .. 650 milliGRAM(s) Oral every 4 hours PRN Temp greater or equal to 38C (100.4F), Moderate Pain (4 - 6)  ALBUTerol    90 MICROgram(s) HFA Inhaler 2 Puff(s) Inhalation every 4 hours PRN Shortness of Breath and/or Wheezing  benzonatate 100 milliGRAM(s) Oral three times a day PRN Cough  loperamide 2 milliGRAM(s) Oral five times a day PRN loose stool  polyethylene glycol 3350 17 Gram(s) Oral daily PRN Constipation  senna 2 Tablet(s) Oral at bedtime PRN Constipation      CAPILLARY BLOOD GLUCOSE        I&O's Summary    05 Apr 2020 07:01  -  06 Apr 2020 07:00  --------------------------------------------------------  IN: 380 mL / OUT: 800 mL / NET: -420 mL        PHYSICAL EXAM:  Vital Signs Last 24 Hrs  T(C): 36.1 (06 Apr 2020 05:38), Max: 37 (05 Apr 2020 21:20)  T(F): 97 (06 Apr 2020 05:38), Max: 98.6 (05 Apr 2020 21:20)  HR: 50 (06 Apr 2020 05:38) (48 - 59)  BP: 112/65 (06 Apr 2020 05:38) (102/58 - 137/54)  BP(mean): --  RR: 20 (06 Apr 2020 05:38) (20 - 20)  SpO2: 91% (06 Apr 2020 05:38) (89% - 100%)    CONSTITUTIONAL: NAD  RESPIRATORY: tachypneic  CARDIOVASCULAR: Regular rate and rhythm  ABDOMEN: Nontender to palpation, normoactive bowel sounds, no rebound/guarding; No hepatosplenomegaly  EXTREMITIES: No lower extremity edema; Peripheral pulses are 2+ bilaterally  MUSCLOSKELETAL: no clubbing or cyanosis of digits; no joint swelling or tenderness to palpation  PSYCH: A+O to person, place, and time; affect appropriate    LABS:                        7.8    10.60 )-----------( 345      ( 05 Apr 2020 08:37 )             26.4     04-05    137  |  104  |  46<H>  ----------------------------<  133<H>  4.0   |  23  |  1.22    Ca    8.9      05 Apr 2020 08:37  Phos  4.2     04-05  Mg     2.5     04-05    TPro  6.6  /  Alb  2.5<L>  /  TBili  0.3  /  DBili  x   /  AST  145<H>  /  ALT  80<H>  /  AlkPhos  197<H>  04-05        RADIOLOGY & ADDITIONAL TESTS:  Results Reviewed:   Imaging Personally Reviewed:  Electrocardiogram Personally Reviewed:    COORDINATION OF CARE:  Care Discussed with Consultants/Other Providers [Y/N]:  Prior or Outpatient Records Reviewed [Y/N]: PROGRESS NOTE:     Patient is a 79y old  Female who presents with a chief complaint of Shortness of breath, fevers (05 Apr 2020 10:48)    SUBJECTIVE / OVERNIGHT EVENTS: Pt seen and examined. No acute overnight events. In the morning, pt reported no fever, chills, CP, SOB, abdominal pain, N/V, urinary or bowel issues, or new joint aches. she continues to have hypoxia and remains on NRB at 15Liters    ADDITIONAL REVIEW OF SYSTEMS: Otherwise negative    MEDICATIONS  (STANDING):  ascorbic acid 500 milliGRAM(s) Oral daily  atorvastatin 20 milliGRAM(s) Oral at bedtime  cholecalciferol 2000 Unit(s) Oral daily  enoxaparin Injectable 40 milliGRAM(s) SubCutaneous daily  levothyroxine 50 MICROGram(s) Oral daily  losartan 50 milliGRAM(s) Oral daily  melatonin 5 milliGRAM(s) Oral at bedtime  methylPREDNISolone sodium succinate Injectable 40 milliGRAM(s) IV Push every 12 hours  pantoprazole    Tablet 40 milliGRAM(s) Oral before breakfast  piperacillin/tazobactam IVPB.. 3.375 Gram(s) IV Intermittent every 8 hours  pyridostigmine 60 milliGRAM(s) Oral three times a day  pyridostigmine  milliGRAM(s) Oral daily  zinc oxide 40% Ointment 1 Application(s) Topical daily  zinc sulfate 220 milliGRAM(s) Oral daily    MEDICATIONS  (PRN):  acetaminophen   Tablet .. 650 milliGRAM(s) Oral every 4 hours PRN Temp greater or equal to 38C (100.4F), Moderate Pain (4 - 6)  ALBUTerol    90 MICROgram(s) HFA Inhaler 2 Puff(s) Inhalation every 4 hours PRN Shortness of Breath and/or Wheezing  benzonatate 100 milliGRAM(s) Oral three times a day PRN Cough  loperamide 2 milliGRAM(s) Oral five times a day PRN loose stool  polyethylene glycol 3350 17 Gram(s) Oral daily PRN Constipation  senna 2 Tablet(s) Oral at bedtime PRN Constipation      CAPILLARY BLOOD GLUCOSE        I&O's Summary    05 Apr 2020 07:01  -  06 Apr 2020 07:00  --------------------------------------------------------  IN: 380 mL / OUT: 800 mL / NET: -420 mL        PHYSICAL EXAM:  Vital Signs Last 24 Hrs  T(C): 36.1 (06 Apr 2020 05:38), Max: 37 (05 Apr 2020 21:20)  T(F): 97 (06 Apr 2020 05:38), Max: 98.6 (05 Apr 2020 21:20)  HR: 50 (06 Apr 2020 05:38) (48 - 59)  BP: 112/65 (06 Apr 2020 05:38) (102/58 - 137/54)  BP(mean): --  RR: 20 (06 Apr 2020 05:38) (20 - 20)  SpO2: 91% (06 Apr 2020 05:38) (89% - 100%)    CONSTITUTIONAL: mild distress given the WOB  RESPIRATORY: tachypneic  CARDIOVASCULAR: Regular rate and rhythm  ABDOMEN: Nontender to palpation, normoactive bowel sounds, no rebound/guarding; No hepatosplenomegaly  EXTREMITIES: No lower extremity edema; Peripheral pulses are 2+ bilaterally  MUSCLOSKELETAL: no clubbing or cyanosis of digits; no joint swelling or tenderness to palpation  PSYCH: A+O to person, place, and time; affect appropriate    LABS:                        7.8    10.60 )-----------( 345      ( 05 Apr 2020 08:37 )             26.4     04-05    137  |  104  |  46<H>  ----------------------------<  133<H>  4.0   |  23  |  1.22    Ca    8.9      05 Apr 2020 08:37  Phos  4.2     04-05  Mg     2.5     04-05    TPro  6.6  /  Alb  2.5<L>  /  TBili  0.3  /  DBili  x   /  AST  145<H>  /  ALT  80<H>  /  AlkPhos  197<H>  04-05        RADIOLOGY & ADDITIONAL TESTS:  Results Reviewed:   Imaging Personally Reviewed:  Electrocardiogram Personally Reviewed:    COORDINATION OF CARE:  Care Discussed with Consultants/Other Providers [Y/N]:  Prior or Outpatient Records Reviewed [Y/N]:

## 2020-04-06 NOTE — PROGRESS NOTE ADULT - ASSESSMENT
79F hx MG on pyridostigmine, HTN, hypothyroidism, recent hip replacement presenting with acute hypoxic respiratory failure and severe sepsis 2/2 r. sided bacterial PNA aspiration vs. HAP and concomitant severe COVID19 PNA, now on supplemental O2. 79F hx MG on pyridostigmine, HTN, hypothyroidism, recent hip replacement presenting with acute hypoxic respiratory failure and severe sepsis 2/2 severe COVID19 PNA,on supplemental O2. 79F hx MG on pyridostigmine, HTN, hypothyroidism, recent hip replacement presenting with acute hypoxic respiratory failure and severe sepsis 2/2 severe COVID19 PNA,on supplemental O2 with NRB

## 2020-04-07 LAB
ALBUMIN SERPL ELPH-MCNC: 2.6 G/DL — LOW (ref 3.3–5)
ALP SERPL-CCNC: 131 U/L — HIGH (ref 40–120)
ALT FLD-CCNC: 35 U/L — SIGNIFICANT CHANGE UP (ref 10–45)
ANION GAP SERPL CALC-SCNC: 12 MMOL/L — SIGNIFICANT CHANGE UP (ref 5–17)
AST SERPL-CCNC: 25 U/L — SIGNIFICANT CHANGE UP (ref 10–40)
BASOPHILS # BLD AUTO: 0.01 K/UL — SIGNIFICANT CHANGE UP (ref 0–0.2)
BASOPHILS NFR BLD AUTO: 0.1 % — SIGNIFICANT CHANGE UP (ref 0–2)
BILIRUB SERPL-MCNC: 0.2 MG/DL — SIGNIFICANT CHANGE UP (ref 0.2–1.2)
BUN SERPL-MCNC: 37 MG/DL — HIGH (ref 7–23)
CALCIUM SERPL-MCNC: 9.3 MG/DL — SIGNIFICANT CHANGE UP (ref 8.4–10.5)
CHLORIDE SERPL-SCNC: 109 MMOL/L — HIGH (ref 96–108)
CO2 SERPL-SCNC: 23 MMOL/L — SIGNIFICANT CHANGE UP (ref 22–31)
CREAT SERPL-MCNC: 0.94 MG/DL — SIGNIFICANT CHANGE UP (ref 0.5–1.3)
EOSINOPHIL # BLD AUTO: 0 K/UL — SIGNIFICANT CHANGE UP (ref 0–0.5)
EOSINOPHIL NFR BLD AUTO: 0 % — SIGNIFICANT CHANGE UP (ref 0–6)
GLUCOSE SERPL-MCNC: 107 MG/DL — HIGH (ref 70–99)
HCT VFR BLD CALC: 27.9 % — LOW (ref 34.5–45)
HGB BLD-MCNC: 8.3 G/DL — LOW (ref 11.5–15.5)
IMM GRANULOCYTES NFR BLD AUTO: 2.2 % — HIGH (ref 0–1.5)
LYMPHOCYTES # BLD AUTO: 0.43 K/UL — LOW (ref 1–3.3)
LYMPHOCYTES # BLD AUTO: 5.1 % — LOW (ref 13–44)
MAGNESIUM SERPL-MCNC: 2.4 MG/DL — SIGNIFICANT CHANGE UP (ref 1.6–2.6)
MCHC RBC-ENTMCNC: 23.9 PG — LOW (ref 27–34)
MCHC RBC-ENTMCNC: 29.7 GM/DL — LOW (ref 32–36)
MCV RBC AUTO: 80.2 FL — SIGNIFICANT CHANGE UP (ref 80–100)
MONOCYTES # BLD AUTO: 0.52 K/UL — SIGNIFICANT CHANGE UP (ref 0–0.9)
MONOCYTES NFR BLD AUTO: 6.1 % — SIGNIFICANT CHANGE UP (ref 2–14)
NEUTROPHILS # BLD AUTO: 7.34 K/UL — SIGNIFICANT CHANGE UP (ref 1.8–7.4)
NEUTROPHILS NFR BLD AUTO: 86.5 % — HIGH (ref 43–77)
NRBC # BLD: 0 /100 WBCS — SIGNIFICANT CHANGE UP (ref 0–0)
PHOSPHATE SERPL-MCNC: 2.9 MG/DL — SIGNIFICANT CHANGE UP (ref 2.5–4.5)
PLATELET # BLD AUTO: 340 K/UL — SIGNIFICANT CHANGE UP (ref 150–400)
POTASSIUM SERPL-MCNC: 4.6 MMOL/L — SIGNIFICANT CHANGE UP (ref 3.5–5.3)
POTASSIUM SERPL-SCNC: 4.6 MMOL/L — SIGNIFICANT CHANGE UP (ref 3.5–5.3)
PROT SERPL-MCNC: 6.5 G/DL — SIGNIFICANT CHANGE UP (ref 6–8.3)
RBC # BLD: 3.48 M/UL — LOW (ref 3.8–5.2)
RBC # FLD: 19.7 % — HIGH (ref 10.3–14.5)
SODIUM SERPL-SCNC: 144 MMOL/L — SIGNIFICANT CHANGE UP (ref 135–145)
WBC # BLD: 8.49 K/UL — SIGNIFICANT CHANGE UP (ref 3.8–10.5)
WBC # FLD AUTO: 8.49 K/UL — SIGNIFICANT CHANGE UP (ref 3.8–10.5)

## 2020-04-07 RX ADMIN — Medication 40 MILLIGRAM(S): at 16:59

## 2020-04-07 RX ADMIN — Medication 50 MICROGRAM(S): at 04:55

## 2020-04-07 RX ADMIN — PANTOPRAZOLE SODIUM 40 MILLIGRAM(S): 20 TABLET, DELAYED RELEASE ORAL at 04:55

## 2020-04-07 RX ADMIN — Medication 650 MILLIGRAM(S): at 10:30

## 2020-04-07 RX ADMIN — ENOXAPARIN SODIUM 40 MILLIGRAM(S): 100 INJECTION SUBCUTANEOUS at 10:29

## 2020-04-07 RX ADMIN — PYRIDOSTIGMINE BROMIDE 60 MILLIGRAM(S): 60 SOLUTION ORAL at 05:32

## 2020-04-07 RX ADMIN — ZINC OXIDE 1 APPLICATION(S): 200 OINTMENT TOPICAL at 10:29

## 2020-04-07 RX ADMIN — Medication 650 MILLIGRAM(S): at 01:47

## 2020-04-07 RX ADMIN — Medication 40 MILLIGRAM(S): at 04:55

## 2020-04-07 RX ADMIN — ATORVASTATIN CALCIUM 20 MILLIGRAM(S): 80 TABLET, FILM COATED ORAL at 23:13

## 2020-04-07 RX ADMIN — Medication 5 MILLIGRAM(S): at 23:13

## 2020-04-07 RX ADMIN — LOSARTAN POTASSIUM 50 MILLIGRAM(S): 100 TABLET, FILM COATED ORAL at 04:55

## 2020-04-07 RX ADMIN — PYRIDOSTIGMINE BROMIDE 60 MILLIGRAM(S): 60 SOLUTION ORAL at 17:00

## 2020-04-07 RX ADMIN — PYRIDOSTIGMINE BROMIDE 60 MILLIGRAM(S): 60 SOLUTION ORAL at 23:14

## 2020-04-07 RX ADMIN — PYRIDOSTIGMINE BROMIDE 180 MILLIGRAM(S): 60 SOLUTION ORAL at 10:29

## 2020-04-07 RX ADMIN — Medication 2000 UNIT(S): at 10:29

## 2020-04-07 NOTE — PROGRESS NOTE ADULT - PROBLEM SELECTOR PLAN 3
Patient at high risk for MG crisis  - C/w pyridostigmine ER 180mg po daily, Mestinon (pyridostigmine) 60mg po tid  - unable to get NIFs and VC q8H given COVID+, will monitor q4 neuro checks specifically looking at neck flexion, voice quality, difficulty swallowing, and call neurology if deterioration  - s/p 1 dose Soliris (eculizumab) outpatient neurology (on mestinon and Soliris for MG outpatient), will continue w/ methylprednisolone (complete 4/8) and touch base w/ neurology after course is completed for possible trial of prednisone  -- hydroxychloroquine and pyridostigmine can lower seizure threshold, monitor neuro checks q4H, currently hydroxycholoruine is now compelted  - neurology following  -contact neuro on 4/8 for plan for steroids LFTS elevated, will monitor, likely due to underlaying covid   if increasing consider to stop statin tx

## 2020-04-07 NOTE — SWALLOW BEDSIDE ASSESSMENT ADULT - SWALLOW EVAL: DIAGNOSIS
Chart reviewed. Orders received. Pt known to this service from previous SLP evaluation on 4/1 in which patient was on 5l NC and presented with suspected pharyngeal dysphagia superimposed upon respiratory deficits. At that time, SLP recommended NPO and pt continued on dysphagia 1 and honey thick liquids per primary team. Now, pt on NRB with sats in mid-90s but intermittently tachypneic/SOB and team requesting repeat swallow evaluation. Discussed with MD Larios that given same, pt is not a candidate for swallow evaluation at this time as it appears pt's respiratory status does not support safe PO intake. This service continues to recommend NPO given current respiratory status. Team to re-consult pending improvement in respiratory status.

## 2020-04-07 NOTE — PROGRESS NOTE ADULT - PROBLEM SELECTOR PLAN 2
On NRB with sats in mid-90s but intermittently tachypneic/SOB  - s/p hydroxychloroquine protocol, EKG<500, no need repeat EKG. course completed april 4th  - can dc high dose vit C, thiamine, zinc at this time Patient at high risk for MG crisis  - C/w pyridostigmine ER 180mg po daily, Mestinon (pyridostigmine) 60mg po tid  - unable to get NIFs and VC q8H given COVID+, will monitor q4 neuro checks specifically looking at neck flexion, voice quality, difficulty swallowing, and call neurology if deterioration  - s/p 1 dose Soliris (eculizumab) outpatient neurology (on mestinon and Soliris for MG outpatient), will continue w/ methylprednisolone (complete 4/8) and touch base w/ neurology after course is completed for possible trial of prednisone  -- hydroxychloroquine and pyridostigmine can lower seizure threshold, monitor neuro checks q4H, currently hydroxychloroquine is now completed  - neurology following  -contact neuro on 4/8 for plan for steroids (coordiante with ID as well regarding this concerning long term effect on steroids and infection)

## 2020-04-07 NOTE — PROGRESS NOTE ADULT - PROBLEM SELECTOR PLAN 1
2/2 COVID19 viral pneumonia, ID curbsided, less likely to have overlying superimposed PNA, while procalcitonin was high, could be 2/2 CKD/NICO and is already done w/ 5 day course zosyn so DC further abx.   Also high risk for MG exacerbation  pt is on NRB for hypoxia  s/p plaquenil course (completed)  - C/w methylprednisolone 40mg IV q12h (course complete 4/8)  - management of COVID as below 2/2 COVID19 viral pneumonia,   ID curbsided, less likely to have overlying superimposed PNA, while procalcitonin was high, could be 2/2 CKD/NICO and is already done w/ 5 day course zosyn so DC further abx.   Also high risk for MG exacerbation  pt is on NRB for hypoxia  s/p plaquenil course (completed)  - C/w methylprednisolone 40mg IV q12h (course complete 4/8)

## 2020-04-07 NOTE — PROGRESS NOTE ADULT - PROBLEM SELECTOR PLAN 7
on hyzaar at home. Hold HCTZ component for now  - continue losartan 50mg po daily with hold parameters. low threshold to hold if with lower end SBP DVT ppx: lovenox QD  Diet: dysphagia 1 w/ honey thick liquids (S&S assessment, recommendation for NPO,   Please note: spoke w/ daughter HCP and she states would like to continue dysphagia diet for now knowing that pt is at risk for aspiration and that pt is reccomended for NPO, HCP expressed that she would like pt to be on diet), on aspiration precautions  Dispo: pending  Code: DNR, DNI

## 2020-04-07 NOTE — PROGRESS NOTE ADULT - ASSESSMENT
79F hx MG on pyridostigmine, HTN, hypothyroidism, recent hip replacement presenting with acute hypoxic respiratory failure and severe sepsis 2/2 severe COVID19 PNA,on supplemental O2 with NRB

## 2020-04-07 NOTE — CHART NOTE - NSCHARTNOTEFT_GEN_A_CORE
Nutrition Initial Assessment    Nutrition Consult Received: Yes [   ]  No [   x]    Reason for Initial Nutrition Assessment: Length Of Stay on 2MON    Source of Information: Unable to conduct a fact to face interview due to limited contact restrictions related to pt's medical condition and isolation precautions. Attempted reach patient multiple times over the phone however no answer, spoke with daughter Patsy over the phone.     Admitting Diagnosis: 79 F PMH Myasthenia gravis on pyridostigmine, reactive airway disease, GERD, HTN, HLD, hypothyroid, spinal stenosis s/p laminectomy/fusion, coming in with day 7 of difficulty breathing and not feeling well, admitted for Infection due to 2019-nCoV    PAST MEDICAL & SURGICAL HISTORY:  Osteoarthritis  Hypothyroid  Reactive airway disease that is not asthma: mild as per pulm note on [employer], patient and daughter denies any inhaler use; thought to be related to myasthena gravis  Near syncope: 8/2018 negative ENT work up, negative cardiac work ups as per daughter  Aortic stenosis, mild: asper daughter  Diverticulitis large intestine: denies any recent exacerbations  Lumbar stenosis  Myasthenia gravis: dx 10/2018 symptoms: intermittent loss of voice/ weakness, negative ENT studies, now stable on Pyridostigmine  Carpal Tunnel Syndrome Right: release 4/10 and left  Hammertoe Right foot  Kidney Calculi  Lumbar Radiculopathy  GERD (Gastroesophageal Reflux Disease): hiatal hernia  Hyperlipidemia  Genital Ulcer, Female  Hypertension  H/O umbilical hernia repair  History of tonsillectomy  S/P foot surgery  H/O shoulder replacement: b/l 2015/2016  H/O laminectomy: cervical  1998  lumbar 1986,1998,2000, 2/19 with fusion  Prolapse, Uterovaginal: s/p sling  S/P Laparoscopic Fundoplication  S/P Hysterectomy Partial: 1974  Bilateral Cataracts: removed  S/P Appendectomy  S/P Cholecystectomy      Subjective Information: Daughter reports pt with decreased PO intake x ~ 2 weeks PTA due to feeling unable, reports prior to onset of symptoms pt was eating well. Reports pt was following regular diet with no chewing, swallowing difficulties. Confirms allergy to shellfish, no additional food allergies reported per nursing flow sheets. Daughter denies pt with nausea, vomiting, diarrhea, constipation PTA. Per chart pt takes vitamin D supplement. Pt seen for SLP evaluation on 4/1 for  due to +COVID and history of MG. Pt recommended to remain NPO with alternative means for nutrition per SLP however daughter states would like pt to be on dysphagia diet for now knowing that pt is at risk for aspiration. Per daughter pt with variable PO intake in-house, obtained food preferences, will honor as able. Daughter amenable to patient trying Magic Cup to supplement PO intake. Discussed with daughter importance of PO intake and prioritizing sources of protein to maintain lean body mass.  Daughter understanding with no nutrition related questions at this time. Made aware RD remains available as needed.     GI Issues: noted with diarrhea, improving last BM 4/6 (3x)    Current Nutrition Order: Dysphagia 1 with honey thick liquids      PO Intake:   Good (%) [   ]    Fair (50-75%) [   ]    Poor (<50%) [  x ]    Skin Integrity: free of pressure injuries per nursing flow sheets   Edema none    Labs:   04-07 Na144 mmol/L Glu 107 mg/dL<H> K+ 4.6 mmol/L Cr  0.94 mg/dL BUN 37 mg/dL<H> Phos 2.9 mg/dL Alb 2.6 g/dL<L> PAB n/a       Medications:  MEDICATIONS  (STANDING):  atorvastatin 20 milliGRAM(s) Oral at bedtime  cholecalciferol 2000 Unit(s) Oral daily  enoxaparin Injectable 40 milliGRAM(s) SubCutaneous daily  levothyroxine 50 MICROGram(s) Oral daily  losartan 50 milliGRAM(s) Oral daily  melatonin 5 milliGRAM(s) Oral at bedtime  methylPREDNISolone sodium succinate Injectable 40 milliGRAM(s) IV Push every 12 hours  pantoprazole    Tablet 40 milliGRAM(s) Oral before breakfast  pyridostigmine 60 milliGRAM(s) Oral three times a day  pyridostigmine  milliGRAM(s) Oral daily  zinc oxide 40% Ointment 1 Application(s) Topical daily    MEDICATIONS  (PRN):  acetaminophen   Tablet .. 650 milliGRAM(s) Oral every 4 hours PRN Temp greater or equal to 38C (100.4F), Moderate Pain (4 - 6)  ALBUTerol    90 MICROgram(s) HFA Inhaler 2 Puff(s) Inhalation every 4 hours PRN Shortness of Breath and/or Wheezing  benzonatate 100 milliGRAM(s) Oral three times a day PRN Cough  loperamide 2 milliGRAM(s) Oral five times a day PRN loose stool  polyethylene glycol 3350 17 Gram(s) Oral daily PRN Constipation  senna 2 Tablet(s) Oral at bedtime PRN Constipation    Admitted Anthropometrics:    weight: 66.2Kg (3/30)  height: 57 inches (3/30)      Weights: daughter reports wt has been stable, current dosing weight noted   Nutrition Focused Physical Exam: Unable to complete due to limited isolation contact precautions at this time.     Estimated Energy Needs (25kcal/kg- 30 kcal/kg): 1655-1986Kcal/day   Estimated Protein Needs (1 g/kg- 1.2 g/kg): 66-79gm/day   Based on weight of: 66.2Kg     [x  ] Nutrition Diagnosis: Inadequate protein-energy intake related to decreased appetite as evidenced by daughter report of decreased PO intake x ~ 2 weeks PTA, suboptimal PO intake in-house   [  ] No active nutrition diagnosis at this time  [  ] Current medical condition precludes nutrition intervention    Goal: pt to meet >75% of estimated needs     Nutrition Interventions:     Recommendations:  1) Defer diet consistency to SLP and/or team/family wishes   2) RD to add Magic Cup BID to supplement PO intake (provides 290 Kcal and 9gm protein per serving)  3) Obtain/honor food preferences as able.   4) RD to remain available and follow-up as medically appropriate.       RD to follow-up per protocol.  Manju Monteiro RD, CDN, Pager # [phone]

## 2020-04-07 NOTE — PROGRESS NOTE ADULT - PROBLEM SELECTOR PLAN 6
continue synthroid  check TSH outpt (will be altered with high dose steroids) on hyzaar at home. Hold HCTZ component for now  - continue losartan 50mg po daily with hold parameters. low threshold to hold if with lower end SBP  - high dose steroids likely contributing to HTN, monitor, increase losartan as needed

## 2020-04-07 NOTE — PROGRESS NOTE ADULT - PROBLEM SELECTOR PLAN 8
DVT ppx: lovenox QD  Diet: dysphagia 1 w/ honey thick liquids (S&S assessment, recommendation for NPO, pending repeat S&S assessment, spoke w/ daughter HCP and she states would like dysphagia diet for now knowing that pt is at risk for aspiration, HCP expressed that she would like pt to be on diet), on aspiration precautions  Dispo: pending  Code: DNR, DNI Daughter Patsy is HCP ([phone]). Per discussion w/ daughter Patsy, she would like her mom to be DNR/DNI given the risk of prolonged suffering w/ intubation and small chance of recovery and she states she spoke to her mom about this. Spoke to patient directly regarding this matter but she does not appear to be emotionally ready to comprehend the situation; will defer to daughter's wishes for the time being.

## 2020-04-07 NOTE — PROGRESS NOTE ADULT - SUBJECTIVE AND OBJECTIVE BOX
PROGRESS NOTE:     Patient is a 79y old  Female who presents with a chief complaint of Shortness of breath, fevers (06 Apr 2020 07:33)      SUBJECTIVE / OVERNIGHT EVENTS: Pt seen and examined. No acute overnight events. In the morning, pt reported no fever, chills, CP, SOB, abdominal pain, N/V, urinary or bowel issues, or new joint aches.     ADDITIONAL REVIEW OF SYSTEMS: Otherwise negative    MEDICATIONS  (STANDING):  atorvastatin 20 milliGRAM(s) Oral at bedtime  cholecalciferol 2000 Unit(s) Oral daily  enoxaparin Injectable 40 milliGRAM(s) SubCutaneous daily  levothyroxine 50 MICROGram(s) Oral daily  losartan 50 milliGRAM(s) Oral daily  melatonin 5 milliGRAM(s) Oral at bedtime  methylPREDNISolone sodium succinate Injectable 40 milliGRAM(s) IV Push every 12 hours  pantoprazole    Tablet 40 milliGRAM(s) Oral before breakfast  pyridostigmine 60 milliGRAM(s) Oral three times a day  pyridostigmine  milliGRAM(s) Oral daily  zinc oxide 40% Ointment 1 Application(s) Topical daily    MEDICATIONS  (PRN):  acetaminophen   Tablet .. 650 milliGRAM(s) Oral every 4 hours PRN Temp greater or equal to 38C (100.4F), Moderate Pain (4 - 6)  ALBUTerol    90 MICROgram(s) HFA Inhaler 2 Puff(s) Inhalation every 4 hours PRN Shortness of Breath and/or Wheezing  benzonatate 100 milliGRAM(s) Oral three times a day PRN Cough  loperamide 2 milliGRAM(s) Oral five times a day PRN loose stool  polyethylene glycol 3350 17 Gram(s) Oral daily PRN Constipation  senna 2 Tablet(s) Oral at bedtime PRN Constipation      CAPILLARY BLOOD GLUCOSE        I&O's Summary    06 Apr 2020 07:01  -  07 Apr 2020 07:00  --------------------------------------------------------  IN: 560 mL / OUT: 600 mL / NET: -40 mL        PHYSICAL EXAM:  Vital Signs Last 24 Hrs  T(C): 36.4 (07 Apr 2020 05:43), Max: 37.1 (06 Apr 2020 16:09)  T(F): 97.5 (07 Apr 2020 05:43), Max: 98.8 (06 Apr 2020 16:09)  HR: 57 (07 Apr 2020 05:43) (57 - 62)  BP: 125/71 (07 Apr 2020 05:43) (111/54 - 134/67)  BP(mean): --  RR: 20 (07 Apr 2020 05:43) (20 - 20)  SpO2: 100% (07 Apr 2020 05:43) (99% - 100%)    CONSTITUTIONAL: NAD  RESPIRATORY: Normal respiratory effort; lungs are clear to auscultation bilaterally  CARDIOVASCULAR: Regular rate and rhythm, normal S1 and S2, no murmur/rub/gallop  ABDOMEN: Nontender to palpation, normoactive bowel sounds, no rebound/guarding; No hepatosplenomegaly  EXTREMITIES: No lower extremity edema; Peripheral pulses are 2+ bilaterally  MUSCLOSKELETAL: no clubbing or cyanosis of digits; no joint swelling or tenderness to palpation  PSYCH: A+O to person, place, and time; affect appropriate    LABS:                        8.8    11.43 )-----------( 462      ( 06 Apr 2020 12:28 )             30.1     04-06    143  |  107  |  43<H>  ----------------------------<  135<H>  4.0   |  24  |  1.04    Ca    9.3      06 Apr 2020 12:28  Phos  3.0     04-06  Mg     2.5     04-06    TPro  7.4  /  Alb  3.0<L>  /  TBili  0.2  /  DBili  x   /  AST  48<H>  /  ALT  54<H>  /  AlkPhos  169<H>  04-06                RADIOLOGY & ADDITIONAL TESTS:  Results Reviewed:   Imaging Personally Reviewed:  Electrocardiogram Personally Reviewed:    COORDINATION OF CARE:  Care Discussed with Consultants/Other Providers [Y/N]:  Prior or Outpatient Records Reviewed [Y/N]: PROGRESS NOTE:     Patient is a 79y old  Female who presents with a chief complaint of Shortness of breath, fevers (06 Apr 2020 07:33)    SUBJECTIVE / OVERNIGHT EVENTS: Pt seen and examined. No acute overnight events. In the morning, pt complained of some SOB and diarrhea, denied fever, chills, CP, abdominal pain, N/V, urinary issues, or new joint aches.     ADDITIONAL REVIEW OF SYSTEMS: Otherwise negative    MEDICATIONS  (STANDING):  atorvastatin 20 milliGRAM(s) Oral at bedtime  cholecalciferol 2000 Unit(s) Oral daily  enoxaparin Injectable 40 milliGRAM(s) SubCutaneous daily  levothyroxine 50 MICROGram(s) Oral daily  losartan 50 milliGRAM(s) Oral daily  melatonin 5 milliGRAM(s) Oral at bedtime  methylPREDNISolone sodium succinate Injectable 40 milliGRAM(s) IV Push every 12 hours  pantoprazole    Tablet 40 milliGRAM(s) Oral before breakfast  pyridostigmine 60 milliGRAM(s) Oral three times a day  pyridostigmine  milliGRAM(s) Oral daily  zinc oxide 40% Ointment 1 Application(s) Topical daily    MEDICATIONS  (PRN):  acetaminophen   Tablet .. 650 milliGRAM(s) Oral every 4 hours PRN Temp greater or equal to 38C (100.4F), Moderate Pain (4 - 6)  ALBUTerol    90 MICROgram(s) HFA Inhaler 2 Puff(s) Inhalation every 4 hours PRN Shortness of Breath and/or Wheezing  benzonatate 100 milliGRAM(s) Oral three times a day PRN Cough  loperamide 2 milliGRAM(s) Oral five times a day PRN loose stool  polyethylene glycol 3350 17 Gram(s) Oral daily PRN Constipation  senna 2 Tablet(s) Oral at bedtime PRN Constipation      CAPILLARY BLOOD GLUCOSE        I&O's Summary    06 Apr 2020 07:01  -  07 Apr 2020 07:00  --------------------------------------------------------  IN: 560 mL / OUT: 600 mL / NET: -40 mL        PHYSICAL EXAM:  Vital Signs Last 24 Hrs  T(C): 36.4 (07 Apr 2020 05:43), Max: 37.1 (06 Apr 2020 16:09)  T(F): 97.5 (07 Apr 2020 05:43), Max: 98.8 (06 Apr 2020 16:09)  HR: 57 (07 Apr 2020 05:43) (57 - 62)  BP: 125/71 (07 Apr 2020 05:43) (111/54 - 134/67)  BP(mean): --  RR: 20 (07 Apr 2020 05:43) (20 - 20)  SpO2: 100% (07 Apr 2020 05:43) (99% - 100%)    CONSTITUTIONAL: NAD  RESPIRATORY: Normal respiratory effort, on NRB and NC  CARDIOVASCULAR: Regular rate and rhythm, normal S1 and S2, no murmur/rub/gallop  ABDOMEN: Nontender to palpation, normoactive bowel sounds, no rebound/guarding; No hepatosplenomegaly  EXTREMITIES: No lower extremity edema; Peripheral pulses are 2+ bilaterally  MUSCLOSKELETAL: no clubbing or cyanosis of digits; no joint swelling or tenderness to palpation  PSYCH: A+O to person, place, and time; affect appropriate    LABS:                        8.8    11.43 )-----------( 462      ( 06 Apr 2020 12:28 )             30.1     04-06    143  |  107  |  43<H>  ----------------------------<  135<H>  4.0   |  24  |  1.04    Ca    9.3      06 Apr 2020 12:28  Phos  3.0     04-06  Mg     2.5     04-06    TPro  7.4  /  Alb  3.0<L>  /  TBili  0.2  /  DBili  x   /  AST  48<H>  /  ALT  54<H>  /  AlkPhos  169<H>  04-06                RADIOLOGY & ADDITIONAL TESTS:  Results Reviewed:   Imaging Personally Reviewed:  Electrocardiogram Personally Reviewed:    COORDINATION OF CARE:  Care Discussed with Consultants/Other Providers [Y/N]:  Prior or Outpatient Records Reviewed [Y/N]:

## 2020-04-07 NOTE — PROGRESS NOTE ADULT - PROBLEM SELECTOR PLAN 9
Daughter Patsy is HCP ([phone]). Per discussion w/ daughter Patsy, she would like her mom to be DNR/DNI given the risk of prolonged suffering w/ intubation and small chance of recovery and she states she spoke to her mom about this. Spoke to patient directly regarding this matter but she does not appear to be emotionally ready to comprehend the situation; will defer to daughter's wishes for the time being.

## 2020-04-07 NOTE — PROGRESS NOTE ADULT - PROBLEM SELECTOR PLAN 5
Hold aspirin, celecoxib for now continue synthroid  check TSH outpt (will be altered with high dose steroids)

## 2020-04-08 LAB
ALBUMIN SERPL ELPH-MCNC: 2.3 G/DL — LOW (ref 3.3–5)
ALP SERPL-CCNC: 108 U/L — SIGNIFICANT CHANGE UP (ref 40–120)
ALT FLD-CCNC: 29 U/L — SIGNIFICANT CHANGE UP (ref 10–45)
ANION GAP SERPL CALC-SCNC: 14 MMOL/L — SIGNIFICANT CHANGE UP (ref 5–17)
AST SERPL-CCNC: 22 U/L — SIGNIFICANT CHANGE UP (ref 10–40)
BASOPHILS # BLD AUTO: 0.01 K/UL — SIGNIFICANT CHANGE UP (ref 0–0.2)
BASOPHILS NFR BLD AUTO: 0.1 % — SIGNIFICANT CHANGE UP (ref 0–2)
BILIRUB SERPL-MCNC: 0.3 MG/DL — SIGNIFICANT CHANGE UP (ref 0.2–1.2)
BUN SERPL-MCNC: 33 MG/DL — HIGH (ref 7–23)
CALCIUM SERPL-MCNC: 9 MG/DL — SIGNIFICANT CHANGE UP (ref 8.4–10.5)
CHLORIDE SERPL-SCNC: 106 MMOL/L — SIGNIFICANT CHANGE UP (ref 96–108)
CO2 SERPL-SCNC: 22 MMOL/L — SIGNIFICANT CHANGE UP (ref 22–31)
CREAT SERPL-MCNC: 0.85 MG/DL — SIGNIFICANT CHANGE UP (ref 0.5–1.3)
EOSINOPHIL # BLD AUTO: 0 K/UL — SIGNIFICANT CHANGE UP (ref 0–0.5)
EOSINOPHIL NFR BLD AUTO: 0 % — SIGNIFICANT CHANGE UP (ref 0–6)
GLUCOSE SERPL-MCNC: 92 MG/DL — SIGNIFICANT CHANGE UP (ref 70–99)
HCT VFR BLD CALC: 26.4 % — LOW (ref 34.5–45)
HGB BLD-MCNC: 8 G/DL — LOW (ref 11.5–15.5)
IMM GRANULOCYTES NFR BLD AUTO: 2.8 % — HIGH (ref 0–1.5)
LYMPHOCYTES # BLD AUTO: 0.93 K/UL — LOW (ref 1–3.3)
LYMPHOCYTES # BLD AUTO: 9.1 % — LOW (ref 13–44)
MAGNESIUM SERPL-MCNC: 2.1 MG/DL — SIGNIFICANT CHANGE UP (ref 1.6–2.6)
MCHC RBC-ENTMCNC: 24 PG — LOW (ref 27–34)
MCHC RBC-ENTMCNC: 30.3 GM/DL — LOW (ref 32–36)
MCV RBC AUTO: 79.3 FL — LOW (ref 80–100)
MONOCYTES # BLD AUTO: 0.72 K/UL — SIGNIFICANT CHANGE UP (ref 0–0.9)
MONOCYTES NFR BLD AUTO: 7 % — SIGNIFICANT CHANGE UP (ref 2–14)
NEUTROPHILS # BLD AUTO: 8.32 K/UL — HIGH (ref 1.8–7.4)
NEUTROPHILS NFR BLD AUTO: 81 % — HIGH (ref 43–77)
NRBC # BLD: 0 /100 WBCS — SIGNIFICANT CHANGE UP (ref 0–0)
PHOSPHATE SERPL-MCNC: 2.9 MG/DL — SIGNIFICANT CHANGE UP (ref 2.5–4.5)
PLATELET # BLD AUTO: 366 K/UL — SIGNIFICANT CHANGE UP (ref 150–400)
POTASSIUM SERPL-MCNC: 3.8 MMOL/L — SIGNIFICANT CHANGE UP (ref 3.5–5.3)
POTASSIUM SERPL-SCNC: 3.8 MMOL/L — SIGNIFICANT CHANGE UP (ref 3.5–5.3)
PROT SERPL-MCNC: 6.3 G/DL — SIGNIFICANT CHANGE UP (ref 6–8.3)
RBC # BLD: 3.33 M/UL — LOW (ref 3.8–5.2)
RBC # FLD: 20 % — HIGH (ref 10.3–14.5)
SODIUM SERPL-SCNC: 142 MMOL/L — SIGNIFICANT CHANGE UP (ref 135–145)
WBC # BLD: 10.27 K/UL — SIGNIFICANT CHANGE UP (ref 3.8–10.5)
WBC # FLD AUTO: 10.27 K/UL — SIGNIFICANT CHANGE UP (ref 3.8–10.5)

## 2020-04-08 RX ORDER — LOPERAMIDE HCL 2 MG
2 TABLET ORAL
Refills: 0 | Status: DISCONTINUED | OUTPATIENT
Start: 2020-04-08 | End: 2020-04-20

## 2020-04-08 RX ORDER — ACETAMINOPHEN 500 MG
650 TABLET ORAL EVERY 6 HOURS
Refills: 0 | Status: DISCONTINUED | OUTPATIENT
Start: 2020-04-08 | End: 2020-04-23

## 2020-04-08 RX ADMIN — LOSARTAN POTASSIUM 50 MILLIGRAM(S): 100 TABLET, FILM COATED ORAL at 06:56

## 2020-04-08 RX ADMIN — ZINC OXIDE 1 APPLICATION(S): 200 OINTMENT TOPICAL at 10:25

## 2020-04-08 RX ADMIN — Medication 40 MILLIGRAM(S): at 06:57

## 2020-04-08 RX ADMIN — Medication 2000 UNIT(S): at 10:25

## 2020-04-08 RX ADMIN — Medication 2 MILLIGRAM(S): at 17:51

## 2020-04-08 RX ADMIN — PANTOPRAZOLE SODIUM 40 MILLIGRAM(S): 20 TABLET, DELAYED RELEASE ORAL at 06:57

## 2020-04-08 RX ADMIN — Medication 5 MILLIGRAM(S): at 22:51

## 2020-04-08 RX ADMIN — ENOXAPARIN SODIUM 40 MILLIGRAM(S): 100 INJECTION SUBCUTANEOUS at 10:26

## 2020-04-08 RX ADMIN — PYRIDOSTIGMINE BROMIDE 60 MILLIGRAM(S): 60 SOLUTION ORAL at 06:57

## 2020-04-08 RX ADMIN — PYRIDOSTIGMINE BROMIDE 180 MILLIGRAM(S): 60 SOLUTION ORAL at 10:24

## 2020-04-08 RX ADMIN — PYRIDOSTIGMINE BROMIDE 60 MILLIGRAM(S): 60 SOLUTION ORAL at 13:21

## 2020-04-08 RX ADMIN — Medication 50 MICROGRAM(S): at 06:56

## 2020-04-08 RX ADMIN — PYRIDOSTIGMINE BROMIDE 60 MILLIGRAM(S): 60 SOLUTION ORAL at 22:51

## 2020-04-08 RX ADMIN — ATORVASTATIN CALCIUM 20 MILLIGRAM(S): 80 TABLET, FILM COATED ORAL at 22:51

## 2020-04-08 RX ADMIN — Medication 650 MILLIGRAM(S): at 13:34

## 2020-04-08 NOTE — PROGRESS NOTE ADULT - SUBJECTIVE AND OBJECTIVE BOX
[facility] Division of Hospital Medicine Progress Note    Patient is a 79y old  Female who presents with a chief complaint of Shortness of breath, fevers (07 Apr 2020 07:23)    SUBJECTIVE / OVERNIGHT EVENTS:  Pt reported feeling better with occasional episodes of shortness of breath  Complains of back pain and right hip pain, usually takes Tylenol for pain at home  Requested to work with PT and be seated in the chair    ADDITIONAL REVIEW OF SYSTEMS:    MEDICATIONS  (STANDING):  atorvastatin 20 milliGRAM(s) Oral at bedtime  cholecalciferol 2000 Unit(s) Oral daily  enoxaparin Injectable 40 milliGRAM(s) SubCutaneous daily  levothyroxine 50 MICROGram(s) Oral daily  losartan 50 milliGRAM(s) Oral daily  melatonin 5 milliGRAM(s) Oral at bedtime  pantoprazole    Tablet 40 milliGRAM(s) Oral before breakfast  pyridostigmine 60 milliGRAM(s) Oral three times a day  pyridostigmine  milliGRAM(s) Oral daily  zinc oxide 40% Ointment 1 Application(s) Topical daily    MEDICATIONS  (PRN):  acetaminophen   Tablet .. 650 milliGRAM(s) Oral every 4 hours PRN Temp greater or equal to 38C (100.4F), Moderate Pain (4 - 6)  ALBUTerol    90 MICROgram(s) HFA Inhaler 2 Puff(s) Inhalation every 4 hours PRN Shortness of Breath and/or Wheezing  benzonatate 100 milliGRAM(s) Oral three times a day PRN Cough  loperamide 2 milliGRAM(s) Oral five times a day PRN loose stool  polyethylene glycol 3350 17 Gram(s) Oral daily PRN Constipation  senna 2 Tablet(s) Oral at bedtime PRN Constipation    I&O's Summary    07 Apr 2020 07:01  -  08 Apr 2020 07:00  --------------------------------------------------------  IN: 480 mL / OUT: 450 mL / NET: 30 mL    PHYSICAL EXAM:  Vital Signs Last 24 Hrs  T(C): 36.2 (08 Apr 2020 05:53), Max: 36.7 (07 Apr 2020 13:59)  T(F): 97.1 (08 Apr 2020 05:53), Max: 98 (07 Apr 2020 13:59)  HR: 62 (08 Apr 2020 05:53) (58 - 69)  BP: 129/66 (08 Apr 2020 05:53) (129/66 - 147/67)  BP(mean): --  RR: 20 (08 Apr 2020 05:53) (20 - 20)  SpO2: 96% (08 Apr 2020 05:53) (93% - 96%)    CONSTITUTIONAL: NAD, well-developed, well-groomed  ENMT: Moist oral mucosa, no pharyngeal injection or exudates; normal dentition  RESPIRATORY: Normal respiratory effort; lungs are clear to auscultation bilaterally  CARDIOVASCULAR: Regular rate and rhythm, normal S1 and S2, no murmur/rub/gallop; No lower extremity edema; Peripheral pulses are 2+ bilaterally  ABDOMEN: Nontender to palpation, normoactive bowel sounds, no rebound/guarding; No hepatosplenomegaly  PSYCH: A+O to person, place, and time; affect appropriate  NEUROLOGY: CN 2-12 are intact and symmetric; no gross sensory deficits   SKIN: No rashes; no palpable lesions    LABS:                        8.0    10.27 )-----------( 366      ( 08 Apr 2020 08:30 )             26.4     04-08    142  |  106  |  33<H>  ----------------------------<  92  3.8   |  22  |  0.85    Ca    9.0      08 Apr 2020 08:30  Phos  2.9     04-08  Mg     2.1     04-08    TPro  6.3  /  Alb  2.3<L>  /  TBili  0.3  /  DBili  x   /  AST  22  /  ALT  29  /  AlkPhos  108  04-08    COVID-19 PCR: Detected (03-30-20 @ 21:41)    RADIOLOGY & ADDITIONAL TESTS:  Imaging from Last 24 Hours:  Electrocardiogram/QTc Interval:    COORDINATION OF CARE:  Care Discussed with Consultants/Other Providers:

## 2020-04-08 NOTE — PROGRESS NOTE ADULT - PROBLEM SELECTOR PLAN 8
Daughter Patsy is HCP ([phone]). Per discussion w/ daughter Patsy, she would like her mom to be DNR/DNI given the risk of prolonged suffering w/ intubation and small chance of recovery and she states she spoke to her mom about this. Spoke to patient directly regarding this matter but she does not appear to be emotionally ready to comprehend the situation; will defer to daughter's wishes for the time being.

## 2020-04-08 NOTE — PROGRESS NOTE ADULT - PROBLEM SELECTOR PLAN 6
- on hyzaar at home. Hold HCTZ component for now  - continue losartan 50mg po daily with hold parameters. low threshold to hold if   - high dose steroids likely contributing to HTN, monitor, increase losartan as needed

## 2020-04-08 NOTE — PROGRESS NOTE ADULT - PROBLEM SELECTOR PLAN 1
2/2 COVID19 viral pneumonia,   ID curbsided, less likely to have overlying superimposed PNA, while procalcitonin was high, could be 2/2 CKD/NICO and is already done w/ 5 day course zosyn so DC further abx.   Also high risk for MG exacerbation  pt is on nasal cannula for hypoxia   s/p Plaquenil course (completed)  Finished course of methylprednisolone 40mg IV q12h (course complete 4/8)

## 2020-04-08 NOTE — PROGRESS NOTE ADULT - PROBLEM SELECTOR PLAN 7
DVT ppx: lovenox QD  Diet: dysphagia 1 w/ honey thick liquids (S&S assessment, recommendation for NPO,   Please note: spoke w/ daughter HCP and she states would like to continue dysphagia diet for now knowing that pt is at risk for aspiration and that pt is reccomended for NPO, HCP expressed that she would like pt to be on diet), on aspiration precautions  Dispo: pending  Code: DNR, DNI

## 2020-04-08 NOTE — PROGRESS NOTE ADULT - PROBLEM SELECTOR PLAN 2
Patient at high risk for MG crisis  - C/w pyridostigmine ER 180mg po daily, Mestinon (pyridostigmine) 60mg po tid  - unable to get NIFs and VC q8H given COVID+, will monitor q4 neuro checks specifically looking at neck flexion, voice quality, difficulty swallowing, and call neurology if deterioration  - s/p 1 dose Soliris (eculizumab) outpatient neurology (on mestinon and Soliris for MG outpatient), today last day of methylprednisolone (complete 4/8)   - Discussed with Neurology and recommended to continue with home dose of Prednisone 10mg daily   - neurology following

## 2020-04-09 RX ADMIN — ZINC OXIDE 1 APPLICATION(S): 200 OINTMENT TOPICAL at 11:49

## 2020-04-09 RX ADMIN — PYRIDOSTIGMINE BROMIDE 60 MILLIGRAM(S): 60 SOLUTION ORAL at 05:12

## 2020-04-09 RX ADMIN — Medication 5 MILLIGRAM(S): at 20:46

## 2020-04-09 RX ADMIN — Medication 2000 UNIT(S): at 11:49

## 2020-04-09 RX ADMIN — Medication 650 MILLIGRAM(S): at 21:30

## 2020-04-09 RX ADMIN — ATORVASTATIN CALCIUM 20 MILLIGRAM(S): 80 TABLET, FILM COATED ORAL at 21:10

## 2020-04-09 RX ADMIN — LOSARTAN POTASSIUM 50 MILLIGRAM(S): 100 TABLET, FILM COATED ORAL at 05:11

## 2020-04-09 RX ADMIN — PYRIDOSTIGMINE BROMIDE 60 MILLIGRAM(S): 60 SOLUTION ORAL at 13:42

## 2020-04-09 RX ADMIN — PYRIDOSTIGMINE BROMIDE 180 MILLIGRAM(S): 60 SOLUTION ORAL at 11:50

## 2020-04-09 RX ADMIN — Medication 50 MICROGRAM(S): at 05:11

## 2020-04-09 RX ADMIN — ENOXAPARIN SODIUM 40 MILLIGRAM(S): 100 INJECTION SUBCUTANEOUS at 11:50

## 2020-04-09 RX ADMIN — PYRIDOSTIGMINE BROMIDE 60 MILLIGRAM(S): 60 SOLUTION ORAL at 20:46

## 2020-04-09 RX ADMIN — Medication 10 MILLIGRAM(S): at 05:12

## 2020-04-09 RX ADMIN — PANTOPRAZOLE SODIUM 40 MILLIGRAM(S): 20 TABLET, DELAYED RELEASE ORAL at 05:12

## 2020-04-09 NOTE — PROGRESS NOTE ADULT - SUBJECTIVE AND OBJECTIVE BOX
[facility] Division of Hospital Medicine Progress Note    Patient is a 79y old  Female who presents with a chief complaint of Shortness of breath, fevers (07 Apr 2020 07:23)    SUBJECTIVE / OVERNIGHT EVENTS:  Stitches removed today from right hip wound    ADDITIONAL REVIEW OF SYSTEMS:    MEDICATIONS  (STANDING):  atorvastatin 20 milliGRAM(s) Oral at bedtime  cholecalciferol 2000 Unit(s) Oral daily  enoxaparin Injectable 40 milliGRAM(s) SubCutaneous daily  levothyroxine 50 MICROGram(s) Oral daily  losartan 50 milliGRAM(s) Oral daily  melatonin 5 milliGRAM(s) Oral at bedtime  pantoprazole    Tablet 40 milliGRAM(s) Oral before breakfast  predniSONE   Tablet 10 milliGRAM(s) Oral daily  pyridostigmine 60 milliGRAM(s) Oral three times a day  pyridostigmine  milliGRAM(s) Oral daily  zinc oxide 40% Ointment 1 Application(s) Topical daily    MEDICATIONS  (PRN):  acetaminophen   Tablet .. 650 milliGRAM(s) Oral every 4 hours PRN Temp greater or equal to 38C (100.4F), Moderate Pain (4 - 6)  acetaminophen   Tablet .. 650 milliGRAM(s) Oral every 6 hours PRN Mild Pain (1 - 3)  ALBUTerol    90 MICROgram(s) HFA Inhaler 2 Puff(s) Inhalation every 4 hours PRN Shortness of Breath and/or Wheezing  benzonatate 100 milliGRAM(s) Oral three times a day PRN Cough  loperamide 2 milliGRAM(s) Oral five times a day PRN loose stool  polyethylene glycol 3350 17 Gram(s) Oral daily PRN Constipation  senna 2 Tablet(s) Oral at bedtime PRN Constipation    I&O's Summary    07 Apr 2020 07:01  -  08 Apr 2020 07:00  --------------------------------------------------------  IN: 480 mL / OUT: 450 mL / NET: 30 mL    PHYSICAL EXAM:  Vital Signs Last 24 Hrs  T(C): 36.6 (09 Apr 2020 05:04), Max: 36.6 (08 Apr 2020 17:46)  T(F): 97.9 (09 Apr 2020 05:04), Max: 97.9 (09 Apr 2020 05:04)  HR: 82 (09 Apr 2020 05:04) (69 - 82)  BP: 116/73 (09 Apr 2020 05:04) (116/73 - 158/78)  RR: 18 (09 Apr 2020 05:04) (18 - 20)  SpO2: 95% (09 Apr 2020 08:00) (88% - 95%)    CONSTITUTIONAL: NAD, well-developed, well-groomed  ENMT: Moist oral mucosa, no pharyngeal injection or exudates; normal dentition  RESPIRATORY: Normal respiratory effort; lungs are clear to auscultation bilaterally  CARDIOVASCULAR: Regular rate and rhythm, normal S1 and S2, no murmur/rub/gallop; No lower extremity edema; Peripheral pulses are 2+ bilaterally  ABDOMEN: Nontender to palpation, normoactive bowel sounds, no rebound/guarding; No hepatosplenomegaly  PSYCH: A+O to person, place, and time; affect appropriate  NEUROLOGY: CN 2-12 are intact and symmetric; no gross sensory deficits   SKIN: No rashes; no palpable lesions    LABS:                        8.0    10.27 )-----------( 366      ( 08 Apr 2020 08:30 )             26.4     04-08    142  |  106  |  33<H>  ----------------------------<  92  3.8   |  22  |  0.85    Ca    9.0      08 Apr 2020 08:30  Phos  2.9     04-08  Mg     2.1     04-08    TPro  6.3  /  Alb  2.3<L>  /  TBili  0.3  /  DBili  x   /  AST  22  /  ALT  29  /  AlkPhos  108  04-08    COVID-19 PCR: Detected (03-30-20 @ 21:41)    RADIOLOGY & ADDITIONAL TESTS:  Imaging from Last 24 Hours:  Electrocardiogram/QTc Interval:    COORDINATION OF CARE:  Care Discussed with Consultants/Other Providers:

## 2020-04-09 NOTE — PROGRESS NOTE ADULT - PROBLEM SELECTOR PLAN 7
Daughter Patsy is HCP ([phone]). Per discussion w/ daughter Patsy, she would like her mom to be DNR/DNI given the risk of prolonged suffering w/ intubation and small chance of recovery and she states she spoke to her mom about this. Spoke to patient directly regarding this matter but she does not appear to be emotionally ready to comprehend the situation; will defer to daughter's wishes for the time being.

## 2020-04-10 RX ADMIN — Medication 650 MILLIGRAM(S): at 21:30

## 2020-04-10 RX ADMIN — PYRIDOSTIGMINE BROMIDE 180 MILLIGRAM(S): 60 SOLUTION ORAL at 12:11

## 2020-04-10 RX ADMIN — PYRIDOSTIGMINE BROMIDE 60 MILLIGRAM(S): 60 SOLUTION ORAL at 21:00

## 2020-04-10 RX ADMIN — PANTOPRAZOLE SODIUM 40 MILLIGRAM(S): 20 TABLET, DELAYED RELEASE ORAL at 05:57

## 2020-04-10 RX ADMIN — ZINC OXIDE 1 APPLICATION(S): 200 OINTMENT TOPICAL at 12:11

## 2020-04-10 RX ADMIN — Medication 5 MILLIGRAM(S): at 20:59

## 2020-04-10 RX ADMIN — ENOXAPARIN SODIUM 40 MILLIGRAM(S): 100 INJECTION SUBCUTANEOUS at 12:11

## 2020-04-10 RX ADMIN — Medication 2000 UNIT(S): at 12:11

## 2020-04-10 RX ADMIN — PYRIDOSTIGMINE BROMIDE 60 MILLIGRAM(S): 60 SOLUTION ORAL at 13:11

## 2020-04-10 RX ADMIN — Medication 50 MICROGRAM(S): at 05:57

## 2020-04-10 RX ADMIN — PYRIDOSTIGMINE BROMIDE 60 MILLIGRAM(S): 60 SOLUTION ORAL at 05:58

## 2020-04-10 RX ADMIN — ATORVASTATIN CALCIUM 20 MILLIGRAM(S): 80 TABLET, FILM COATED ORAL at 20:59

## 2020-04-10 RX ADMIN — Medication 10 MILLIGRAM(S): at 05:58

## 2020-04-10 RX ADMIN — LOSARTAN POTASSIUM 50 MILLIGRAM(S): 100 TABLET, FILM COATED ORAL at 05:57

## 2020-04-10 RX ADMIN — Medication 650 MILLIGRAM(S): at 05:58

## 2020-04-10 NOTE — PROGRESS NOTE ADULT - PROBLEM SELECTOR PLAN 6
DVT ppx: lovenox QD  Diet: dysphagia 1 w/ honey thick liquids (S&S assessment, recommendation for NPO,   Please note: spoke w/ daughter HCP and she states would like to continue dysphagia diet for now knowing that pt is at risk for aspiration and that pt is reccomended for NPO, HCP expressed that she would like pt to be on diet), on aspiration precautions  Dispo: pending PT eval   Code: DNR, DNI

## 2020-04-10 NOTE — PHYSICAL THERAPY INITIAL EVALUATION ADULT - BED MOBILITY LIMITATIONS, REHAB EVAL
TBD upon functional eval impaired ability to control trunk for mobility/decreased ability to use legs for bridging/pushing

## 2020-04-10 NOTE — PROGRESS NOTE ADULT - PROBLEM SELECTOR PLAN 1
2/2 COVID19 viral pneumonia,   ID curbsided, less likely to have overlying superimposed PNA, while procalcitonin was high, could be 2/2 CKD/NICO and is already done w/ 5 day course zosyn so DC further abx.   Also high risk for MG exacerbation  pt is back on 100% NRBD, instructed to self prone as tolerated   s/p Plaquenil course (completed)  Finished course of methylprednisolone 40mg IV q12h (course complete 4/8)

## 2020-04-10 NOTE — PROGRESS NOTE ADULT - PROBLEM SELECTOR PLAN 5
- on Hyzaar at home. Hold HCTZ component for now  - continue losartan 50mg po daily with hold parameters. low threshold to hold if   - monitor, increase losartan as needed

## 2020-04-10 NOTE — PHYSICAL THERAPY INITIAL EVALUATION ADULT - ADDITIONAL COMMENTS
Normally: Pt lives in house with 5 steps to enter with handrail. stays on first floor.  recently passed aware (Pt is not aware.) Pt states has RW and cane. uses them as needed.

## 2020-04-10 NOTE — PHYSICAL THERAPY INITIAL EVALUATION ADULT - CRITERIA FOR SKILLED THERAPEUTIC INTERVENTIONS
rehab potential/predicted duration of therapy intervention/functional limitations in following categories/therapy frequency/impairments found/anticipated discharge recommendation/anticipated equipment needs at discharge

## 2020-04-10 NOTE — PROGRESS NOTE ADULT - PROBLEM SELECTOR PLAN 7
Daughter Patsy is HCP ([phone]). Per discussion w/ daughter Patsy, she would like her mom to be DNR/DNI given the risk of prolonged suffering w/ intubation and small chance of recovery and she states she spoke to her mom about this. Spoke to patient directly regarding this matter but she does not appear to be emotionally ready to comprehend the situation; will defer to daughter's wishes for the time being.

## 2020-04-10 NOTE — PROGRESS NOTE ADULT - SUBJECTIVE AND OBJECTIVE BOX
[facility] Division of Hospital Medicine Progress Note    Patient is a 79y old  Female who presents with a chief complaint of Shortness of breath, fevers (09 Apr 2020 14:48)    SUBJECTIVE / OVERNIGHT EVENTS:  Pt reported feeling a little better in terms of her breathing, discussed to self prone as tolerated    ADDITIONAL REVIEW OF SYSTEMS:    MEDICATIONS  (STANDING):  atorvastatin 20 milliGRAM(s) Oral at bedtime  cholecalciferol 2000 Unit(s) Oral daily  enoxaparin Injectable 40 milliGRAM(s) SubCutaneous daily  levothyroxine 50 MICROGram(s) Oral daily  losartan 50 milliGRAM(s) Oral daily  melatonin 5 milliGRAM(s) Oral at bedtime  pantoprazole    Tablet 40 milliGRAM(s) Oral before breakfast  predniSONE   Tablet 10 milliGRAM(s) Oral daily  pyridostigmine 60 milliGRAM(s) Oral three times a day  pyridostigmine  milliGRAM(s) Oral daily  zinc oxide 40% Ointment 1 Application(s) Topical daily    MEDICATIONS  (PRN):  acetaminophen   Tablet .. 650 milliGRAM(s) Oral every 4 hours PRN Temp greater or equal to 38C (100.4F), Moderate Pain (4 - 6)  acetaminophen   Tablet .. 650 milliGRAM(s) Oral every 6 hours PRN Mild Pain (1 - 3)  ALBUTerol    90 MICROgram(s) HFA Inhaler 2 Puff(s) Inhalation every 4 hours PRN Shortness of Breath and/or Wheezing  benzonatate 100 milliGRAM(s) Oral three times a day PRN Cough  loperamide 2 milliGRAM(s) Oral five times a day PRN loose stool  polyethylene glycol 3350 17 Gram(s) Oral daily PRN Constipation  senna 2 Tablet(s) Oral at bedtime PRN Constipation    I&O's Summary    09 Apr 2020 07:01  -  10 Apr 2020 07:00  --------------------------------------------------------  IN: 140 mL / OUT: 350 mL / NET: -210 mL    PHYSICAL EXAM:  Vital Signs Last 24 Hrs  T(C): 35.9 (10 Apr 2020 10:00), Max: 37 (10 Apr 2020 05:00)  T(F): 96.7 (10 Apr 2020 10:00), Max: 98.6 (10 Apr 2020 05:00)  HR: 88 (10 Apr 2020 14:31) (79 - 99)  BP: 104/79 (10 Apr 2020 10:00) (104/79 - 123/82)  BP(mean): --  RR: 19 (10 Apr 2020 14:31) (18 - 20)  SpO2: 99% (10 Apr 2020 14:31) (70% - 100%)    CONSTITUTIONAL: NAD, well-developed, well-groomed  ENMT: Moist oral mucosa, no pharyngeal injection or exudates; normal dentition  RESPIRATORY: Normal respiratory effort; lungs are clear to auscultation bilaterally  CARDIOVASCULAR: Regular rate and rhythm, normal S1 and S2, no murmur/rub/gallop; No lower extremity edema; Peripheral pulses are 2+ bilaterally  ABDOMEN: Nontender to palpation, normoactive bowel sounds, no rebound/guarding; No hepatosplenomegaly  PSYCH: A+O to person, place, and time; affect appropriate  NEUROLOGY: CN 2-12 are intact and symmetric; no gross sensory deficits   SKIN: No rashes; no palpable lesions    LABS:                        8.1    10.89 )-----------( 315      ( 10 Apr 2020 13:22 )             27.5     04-10    144  |  107  |  36<H>  ----------------------------<  142<H>  4.2   |  22  |  0.94    Ca    9.5      10 Apr 2020 13:22    TPro  6.8  /  Alb  2.9<L>  /  TBili  0.4  /  DBili  x   /  AST  19  /  ALT  19  /  AlkPhos  99  04-10    COVID-19 PCR: Detected (03-30-20 @ 21:41)    RADIOLOGY & ADDITIONAL TESTS:  Imaging from Last 24 Hours:  Electrocardiogram/QTc Interval:    COORDINATION OF CARE:  Care Discussed with Consultants/Other Providers:

## 2020-04-10 NOTE — PHYSICAL THERAPY INITIAL EVALUATION ADULT - GAIT DEVIATIONS NOTED, PT EVAL
decreased weight-shifting ability/decreased step length/decreased swing-to-stance ratio/decreased isaiah/increased time in double stance/decreased velocity of limb motion

## 2020-04-11 RX ADMIN — ENOXAPARIN SODIUM 40 MILLIGRAM(S): 100 INJECTION SUBCUTANEOUS at 10:12

## 2020-04-11 RX ADMIN — Medication 650 MILLIGRAM(S): at 21:23

## 2020-04-11 RX ADMIN — ZINC OXIDE 1 APPLICATION(S): 200 OINTMENT TOPICAL at 10:12

## 2020-04-11 RX ADMIN — LOSARTAN POTASSIUM 50 MILLIGRAM(S): 100 TABLET, FILM COATED ORAL at 04:57

## 2020-04-11 RX ADMIN — Medication 10 MILLIGRAM(S): at 04:58

## 2020-04-11 RX ADMIN — Medication 2000 UNIT(S): at 10:12

## 2020-04-11 RX ADMIN — PYRIDOSTIGMINE BROMIDE 60 MILLIGRAM(S): 60 SOLUTION ORAL at 04:58

## 2020-04-11 RX ADMIN — ATORVASTATIN CALCIUM 20 MILLIGRAM(S): 80 TABLET, FILM COATED ORAL at 21:23

## 2020-04-11 RX ADMIN — PYRIDOSTIGMINE BROMIDE 60 MILLIGRAM(S): 60 SOLUTION ORAL at 21:23

## 2020-04-11 RX ADMIN — PANTOPRAZOLE SODIUM 40 MILLIGRAM(S): 20 TABLET, DELAYED RELEASE ORAL at 04:58

## 2020-04-11 RX ADMIN — Medication 5 MILLIGRAM(S): at 21:23

## 2020-04-11 RX ADMIN — Medication 650 MILLIGRAM(S): at 06:05

## 2020-04-11 RX ADMIN — Medication 650 MILLIGRAM(S): at 16:59

## 2020-04-11 RX ADMIN — PYRIDOSTIGMINE BROMIDE 60 MILLIGRAM(S): 60 SOLUTION ORAL at 11:59

## 2020-04-11 RX ADMIN — PYRIDOSTIGMINE BROMIDE 180 MILLIGRAM(S): 60 SOLUTION ORAL at 10:12

## 2020-04-11 RX ADMIN — Medication 50 MICROGRAM(S): at 04:57

## 2020-04-11 NOTE — PROGRESS NOTE ADULT - SUBJECTIVE AND OBJECTIVE BOX
CC: F/u viral pneumonia, +COVID-19  INTERVAL HPI/OVERNIGHT EVENTS: Pt seen and examined at bedside this AM. Still on NRB, appearing tachypneic. Complaining of right hip pain and back pain, which she has had in the past related to previous surgeries.     REVIEW OF SYSTEMS:  CONSTITUTIONAL: No fever, weight loss, or fatigue  EYES: No eye pain, visual disturbances, or discharge  ENT:  No difficulty hearing, tinnitus, vertigo; No sinus or throat pain  NECK: No pain or stiffness  RESPIRATORY: See HPI  CARDIOVASCULAR: No chest pain, palpitations, dizziness, or leg swelling  GASTROINTESTINAL: No abdominal pain. No nausea, vomiting, diarrhea. No hematemesis, melena or hematochezia.  GENITOURINARY: No dysuria, frequency, hematuria, or incontinence  NEUROLOGICAL: No headaches or dizziness  SKIN: No itching or rashes  MUSCULOSKELETAL: Back pain and right hip pain    Allergies    IODINE (Rash)  No Known Drug Allergies  shellfish (Rash)    Intolerances    HEALTH ISSUES - PROBLEM Dx:  Elevated LFTs: Elevated LFTs  Prophylactic measure: Prophylactic measure  Advance care planning: Advance care planning  Hypertension: Hypertension  Hypothyroid: Hypothyroid  Osteoarthritis: Osteoarthritis  Myasthenia gravis: Myasthenia gravis  Infection due to 2019-nCoV: Infection due to 2019-nCoV  Respiratory failure: Respiratory failure    PAST MEDICAL & SURGICAL HISTORY:  Osteoarthritis  Hypothyroid  Reactive airway disease that is not asthma: mild as per pulm note on [employer], patient and daughter denies any inhaler use; thought to be related to myasthena gravis  Near syncope: 8/2018 negative ENT work up, negative cardiac work ups as per daughter  Aortic stenosis, mild: asper daughter  Diverticulitis large intestine: denies any recent exacerbations  Lumbar stenosis  Myasthenia gravis: dx 10/2018 symptoms: intermittent loss of voice/ weakness, negative ENT studies, now stable on Pyridostigmine  Carpal Tunnel Syndrome Right: release 4/10 and left  Hammertoe Right foot  Kidney Calculi  Lumbar Radiculopathy  GERD (Gastroesophageal Reflux Disease): hiatal hernia  Hyperlipidemia  Genital Ulcer, Female  Hypertension  H/O umbilical hernia repair  History of tonsillectomy  S/P foot surgery  H/O shoulder replacement: b/l 2015/2016  H/O laminectomy: cervical  1998  lumbar 1986,1998,2000, 2/19 with fusion  Prolapse, Uterovaginal: s/p sling  S/P Laparoscopic Fundoplication  S/P Hysterectomy Partial: 1974  Bilateral Cataracts: removed  S/P Appendectomy  S/P Cholecystectomy    VITAL SIGNS:  T(C): 36.4 (04-11-20 @ 11:54), Max: 36.6 (04-11-20 @ 06:10)  HR: 71 (04-11-20 @ 11:54) (63 - 88)  BP: 111/66 (04-11-20 @ 11:54) (101/62 - 111/66)  RR: 20 (04-11-20 @ 11:54) (19 - 20)  SpO2: 99% (04-11-20 @ 11:54) (98% - 100%)  Wt(kg): --Vital Signs Last 24 Hrs  T(C): 36.4 (11 Apr 2020 11:54), Max: 36.6 (11 Apr 2020 06:10)  T(F): 97.6 (11 Apr 2020 11:54), Max: 97.9 (11 Apr 2020 06:10)  HR: 71 (11 Apr 2020 11:54) (63 - 88)  BP: 111/66 (11 Apr 2020 11:54) (101/62 - 111/66)  BP(mean): --  RR: 20 (11 Apr 2020 11:54) (19 - 20)  SpO2: 99% (11 Apr 2020 11:54) (98% - 100%)    PHYSICAL EXAM:  GENERAL: Pt lying in bed comfortably in NAD  HEAD:  Atraumatic, Normocephalic  ENT: Moist mucous membranes  CHEST/LUNG: Clear to auscultation bilaterally, Unlabored respirations  HEART: Regular rate and rhythm; No murmurs, rubs, or gallops  ABDOMEN: Bowel sounds present; Soft, Nontender, Nondistended.   EXTREMITIES:  2+ Peripheral Pulses, brisk capillary refill. No clubbing, cyanosis, or edema  NERVOUS SYSTEM:  Alert & Oriented X3  SKIN: No rashes or lesion                        8.1    10.89 )-----------( 315      ( 10 Apr 2020 13:22 )             27.5     04-10    144  |  107  |  36<H>  ----------------------------<  142<H>  4.2   |  22  |  0.94    Ca    9.5      10 Apr 2020 13:22    TPro  6.8  /  Alb  2.9<L>  /  TBili  0.4  /  DBili  x   /  AST  19  /  ALT  19  /  AlkPhos  99  04-10    Procalcitonin, Serum: 0.14 ng/mL (04-10-20 @ 13:22)    Procalcitonin, Serum: 0.14 (04-10)  Procalcitonin, Serum: 0.78 (04-05)  Procalcitonin, Serum: 1.02 (04-03)  Procalcitonin, Serum: 3.60 (04-01)  Procalcitonin, Serum: 3.40 (03-30)    C-Reactive Protein, Serum: 13.55 (04-10)  C-Reactive Protein, Serum: 12.71 (04-05)  C-Reactive Protein, Serum: 27.92 (04-03)  C-Reactive Protein, Serum: 18.51 (04-01)  C-Reactive Protein, Serum: 18.48 (03-31)    Ferritin, Serum: 425 (04-10)  Ferritin, Serum: 590 (04-05)  Ferritin, Serum: 550 (04-03)  Ferritin, Serum: 401 (04-01)  Ferritin, Serum: 368 (03-31)    D-Dimer Assay, Quantitative: 868 (03-30)    CAPILLARY BLOOD GLUCOSE

## 2020-04-11 NOTE — PROGRESS NOTE ADULT - REASON FOR ADMISSION
Shortness of breath, fevers Cephalexin Counseling: I counseled the patient regarding use of cephalexin as an antibiotic for prophylactic and/or therapeutic purposes. Cephalexin (commonly prescribed under brand name Keflex) is a cephalosporin antibiotic which is active against numerous classes of bacteria, including most skin bacteria. Side effects may include nausea, diarrhea, gastrointestinal upset, rash, hives, yeast infections, and in rare cases, hepatitis, kidney disease, seizures, fever, confusion, neurologic symptoms, and others. Patients with severe allergies to penicillin medications are cautioned that there is about a 10% incidence of cross-reactivity with cephalosporins. When possible, patients with penicillin allergies should use alternatives to cephalosporins for antibiotic therapy.

## 2020-04-11 NOTE — PROGRESS NOTE ADULT - ASSESSMENT
79 F hx of myasthenia gravis, HTN, recent hip replacement presented with acute hypoxemic respiratory failure and sepsis in the setting of COVID-19 infection.    # Acute hypoxemic respiratory failure secondary to COVID-19  - Currently on NRB, educated on self-proning  - Monitor closely given risk of concurrent myasthenia gravis exacerbation  - Low suspicion for superimposed bacterial pneumonia [ID curbsided]. Patient previously treated with 5 day course of Zosyn    # COVID-19  - Patient s/p Solumedrol (4/3-7) and Plaquenil (3/31-4/4)  - Airborne/Contact precautions  - CRP and Ferritin improved  - Supportive care    # Myasthenia Gravis  - Currently on pyridostigmine ER 180mg po daily, Mestinon (pyridostigmine) 60mg po tid  - Unable to get NIFs and VC q8H given COVID+, will monitor q4 neuro checks specifically looking at neck flexion, voice quality, difficulty swallowing, and call neurology if deterioration  - s/p 1 dose Soliris (eculizumab) outpatient neurology (on mestinon and Soliris for MG outpatient) [completed last day 4/8)   - On Prednisone 10mg PO qd per neurology recs    # Osteoarthritis  - On Celecoxib    # Hypothyroidism  - On Syndroid    # Hypertension  - BP Stable on Losartan 50mg PO qd  - Hold HCTZ for now    # DVT PPx: On Lovenox  # Diet: S+S Eval recommended NPO, but patient and HCP prefer dysphagia 1 with honey thickened liquids despite risks of aspiration  # GOC: DNR/DNI    Purnima Stoddard MD  Hospitalist/Gastroenterology Fellow  [phone]  Please page on call fellow on weekends and after 5pm on weekdays

## 2020-04-12 RX ADMIN — LOSARTAN POTASSIUM 50 MILLIGRAM(S): 100 TABLET, FILM COATED ORAL at 04:34

## 2020-04-12 RX ADMIN — Medication 50 MICROGRAM(S): at 04:34

## 2020-04-12 RX ADMIN — Medication 10 MILLIGRAM(S): at 04:34

## 2020-04-12 RX ADMIN — PYRIDOSTIGMINE BROMIDE 180 MILLIGRAM(S): 60 SOLUTION ORAL at 14:08

## 2020-04-12 RX ADMIN — PANTOPRAZOLE SODIUM 40 MILLIGRAM(S): 20 TABLET, DELAYED RELEASE ORAL at 04:34

## 2020-04-12 RX ADMIN — Medication 2000 UNIT(S): at 14:08

## 2020-04-12 RX ADMIN — ZINC OXIDE 1 APPLICATION(S): 200 OINTMENT TOPICAL at 14:08

## 2020-04-12 RX ADMIN — ENOXAPARIN SODIUM 40 MILLIGRAM(S): 100 INJECTION SUBCUTANEOUS at 14:08

## 2020-04-12 RX ADMIN — Medication 5 MILLIGRAM(S): at 20:45

## 2020-04-12 RX ADMIN — Medication 650 MILLIGRAM(S): at 20:46

## 2020-04-12 RX ADMIN — ATORVASTATIN CALCIUM 20 MILLIGRAM(S): 80 TABLET, FILM COATED ORAL at 20:45

## 2020-04-12 RX ADMIN — PYRIDOSTIGMINE BROMIDE 60 MILLIGRAM(S): 60 SOLUTION ORAL at 20:46

## 2020-04-12 RX ADMIN — PYRIDOSTIGMINE BROMIDE 60 MILLIGRAM(S): 60 SOLUTION ORAL at 04:35

## 2020-04-12 RX ADMIN — PYRIDOSTIGMINE BROMIDE 60 MILLIGRAM(S): 60 SOLUTION ORAL at 14:08

## 2020-04-12 NOTE — PROGRESS NOTE ADULT - ASSESSMENT
79 F hx of myasthenia gravis, HTN, recent hip replacement presented with acute hypoxemic respiratory failure and sepsis in the setting of COVID-19 infection.    # Acute hypoxemic respiratory failure secondary to COVID-19  - Currently on NRB with O2 sat of 90%  - Self proning  - Monitor closely given risk of concurrent myasthenia gravis exacerbation  - Low suspicion for superimposed bacterial pneumonia [ID curbsided]. Patient previously treated with 5 day course of Zosyn  - DNR/DNI    # COVID-19  - Patient s/p Solumedrol (4/3-7) and Plaquenil (3/31-4/4)  - Airborne/Contact precautions  - CRP and Ferritin improved  - Supportive care    # Myasthenia Gravis  - Currently on pyridostigmine ER 180mg po daily, Mestinon (pyridostigmine) 60mg po tid  - Unable to get NIFs and VC q8H given COVID+, will monitor q4 neuro checks specifically looking at neck flexion, voice quality, difficulty swallowing, and call neurology if deterioration  - s/p 1 dose Soliris (eculizumab) outpatient neurology (on mestinon and Soliris for MG outpatient) [completed last day 4/8)   - On Prednisone 10mg PO qd per neurology recs    # Osteoarthritis  - On Celecoxib    # Hypothyroidism  - On Syntroid    # Hypertension  - BP Stable on Losartan 50mg PO qd  - Hold HCTZ for now    # DVT PPx: On Lovenox  # Diet: S+S Eval recommended NPO, but patient and HCP prefer dysphagia 1 with honey thickened liquids despite risks of aspiration  # GOC: DNR/DNI    Purnima Stoddard MD  Hospitalist/Gastroenterology Fellow  [phone]

## 2020-04-12 NOTE — PROGRESS NOTE ADULT - SUBJECTIVE AND OBJECTIVE BOX
CC: F/u viral pneumonia, +COVID-19  INTERVAL HPI/OVERNIGHT EVENTS: Pt seen and examined at bedside this AM. Still on NRB reporting shortness of breath  and continues to complain of pain in back/hip. Denies cough    REVIEW OF SYSTEMS:  CONSTITUTIONAL: No fever, weight loss, or fatigue  EYES: No eye pain, visual disturbances, or discharge  ENT:  No difficulty hearing, tinnitus, vertigo; No sinus or throat pain  NECK: No pain or stiffness  RESPIRATORY: See HPI  CARDIOVASCULAR: No chest pain, palpitations, dizziness, or leg swelling  GASTROINTESTINAL: No abdominal pain. No nausea, vomiting, diarrhea. No hematemesis, melena or hematochezia.  GENITOURINARY: No dysuria, frequency, hematuria, or incontinence  NEUROLOGICAL: No headachess or dizziness  SKIN: No itching or rashes  MUSCULOSKELETAL: No joint pain or swelling    Allergies  IODINE (Rash)  No Known Drug Allergies  shellfish (Rash)    Intolerances    HEALTH ISSUES - PROBLEM Dx:  Elevated LFTs: Elevated LFTs  Prophylactic measure: Prophylactic measure  Advance care planning: Advance care planning  Hypertension: Hypertension  Hypothyroid: Hypothyroid  Osteoarthritis: Osteoarthritis  Myasthenia gravis: Myasthenia gravis  Infection due to 2019-nCoV: Infection due to 2019-nCoV  Respiratory failure: Respiratory failure    PAST MEDICAL & SURGICAL HISTORY:  Osteoarthritis  Hypothyroid  Reactive airway disease that is not asthma: mild as per pulm note on [employer], patient and daughter denies any inhaler use; thought to be related to myasthena gravis  Near syncope: 8/2018 negative ENT work up, negative cardiac work ups as per daughter  Aortic stenosis, mild: asper daughter  Diverticulitis large intestine: denies any recent exacerbations  Lumbar stenosis  Myasthenia gravis: dx 10/2018 symptoms: intermittent loss of voice/ weakness, negative ENT studies, now stable on Pyridostigmine  Carpal Tunnel Syndrome Right: release 4/10 and left  Hammertoe Right foot  Kidney Calculi  Lumbar Radiculopathy  GERD (Gastroesophageal Reflux Disease): hiatal hernia  Hyperlipidemia  Genital Ulcer, Female  Hypertension  H/O umbilical hernia repair  History of tonsillectomy  S/P foot surgery  H/O shoulder replacement: b/l 2015/2016  H/O laminectomy: cervical  1998  lumbar 1986,1998,2000, 2/19 with fusion  Prolapse, Uterovaginal: s/p sling  S/P Laparoscopic Fundoplication  S/P Hysterectomy Partial: 1974  Bilateral Cataracts: removed  S/P Appendectomy  S/P Cholecystectomy    VITAL SIGNS:  T(C): 36.7 (04-12-20 @ 13:23), Max: 36.9 (04-12-20 @ 05:27)  HR: 104 (04-12-20 @ 13:23) (74 - 104)  BP: 101/66 (04-12-20 @ 13:23) (101/66 - 118/71)  RR: 20 (04-12-20 @ 13:23) (16 - 20)  SpO2: 90% (04-12-20 @ 13:23) (78% - 98%)  Wt(kg): --Vital Signs Last 24 Hrs  T(C): 36.7 (12 Apr 2020 13:23), Max: 36.9 (12 Apr 2020 05:27)  T(F): 98 (12 Apr 2020 13:23), Max: 98.4 (12 Apr 2020 05:27)  HR: 104 (12 Apr 2020 13:23) (74 - 104)  BP: 101/66 (12 Apr 2020 13:23) (101/66 - 118/71)  BP(mean): --  RR: 20 (12 Apr 2020 13:23) (16 - 20)  SpO2: 90% (12 Apr 2020 13:23) (78% - 98%)    PHYSICAL EXAM:  GENERAL: Pt lying in bed comfortably in NAD  HEAD:  Atraumatic, Normocephalic  ENT: Moist mucous membranes  CHEST/LUNG: Clear to auscultation bilaterally, Unlabored respirations  HEART: Regular rate and rhythm; No murmurs, rubs, or gallops  ABDOMEN: Bowel sounds present; Soft, Nontender, Nondistended.   EXTREMITIES:  2+ Peripheral Pulses, brisk capillary refill. No clubbing, cyanosis, or edema  NERVOUS SYSTEM:  Alert & Oriented X3  SKIN: No rashes or lesion                        7.7    9.99  )-----------( 319      ( 11 Apr 2020 17:10 )             27.7     04-11    142  |  105  |  39<H>  ----------------------------<  52<L>  3.9   |  21<L>  |  0.91    Ca    9.4      11 Apr 2020 17:10    TPro  6.5  /  Alb  2.5<L>  /  TBili  0.3  /  DBili  x   /  AST  21  /  ALT  18  /  AlkPhos  78  04-11    Procalcitonin, Serum: 0.14 (04-10)  Procalcitonin, Serum: 0.78 (04-05)  Procalcitonin, Serum: 1.02 (04-03)  Procalcitonin, Serum: 3.60 (04-01)  Procalcitonin, Serum: 3.40 (03-30)    C-Reactive Protein, Serum: 13.55 (04-10)  C-Reactive Protein, Serum: 12.71 (04-05)  C-Reactive Protein, Serum: 27.92 (04-03)  C-Reactive Protein, Serum: 18.51 (04-01)  C-Reactive Protein, Serum: 18.48 (03-31)    Ferritin, Serum: 425 (04-10)  Ferritin, Serum: 590 (04-05)  Ferritin, Serum: 550 (04-03)  Ferritin, Serum: 401 (04-01)  Ferritin, Serum: 368 (03-31)    D-Dimer Assay, Quantitative: 868 (03-30)    CAPILLARY BLOOD GLUCOSE  108 (11 Apr 2020 22:24)

## 2020-04-13 LAB
ANION GAP SERPL CALC-SCNC: 12 MMOL/L — SIGNIFICANT CHANGE UP (ref 5–17)
BLD GP AB SCN SERPL QL: NEGATIVE — SIGNIFICANT CHANGE UP
BUN SERPL-MCNC: 29 MG/DL — HIGH (ref 7–23)
CALCIUM SERPL-MCNC: 9.2 MG/DL — SIGNIFICANT CHANGE UP (ref 8.4–10.5)
CHLORIDE SERPL-SCNC: 105 MMOL/L — SIGNIFICANT CHANGE UP (ref 96–108)
CO2 SERPL-SCNC: 25 MMOL/L — SIGNIFICANT CHANGE UP (ref 22–31)
CREAT SERPL-MCNC: 0.72 MG/DL — SIGNIFICANT CHANGE UP (ref 0.5–1.3)
CRP SERPL-MCNC: 15.92 MG/DL — HIGH (ref 0–0.4)
FERRITIN SERPL-MCNC: 469 NG/ML — HIGH (ref 15–150)
GLUCOSE BLDC GLUCOMTR-MCNC: 108 MG/DL — HIGH (ref 70–99)
GLUCOSE SERPL-MCNC: 82 MG/DL — SIGNIFICANT CHANGE UP (ref 70–99)
HCT VFR BLD CALC: 26.3 % — LOW (ref 34.5–45)
HGB BLD-MCNC: 7.5 G/DL — LOW (ref 11.5–15.5)
MCHC RBC-ENTMCNC: 23.4 PG — LOW (ref 27–34)
MCHC RBC-ENTMCNC: 28.5 GM/DL — LOW (ref 32–36)
MCV RBC AUTO: 82.2 FL — SIGNIFICANT CHANGE UP (ref 80–100)
NRBC # BLD: 0 /100 WBCS — SIGNIFICANT CHANGE UP (ref 0–0)
PLATELET # BLD AUTO: 243 K/UL — SIGNIFICANT CHANGE UP (ref 150–400)
POTASSIUM SERPL-MCNC: 3.9 MMOL/L — SIGNIFICANT CHANGE UP (ref 3.5–5.3)
POTASSIUM SERPL-SCNC: 3.9 MMOL/L — SIGNIFICANT CHANGE UP (ref 3.5–5.3)
RBC # BLD: 3.2 M/UL — LOW (ref 3.8–5.2)
RBC # FLD: 21.3 % — HIGH (ref 10.3–14.5)
RH IG SCN BLD-IMP: NEGATIVE — SIGNIFICANT CHANGE UP
SODIUM SERPL-SCNC: 142 MMOL/L — SIGNIFICANT CHANGE UP (ref 135–145)
WBC # BLD: 8.37 K/UL — SIGNIFICANT CHANGE UP (ref 3.8–10.5)
WBC # FLD AUTO: 8.37 K/UL — SIGNIFICANT CHANGE UP (ref 3.8–10.5)

## 2020-04-13 RX ORDER — HYDROMORPHONE HYDROCHLORIDE 2 MG/ML
0.2 INJECTION INTRAMUSCULAR; INTRAVENOUS; SUBCUTANEOUS
Refills: 0 | Status: DISCONTINUED | OUTPATIENT
Start: 2020-04-13 | End: 2020-04-19

## 2020-04-13 RX ADMIN — PANTOPRAZOLE SODIUM 40 MILLIGRAM(S): 20 TABLET, DELAYED RELEASE ORAL at 09:19

## 2020-04-13 RX ADMIN — Medication 650 MILLIGRAM(S): at 09:29

## 2020-04-13 RX ADMIN — ENOXAPARIN SODIUM 40 MILLIGRAM(S): 100 INJECTION SUBCUTANEOUS at 09:19

## 2020-04-13 RX ADMIN — LOSARTAN POTASSIUM 50 MILLIGRAM(S): 100 TABLET, FILM COATED ORAL at 09:18

## 2020-04-13 RX ADMIN — ZINC OXIDE 1 APPLICATION(S): 200 OINTMENT TOPICAL at 09:21

## 2020-04-13 RX ADMIN — HYDROMORPHONE HYDROCHLORIDE 0.2 MILLIGRAM(S): 2 INJECTION INTRAMUSCULAR; INTRAVENOUS; SUBCUTANEOUS at 04:35

## 2020-04-13 RX ADMIN — Medication 2000 UNIT(S): at 09:19

## 2020-04-13 RX ADMIN — Medication 5 MILLIGRAM(S): at 21:57

## 2020-04-13 RX ADMIN — PYRIDOSTIGMINE BROMIDE 60 MILLIGRAM(S): 60 SOLUTION ORAL at 09:18

## 2020-04-13 RX ADMIN — PYRIDOSTIGMINE BROMIDE 60 MILLIGRAM(S): 60 SOLUTION ORAL at 18:49

## 2020-04-13 RX ADMIN — PYRIDOSTIGMINE BROMIDE 60 MILLIGRAM(S): 60 SOLUTION ORAL at 21:57

## 2020-04-13 RX ADMIN — PYRIDOSTIGMINE BROMIDE 180 MILLIGRAM(S): 60 SOLUTION ORAL at 10:46

## 2020-04-13 RX ADMIN — HYDROMORPHONE HYDROCHLORIDE 0.2 MILLIGRAM(S): 2 INJECTION INTRAMUSCULAR; INTRAVENOUS; SUBCUTANEOUS at 02:30

## 2020-04-13 RX ADMIN — Medication 10 MILLIGRAM(S): at 09:18

## 2020-04-13 RX ADMIN — ATORVASTATIN CALCIUM 20 MILLIGRAM(S): 80 TABLET, FILM COATED ORAL at 21:57

## 2020-04-13 NOTE — CHART NOTE - NSCHARTNOTEFT_GEN_A_CORE
Per blood protocol packed red blood cells held for fever of 101.9.   Antipyretic given  Plan to transfuse packed red blood cells once patient is normothermic

## 2020-04-13 NOTE — PROGRESS NOTE ADULT - ASSESSMENT
79 F hx of myasthenia gravis, HTN, recent hip replacement presented with acute hypoxemic respiratory failure and sepsis in the setting of COVID-19 infection.    # Acute hypoxemic respiratory failure secondary to COVID-19  - Currently on NRB with O2 sat of 90%  - Self proning  - Monitor closely given risk of concurrent myasthenia gravis exacerbation  - Low suspicion for superimposed bacterial pneumonia [ID curbsided]. Patient previously treated with 5 day course of Zosyn  - DNR/DNI    # COVID-19  - Patient s/p Solumedrol (4/3-7) and Plaquenil (3/31-4/4)  - Airborne/Contact precautions  - CRP and Ferritin improved  - Supportive care    #Anemia  - Pt had drop in H/H   - Hb level 7.5   - likely 2/2 myelosuppression from viral process  - will transfuse 1U of PRBC today  - consent obtained from pt and daughter over the phone  - f/u CBC after transfusion     # Myasthenia Gravis  - Currently on pyridostigmine ER 180mg po daily, Mestinon (pyridostigmine) 60mg po tid  - Unable to get NIFs and VC q8H given COVID+, will monitor q4 neuro checks specifically looking at neck flexion, voice quality, difficulty swallowing, and call neurology if deterioration  - s/p 1 dose Soliris (eculizumab) outpatient neurology (on mestinon and Soliris for MG outpatient) [completed last day 4/8)   - On Prednisone 10mg PO qd per neurology recs    # Osteoarthritis  - On Celecoxib    # Hypothyroidism  - On Syntroid    # Hypertension  - BP Stable on Losartan 50mg PO qd  - Hold HCTZ for now    # DVT PPx: On Lovenox  # Diet: S+S Eval recommended NPO, but patient and HCP prefer dysphagia 1 with honey thickened liquids despite risks of aspiration  # Dispo: PT recommended home with home PT  # GOC: DNR/DNI

## 2020-04-13 NOTE — PROGRESS NOTE ADULT - SUBJECTIVE AND OBJECTIVE BOX
CC: F/u viral pneumonia, +COVID-19    INTERVAL HPI/OVERNIGHT EVENTS:   Pt seen and examined at bedside this AM. Still on NRB 10L from 14L over the weekend reporting shortness of breath and continues to complain of pain in back/hip. Denies cough    REVIEW OF SYSTEMS:  CONSTITUTIONAL: No fever, weight loss, or fatigue  EYES: No eye pain, visual disturbances, or discharge  ENT:  No difficulty hearing, tinnitus, vertigo; No sinus or throat pain  NECK: No pain or stiffness  RESPIRATORY: See HPI  CARDIOVASCULAR: No chest pain, palpitations, dizziness, or leg swelling  GASTROINTESTINAL: No abdominal pain. No nausea, vomiting, diarrhea. No hematemesis, melena or hematochezia.  GENITOURINARY: No dysuria, frequency, hematuria, or incontinence  NEUROLOGICAL: No headachess or dizziness  SKIN: No itching or rashes  MUSCULOSKELETAL: No joint pain or swelling    Allergies  IODINE (Rash)  No Known Drug Allergies  shellfish (Rash)    Intolerances    HEALTH ISSUES - PROBLEM Dx:  Elevated LFTs: Elevated LFTs  Prophylactic measure: Prophylactic measure  Advance care planning: Advance care planning  Hypertension: Hypertension  Hypothyroid: Hypothyroid  Osteoarthritis: Osteoarthritis  Myasthenia gravis: Myasthenia gravis  Infection due to 2019-nCoV: Infection due to 2019-nCoV  Respiratory failure: Respiratory failure    PAST MEDICAL & SURGICAL HISTORY:  Osteoarthritis  Hypothyroid  Reactive airway disease that is not asthma: mild as per pulm note on [employer], patient and daughter denies any inhaler use; thought to be related to myasthena gravis  Near syncope: 8/2018 negative ENT work up, negative cardiac work ups as per daughter  Aortic stenosis, mild: asper daughter  Diverticulitis large intestine: denies any recent exacerbations  Lumbar stenosis  Myasthenia gravis: dx 10/2018 symptoms: intermittent loss of voice/ weakness, negative ENT studies, now stable on Pyridostigmine  Carpal Tunnel Syndrome Right: release 4/10 and left  Hammertoe Right foot  Kidney Calculi  Lumbar Radiculopathy  GERD (Gastroesophageal Reflux Disease): hiatal hernia  Hyperlipidemia  Genital Ulcer, Female  Hypertension  H/O umbilical hernia repair  History of tonsillectomy  S/P foot surgery  H/O shoulder replacement: b/l 2015/2016  H/O laminectomy: cervical  1998  lumbar 1986,1998,2000, 2/19 with fusion  Prolapse, Uterovaginal: s/p sling  S/P Laparoscopic Fundoplication  S/P Hysterectomy Partial: 1974  Bilateral Cataracts: removed  S/P Appendectomy  S/P Cholecystectomy    VITAL SIGNS:  Vital Signs Last 24 Hrs  T(C): 36.4 (13 Apr 2020 12:54), Max: 36.7 (12 Apr 2020 13:23)  T(F): 97.6 (13 Apr 2020 12:54), Max: 98 (12 Apr 2020 13:23)  HR: 73 (13 Apr 2020 12:54) (73 - 104)  BP: 92/54 (13 Apr 2020 12:54) (92/54 - 101/66)  RR: 20 (13 Apr 2020 12:54) (20 - 20)  SpO2: 99% (13 Apr 2020 12:54) (90% - 99%)    PHYSICAL EXAM:  GENERAL: Pt lying in bed comfortably in NAD  HEAD:  Atraumatic, Normocephalic  ENT: Moist mucous membranes  CHEST/LUNG: Clear to auscultation bilaterally, Unlabored respirations  HEART: Regular rate and rhythm; No murmurs, rubs, or gallops  ABDOMEN: Bowel sounds present; Soft, Nontender, Nondistended.   EXTREMITIES:  2+ Peripheral Pulses, brisk capillary refill. No clubbing, cyanosis, or edema  NERVOUS SYSTEM:  Alert & Oriented X3  SKIN: No rashes or lesion                                   7.5    8.37  )-----------( 243      ( 13 Apr 2020 09:33 )             26.3     04-13    142  |  105  |  29<H>  ----------------------------<  82  3.9   |  25  |  0.72    Ca    9.2      13 Apr 2020 09:33    TPro  6.5  /  Alb  2.5<L>  /  TBili  0.3  /  DBili  x   /  AST  21  /  ALT  18  /  AlkPhos  78  04-11    CAPILLARY BLOOD GLUCOSE  108 (11 Apr 2020 22:24)    Procalcitonin, Serum: 0.14 (04-10)  Procalcitonin, Serum: 0.78 (04-05)  Procalcitonin, Serum: 1.02 (04-03)  Procalcitonin, Serum: 3.60 (04-01)  Procalcitonin, Serum: 3.40 (03-30)    C-Reactive Protein, Serum: 13.55 (04-10)  C-Reactive Protein, Serum: 12.71 (04-05)  C-Reactive Protein, Serum: 27.92 (04-03)  C-Reactive Protein, Serum: 18.51 (04-01)  C-Reactive Protein, Serum: 18.48 (03-31)    Ferritin, Serum: 425 (04-10)  Ferritin, Serum: 590 (04-05)  Ferritin, Serum: 550 (04-03)  Ferritin, Serum: 401 (04-01)  Ferritin, Serum: 368 (03-31)    D-Dimer Assay, Quantitative: 868 (03-30)    CAPILLARY BLOOD GLUCOSE  108 (11 Apr 2020 22:24)

## 2020-04-14 LAB
ANION GAP SERPL CALC-SCNC: 14 MMOL/L — SIGNIFICANT CHANGE UP (ref 5–17)
BASOPHILS # BLD AUTO: 0.02 K/UL — SIGNIFICANT CHANGE UP (ref 0–0.2)
BASOPHILS NFR BLD AUTO: 0.2 % — SIGNIFICANT CHANGE UP (ref 0–2)
BUN SERPL-MCNC: 31 MG/DL — HIGH (ref 7–23)
CALCIUM SERPL-MCNC: 9.5 MG/DL — SIGNIFICANT CHANGE UP (ref 8.4–10.5)
CHLORIDE SERPL-SCNC: 105 MMOL/L — SIGNIFICANT CHANGE UP (ref 96–108)
CO2 SERPL-SCNC: 24 MMOL/L — SIGNIFICANT CHANGE UP (ref 22–31)
CREAT SERPL-MCNC: 0.82 MG/DL — SIGNIFICANT CHANGE UP (ref 0.5–1.3)
EOSINOPHIL # BLD AUTO: 0.18 K/UL — SIGNIFICANT CHANGE UP (ref 0–0.5)
EOSINOPHIL NFR BLD AUTO: 1.9 % — SIGNIFICANT CHANGE UP (ref 0–6)
GLUCOSE BLDC GLUCOMTR-MCNC: 112 MG/DL — HIGH (ref 70–99)
GLUCOSE SERPL-MCNC: 87 MG/DL — SIGNIFICANT CHANGE UP (ref 70–99)
HCT VFR BLD CALC: 32.7 % — LOW (ref 34.5–45)
HGB BLD-MCNC: 9.9 G/DL — LOW (ref 11.5–15.5)
IMM GRANULOCYTES NFR BLD AUTO: 1.6 % — HIGH (ref 0–1.5)
LYMPHOCYTES # BLD AUTO: 1.64 K/UL — SIGNIFICANT CHANGE UP (ref 1–3.3)
LYMPHOCYTES # BLD AUTO: 17.5 % — SIGNIFICANT CHANGE UP (ref 13–44)
MCHC RBC-ENTMCNC: 25.2 PG — LOW (ref 27–34)
MCHC RBC-ENTMCNC: 30.3 GM/DL — LOW (ref 32–36)
MCV RBC AUTO: 83.2 FL — SIGNIFICANT CHANGE UP (ref 80–100)
MONOCYTES # BLD AUTO: 0.73 K/UL — SIGNIFICANT CHANGE UP (ref 0–0.9)
MONOCYTES NFR BLD AUTO: 7.8 % — SIGNIFICANT CHANGE UP (ref 2–14)
NEUTROPHILS # BLD AUTO: 6.66 K/UL — SIGNIFICANT CHANGE UP (ref 1.8–7.4)
NEUTROPHILS NFR BLD AUTO: 71 % — SIGNIFICANT CHANGE UP (ref 43–77)
NRBC # BLD: 0 /100 WBCS — SIGNIFICANT CHANGE UP (ref 0–0)
PLATELET # BLD AUTO: 272 K/UL — SIGNIFICANT CHANGE UP (ref 150–400)
POTASSIUM SERPL-MCNC: 4 MMOL/L — SIGNIFICANT CHANGE UP (ref 3.5–5.3)
POTASSIUM SERPL-SCNC: 4 MMOL/L — SIGNIFICANT CHANGE UP (ref 3.5–5.3)
RBC # BLD: 3.93 M/UL — SIGNIFICANT CHANGE UP (ref 3.8–5.2)
RBC # FLD: 21.8 % — HIGH (ref 10.3–14.5)
SODIUM SERPL-SCNC: 143 MMOL/L — SIGNIFICANT CHANGE UP (ref 135–145)
WBC # BLD: 9.38 K/UL — SIGNIFICANT CHANGE UP (ref 3.8–10.5)
WBC # FLD AUTO: 9.38 K/UL — SIGNIFICANT CHANGE UP (ref 3.8–10.5)

## 2020-04-14 PROCEDURE — 71045 X-RAY EXAM CHEST 1 VIEW: CPT | Mod: 26

## 2020-04-14 RX ORDER — FUROSEMIDE 40 MG
20 TABLET ORAL ONCE
Refills: 0 | Status: DISCONTINUED | OUTPATIENT
Start: 2020-04-14 | End: 2020-04-19

## 2020-04-14 RX ADMIN — PYRIDOSTIGMINE BROMIDE 60 MILLIGRAM(S): 60 SOLUTION ORAL at 22:29

## 2020-04-14 RX ADMIN — PYRIDOSTIGMINE BROMIDE 60 MILLIGRAM(S): 60 SOLUTION ORAL at 16:48

## 2020-04-14 RX ADMIN — ZINC OXIDE 1 APPLICATION(S): 200 OINTMENT TOPICAL at 11:23

## 2020-04-14 RX ADMIN — Medication 2000 UNIT(S): at 11:24

## 2020-04-14 RX ADMIN — Medication 50 MICROGRAM(S): at 04:26

## 2020-04-14 RX ADMIN — PYRIDOSTIGMINE BROMIDE 180 MILLIGRAM(S): 60 SOLUTION ORAL at 11:23

## 2020-04-14 RX ADMIN — ENOXAPARIN SODIUM 40 MILLIGRAM(S): 100 INJECTION SUBCUTANEOUS at 11:24

## 2020-04-14 RX ADMIN — PYRIDOSTIGMINE BROMIDE 60 MILLIGRAM(S): 60 SOLUTION ORAL at 04:27

## 2020-04-14 RX ADMIN — Medication 10 MILLIGRAM(S): at 04:27

## 2020-04-14 RX ADMIN — PANTOPRAZOLE SODIUM 40 MILLIGRAM(S): 20 TABLET, DELAYED RELEASE ORAL at 04:26

## 2020-04-14 RX ADMIN — ATORVASTATIN CALCIUM 20 MILLIGRAM(S): 80 TABLET, FILM COATED ORAL at 22:28

## 2020-04-14 RX ADMIN — Medication 650 MILLIGRAM(S): at 22:28

## 2020-04-14 RX ADMIN — Medication 5 MILLIGRAM(S): at 22:28

## 2020-04-14 NOTE — PROGRESS NOTE ADULT - SUBJECTIVE AND OBJECTIVE BOX
[facility] Division of Hospital Medicine Progress Note    Patient is a 79y old  Female who presents with a chief complaint of Shortness of breath, fevers (13 Apr 2020 13:16)    SUBJECTIVE / OVERNIGHT EVENTS:  Pt reported feeling worsening shortness of breath, will do trial of small dose of lasix  Discussed with ID possibility of starting trial of Anakinra, after discussion opted to hold of as pt had received an immunosuppressant during admission  Inquiring about possible enrollement in Convalescent Plasma Transfusion      MEDICATIONS  (STANDING):  atorvastatin 20 milliGRAM(s) Oral at bedtime  cholecalciferol 2000 Unit(s) Oral daily  enoxaparin Injectable 40 milliGRAM(s) SubCutaneous daily  furosemide    Tablet 20 milliGRAM(s) Oral once  levothyroxine 50 MICROGram(s) Oral daily  melatonin 5 milliGRAM(s) Oral at bedtime  pantoprazole    Tablet 40 milliGRAM(s) Oral before breakfast  predniSONE   Tablet 10 milliGRAM(s) Oral daily  pyridostigmine 60 milliGRAM(s) Oral three times a day  pyridostigmine  milliGRAM(s) Oral daily  zinc oxide 40% Ointment 1 Application(s) Topical daily    MEDICATIONS  (PRN):  acetaminophen   Tablet .. 650 milliGRAM(s) Oral every 4 hours PRN Temp greater or equal to 38C (100.4F), Moderate Pain (4 - 6)  acetaminophen   Tablet .. 650 milliGRAM(s) Oral every 6 hours PRN Mild Pain (1 - 3)  ALBUTerol    90 MICROgram(s) HFA Inhaler 2 Puff(s) Inhalation every 4 hours PRN Shortness of Breath and/or Wheezing  benzonatate 100 milliGRAM(s) Oral three times a day PRN Cough  HYDROmorphone  Injectable 0.2 milliGRAM(s) IV Push every 2 hours PRN Pain  loperamide 2 milliGRAM(s) Oral five times a day PRN loose stool  polyethylene glycol 3350 17 Gram(s) Oral daily PRN Constipation  senna 2 Tablet(s) Oral at bedtime PRN Constipation    PHYSICAL EXAM:  Vital Signs Last 24 Hrs  T(C): 36.7 (14 Apr 2020 11:58), Max: 38.8 (13 Apr 2020 20:52)  T(F): 98.1 (14 Apr 2020 11:58), Max: 101.9 (13 Apr 2020 20:52)  HR: 95 (14 Apr 2020 11:58) (64 - 96)  BP: 107/66 (14 Apr 2020 11:58) (95/57 - 122/66)  RR: 22 (14 Apr 2020 11:58) (20 - 24)  SpO2: 91% (14 Apr 2020 11:58) (85% - 98%)    CONSTITUTIONAL: NAD, well-developed, well-groomed  ENMT: Moist oral mucosa, no pharyngeal injection or exudates; normal dentition  RESPIRATORY: Normal respiratory effort; lungs are clear to auscultation bilaterally  CARDIOVASCULAR: Regular rate and rhythm, normal S1 and S2, no murmur/rub/gallop; No lower extremity edema; Peripheral pulses are 2+ bilaterally  ABDOMEN: Nontender to palpation, normoactive bowel sounds, no rebound/guarding; No hepatosplenomegaly  PSYCH: A+O to person, place, and time; affect appropriate  NEUROLOGY: CN 2-12 are intact and symmetric; no gross sensory deficits   SKIN: No rashes; no palpable lesions    LABS:                        9.9    9.38  )-----------( 272      ( 14 Apr 2020 08:28 )             32.7     04-14    143  |  105  |  31<H>  ----------------------------<  87  4.0   |  24  |  0.82    Ca    9.5      14 Apr 2020 08:28    COVID-19 PCR: Detected (03-30-20 @ 21:41)    Procalcitonin, Serum: 0.14 (04-10)  Procalcitonin, Serum: 0.78 (04-05)  Procalcitonin, Serum: 1.02 (04-03)  Procalcitonin, Serum: 3.60 (04-01)  Procalcitonin, Serum: 3.40 (03-30)    C-Reactive Protein, Serum: 15.92  (04.13)  C-Reactive Protein, Serum: 13.55 (04-10)  C-Reactive Protein, Serum: 12.71 (04-05)  C-Reactive Protein, Serum: 27.92 (04-03)  C-Reactive Protein, Serum: 18.51 (04-01)  C-Reactive Protein, Serum: 18.48 (03-31)    Ferritin, Serum: 469 (04.13)  Ferritin, Serum: 425 (04-10)  Ferritin, Serum: 590 (04-05)  Ferritin, Serum: 550 (04-03)  Ferritin, Serum: 401 (04-01)  Ferritin, Serum: 368 (03-31)    RADIOLOGY & ADDITIONAL TESTS:  Imaging from Last 24 Hours:  Electrocardiogram/QTc Interval:    COORDINATION OF CARE:  Care Discussed with Consultants/Other Providers:

## 2020-04-15 LAB
BLD GP AB SCN SERPL QL: NEGATIVE — SIGNIFICANT CHANGE UP
CRP SERPL-MCNC: 20.82 MG/DL — HIGH (ref 0–0.4)
FERRITIN SERPL-MCNC: 560 NG/ML — HIGH (ref 15–150)
HCT VFR BLD CALC: 29.1 % — LOW (ref 34.5–45)
HGB BLD-MCNC: 8.7 G/DL — LOW (ref 11.5–15.5)
MCHC RBC-ENTMCNC: 24.8 PG — LOW (ref 27–34)
MCHC RBC-ENTMCNC: 29.9 GM/DL — LOW (ref 32–36)
MCV RBC AUTO: 82.9 FL — SIGNIFICANT CHANGE UP (ref 80–100)
NRBC # BLD: 0 /100 WBCS — SIGNIFICANT CHANGE UP (ref 0–0)
PLATELET # BLD AUTO: 232 K/UL — SIGNIFICANT CHANGE UP (ref 150–400)
PROCALCITONIN SERPL-MCNC: 0.27 NG/ML — HIGH (ref 0.02–0.1)
RBC # BLD: 3.51 M/UL — LOW (ref 3.8–5.2)
RBC # FLD: 22 % — HIGH (ref 10.3–14.5)
RH IG SCN BLD-IMP: NEGATIVE — SIGNIFICANT CHANGE UP
WBC # BLD: 10.59 K/UL — HIGH (ref 3.8–10.5)
WBC # FLD AUTO: 10.59 K/UL — HIGH (ref 3.8–10.5)

## 2020-04-15 PROCEDURE — 99232 SBSQ HOSP IP/OBS MODERATE 35: CPT | Mod: CS

## 2020-04-15 RX ORDER — ECULIZUMAB 300 MG/30ML
1200 INJECTION, SOLUTION, CONCENTRATE INTRAVENOUS ONCE
Refills: 0 | Status: COMPLETED | OUTPATIENT
Start: 2020-04-15 | End: 2020-04-15

## 2020-04-15 RX ADMIN — ZINC OXIDE 1 APPLICATION(S): 200 OINTMENT TOPICAL at 12:46

## 2020-04-15 RX ADMIN — Medication 50 MICROGRAM(S): at 05:51

## 2020-04-15 RX ADMIN — Medication 10 MILLIGRAM(S): at 05:51

## 2020-04-15 RX ADMIN — PYRIDOSTIGMINE BROMIDE 180 MILLIGRAM(S): 60 SOLUTION ORAL at 12:46

## 2020-04-15 RX ADMIN — Medication 650 MILLIGRAM(S): at 22:28

## 2020-04-15 RX ADMIN — Medication 650 MILLIGRAM(S): at 06:00

## 2020-04-15 RX ADMIN — Medication 650 MILLIGRAM(S): at 18:40

## 2020-04-15 RX ADMIN — ATORVASTATIN CALCIUM 20 MILLIGRAM(S): 80 TABLET, FILM COATED ORAL at 20:51

## 2020-04-15 RX ADMIN — Medication 5 MILLIGRAM(S): at 20:51

## 2020-04-15 RX ADMIN — ENOXAPARIN SODIUM 40 MILLIGRAM(S): 100 INJECTION SUBCUTANEOUS at 12:44

## 2020-04-15 RX ADMIN — PYRIDOSTIGMINE BROMIDE 60 MILLIGRAM(S): 60 SOLUTION ORAL at 05:51

## 2020-04-15 RX ADMIN — ECULIZUMAB 617.14 MILLIGRAM(S): 300 INJECTION, SOLUTION, CONCENTRATE INTRAVENOUS at 16:57

## 2020-04-15 RX ADMIN — PYRIDOSTIGMINE BROMIDE 60 MILLIGRAM(S): 60 SOLUTION ORAL at 12:54

## 2020-04-15 RX ADMIN — Medication 650 MILLIGRAM(S): at 12:47

## 2020-04-15 RX ADMIN — Medication 2000 UNIT(S): at 12:53

## 2020-04-15 RX ADMIN — PANTOPRAZOLE SODIUM 40 MILLIGRAM(S): 20 TABLET, DELAYED RELEASE ORAL at 05:51

## 2020-04-15 RX ADMIN — PYRIDOSTIGMINE BROMIDE 60 MILLIGRAM(S): 60 SOLUTION ORAL at 20:51

## 2020-04-15 NOTE — PROGRESS NOTE ADULT - ASSESSMENT
79 F hx of myasthenia gravis, HTN, recent hip replacement presented with acute hypoxemic respiratory failure and sepsis in the setting of COVID-19 infection.    # Acute hypoxemic respiratory failure secondary to COVID-19  - Currently on NRB with O2 sat of 91%  - Self proning  - Ordered low dose of Lasix 20mg x 1   - Monitor closely given risk of concurrent myasthenia gravis exacerbation  - Low suspicion for superimposed bacterial pneumonia [ID curbsided]. Patient previously treated with 5 day course of Zosyn  - DNR/DNI    # COVID-19  - Patient s/p Solumedrol (4/3-7) and Plaquenil (3/31-4/4)  - Airborne/Contact precautions  - CRP and Ferritin slightly increased   - Discussed with ID about possible Anakinra trial, recommended to hold off as pt has already received immunosuppressive medication for MS  - Requested information about possible Convalescent Plasma Transfusion   - Supportive care    #Anemia  - Hb level 7.5 s/p 1U of PRBC with improvement of Hb level to 9.9 ---. now 8.7 ( 4/15)   - likely 2/2 myelosuppression from viral process  - consent obtained from pt and daughter over the phone  - Continue to Monitor CBC    # Myasthenia Gravis  - Currently on pyridostigmine ER 180mg po daily, Mestinon (pyridostigmine) 60mg po tid  - Unable to get NIFs and VC q8H given COVID+, will monitor q4 neuro checks specifically looking at neck flexion, voice quality, difficulty swallowing, and call neurology if deterioration  - s/p 1 dose Soliris (eculizumab) outpatient neurology (on mestinon and Soliris for MG outpatient) [completed last day 4/8)   - On Prednisone 10mg PO qd per neurology recs    # Osteoarthritis  - On Celecoxib    # Hypothyroidism  - On Syntroid    # Hypertension  - BP Stable on Losartan 50mg PO qd  - Hold HCTZ for now    # DVT PPx: On Lovenox  # Diet: S+S Eval recommended NPO, but patient and HCP prefer dysphagia 1 with honey thickened liquids despite risks of aspiration  # Dispo: PT recommended home with home PT  # GOC: DNR/DNI

## 2020-04-15 NOTE — PROGRESS NOTE ADULT - SUBJECTIVE AND OBJECTIVE BOX
Patient is a 79y old  Female who presents with a chief complaint of Shortness of breath, fevers.    SUBJECTIVE / OVERNIGHT EVENTS:  Patient seen and examined at bedside.  patient has not been able to come off NRB, currently on 15 L, satting at 98 %      MEDICATIONS  (STANDING):  atorvastatin 20 milliGRAM(s) Oral at bedtime  cholecalciferol 2000 Unit(s) Oral daily  eculizumab IVPB 1200 milliGRAM(s) IV Intermittent once  enoxaparin Injectable 40 milliGRAM(s) SubCutaneous daily  furosemide    Tablet 20 milliGRAM(s) Oral once  levothyroxine 50 MICROGram(s) Oral daily  melatonin 5 milliGRAM(s) Oral at bedtime  pantoprazole    Tablet 40 milliGRAM(s) Oral before breakfast  predniSONE   Tablet 10 milliGRAM(s) Oral daily  pyridostigmine 60 milliGRAM(s) Oral three times a day  pyridostigmine  milliGRAM(s) Oral daily  zinc oxide 40% Ointment 1 Application(s) Topical daily    MEDICATIONS  (PRN):  acetaminophen   Tablet .. 650 milliGRAM(s) Oral every 4 hours PRN Temp greater or equal to 38C (100.4F), Moderate Pain (4 - 6)  acetaminophen   Tablet .. 650 milliGRAM(s) Oral every 6 hours PRN Mild Pain (1 - 3)  ALBUTerol    90 MICROgram(s) HFA Inhaler 2 Puff(s) Inhalation every 4 hours PRN Shortness of Breath and/or Wheezing  benzonatate 100 milliGRAM(s) Oral three times a day PRN Cough  HYDROmorphone  Injectable 0.2 milliGRAM(s) IV Push every 2 hours PRN Pain  loperamide 2 milliGRAM(s) Oral five times a day PRN loose stool  polyethylene glycol 3350 17 Gram(s) Oral daily PRN Constipation  senna 2 Tablet(s) Oral at bedtime PRN Constipation      CAPILLARY BLOOD GLUCOSE        I&O's Summary    14 Apr 2020 07:01  -  15 Apr 2020 07:00  --------------------------------------------------------  IN: 120 mL / OUT: 0 mL / NET: 120 mL        PHYSICAL EXAM:  Vital Signs Last 24 Hrs  T(C): 36.3 (15 Apr 2020 12:22), Max: 36.7 (15 Apr 2020 04:15)  T(F): 97.4 (15 Apr 2020 12:22), Max: 98.1 (15 Apr 2020 04:15)  HR: 75 (15 Apr 2020 12:22) (75 - 99)  BP: 111/63 (15 Apr 2020 12:22) (111/63 - 117/71)  BP(mean): --  RR: 18 (15 Apr 2020 12:22) (18 - 22)  SpO2: 98% (15 Apr 2020 12:22) (91% - 98%)    CONSTITUTIONAL: NAD, well-developed, well-groomed  ENMT: Moist oral mucosa, no pharyngeal injection or exudates; normal dentition  RESPIRATORY: Normal respiratory effort; lungs are clear to auscultation bilaterally  CARDIOVASCULAR: Regular rate and rhythm, normal S1 and S2, no murmur/rub/gallop; No lower extremity edema; Peripheral pulses are 2+ bilaterally  ABDOMEN: Nontender to palpation, normoactive bowel sounds, no rebound/guarding; No hepatosplenomegaly  PSYCH: A+O to person, place, and time; affect appropriate  NEUROLOGY: CN 2-12 are intact and symmetric; no gross sensory deficits   SKIN: No rashes; no palpable lesions      LABS:                        8.7    10.59 )-----------( 232      ( 15 Apr 2020 08:45 )             29.1     04-14    143  |  105  |  31<H>  ----------------------------<  87  4.0   |  24  |  0.82    Ca    9.5      14 Apr 2020 08:28      Procalcitonin, Serum: 0.14 (04-10)  Procalcitonin, Serum: 0.78 (04-05)  Procalcitonin, Serum: 1.02 (04-03)  Procalcitonin, Serum: 3.60 (04-01)  Procalcitonin, Serum: 3.40 (03-30)    C-Reactive Protein, Serum: 15.92  (04.13)  C-Reactive Protein, Serum: 13.55 (04-10)  C-Reactive Protein, Serum: 12.71 (04-05)  COVID-19 PCR: Detected (03-30-20 @ 21:41)    C-Reactive Protein, Serum: 27.92 (04-03)  C-Reactive Protein, Serum: 18.51 (04-01)  C-Reactive Protein, Serum: 18.48 (03-31)    Ferritin, Serum: 469 (04.13)  Ferritin, Serum: 425 (04-10)  Ferritin, Serum: 590 (04-05)  Ferritin, Serum: 550 (04-03)  Ferritin, Serum: 401 (04-01)  Ferritin, Serum: 368 (03-31)    RADIOLOGY & ADDITIONAL TESTS:  Imaging from Last 24 Hours:  Electrocardiogram/QTc Interval:    COORDINATION OF CARE:  Care Discussed with NP and

## 2020-04-16 PROCEDURE — 99232 SBSQ HOSP IP/OBS MODERATE 35: CPT | Mod: CS

## 2020-04-16 RX ADMIN — Medication 2000 UNIT(S): at 11:23

## 2020-04-16 RX ADMIN — Medication 650 MILLIGRAM(S): at 18:15

## 2020-04-16 RX ADMIN — HYDROMORPHONE HYDROCHLORIDE 0.2 MILLIGRAM(S): 2 INJECTION INTRAMUSCULAR; INTRAVENOUS; SUBCUTANEOUS at 04:34

## 2020-04-16 RX ADMIN — Medication 5 MILLIGRAM(S): at 21:11

## 2020-04-16 RX ADMIN — PANTOPRAZOLE SODIUM 40 MILLIGRAM(S): 20 TABLET, DELAYED RELEASE ORAL at 05:20

## 2020-04-16 RX ADMIN — PYRIDOSTIGMINE BROMIDE 60 MILLIGRAM(S): 60 SOLUTION ORAL at 12:27

## 2020-04-16 RX ADMIN — Medication 50 MICROGRAM(S): at 04:19

## 2020-04-16 RX ADMIN — Medication 650 MILLIGRAM(S): at 12:25

## 2020-04-16 RX ADMIN — HYDROMORPHONE HYDROCHLORIDE 0.2 MILLIGRAM(S): 2 INJECTION INTRAMUSCULAR; INTRAVENOUS; SUBCUTANEOUS at 08:55

## 2020-04-16 RX ADMIN — HYDROMORPHONE HYDROCHLORIDE 0.2 MILLIGRAM(S): 2 INJECTION INTRAMUSCULAR; INTRAVENOUS; SUBCUTANEOUS at 06:34

## 2020-04-16 RX ADMIN — ENOXAPARIN SODIUM 40 MILLIGRAM(S): 100 INJECTION SUBCUTANEOUS at 11:22

## 2020-04-16 RX ADMIN — PYRIDOSTIGMINE BROMIDE 60 MILLIGRAM(S): 60 SOLUTION ORAL at 21:11

## 2020-04-16 RX ADMIN — HYDROMORPHONE HYDROCHLORIDE 0.2 MILLIGRAM(S): 2 INJECTION INTRAMUSCULAR; INTRAVENOUS; SUBCUTANEOUS at 02:34

## 2020-04-16 RX ADMIN — Medication 10 MILLIGRAM(S): at 04:19

## 2020-04-16 RX ADMIN — PYRIDOSTIGMINE BROMIDE 180 MILLIGRAM(S): 60 SOLUTION ORAL at 11:23

## 2020-04-16 RX ADMIN — HYDROMORPHONE HYDROCHLORIDE 0.2 MILLIGRAM(S): 2 INJECTION INTRAMUSCULAR; INTRAVENOUS; SUBCUTANEOUS at 21:04

## 2020-04-16 RX ADMIN — ALBUTEROL 2 PUFF(S): 90 AEROSOL, METERED ORAL at 22:47

## 2020-04-16 RX ADMIN — HYDROMORPHONE HYDROCHLORIDE 0.2 MILLIGRAM(S): 2 INJECTION INTRAMUSCULAR; INTRAVENOUS; SUBCUTANEOUS at 18:42

## 2020-04-16 RX ADMIN — ZINC OXIDE 1 APPLICATION(S): 200 OINTMENT TOPICAL at 12:25

## 2020-04-16 RX ADMIN — PYRIDOSTIGMINE BROMIDE 60 MILLIGRAM(S): 60 SOLUTION ORAL at 04:19

## 2020-04-16 RX ADMIN — ATORVASTATIN CALCIUM 20 MILLIGRAM(S): 80 TABLET, FILM COATED ORAL at 21:11

## 2020-04-16 NOTE — PROGRESS NOTE ADULT - SUBJECTIVE AND OBJECTIVE BOX
Patient is a 79y old  Female who presents with a chief complaint of Shortness of breath, fevers (15 Apr 2020 16:41)    SUBJECTIVE / OVERNIGHT EVENTS:  Patient seen and examined at bedside. Had a slight Nose bleed this morning which resolved with humidified oxygen.  Patient has not been able to come off NRB, currently on 15 L, satting at 94 %    MEDICATIONS  (STANDING):  atorvastatin 20 milliGRAM(s) Oral at bedtime  cholecalciferol 2000 Unit(s) Oral daily  enoxaparin Injectable 40 milliGRAM(s) SubCutaneous daily  furosemide    Tablet 20 milliGRAM(s) Oral once  levothyroxine 50 MICROGram(s) Oral daily  melatonin 5 milliGRAM(s) Oral at bedtime  pantoprazole    Tablet 40 milliGRAM(s) Oral before breakfast  predniSONE   Tablet 10 milliGRAM(s) Oral daily  pyridostigmine 60 milliGRAM(s) Oral three times a day  pyridostigmine  milliGRAM(s) Oral daily  zinc oxide 40% Ointment 1 Application(s) Topical daily    MEDICATIONS  (PRN):  acetaminophen   Tablet .. 650 milliGRAM(s) Oral every 4 hours PRN Temp greater or equal to 38C (100.4F), Moderate Pain (4 - 6)  acetaminophen   Tablet .. 650 milliGRAM(s) Oral every 6 hours PRN Mild Pain (1 - 3)  ALBUTerol    90 MICROgram(s) HFA Inhaler 2 Puff(s) Inhalation every 4 hours PRN Shortness of Breath and/or Wheezing  benzonatate 100 milliGRAM(s) Oral three times a day PRN Cough  HYDROmorphone  Injectable 0.2 milliGRAM(s) IV Push every 2 hours PRN Pain  loperamide 2 milliGRAM(s) Oral five times a day PRN loose stool  polyethylene glycol 3350 17 Gram(s) Oral daily PRN Constipation  senna 2 Tablet(s) Oral at bedtime PRN Constipation      CAPILLARY BLOOD GLUCOSE    I&O's Summary    PHYSICAL EXAM:  Vital Signs Last 24 Hrs  T(C): 36.6 (16 Apr 2020 09:03), Max: 36.7 (16 Apr 2020 04:14)  T(F): 97.8 (16 Apr 2020 09:03), Max: 98.1 (16 Apr 2020 04:14)  HR: 89 (16 Apr 2020 09:03) (78 - 89)  BP: 110/65 (16 Apr 2020 09:03) (108/66 - 118/63)  BP(mean): --  RR: 22 (16 Apr 2020 09:03) (18 - 22)  SpO2: 94% (16 Apr 2020 09:03) (92% - 98%)    CONSTITUTIONAL: NAD, well-developed, well-groomed  ENMT: Moist oral mucosa, no pharyngeal injection or exudates; normal dentition  RESPIRATORY: Normal respiratory effort; lungs are clear to auscultation bilaterally  CARDIOVASCULAR: Regular rate and rhythm, normal S1 and S2, no murmur/rub/gallop; No lower extremity edema; Peripheral pulses are 2+ bilaterally  ABDOMEN: Nontender to palpation, normoactive bowel sounds, no rebound/guarding; No hepatosplenomegaly  PSYCH: A+O to person, place, and time; affect appropriate  NEUROLOGY: CN 2-12 are intact and symmetric; no gross sensory deficits   SKIN: No rashes; no palpable lesions      LABS:                        8.7    10.59 )-----------( 232      ( 15 Apr 2020 08:45 )             29.1     04-14    143  |  105  |  31<H>  ----------------------------<  87  4.0   |  24  |  0.82    Ca    9.5      14 Apr 2020 08:28      Procalcitonin, Serum: 0.14 (04-10)  Procalcitonin, Serum: 0.78 (04-05)  Procalcitonin, Serum: 1.02 (04-03)  Procalcitonin, Serum: 3.60 (04-01)  Procalcitonin, Serum: 3.40 (03-30)    C-Reactive Protein, Serum: 15.92  (04.13)  C-Reactive Protein, Serum: 13.55 (04-10)  C-Reactive Protein, Serum: 12.71 (04-05)  COVID-19 PCR: Detected (03-30-20 @ 21:41)    C-Reactive Protein, Serum: 27.92 (04-03)  C-Reactive Protein, Serum: 18.51 (04-01)  C-Reactive Protein, Serum: 18.48 (03-31)    Ferritin, Serum: 469 (04.13)  Ferritin, Serum: 425 (04-10)  Ferritin, Serum: 590 (04-05)  Ferritin, Serum: 550 (04-03)  Ferritin, Serum: 401 (04-01)  Ferritin, Serum: 368 (03-31)    Culture - Blood (collected 14 Apr 2020 18:13)  Source: .Blood Blood  Preliminary Report (15 Apr 2020 19:02):    No growth to date.    Culture - Blood (collected 14 Apr 2020 18:13)  Source: .Blood Blood  Preliminary Report (15 Apr 2020 19:02):    No growth to date.    COVID-19 PCR: Detected (03-30-20 @ 21:41)      RADIOLOGY & ADDITIONAL TESTS:  Imaging from Last 24 Hours:  Electrocardiogram/QTc Interval:    COORDINATION OF CARE:  Care Discussed with NP and .

## 2020-04-16 NOTE — PROGRESS NOTE ADULT - ASSESSMENT
79 F hx of myasthenia gravis, HTN, recent hip replacement presented with acute hypoxemic respiratory failure and sepsis in the setting of COVID-19 infection.    # Acute hypoxemic respiratory failure secondary to COVID-19  - Currently on NRB 15 L  with O2 sat of 94%  - Self proning  - Monitor closely given risk of concurrent myasthenia gravis exacerbation  - Low suspicion for superimposed bacterial pneumonia [ID curbsided]. Patient previously treated with 5 day course of Zosyn  - DNR/DNI    # COVID-19  - Copleted Solumedrol (4/3-7) and Plaquenil (3/31-4/4)  - Airborne/Contact precautions  - CRP and Ferritin slightly increased   - Discussed with ID about possible Anakinra trial, recommended to hold off as pt has already received immunosuppressive medication for MS  - Requested information about possible Convalescent Plasma Transfusion   - Supportive care    #Anemia  - Hb level 7.5 s/p 1U of PRBC with improvement of Hb level to 9.9 ---. now 8.7 ( 4/15)   - likely 2/2 myelosuppression from viral process  - consent obtained from pt and daughter over the phone  - Continue to Monitor CBC    # Myasthenia Gravis  - Currently on pyridostigmine ER 180mg po daily, Mestinon (pyridostigmine) 60mg po tid  - Unable to get NIFs and VC q8H given COVID+, will monitor q4 neuro checks specifically looking at neck flexion, voice quality, difficulty swallowing, and call neurology if deterioration  - s/p 1 dose Soliris (eculizumab) outpatient neurology (on mestinon and Soliris for MG outpatient) [completed last day 4/8)   - On Prednisone 10mg PO qd per neurology recs    # Osteoarthritis  - On Celecoxib    # Hypothyroidism  - On Synthroid    # Hypertension  - BP Stable on Losartan 50mg PO qd  - Hold HCTZ for now    # DVT PPx: On Lovenox  # Diet: S+S Eval recommended NPO, but patient and HCP prefer dysphagia 1 with honey thickened liquids despite risks of aspiration  # Dispo: PT recommended home with home PT  # GOC: DNR/DNI

## 2020-04-17 LAB
HCT VFR BLD CALC: 28 % — LOW (ref 34.5–45)
HGB BLD-MCNC: 8.5 G/DL — LOW (ref 11.5–15.5)
MCHC RBC-ENTMCNC: 25.1 PG — LOW (ref 27–34)
MCHC RBC-ENTMCNC: 30.4 GM/DL — LOW (ref 32–36)
MCV RBC AUTO: 82.6 FL — SIGNIFICANT CHANGE UP (ref 80–100)
NRBC # BLD: 0 /100 WBCS — SIGNIFICANT CHANGE UP (ref 0–0)
PLATELET # BLD AUTO: 217 K/UL — SIGNIFICANT CHANGE UP (ref 150–400)
RBC # BLD: 3.39 M/UL — LOW (ref 3.8–5.2)
RBC # FLD: 22.5 % — HIGH (ref 10.3–14.5)
WBC # BLD: 11.17 K/UL — HIGH (ref 3.8–10.5)
WBC # FLD AUTO: 11.17 K/UL — HIGH (ref 3.8–10.5)

## 2020-04-17 PROCEDURE — 99232 SBSQ HOSP IP/OBS MODERATE 35: CPT | Mod: CS

## 2020-04-17 RX ORDER — GUAIFENESIN/DEXTROMETHORPHAN 600MG-30MG
10 TABLET, EXTENDED RELEASE 12 HR ORAL ONCE
Refills: 0 | Status: COMPLETED | OUTPATIENT
Start: 2020-04-17 | End: 2020-04-17

## 2020-04-17 RX ORDER — ACETAMINOPHEN 500 MG
1000 TABLET ORAL ONCE
Refills: 0 | Status: COMPLETED | OUTPATIENT
Start: 2020-04-17 | End: 2020-04-17

## 2020-04-17 RX ORDER — SODIUM CHLORIDE 0.65 %
1 AEROSOL, SPRAY (ML) NASAL
Refills: 0 | Status: DISCONTINUED | OUTPATIENT
Start: 2020-04-17 | End: 2020-05-19

## 2020-04-17 RX ORDER — FUROSEMIDE 40 MG
40 TABLET ORAL ONCE
Refills: 0 | Status: COMPLETED | OUTPATIENT
Start: 2020-04-17 | End: 2020-04-17

## 2020-04-17 RX ADMIN — PYRIDOSTIGMINE BROMIDE 60 MILLIGRAM(S): 60 SOLUTION ORAL at 14:30

## 2020-04-17 RX ADMIN — ENOXAPARIN SODIUM 40 MILLIGRAM(S): 100 INJECTION SUBCUTANEOUS at 11:56

## 2020-04-17 RX ADMIN — Medication 100 MILLIGRAM(S): at 17:59

## 2020-04-17 RX ADMIN — Medication 10 MILLIGRAM(S): at 04:25

## 2020-04-17 RX ADMIN — PYRIDOSTIGMINE BROMIDE 180 MILLIGRAM(S): 60 SOLUTION ORAL at 11:56

## 2020-04-17 RX ADMIN — ZINC OXIDE 1 APPLICATION(S): 200 OINTMENT TOPICAL at 11:56

## 2020-04-17 RX ADMIN — Medication 50 MICROGRAM(S): at 04:26

## 2020-04-17 RX ADMIN — Medication 40 MILLIGRAM(S): at 03:40

## 2020-04-17 RX ADMIN — PYRIDOSTIGMINE BROMIDE 60 MILLIGRAM(S): 60 SOLUTION ORAL at 04:26

## 2020-04-17 RX ADMIN — PYRIDOSTIGMINE BROMIDE 60 MILLIGRAM(S): 60 SOLUTION ORAL at 20:35

## 2020-04-17 RX ADMIN — Medication 5 MILLIGRAM(S): at 20:35

## 2020-04-17 RX ADMIN — Medication 650 MILLIGRAM(S): at 17:59

## 2020-04-17 RX ADMIN — Medication 400 MILLIGRAM(S): at 04:21

## 2020-04-17 RX ADMIN — Medication 10 MILLILITER(S): at 03:40

## 2020-04-17 RX ADMIN — HYDROMORPHONE HYDROCHLORIDE 0.2 MILLIGRAM(S): 2 INJECTION INTRAMUSCULAR; INTRAVENOUS; SUBCUTANEOUS at 00:58

## 2020-04-17 RX ADMIN — PANTOPRAZOLE SODIUM 40 MILLIGRAM(S): 20 TABLET, DELAYED RELEASE ORAL at 04:26

## 2020-04-17 RX ADMIN — ATORVASTATIN CALCIUM 20 MILLIGRAM(S): 80 TABLET, FILM COATED ORAL at 20:35

## 2020-04-17 RX ADMIN — Medication 2000 UNIT(S): at 11:56

## 2020-04-17 NOTE — PROGRESS NOTE ADULT - SUBJECTIVE AND OBJECTIVE BOX
Patient is a 79y old  Female who presents with a chief complaint of Shortness of breath, fevers.     SUBJECTIVE / OVERNIGHT EVENTS:  Patient seen and examined at bedside.  Patient desatting this morning to 70 % on 100 % NRB, Patient has not been able to come off NRB, . Added 6L NC oxygen.  Had  fever Overnight       MEDICATIONS  (STANDING):  atorvastatin 20 milliGRAM(s) Oral at bedtime  cholecalciferol 2000 Unit(s) Oral daily  enoxaparin Injectable 40 milliGRAM(s) SubCutaneous daily  furosemide    Tablet 20 milliGRAM(s) Oral once  levothyroxine 50 MICROGram(s) Oral daily  melatonin 5 milliGRAM(s) Oral at bedtime  pantoprazole    Tablet 40 milliGRAM(s) Oral before breakfast  predniSONE   Tablet 10 milliGRAM(s) Oral daily  pyridostigmine 60 milliGRAM(s) Oral three times a day  pyridostigmine  milliGRAM(s) Oral daily  zinc oxide 40% Ointment 1 Application(s) Topical daily    MEDICATIONS  (PRN):  acetaminophen   Tablet .. 650 milliGRAM(s) Oral every 4 hours PRN Temp greater or equal to 38C (100.4F), Moderate Pain (4 - 6)  acetaminophen   Tablet .. 650 milliGRAM(s) Oral every 6 hours PRN Mild Pain (1 - 3)  ALBUTerol    90 MICROgram(s) HFA Inhaler 2 Puff(s) Inhalation every 4 hours PRN Shortness of Breath and/or Wheezing  benzonatate 100 milliGRAM(s) Oral three times a day PRN Cough  HYDROmorphone  Injectable 0.2 milliGRAM(s) IV Push every 2 hours PRN Pain  loperamide 2 milliGRAM(s) Oral five times a day PRN loose stool  polyethylene glycol 3350 17 Gram(s) Oral daily PRN Constipation  senna 2 Tablet(s) Oral at bedtime PRN Constipation  sodium chloride 0.65% Nasal 1 Spray(s) Both Nostrils two times a day PRN Nasal Congestion      CAPILLARY BLOOD GLUCOSE    I&O's Summary    16 Apr 2020 07:01  -  17 Apr 2020 07:00  --------------------------------------------------------  IN: 180 mL / OUT: 0 mL / NET: 180 mL        PHYSICAL EXAM:  Vital Signs Last 24 Hrs  T(C): 37.1 (17 Apr 2020 04:34), Max: 38 (17 Apr 2020 03:29)  T(F): 98.8 (17 Apr 2020 04:34), Max: 100.4 (17 Apr 2020 03:29)  HR: 110 (17 Apr 2020 04:34) (83 - 120)  BP: 112/66 (17 Apr 2020 04:34) (112/66 - 145/76)  BP(mean): --  RR: 22 (17 Apr 2020 04:34) (22 - 22)  SpO2: 94% (17 Apr 2020 04:34) (83% - 94%)    CONSTITUTIONAL: Respiratory distress  ENMT: NRB  RESPIRATORY: respiratory effort   CARDIOVASCULAR: Regular rate and rhythm, normal S1 and S2, no murmur/rub/gallop; No lower extremity edema; Peripheral pulses are 2+ bilaterally  ABDOMEN: Nontender to palpation, normoactive bowel sounds, no rebound/guarding; No hepatosplenomegaly  PSYCH: A+O to person, place, and time; affect appropriate  NEUROLOGY: CN 2-12 are intact and symmetric; no gross sensory deficits   SKIN: No rashes; no palpable lesions    LABS:                        8.5    11.17 )-----------( 217      ( 17 Apr 2020 08:51 )             28.0                     Culture - Blood (collected 14 Apr 2020 18:13)  Source: .Blood Blood  Preliminary Report (15 Apr 2020 19:02):    No growth to date.    Culture - Blood (collected 14 Apr 2020 18:13)  Source: .Blood Blood  Preliminary Report (15 Apr 2020 19:02):    No growth to date.      COVID-19 PCR: Detected (03-30-20 @ 21:41)      RADIOLOGY & ADDITIONAL TESTS:  Imaging from Last 24 Hours:  Electrocardiogram/QTc Interval:    COORDINATION OF CARE:  Care Discussed with Consultants/Other Providers: Patient is a 79y old  Female who presents with a chief complaint of Shortness of breath, fevers.     SUBJECTIVE / OVERNIGHT EVENTS:  Patient seen and examined at bedside.  Patient desatting this morning to 70 % on 100 % NRB, Patient has not been able to come off NRB, . Added 6L NC oxygen.  Had  fever Overnight, IV Tylenol administered.       MEDICATIONS  (STANDING):  atorvastatin 20 milliGRAM(s) Oral at bedtime  cholecalciferol 2000 Unit(s) Oral daily  enoxaparin Injectable 40 milliGRAM(s) SubCutaneous daily  furosemide    Tablet 20 milliGRAM(s) Oral once  levothyroxine 50 MICROGram(s) Oral daily  melatonin 5 milliGRAM(s) Oral at bedtime  pantoprazole    Tablet 40 milliGRAM(s) Oral before breakfast  predniSONE   Tablet 10 milliGRAM(s) Oral daily  pyridostigmine 60 milliGRAM(s) Oral three times a day  pyridostigmine  milliGRAM(s) Oral daily  zinc oxide 40% Ointment 1 Application(s) Topical daily    MEDICATIONS  (PRN):  acetaminophen   Tablet .. 650 milliGRAM(s) Oral every 4 hours PRN Temp greater or equal to 38C (100.4F), Moderate Pain (4 - 6)  acetaminophen   Tablet .. 650 milliGRAM(s) Oral every 6 hours PRN Mild Pain (1 - 3)  ALBUTerol    90 MICROgram(s) HFA Inhaler 2 Puff(s) Inhalation every 4 hours PRN Shortness of Breath and/or Wheezing  benzonatate 100 milliGRAM(s) Oral three times a day PRN Cough  HYDROmorphone  Injectable 0.2 milliGRAM(s) IV Push every 2 hours PRN Pain  loperamide 2 milliGRAM(s) Oral five times a day PRN loose stool  polyethylene glycol 3350 17 Gram(s) Oral daily PRN Constipation  senna 2 Tablet(s) Oral at bedtime PRN Constipation  sodium chloride 0.65% Nasal 1 Spray(s) Both Nostrils two times a day PRN Nasal Congestion      CAPILLARY BLOOD GLUCOSE    I&O's Summary    16 Apr 2020 07:01  -  17 Apr 2020 07:00  --------------------------------------------------------  IN: 180 mL / OUT: 0 mL / NET: 180 mL        PHYSICAL EXAM:  Vital Signs Last 24 Hrs  T(C): 37.1 (17 Apr 2020 04:34), Max: 38 (17 Apr 2020 03:29)  T(F): 98.8 (17 Apr 2020 04:34), Max: 100.4 (17 Apr 2020 03:29)  HR: 110 (17 Apr 2020 04:34) (83 - 120)  BP: 112/66 (17 Apr 2020 04:34) (112/66 - 145/76)  BP(mean): --  RR: 22 (17 Apr 2020 04:34) (22 - 22)  SpO2: 94% (17 Apr 2020 04:34) (83% - 94%)    CONSTITUTIONAL: Respiratory distress  ENMT: NRB  RESPIRATORY: respiratory effort   CARDIOVASCULAR: Regular rate and rhythm, normal S1 and S2, no murmur/rub/gallop; No lower extremity edema; Peripheral pulses are 2+ bilaterally  ABDOMEN: Nontender to palpation, normoactive bowel sounds, no rebound/guarding; No hepatosplenomegaly  PSYCH: A+O to person, place, and time; affect appropriate  NEUROLOGY: CN 2-12 are intact and symmetric; no gross sensory deficits   SKIN: No rashes; no palpable lesions    LABS:                        8.5    11.17 )-----------( 217      ( 17 Apr 2020 08:51 )             28.0     Culture - Blood (collected 14 Apr 2020 18:13)  Source: .Blood Blood  Preliminary Report (15 Apr 2020 19:02):    No growth to date.    Culture - Blood (collected 14 Apr 2020 18:13)  Source: .Blood Blood  Preliminary Report (15 Apr 2020 19:02):  No growth to date.      COVID-19 PCR: Detected (03-30-20 @ 21:41)      RADIOLOGY & ADDITIONAL TESTS:  Imaging from Last 24 Hours:  Electrocardiogram/QTc Interval:    COORDINATION OF CARE:  Care Discussed with NP and

## 2020-04-17 NOTE — PROGRESS NOTE ADULT - ASSESSMENT
79 F hx of myasthenia gravis, HTN, recent hip replacement presented with acute hypoxemic respiratory failure and sepsis in the setting of COVID-19 infection.    # Acute hypoxemic respiratory failure secondary to COVID-19  - Currently on NRB 15 L with O2 sat dropping to 70 %, added NC 6L  - Self proning  - Monitor closely given risk of concurrent myasthenia gravis exacerbation  - Low suspicion for superimposed bacterial pneumonia [ID curbsided]. Patient previously treated with 5 day course of Zosyn  - DNR/DNI    # COVID-19  - Completed Solumedrol (4/3-7) and Plaquenil (3/31-4/4)  - Airborne/Contact precautions  - CRP and Ferritin slightly increased   - Discussed with ID about possible Anakinra trial, recommended to hold off as pt has already received immunosuppressive medication for MS (Eculizumab- solaris)   - Requested information about possible Convalescent Plasma Transfusion   - Supportive care    #Anemia  - Hb level 7.5 s/p 1U of PRBC with improvement of Hb level to 9.9 --- Now 8.5 ( 4/17)   - likely 2/2 myelosuppression from viral process  - consent obtained from pt and daughter over the phone  - Continue to Monitor CBC    # Myasthenia Gravis  - Currently on pyridostigmine ER 180mg po daily, Mestinon (pyridostigmine) 60mg po tid  - Unable to get NIFs and VC q8H given COVID+, will monitor q4 neuro checks specifically looking at neck flexion, voice quality, difficulty swallowing, and call neurology if deterioration  - s/p 1 dose Soliris (eculizumab) outpatient neurology (on mestinon and Soliris for MG outpatient) [completed last day 4/8)   - On Prednisone 10mg PO qd per neurology recs    # Osteoarthritis  - On Celecoxib    # Hypothyroidism  - On Synthroid    # Hypertension  - BP Stable on Losartan 50mg PO qd  - Hold HCTZ for now    # DVT PPx: On Lovenox  # Diet: S+S Eval recommended NPO, but patient and HCP prefer dysphagia 1 with honey thickened liquids despite risks of aspiration  # Dispo: PT recommended home with home PT  # GOC: DNR/DNI

## 2020-04-18 DIAGNOSIS — M94.0 CHONDROCOSTAL JUNCTION SYNDROME [TIETZE]: ICD-10-CM

## 2020-04-18 LAB
HCT VFR BLD CALC: 27.1 % — LOW (ref 34.5–45)
HGB BLD-MCNC: 8.2 G/DL — LOW (ref 11.5–15.5)
MCHC RBC-ENTMCNC: 25.1 PG — LOW (ref 27–34)
MCHC RBC-ENTMCNC: 30.3 GM/DL — LOW (ref 32–36)
MCV RBC AUTO: 82.9 FL — SIGNIFICANT CHANGE UP (ref 80–100)
NRBC # BLD: 0 /100 WBCS — SIGNIFICANT CHANGE UP (ref 0–0)
PLATELET # BLD AUTO: 205 K/UL — SIGNIFICANT CHANGE UP (ref 150–400)
RBC # BLD: 3.27 M/UL — LOW (ref 3.8–5.2)
RBC # FLD: 22.8 % — HIGH (ref 10.3–14.5)
WBC # BLD: 8.96 K/UL — SIGNIFICANT CHANGE UP (ref 3.8–10.5)
WBC # FLD AUTO: 8.96 K/UL — SIGNIFICANT CHANGE UP (ref 3.8–10.5)

## 2020-04-18 RX ORDER — FUROSEMIDE 40 MG
40 TABLET ORAL ONCE
Refills: 0 | Status: COMPLETED | OUTPATIENT
Start: 2020-04-18 | End: 2020-04-18

## 2020-04-18 RX ORDER — MORPHINE SULFATE 50 MG/1
2 CAPSULE, EXTENDED RELEASE ORAL ONCE
Refills: 0 | Status: DISCONTINUED | OUTPATIENT
Start: 2020-04-18 | End: 2020-04-18

## 2020-04-18 RX ADMIN — Medication 10 MILLIGRAM(S): at 04:38

## 2020-04-18 RX ADMIN — Medication 100 MILLIGRAM(S): at 03:45

## 2020-04-18 RX ADMIN — PYRIDOSTIGMINE BROMIDE 180 MILLIGRAM(S): 60 SOLUTION ORAL at 11:21

## 2020-04-18 RX ADMIN — PANTOPRAZOLE SODIUM 40 MILLIGRAM(S): 20 TABLET, DELAYED RELEASE ORAL at 04:38

## 2020-04-18 RX ADMIN — Medication 40 MILLIGRAM(S): at 11:58

## 2020-04-18 RX ADMIN — ATORVASTATIN CALCIUM 20 MILLIGRAM(S): 80 TABLET, FILM COATED ORAL at 20:33

## 2020-04-18 RX ADMIN — PYRIDOSTIGMINE BROMIDE 60 MILLIGRAM(S): 60 SOLUTION ORAL at 20:33

## 2020-04-18 RX ADMIN — PYRIDOSTIGMINE BROMIDE 60 MILLIGRAM(S): 60 SOLUTION ORAL at 04:38

## 2020-04-18 RX ADMIN — PYRIDOSTIGMINE BROMIDE 60 MILLIGRAM(S): 60 SOLUTION ORAL at 11:21

## 2020-04-18 RX ADMIN — Medication 2000 UNIT(S): at 18:52

## 2020-04-18 RX ADMIN — ENOXAPARIN SODIUM 40 MILLIGRAM(S): 100 INJECTION SUBCUTANEOUS at 18:52

## 2020-04-18 RX ADMIN — ALBUTEROL 2 PUFF(S): 90 AEROSOL, METERED ORAL at 02:15

## 2020-04-18 RX ADMIN — MORPHINE SULFATE 2 MILLIGRAM(S): 50 CAPSULE, EXTENDED RELEASE ORAL at 02:15

## 2020-04-18 RX ADMIN — ZINC OXIDE 1 APPLICATION(S): 200 OINTMENT TOPICAL at 18:52

## 2020-04-18 RX ADMIN — Medication 5 MILLIGRAM(S): at 20:33

## 2020-04-18 RX ADMIN — Medication 650 MILLIGRAM(S): at 02:15

## 2020-04-18 RX ADMIN — Medication 50 MICROGRAM(S): at 04:38

## 2020-04-18 NOTE — CHART NOTE - NSCHARTNOTEFT_GEN_A_CORE
Notified by RN pt complain of chest pain. She explains she was sleeping and was awakened by pain under left breast,7/10, described as sharp. Pain does not radiate. Upon exam pain she reproducible upon palpation at 5-6 intercostal space. EKG done -NSR without ST segment changes. Vital signs stable . 02 saturation 94%.  Dr Praful Knowles contacted via [employer] - reviewed situation and EKG with MD. Plan as per Dr Knowles - Morphine 2mg IV Push and Tylenol 650mg.   Will continue to monitor patient and vital signs.   Will endorse to AM team to notify attending.

## 2020-04-18 NOTE — CONSULT NOTE ADULT - SUBJECTIVE AND OBJECTIVE BOX
Notified by RN pt complain of chest pain. She explains she was sleeping and was awakened by pain under left breast,7/10, described as sharp. Pain does not radiate. Upon exam pain she reproducible upon palpation at 5-6 intercostal space. EKG done -NSR without ST segment changes. Vital signs stable . 02 saturation 94%.  Dr Praful Knowles contacted via [employer] - reviewed situation and EKG with MD. Plan as per Dr Knowles - Morphine 2mg IV Push and Tylenol 650mg.   Will continue to monitor patient and vital signs.   Will endorse to AM team to notify attending.    When discussing the pain was reproducible by palpation by the nurse in the lower costal region.  The patient otherwise is in NAD    Vital Signs Last 24 Hrs  T(C): 36.5 (18 Apr 2020 01:43), Max: 38 (17 Apr 2020 03:29)  T(F): 97.7 (18 Apr 2020 01:43), Max: 100.4 (17 Apr 2020 03:29)  HR: 102 (18 Apr 2020 01:43) (90 - 120)  BP: 126/77 (18 Apr 2020 01:43) (101/58 - 145/76)  RR: 20 (18 Apr 2020 01:43) (18 - 22)  SpO2: 93% (18 Apr 2020 01:43) (83% - 94%)

## 2020-04-18 NOTE — PROGRESS NOTE ADULT - SUBJECTIVE AND OBJECTIVE BOX
Patient is a 79y old  Female who presents with a chief complaint of Shortness of breath, fevers.     SUBJECTIVE / OVERNIGHT EVENTS:  Patient seen and examined at bedside. Pt had episode of pain under her left breast,7/10, described as sharp. Upon exam pain she reproducible upon palpation at 5-6 intercostal space. EKG done NSR without ST segment changes. Dr Praful Knowles contacted via [employer] - reviewed situation and EKG with MD. Concern for costochondritis pt was given Morphine 2mg IV Push and Tylenol 650mg with improvement of symptoms.     She continues to have SOB, ordered Lasix 40mg IV x 1     MEDICATIONS  (STANDING):  atorvastatin 20 milliGRAM(s) Oral at bedtime  cholecalciferol 2000 Unit(s) Oral daily  enoxaparin Injectable 40 milliGRAM(s) SubCutaneous daily  furosemide    Tablet 20 milliGRAM(s) Oral once  levothyroxine 50 MICROGram(s) Oral daily  melatonin 5 milliGRAM(s) Oral at bedtime  pantoprazole    Tablet 40 milliGRAM(s) Oral before breakfast  predniSONE   Tablet 10 milliGRAM(s) Oral daily  pyridostigmine 60 milliGRAM(s) Oral three times a day  pyridostigmine  milliGRAM(s) Oral daily  zinc oxide 40% Ointment 1 Application(s) Topical daily    MEDICATIONS  (PRN):  acetaminophen   Tablet .. 650 milliGRAM(s) Oral every 4 hours PRN Temp greater or equal to 38C (100.4F), Moderate Pain (4 - 6)  acetaminophen   Tablet .. 650 milliGRAM(s) Oral every 6 hours PRN Mild Pain (1 - 3)  ALBUTerol    90 MICROgram(s) HFA Inhaler 2 Puff(s) Inhalation every 4 hours PRN Shortness of Breath and/or Wheezing  benzonatate 100 milliGRAM(s) Oral three times a day PRN Cough  HYDROmorphone  Injectable 0.2 milliGRAM(s) IV Push every 2 hours PRN Pain  loperamide 2 milliGRAM(s) Oral five times a day PRN loose stool  polyethylene glycol 3350 17 Gram(s) Oral daily PRN Constipation  senna 2 Tablet(s) Oral at bedtime PRN Constipation  sodium chloride 0.65% Nasal 1 Spray(s) Both Nostrils two times a day PRN Nasal Congestion    I&O's Detail    17 Apr 2020 07:01  -  18 Apr 2020 07:00  --------------------------------------------------------  IN:    Oral Fluid: 180 mL  Total IN: 180 mL    OUT:  Total OUT: 0 mL    Total NET: 180 mL    PHYSICAL EXAM:  Vital Signs Last 24 Hrs  T(C): 36.5 (18 Apr 2020 01:43), Max: 36.6 (17 Apr 2020 19:39)  T(F): 97.7 (18 Apr 2020 01:43), Max: 97.9 (17 Apr 2020 19:39)  HR: 95 (18 Apr 2020 02:30) (90 - 102)  BP: 133/77 (18 Apr 2020 02:30) (126/77 - 133/77)  BP(mean): --  RR: 20 (18 Apr 2020 03:42) (18 - 24)  SpO2: 100% (18 Apr 2020 03:42) (83% - 100%)    CONSTITUTIONAL: Respiratory distress  ENMT: NRB  RESPIRATORY: respiratory effort   CARDIOVASCULAR: Regular rate and rhythm, normal S1 and S2, no murmur/rub/gallop; No lower extremity edema; Peripheral pulses are 2+ bilaterally  ABDOMEN: Nontender to palpation, normoactive bowel sounds, no rebound/guarding; No hepatosplenomegaly  PSYCH: A+O to person, place, and time; affect appropriate  NEUROLOGY: CN 2-12 are intact and symmetric; no gross sensory deficits   SKIN: No rashes; no palpable lesions    LABS:                               8.2    8.96  )-----------( 205      ( 18 Apr 2020 08:22 )             27.1       Culture - Blood (collected 14 Apr 2020 18:13)  Source: .Blood Blood  Preliminary Report (15 Apr 2020 19:02):    No growth to date.    Culture - Blood (collected 14 Apr 2020 18:13)  Source: .Blood Blood  Preliminary Report (15 Apr 2020 19:02):  No growth to date.    COVID-19 PCR: Detected (03-30-20 @ 21:41)    RADIOLOGY & ADDITIONAL TESTS:  Imaging from Last 24 Hours:  Electrocardiogram/QTc Interval: no EKG changes     COORDINATION OF CARE:  Care Discussed with NP and

## 2020-04-18 NOTE — PROVIDER CONTACT NOTE (OTHER) - RECOMMENDATIONS
lay on side or lay prone position
250cc bolus adminsitered as per NP order. NP notified and aware
Applied humidified oxygen .
MD notified and aware, RN suggests bolus
NP notified and aware
NP notified and aware
PRN Dilaudid 0.2 mg
notify NP, continue to monitor, EKG done

## 2020-04-19 ENCOUNTER — TRANSCRIPTION ENCOUNTER (OUTPATIENT)
Age: 80
End: 2020-04-19

## 2020-04-19 LAB
ANION GAP SERPL CALC-SCNC: 11 MMOL/L — SIGNIFICANT CHANGE UP (ref 5–17)
BUN SERPL-MCNC: 27 MG/DL — HIGH (ref 7–23)
CALCIUM SERPL-MCNC: 8.9 MG/DL — SIGNIFICANT CHANGE UP (ref 8.4–10.5)
CHLORIDE SERPL-SCNC: 101 MMOL/L — SIGNIFICANT CHANGE UP (ref 96–108)
CO2 SERPL-SCNC: 28 MMOL/L — SIGNIFICANT CHANGE UP (ref 22–31)
CREAT SERPL-MCNC: 0.78 MG/DL — SIGNIFICANT CHANGE UP (ref 0.5–1.3)
CRP SERPL-MCNC: 12.2 MG/DL — HIGH (ref 0–0.4)
CULTURE RESULTS: SIGNIFICANT CHANGE UP
CULTURE RESULTS: SIGNIFICANT CHANGE UP
D DIMER BLD IA.RAPID-MCNC: 4566 NG/ML DDU — HIGH
FERRITIN SERPL-MCNC: 525 NG/ML — HIGH (ref 15–150)
GLUCOSE SERPL-MCNC: 88 MG/DL — SIGNIFICANT CHANGE UP (ref 70–99)
HCT VFR BLD CALC: 27.5 % — LOW (ref 34.5–45)
HGB BLD-MCNC: 8.2 G/DL — LOW (ref 11.5–15.5)
MCHC RBC-ENTMCNC: 24.8 PG — LOW (ref 27–34)
MCHC RBC-ENTMCNC: 29.8 GM/DL — LOW (ref 32–36)
MCV RBC AUTO: 83.3 FL — SIGNIFICANT CHANGE UP (ref 80–100)
NRBC # BLD: 0 /100 WBCS — SIGNIFICANT CHANGE UP (ref 0–0)
NT-PROBNP SERPL-SCNC: 273 PG/ML — SIGNIFICANT CHANGE UP (ref 0–300)
PLATELET # BLD AUTO: 219 K/UL — SIGNIFICANT CHANGE UP (ref 150–400)
POTASSIUM SERPL-MCNC: 3.8 MMOL/L — SIGNIFICANT CHANGE UP (ref 3.5–5.3)
POTASSIUM SERPL-SCNC: 3.8 MMOL/L — SIGNIFICANT CHANGE UP (ref 3.5–5.3)
PROCALCITONIN SERPL-MCNC: 0.16 NG/ML — HIGH (ref 0.02–0.1)
RBC # BLD: 3.3 M/UL — LOW (ref 3.8–5.2)
RBC # FLD: 22.7 % — HIGH (ref 10.3–14.5)
SODIUM SERPL-SCNC: 140 MMOL/L — SIGNIFICANT CHANGE UP (ref 135–145)
SPECIMEN SOURCE: SIGNIFICANT CHANGE UP
SPECIMEN SOURCE: SIGNIFICANT CHANGE UP
WBC # BLD: 8.51 K/UL — SIGNIFICANT CHANGE UP (ref 3.8–10.5)
WBC # FLD AUTO: 8.51 K/UL — SIGNIFICANT CHANGE UP (ref 3.8–10.5)

## 2020-04-19 RX ORDER — HYDROMORPHONE HYDROCHLORIDE 2 MG/ML
0.2 INJECTION INTRAMUSCULAR; INTRAVENOUS; SUBCUTANEOUS
Refills: 0 | Status: DISCONTINUED | OUTPATIENT
Start: 2020-04-19 | End: 2020-04-23

## 2020-04-19 RX ORDER — HYDROMORPHONE HYDROCHLORIDE 2 MG/ML
0.2 INJECTION INTRAMUSCULAR; INTRAVENOUS; SUBCUTANEOUS AT BEDTIME
Refills: 0 | Status: DISCONTINUED | OUTPATIENT
Start: 2020-04-19 | End: 2020-04-23

## 2020-04-19 RX ADMIN — PYRIDOSTIGMINE BROMIDE 60 MILLIGRAM(S): 60 SOLUTION ORAL at 05:41

## 2020-04-19 RX ADMIN — Medication 50 MICROGRAM(S): at 05:41

## 2020-04-19 RX ADMIN — HYDROMORPHONE HYDROCHLORIDE 0.2 MILLIGRAM(S): 2 INJECTION INTRAMUSCULAR; INTRAVENOUS; SUBCUTANEOUS at 22:00

## 2020-04-19 RX ADMIN — Medication 5 MILLIGRAM(S): at 22:00

## 2020-04-19 RX ADMIN — Medication 650 MILLIGRAM(S): at 12:47

## 2020-04-19 RX ADMIN — ATORVASTATIN CALCIUM 20 MILLIGRAM(S): 80 TABLET, FILM COATED ORAL at 22:00

## 2020-04-19 RX ADMIN — ZINC OXIDE 1 APPLICATION(S): 200 OINTMENT TOPICAL at 12:16

## 2020-04-19 RX ADMIN — PYRIDOSTIGMINE BROMIDE 60 MILLIGRAM(S): 60 SOLUTION ORAL at 22:00

## 2020-04-19 RX ADMIN — PYRIDOSTIGMINE BROMIDE 180 MILLIGRAM(S): 60 SOLUTION ORAL at 12:16

## 2020-04-19 RX ADMIN — Medication 10 MILLIGRAM(S): at 05:41

## 2020-04-19 RX ADMIN — PYRIDOSTIGMINE BROMIDE 60 MILLIGRAM(S): 60 SOLUTION ORAL at 12:16

## 2020-04-19 RX ADMIN — Medication 2000 UNIT(S): at 12:17

## 2020-04-19 RX ADMIN — PANTOPRAZOLE SODIUM 40 MILLIGRAM(S): 20 TABLET, DELAYED RELEASE ORAL at 05:41

## 2020-04-19 RX ADMIN — ENOXAPARIN SODIUM 40 MILLIGRAM(S): 100 INJECTION SUBCUTANEOUS at 12:17

## 2020-04-19 NOTE — PROGRESS NOTE ADULT - SUBJECTIVE AND OBJECTIVE BOX
Patient is a 79y old  Female who presents with a chief complaint of Shortness of breath, fevers.     SUBJECTIVE / OVERNIGHT EVENTS:  She continues to have SOB, reported felt better after dose of lasix yesterday  Low dose Dilaudid available for symptom management (SOB / dyspnea)  She also complains of not being able to sleep due to SOB, with minimal improvement after self proning. Added low dose Dilaudid at HS.      MEDICATIONS  (STANDING):  atorvastatin 20 milliGRAM(s) Oral at bedtime  cholecalciferol 2000 Unit(s) Oral daily  enoxaparin Injectable 40 milliGRAM(s) SubCutaneous daily  furosemide    Tablet 20 milliGRAM(s) Oral once  HYDROmorphone  Injectable 0.2 milliGRAM(s) IV Push at bedtime  levothyroxine 50 MICROGram(s) Oral daily  melatonin 5 milliGRAM(s) Oral at bedtime  pantoprazole    Tablet 40 milliGRAM(s) Oral before breakfast  predniSONE   Tablet 10 milliGRAM(s) Oral daily  pyridostigmine 60 milliGRAM(s) Oral three times a day  pyridostigmine  milliGRAM(s) Oral daily  zinc oxide 40% Ointment 1 Application(s) Topical daily    MEDICATIONS  (PRN):  acetaminophen   Tablet .. 650 milliGRAM(s) Oral every 4 hours PRN Temp greater or equal to 38C (100.4F), Moderate Pain (4 - 6)  acetaminophen   Tablet .. 650 milliGRAM(s) Oral every 6 hours PRN Mild Pain (1 - 3)  ALBUTerol    90 MICROgram(s) HFA Inhaler 2 Puff(s) Inhalation every 4 hours PRN Shortness of Breath and/or Wheezing  benzonatate 100 milliGRAM(s) Oral three times a day PRN Cough  HYDROmorphone  Injectable 0.2 milliGRAM(s) IV Push every 2 hours PRN Pain  loperamide 2 milliGRAM(s) Oral five times a day PRN loose stool  polyethylene glycol 3350 17 Gram(s) Oral daily PRN Constipation  senna 2 Tablet(s) Oral at bedtime PRN Constipation  sodium chloride 0.65% Nasal 1 Spray(s) Both Nostrils two times a day PRN Nasal Congestion    I&O's Detail    PHYSICAL EXAM:  Vital Signs Last 24 Hrs  T(C): 36.6 (19 Apr 2020 04:39), Max: 36.7 (18 Apr 2020 13:08)  T(F): 97.9 (19 Apr 2020 04:39), Max: 98.1 (18 Apr 2020 13:08)  HR: 77 (19 Apr 2020 04:39) (77 - 98)  BP: 105/67 (19 Apr 2020 04:39) (105/67 - 115/70)  RR: 18 (19 Apr 2020 04:39) (18 - 20)  SpO2: 100% (19 Apr 2020 04:39) (90% - 100%)    CONSTITUTIONAL: Respiratory distress  ENMT: NRB  RESPIRATORY: respiratory effort   CARDIOVASCULAR: Regular rate and rhythm, normal S1 and S2, no murmur/rub/gallop; No lower extremity edema; Peripheral pulses are 2+ bilaterally  ABDOMEN: Nontender to palpation, normoactive bowel sounds, no rebound/guarding; No hepatosplenomegaly  PSYCH: A+O to person, place, and time; affect appropriate  NEUROLOGY: CN 2-12 are intact and symmetric; no gross sensory deficits   SKIN: No rashes; no palpable lesions    LABS:                                        8.2    8.51  )-----------( 219      ( 19 Apr 2020 07:28 )             27.5     04-19    140  |  101  |  27<H>  ----------------------------<  88  3.8   |  28  |  0.78    Ca    8.9      19 Apr 2020 07:28    Culture - Blood (collected 14 Apr 2020 18:13)  Source: .Blood Blood  Preliminary Report (15 Apr 2020 19:02):    No growth to date.    Culture - Blood (collected 14 Apr 2020 18:13)  Source: .Blood Blood  Preliminary Report (15 Apr 2020 19:02):  No growth to date.    COVID-19 PCR: Detected (03-30-20 @ 21:41)    RADIOLOGY & ADDITIONAL TESTS:  Imaging from Last 24 Hours:  Electrocardiogram/QTc Interval: no EKG changes     COORDINATION OF CARE:  Care Discussed with NP and

## 2020-04-19 NOTE — DISCHARGE NOTE PROVIDER - CARE PROVIDERS DIRECT ADDRESSES
,[email or web address]

## 2020-04-19 NOTE — PROGRESS NOTE ADULT - ASSESSMENT
79 F hx of myasthenia gravis, HTN, recent hip replacement presented with acute hypoxemic respiratory failure and sepsis in the setting of COVID-19 infection.    # Acute hypoxemic respiratory failure secondary to COVID-19  - Currently on NRB 15 L with O2 sat dropping to 90 %, added NC 6L  - Self proning  - Monitor closely given risk of concurrent myasthenia gravis exacerbation  - Low suspicion for superimposed bacterial pneumonia [ID curbsided]. Patient previously treated with 5 day course of Zosyn  - negative repeat blood Cx   - DNR/DNI    # COVID-19  - Completed Solumedrol (4/3-7) and Plaquenil (3/31-4/4)  - Airborne/Contact precautions  - CRP and Ferritin slightly decreasing   - Discussed with ID about possible Anakinra trial, recommended to hold off as pt has already received immunosuppressive medication for MS (Eculizumab- solaris)   - Requested information about possible Convalescent Plasma Transfusion   - Supportive care    #Anemia  - Hb level 7.5 s/p 1U of PRBC (on 4/15) with improvement of Hb level to 9.9 --- Now 8.2  - likely 2/2 myelosuppression from viral process  - consent obtained from pt and daughter over the phone  - Continue to Monitor CBC    # Myasthenia Gravis  - Currently on pyridostigmine ER 180mg po daily, Mestinon (pyridostigmine) 60mg po tid  - Unable to get NIFs and VC q8H given COVID+, will monitor q4 neuro checks specifically looking at neck flexion, voice quality, difficulty swallowing, and call neurology if deterioration  - s/p 1 dose Soliris (eculizumab) outpatient neurology (on mestinon and Soliris for MG outpatient) [completed last day 4/8)   - On Prednisone 10mg PO qd per neurology recs    # Osteoarthritis  - On Celecoxib    # Hypothyroidism  - On Synthroid    # Hypertension  - BP Stable on Losartan 50mg PO qd  - Hold HCTZ for now    # DVT PPx: On Lovenox  # Diet: S+S Eval recommended NPO, but patient and HCP prefer dysphagia 1 with honey thickened liquids despite risks of aspiration  # Dispo: PT recommended home with home PT  # GOC: DNR/DNI

## 2020-04-19 NOTE — DISCHARGE NOTE PROVIDER - NSDCCAREPROVSEEN_GEN_ALL_CORE_FT
[facility] Medicine, 5M Care Model Team A [facility] Medicine, 5M Care Model Team A  Loida Lazar

## 2020-04-19 NOTE — DISCHARGE NOTE PROVIDER - CARE PROVIDER_API CALL
Carrillo Hannon)  Internal Medicine  [address] [address] [place] [address]  Phone: [phone]  Fax: [phone]  Follow Up Time: Carrillo Hannon)  Internal Medicine  [address] [place] [address]  Phone: [phone]  Fax: [phone]  Follow Up Time:     Baron Fitzgerald)  Neurology  [address] [address]  Phone: [phone]  Fax: [phone]  Follow Up Time:

## 2020-04-19 NOTE — DISCHARGE NOTE PROVIDER - PROVIDER TOKENS
PROVIDER:[TOKEN:[[id number]]] PROVIDER:[TOKEN:[[id number]]],PROVIDER:[TOKEN:[[id number]]]

## 2020-04-19 NOTE — DISCHARGE NOTE PROVIDER - HOSPITAL COURSE
79 F hx of myasthenia gravis, HTN, recent hip replacement presented with acute hypoxemic respiratory failure and sepsis in the setting of COVID-19 infection.        # Acute hypoxemic respiratory failure secondary to COVID-19    - Currently on NRB 15 L with O2 sat dropping to 70 %, added NC 6L    - Self proning    - Monitor closely given risk of concurrent myasthenia gravis exacerbation    - Low suspicion for superimposed bacterial pneumonia [ID curbsided]. Patient previously treated with 5 day course of Zosyn    - DNR/DNI    COVID-19    - Completed Solumedrol (4/3-7) and Plaquenil (3/31-4/4)    - Airborne/Contact precautions    - CRP and Ferritin slightly increased     - Discussed with ID about possible Anakinra trial, recommended to hold off as pt has already received immunosuppressive medication for MS (Eculizumab- solaris) 79 F hx of myasthenia gravis, HTN, recent hip replacement presented with acute hypoxemic respiratory failure and sepsis in the setting of COVID-19 infection.        # Acute hypoxemic respiratory failure secondary to COVID-19    - Currently on NRB 15 L with O2 sat stable at rest. Intermittently worsens when anxious and improves with self-proning.     - Monitor closely given risk of concurrent myasthenia gravis exacerbation.  PT's neck flexion strength 5/5, proximal arm strength 4/5, proximal leg strength 3/5.  Endorses intermittent dysphagia, but no nasal intonation of her voice.     - Low suspicion for superimposed bacterial pneumonia [ID curbsided]. Patient previously treated with 5 day course of Zosyn    - DNR/DNI    COVID-19     - Completed Solumedrol (4/3-7) and Plaquenil (3/31-4/4) now s/p convalescent plasma transfusion x 1 (04/23)     - Airborne/Contact precautions    - CRP and Ferritin slightly increased     - Discussed with ID about possible Anakinra trial, recommended to hold off as pt has already received immunosuppressive medication for MS (Eculizumab- solaris)     - PT mildly improved in that she could sit up in bed with NRB, but still significant hypoxia requiring NRB 15L.      - NIFs and VCs ordered and requested multiple times due to high risk of MG exacerbation but due to aerolozation from procedure and COVID + status, refused.     - Being transferred back to [facility] rehab for weaning O2 as tolerated.         # Myasthenia Graivs    -Was on 60mg mestinon instant release TID and 180mg mestinon extended release every morning prior to arrival.      -Was previously on a weaning dose of prednisone and then off since february (last on 5mg).  Received one dose of IV solumedrol 40mg for significant respiratory distress and wheezing    -Continue prednisone 10mg daily with outpatient follow up with Dr. Fitzgerald and for plan of possible re-taper    -[facility]  Discussed with Dr. Fitzgerald and [facility] 79 F hx of myasthenia gravis, HTN, recent hip replacement presented with acute hypoxemic respiratory failure and sepsis in the setting of COVID-19 infection.        # Acute hypoxemic respiratory failure secondary to COVID-19    - Currently on NRB 15 L with O2 sat stable at rest. Intermittently worsens when anxious and improves with self-proning.     - Monitor closely given risk of concurrent myasthenia gravis exacerbation.  PT's neck flexion strength 5/5, proximal arm strength 4/5, proximal leg strength 3/5.  Endorses intermittent dysphagia, but no nasal intonation of her voice.     - Low suspicion for superimposed bacterial pneumonia [ID curbsided]. Patient previously treated with 5 day course of Zosyn    - DNR/DNI    COVID-19     - Completed Solumedrol (4/3-7) and Plaquenil (3/31-4/4) now s/p convalescent plasma transfusion x 1 (04/23)     - Airborne/Contact precautions    - CRP and Ferritin slightly increased     - Discussed with ID about possible Anakinra trial, recommended to hold off as pt has already received immunosuppressive medication for MS (Eculizumab- solaris)     - PT mildly improved in that she could sit up in bed with NRB, but still significant hypoxia requiring NRB 15L.      - NIFs and VCs ordered and requested multiple times due to high risk of MG exacerbation but due to aerolozation from procedure and COVID + status, refused.     - Being transferred back to [facility] rehab for weaning O2 as tolerated.         # Myasthenia Graivs    -Was on 60mg mestinon instant release TID and 180mg mestinon extended release every morning prior to arrival.      -Was previously on a weaning dose of prednisone and then off since february (last on 5mg).  Received one dose of IV solumedrol 40mg for significant respiratory distress and wheezing    -Continue prednisone 10mg daily with outpatient follow up with Dr. Fitzgerald and for plan of possible re-taper    -Received Solaris as outpatient, which can be done at [facility].  D[facility]ed with Dr. Fitzgerald [facility]         #HTN    - anti-HTNsive combo home pill losartan-HCTZ was held during hospitalization 2/2 low BPs. Can re-add PRN if BPs become elevated, starting with ACEI. 79 F PMH Myasthenia gravis on pyridostigmine, reactive airway disease, GERD, HTN, HLD, hypothyroid, spinal stenosis s/p laminectomy/fusion, coming in with day 7 of difficulty breathing and not feeling well.  + chills, no documented fevers at home.  No chest pain.  Shortness of breath worse when she tries to walk around or speak.  Mild runny nose and sore throat.  No abd pain, no nausea or vomiting, no diarrhea.  No one is sick at home. Per daughter patient had been started on z-pack as OP with minimal relief. Has had prior intubations for elective surgeries but no reported emergency intubations for MG crises. Per daughter patient would want trial of intubation.        In ED: 100.2, 119/169/53, 36, 98 2L NC        given KCl 40meq po x 1, tylenol x 2        Pt was treated with plaquenil for COVID and zosyn for possible superimposed bacterial PNA. Pt was severely hypoxic on NRB. Per discussion with daughter, pt was DNR/DNI. Pulmonology was consulted and pt was started on steroids. Pt had RRTs called for hypoxia, improving with proning. She received convalescent plasma. Pt later improved with lowering oxygen requirements. Pt became anemic without identifiable cause and was transfused 1u over this admission. Pt's oxygen requirements decreasing. 79 F PMH Myasthenia gravis on pyridostigmine, reactive airway disease, GERD, HTN, HLD, hypothyroid, spinal stenosis s/p laminectomy/fusion, coming in with day 7 of difficulty breathing and not feeling well.  + chills, no documented fevers at home.  No chest pain.  Shortness of breath worse when she tries to walk around or speak.  Mild runny nose and sore throat.  No abd pain, no nausea or vomiting, no diarrhea.  No one is sick at home. Per daughter patient had been started on z-pack as OP with minimal relief. Has had prior intubations for elective surgeries but no reported emergency intubations for MG crises. Per daughter patient would want trial of intubation.        In ED: 100.2, 119/169/53, 36, 98 2L NC        given KCl 40meq po x 1, tylenol x 2        Pt was treated with plaquenil for COVID and zosyn for possible superimposed bacterial PNA. Pt was severely hypoxic on NRB. Per discussion with daughter, pt was DNR/DNI. Pulmonology was consulted and pt was started on steroids. Pt had RRTs called for hypoxia, improving with proning. She received convalescent plasma. Pt later improved with lowering oxygen requirements. Pt became anemic without identifiable cause and was transfused 1u over this admission. Pt's oxygen requirement decreased. Patient stable and weaned down to 4L at rest, however persistently desaturating with ambulation into 70s-80s. CTA ordered with concern for PE, which showed....... 79 F PMH Myasthenia gravis on pyridostigmine, reactive airway disease, GERD, HTN, HLD, hypothyroid, spinal stenosis s/p laminectomy/fusion, coming in with day 7 of difficulty breathing and not feeling well.  + chills, no documented fevers at home.  No chest pain.  Shortness of breath worse when she tries to walk around or speak.  Mild runny nose and sore throat.  No abd pain, no nausea or vomiting, no diarrhea.  No one is sick at home. Per daughter patient had been started on z-pack as OP with minimal relief. Has had prior intubations for elective surgeries but no reported emergency intubations for MG crises. Per daughter patient would want trial of intubation.        In ED: 100.2, 119/169/53, 36, 98 2L NC        given KCl 40meq po x 1, tylenol x 2        Pt was treated with plaquenil for COVID and zosyn for possible superimposed bacterial PNA. Pt was severely hypoxic on NRB. Per discussion with daughter, pt was DNR/DNI. Pulmonology was consulted and pt was started on steroids. Pt had RRTs called for hypoxia, improving with proning. She received convalescent plasma. Pt later improved with lowering oxygen requirements. Pt became anemic without identifiable cause and was transfused 1u over this admission. Pt's oxygen requirement decreased. Patient stable and weaned down to 4L at rest, however persistently desaturating with ambulation into 70s-80s. CTA ordered with concern for PE, which was negative for PE. Patient improved, saturated 92% 4L with ambulation, and wean down oxygen requirements. Patient stable and ready for discharge to [facility]. Patient followed by her neurologist, Dr. Fitzgerald, got Soliris during her stay as per his recommendations.        F/u    - oxygen requirements for discharge home    - f/u with Dr. Fitzgerald for Soliris dosing 79 F PMH Myasthenia gravis on pyridostigmine, reactive airway disease, GERD, HTN, HLD, hypothyroid, spinal stenosis s/p laminectomy/fusion, coming in with day 7 of difficulty breathing and not feeling well.  + chills, no documented fevers at home.  No chest pain.  Shortness of breath worse when she tries to walk around or speak.  Mild runny nose and sore throat.  No abd pain, no nausea or vomiting, no diarrhea.  No one is sick at home. Per daughter patient had been started on z-pack as OP with minimal relief. Has had prior intubations for elective surgeries but no reported emergency intubations for MG crises. Per daughter patient would want trial of intubation.        In ED: 100.2, 119/169/53, 36, 98 2L NC        given KCl 40meq po x 1, tylenol x 2        Pt was treated with plaquenil for COVID and zosyn for possible superimposed bacterial PNA. Pt was severely hypoxic on NRB. Per discussion with daughter, pt was DNR/DNI. Pulmonology was consulted and pt was started on steroids. Pt had RRTs called for hypoxia, improving with proning. She received convalescent plasma. Pt later improved with lowering oxygen requirements. Pt became anemic without identifiable cause and was transfused 1u over this admission. Pt's oxygen requirement decreased. Patient stable and weaned down to 4L at rest, however persistently desaturating with ambulation into 70s-80s. CTA ordered with concern for PE, which was negative for PE. Patient improved, saturated 92% 4L with ambulation, and wean down oxygen requirements. Patient stable and ready for discharge to [facility]. Patient followed by her neurologist, Dr. Fitzgerald, got Soliris during her stay as per his recommendations.        F/u    - oxygen requirements for discharge home    - f/u with Dr. Fitzgerald for Soliris dosing     - consider considering ppx dose AC after dc as per covid protocol (30 days xarelto 10 mg)

## 2020-04-19 NOTE — DISCHARGE NOTE PROVIDER - NSDCMRMEDTOKEN_GEN_ALL_CORE_FT
acetaminophen 500 mg oral tablet: 2 tab(s) orally every 12 hours  Crestor 5 mg oral tablet: 1 tab(s) orally once a day  esomeprazole 40 mg oral delayed release capsule: 1 tab(s) orally once a day  losartan-hydrochlorothiazide 50mg-12.5mg oral tablet: 1 tab(s) orally once a day  Mestinon 60 mg oral tablet: 1 tab(s) orally 3 times a day  polyethylene glycol 3350 oral powder for reconstitution: 17 gram(s) orally once a day  pyridostigmine 180 mg oral tablet, extended release: 1 tab(s) orally once a day  senna oral tablet: 2 tab(s) orally once a day (at bedtime), As needed, Constipation  Synthroid 50 mcg (0.05 mg) oral tablet: 1 tab(s) orally once a day  Vitamin D3 2000 intl units oral capsule: 1 cap(s) orally once a day acetaminophen 500 mg oral tablet: 2 tab(s) orally every 12 hours 1st line for back pain   albuterol 90 mcg/inh inhalation aerosol: 2 puff(s) inhaled every 4 hours, As needed, Shortness of Breath and/or Wheezing  atorvastatin 20 mg oral tablet: 1 tab(s) orally once a day (at bedtime)  benzonatate 100 mg oral capsule: 1 cap(s) orally 3 times a day, As needed, Cough  bisacodyl 5 mg oral delayed release tablet: 1 tab(s) orally every 12 hours, As needed, Constipation  Crestor 5 mg oral tablet: 1 tab(s) orally once a day  enoxaparin: 40 milligram(s) subcutaneous once a day  esomeprazole 40 mg oral delayed release capsule: 1 tab(s) orally once a day  HYDROmorphone: 0.2 milligram(s) intravenous every 3 hours, As Needed as 3rd line for pain when prone.   lidocaine 5% topical film: Apply topically to affected area once a day to back PRN for pain  losartan-hydrochlorothiazide 50mg-12.5mg oral tablet: 1 tab(s) orally once a day  melatonin 5 mg oral tablet: 1 tab(s) orally once a day (at bedtime)  Mestinon Timespan 180 mg oral tablet, extended release: 1 tab(s) orally once a day  polyethylene glycol 3350 oral powder for reconstitution: 17 gram(s) orally once a day  predniSONE 10 mg oral tablet: 1 tab(s) orally once a day  pyridostigmine 60 mg oral tablet: 1 tab(s) orally 3 times a day  senna oral tablet: 2 tab(s) orally once a day (at bedtime), As needed, Constipation  sodium chloride 0.65% nasal spray: 1 spray(s) nasal 4 times a day, As Needed for dry mouth/nose  Synthroid 50 mcg (0.05 mg) oral tablet: 1 tab(s) orally once a day  Vitamin D3 2000 intl units oral capsule: 1 cap(s) orally once a day  zinc oxide 40% topical ointment: 1 application topically once a day acetaminophen 500 mg oral tablet: 2 tab(s) orally every 12 hours 1st line for back pain   albuterol 90 mcg/inh inhalation aerosol: 2 puff(s) inhaled every 4 hours, As needed, Shortness of Breath and/or Wheezing  benzonatate 100 mg oral capsule: 1 cap(s) orally 3 times a day, As needed, Cough  bisacodyl 5 mg oral delayed release tablet: 1 tab(s) orally every 12 hours, As needed, Constipation  Crestor 5 mg oral tablet: 1 tab(s) orally once a day  enoxaparin: 40 milligram(s) subcutaneous once a day  esomeprazole 40 mg oral delayed release capsule: 1 tab(s) orally once a day  HYDROmorphone: 0.2 milligram(s) intravenous every 3 hours, As Needed as 3rd line for pain when prone.   lidocaine 5% topical film: Apply topically to affected area once a day to back PRN for pain  melatonin 5 mg oral tablet: 1 tab(s) orally once a day (at bedtime)  Mestinon Timespan 180 mg oral tablet, extended release: 1 tab(s) orally once a day  polyethylene glycol 3350 oral powder for reconstitution: 17 gram(s) orally once a day  predniSONE 10 mg oral tablet: 1 tab(s) orally once a day  pyridostigmine 60 mg oral tablet: 1 tab(s) orally 3 times a day  senna oral tablet: 2 tab(s) orally once a day (at bedtime), As needed, Constipation  sodium chloride 0.65% nasal spray: 1 spray(s) nasal 4 times a day, As Needed for dry mouth/nose  Synthroid 50 mcg (0.05 mg) oral tablet: 1 tab(s) orally once a day  Vitamin D3 2000 intl units oral capsule: 1 cap(s) orally once a day  zinc oxide 40% topical ointment: 1 application topically once a day acetaminophen 500 mg oral tablet: 2 tab(s) orally every 12 hours 1st line for back pain   albuterol 90 mcg/inh inhalation aerosol: 2 puff(s) inhaled every 4 hours, As needed, Shortness of Breath and/or Wheezing  benzonatate 100 mg oral capsule: 1 cap(s) orally 3 times a day, As needed, Cough  Crestor 5 mg oral tablet: 1 tab(s) orally once a day  enoxaparin: 40 milligram(s) subcutaneous once a day  esomeprazole 40 mg oral delayed release capsule: 1 tab(s) orally once a day  guaiFENesin 100 mg/5 mL oral liquid: 5 milliliter(s) orally every 6 hours, As needed, Cough  lidocaine 5% topical film: Apply topically to affected area once a day to back PRN for pain  melatonin 5 mg oral tablet: 1 tab(s) orally once a day (at bedtime)  Mestinon Timespan 180 mg oral tablet, extended release: 1 tab(s) orally once a day  polyethylene glycol 3350 oral powder for reconstitution: 17 gram(s) orally once a day  predniSONE 10 mg oral tablet: 1 tab(s) orally once a day  pyridostigmine 60 mg oral tablet: 1 tab(s) orally 3 times a day  senna oral tablet: 2 tab(s) orally once a day (at bedtime), As needed, Constipation  sodium chloride 0.65% nasal spray: 1 spray(s) nasal 4 times a day, As Needed for dry mouth/nose  Synthroid 50 mcg (0.05 mg) oral tablet: 1 tab(s) orally once a day  Vitamin D3 2000 intl units oral capsule: 1 cap(s) orally once a day  zinc oxide 40% topical ointment: 1 application topically once a day

## 2020-04-19 NOTE — DISCHARGE NOTE PROVIDER - NSDCFUADDINST_GEN_ALL_CORE_FT
You have tested POSITIVE for the novel coronavirus (COVID-19). Upon discharge, you must self-quarantine for 14 days, or until the Department of Health contacts you. Please wear a face mask if you are around other individuals. Try to avoid contact with house members, family, and friends for the duration of this quarantine. Please follow up with your primary care physician within 2-3 weeks of your discharge from [facility]. Please take all medications as prescribed. If you experience any worsening or recurrence of your symptoms, particularly worsening or high fever, shortness of breathe, extreme fatigue, or bloody cough please call [phone] immediately or report to the nearest Emergency Department. If you have any questions or concerns, please do not hesitate to call the hospital at [phone]    Follow up with your providers as above    You will be started on a medication to prevent against blood clotting as COVID patients are at increased risk for clots. Take this for one month as instructed.

## 2020-04-19 NOTE — DISCHARGE NOTE PROVIDER - NSDCCPCAREPLAN_GEN_ALL_CORE_FT
PRINCIPAL DISCHARGE DIAGNOSIS  Diagnosis: Infection due to 2019-nCoV  Assessment and Plan of Treatment: treatment/supportive care  isolation protocol      SECONDARY DISCHARGE DIAGNOSES  Diagnosis: Elevated LFTs  Assessment and Plan of Treatment: Elevated LFTs PRINCIPAL DISCHARGE DIAGNOSIS  Diagnosis: Infection due to 2019-nCoV  Assessment and Plan of Treatment: *** PLEASE REFER TO INFORMATION PACKET ***  You were diagnosed with coronavirus. Common signs include fever and/or respiratory symptoms such as cough and shortness of breath.  It is spread from person to person usually after close contact with an infected person through droplets and contact. You were treated with supportive care as there is no specific treatement for COVID 19.  You no longer need hospitalization and can recover while remaining in self-quarantine at home. Please follow the prevention steps below until a healthcare provider or local or state health department says you can return to normal activities. Please restrict activities outside your home, except for getting medical care. Do not go to work, school or public areas. Avoid using public transportation, ride-sharing or taxis. Separate yourself from other people in your home. Stay in a specific room and away from other people in your home. Use a separate bathroom if available. Call ahead before visiting your doctor. Please wear a facemask when you are around other people. Please clean your hands often with soap and water especially if touching your face or eating. Avoid sharing personal household items. Clean all "high touch" surfaces everyday. Monitor your symptoms for worsening difficulty breathing.   You can discontinue home isolation after having no fevers for 3 days (without the help of fever-reducing medications) AND 7 days after your symptoms first appeared. Please call [phone] to speak to a [facility] Coronavirus specialist.      SECONDARY DISCHARGE DIAGNOSES  Diagnosis: Elevated LFTs  Assessment and Plan of Treatment: Elevated LFTs PRINCIPAL DISCHARGE DIAGNOSIS  Diagnosis: Infection due to 2019-nCoV  Assessment and Plan of Treatment: *** PLEASE REFER TO INFORMATION PACKET ***  You were diagnosed with coronavirus. Common signs include fever and/or respiratory symptoms such as cough and shortness of breath.  It is spread from person to person usually after close contact with an infected person through droplets and contact. You were treated with supportive care as there is no specific treatement for COVID 19.  You no longer need hospitalization and can recover while remaining in self-quarantine at home. Please follow the prevention steps below until a healthcare provider or local or state health department says you can return to normal activities. Please restrict activities outside your home, except for getting medical care. Do not go to work, school or public areas. Avoid using public transportation, ride-sharing or taxis. Separate yourself from other people in your home. Stay in a specific room and away from other people in your home. Use a separate bathroom if available. Call ahead before visiting your doctor. Please wear a facemask when you are around other people. Please clean your hands often with soap and water especially if touching your face or eating. Avoid sharing personal household items. Clean all "high touch" surfaces everyday. Monitor your symptoms for worsening difficulty breathing.   You can discontinue home isolation after having no fevers for 3 days (without the help of fever-reducing medications) AND 7 days after your symptoms first appeared. Please call [phone] to speak to a [facility] Coronavirus specialist.      SECONDARY DISCHARGE DIAGNOSES  Diagnosis: Myasthenia gravis  Assessment and Plan of Treatment: You were followed by Dr. Fitzgerald for your exisiting MG and prescribed your medications as he recommends. Follow up with him and if you have any weakness, trouble speaking, please seek medical attention.

## 2020-04-20 LAB
ANION GAP SERPL CALC-SCNC: 12 MMOL/L — SIGNIFICANT CHANGE UP (ref 5–17)
BUN SERPL-MCNC: 27 MG/DL — HIGH (ref 7–23)
CALCIUM SERPL-MCNC: 9.2 MG/DL — SIGNIFICANT CHANGE UP (ref 8.4–10.5)
CHLORIDE SERPL-SCNC: 101 MMOL/L — SIGNIFICANT CHANGE UP (ref 96–108)
CO2 SERPL-SCNC: 27 MMOL/L — SIGNIFICANT CHANGE UP (ref 22–31)
CREAT SERPL-MCNC: 0.79 MG/DL — SIGNIFICANT CHANGE UP (ref 0.5–1.3)
GLUCOSE SERPL-MCNC: 96 MG/DL — SIGNIFICANT CHANGE UP (ref 70–99)
HCT VFR BLD CALC: 28.4 % — LOW (ref 34.5–45)
HGB BLD-MCNC: 8.6 G/DL — LOW (ref 11.5–15.5)
MCHC RBC-ENTMCNC: 25.1 PG — LOW (ref 27–34)
MCHC RBC-ENTMCNC: 30.3 GM/DL — LOW (ref 32–36)
MCV RBC AUTO: 83 FL — SIGNIFICANT CHANGE UP (ref 80–100)
NRBC # BLD: 0 /100 WBCS — SIGNIFICANT CHANGE UP (ref 0–0)
PLATELET # BLD AUTO: 244 K/UL — SIGNIFICANT CHANGE UP (ref 150–400)
POTASSIUM SERPL-MCNC: 3.8 MMOL/L — SIGNIFICANT CHANGE UP (ref 3.5–5.3)
POTASSIUM SERPL-SCNC: 3.8 MMOL/L — SIGNIFICANT CHANGE UP (ref 3.5–5.3)
RBC # BLD: 3.42 M/UL — LOW (ref 3.8–5.2)
RBC # FLD: 22.4 % — HIGH (ref 10.3–14.5)
SODIUM SERPL-SCNC: 140 MMOL/L — SIGNIFICANT CHANGE UP (ref 135–145)
WBC # BLD: 8.38 K/UL — SIGNIFICANT CHANGE UP (ref 3.8–10.5)
WBC # FLD AUTO: 8.38 K/UL — SIGNIFICANT CHANGE UP (ref 3.8–10.5)

## 2020-04-20 PROCEDURE — 99232 SBSQ HOSP IP/OBS MODERATE 35: CPT | Mod: CS

## 2020-04-20 RX ORDER — LOPERAMIDE HCL 2 MG
2 TABLET ORAL
Refills: 0 | Status: DISCONTINUED | OUTPATIENT
Start: 2020-04-20 | End: 2020-04-23

## 2020-04-20 RX ADMIN — Medication 5 MILLIGRAM(S): at 22:40

## 2020-04-20 RX ADMIN — PYRIDOSTIGMINE BROMIDE 180 MILLIGRAM(S): 60 SOLUTION ORAL at 13:31

## 2020-04-20 RX ADMIN — PYRIDOSTIGMINE BROMIDE 60 MILLIGRAM(S): 60 SOLUTION ORAL at 06:24

## 2020-04-20 RX ADMIN — Medication 50 MICROGRAM(S): at 06:24

## 2020-04-20 RX ADMIN — Medication 2 MILLIGRAM(S): at 13:32

## 2020-04-20 RX ADMIN — ATORVASTATIN CALCIUM 20 MILLIGRAM(S): 80 TABLET, FILM COATED ORAL at 22:39

## 2020-04-20 RX ADMIN — Medication 10 MILLIGRAM(S): at 06:24

## 2020-04-20 RX ADMIN — PYRIDOSTIGMINE BROMIDE 60 MILLIGRAM(S): 60 SOLUTION ORAL at 22:40

## 2020-04-20 RX ADMIN — ZINC OXIDE 1 APPLICATION(S): 200 OINTMENT TOPICAL at 13:32

## 2020-04-20 RX ADMIN — PYRIDOSTIGMINE BROMIDE 60 MILLIGRAM(S): 60 SOLUTION ORAL at 13:31

## 2020-04-20 RX ADMIN — PANTOPRAZOLE SODIUM 40 MILLIGRAM(S): 20 TABLET, DELAYED RELEASE ORAL at 06:24

## 2020-04-20 RX ADMIN — Medication 2000 UNIT(S): at 13:31

## 2020-04-20 RX ADMIN — HYDROMORPHONE HYDROCHLORIDE 0.2 MILLIGRAM(S): 2 INJECTION INTRAMUSCULAR; INTRAVENOUS; SUBCUTANEOUS at 22:40

## 2020-04-20 NOTE — PROGRESS NOTE ADULT - ASSESSMENT
79 F hx of myasthenia gravis, HTN, recent hip replacement presented with acute hypoxemic respiratory failure and sepsis in the setting of COVID-19 infection.    # Acute hypoxemic respiratory failure secondary to COVID-19  - Currently on NRB 15 L with O2 sat at 100 % ( Goal 92-96%)   - Self proning as needed   - Monitor closely given risk of concurrent myasthenia gravis exacerbation  - Low suspicion for superimposed bacterial pneumonia [ID curbsided]. Patient previously treated with 5 day course of Zosyn  - negative repeat blood Cx   - DNR/DNI    # COVID-19  - Completed Solumedrol (4/3-7) and Plaquenil (3/31-4/4)  - Airborne/Contact precautions  - CRP and Ferritin slightly decreasing   - Discussed with ID about possible Anakinra trial, recommended to hold off as pt has already received immunosuppressive medication for MS (Eculizumab- solaris)   - Requested information about possible Convalescent Plasma Transfusion   - Supportive care    #Anemia  - Hb level 7.5 s/p 1U of PRBC (on 4/15) with improvement of Hb level to 9.9 --- Now 8.2 ( 4/19)   - likely 2/2 myelosuppression from viral process  - consent obtained from pt and daughter over the phone  - Continue to Monitor CBC    # Myasthenia Gravis  - Currently on pyridostigmine ER 180mg po daily, Mestinon (pyridostigmine) 60mg po tid  - Unable to get NIFs and VC q8H given COVID+, will monitor q4 neuro checks specifically looking at neck flexion, voice quality, difficulty swallowing, and call neurology if deterioration  - s/p 1 dose Soliris (eculizumab) outpatient neurology (on mestinon and Soliris for MG outpatient) [completed last day 4/8)   - On Prednisone 10mg PO qd per neurology recs    # Osteoarthritis  - On Celecoxib    # Hypothyroidism  - On Synthroid    # Hypertension  - BP Stable on Losartan 50mg PO qd  - Hold HCTZ for now    # Diarrhea:  -C/W imodium  -Encourage Fluids    #Epistaxis  - D/C Lovenox  -C/W Ocean spray mist Nasal saline  - Now on compressive pneumatic Device for DVT prophylaxis      # DVT PPx: Now on Compression Pneumatic  device 2/2 episode of epistaxis  # Diet: S+S Eval recommended NPO, but patient and HCP prefer dysphagia 1 with honey thickened liquids despite risks of aspiration  # Dispo: PT recommended home with home PT  # GOC: DNR/DNI  -Spoke to Daughter Patsy with updates.

## 2020-04-20 NOTE — CHART NOTE - NSCHARTNOTEFT_GEN_A_CORE
follow up- pt with epistaxis and self resolved; per d/w attending   md, d/c lovenox; monitor for any further epistaxis; follow up- pt with epistaxis and self resolved; per d/w attending md, d/c lovenox; intermittent pneumatic compression stocking for DVT prophylaxis now; monitor for any further epistaxis and reevaluate the use of lovenox if no further bleeding.

## 2020-04-20 NOTE — PROGRESS NOTE ADULT - SUBJECTIVE AND OBJECTIVE BOX
[facility] Division of Hospital Medicine Progress Note    Patient is a 79y old  Female who presents with a chief complaint of Shortness of breath, fevers (19 Apr 2020 12:10)    SUBJECTIVE / OVERNIGHT EVENTS:  patient seen and examined at bedside, had an episode of epistaxis this morning while using Nasal cannula. D/C nasal canula, and patient continues on NRB 15L satting at 100 %, Also C/O loose stool this morning.  Remains on 0.2 mg IVP Dilaudid for symptom management of Dyspnea.       MEDICATIONS  (STANDING):  atorvastatin 20 milliGRAM(s) Oral at bedtime  cholecalciferol 2000 Unit(s) Oral daily  HYDROmorphone  Injectable 0.2 milliGRAM(s) IV Push at bedtime  levothyroxine 50 MICROGram(s) Oral daily  melatonin 5 milliGRAM(s) Oral at bedtime  pantoprazole    Tablet 40 milliGRAM(s) Oral before breakfast  predniSONE   Tablet 10 milliGRAM(s) Oral daily  pyridostigmine 60 milliGRAM(s) Oral three times a day  pyridostigmine  milliGRAM(s) Oral daily  zinc oxide 40% Ointment 1 Application(s) Topical daily    MEDICATIONS  (PRN):  acetaminophen   Tablet .. 650 milliGRAM(s) Oral every 4 hours PRN Temp greater or equal to 38C (100.4F), Moderate Pain (4 - 6)  acetaminophen   Tablet .. 650 milliGRAM(s) Oral every 6 hours PRN Mild Pain (1 - 3)  ALBUTerol    90 MICROgram(s) HFA Inhaler 2 Puff(s) Inhalation every 4 hours PRN Shortness of Breath and/or Wheezing  benzonatate 100 milliGRAM(s) Oral three times a day PRN Cough  HYDROmorphone  Injectable 0.2 milliGRAM(s) IV Push every 2 hours PRN Pain  loperamide 2 milliGRAM(s) Oral five times a day PRN loose stool  polyethylene glycol 3350 17 Gram(s) Oral daily PRN Constipation  senna 2 Tablet(s) Oral at bedtime PRN Constipation  sodium chloride 0.65% Nasal 1 Spray(s) Both Nostrils two times a day PRN Nasal Congestion      CAPILLARY BLOOD GLUCOSE    I&O's Summary      PHYSICAL EXAM:  Vital Signs Last 24 Hrs  T(C): 36.8 (20 Apr 2020 05:43), Max: 36.8 (20 Apr 2020 05:43)  T(F): 98.3 (20 Apr 2020 05:43), Max: 98.3 (20 Apr 2020 05:43)  HR: 70 (20 Apr 2020 05:43) (70 - 84)  BP: 115/71 (20 Apr 2020 05:43) (110/67 - 116/66)  BP(mean): --  RR: 20 (20 Apr 2020 05:43) (18 - 20)  SpO2: 100% (20 Apr 2020 05:43) (97% - 100%)    CONSTITUTIONAL: Respiratory distress  ENMT: NRB  RESPIRATORY: respiratory effort   CARDIOVASCULAR: Regular rate and rhythm, normal S1 and S2, no murmur/rub/gallop; No lower extremity edema; Peripheral pulses are 2+ bilaterally  ABDOMEN: Nontender to palpation, normoactive bowel sounds, no rebound/guarding; No hepatosplenomegaly  PSYCH: A+O to person, place, and time; affect appropriate  NEUROLOGY: CN 2-12 are intact and symmetric; no gross sensory deficits   SKIN: No rashes; no palpable lesions      LABS:                        8.2    8.51  )-----------( 219      ( 19 Apr 2020 07:28 )             27.5     04-19    140  |  101  |  27<H>  ----------------------------<  88  3.8   |  28  |  0.78    Ca    8.9      19 Apr 2020 07:28    Culture - Blood (collected 14 Apr 2020 18:13)  Source: .Blood Blood  Preliminary Report (15 Apr 2020 19:02):   No growth to date.    Culture - Blood (collected 14 Apr 2020 18:13)  Source: .Blood Blood  Preliminary Report (15 Apr 2020 19:02):  No growth to date.    COVID-19 PCR: Detected (03-30-20 @ 21:41)      RADIOLOGY & ADDITIONAL TESTS:  Imaging from Last 24 Hours:  Electrocardiogram/QTc Interval:    COORDINATION OF CARE:  Care Discussed with NP and social workers

## 2020-04-21 LAB
HCT VFR BLD CALC: 26.8 % — LOW (ref 34.5–45)
HGB BLD-MCNC: 8.2 G/DL — LOW (ref 11.5–15.5)
MCHC RBC-ENTMCNC: 25.5 PG — LOW (ref 27–34)
MCHC RBC-ENTMCNC: 30.6 GM/DL — LOW (ref 32–36)
MCV RBC AUTO: 83.2 FL — SIGNIFICANT CHANGE UP (ref 80–100)
NRBC # BLD: 0 /100 WBCS — SIGNIFICANT CHANGE UP (ref 0–0)
PLATELET # BLD AUTO: 222 K/UL — SIGNIFICANT CHANGE UP (ref 150–400)
RBC # BLD: 3.22 M/UL — LOW (ref 3.8–5.2)
RBC # FLD: 22 % — HIGH (ref 10.3–14.5)
WBC # BLD: 6.68 K/UL — SIGNIFICANT CHANGE UP (ref 3.8–10.5)
WBC # FLD AUTO: 6.68 K/UL — SIGNIFICANT CHANGE UP (ref 3.8–10.5)

## 2020-04-21 PROCEDURE — 99232 SBSQ HOSP IP/OBS MODERATE 35: CPT | Mod: CS

## 2020-04-21 RX ADMIN — HYDROMORPHONE HYDROCHLORIDE 0.2 MILLIGRAM(S): 2 INJECTION INTRAMUSCULAR; INTRAVENOUS; SUBCUTANEOUS at 22:02

## 2020-04-21 RX ADMIN — ALBUTEROL 2 PUFF(S): 90 AEROSOL, METERED ORAL at 08:39

## 2020-04-21 RX ADMIN — PYRIDOSTIGMINE BROMIDE 60 MILLIGRAM(S): 60 SOLUTION ORAL at 13:08

## 2020-04-21 RX ADMIN — PYRIDOSTIGMINE BROMIDE 60 MILLIGRAM(S): 60 SOLUTION ORAL at 20:42

## 2020-04-21 RX ADMIN — PANTOPRAZOLE SODIUM 40 MILLIGRAM(S): 20 TABLET, DELAYED RELEASE ORAL at 05:25

## 2020-04-21 RX ADMIN — Medication 5 MILLIGRAM(S): at 20:42

## 2020-04-21 RX ADMIN — PYRIDOSTIGMINE BROMIDE 60 MILLIGRAM(S): 60 SOLUTION ORAL at 05:26

## 2020-04-21 RX ADMIN — Medication 2000 UNIT(S): at 13:08

## 2020-04-21 RX ADMIN — PYRIDOSTIGMINE BROMIDE 180 MILLIGRAM(S): 60 SOLUTION ORAL at 13:08

## 2020-04-21 RX ADMIN — Medication 10 MILLIGRAM(S): at 05:26

## 2020-04-21 RX ADMIN — ATORVASTATIN CALCIUM 20 MILLIGRAM(S): 80 TABLET, FILM COATED ORAL at 20:42

## 2020-04-21 RX ADMIN — ZINC OXIDE 1 APPLICATION(S): 200 OINTMENT TOPICAL at 13:08

## 2020-04-21 RX ADMIN — HYDROMORPHONE HYDROCHLORIDE 0.2 MILLIGRAM(S): 2 INJECTION INTRAMUSCULAR; INTRAVENOUS; SUBCUTANEOUS at 13:09

## 2020-04-21 RX ADMIN — Medication 50 MICROGRAM(S): at 05:25

## 2020-04-21 NOTE — PROGRESS NOTE ADULT - SUBJECTIVE AND OBJECTIVE BOX
[facility] Division of Hospital Medicine Progress Note    Patient is a 79y old  Female who presents with a chief complaint of Shortness of breath, fevers     SUBJECTIVE / OVERNIGHT EVENTS:  patient seen and examined at bedside, No acute events overnight. Remains on NRB 15L satting at 95%,  Remains on 0.2 mg IVP Dilaudid for symptom management of Dyspnea. received Dilaudid x 1    MEDICATIONS  (STANDING):  atorvastatin 20 milliGRAM(s) Oral at bedtime  cholecalciferol 2000 Unit(s) Oral daily  HYDROmorphone  Injectable 0.2 milliGRAM(s) IV Push at bedtime  levothyroxine 50 MICROGram(s) Oral daily  melatonin 5 milliGRAM(s) Oral at bedtime  pantoprazole    Tablet 40 milliGRAM(s) Oral before breakfast  predniSONE   Tablet 10 milliGRAM(s) Oral daily  pyridostigmine 60 milliGRAM(s) Oral three times a day  pyridostigmine  milliGRAM(s) Oral daily  zinc oxide 40% Ointment 1 Application(s) Topical daily    MEDICATIONS  (PRN):  acetaminophen   Tablet .. 650 milliGRAM(s) Oral every 4 hours PRN Temp greater or equal to 38C (100.4F), Moderate Pain (4 - 6)  acetaminophen   Tablet .. 650 milliGRAM(s) Oral every 6 hours PRN Mild Pain (1 - 3)  ALBUTerol    90 MICROgram(s) HFA Inhaler 2 Puff(s) Inhalation every 4 hours PRN Shortness of Breath and/or Wheezing  benzonatate 100 milliGRAM(s) Oral three times a day PRN Cough  HYDROmorphone  Injectable 0.2 milliGRAM(s) IV Push every 2 hours PRN Pain  loperamide 2 milliGRAM(s) Oral five times a day PRN loose stool  polyethylene glycol 3350 17 Gram(s) Oral daily PRN Constipation  senna 2 Tablet(s) Oral at bedtime PRN Constipation  sodium chloride 0.65% Nasal 1 Spray(s) Both Nostrils two times a day PRN Nasal Congestion      CAPILLARY BLOOD GLUCOSE    I&O's Summary    20 Apr 2020 07:01  -  21 Apr 2020 07:00  --------------------------------------------------------  IN: 80 mL / OUT: 200 mL / NET: -120 mL      PHYSICAL EXAM:  Vital Signs Last 24 Hrs  T(C): 36.6 (21 Apr 2020 14:06), Max: 36.7 (21 Apr 2020 05:00)  T(F): 97.8 (21 Apr 2020 14:06), Max: 98 (21 Apr 2020 05:00)  HR: 85 (21 Apr 2020 14:06) (67 - 88)  BP: 110/69 (21 Apr 2020 14:06) (110/69 - 130/70)  BP(mean): --  RR: 22 (21 Apr 2020 14:06) (20 - 25)  SpO2: 95% (21 Apr 2020 14:06) (83% - 99%)    CONSTITUTIONAL: Respiratory distress  ENMT: NRB  RESPIRATORY: respiratory effort   CARDIOVASCULAR: Regular rate and rhythm, normal S1 and S2, no murmur/rub/gallop; No lower extremity edema; Peripheral pulses are 2+ bilaterally  ABDOMEN: Nontender to palpation, normoactive bowel sounds, no rebound/guarding; No hepatosplenomegaly  PSYCH: A+O to person, place, and time; affect appropriate  NEUROLOGY: CN 2-12 are intact and symmetric; no gross sensory deficits   SKIN: No rashes; no palpable lesions  LABS:                        8.2    6.68  )-----------( 222      ( 21 Apr 2020 08:05 )             26.8     04-20    140  |  101  |  27<H>  ----------------------------<  96  3.8   |  27  |  0.79    Ca    9.2      20 Apr 2020 11:49      Culture - Blood (collected 14 Apr 2020 18:13)  Source: .Blood Blood  Preliminary Report (15 Apr 2020 19:02):   No growth to date.    Culture - Blood (collected 14 Apr 2020 18:13)  Source: .Blood Blood  Preliminary Report (15 Apr 2020 19:02):  No growth to date.      COVID-19 PCR: Detected (03-30-20 @ 21:41)      RADIOLOGY & ADDITIONAL TESTS:  Imaging from Last 24 Hours:  Electrocardiogram/QTc Interval:    COORDINATION OF CARE:  Care Discussed with NP and social workers

## 2020-04-21 NOTE — PROGRESS NOTE ADULT - ASSESSMENT
79 F hx of myasthenia gravis, HTN, recent hip replacement presented with acute hypoxemic respiratory failure and sepsis in the setting of COVID-19 infection.    # Acute hypoxemic respiratory failure secondary to COVID-19  - Currently on NRB 15 L with O2 sat at 9% ( Goal 92-96%)   - Self proning as needed   - Monitor closely given risk of concurrent myasthenia gravis exacerbation  - Low suspicion for superimposed bacterial pneumonia [ID curbsided]. Patient previously treated with 5 day course of Zosyn  - negative repeat blood Cx   - DNR/DNI    # COVID-19  - Completed Solumedrol (4/3-7) and Plaquenil (3/31-4/4)  - Airborne/Contact precautions  - CRP and Ferritin slightly decreasing   - Discussed with ID about possible Anakinra trial, recommended to hold off as pt has already received immunosuppressive medication for MS (Eculizumab- solaris)   - Requested information about possible Convalescent Plasma Transfusion   - Supportive care    #Anemia  - Hb level 7.5 s/p 1U of PRBC (on 4/15) with improvement of Hb level to 9.9 --- Now 8.2 ( 4/21)   - likely 2/2 myelosuppression from viral process  - consent obtained from pt and daughter over the phone  - Continue to Monitor CBC    # Myasthenia Gravis  - Currently on pyridostigmine ER 180mg po daily, Mestinon (pyridostigmine) 60mg po tid  - Unable to get NIFs and VC q8H given COVID+, will monitor q4 neuro checks specifically looking at neck flexion, voice quality, difficulty swallowing, and call neurology if deterioration  - s/p 1 dose Soliris (eculizumab) outpatient neurology (on mestinon and Soliris for MG outpatient) [completed last day 4/8)   - On Prednisone 10mg PO qd per neurology recs    # Osteoarthritis  - On Celecoxib    # Hypothyroidism  - On Synthroid    # Hypertension  - BP Stable on Losartan 50mg PO qd  - Hold HCTZ for now    # Diarrhea:  -C/W imodium  -Encourage Fluids    #Epistaxis  - D/C Lovenox  -C/W Ocean spray mist Nasal saline  - Now on compressive pneumatic Device for DVT prophylaxis      # DVT PPx: Now on Compression Pneumatic  device 2/2 episode of epistaxis  # Diet: S+S Eval recommended NPO, but patient and HCP prefer dysphagia 1 with honey thickened liquids despite risks of aspiration  # Dispo: PT recommended home with home PT  # GOC: DNR/DNI  -Spoke to Daughter Patsy with updates. ( [phone])

## 2020-04-22 LAB
ANION GAP SERPL CALC-SCNC: 8 MMOL/L — SIGNIFICANT CHANGE UP (ref 5–17)
BUN SERPL-MCNC: 18 MG/DL — SIGNIFICANT CHANGE UP (ref 7–23)
CALCIUM SERPL-MCNC: 8.8 MG/DL — SIGNIFICANT CHANGE UP (ref 8.4–10.5)
CHLORIDE SERPL-SCNC: 100 MMOL/L — SIGNIFICANT CHANGE UP (ref 96–108)
CO2 SERPL-SCNC: 31 MMOL/L — SIGNIFICANT CHANGE UP (ref 22–31)
CREAT SERPL-MCNC: 0.68 MG/DL — SIGNIFICANT CHANGE UP (ref 0.5–1.3)
GLUCOSE SERPL-MCNC: 102 MG/DL — HIGH (ref 70–99)
HCT VFR BLD CALC: 26.8 % — LOW (ref 34.5–45)
HGB BLD-MCNC: 8 G/DL — LOW (ref 11.5–15.5)
MCHC RBC-ENTMCNC: 24.9 PG — LOW (ref 27–34)
MCHC RBC-ENTMCNC: 29.9 GM/DL — LOW (ref 32–36)
MCV RBC AUTO: 83.5 FL — SIGNIFICANT CHANGE UP (ref 80–100)
NRBC # BLD: 0 /100 WBCS — SIGNIFICANT CHANGE UP (ref 0–0)
PLATELET # BLD AUTO: 206 K/UL — SIGNIFICANT CHANGE UP (ref 150–400)
POTASSIUM SERPL-MCNC: 4.1 MMOL/L — SIGNIFICANT CHANGE UP (ref 3.5–5.3)
POTASSIUM SERPL-SCNC: 4.1 MMOL/L — SIGNIFICANT CHANGE UP (ref 3.5–5.3)
RBC # BLD: 3.21 M/UL — LOW (ref 3.8–5.2)
RBC # FLD: 21.9 % — HIGH (ref 10.3–14.5)
SODIUM SERPL-SCNC: 139 MMOL/L — SIGNIFICANT CHANGE UP (ref 135–145)
WBC # BLD: 7.47 K/UL — SIGNIFICANT CHANGE UP (ref 3.8–10.5)
WBC # FLD AUTO: 7.47 K/UL — SIGNIFICANT CHANGE UP (ref 3.8–10.5)

## 2020-04-22 RX ORDER — ENOXAPARIN SODIUM 100 MG/ML
40 INJECTION SUBCUTANEOUS DAILY
Refills: 0 | Status: DISCONTINUED | OUTPATIENT
Start: 2020-04-22 | End: 2020-05-19

## 2020-04-22 RX ADMIN — ALBUTEROL 2 PUFF(S): 90 AEROSOL, METERED ORAL at 02:50

## 2020-04-22 RX ADMIN — PYRIDOSTIGMINE BROMIDE 60 MILLIGRAM(S): 60 SOLUTION ORAL at 14:09

## 2020-04-22 RX ADMIN — PYRIDOSTIGMINE BROMIDE 180 MILLIGRAM(S): 60 SOLUTION ORAL at 12:11

## 2020-04-22 RX ADMIN — PANTOPRAZOLE SODIUM 40 MILLIGRAM(S): 20 TABLET, DELAYED RELEASE ORAL at 04:22

## 2020-04-22 RX ADMIN — Medication 5 MILLIGRAM(S): at 22:49

## 2020-04-22 RX ADMIN — PYRIDOSTIGMINE BROMIDE 60 MILLIGRAM(S): 60 SOLUTION ORAL at 22:50

## 2020-04-22 RX ADMIN — HYDROMORPHONE HYDROCHLORIDE 0.2 MILLIGRAM(S): 2 INJECTION INTRAMUSCULAR; INTRAVENOUS; SUBCUTANEOUS at 22:49

## 2020-04-22 RX ADMIN — PYRIDOSTIGMINE BROMIDE 60 MILLIGRAM(S): 60 SOLUTION ORAL at 04:23

## 2020-04-22 RX ADMIN — Medication 2000 UNIT(S): at 12:11

## 2020-04-22 RX ADMIN — ENOXAPARIN SODIUM 40 MILLIGRAM(S): 100 INJECTION SUBCUTANEOUS at 12:11

## 2020-04-22 RX ADMIN — ZINC OXIDE 1 APPLICATION(S): 200 OINTMENT TOPICAL at 12:11

## 2020-04-22 RX ADMIN — ATORVASTATIN CALCIUM 20 MILLIGRAM(S): 80 TABLET, FILM COATED ORAL at 22:49

## 2020-04-22 RX ADMIN — Medication 50 MICROGRAM(S): at 04:22

## 2020-04-22 RX ADMIN — Medication 10 MILLIGRAM(S): at 04:22

## 2020-04-22 NOTE — PROGRESS NOTE ADULT - ASSESSMENT
79 F hx of myasthenia gravis, HTN, recent hip replacement presented with acute hypoxemic respiratory failure and sepsis in the setting of COVID-19 infection.    # Acute hypoxemic respiratory failure secondary to COVID-19  - Currently on NRB 15 L with O2 sat at 9% ( Goal 92-96%)   - Self proning as needed   - Monitor closely given risk of concurrent myasthenia gravis exacerbation  - Low suspicion for superimposed bacterial pneumonia [ID curbsided]. Patient previously treated with 5 day course of Zosyn  - negative repeat blood Cx   - DNR/DNI    # COVID-19  - Completed Solumedrol (4/3-7) and Plaquenil (3/31-4/4)  - Airborne/Contact precautions  - CRP and Ferritin slightly decreasing   - Discussed with ID about possible Anakinra trial, recommended to hold off as pt has already received immunosuppressive medication for MS (Eculizumab- solaris)   - Will transfer to [facility] for possible Convalescent Plasma Transfusion Dr. Peck (plan discussed with daughter Patsy)   - Supportive care    #Anemia  - Hb level 7.5 s/p 1U of PRBC (on 4/15) with improvement of Hb level to 9.9 --- Now 8.0 ( 4/22)   - likely 2/2 myelosuppression from viral process  - consent obtained from pt and daughter over the phone  - Continue to Monitor CBC    # Myasthenia Gravis  - Currently on pyridostigmine ER 180mg po daily, Mestinon (pyridostigmine) 60mg po tid  - Unable to get NIFs and VC q8H given COVID+, will monitor q4 neuro checks specifically looking at neck flexion, voice quality, difficulty swallowing, and call neurology if deterioration  - s/p 1 dose Soliris (eculizumab) outpatient neurology (on mestinon and Soliris for MG outpatient) [completed last day 4/8)   - On Prednisone 10mg PO qd per neurology recs    # Osteoarthritis  - On Celecoxib    # Hypothyroidism  - On Synthroid    # Hypertension  - BP Stable on Losartan 50mg PO qd  - Hold HCTZ for now    # Diarrhea:  -C/W imodium  -Encourage Fluids    #Epistaxis  - D/C Lovenox  -C/W Ocean spray mist Nasal saline  - Now on compressive pneumatic Device for DVT prophylaxis      # DVT PPx: Now on Compression Pneumatic  device 2/2 episode of epistaxis  # Diet: S+S Eval recommended NPO, but patient and HCP prefer dysphagia 1 with honey thickened liquids despite risks of aspiration  # Dispo: PT recommended home with home PT  # GOC: DNR/DNI: Spoke to Daughter Patsy with daily updates. ( [phone])

## 2020-04-22 NOTE — PROGRESS NOTE ADULT - SUBJECTIVE AND OBJECTIVE BOX
Transfer Note    78 y/o female with hx of myasthenia gravis, HTN, recent hip replacement presented with acute hypoxemic respiratory failure and sepsis in the setting of COVID-19 infection.      # Acute hypoxemic respiratory failure secondary to COVID-19  - Currently on NRB 15 L with O2 sat at 96% (Goal 92-96%)   - Self proning as needed   - Monitor closely given risk of concurrent myasthenia gravis exacerbation  - Low suspicion for superimposed bacterial pneumonia [ID curb sided). Patient previously treated with 5 day course of Zosyn  - negative repeat blood Cx   - DNR/DNI    # Myasthenia Gravis  - Currently on pyridostigmine ER 180mg po daily, Mestinon (pyridostigmine) 60mg po tid  - Unable to get NIFs and VC q8H given COVID+, will monitor q4 neuro checks specifically looking at neck flexion, voice quality, difficulty swallowing, and call neurology if deterioration  - s/p 1 dose Soliris (eculizumab) outpatient neurology (on Mestinon and Soliris for MG outpatient) [completed last day 4/8)     # COVID-19  - Completed Solumedrol (4/3-7) and Plaquenil (3/31-4/4)  - Airborne/Contact precautions  - CRP and Ferritin slightly decreasing  - Supportive Care   - Discussed with ID about possible Anakinra trial, recommended to hold off as pt has already received immunosuppressive medication for MS (Eculizumab- solaris)   - Patient to be transferred to [facility] for Convalescent Plasma Transfusion-Dr. Peck (plan discussed with daughter Patsy).

## 2020-04-22 NOTE — CHART NOTE - NSCHARTNOTEFT_GEN_A_CORE
Nutrition Follow-up Note  Patient seen for:  Nutrition follow up    Source of Information:  Medical record review, Spoke with RN.    Current Diet:  Dysphagia 1, puree, Honey Consistency Fluids    Subjective Information:  RN reported patient consumes and tolerates >75% of her meals.  Patient with good intakes of tray supplement - magic cup.     Objective Information:  Patient evaluated by SLP on 4/07/20 and recommendation for patient to be NPO.  Noted patient and HCP prefer dysphagia 1 puree, Honey Consistency Fluids diet.        Previous Nutrition Diagnosis:    New Nutrition Diagnosis:    Nutrition Care Plan:  [X] In progress   [ ] Achieved    Nutrition Interventions:    Monitoring and Evaluation:   Continue to monitor Nutritional intake, Tolerance to diet prescription, weights, labs, skin integrity    RD remains available upon request and will follow up per protocol  Aissatou Adamson RDN, CDN,    # [phone] Nutrition Follow-up Note  Patient seen for:  Nutrition follow up    Source of Information:  Medical record review, Spoke with RN.    Current Diet:  Dysphagia 1, puree, Honey Consistency Fluids    Subjective Information:  79 year old Female with hx of myasthenia gravis, HTN, recent hip replacement presented with acute hypoxemic respiratory failure and sepsis in the setting of COVID-19 infection.  RN reported patient consumes and tolerates >75% of her meals.  Patient with good intakes of tray supplement - magic cup.     Objective Information:  Patient evaluated by SLP on 4/01/20 and recommendation for patient to be NPO, non-oral means of nutrition/hydration.  Repeat  speech and swallow evaluation on 4/07/20, patient deemed not a candidate for evaluation due to respiratory status.    Noted patient and HCP continue to prefer dysphagia 1 puree, Honey Consistency Fluids diet, despite aspiration risks.        Previous Nutrition Diagnosis:    New Nutrition Diagnosis:    Nutrition Care Plan:  [X] In progress   [ ] Achieved    Nutrition Interventions:    Monitoring and Evaluation:   Continue to monitor Nutritional intake, Tolerance to diet prescription, weights, labs, skin integrity    RD remains available upon request and will follow up per protocol  Aissatou Adamson, BARBARA, CDN,    # [phone] Nutrition Follow-up Note  Patient seen for:  Nutrition follow up    Source of Information:  Medical record review, Spoke with RN.    Current Diet:  Dysphagia 1, puree, Honey Consistency Fluids    Subjective Information:  79 year old Female with hx of myasthenia gravis, HTN, recent hip replacement presented with acute hypoxemic respiratory failure and sepsis in the setting of COVID-19 infection.  RN reported patient consumes and tolerates >75% of her meals.  Patient with good intakes of tray supplement - magic cup.     Objective Information:  Patient evaluated by SLP on 4/01/20 and recommendation for patient to be NPO, non-oral means of nutrition/hydration.  Repeat  speech and swallow evaluation on 4/07/20, patient deemed not a candidate for evaluation due to respiratory status.    Noted patient and HCP continue to prefer dysphagia 1 puree, Honey Consistency Fluids diet, despite aspiration risks.        Previous Nutrition Diagnosis:  Inadequate protein-energy intake     New Nutrition Diagnosis:    Nutrition Care Plan:  [X] In progress   [ ] Achieved    Nutrition Interventions:    Monitoring and Evaluation:   Continue to monitor Nutritional intake, Tolerance to diet prescription, weights, labs, skin integrity.  May consider reconsulting SLP should respiratory status be improved.    RD remains available upon request and will follow up per protocol  Aissatou Adasmon RDN, CDN,    # [phone] Nutrition Follow-up Note  Patient seen for:  Nutrition follow up    Source of Information:  Medical record review, Spoke with RN.    Current Diet:  Dysphagia 1, puree, Honey Consistency Fluids    Subjective Information:  79 year old Female with hx of myasthenia gravis, HTN, recent hip replacement presented with acute hypoxemic respiratory failure and sepsis in the setting of COVID-19 infection.  RN reported patient consumes and tolerates >75% of her meals.  Patient with good intakes of tray supplement - magic cup.     Objective Information:  Patient evaluated by SLP on 4/01/20 and recommendation for patient to be NPO, non-oral means of nutrition/hydration.  Repeat  speech and swallow evaluation on 4/07/20, patient deemed not a candidate for evaluation due to respiratory status.    Noted patient and HCP continue to prefer dysphagia 1 puree, Honey Consistency Fluids diet, despite aspiration risks.        GI issues:  Noted with diarrhea, loose BM 4/19 and 4/20 20.    Skin:  Intact, no impairment noted      Edema:  none noted    Labs:  4/22/20:  Hgb/Hct  8.0/26.8 <L>  Na  139   K+  4.1   Glu 102  Previous Nutrition Diagnosis:  Inadequate protein-energy intake     New Nutrition Diagnosis:    Nutrition Care Plan:  [X] In progress   [ ] Achieved    Nutrition Interventions:    Monitoring and Evaluation:   Continue to monitor Nutritional intake, Tolerance to diet prescription, weights, labs, skin integrity.  May consider reconsulting SLP should respiratory status be improved.    RD remains available upon request and will follow up per protocol  Aissatou Adamson RDN, CDN,    # [phone]

## 2020-04-22 NOTE — PROGRESS NOTE ADULT - SUBJECTIVE AND OBJECTIVE BOX
[facility] Division of Hospital Medicine Progress Note    Patient is a 79y old  Female who presents with a chief complaint of Shortness of breath, fevers (21 Apr 2020 17:18)    SUBJECTIVE / OVERNIGHT EVENTS:  Pt reported continues to have shortness of breath worse at night  Pt remains on 100% NRBD    ADDITIONAL REVIEW OF SYSTEMS:    MEDICATIONS  (STANDING):  atorvastatin 20 milliGRAM(s) Oral at bedtime  cholecalciferol 2000 Unit(s) Oral daily  enoxaparin Injectable 40 milliGRAM(s) SubCutaneous daily  HYDROmorphone  Injectable 0.2 milliGRAM(s) IV Push at bedtime  levothyroxine 50 MICROGram(s) Oral daily  melatonin 5 milliGRAM(s) Oral at bedtime  pantoprazole    Tablet 40 milliGRAM(s) Oral before breakfast  predniSONE   Tablet 10 milliGRAM(s) Oral daily  pyridostigmine 60 milliGRAM(s) Oral three times a day  pyridostigmine  milliGRAM(s) Oral daily  zinc oxide 40% Ointment 1 Application(s) Topical daily    MEDICATIONS  (PRN):  acetaminophen   Tablet .. 650 milliGRAM(s) Oral every 4 hours PRN Temp greater or equal to 38C (100.4F), Moderate Pain (4 - 6)  acetaminophen   Tablet .. 650 milliGRAM(s) Oral every 6 hours PRN Mild Pain (1 - 3)  ALBUTerol    90 MICROgram(s) HFA Inhaler 2 Puff(s) Inhalation every 4 hours PRN Shortness of Breath and/or Wheezing  benzonatate 100 milliGRAM(s) Oral three times a day PRN Cough  HYDROmorphone  Injectable 0.2 milliGRAM(s) IV Push every 2 hours PRN Pain  loperamide 2 milliGRAM(s) Oral five times a day PRN loose stool  polyethylene glycol 3350 17 Gram(s) Oral daily PRN Constipation  senna 2 Tablet(s) Oral at bedtime PRN Constipation  sodium chloride 0.65% Nasal 1 Spray(s) Both Nostrils two times a day PRN Nasal Congestion    CAPILLARY BLOOD GLUCOSE    I&O's Summary      PHYSICAL EXAM:  Vital Signs Last 24 Hrs  T(C): 36.3 (22 Apr 2020 12:35), Max: 36.9 (21 Apr 2020 21:04)  T(F): 97.4 (22 Apr 2020 12:35), Max: 98.5 (21 Apr 2020 21:04)  HR: 76 (22 Apr 2020 12:35) (73 - 80)  BP: 93/59 (22 Apr 2020 12:35) (93/59 - 122/68)  BP(mean): --  RR: 25 (22 Apr 2020 02:51) (22 - 25)  SpO2: 95% (22 Apr 2020 12:35) (75% - 100%)    CONSTITUTIONAL: NAD, well-developed, well-groomed  ENMT: Moist oral mucosa, no pharyngeal injection or exudates; normal dentition  RESPIRATORY: Normal respiratory effort; lungs are clear to auscultation bilaterally  CARDIOVASCULAR: Regular rate and rhythm, normal S1 and S2, no murmur/rub/gallop; No lower extremity edema; Peripheral pulses are 2+ bilaterally  ABDOMEN: Nontender to palpation, normoactive bowel sounds, no rebound/guarding; No hepatosplenomegaly  PSYCH: A+O to person, place, and time; affect appropriate  NEUROLOGY: CN 2-12 are intact and symmetric; no gross sensory deficits   SKIN: No rashes; no palpable lesions    LABS:                        8.0    7.47  )-----------( 206      ( 22 Apr 2020 08:37 )             26.8     04-22    139  |  100  |  18  ----------------------------<  102<H>  4.1   |  31  |  0.68    Ca    8.8      22 Apr 2020 08:37    COVID-19 PCR: Detected (03-30-20 @ 21:41)    Procalcitonin, Serum: 0.16 (04.19)  Procalcitonin, Serum: 0.14 (04-10)  Procalcitonin, Serum: 0.78 (04-05)  Procalcitonin, Serum: 1.02 (04-03)  Procalcitonin, Serum: 3.60 (04-01)  Procalcitonin, Serum: 3.40 (03-30)    C-Reactive Protein, Serum: 12.20 (04.19)  C-Reactive Protein, Serum: 15.92  (04.13)  C-Reactive Protein, Serum: 13.55 (04-10)  C-Reactive Protein, Serum: 12.71 (04-05)  C-Reactive Protein, Serum: 27.92 (04-03)  C-Reactive Protein, Serum: 18.51 (04-01)  C-Reactive Protein, Serum: 18.48 (03-31)    Ferritin, Serum: 525 (04.19)  Ferritin, Serum: 469 (04.13)  Ferritin, Serum: 425 (04-10)  Ferritin, Serum: 590 (04-05)  Ferritin, Serum: 550 (04-03)  Ferritin, Serum: 401 (04-01)  Ferritin, Serum: 368 (03-31)    RADIOLOGY & ADDITIONAL TESTS:  Imaging from Last 24 Hours:  Electrocardiogram/QTc Interval:    COORDINATION OF CARE:  Care Discussed with Consultants/Other Providers:

## 2020-04-23 DIAGNOSIS — U07.1 COVID-19: ICD-10-CM

## 2020-04-23 DIAGNOSIS — M54.5 LOW BACK PAIN: ICD-10-CM

## 2020-04-23 DIAGNOSIS — Z51.5 ENCOUNTER FOR PALLIATIVE CARE: ICD-10-CM

## 2020-04-23 DIAGNOSIS — Z71.89 OTHER SPECIFIED COUNSELING: ICD-10-CM

## 2020-04-23 DIAGNOSIS — R53.2 FUNCTIONAL QUADRIPLEGIA: ICD-10-CM

## 2020-04-23 DIAGNOSIS — I10 ESSENTIAL (PRIMARY) HYPERTENSION: ICD-10-CM

## 2020-04-23 DIAGNOSIS — E78.5 HYPERLIPIDEMIA, UNSPECIFIED: ICD-10-CM

## 2020-04-23 LAB
ALBUMIN SERPL ELPH-MCNC: 2.5 G/DL — LOW (ref 3.3–5)
ALP SERPL-CCNC: 83 U/L — SIGNIFICANT CHANGE UP (ref 40–120)
ALT FLD-CCNC: 27 U/L — SIGNIFICANT CHANGE UP (ref 10–45)
ANION GAP SERPL CALC-SCNC: 13 MMOL/L — SIGNIFICANT CHANGE UP (ref 5–17)
APTT BLD: 29.2 SEC — SIGNIFICANT CHANGE UP (ref 27.5–36.3)
AST SERPL-CCNC: 31 U/L — SIGNIFICANT CHANGE UP (ref 10–40)
BASE EXCESS BLDV CALC-SCNC: 6.5 MMOL/L — HIGH (ref -2–2)
BASOPHILS # BLD AUTO: 0.01 K/UL — SIGNIFICANT CHANGE UP (ref 0–0.2)
BASOPHILS NFR BLD AUTO: 0.1 % — SIGNIFICANT CHANGE UP (ref 0–2)
BILIRUB SERPL-MCNC: 0.3 MG/DL — SIGNIFICANT CHANGE UP (ref 0.2–1.2)
BLD GP AB SCN SERPL QL: NEGATIVE — SIGNIFICANT CHANGE UP
BUN SERPL-MCNC: 18 MG/DL — SIGNIFICANT CHANGE UP (ref 7–23)
CA-I SERPL-SCNC: 1.18 MMOL/L — SIGNIFICANT CHANGE UP (ref 1.12–1.3)
CALCIUM SERPL-MCNC: 8.7 MG/DL — SIGNIFICANT CHANGE UP (ref 8.4–10.5)
CHLORIDE BLDV-SCNC: 116 MMOL/L — HIGH (ref 96–108)
CHLORIDE SERPL-SCNC: 100 MMOL/L — SIGNIFICANT CHANGE UP (ref 96–108)
CO2 BLDV-SCNC: 32 MMOL/L — HIGH (ref 22–30)
CO2 SERPL-SCNC: 27 MMOL/L — SIGNIFICANT CHANGE UP (ref 22–31)
CREAT SERPL-MCNC: 0.74 MG/DL — SIGNIFICANT CHANGE UP (ref 0.5–1.3)
D DIMER BLD IA.RAPID-MCNC: 3662 NG/ML DDU — HIGH
EOSINOPHIL # BLD AUTO: 0.39 K/UL — SIGNIFICANT CHANGE UP (ref 0–0.5)
EOSINOPHIL NFR BLD AUTO: 4.6 % — SIGNIFICANT CHANGE UP (ref 0–6)
GAS PNL BLDV: 135 MMOL/L — SIGNIFICANT CHANGE UP (ref 135–145)
GAS PNL BLDV: SIGNIFICANT CHANGE UP
GAS PNL BLDV: SIGNIFICANT CHANGE UP
GLUCOSE BLDV-MCNC: 138 MG/DL — HIGH (ref 70–99)
GLUCOSE SERPL-MCNC: 94 MG/DL — SIGNIFICANT CHANGE UP (ref 70–99)
HCO3 BLDV-SCNC: 31 MMOL/L — HIGH (ref 21–29)
HCT VFR BLD CALC: 28.4 % — LOW (ref 34.5–45)
HCT VFR BLDA CALC: 28 % — LOW (ref 39–50)
HGB BLD CALC-MCNC: 9.2 G/DL — LOW (ref 11.5–15.5)
HGB BLD-MCNC: 8.3 G/DL — LOW (ref 11.5–15.5)
IMM GRANULOCYTES NFR BLD AUTO: 1.7 % — HIGH (ref 0–1.5)
INR BLD: 1.14 RATIO — SIGNIFICANT CHANGE UP (ref 0.88–1.16)
LACTATE BLDV-MCNC: 1.1 MMOL/L — SIGNIFICANT CHANGE UP (ref 0.7–2)
LYMPHOCYTES # BLD AUTO: 1.35 K/UL — SIGNIFICANT CHANGE UP (ref 1–3.3)
LYMPHOCYTES # BLD AUTO: 16 % — SIGNIFICANT CHANGE UP (ref 13–44)
MAGNESIUM SERPL-MCNC: 1.8 MG/DL — SIGNIFICANT CHANGE UP (ref 1.6–2.6)
MCHC RBC-ENTMCNC: 24.7 PG — LOW (ref 27–34)
MCHC RBC-ENTMCNC: 29.2 GM/DL — LOW (ref 32–36)
MCV RBC AUTO: 84.5 FL — SIGNIFICANT CHANGE UP (ref 80–100)
MONOCYTES # BLD AUTO: 0.74 K/UL — SIGNIFICANT CHANGE UP (ref 0–0.9)
MONOCYTES NFR BLD AUTO: 8.7 % — SIGNIFICANT CHANGE UP (ref 2–14)
NEUTROPHILS # BLD AUTO: 5.83 K/UL — SIGNIFICANT CHANGE UP (ref 1.8–7.4)
NEUTROPHILS NFR BLD AUTO: 68.9 % — SIGNIFICANT CHANGE UP (ref 43–77)
NRBC # BLD: 0 /100 WBCS — SIGNIFICANT CHANGE UP (ref 0–0)
OTHER CELLS CSF MANUAL: 13 ML/DL — LOW (ref 18–22)
PCO2 BLDV: 45 MMHG — SIGNIFICANT CHANGE UP (ref 35–50)
PH BLDV: 7.45 — SIGNIFICANT CHANGE UP (ref 7.35–7.45)
PHOSPHATE SERPL-MCNC: 3 MG/DL — SIGNIFICANT CHANGE UP (ref 2.5–4.5)
PLATELET # BLD AUTO: 237 K/UL — SIGNIFICANT CHANGE UP (ref 150–400)
PO2 BLDV: 164 MMHG — HIGH (ref 25–45)
POTASSIUM BLDV-SCNC: 4.1 MMOL/L — SIGNIFICANT CHANGE UP (ref 3.5–5.3)
POTASSIUM SERPL-MCNC: 4.1 MMOL/L — SIGNIFICANT CHANGE UP (ref 3.5–5.3)
POTASSIUM SERPL-SCNC: 4.1 MMOL/L — SIGNIFICANT CHANGE UP (ref 3.5–5.3)
PROT SERPL-MCNC: 6.1 G/DL — SIGNIFICANT CHANGE UP (ref 6–8.3)
PROTHROM AB SERPL-ACNC: 13.2 SEC — HIGH (ref 10–12.9)
RBC # BLD: 3.36 M/UL — LOW (ref 3.8–5.2)
RBC # FLD: 22 % — HIGH (ref 10.3–14.5)
RH IG SCN BLD-IMP: NEGATIVE — SIGNIFICANT CHANGE UP
SAO2 % BLDV: 99 % — HIGH (ref 67–88)
SODIUM SERPL-SCNC: 140 MMOL/L — SIGNIFICANT CHANGE UP (ref 135–145)
WBC # BLD: 8.46 K/UL — SIGNIFICANT CHANGE UP (ref 3.8–10.5)
WBC # FLD AUTO: 8.46 K/UL — SIGNIFICANT CHANGE UP (ref 3.8–10.5)

## 2020-04-23 PROCEDURE — 99223 1ST HOSP IP/OBS HIGH 75: CPT | Mod: CS

## 2020-04-23 PROCEDURE — 99233 SBSQ HOSP IP/OBS HIGH 50: CPT | Mod: CS,GC

## 2020-04-23 RX ORDER — POLYETHYLENE GLYCOL 3350 17 G/17G
17 POWDER, FOR SOLUTION ORAL DAILY
Refills: 0 | Status: DISCONTINUED | OUTPATIENT
Start: 2020-04-23 | End: 2020-04-26

## 2020-04-23 RX ORDER — HYDROMORPHONE HYDROCHLORIDE 2 MG/ML
0.2 INJECTION INTRAMUSCULAR; INTRAVENOUS; SUBCUTANEOUS
Refills: 0 | Status: DISCONTINUED | OUTPATIENT
Start: 2020-04-23 | End: 2020-04-23

## 2020-04-23 RX ORDER — LIDOCAINE 4 G/100G
2 CREAM TOPICAL DAILY
Refills: 0 | Status: DISCONTINUED | OUTPATIENT
Start: 2020-04-23 | End: 2020-05-19

## 2020-04-23 RX ORDER — ACETAMINOPHEN 500 MG
1000 TABLET ORAL ONCE
Refills: 0 | Status: COMPLETED | OUTPATIENT
Start: 2020-04-23 | End: 2020-04-23

## 2020-04-23 RX ORDER — ACETAMINOPHEN 500 MG
1000 TABLET ORAL EVERY 8 HOURS
Refills: 0 | Status: COMPLETED | OUTPATIENT
Start: 2020-04-23 | End: 2020-04-23

## 2020-04-23 RX ADMIN — Medication 10 MILLIGRAM(S): at 05:32

## 2020-04-23 RX ADMIN — LIDOCAINE 2 PATCH: 4 CREAM TOPICAL at 19:41

## 2020-04-23 RX ADMIN — PYRIDOSTIGMINE BROMIDE 180 MILLIGRAM(S): 60 SOLUTION ORAL at 12:25

## 2020-04-23 RX ADMIN — PYRIDOSTIGMINE BROMIDE 60 MILLIGRAM(S): 60 SOLUTION ORAL at 21:00

## 2020-04-23 RX ADMIN — HYDROMORPHONE HYDROCHLORIDE 0.2 MILLIGRAM(S): 2 INJECTION INTRAMUSCULAR; INTRAVENOUS; SUBCUTANEOUS at 06:03

## 2020-04-23 RX ADMIN — LIDOCAINE 2 PATCH: 4 CREAM TOPICAL at 18:20

## 2020-04-23 RX ADMIN — ATORVASTATIN CALCIUM 20 MILLIGRAM(S): 80 TABLET, FILM COATED ORAL at 20:59

## 2020-04-23 RX ADMIN — PANTOPRAZOLE SODIUM 40 MILLIGRAM(S): 20 TABLET, DELAYED RELEASE ORAL at 05:33

## 2020-04-23 RX ADMIN — HYDROMORPHONE HYDROCHLORIDE 0.2 MILLIGRAM(S): 2 INJECTION INTRAMUSCULAR; INTRAVENOUS; SUBCUTANEOUS at 15:43

## 2020-04-23 RX ADMIN — Medication 400 MILLIGRAM(S): at 10:04

## 2020-04-23 RX ADMIN — Medication 5 MILLIGRAM(S): at 20:59

## 2020-04-23 RX ADMIN — HYDROMORPHONE HYDROCHLORIDE 0.2 MILLIGRAM(S): 2 INJECTION INTRAMUSCULAR; INTRAVENOUS; SUBCUTANEOUS at 00:51

## 2020-04-23 RX ADMIN — ENOXAPARIN SODIUM 40 MILLIGRAM(S): 100 INJECTION SUBCUTANEOUS at 13:36

## 2020-04-23 RX ADMIN — Medication 400 MILLIGRAM(S): at 20:59

## 2020-04-23 RX ADMIN — Medication 2000 UNIT(S): at 12:25

## 2020-04-23 RX ADMIN — PYRIDOSTIGMINE BROMIDE 60 MILLIGRAM(S): 60 SOLUTION ORAL at 13:36

## 2020-04-23 RX ADMIN — Medication 50 MICROGRAM(S): at 05:32

## 2020-04-23 RX ADMIN — PYRIDOSTIGMINE BROMIDE 60 MILLIGRAM(S): 60 SOLUTION ORAL at 05:32

## 2020-04-23 RX ADMIN — HYDROMORPHONE HYDROCHLORIDE 0.2 MILLIGRAM(S): 2 INJECTION INTRAMUSCULAR; INTRAVENOUS; SUBCUTANEOUS at 12:26

## 2020-04-23 NOTE — PROGRESS NOTE ADULT - PROBLEM SELECTOR PLAN 1
-S/P Solumedrol 04/03/2020-04/07/2020  -S/P Plaquenil 03/31-04/04  -CRP and ferritin slightly decreasing  -Anakinra d/w ID but holding off 2* having already received immunosuppression Eculizumab (Solaris) for MG   -Transferred from [facility] for Convalescent Plasma Transfusion -S/P Solumedrol 04/03/2020-04/07/2020  -S/P Plaquenil 03/31-04/04  -CRP and ferritin slightly decreasing  -Anakinra d/w ID but holding off 2* having already received immunosuppression Eculizumab (Solaris) for MG   -Transferred from [facility] for Convalescent Plasma Transfusion  -Sent email to Dr. Peck, Currently in screening/consenting process. Will have restrictions on fluid for now as per recs from research team.

## 2020-04-23 NOTE — PROGRESS NOTE ADULT - SUBJECTIVE AND OBJECTIVE BOX
Patient is a 79y old  Female who presents with a chief complaint of Shortness of breath, fevers (22 Apr 2020 15:22)    Authored by Dr. Almas Isaacs pager number [phone]    SUBJECTIVE / OVERNIGHT EVENTS: PT transferred from [facility] for plasma COVID therapy, was on NRB 15L. Overnight desaturated to 70s, PT put prone and improved.  04/23/20 AM RRT called by me for acute hypoxemia to 70s again.  PT put prone, given one time dose of solumedrol 40 mg, ofirmev 1000 mg given x 1 given PT has severe back pain when prone.     MEDICATIONS  (STANDING):  acetaminophen  IVPB .. 1000 milliGRAM(s) IV Intermittent once  atorvastatin 20 milliGRAM(s) Oral at bedtime  cholecalciferol 2000 Unit(s) Oral daily  enoxaparin Injectable 40 milliGRAM(s) SubCutaneous daily  HYDROmorphone  Injectable 0.2 milliGRAM(s) IV Push at bedtime  levothyroxine 50 MICROGram(s) Oral daily  melatonin 5 milliGRAM(s) Oral at bedtime  pantoprazole    Tablet 40 milliGRAM(s) Oral before breakfast  polyethylene glycol 3350 17 Gram(s) Oral daily  predniSONE   Tablet 10 milliGRAM(s) Oral daily  pyridostigmine 60 milliGRAM(s) Oral three times a day  pyridostigmine  milliGRAM(s) Oral daily  zinc oxide 40% Ointment 1 Application(s) Topical daily    MEDICATIONS  (PRN):  acetaminophen   Tablet .. 650 milliGRAM(s) Oral every 4 hours PRN Temp greater or equal to 38C (100.4F), Moderate Pain (4 - 6)  acetaminophen   Tablet .. 650 milliGRAM(s) Oral every 6 hours PRN Mild Pain (1 - 3)  ALBUTerol    90 MICROgram(s) HFA Inhaler 2 Puff(s) Inhalation every 4 hours PRN Shortness of Breath and/or Wheezing  benzonatate 100 milliGRAM(s) Oral three times a day PRN Cough  HYDROmorphone  Injectable 0.2 milliGRAM(s) IV Push every 2 hours PRN Pain  senna 2 Tablet(s) Oral at bedtime PRN Constipation  sodium chloride 0.65% Nasal 1 Spray(s) Both Nostrils two times a day PRN Nasal Congestion    Vital Signs Last 24 Hrs  T(C): 36.7 (23 Apr 2020 03:49), Max: 36.7 (22 Apr 2020 21:10)  T(F): 98 (23 Apr 2020 03:49), Max: 98 (22 Apr 2020 21:10)  HR: 76 (23 Apr 2020 06:00) (70 - 89)  BP: 112/71 (23 Apr 2020 06:00) (93/59 - 134/74)  BP(mean): --  RR: 20 (23 Apr 2020 06:00) (20 - 22).  On my exam, 34 RR at rest.   SpO2: 92% (23 Apr 2020 06:00) (73% - 100%)    PHYSICAL EXAM:  GENERAL: Overweight, significant respiratory distress.   EYES: EOMI, PERRLA, conjunctiva and sclera clear  CHEST/LUNG: Prominent B/L expiratory wheezing, Bibasilar fine crackles, increased work of breathing, desaturating to 70s when supine  HEART: Regular rate and rhythm; No murmurs, rubs, or gallops  ABDOMEN: Soft, Nontender, Nondistended; Bowel sounds present  EXTREMITIES:  2+ Peripheral Pulses, No clubbing, cyanosis, or edema  NEUROLOGY: AAOx3, Neck flexion full strength,  strength full strength, proximal B/L arm strength 4-/5  SKIN: No rashes or lesions  LABS:                     8.3    8.46  )-----------( 237      ( 23 Apr 2020 06:21 )             28.4     04-23  140  |  100  |  18  ----------------------------<  94  4.1   |  27  |  0.74    Ca    8.7      23 Apr 2020 06:21  Phos  3.0     04-23  Mg     1.8     04-23    TPro  6.1  /  Alb  2.5<L>  /  TBili  0.3  /  DBili  x   /  AST  31  /  ALT  27  /  AlkPhos  83  04-23  PT/INR - ( 23 Apr 2020 06:21 )   PT: 13.2 sec;   INR: 1.14 ratio  PTT - ( 23 Apr 2020 06:21 )  PTT:29.2 sec    Auto Lymphocyte #: 1.35 K/uL (04-23)  D-Dimer Assay, Quantitative: 3662 ng/mL DDU <H> (04-23)  Ferritin, Serum: 525 ng/mL <H> (04-19)  D-Dimer Assay, Quantitative: 4566 ng/mL DDU <H> (04-19)  COVID-19 PCR: Detected (30 Mar 2020 21:41)                    RADIOLOGY & ADDITIONAL TESTS:    Imaging Personally Reviewed:    Consultant(s) Notes Reviewed:      Care Discussed with Consultants/Other Providers: Patient is a 79y old  Female who presents with a chief complaint of Shortness of breath, fevers (22 Apr 2020 15:22)    Authored by Dr. Almas Isaacs pager number [phone]    SUBJECTIVE / OVERNIGHT EVENTS: PT transferred from [facility] for plasma COVID therapy, was on NRB 15L. Overnight desaturated to 70s, PT put prone and improved.  04/23/20 AM RRT called by me for acute hypoxemia to 70s again.  PT put prone, given one time dose of solumedrol 40 mg, ofirmev 1000 mg given x 1 given PT has severe back pain when prone.     MEDICATIONS  (STANDING):  acetaminophen  IVPB .. 1000 milliGRAM(s) IV Intermittent once  atorvastatin 20 milliGRAM(s) Oral at bedtime  cholecalciferol 2000 Unit(s) Oral daily  enoxaparin Injectable 40 milliGRAM(s) SubCutaneous daily  HYDROmorphone  Injectable 0.2 milliGRAM(s) IV Push at bedtime  levothyroxine 50 MICROGram(s) Oral daily  melatonin 5 milliGRAM(s) Oral at bedtime  pantoprazole    Tablet 40 milliGRAM(s) Oral before breakfast  polyethylene glycol 3350 17 Gram(s) Oral daily  predniSONE   Tablet 10 milliGRAM(s) Oral daily  pyridostigmine 60 milliGRAM(s) Oral three times a day  pyridostigmine  milliGRAM(s) Oral daily  zinc oxide 40% Ointment 1 Application(s) Topical daily    MEDICATIONS  (PRN):  acetaminophen   Tablet .. 650 milliGRAM(s) Oral every 4 hours PRN Temp greater or equal to 38C (100.4F), Moderate Pain (4 - 6)  acetaminophen   Tablet .. 650 milliGRAM(s) Oral every 6 hours PRN Mild Pain (1 - 3)  ALBUTerol    90 MICROgram(s) HFA Inhaler 2 Puff(s) Inhalation every 4 hours PRN Shortness of Breath and/or Wheezing  benzonatate 100 milliGRAM(s) Oral three times a day PRN Cough  HYDROmorphone  Injectable 0.2 milliGRAM(s) IV Push every 2 hours PRN Pain  senna 2 Tablet(s) Oral at bedtime PRN Constipation  sodium chloride 0.65% Nasal 1 Spray(s) Both Nostrils two times a day PRN Nasal Congestion    Vital Signs Last 24 Hrs  T(C): 36.7 (23 Apr 2020 03:49), Max: 36.7 (22 Apr 2020 21:10)  T(F): 98 (23 Apr 2020 03:49), Max: 98 (22 Apr 2020 21:10)  HR: 76 (23 Apr 2020 06:00) (70 - 89)  BP: 112/71 (23 Apr 2020 06:00) (93/59 - 134/74)  BP(mean): --  RR: 20 (23 Apr 2020 06:00) (20 - 22).  On my exam, 34 RR at rest.   SpO2: 92% (23 Apr 2020 06:00) (73% - 100%)    PHYSICAL EXAM:  GENERAL: Overweight, significant respiratory distress.   EYES: EOMI, PERRLA, conjunctiva and sclera clear  CHEST/LUNG: Prominent B/L expiratory wheezing, Bibasilar fine crackles, increased work of breathing, desaturating to 70s when supine  HEART: Regular rate and rhythm; No murmurs, rubs, or gallops  ABDOMEN: Soft, Nontender, Nondistended; Bowel sounds present  EXTREMITIES:  2+ Peripheral Pulses, No clubbing, cyanosis, or edema  NEUROLOGY: AAOx3, Neck flexion full strength,  strength full strength, proximal B/L arm strength 4-/5  SKIN: No rashes or lesions  LABS:                     8.3    8.46  )-----------( 237      ( 23 Apr 2020 06:21 )             28.4     04-23  140  |  100  |  18  ----------------------------<  94  4.1   |  27  |  0.74    Ca    8.7      23 Apr 2020 06:21  Phos  3.0     04-23  Mg     1.8     04-23    TPro  6.1  /  Alb  2.5<L>  /  TBili  0.3  /  DBili  x   /  AST  31  /  ALT  27  /  AlkPhos  83  04-23  PT/INR - ( 23 Apr 2020 06:21 )   PT: 13.2 sec;   INR: 1.14 ratio  PTT - ( 23 Apr 2020 06:21 )  PTT:29.2 sec    Auto Lymphocyte #: 1.35 K/uL (04-23)  D-Dimer Assay, Quantitative: 3662 ng/mL DDU <H> (04-23)  Ferritin, Serum: 525 ng/mL <H> (04-19)  D-Dimer Assay, Quantitative: 4566 ng/mL DDU <H> (04-19)  COVID-19 PCR: Detected (30 Mar 2020 21:41)

## 2020-04-23 NOTE — CONSULT NOTE ADULT - REASON FOR ADMISSION
Shortness of breath, fevers

## 2020-04-23 NOTE — CONSULT NOTE ADULT - PROBLEM SELECTOR RECOMMENDATION 7
Due to the patient's health status and restrictions on visitation during the [facility], the Advance Care Planning service was performed via phone with patients daughter/surrogate Patsy Stevenson, with whom the discussion took place.  Patsy shares the death of her father a week ago, unknown if was COVID related. She verbalizes understanding of her mothers current medical issues including persistent desaturation while in supine position and understands that prognosis is guarded.  Patsy confirms directives, maintain DNR/DNI, MOLST document per primary team.  Family and patient wishes are to continue  medical management in the hope of meaningful recovery.   Will continue to follow

## 2020-04-23 NOTE — RAPID RESPONSE TEAM SUMMARY - NSADDTLFINDINGSRRT_GEN_ALL_CORE
RRT called for hypoxia in the low 80s. Readjust proned patient, max inflated the bed, and fixed the NRB, her saturation improved to high 90s. She was wheezing as per primary team ordered solumedrol 40mg x 1.    Given multiple comorbidities and a non improving status, consulting palliative for further goals of care. Patient is DNR/DNI with molst filled out previous admission. Maintain net negative.     See RRT sheet for further details.

## 2020-04-23 NOTE — CHART NOTE - NSCHARTNOTEFT_GEN_A_CORE
As per RN, patient arrived from outside facility around 9pm. Notified by RN that patient desaturate to the 70s while unproned, improved to the 90s when proned. Patient endorsed unable to tolerate laying proned due to discomfort, was given IV dilaudid. Reassess pain so patient may remain proned.    Notified hospitalist attending and night float about patient. CMA to assume care in the am.     Follow up  - am labs  - Reach out for covalescant plasma   -MOLST IN CHART. DNR/DNI, please discuss pressors with patient.

## 2020-04-23 NOTE — RAPID RESPONSE TEAM SUMMARY - NSSITUATIONBACKGROUNDRRT_GEN_ALL_CORE
78 y/o female with hx of myasthenia gravis, HTN, recent hip replacement presented on march 30th with acute hypoxemic respiratory failure and sepsis in the setting of COVID-19 infection, s/p multiple medical therapies, transferred to [facility] for cov plasma.

## 2020-04-23 NOTE — PROGRESS NOTE ADULT - ATTENDING COMMENTS
80 y/o RH-F w/ h/o MG on mestinon 60mg TID, s/p Solaris tapering dose of prednisone, HTN and recent acute hypoxemic respiratory failure with sepsis in setting of COVID-19 infection s/p solumedrol 4/3-4/7 + Plaquenil 3/31-4/4 who presented for plasma COVID therapy.     Overnight desaturated to 70s, PT put prone and improved.  04/23/20 AM RRT called by me for acute hypoxemia to 70s again.  PT put prone, given one time dose of solumedrol 40 mg, ofirmev 1000 mg given x 1 given PT has severe back pain when prone    Plan: 80 y/o RH-F w/ h/o MG on mestinon 60mg TID, s/p Solaris tapering dose of prednisone, HTN and recent acute hypoxemic respiratory failure with sepsis in setting of COVID-19 infection s/p solumedrol 4/3-4/7 + Plaquenil 3/31-4/4 who presented for plasma COVID therapy.     Overnight desaturated to 70s, PT put prone and improved.  04/23/20 AM RRT called by me for acute hypoxemia to 70s again.  PT put prone, given one time dose of solumedrol 40 mg, ofirmev 1000 mg given x 1 given PT has severe back pain when prone    Plan:  # Acute hypoxemic respiratory failure secondary to COVID-19  - Currently on NRB 15 L with O2 sat at 9% ( Goal 92-96%)   - Self proning as needed   - Monitor closely given risk of concurrent myasthenia gravis exacerbation  -Transferred from [facility] for Convalescent Plasma Transfusion<< Hemo consult appreciated      # COVID-19  - Completed Solumedrol (4/3-7) and Plaquenil (3/31-4/4)  - Airborne/Contact precautions  - CRP and Ferritin slightly decreasing   - Discussed with ID about possible Anakinra trial, recommended to hold off as pt has already received immunosuppressive medication for MS (Eculizumab- solaris)   - Here for     #Anemia  - Hb level 7.5 s/p 1U of PRBC (on 4/15) with improvement of Hb level to 9.9 --- Now 8.3 ( 4/23)   - likely 2/2 myelosuppression from viral process  -Trend CBC    # Myasthenia Gravis  - Currently on pyridostigmine ER 180mg po daily, Mestinon (pyridostigmine) 60mg po tid  - s/p 1 dose Soliris (eculizumab) outpatient neurology (on Mestinon and Soliris for MG outpatient) [completed last day 4/8)   - On Prednisone 10mg PO qd per neurology recs  - CLOSE MONITORING OF RR and daily VBGs    # Pain <History of lumbar stenosis s/p Laminectomy worsening when prone  - Tylenol high dose Q8hr prn  - Lidoderm patch  - Cold/warm packs           # Osteoarthritis  - On Celecoxib    # Hypothyroidism  - On Synthroid    # Hypertension  - BP Stable on Losartan 50mg PO qd  - Hold HCTZ for now    # DVT PPx: Lovenox daily  # Diet: S+S Eval recommended NPO, but patient and HCP prefer dysphagia 1 with honey thickened liquids despite risks of aspiration  # Dispo: PT recommended home with home PT  # GOC: DNR/DNI: Spoke to Daughter Patsy with daily updates. ( [phone]) 80 y/o RH-F w/ h/o MG on mestinon 60mg TID, s/p Solaris tapering dose of prednisone, HTN and recent acute hypoxemic respiratory failure with sepsis in setting of COVID-19 infection s/p solumedrol 4/3-4/7 + Plaquenil 3/31-4/4 who presented for plasma COVID therapy.     Overnight desaturated to 70s, PT put prone and improved.  04/23/20 AM RRT called by me for acute hypoxemia to 70s again.  PT put prone, given one time dose of solumedrol 40 mg, ofirmev 1000 mg given x 1 given PT has severe back pain when prone    Plan:  # Acute hypoxemic respiratory failure secondary to COVID-19  - Currently on NRB 15 L with O2 sat at 9% ( Goal 92-96%)   - Self proning as needed   - Monitor closely given risk of concurrent myasthenia gravis exacerbation  -Transferred from [facility] for Convalescent Plasma Transfusion<< Hemo consult appreciated      # COVID-19  - Completed Solumedrol (4/3-7) and Plaquenil (3/31-4/4)  - Airborne/Contact precautions  - CRP and Ferritin slightly decreasing   - Discussed with ID about possible Anakinra trial, recommended to hold off as pt has already received immunosuppressive medication for MS (Eculizumab- solaris)     #Anemia  - Hb level 7.5 s/p 1U of PRBC (on 4/15) with improvement of Hb level to 9.9 --- Now 8.3 ( 4/23)   - likely 2/2 myelosuppression from viral process  -Trend CBC    # Myasthenia Gravis  - Currently on pyridostigmine ER 180mg po daily, Mestinon (pyridostigmine) 60mg po tid  - s/p 1 dose Soliris (eculizumab) outpatient neurology (on Mestinon and Soliris for MG outpatient) [completed last day 4/8)   - On Prednisone 10mg PO qd per neurology recs  - CLOSE MONITORING OF RR and daily VBGs    # Pain <History of lumbar stenosis s/p Laminectomy worsening when prone  - Tylenol high dose Q8hr prn  - Lidoderm patch  - Cold/warm packs           # Osteoarthritis  - On Celecoxib    # Hypothyroidism  - On Synthroid    # Hypertension  - BP Stable on Losartan 50mg PO qd  - Hold HCTZ for now    # DVT PPx: Lovenox daily  # Diet: S+S Eval recommended NPO, but patient and HCP prefer dysphagia 1 with honey thickened liquids despite risks of aspiration  # Dispo: PT recommended home with home PT  # GOC: DNR/DNI: Spoke to Daughter Patsy with daily updates. ( [phone]) 80 y/o RH-F w/ h/o MG on mestinon 60mg TID, s/p Solaris tapering dose of prednisone, HTN and recent acute hypoxemic respiratory failure with sepsis in setting of COVID-19 infection s/p solumedrol 4/3-4/7 + Plaquenil 3/31-4/4 who presented for plasma COVID therapy.     Overnight desaturated to 70s, PT put prone and improved.  04/23/20 AM RRT called by me for acute hypoxemia to 70s again.  PT put prone, given one time dose of solumedrol 40 mg, ofirmev 1000 mg given x 1 given PT has severe back pain when prone    Plan:  # Acute hypoxemic respiratory failure secondary to COVID-19  - Currently on NRB 15 L with O2 sat at 9% ( Goal 92-96%)   - Self proning as needed   - Monitor closely given risk of concurrent myasthenia gravis exacerbation  -Transferred from [facility] for Convalescent Plasma Transfusion<< Hemo consult appreciated      # COVID-19  - Completed Solumedrol (4/3-7) and Plaquenil (3/31-4/4)  - Airborne/Contact precautions  - CRP and Ferritin slightly decreasing   - Discussed with ID about possible Anakinra trial, recommended to hold off as pt has already received immunosuppressive medication for MS (Eculizumab- solaris)     #Anemia  - Hb level 7.5 s/p 1U of PRBC (on 4/15) with improvement of Hb level to 9.9 --- Now 8.3 ( 4/23)   - likely 2/2 myelosuppression from viral process  -Trend CBC    # Myasthenia Gravis  - Currently on pyridostigmine ER 180mg po daily, Mestinon (pyridostigmine) 60mg po tid  - s/p 1 dose Soliris (eculizumab) outpatient neurology (on Mestinon and Soliris for MG outpatient) [completed last day 4/8)   - On Prednisone 10mg PO qd per neurology recs  - CLOSE MONITORING OF RR and daily VBGs    # Pain <History of lumbar stenosis s/p Laminectomy worsening when prone  - Tylenol high dose Q8hr prn  - Lidoderm patch  - Cold/warm packs           # Osteoarthritis  - On Celecoxib    # Hypothyroidism  - On Synthroid    # Hypertension  - BP Stable on Losartan 50mg PO qd  - Hold HCTZ for now    # DVT PPx: Lovenox daily  # Diet: S+S Eval recommended NPO, but patient and HCP prefer dysphagia 1 with honey thickened liquids despite risks of aspiration  # Dispo: PT recommended home with home PT  # GOC: DNR/DNI: Daughter Patsy ( [phone])

## 2020-04-23 NOTE — CONSULT NOTE ADULT - PROBLEM SELECTOR RECOMMENDATION 9
morphine 2 mg  Tylenol PRN  monitor
On non rebreather 15 liters saturations above 95% while in prone position, desaturates in supine position to <90%  s/p solumedrol

## 2020-04-23 NOTE — CONSULT NOTE ADULT - ASSESSMENT
80 y/o PMH as previously described admitted with acute respiratory failure and sepsis 2/2 Covid, transferred from [facility] for Convalescence Plasma Therapy called for goals of care

## 2020-04-23 NOTE — CONSULT NOTE ADULT - PROBLEM SELECTOR RECOMMENDATION 2
Agree with low dose Dilaudid IVP PRN q 2 hours for pain and standing Dilaudid at bedtime  Educated RN in utilizing these medications, she agreed that the Dilaudid helped patient .

## 2020-04-23 NOTE — CONSULT NOTE ADULT - PROBLEM SELECTOR RECOMMENDATION 3
Airborne/contact precautions  s/p Plaquenil   Being evaluated for Convalescence Plasma Therapy  Followed by Dr Peck

## 2020-04-23 NOTE — CHART NOTE - NSCHARTNOTEFT_GEN_A_CORE
The trial, [id number]: Expanded Access to Convalescent Plasma for the Treatment of Patients with COVID-19, was discussed with LARPatsy, (daughter) on 4/23/2020 Consent was witnessed by Bambi James, research coordinator    During the consent process, the following was discussed:     •	The fact that the study involves research  •	The study schedule and procedures involved  •	The main risks of the study, and the fact that all risks may not be known at this time  •	New information that may affect the subject’s willingness to continue on the study will be presented as soon as it is available  •	Benefits of participating  •	Alternatives to participating  •	Confidentiality   •	Compensation for research-related injury  •	Contacts for questions about the study or their rights while on the study  •	The fact that the subject’s participation is voluntary – they can refuse or withdraw at any time without penalty or loss of benefits.    I spoke with the daughter over phone and the coordinator sent consent via email to ELENO, daughter on 4/23/2020. The daughter responded to confirm receipt and confirmed the consent for her mother. The signed consent was sent back to the coordinator. I signed as the consenting professional and a fully executed consent was sent back to the LAR via email.    Informed consent was obtained prior to any study procedures being performed. A copy of the signed consent and supplemental documentation was given to the subject.    Eligibility Criteria:   Patient has laboratory confirmed diagnosis of infection with SARS-CoV-2 on 3/30/2020 and admitted to an acute care facility for treatment of COVID-19 complications.     Patient has [severe] COVID-19 with the following symptoms: [please only include patient actual symptoms]   - Dyspnea      ABO was performed on 04/23/2020 and patient blood type confirmed as O (-)     I have confirmed all eligibility are met for this patient to participate.     Convalescent plasma order has been completed, pending plasma from blood bank.

## 2020-04-23 NOTE — PROGRESS NOTE ADULT - ASSESSMENT
80 y/o RH-F w/ h/o MG on mestinon 60mg TID, s/p Solaris tapering dose of prednisone, HTN and recent acute hypoxemic respiratory failure with sepsis in setting of COVID-19 infection s/p solumedrol 4/3-4/7 + Plaquenil 3/31-4/4 who presented for plasma COVID therapy.

## 2020-04-23 NOTE — PROGRESS NOTE ADULT - PROBLEM SELECTOR PLAN 2
On 15L NRB, SpO2 desaturation to 70% intermittently when supine.  Improves somewhat when prone, bed hyperinflated, frequent chest PT.     -PT is DNR/DNI and has severe COVID-19 associated hypoxic respiratory failure.   -PT also at very high risk of rapid decompensation secondary to superimposed neuromuscular respiratory failure due to myasthenia gravis.  Will discuss further with outpatient neuromuscular specialist, Dr. Fitzgerald, but on initial contact likely her current presentation is mostly due to COVID-19 rather than MG.   -GOC discussion with family and patient

## 2020-04-24 LAB
ALBUMIN SERPL ELPH-MCNC: 2.5 G/DL — LOW (ref 3.3–5)
ALP SERPL-CCNC: 77 U/L — SIGNIFICANT CHANGE UP (ref 40–120)
ALT FLD-CCNC: 25 U/L — SIGNIFICANT CHANGE UP (ref 10–45)
ANION GAP SERPL CALC-SCNC: 10 MMOL/L — SIGNIFICANT CHANGE UP (ref 5–17)
APTT BLD: 29.3 SEC — SIGNIFICANT CHANGE UP (ref 27.5–36.3)
AST SERPL-CCNC: 27 U/L — SIGNIFICANT CHANGE UP (ref 10–40)
BASOPHILS # BLD AUTO: 0.01 K/UL — SIGNIFICANT CHANGE UP (ref 0–0.2)
BASOPHILS NFR BLD AUTO: 0.1 % — SIGNIFICANT CHANGE UP (ref 0–2)
BILIRUB SERPL-MCNC: 0.3 MG/DL — SIGNIFICANT CHANGE UP (ref 0.2–1.2)
BUN SERPL-MCNC: 19 MG/DL — SIGNIFICANT CHANGE UP (ref 7–23)
CALCIUM SERPL-MCNC: 9 MG/DL — SIGNIFICANT CHANGE UP (ref 8.4–10.5)
CHLORIDE SERPL-SCNC: 102 MMOL/L — SIGNIFICANT CHANGE UP (ref 96–108)
CO2 SERPL-SCNC: 29 MMOL/L — SIGNIFICANT CHANGE UP (ref 22–31)
CREAT SERPL-MCNC: 0.69 MG/DL — SIGNIFICANT CHANGE UP (ref 0.5–1.3)
CRP SERPL-MCNC: 4.79 MG/DL — HIGH (ref 0–0.4)
D DIMER BLD IA.RAPID-MCNC: 2906 NG/ML DDU — HIGH
EOSINOPHIL # BLD AUTO: 0.09 K/UL — SIGNIFICANT CHANGE UP (ref 0–0.5)
EOSINOPHIL NFR BLD AUTO: 1.1 % — SIGNIFICANT CHANGE UP (ref 0–6)
FERRITIN SERPL-MCNC: 457 NG/ML — HIGH (ref 15–150)
GLUCOSE SERPL-MCNC: 90 MG/DL — SIGNIFICANT CHANGE UP (ref 70–99)
HCT VFR BLD CALC: 28.7 % — LOW (ref 34.5–45)
HGB BLD-MCNC: 8.4 G/DL — LOW (ref 11.5–15.5)
IMM GRANULOCYTES NFR BLD AUTO: 1.2 % — SIGNIFICANT CHANGE UP (ref 0–1.5)
INR BLD: 1.16 RATIO — SIGNIFICANT CHANGE UP (ref 0.88–1.16)
LYMPHOCYTES # BLD AUTO: 0.86 K/UL — LOW (ref 1–3.3)
LYMPHOCYTES # BLD AUTO: 10.3 % — LOW (ref 13–44)
MCHC RBC-ENTMCNC: 24.7 PG — LOW (ref 27–34)
MCHC RBC-ENTMCNC: 29.3 GM/DL — LOW (ref 32–36)
MCV RBC AUTO: 84.4 FL — SIGNIFICANT CHANGE UP (ref 80–100)
MONOCYTES # BLD AUTO: 0.69 K/UL — SIGNIFICANT CHANGE UP (ref 0–0.9)
MONOCYTES NFR BLD AUTO: 8.3 % — SIGNIFICANT CHANGE UP (ref 2–14)
NEUTROPHILS # BLD AUTO: 6.56 K/UL — SIGNIFICANT CHANGE UP (ref 1.8–7.4)
NEUTROPHILS NFR BLD AUTO: 79 % — HIGH (ref 43–77)
NRBC # BLD: 0 /100 WBCS — SIGNIFICANT CHANGE UP (ref 0–0)
PLATELET # BLD AUTO: 237 K/UL — SIGNIFICANT CHANGE UP (ref 150–400)
POTASSIUM SERPL-MCNC: 3.8 MMOL/L — SIGNIFICANT CHANGE UP (ref 3.5–5.3)
POTASSIUM SERPL-SCNC: 3.8 MMOL/L — SIGNIFICANT CHANGE UP (ref 3.5–5.3)
PROT SERPL-MCNC: 6.1 G/DL — SIGNIFICANT CHANGE UP (ref 6–8.3)
PROTHROM AB SERPL-ACNC: 13.3 SEC — HIGH (ref 10–12.9)
RBC # BLD: 3.4 M/UL — LOW (ref 3.8–5.2)
RBC # FLD: 22.1 % — HIGH (ref 10.3–14.5)
SODIUM SERPL-SCNC: 141 MMOL/L — SIGNIFICANT CHANGE UP (ref 135–145)
WBC # BLD: 8.31 K/UL — SIGNIFICANT CHANGE UP (ref 3.8–10.5)
WBC # FLD AUTO: 8.31 K/UL — SIGNIFICANT CHANGE UP (ref 3.8–10.5)

## 2020-04-24 PROCEDURE — 99233 SBSQ HOSP IP/OBS HIGH 50: CPT | Mod: CS

## 2020-04-24 PROCEDURE — 99232 SBSQ HOSP IP/OBS MODERATE 35: CPT | Mod: CS,GC

## 2020-04-24 RX ORDER — HYDROMORPHONE HYDROCHLORIDE 2 MG/ML
0.2 INJECTION INTRAMUSCULAR; INTRAVENOUS; SUBCUTANEOUS
Refills: 0 | Status: DISCONTINUED | OUTPATIENT
Start: 2020-04-24 | End: 2020-04-30

## 2020-04-24 RX ADMIN — POLYETHYLENE GLYCOL 3350 17 GRAM(S): 17 POWDER, FOR SOLUTION ORAL at 12:34

## 2020-04-24 RX ADMIN — PYRIDOSTIGMINE BROMIDE 180 MILLIGRAM(S): 60 SOLUTION ORAL at 12:34

## 2020-04-24 RX ADMIN — ZINC OXIDE 1 APPLICATION(S): 200 OINTMENT TOPICAL at 12:30

## 2020-04-24 RX ADMIN — LIDOCAINE 2 PATCH: 4 CREAM TOPICAL at 12:32

## 2020-04-24 RX ADMIN — Medication 5 MILLIGRAM(S): at 21:51

## 2020-04-24 RX ADMIN — ATORVASTATIN CALCIUM 20 MILLIGRAM(S): 80 TABLET, FILM COATED ORAL at 21:51

## 2020-04-24 RX ADMIN — PYRIDOSTIGMINE BROMIDE 60 MILLIGRAM(S): 60 SOLUTION ORAL at 04:13

## 2020-04-24 RX ADMIN — Medication 50 MICROGRAM(S): at 04:13

## 2020-04-24 RX ADMIN — LIDOCAINE 2 PATCH: 4 CREAM TOPICAL at 21:21

## 2020-04-24 RX ADMIN — PANTOPRAZOLE SODIUM 40 MILLIGRAM(S): 20 TABLET, DELAYED RELEASE ORAL at 04:13

## 2020-04-24 RX ADMIN — LIDOCAINE 2 PATCH: 4 CREAM TOPICAL at 06:00

## 2020-04-24 RX ADMIN — Medication 10 MILLIGRAM(S): at 04:13

## 2020-04-24 RX ADMIN — PYRIDOSTIGMINE BROMIDE 60 MILLIGRAM(S): 60 SOLUTION ORAL at 14:29

## 2020-04-24 RX ADMIN — PYRIDOSTIGMINE BROMIDE 60 MILLIGRAM(S): 60 SOLUTION ORAL at 21:51

## 2020-04-24 RX ADMIN — Medication 2000 UNIT(S): at 12:33

## 2020-04-24 RX ADMIN — ENOXAPARIN SODIUM 40 MILLIGRAM(S): 100 INJECTION SUBCUTANEOUS at 12:33

## 2020-04-24 NOTE — PROGRESS NOTE ADULT - PROBLEM SELECTOR PLAN 4
Continue mestinon home dosing  Given solumedrol 40mg x 1  CLOSE MONITORING OF RR and daily VBGs  NIF and VC should be done at minimum q6hrs, but due to current pandemic and DNR/DNI status, unable to perform.  Will discuss at least daily NIF and VC monitoring  As a proxy, can also ask PT to count from 20 to 0 in one breath and look for tachypnea, nasal tone intonation change, neck flexion weakness and hypercarbia.   PT's get hypercarbic and tachypneic before becoming hypoxic and thus SpO2 is not an accurate enough proxy of MG status

## 2020-04-24 NOTE — PROGRESS NOTE ADULT - SUBJECTIVE AND OBJECTIVE BOX
Magda Jimenez NP  [phone]  Mon-Fri  Please page [phone]  Sat-Sun    DUE TO COVID 19 AND IN ORDER TO DECREASE PROVIDERS EXPOSURE AND USAGE OF PPE, H&P ROS OR PE WAS TAKEN FROM PRIMARY TEAM NOTE. THIS WRITER WORKING REMOTELY AND THEREFORE ASSESSMENT , MANAGEMENT AND PLAN HAS BEEN DISCUSSED AT LENGTH WITH RN/NP/MD AND FAMILY        SUBJECTIVE AND OBJECTIVE:  INTERVAL HPI/OVERNIGHT EVENTS: Per EDER Mora patient requires prone position to maintain adequate saturations, remains alert , verbal taking PO    DNR on chart: Yes    Allergies    IODINE (Rash)  No Known Drug Allergies  shellfish (Rash)    Intolerances    MEDICATIONS  (STANDING):  atorvastatin 20 milliGRAM(s) Oral at bedtime  cholecalciferol 2000 Unit(s) Oral daily  enoxaparin Injectable 40 milliGRAM(s) SubCutaneous daily  levothyroxine 50 MICROGram(s) Oral daily  lidocaine   Patch 2 Patch Transdermal daily  melatonin 5 milliGRAM(s) Oral at bedtime  pantoprazole    Tablet 40 milliGRAM(s) Oral before breakfast  polyethylene glycol 3350 17 Gram(s) Oral daily  predniSONE   Tablet 10 milliGRAM(s) Oral daily  pyridostigmine 60 milliGRAM(s) Oral three times a day  pyridostigmine  milliGRAM(s) Oral daily  zinc oxide 40% Ointment 1 Application(s) Topical daily    MEDICATIONS  (PRN):  acetaminophen   Tablet .. 650 milliGRAM(s) Oral every 4 hours PRN Temp greater or equal to 38C (100.4F), Moderate Pain (4 - 6)  ALBUTerol    90 MICROgram(s) HFA Inhaler 2 Puff(s) Inhalation every 4 hours PRN Shortness of Breath and/or Wheezing  benzonatate 100 milliGRAM(s) Oral three times a day PRN Cough  senna 2 Tablet(s) Oral at bedtime PRN Constipation  sodium chloride 0.65% Nasal 1 Spray(s) Both Nostrils two times a day PRN Nasal Congestion      ITEMS UNCHECKED ARE NOT PRESENT    PRESENT SYMPTOMS: [ ]Unable to obtain due to poor mentation   Source if other than patient:  [ ]Family   [ ]Team     Pain:  [x ]yes [ ]no  QOL impact -   Location -        lower back as discussed with team             Aggravating factors - prone position   Quality -dull  Timing-  Severity (0-10 scale):  Minimal acceptable level (0-10 scale):     Dyspnea:                           [ ]Mild [ ]Moderate [ ]Severe  Anxiety:                             [ ]Mild [ ]Moderate [ ]Severe  Fatigue:                             [ ]Mild [ ]Moderate [ ]Severe  Nausea:                             [ ]Mild [ ]Moderate [ ]Severe  Loss of appetite:              [ ]Mild [ ]Moderate [ ]Severe  Constipation:                    [ ]Mild [ ]Moderate [ ]Severe    PAIN AD Score:	  [email or web address] (Ctrl + left click to view)    Other Symptoms:  [ ]All other review of systems negative     Palliative Performance Status Version 2:   30     %      [email or web address]/[email or web address]_[email or web address]  PHYSICAL EXAM:  Vital Signs Last 24 Hrs  T(C): 36.7 (24 Apr 2020 04:46), Max: 37 (23 Apr 2020 18:50)  T(F): 98.1 (24 Apr 2020 04:46), Max: 98.6 (23 Apr 2020 18:50)  HR: 59 (24 Apr 2020 04:46) (59 - 70)  BP: 117/61 (24 Apr 2020 05:32) (99/57 - 152/72)  BP(mean): --  RR: 20 (24 Apr 2020 04:46) (20 - 20)  SpO2: 100% (24 Apr 2020 04:46) (97% - 100%) I&O's Summary    23 Apr 2020 07:01  -  24 Apr 2020 07:00  --------------------------------------------------------  IN: 0 mL / OUT: 400 mL / NET: -400 mL       GENERAL:  [ x]Alert  [x]Oriented x 3  [ ]Lethargic  [ ]Cachexia  [ ]Unarousable  [ ]Verbal  [ ]Non-Verbal  Behavioral:   [ ]Anxiety  [ ]Delirium [ ]Agitation [ ]Other  HEENT:  [ ]Normal   [ ]Dry mouth   [ ]ET Tube/Trach  [ ]Oral lesions  PULMONARY:   [ ]Clear [ ]xTachypnea  [ ]Audible excessive secretions   [ ]Rhonchi        [ ]Right [ ]Left [ ]Bilateral  [ ]Crackles        [ ]Right [ ]Left [ ]Bilateral  [ ]Wheezing     [ ]Right [ ]Left [ ]Bilateral  [ ]Diminished BS [ ] Right [ ]Left [ ]Bilateral  CARDIOVASCULAR:    [ ]Regular [x ]Irregular [ ]Tachy  [ ]Brian [ ]Murmur [ ]Other  GASTROINTESTINAL:  [ ]Soft  [ ]Distended   [ ]+BS  [ ]Non tender [ ]Tender  [ ]PEG [ ]OGT/ NGT   Last BM:      GENITOURINARY:  [ x]Normal [ ]Incontinent   [ ]Oliguria/Anuria   [ ]Miller  MUSCULOSKELETAL:   [ ]Normal   [ x]Weakness  [x ]Bed/Wheelchair bound [ ]Edema  NEUROLOGIC:   [ x]No focal deficits  [ ] Cognitive impairment  [ ] Dysphagia [ ]Dysarthria [ ] Paresis [ ]Other   SKIN:   [ ]Normal  [ ]Rash   [ ]Pressure ulcer(s) [ ]y [ ]n present on admission    CRITICAL CARE:  [ ]Shock Present  [ ]Septic [ ]Cardiogenic [ ]Neurologic [ ]Hypovolemic  [ ]Vasopressors [ ]Inotropes  [x ]Respiratory failure present [ ]Mechanical Ventilation [ x]Non-invasive ventilatory support [ ]High-Flow  [ x]Acute  [ ]Chronic [ ]Hypoxic  [ ]Hypercarbic [ ]Other  [ ]Other organ failure     LABS:                        8.4    8.31  )-----------( 237      ( 24 Apr 2020 06:58 )             28.7   04-24    141  |  102  |  19  ----------------------------<  90  3.8   |  29  |  0.69    Ca    9.0      24 Apr 2020 06:56  Phos  3.0     04-23  Mg     1.8     04-23    TPro  6.1  /  Alb  2.5<L>  /  TBili  0.3  /  DBili  x   /  AST  27  /  ALT  25  /  AlkPhos  77  04-24  PT/INR - ( 24 Apr 2020 06:58 )   PT: 13.3 sec;   INR: 1.16 ratio         PTT - ( 24 Apr 2020 06:58 )  PTT:29.3 sec      RADIOLOGY & ADDITIONAL STUDIES:    Protein Calorie Malnutrition Present: [ ]mild [ ]moderate [ ]severe [ ]underweight [ ]morbid obesity  https://www.andeal.org/vault/9061/web/files/ONC/Table_Clinical%20Characteristics%20to%20Document%20Malnutrition-White%20JV%20et%20al%202012.pdf    Height (cm): 144.8 (04-22-20 @ 21:10), 147.32 (03-05-20 @ 08:35), 175.26 (12-30-19 @ 06:05)  Weight (kg): 66.1 (04-22-20 @ 21:10), 65.8 (03-05-20 @ 08:35), 65 (12-30-19 @ 06:05)  BMI (kg/m2): 31.5 (04-22-20 @ 21:10), 30.3 (03-05-20 @ 08:35), 21.2 (12-30-19 @ 06:05)    [ ]PPSV2 < or = 30%  [ ]significant weight loss [xx ]poor nutritional intake [ ]anasarca   Albumin, Serum: 2.5 g/dL (04-24-20 @ 06:56)   [ ]Artificial Nutrition    REFERRALS:   [ ]Chaplaincy  [ ]Hospice  [ ]Child Life  [ ]Social Work  [x ]Case management [ ]Holistic Therapy     Goals of Care Document:

## 2020-04-24 NOTE — PROGRESS NOTE ADULT - PROBLEM SELECTOR PLAN 2
On 15L NRB, SpO2 desaturation to 70% intermittently when supine.  Improves somewhat when prone, bed hyperinflated, frequent chest PT.     -PT is DNR/DNI and has severe COVID-19 associated hypoxic respiratory failure.   -PT also at very high risk of rapid decompensation secondary to superimposed neuromuscular respiratory failure due to myasthenia gravis.  Will discuss further with outpatient neuromuscular specialist, Dr. Fitzgerald, but on initial contact likely her current presentation is mostly due to COVID-19 rather than MG.   -GOC discussion with family and patient On 15L NRB, SpO2 desaturation to 70% intermittently when supine.  Improves somewhat when prone, bed hyperinflated, frequent chest PT.   -PT is DNR/DNI and has severe COVID-19 associated hypoxic respiratory failure.   -PT also at very high risk of rapid decompensation secondary to superimposed neuromuscular respiratory failure due to myasthenia gravis.  Will discuss further with outpatient neuromuscular specialist, Dr. Fitzgerald, but on initial contact likely her current presentation is mostly due to COVID-19 rather than MG.   -GOC discussion with family and patient

## 2020-04-24 NOTE — PROGRESS NOTE ADULT - SUBJECTIVE AND OBJECTIVE BOX
Patient is a 79y old  Female who presents with a chief complaint of Shortness of breath, fevers (23 Apr 2020 15:58)      SUBJECTIVE / OVERNIGHT EVENTS:    MEDICATIONS  (STANDING):  atorvastatin 20 milliGRAM(s) Oral at bedtime  cholecalciferol 2000 Unit(s) Oral daily  enoxaparin Injectable 40 milliGRAM(s) SubCutaneous daily  levothyroxine 50 MICROGram(s) Oral daily  lidocaine   Patch 2 Patch Transdermal daily  melatonin 5 milliGRAM(s) Oral at bedtime  pantoprazole    Tablet 40 milliGRAM(s) Oral before breakfast  polyethylene glycol 3350 17 Gram(s) Oral daily  predniSONE   Tablet 10 milliGRAM(s) Oral daily  pyridostigmine 60 milliGRAM(s) Oral three times a day  pyridostigmine  milliGRAM(s) Oral daily  zinc oxide 40% Ointment 1 Application(s) Topical daily    MEDICATIONS  (PRN):  acetaminophen   Tablet .. 650 milliGRAM(s) Oral every 4 hours PRN Temp greater or equal to 38C (100.4F), Moderate Pain (4 - 6)  ALBUTerol    90 MICROgram(s) HFA Inhaler 2 Puff(s) Inhalation every 4 hours PRN Shortness of Breath and/or Wheezing  benzonatate 100 milliGRAM(s) Oral three times a day PRN Cough  senna 2 Tablet(s) Oral at bedtime PRN Constipation  sodium chloride 0.65% Nasal 1 Spray(s) Both Nostrils two times a day PRN Nasal Congestion      Vital Signs Last 24 Hrs  T(C): 36.7 (24 Apr 2020 04:46), Max: 37 (23 Apr 2020 18:50)  T(F): 98.1 (24 Apr 2020 04:46), Max: 98.6 (23 Apr 2020 18:50)  HR: 59 (24 Apr 2020 04:46) (59 - 85)  BP: 117/61 (24 Apr 2020 05:32) (99/57 - 152/72)  BP(mean): --  RR: 20 (24 Apr 2020 04:46) (20 - 20)  SpO2: 100% (24 Apr 2020 04:46) (97% - 100%)  CAPILLARY BLOOD GLUCOSE        I&O's Summary    23 Apr 2020 07:01  -  24 Apr 2020 07:00  --------------------------------------------------------  IN: 0 mL / OUT: 400 mL / NET: -400 mL        PHYSICAL EXAM:  GENERAL: Overweight, significant respiratory distress.   EYES: EOMI, PERRLA, conjunctiva and sclera clear  CHEST/LUNG: Prominent B/L expiratory wheezing, Bibasilar fine crackles, increased work of breathing, desaturating to 70s when supine  HEART: Regular rate and rhythm; No murmurs, rubs, or gallops  ABDOMEN: Soft, Nontender, Nondistended; Bowel sounds present  EXTREMITIES:  2+ Peripheral Pulses, No clubbing, cyanosis, or edema  NEUROLOGY: AAOx3, Neck flexion full strength,  strength full strength, proximal B/L arm strength 4-/5  SKIN: No rashes or lesions    LABS:                        8.4    8.31  )-----------( 237      ( 24 Apr 2020 06:58 )             28.7     04-24    141  |  102  |  19  ----------------------------<  90  3.8   |  29  |  0.69    Ca    9.0      24 Apr 2020 06:56  Phos  3.0     04-23  Mg     1.8     04-23    TPro  6.1  /  Alb  2.5<L>  /  TBili  0.3  /  DBili  x   /  AST  27  /  ALT  25  /  AlkPhos  77  04-24    PT/INR - ( 23 Apr 2020 06:21 )   PT: 13.2 sec;   INR: 1.14 ratio         PTT - ( 23 Apr 2020 06:21 )  PTT:29.2 sec    RADIOLOGY & ADDITIONAL TESTS:    Imaging Personally Reviewed:    Consultant(s) Notes Reviewed:      Care Discussed with Consultants/Other Providers: Patient is a 79y old  Female who presents with a chief complaint of Shortness of breath, fevers (23 Apr 2020 15:58)      SUBJECTIVE / OVERNIGHT EVENTS:    Pt stable on, episode of desaturation, improved on prone positioning. Seen this morning in supine position, eating breakfast while intermittently putting the mask on while chewing. Otherwise denies fevers, chills, chest p/p, sob, n/v/d.     MEDICATIONS  (STANDING):  atorvastatin 20 milliGRAM(s) Oral at bedtime  cholecalciferol 2000 Unit(s) Oral daily  enoxaparin Injectable 40 milliGRAM(s) SubCutaneous daily  levothyroxine 50 MICROGram(s) Oral daily  lidocaine   Patch 2 Patch Transdermal daily  melatonin 5 milliGRAM(s) Oral at bedtime  pantoprazole    Tablet 40 milliGRAM(s) Oral before breakfast  polyethylene glycol 3350 17 Gram(s) Oral daily  predniSONE   Tablet 10 milliGRAM(s) Oral daily  pyridostigmine 60 milliGRAM(s) Oral three times a day  pyridostigmine  milliGRAM(s) Oral daily  zinc oxide 40% Ointment 1 Application(s) Topical daily    MEDICATIONS  (PRN):  acetaminophen   Tablet .. 650 milliGRAM(s) Oral every 4 hours PRN Temp greater or equal to 38C (100.4F), Moderate Pain (4 - 6)  ALBUTerol    90 MICROgram(s) HFA Inhaler 2 Puff(s) Inhalation every 4 hours PRN Shortness of Breath and/or Wheezing  benzonatate 100 milliGRAM(s) Oral three times a day PRN Cough  senna 2 Tablet(s) Oral at bedtime PRN Constipation  sodium chloride 0.65% Nasal 1 Spray(s) Both Nostrils two times a day PRN Nasal Congestion      Vital Signs Last 24 Hrs  T(C): 36.7 (24 Apr 2020 04:46), Max: 37 (23 Apr 2020 18:50)  T(F): 98.1 (24 Apr 2020 04:46), Max: 98.6 (23 Apr 2020 18:50)  HR: 59 (24 Apr 2020 04:46) (59 - 85)  BP: 117/61 (24 Apr 2020 05:32) (99/57 - 152/72)  BP(mean): --  RR: 20 (24 Apr 2020 04:46) (20 - 20)  SpO2: 100% (24 Apr 2020 04:46) (97% - 100%)  CAPILLARY BLOOD GLUCOSE        I&O's Summary    23 Apr 2020 07:01  -  24 Apr 2020 07:00  --------------------------------------------------------  IN: 0 mL / OUT: 400 mL / NET: -400 mL        PHYSICAL EXAM:  GENERAL: Overweight, significant respiratory distress.   EYES: EOMI, PERRLA, conjunctiva and sclera clear  CHEST/LUNG: Prominent B/L expiratory wheezing, Bibasilar fine crackles, increased work of breathing, desaturating to 70s when supine  HEART: Regular rate and rhythm; No murmurs, rubs, or gallops  ABDOMEN: Soft, Nontender, Nondistended; Bowel sounds present  EXTREMITIES:  2+ Peripheral Pulses, No clubbing, cyanosis, or edema  NEUROLOGY: AAOx3, Neck flexion full strength,  strength full strength, proximal B/L arm strength 4-/5  SKIN: No rashes or lesions    LABS:                        8.4    8.31  )-----------( 237      ( 24 Apr 2020 06:58 )             28.7     04-24    141  |  102  |  19  ----------------------------<  90  3.8   |  29  |  0.69    Ca    9.0      24 Apr 2020 06:56  Phos  3.0     04-23  Mg     1.8     04-23    TPro  6.1  /  Alb  2.5<L>  /  TBili  0.3  /  DBili  x   /  AST  27  /  ALT  25  /  AlkPhos  77  04-24    PT/INR - ( 23 Apr 2020 06:21 )   PT: 13.2 sec;   INR: 1.14 ratio         PTT - ( 23 Apr 2020 06:21 )  PTT:29.2 sec    RADIOLOGY & ADDITIONAL TESTS:    Imaging Personally Reviewed:    Consultant(s) Notes Reviewed:      Care Discussed with Consultants/Other Providers:

## 2020-04-24 NOTE — PROGRESS NOTE ADULT - PROBLEM SELECTOR PLAN 1
On non rebreather 15 liters saturations above 95% while in prone position, desaturates in supine position to <90%  s/p solumedrol.

## 2020-04-24 NOTE — PROGRESS NOTE ADULT - ATTENDING COMMENTS
Patient was last seen on 7-20-18  Last Prescribed 12-15-16    Medication is pending  Please approve or deny this request 80 y/o RH-F w/ h/o MG on mestinon 60mg TID, s/p Solaris tapering dose of prednisone, HTN and recent acute hypoxemic respiratory failure with sepsis in setting of COVID-19 infection s/p solumedrol 4/3-4/7 + Plaquenil 3/31-4/4 who presented for plasma COVID therapy.     Plan:  # Acute hypoxemic respiratory failure secondary to COVID-19  - Currently on NRB 15 L with O2 sat at 100% ( Goal 92-96%)   - Self proning as needed   - Monitor closely given risk of concurrent myasthenia gravis exacerbation  - S/p plasma transfusion 4/24      # COVID-19  - Completed Solumedrol (4/3-7) and Plaquenil (3/31-4/4)  - Airborne/Contact precautions  - CRP and Ferritin slightly decreasing   - Discussed with ID about possible Anakinra trial, recommended to hold off as pt has already received immunosuppressive medication for MS (Eculizumab- solaris)       #Anemia  - Hb level 7.5 s/p 1U of PRBC (on 4/15) Hb 8.4 today  - likely 2/2 myelosuppression from viral process  -Trend CBC    # Myasthenia Gravis  - Currently on pyridostigmine ER 180mg po daily, Mestinon (pyridostigmine) 60mg po tid  - s/p 1 dose Soliris (eculizumab) outpatient neurology (on Mestinon and Soliris for MG outpatient) [completed last day 4/8)   - On Prednisone 10mg PO qd per neurology recs  - CLOSE MONITORING OF RR and daily VBGs  - Daily NIF and VC monitoring      # Pain <History of lumbar stenosis s/p Laminectomy worsening when prone  - Tylenol high dose Q8hr prn  - Lidoderm patch  - Cold/warm packs     # Osteoarthritis  - On Celecoxib    # Hypothyroidism  - On Synthroid    # Hypertension  - BP Stable on Losartan 50mg PO qd  - Hold HCTZ for now    # DVT PPx: Lovenox daily  # Diet: S+S Eval recommended NPO, but patient and HCP prefer dysphagia 1 with honey thickened liquids despite risks of aspiration  # Dispo: PT recommended home with home PT  # GOC: DNR/DNI:  Daughter Patsy ( [phone])

## 2020-04-24 NOTE — PROGRESS NOTE ADULT - PROBLEM SELECTOR PLAN 1
-S/P Solumedrol 04/03/2020-04/07/2020  -S/P Plaquenil 03/31-04/04  -CRP and ferritin slightly decreasing  -Anakinra d/w ID but holding off 2* having already received immunosuppression Eculizumab (Solaris) for MG   -Transferred from [facility] for Convalescent Plasma Transfusion  -Sent email to Dr. Peck, consent left in chart -S/P Solumedrol 04/03/2020-04/07/2020  -S/P Plaquenil 03/31-04/04  -CRP and ferritin slightly decreasing  -Anakinra d/w ID but holding off 2/2 having already received immunosuppression Eculizumab (Solaris) for MG   -Transferred from [facility] for Convalescent Plasma Transfusion  -Sent email to Dr. Peck, consent left in chart -S/P Solumedrol 04/03/2020-04/07/2020  -S/P Plaquenil 03/31-04/04  -CRP and ferritin slightly decreasing  -Anakinra d/w ID but holding off 2/2 having already received immunosuppression Eculizumab (Solaris) for MG   -Transferred from [facility] for Convalescent Plasma Transfusion  -Sent email to Dr. Peck, consent left in chart, awaiting from blood bank -S/P Solumedrol 04/03/2020-04/07/2020  -S/P Plaquenil 03/31-04/04  -CRP and ferritin slightly decreasing  -Anakinra d/w ID but holding off 2/2 having already received immunosuppression Eculizumab (Solaris) for MG   -Transferred from [facility] for Convalescent Plasma Transfusion  -S/p plasma transfusion 4/24

## 2020-04-24 NOTE — PROGRESS NOTE ADULT - PROBLEM SELECTOR PLAN 6
Continue current management, prognosis guarded.  Patients   this week , unknown if Covid related.   Daughter Mae surrogate  MOLST/DNR/DNI PER PRIMARY TEAM

## 2020-04-24 NOTE — PROGRESS NOTE ADULT - PROBLEM SELECTOR PLAN 2
Dilaudid IVP PRN discontinued, as discussed with resident Armond, patient tolerated prone position better with pain management .  I will reinstate per our conversation adding parameters to hold for HR<50 and SBP <100  BOWEL REGIMEN

## 2020-04-24 NOTE — PROGRESS NOTE ADULT - PROBLEM SELECTOR PLAN 3
Airborne/contact precautions  s/p Plaquenil   Being evaluated for Convalescence Plasma Therapy  Followed by Dr Peck.

## 2020-04-24 NOTE — PROGRESS NOTE ADULT - ASSESSMENT
78 y/o PMH as previously described admitted with acute respiratory failure and sepsis 2/2 Covid, transferred from [facility] for Convalescence Plasma Therapy called for goals of care

## 2020-04-25 LAB
ALBUMIN SERPL ELPH-MCNC: 2.5 G/DL — LOW (ref 3.3–5)
ALP SERPL-CCNC: 72 U/L — SIGNIFICANT CHANGE UP (ref 40–120)
ALT FLD-CCNC: 20 U/L — SIGNIFICANT CHANGE UP (ref 10–45)
ANION GAP SERPL CALC-SCNC: 10 MMOL/L — SIGNIFICANT CHANGE UP (ref 5–17)
AST SERPL-CCNC: 19 U/L — SIGNIFICANT CHANGE UP (ref 10–40)
BASOPHILS # BLD AUTO: 0.01 K/UL — SIGNIFICANT CHANGE UP (ref 0–0.2)
BASOPHILS NFR BLD AUTO: 0.1 % — SIGNIFICANT CHANGE UP (ref 0–2)
BILIRUB SERPL-MCNC: 0.3 MG/DL — SIGNIFICANT CHANGE UP (ref 0.2–1.2)
BUN SERPL-MCNC: 18 MG/DL — SIGNIFICANT CHANGE UP (ref 7–23)
CALCIUM SERPL-MCNC: 8.9 MG/DL — SIGNIFICANT CHANGE UP (ref 8.4–10.5)
CHLORIDE SERPL-SCNC: 105 MMOL/L — SIGNIFICANT CHANGE UP (ref 96–108)
CO2 SERPL-SCNC: 29 MMOL/L — SIGNIFICANT CHANGE UP (ref 22–31)
CREAT SERPL-MCNC: 0.67 MG/DL — SIGNIFICANT CHANGE UP (ref 0.5–1.3)
CRP SERPL-MCNC: 4.4 MG/DL — HIGH (ref 0–0.4)
D DIMER BLD IA.RAPID-MCNC: 2576 NG/ML DDU — HIGH
EOSINOPHIL # BLD AUTO: 0.25 K/UL — SIGNIFICANT CHANGE UP (ref 0–0.5)
EOSINOPHIL NFR BLD AUTO: 3.1 % — SIGNIFICANT CHANGE UP (ref 0–6)
FERRITIN SERPL-MCNC: 442 NG/ML — HIGH (ref 15–150)
GLUCOSE SERPL-MCNC: 91 MG/DL — SIGNIFICANT CHANGE UP (ref 70–99)
HCT VFR BLD CALC: 27.6 % — LOW (ref 34.5–45)
HGB BLD-MCNC: 8.3 G/DL — LOW (ref 11.5–15.5)
IMM GRANULOCYTES NFR BLD AUTO: 1 % — SIGNIFICANT CHANGE UP (ref 0–1.5)
LYMPHOCYTES # BLD AUTO: 0.9 K/UL — LOW (ref 1–3.3)
LYMPHOCYTES # BLD AUTO: 11.3 % — LOW (ref 13–44)
MAGNESIUM SERPL-MCNC: 1.9 MG/DL — SIGNIFICANT CHANGE UP (ref 1.6–2.6)
MCHC RBC-ENTMCNC: 25.5 PG — LOW (ref 27–34)
MCHC RBC-ENTMCNC: 30.1 GM/DL — LOW (ref 32–36)
MCV RBC AUTO: 84.9 FL — SIGNIFICANT CHANGE UP (ref 80–100)
MONOCYTES # BLD AUTO: 0.75 K/UL — SIGNIFICANT CHANGE UP (ref 0–0.9)
MONOCYTES NFR BLD AUTO: 9.4 % — SIGNIFICANT CHANGE UP (ref 2–14)
NEUTROPHILS # BLD AUTO: 5.98 K/UL — SIGNIFICANT CHANGE UP (ref 1.8–7.4)
NEUTROPHILS NFR BLD AUTO: 75.1 % — SIGNIFICANT CHANGE UP (ref 43–77)
NRBC # BLD: 0 /100 WBCS — SIGNIFICANT CHANGE UP (ref 0–0)
PHOSPHATE SERPL-MCNC: 2.3 MG/DL — LOW (ref 2.5–4.5)
PLATELET # BLD AUTO: 271 K/UL — SIGNIFICANT CHANGE UP (ref 150–400)
POTASSIUM SERPL-MCNC: 3.9 MMOL/L — SIGNIFICANT CHANGE UP (ref 3.5–5.3)
POTASSIUM SERPL-SCNC: 3.9 MMOL/L — SIGNIFICANT CHANGE UP (ref 3.5–5.3)
PROT SERPL-MCNC: 5.9 G/DL — LOW (ref 6–8.3)
RBC # BLD: 3.25 M/UL — LOW (ref 3.8–5.2)
RBC # FLD: 22.2 % — HIGH (ref 10.3–14.5)
SODIUM SERPL-SCNC: 144 MMOL/L — SIGNIFICANT CHANGE UP (ref 135–145)
WBC # BLD: 7.97 K/UL — SIGNIFICANT CHANGE UP (ref 3.8–10.5)
WBC # FLD AUTO: 7.97 K/UL — SIGNIFICANT CHANGE UP (ref 3.8–10.5)

## 2020-04-25 PROCEDURE — ZZZZZ: CPT | Mod: CS

## 2020-04-25 RX ORDER — SODIUM,POTASSIUM PHOSPHATES 278-250MG
1 POWDER IN PACKET (EA) ORAL ONCE
Refills: 0 | Status: COMPLETED | OUTPATIENT
Start: 2020-04-25 | End: 2020-04-25

## 2020-04-25 RX ADMIN — PYRIDOSTIGMINE BROMIDE 60 MILLIGRAM(S): 60 SOLUTION ORAL at 05:26

## 2020-04-25 RX ADMIN — Medication 5 MILLIGRAM(S): at 22:02

## 2020-04-25 RX ADMIN — LIDOCAINE 2 PATCH: 4 CREAM TOPICAL at 19:29

## 2020-04-25 RX ADMIN — ALBUTEROL 2 PUFF(S): 90 AEROSOL, METERED ORAL at 03:00

## 2020-04-25 RX ADMIN — Medication 2000 UNIT(S): at 12:48

## 2020-04-25 RX ADMIN — HYDROMORPHONE HYDROCHLORIDE 0.2 MILLIGRAM(S): 2 INJECTION INTRAMUSCULAR; INTRAVENOUS; SUBCUTANEOUS at 15:37

## 2020-04-25 RX ADMIN — ATORVASTATIN CALCIUM 20 MILLIGRAM(S): 80 TABLET, FILM COATED ORAL at 22:01

## 2020-04-25 RX ADMIN — LIDOCAINE 2 PATCH: 4 CREAM TOPICAL at 12:48

## 2020-04-25 RX ADMIN — LIDOCAINE 2 PATCH: 4 CREAM TOPICAL at 01:14

## 2020-04-25 RX ADMIN — PANTOPRAZOLE SODIUM 40 MILLIGRAM(S): 20 TABLET, DELAYED RELEASE ORAL at 05:26

## 2020-04-25 RX ADMIN — Medication 50 MICROGRAM(S): at 05:26

## 2020-04-25 RX ADMIN — Medication 10 MILLIGRAM(S): at 05:26

## 2020-04-25 RX ADMIN — PYRIDOSTIGMINE BROMIDE 60 MILLIGRAM(S): 60 SOLUTION ORAL at 22:02

## 2020-04-25 RX ADMIN — ZINC OXIDE 1 APPLICATION(S): 200 OINTMENT TOPICAL at 12:48

## 2020-04-25 RX ADMIN — ENOXAPARIN SODIUM 40 MILLIGRAM(S): 100 INJECTION SUBCUTANEOUS at 12:48

## 2020-04-25 RX ADMIN — Medication 1 PACKET(S): at 12:48

## 2020-04-25 RX ADMIN — HYDROMORPHONE HYDROCHLORIDE 0.2 MILLIGRAM(S): 2 INJECTION INTRAMUSCULAR; INTRAVENOUS; SUBCUTANEOUS at 21:50

## 2020-04-25 RX ADMIN — PYRIDOSTIGMINE BROMIDE 60 MILLIGRAM(S): 60 SOLUTION ORAL at 15:37

## 2020-04-25 RX ADMIN — PYRIDOSTIGMINE BROMIDE 180 MILLIGRAM(S): 60 SOLUTION ORAL at 12:49

## 2020-04-25 RX ADMIN — HYDROMORPHONE HYDROCHLORIDE 0.2 MILLIGRAM(S): 2 INJECTION INTRAMUSCULAR; INTRAVENOUS; SUBCUTANEOUS at 03:14

## 2020-04-25 NOTE — PROGRESS NOTE ADULT - PROBLEM SELECTOR PLAN 2
On 15L NRB, SpO2 desaturation to 70% intermittently when supine.  Improves somewhat when prone, bed hyperinflated, frequent chest PT.   -PT is DNR/DNI and has severe COVID-19 associated hypoxic respiratory failure.   -PT also at very high risk of rapid decompensation secondary to superimposed neuromuscular respiratory failure due to myasthenia gravis.  Will discuss further with outpatient neuromuscular specialist, Dr. Fitzgerald, but on initial contact likely her current presentation is mostly due to COVID-19 rather than MG.   -GOC discussion with family and patient-->Palliative care on board, recs for intermittent dilaudid for severe pain, caution with respiratory depression On 15L NRB, mild improvement 04/25 with being able to sit up in bed eating on 15L NC without SpO2 desaturation.  When in distress, SpO2 sometimes improves when prone, bed hyperinflated, frequent chest PT.   -PT is DNR/DNI and has severe COVID-19 associated hypoxic respiratory failure.   -PT also at very high risk of rapid decompensation secondary to superimposed neuromuscular respiratory failure due to myasthenia gravis.  Will discuss further with outpatient neuromuscular specialist, Dr. Fitzgerald, but on initial contact likely her current presentation is mostly due to COVID-19 rather than MG.   -GOC discussion with family and patient-->Palliative care on board, recs for intermittent Dilaudid for severe pain, caution with respiratory depression On 15L NRB, mild improvement 04/25 with being able to sit up in bed eating on 15L NC without SpO2 desaturation.  When in distress, SpO2 sometimes improves when prone, bed hyperinflated, frequent chest PT.   -PT is DNR/DNI and has severe COVID-19 associated hypoxic respiratory failure.   -PT also at very high risk of rapid decompensation secondary to superimposed neuromuscular respiratory failure due to myasthenia gravis.  Will discuss further with outpatient neuromuscular specialist, Dr. Fitzgerald, but on initial contact likely her current presentation is mostly due to COVID-19 rather than MG.   -Encourage intermittent Prone position 1-2hrs at a time as tolerated.   -PT has significant back pain when doing so due to hyperextension w/ lumbar stenosis.  First try adding pillow underneath and try alternating between cold or heating packs depending on what PT reports is more helpful.    For back pain during prone position:  -1st line pain med 1000mg IV ofirmev q8hr PRN.  -2nd line pain lidocaine patches PRN  -3rd line 0.2 mg IV dilaudid q3hrs PRN, hold for RR <18 and/or oversedation.  DO NOT GIVE ANY BENZODIAZAPINES GIVEN FURTHER respiratory depression with concominant use.   -If PT has severe pain not amenable to dilaudid can move timing of ofirmev to give at same time.   -GOC discussion with family and patient-->Palliative care on board, recs for intermittent Dilaudid for severe pain, caution with respiratory depression.   -Will continue weaning trials and if clinically improves will d/w [facility] for return.

## 2020-04-25 NOTE — PROGRESS NOTE ADULT - PROBLEM SELECTOR PLAN 1
-S/P Solumedrol 04/03/2020-04/07/2020  -S/P Plaquenil 03/31-04/04  -Transferred from [facility] for Convalescent Plasma Transfusion->S/p plasma transfusion 4/24-CRP and ferritin slightly decreasing  -Anakinra d/w ID but holding off 2/2 having already received immunosuppression Eculizumab (Solaris) for MG -S/P Solumedrol 04/03/2020-04/07/2020  -S/P Plaquenil 03/31-04/04  -Transferred from [facility] for Convalescent Plasma Transfusion->S/p plasma transfusion 4/24-CRP and ferritin slightly decreasing.  No further plans for convalescent plasma treatments.   -Anakinra d/w ID but holding off 2/2 having already received immunosuppression Eculizumab (Solaris) for MG

## 2020-04-25 NOTE — CHART NOTE - NSCHARTNOTEFT_GEN_A_CORE
Case discussed with primary team regarding the patient's high degree of clinical complexity and superimposed respiratory compromise (COVID+ and myasthenia gravis).  Employ rational polypharmacy to maximize analgesia using a multi-agent approach  Avoid benzodiazepine use given additive risk of respiratory depression  May consider concurrent IV APAP and IV dilaudid to enhance analgesic impact during proning attempts.      In the event of newly developing, evolving, or worsening symptoms, please contact the Palliative Medicine team via pager (if the patient is at [facility] #[phone] or if the patient is at [facility] #[phone]) The Geriatric and Palliative Medicine service has coverage 24 hours a day/ 7 days a week to provide medical recommendations regarding symptom management needs via telephone

## 2020-04-25 NOTE — PROGRESS NOTE ADULT - ASSESSMENT
80 y/o RH-F w/ h/o MG on mestinon 60mg TID, s/p Solaris tapering dose of prednisone, HTN and recent acute hypoxemic respiratory failure with sepsis in setting of COVID-19 infection s/p solumedrol 4/3-4/7 + Plaquenil 3/31-4/4 who presented for plasma COVID therapy. 80 y/o RH-F w/ h/o Myasthenia Gravis on mestinon 60mg TID and 180 mg Mestinon extended release at night, s/p Solaris tapering dose of prednisone, HTN and recent acute hypoxemic respiratory failure with sepsis in setting of COVID-19 infection s/p solumedrol 4/3-4/7 + Plaquenil 3/31-4/4 who presented for plasma COVID therapy.

## 2020-04-25 NOTE — PROGRESS NOTE ADULT - SUBJECTIVE AND OBJECTIVE BOX
Patient is a 79y old  Female who presents with a chief complaint of Shortness of breath, fevers (23 Apr 2020 15:58)      SUBJECTIVE / OVERNIGHT EVENTS:    MEDICATIONS  (STANDING):  atorvastatin 20 milliGRAM(s) Oral at bedtime  cholecalciferol 2000 Unit(s) Oral daily  enoxaparin Injectable 40 milliGRAM(s) SubCutaneous daily  levothyroxine 50 MICROGram(s) Oral daily  lidocaine   Patch 2 Patch Transdermal daily  melatonin 5 milliGRAM(s) Oral at bedtime  pantoprazole    Tablet 40 milliGRAM(s) Oral before breakfast  polyethylene glycol 3350 17 Gram(s) Oral daily  potassium phosphate / sodium phosphate powder 1 Packet(s) Oral once  predniSONE   Tablet 10 milliGRAM(s) Oral daily  pyridostigmine 60 milliGRAM(s) Oral three times a day  pyridostigmine  milliGRAM(s) Oral daily  zinc oxide 40% Ointment 1 Application(s) Topical daily    MEDICATIONS  (PRN):  acetaminophen   Tablet .. 650 milliGRAM(s) Oral every 4 hours PRN Temp greater or equal to 38C (100.4F), Moderate Pain (4 - 6)  ALBUTerol    90 MICROgram(s) HFA Inhaler 2 Puff(s) Inhalation every 4 hours PRN Shortness of Breath and/or Wheezing  benzonatate 100 milliGRAM(s) Oral three times a day PRN Cough  HYDROmorphone  Injectable 0.2 milliGRAM(s) IV Push every 3 hours PRN moderate/severe pain  senna 2 Tablet(s) Oral at bedtime PRN Constipation  sodium chloride 0.65% Nasal 1 Spray(s) Both Nostrils two times a day PRN Nasal Congestion    Vital Signs Last 24 Hrs  T(C): 36.4 (25 Apr 2020 05:00), Max: 36.6 (24 Apr 2020 14:10)  T(F): 97.5 (25 Apr 2020 05:00), Max: 97.9 (24 Apr 2020 14:10)  HR: 74 (25 Apr 2020 05:00) (64 - 81)  BP: 106/63 (25 Apr 2020 05:00) (106/63 - 131/62)  BP(mean): --  RR: 20 (25 Apr 2020 05:00) (20 - 20)  SpO2: 99% (25 Apr 2020 05:00) (98% - 99%)    PHYSICAL EXAM:  GENERAL: Overweight, significant respiratory distress.   EYES: EOMI, PERRLA, conjunctiva and sclera clear  CHEST/LUNG: Prominent B/L expiratory wheezing, Bibasilar fine crackles, increased work of breathing, desaturating to 70s when supine  HEART: Regular rate and rhythm; No murmurs, rubs, or gallops  ABDOMEN: Soft, Nontender, Nondistended; Bowel sounds present  EXTREMITIES:  2+ Peripheral Pulses, No clubbing, cyanosis, or edema  NEUROLOGY: AAOx3, Neck flexion full strength,  strength full strength, proximal B/L arm strength 4-/5  SKIN: No rashes or lesions    LABS:                        8.3    7.97  )-----------( 271      ( 25 Apr 2020 06:47 )             27.6   04-25    144  |  105  |  18  ----------------------------<  91  3.9   |  29  |  0.67    Ca    8.9      25 Apr 2020 06:47  Phos  2.3     04-25  Mg     1.9     04-25    TPro  5.9<L>  /  Alb  2.5<L>  /  TBili  0.3  /  DBili  x   /  AST  19  /  ALT  20  /  AlkPhos  72  04-25    PT/INR - ( 24 Apr 2020 06:58 )   PT: 13.3 sec;   INR: 1.16 ratio PTT - ( 24 Apr 2020 06:58 )  PTT:29.3 sec    COVID-19 LABS  T(C): 36.4 (04-25-20 @ 05:00), Max: 36.6 (04-24-20 @ 14:10)  HR: 74 (04-25-20 @ 05:00) (64 - 81)  BP: 106/63 (04-25-20 @ 05:00) (106/63 - 131/62)  RR: 20 (04-25-20 @ 05:00) (20 - 20)  SpO2: 99% (04-25-20 @ 05:00) (98% - 99%)    COVID-19 PCR: Detected (03-30)    D-Dimer Assay, Quantitative: 2576 ng/mL DDU <H> (04-25)  D-Dimer Assay, Quantitative: 2906 ng/mL DDU <H> (04-24)  D-Dimer Assay, Quantitative: 3662 ng/mL DDU <H> (04-23)  D-Dimer Assay, Quantitative: 4566 ng/mL DDU <H> (04-19)    Ferritin, Serum: 457 ng/mL <H> (04-24)  Ferritin, Serum: 525 ng/mL <H> (04-19)  Ferritin, Serum: 560 ng/mL <H> (04-15)  Ferritin, Serum: 469 ng/mL <H> (04-13)    C-Reactive Protein, Serum: 4.79 mg/dL <H> (04-24)  C-Reactive Protein, Serum: 12.20 mg/dL <H> (04-19)  C-Reactive Protein, Serum: 20.82 mg/dL <H> (04-15)  C-Reactive Protein, Serum: 15.92 mg/dL <H> (04-13)    Auto Lymphocyte #: 0.90 K/uL <L> (04-25)  Auto Lymphocyte #: 0.86 K/uL <L> (04-24)  Auto Lymphocyte #: 1.35 K/uL (04-23)    Pro-Calcitonin (Superimposed bacterial infection)   Procalcitonin, Serum: 0.16 ng/mL <H> (04-19)  Procalcitonin, Serum: 0.27 ng/mL <H> (04-15)    Meds Received  -S/P Solumedrol 04/03/2020-04/07/2020  -S/P Plaquenil 03/31-04/04  -S/P Convalescent Plasma  -On chronic predniSONE 10 daily re: MG Patient is a 79y old  Female who presents with a chief complaint of Shortness of breath, fevers (23 Apr 2020 15:58)    SUBJECTIVE / OVERNIGHT EVENTS: Notes no change from yesterday, continues to endorse SOB.  Endorses B/L flank pain intermittently (chronic).  Is able to tolerate sitting up supine more now.      MEDICATIONS  (STANDING):  atorvastatin 20 milliGRAM(s) Oral at bedtime  cholecalciferol 2000 Unit(s) Oral daily  enoxaparin Injectable 40 milliGRAM(s) SubCutaneous daily  levothyroxine 50 MICROGram(s) Oral daily  lidocaine   Patch 2 Patch Transdermal daily  melatonin 5 milliGRAM(s) Oral at bedtime  pantoprazole    Tablet 40 milliGRAM(s) Oral before breakfast  polyethylene glycol 3350 17 Gram(s) Oral daily  potassium phosphate / sodium phosphate powder 1 Packet(s) Oral once  predniSONE   Tablet 10 milliGRAM(s) Oral daily  pyridostigmine 60 milliGRAM(s) Oral three times a day  pyridostigmine  milliGRAM(s) Oral daily  zinc oxide 40% Ointment 1 Application(s) Topical daily    MEDICATIONS  (PRN):  acetaminophen   Tablet .. 650 milliGRAM(s) Oral every 4 hours PRN Temp greater or equal to 38C (100.4F), Moderate Pain (4 - 6)  ALBUTerol    90 MICROgram(s) HFA Inhaler 2 Puff(s) Inhalation every 4 hours PRN Shortness of Breath and/or Wheezing  benzonatate 100 milliGRAM(s) Oral three times a day PRN Cough  HYDROmorphone  Injectable 0.2 milliGRAM(s) IV Push every 3 hours PRN moderate/severe pain  senna 2 Tablet(s) Oral at bedtime PRN Constipation  sodium chloride 0.65% Nasal 1 Spray(s) Both Nostrils two times a day PRN Nasal Congestion    Vital Signs Last 24 Hrs  T(C): 36.4 (25 Apr 2020 05:00), Max: 36.6 (24 Apr 2020 14:10)  T(F): 97.5 (25 Apr 2020 05:00), Max: 97.9 (24 Apr 2020 14:10)  HR: 74 (25 Apr 2020 05:00) (64 - 81)  BP: 106/63 (25 Apr 2020 05:00) (106/63 - 131/62)  BP(mean): --  RR: 20 (25 Apr 2020 05:00) (20 - 20)  SpO2: 99% (25 Apr 2020 05:00) (98% - 99%)    PHYSICAL EXAM:  GENERAL: Overweight, significant respiratory distress.   EYES: EOMI, PERRLA, conjunctiva and sclera clear  CHEST/LUNG: Less wheezing, Bibasilar fine crackles, mildly improved work of breathing, but still labored.  No longer recruiting accessory abdominal muscles of respiration.  Sitting up eating breakfast with NC rather than NRB and SpO2 93%  HEART: Regular rate and rhythm; No murmurs, rubs, or gallops  ABDOMEN: Soft, Nontender, Nondistended; Bowel sounds present  EXTREMITIES:  2+ Peripheral Pulses, No clubbing, cyanosis, or edema  NEUROLOGY: AAOx3, Neck flexion full strength,  strength full strength, proximal B/L arm strength 4-/5  SKIN: No rashes or lesions    LABS:                        8.3    7.97  )-----------( 271      ( 25 Apr 2020 06:47 )             27.6   04-25    144  |  105  |  18  ----------------------------<  91  3.9   |  29  |  0.67    Ca    8.9      25 Apr 2020 06:47  Phos  2.3     04-25  Mg     1.9     04-25    TPro  5.9<L>  /  Alb  2.5<L>  /  TBili  0.3  /  DBili  x   /  AST  19  /  ALT  20  /  AlkPhos  72  04-25    PT/INR - ( 24 Apr 2020 06:58 )   PT: 13.3 sec;   INR: 1.16 ratio PTT - ( 24 Apr 2020 06:58 )  PTT:29.3 sec    COVID-19 PCR: Detected (03-30)    D-Dimer Assay, Quantitative: 2576 ng/mL DDU <H> (04-25)  D-Dimer Assay, Quantitative: 2906 ng/mL DDU <H> (04-24)  D-Dimer Assay, Quantitative: 3662 ng/mL DDU <H> (04-23)  D-Dimer Assay, Quantitative: 4566 ng/mL DDU <H> (04-19)    Ferritin, Serum: 457 ng/mL <H> (04-24)  Ferritin, Serum: 525 ng/mL <H> (04-19)  Ferritin, Serum: 560 ng/mL <H> (04-15)  Ferritin, Serum: 469 ng/mL <H> (04-13)    C-Reactive Protein, Serum: 4.79 mg/dL <H> (04-24)  C-Reactive Protein, Serum: 12.20 mg/dL <H> (04-19)  C-Reactive Protein, Serum: 20.82 mg/dL <H> (04-15)  C-Reactive Protein, Serum: 15.92 mg/dL <H> (04-13)    Auto Lymphocyte #: 0.90 K/uL <L> (04-25)  Auto Lymphocyte #: 0.86 K/uL <L> (04-24)  Auto Lymphocyte #: 1.35 K/uL (04-23)    Pro-Calcitonin (Superimposed bacterial infection)   Procalcitonin, Serum: 0.16 ng/mL <H> (04-19)  Procalcitonin, Serum: 0.27 ng/mL <H> (04-15)    Meds Received  -S/P Solumedrol 04/03/2020-04/07/2020  -S/P Plaquenil 03/31-04/04  -S/P Convalescent Plasma  -On chronic predniSONE 10 daily re: MG Patient is a 79y old  Female who presents with a chief complaint of Shortness of breath, fevers (23 Apr 2020 15:58)    SUBJECTIVE / OVERNIGHT EVENTS: Notes no change from yesterday, continues to endorse SOB.  Endorses B/L flank pain intermittently (chronic).  Is able to tolerate sitting up supine more now.  Nursing report PT intermittently able to tolerate 6L NC at rest while eating, though often gets anxious and states cannot breath asks for NRB which is on 15L.     MEDICATIONS  (STANDING):  atorvastatin 20 milliGRAM(s) Oral at bedtime  cholecalciferol 2000 Unit(s) Oral daily  enoxaparin Injectable 40 milliGRAM(s) SubCutaneous daily  levothyroxine 50 MICROGram(s) Oral daily  lidocaine   Patch 2 Patch Transdermal daily  melatonin 5 milliGRAM(s) Oral at bedtime  pantoprazole    Tablet 40 milliGRAM(s) Oral before breakfast  polyethylene glycol 3350 17 Gram(s) Oral daily  potassium phosphate / sodium phosphate powder 1 Packet(s) Oral once  predniSONE   Tablet 10 milliGRAM(s) Oral daily  pyridostigmine 60 milliGRAM(s) Oral three times a day  pyridostigmine  milliGRAM(s) Oral daily  zinc oxide 40% Ointment 1 Application(s) Topical daily    MEDICATIONS  (PRN):  acetaminophen   Tablet .. 650 milliGRAM(s) Oral every 4 hours PRN Temp greater or equal to 38C (100.4F), Moderate Pain (4 - 6)  ALBUTerol    90 MICROgram(s) HFA Inhaler 2 Puff(s) Inhalation every 4 hours PRN Shortness of Breath and/or Wheezing  benzonatate 100 milliGRAM(s) Oral three times a day PRN Cough  HYDROmorphone  Injectable 0.2 milliGRAM(s) IV Push every 3 hours PRN moderate/severe pain  senna 2 Tablet(s) Oral at bedtime PRN Constipation  sodium chloride 0.65% Nasal 1 Spray(s) Both Nostrils two times a day PRN Nasal Congestion    Vital Signs Last 24 Hrs  T(C): 36.4 (25 Apr 2020 05:00), Max: 36.6 (24 Apr 2020 14:10)  T(F): 97.5 (25 Apr 2020 05:00), Max: 97.9 (24 Apr 2020 14:10)  HR: 74 (25 Apr 2020 05:00) (64 - 81)  BP: 106/63 (25 Apr 2020 05:00) (106/63 - 131/62)  BP(mean): --  RR: 20 (25 Apr 2020 05:00) (20 - 20)  SpO2: 99% (25 Apr 2020 05:00) (98% - 99%)    PHYSICAL EXAM:  GENERAL: Overweight, significant respiratory distress.   EYES: EOMI, PERRLA, conjunctiva and sclera clear  CHEST/LUNG: Less wheezing, Bibasilar fine crackles, mildly improved work of breathing, but still labored.  No longer recruiting accessory abdominal muscles of respiration.  Sitting up eating breakfast with NC rather than NRB and SpO2 93%  HEART: Regular rate and rhythm; No murmurs, rubs, or gallops  ABDOMEN: Soft, Nontender, Nondistended; Bowel sounds present  EXTREMITIES:  2+ Peripheral Pulses, No clubbing, cyanosis, or edema  NEUROLOGY: AAOx3, Neck flexion full strength,  strength full strength, proximal B/L arm strength 4-/5  SKIN: No rashes or lesions    LABS:                        8.3    7.97  )-----------( 271      ( 25 Apr 2020 06:47 )             27.6   04-25    144  |  105  |  18  ----------------------------<  91  3.9   |  29  |  0.67    Ca    8.9      25 Apr 2020 06:47  Phos  2.3     04-25  Mg     1.9     04-25    TPro  5.9<L>  /  Alb  2.5<L>  /  TBili  0.3  /  DBili  x   /  AST  19  /  ALT  20  /  AlkPhos  72  04-25    PT/INR - ( 24 Apr 2020 06:58 )   PT: 13.3 sec;   INR: 1.16 ratio PTT - ( 24 Apr 2020 06:58 )  PTT:29.3 sec    COVID-19 PCR: Detected (03-30)    D-Dimer Assay, Quantitative: 2576 ng/mL DDU <H> (04-25)  D-Dimer Assay, Quantitative: 2906 ng/mL DDU <H> (04-24)  D-Dimer Assay, Quantitative: 3662 ng/mL DDU <H> (04-23)  D-Dimer Assay, Quantitative: 4566 ng/mL DDU <H> (04-19)    Ferritin, Serum: 457 ng/mL <H> (04-24)  Ferritin, Serum: 525 ng/mL <H> (04-19)  Ferritin, Serum: 560 ng/mL <H> (04-15)  Ferritin, Serum: 469 ng/mL <H> (04-13)    C-Reactive Protein, Serum: 4.79 mg/dL <H> (04-24)  C-Reactive Protein, Serum: 12.20 mg/dL <H> (04-19)  C-Reactive Protein, Serum: 20.82 mg/dL <H> (04-15)  C-Reactive Protein, Serum: 15.92 mg/dL <H> (04-13)    Auto Lymphocyte #: 0.90 K/uL <L> (04-25)  Auto Lymphocyte #: 0.86 K/uL <L> (04-24)  Auto Lymphocyte #: 1.35 K/uL (04-23)    Pro-Calcitonin (Superimposed bacterial infection)   Procalcitonin, Serum: 0.16 ng/mL <H> (04-19)  Procalcitonin, Serum: 0.27 ng/mL <H> (04-15)    Meds Received  -S/P Solumedrol 04/03/2020-04/07/2020  -S/P Plaquenil 03/31-04/04  -S/P Convalescent Plasma  -On chronic predniSONE 10 daily re: MG

## 2020-04-25 NOTE — PROGRESS NOTE ADULT - PROBLEM SELECTOR PLAN 4
Continue mestinon home dosing  Given solumedrol 40mg x 1  CLOSE MONITORING OF RR and daily VBGs  NIF and VC should be done at minimum q6hrs, but due to current pandemic and DNR/DNI status, unable to perform.  Will discuss at least daily NIF and VC monitoring  As a proxy, can also ask PT to count from 20 to 0 in one breath and look for tachypnea, nasal tone intonation change, neck flexion weakness and hypercarbia.   PT's get hypercarbic and tachypneic before becoming hypoxic and thus SpO2 is not an accurate enough proxy of MG status Continue mestinon home dosing  Given solumedrol 40mg x 1  CLOSE MONITORING OF RR, respiratory effort, neck flexion, strength.   Neuro checks q4hr  NIF and VC should be done at minimum q6hrs, but due to current pandemic and DNR/DNI status, unable to perform.  Will discuss at least daily NIF and VC monitoring  As a proxy, can also ask PT to count from 20 to 0 in one breath and look for tachypnea, nasal tone intonation change, neck flexion weakness and hypercarbia.   PT's get hypercarbic and tachypneic before becoming hypoxic and thus SpO2 is not an accurate enough proxy of MG status

## 2020-04-25 NOTE — PROGRESS NOTE ADULT - ATTENDING COMMENTS
79 year old woman with history of mysthenia gravis on pyridostigmine 60mg TID, s/p eculizumab tapering dose of prednisone, HTN and recent acute hypoxemic respiratory failure with sepsis in setting of COVID-19 infection. COVID-19 PCR positive 3/31. He has received solumedrol 4/3-4/7 and Plaquenil 3/31-4/4. Previous team has discussed with ID about Anakinra trial but was told to hold off given that he had  received eculizumab in the past for mysthenia gravis.   She was transferred from [facility] 4/22 evening for convalescent plasma transfusion. On 4/23, rapid response team was called for oxygen saturation in the 70s. She was proned. Solumedrol 40mg and IV tylenol given. She was proned given that she was desatting despite of requiring 15L of NRB. On 4/24, s/p convalescent plasma. She is supine and is on NRB and NC today. Overall, not in as much distress. She still need close monitoring given her high oxygen requirement. Overall prognosis guarded given that she is an elderly with chronic mysthenia gravis ongoing hypoxemic respiratory failure.    Relevant data:  CBC and chemistry unremarkable. No evidence of renal dysfunction.  CRP 4.40<20.82 (4/15) << 27.92 (4/3)  Ferritin 4424 <<457<<525<<590(4/5)  D-dimer 2576 <<4566 (4/19)  Album 2.5 (severely low)  last CXR 4/14 bilateral opacities  LE duplex 3/19 negative      Plan:  - Currently on NRB 15 L and NC (Goal oxygen sation low 90%)   - Continue with self- proning as needed   - Neuromuscular consult appreciated- Currently on pyridostigmine ER 180mg po daily, Mestinon (pyridostigmine) 60mg po tid  - On Prednisone 10mg PO qd per neurology recs  - Tylenol high dose Q8hr prn,  Lidoderm patch, and a low does dilaudid pushes for severe pain PRN (palliative care consult appreciated  - Continue levothyroixine  - DVT PPx: Lovenox daily  - Diet: S+S Eval recommended NPO, but patient and HCP prefer dysphagia 1 with honey thickened liquids despite risks of aspiration  -She is DNR/DNI; Daughter Patsy ( [phone]) .    Alexus Holguin D.O.  [phone] 79 year old woman with history of mysthenia gravis on pyridostigmine 60mg TID, s/p eculizumab tapering dose of prednisone, HTN and recent acute hypoxemic respiratory failure with sepsis in setting of COVID-19 infection. COVID-19 PCR positive 3/31. He has received solumedrol 4/3-4/7 and Plaquenil 3/31-4/4. Previous team has discussed with ID about Anakinra trial but was told to hold off given that he had  received eculizumab in the past for mysthenia gravis.   She was transferred from [facility] 4/22 evening for convalescent plasma transfusion. On 4/23, rapid response team was called for oxygen saturation in the 70s. She was proned. Solumedrol 40mg and IV tylenol given. She was proned given that she was desatting despite of requiring 15L of NRB. On 4/24, s/p convalescent plasma. She is supine and is on NRB and NC today. Overall, not in as much distress. She still need close monitoring given her high oxygen requirement. Overall prognosis guarded given that she is an elderly with chronic mysthenia gravis ongoing hypoxemic respiratory failure.    Relevant data:  CBC and chemistry unremarkable. No evidence of renal dysfunction.  CRP 4.40<20.82 (4/15) << 27.92 (4/3)  Ferritin 4424 <<457<<525<<590(4/5)  D-dimer 2576 <<4566 (4/19)  Album 2.5 (severely low)  last CXR 4/14 bilateral opacities  LE duplex 3/19 negative      Plan:  - Currently on NRB 15 L and NC (Goal oxygen sation low 90%)   - Continue with self- proning as needed   - Avoid benzodiazepines or additional narcotics. Try to control pain with Tylenol and lidocaine patch.   - Currently on pyridostigmine ER 180mg po daily and 60mg po and prednisone 10mg PO qd per neurology recs. My resident had spoken with  and he felt that her respiratory failure is more related to COVID infection rather that mysthenia gravis. Mysthenia gravis has been controlled prior to COVID-19 infection.  - Tylenol high dose Q8hr prn,  Lidoderm patch, and a low does dilaudid pushes for severe pain PRN (palliative care consult appreciated.  Recommends intermittent a low dose Dilaudid for severe pain, caution with respiratory depression)  - Continue levothyroxine  - DVT PPx: Lovenox daily  - Diet: S+S Eval recommended NPO, but patient and HCP prefer dysphagia 1 with honey thickened liquids despite risks of aspiration  -She is DNR/DNI; Daughter Patsy ( [phone]) .    Alexus Holguin D.O.  [phone]

## 2020-04-26 LAB
ALBUMIN SERPL ELPH-MCNC: 2.5 G/DL — LOW (ref 3.3–5)
ALP SERPL-CCNC: 69 U/L — SIGNIFICANT CHANGE UP (ref 40–120)
ALT FLD-CCNC: 19 U/L — SIGNIFICANT CHANGE UP (ref 10–45)
ANION GAP SERPL CALC-SCNC: 11 MMOL/L — SIGNIFICANT CHANGE UP (ref 5–17)
APPEARANCE UR: ABNORMAL
APTT BLD: 29.5 SEC — SIGNIFICANT CHANGE UP (ref 27.5–36.3)
AST SERPL-CCNC: 18 U/L — SIGNIFICANT CHANGE UP (ref 10–40)
BACTERIA # UR AUTO: ABNORMAL
BASOPHILS # BLD AUTO: 0 K/UL — SIGNIFICANT CHANGE UP (ref 0–0.2)
BASOPHILS NFR BLD AUTO: 0 % — SIGNIFICANT CHANGE UP (ref 0–2)
BILIRUB SERPL-MCNC: 0.2 MG/DL — SIGNIFICANT CHANGE UP (ref 0.2–1.2)
BILIRUB UR-MCNC: NEGATIVE — SIGNIFICANT CHANGE UP
BUN SERPL-MCNC: 20 MG/DL — SIGNIFICANT CHANGE UP (ref 7–23)
CALCIUM SERPL-MCNC: 8.8 MG/DL — SIGNIFICANT CHANGE UP (ref 8.4–10.5)
CHLORIDE SERPL-SCNC: 104 MMOL/L — SIGNIFICANT CHANGE UP (ref 96–108)
CO2 SERPL-SCNC: 27 MMOL/L — SIGNIFICANT CHANGE UP (ref 22–31)
COD CRY URNS QL: ABNORMAL
COLOR SPEC: YELLOW — SIGNIFICANT CHANGE UP
COMMENT - URINE: SIGNIFICANT CHANGE UP
CREAT SERPL-MCNC: 0.66 MG/DL — SIGNIFICANT CHANGE UP (ref 0.5–1.3)
CRP SERPL-MCNC: 3.37 MG/DL — HIGH (ref 0–0.4)
D DIMER BLD IA.RAPID-MCNC: 1788 NG/ML DDU — HIGH
DIFF PNL FLD: NEGATIVE — SIGNIFICANT CHANGE UP
EOSINOPHIL # BLD AUTO: 0.02 K/UL — SIGNIFICANT CHANGE UP (ref 0–0.5)
EOSINOPHIL NFR BLD AUTO: 0.3 % — SIGNIFICANT CHANGE UP (ref 0–6)
EPI CELLS # UR: 10 — SIGNIFICANT CHANGE UP
FERRITIN SERPL-MCNC: 412 NG/ML — HIGH (ref 15–150)
GLUCOSE SERPL-MCNC: 143 MG/DL — HIGH (ref 70–99)
GLUCOSE UR QL: NEGATIVE — SIGNIFICANT CHANGE UP
HCT VFR BLD CALC: 28.6 % — LOW (ref 34.5–45)
HGB BLD-MCNC: 8.3 G/DL — LOW (ref 11.5–15.5)
HYALINE CASTS # UR AUTO: 7 /LPF — SIGNIFICANT CHANGE UP (ref 0–7)
IMM GRANULOCYTES NFR BLD AUTO: 2.3 % — HIGH (ref 0–1.5)
INR BLD: 1.13 RATIO — SIGNIFICANT CHANGE UP (ref 0.88–1.16)
KETONES UR-MCNC: NEGATIVE — SIGNIFICANT CHANGE UP
LEUKOCYTE ESTERASE UR-ACNC: NEGATIVE — SIGNIFICANT CHANGE UP
LYMPHOCYTES # BLD AUTO: 0.57 K/UL — LOW (ref 1–3.3)
LYMPHOCYTES # BLD AUTO: 8.2 % — LOW (ref 13–44)
MAGNESIUM SERPL-MCNC: 1.8 MG/DL — SIGNIFICANT CHANGE UP (ref 1.6–2.6)
MCHC RBC-ENTMCNC: 25.1 PG — LOW (ref 27–34)
MCHC RBC-ENTMCNC: 29 GM/DL — LOW (ref 32–36)
MCV RBC AUTO: 86.4 FL — SIGNIFICANT CHANGE UP (ref 80–100)
MONOCYTES # BLD AUTO: 0.3 K/UL — SIGNIFICANT CHANGE UP (ref 0–0.9)
MONOCYTES NFR BLD AUTO: 4.3 % — SIGNIFICANT CHANGE UP (ref 2–14)
NEUTROPHILS # BLD AUTO: 5.87 K/UL — SIGNIFICANT CHANGE UP (ref 1.8–7.4)
NEUTROPHILS NFR BLD AUTO: 84.9 % — HIGH (ref 43–77)
NITRITE UR-MCNC: NEGATIVE — SIGNIFICANT CHANGE UP
NRBC # BLD: 0 /100 WBCS — SIGNIFICANT CHANGE UP (ref 0–0)
PH UR: 6 — SIGNIFICANT CHANGE UP (ref 5–8)
PHOSPHATE SERPL-MCNC: 3 MG/DL — SIGNIFICANT CHANGE UP (ref 2.5–4.5)
PLATELET # BLD AUTO: 234 K/UL — SIGNIFICANT CHANGE UP (ref 150–400)
POTASSIUM SERPL-MCNC: 4.1 MMOL/L — SIGNIFICANT CHANGE UP (ref 3.5–5.3)
POTASSIUM SERPL-SCNC: 4.1 MMOL/L — SIGNIFICANT CHANGE UP (ref 3.5–5.3)
PROT SERPL-MCNC: 6 G/DL — SIGNIFICANT CHANGE UP (ref 6–8.3)
PROT UR-MCNC: 100 — SIGNIFICANT CHANGE UP
PROTHROM AB SERPL-ACNC: 13.1 SEC — HIGH (ref 10–12.9)
RBC # BLD: 3.31 M/UL — LOW (ref 3.8–5.2)
RBC # FLD: 22 % — HIGH (ref 10.3–14.5)
RBC CASTS # UR COMP ASSIST: 8 /HPF — HIGH (ref 0–4)
SODIUM SERPL-SCNC: 142 MMOL/L — SIGNIFICANT CHANGE UP (ref 135–145)
SP GR SPEC: 1.03 — HIGH (ref 1.01–1.02)
UROBILINOGEN FLD QL: NEGATIVE — SIGNIFICANT CHANGE UP
WBC # BLD: 6.92 K/UL — SIGNIFICANT CHANGE UP (ref 3.8–10.5)
WBC # FLD AUTO: 6.92 K/UL — SIGNIFICANT CHANGE UP (ref 3.8–10.5)
WBC UR QL: 9 /HPF — HIGH (ref 0–5)

## 2020-04-26 PROCEDURE — ZZZZZ: CPT | Mod: CS

## 2020-04-26 PROCEDURE — 74018 RADEX ABDOMEN 1 VIEW: CPT | Mod: 26

## 2020-04-26 RX ORDER — POLYETHYLENE GLYCOL 3350 17 G/17G
17 POWDER, FOR SOLUTION ORAL DAILY
Refills: 0 | Status: DISCONTINUED | OUTPATIENT
Start: 2020-04-26 | End: 2020-05-08

## 2020-04-26 RX ADMIN — PANTOPRAZOLE SODIUM 40 MILLIGRAM(S): 20 TABLET, DELAYED RELEASE ORAL at 05:32

## 2020-04-26 RX ADMIN — Medication 650 MILLIGRAM(S): at 09:17

## 2020-04-26 RX ADMIN — Medication 1 ENEMA: at 14:34

## 2020-04-26 RX ADMIN — HYDROMORPHONE HYDROCHLORIDE 0.2 MILLIGRAM(S): 2 INJECTION INTRAMUSCULAR; INTRAVENOUS; SUBCUTANEOUS at 05:32

## 2020-04-26 RX ADMIN — ATORVASTATIN CALCIUM 20 MILLIGRAM(S): 80 TABLET, FILM COATED ORAL at 22:04

## 2020-04-26 RX ADMIN — PYRIDOSTIGMINE BROMIDE 180 MILLIGRAM(S): 60 SOLUTION ORAL at 12:43

## 2020-04-26 RX ADMIN — LIDOCAINE 2 PATCH: 4 CREAM TOPICAL at 00:00

## 2020-04-26 RX ADMIN — Medication 5 MILLIGRAM(S): at 22:04

## 2020-04-26 RX ADMIN — PYRIDOSTIGMINE BROMIDE 60 MILLIGRAM(S): 60 SOLUTION ORAL at 05:32

## 2020-04-26 RX ADMIN — LIDOCAINE 2 PATCH: 4 CREAM TOPICAL at 19:54

## 2020-04-26 RX ADMIN — Medication 100 MILLIGRAM(S): at 12:44

## 2020-04-26 RX ADMIN — ALBUTEROL 2 PUFF(S): 90 AEROSOL, METERED ORAL at 21:08

## 2020-04-26 RX ADMIN — ZINC OXIDE 1 APPLICATION(S): 200 OINTMENT TOPICAL at 12:44

## 2020-04-26 RX ADMIN — Medication 10 MILLIGRAM(S): at 05:32

## 2020-04-26 RX ADMIN — ENOXAPARIN SODIUM 40 MILLIGRAM(S): 100 INJECTION SUBCUTANEOUS at 12:43

## 2020-04-26 RX ADMIN — Medication 50 MICROGRAM(S): at 05:32

## 2020-04-26 RX ADMIN — Medication 2000 UNIT(S): at 12:43

## 2020-04-26 RX ADMIN — PYRIDOSTIGMINE BROMIDE 60 MILLIGRAM(S): 60 SOLUTION ORAL at 22:05

## 2020-04-26 RX ADMIN — Medication 100 MILLIGRAM(S): at 05:32

## 2020-04-26 RX ADMIN — LIDOCAINE 2 PATCH: 4 CREAM TOPICAL at 12:43

## 2020-04-26 RX ADMIN — Medication 100 MILLIGRAM(S): at 19:54

## 2020-04-26 RX ADMIN — Medication 650 MILLIGRAM(S): at 22:26

## 2020-04-26 RX ADMIN — Medication 650 MILLIGRAM(S): at 16:21

## 2020-04-26 RX ADMIN — PYRIDOSTIGMINE BROMIDE 60 MILLIGRAM(S): 60 SOLUTION ORAL at 16:21

## 2020-04-26 RX ADMIN — POLYETHYLENE GLYCOL 3350 17 GRAM(S): 17 POWDER, FOR SOLUTION ORAL at 12:43

## 2020-04-26 NOTE — PROGRESS NOTE ADULT - ASSESSMENT
78 y/o RH-F w/ h/o Myasthenia Gravis on mestinon 60mg TID and 180 mg Mestinon extended release at night, s/p Solaris tapering dose of prednisone, HTN and recent acute hypoxemic respiratory failure with sepsis in setting of COVID-19 infection s/p solumedrol 4/3-4/7 + Plaquenil 3/31-4/4 who presented for plasma COVID therapy.

## 2020-04-26 NOTE — PROGRESS NOTE ADULT - PROBLEM SELECTOR PLAN 1
-S/P Solumedrol 04/03/2020-04/07/2020  -S/P Plaquenil 03/31-04/04  -Transferred from [facility] for Convalescent Plasma Transfusion->S/p plasma transfusion 4/24-CRP and ferritin slightly decreasing.  No further plans for convalescent plasma treatments.   -Anakinra d/w ID but holding off 2/2 having already received immunosuppression Eculizumab (Solaris) for MG

## 2020-04-26 NOTE — PROGRESS NOTE ADULT - PROBLEM SELECTOR PLAN 2
On 15L NRB, no change from day prior. Still able to sit up in bed on 6L NC intermittently needing addition of NRB up to 15L when very anxious.     When in distress, SpO2 sometimes improves when prone, bed hyperinflated, frequent chest PT.   -PT is DNR/DNI and has severe COVID-19 associated hypoxic respiratory failure.   -PT also at very high risk of rapid decompensation secondary to superimposed neuromuscular respiratory failure due to myasthenia gravis.  Will discuss further with outpatient neuromuscular specialist, Dr. Fitzgerald, but on initial contact likely her current presentation is mostly due to COVID-19 rather than MG.   -Encourage intermittent Prone position 1-2hrs at a time as tolerated.   -PT has significant back pain when doing so due to hyperextension w/ lumbar stenosis.  First try adding pillow underneath and try alternating between cold or heating packs depending on what PT reports is more helpful.    For back pain during prone position:  -1st line pain med 1000mg IV ofirmev q8hr PRN.  -2nd line pain lidocaine patches PRN  -3rd line 0.2 mg IV dilaudid q3hrs PRN, hold for RR <18 and/or oversedation.  DO NOT GIVE ANY BENZODIAZAPINES GIVEN FURTHER respiratory depression with concominant use.   -If PT has severe pain not amenable to dilaudid can move timing of ofirmev to give at same time.   -GOC discussion with family and patient-->Palliative care on board, recs for intermittent Dilaudid for severe pain, caution with respiratory depression.   -Will continue weaning trials and if clinically improves will d/w [facility] for return.

## 2020-04-26 NOTE — PROGRESS NOTE ADULT - PROBLEM SELECTOR PLAN 4
Continue mestinon home dosing  Given solumedrol 40mg x 1  CLOSE MONITORING OF RR, respiratory effort, neck flexion, strength.   Neuro checks q4hr  NIF and VC should be done at minimum q6hrs, but due to current pandemic and DNR/DNI status, unable to perform.  Will discuss at least daily NIF and VC monitoring  As a proxy, can also ask PT to count from 20 to 0 in one breath and look for tachypnea, nasal tone intonation change, neck flexion weakness and hypercarbia.   PT's get hypercarbic and tachypneic before becoming hypoxic and thus SpO2 is not an accurate enough proxy of MG status

## 2020-04-26 NOTE — PROGRESS NOTE ADULT - SUBJECTIVE AND OBJECTIVE BOX
Patient is a 79y old  Female who presents with a chief complaint of Shortness of breath, fevers (23 Apr 2020 15:58)    SUBJECTIVE / OVERNIGHT EVENTS: Continues to endorse SOB.  Endorses B/L flank pain intermittently, worse than normal.  Notes no BM in 2 days.  Is able to tolerate sitting up supine but notes intermittent bouts of anxiety and significant worsening SOb.  Nursing report PT intermittently able to tolerate 6L NC at rest while eating, though often gets anxious and states cannot breath asks for NRB which is on 10-15L     MEDICATIONS  (STANDING):  atorvastatin 20 milliGRAM(s) Oral at bedtime  cholecalciferol 2000 Unit(s) Oral daily  enoxaparin Injectable 40 milliGRAM(s) SubCutaneous daily  levothyroxine 50 MICROGram(s) Oral daily  lidocaine   Patch 2 Patch Transdermal daily  melatonin 5 milliGRAM(s) Oral at bedtime  pantoprazole    Tablet 40 milliGRAM(s) Oral before breakfast  polyethylene glycol 3350 17 Gram(s) Oral daily  predniSONE   Tablet 10 milliGRAM(s) Oral daily  pyridostigmine 60 milliGRAM(s) Oral three times a day  pyridostigmine  milliGRAM(s) Oral daily  zinc oxide 40% Ointment 1 Application(s) Topical daily    MEDICATIONS  (PRN):  acetaminophen   Tablet .. 650 milliGRAM(s) Oral every 4 hours PRN Temp greater or equal to 38C (100.4F), Moderate Pain (4 - 6)  ALBUTerol    90 MICROgram(s) HFA Inhaler 2 Puff(s) Inhalation every 4 hours PRN Shortness of Breath and/or Wheezing  benzonatate 100 milliGRAM(s) Oral three times a day PRN Cough  bisacodyl 5 milliGRAM(s) Oral every 12 hours PRN Constipation  HYDROmorphone  Injectable 0.2 milliGRAM(s) IV Push every 3 hours PRN moderate/severe pain  saline laxative (FLEET) Rectal Enema 1 Enema Rectal once PRN Constipation refractory to miralax + senna  senna 2 Tablet(s) Oral at bedtime PRN Constipation  sodium chloride 0.65% Nasal 1 Spray(s) Both Nostrils two times a day PRN Nasal Congestion    Vital Signs Last 24 Hrs  T(C): 36.4 (26 Apr 2020 13:32), Max: 36.4 (26 Apr 2020 05:15)  T(F): 97.6 (26 Apr 2020 13:32), Max: 97.6 (26 Apr 2020 13:32)  HR: 66 (26 Apr 2020 13:32) (54 - 71)  BP: 111/57 (26 Apr 2020 13:32) (106/67 - 125/79)  BP(mean): --  RR: 18 (26 Apr 2020 13:32) (18 - 20)  SpO2: 100% (26 Apr 2020 13:32) (98% - 100%)    PHYSICAL EXAM:  GENERAL: Overweight, significant respiratory distress.   EYES: EOMI, PERRLA, conjunctiva and sclera clear  CHEST/LUNG: No wheezes, Bibasilar fine crackles R>L.  Moderate distress with increased work of breathing but not recruiting accessory abdominal or neck muscles of respiration.  Sitting up with both NC and NRB with SpO2 97%  HEART: Regular rate and rhythm; No murmurs, rubs, or gallops  ABDOMEN: Soft, mild tenderness to palpation in RLQ and LLQ.  No rebound or guarding.  Nondistended; Bowel sounds present  EXTREMITIES:  2+ Peripheral Pulses, No clubbing, cyanosis, or edema  NEUROLOGY: AAOx3, Neck flexion full strength,  strength full strength, proximal B/L arm strength 4-/5  SKIN: No rashes or lesions    LABS:                        8.3    6.92  )-----------( 234      ( 26 Apr 2020 12:02 )             28.6   04-26    142  |  104  |  20  ----------------------------<  143<H>  4.1   |  27  |  0.66    Ca    8.8      26 Apr 2020 12:02  Phos  3.0     04-26  Mg     1.8     04-26    TPro  6.0  /  Alb  2.5<L>  /  TBili  0.2  /  DBili  x   /  AST  18  /  ALT  19  /  AlkPhos  69  04-26    COVID-19 PCR: Detected (03-30)    D-Dimer Assay, Quantitative: 1788 ng/mL DDU <H> (04-26)  D-Dimer Assay, Quantitative: 2576 ng/mL DDU <H> (04-25)  D-Dimer Assay, Quantitative: 2906 ng/mL DDU <H> (04-24)  D-Dimer Assay, Quantitative: 3662 ng/mL DDU <H> (04-23)  D-Dimer Assay, Quantitative: 4566 ng/mL DDU <H> (04-19)    Ferritin, Serum: 442 ng/mL <H> (04-25)  Ferritin, Serum: 457 ng/mL <H> (04-24)  Ferritin, Serum: 525 ng/mL <H> (04-19)  Ferritin, Serum: 560 ng/mL <H> (04-15)  Ferritin, Serum: 469 ng/mL <H> (04-13)    C-Reactive Protein, Serum: 4.40 mg/dL <H> (04-25)  C-Reactive Protein, Serum: 4.79 mg/dL <H> (04-24)  C-Reactive Protein, Serum: 12.20 mg/dL <H> (04-19)  C-Reactive Protein, Serum: 20.82 mg/dL <H> (04-15)  C-Reactive Protein, Serum: 15.92 mg/dL <H> (04-13)    Auto Lymphocyte #: 0.57 K/uL <L> (04-26)  Auto Lymphocyte #: 0.90 K/uL <L> (04-25)  Auto Lymphocyte #: 0.86 K/uL <L> (04-24)  Auto Lymphocyte #: 1.35 K/uL (04-23)    Pro-Calcitonin (Superimposed bacterial infection)   Procalcitonin, Serum: 0.16 ng/mL <H> (04-19)  Procalcitonin, Serum: 0.27 ng/mL <H> (04-15)    Meds Received  -S/P Solumedrol 04/03/2020-04/07/2020  -S/P Plaquenil 03/31-04/04  -S/P Convalescent Plasma  -On chronic predniSONE 10 daily re: MG

## 2020-04-26 NOTE — PROGRESS NOTE ADULT - ATTENDING COMMENTS
79 year old woman with history of mysthenia gravis on pyridostigmine 60mg TID, s/p eculizumab tapering dose of prednisone, HTN and recent acute hypoxemic respiratory failure with sepsis in setting of COVID-19 infection. COVID-19 PCR positive 3/31. He has received solumedrol 4/3-4/7 and Plaquenil 3/31-4/4. Previous team has discussed with ID about Anakinra trial but was told to hold off given that he had  received eculizumab in the past for mysthenia gravis.    She was transferred from [facility] 4/22 evening for convalescent plasma transfusion. On 4/23, rapid response team was called for oxygen saturation in the 70s. She was proned. Solumedrol 40mg and IV tylenol given. She was proned given that she was desatting despite of requiring 15L of NRB. On 4/24, s/p convalescent plasma. She is supine and is on NRB and NC today. Overall, not in as much distress. She still need close monitoring given her high oxygen requirement. Overall prognosis guarded given that she is an elderly with chronic mysthenia gravis ongoing hypoxemic respiratory failure.    Relevant data:  CBC and chemistry unremarkable. No evidence of renal dysfunction.  CRP 3.37< 4.40<20.82 (4/15) << 27.92 (4/3)  Ferritin 412<424 <<457<<525<<590(4/5)  D-dimer 1788<2576 <<4566 (4/19)  Album 2.5 (severely low)  last CXR 4/14 bilateral opacities  LE duplex 3/19 negative      Plan:  - Biomarkers can be checked  2-3 days since they are downtrending.  - Currently on NRB 15 L and NC (Goal oxygen sation low 90%)   - Continue with self- proning as needed   - Avoid benzodiazepines or additional narcotics. Try to control pain with Tylenol and lidocaine patch.   - Currently on pyridostigmine ER 180mg po daily and 60mg po and prednisone 10mg PO qd per neurology recs. My resident had spoken with  on 4/23 and he felt that her respiratory failure is more related to COVID infection rather that mysthenia gravis. Mysthenia gravis has been controlled prior to COVID-19 infection. Reconsult neurology if there is any significant changes in her clinical status.  - Tylenol high dose Q8hr prn,  Lidoderm patch, and a low does dilaudid pushes for severe pain PRN (palliative care consult appreciated.  Recommends intermittent a low dose Dilaudid for severe pain, caution with respiratory depression)  - Continue levothyroxine  - DVT PPx: Lovenox daily  - Diet: S+S Eval recommended NPO, but patient and HCP prefer dysphagia 1 with honey thickened liquids despite risks of aspiration  - She is DNR/DNI; Daughter Patsy ( [phone]) .

## 2020-04-27 LAB
ALBUMIN SERPL ELPH-MCNC: 2.6 G/DL — LOW (ref 3.3–5)
ALP SERPL-CCNC: 64 U/L — SIGNIFICANT CHANGE UP (ref 40–120)
ALT FLD-CCNC: 17 U/L — SIGNIFICANT CHANGE UP (ref 10–45)
ANION GAP SERPL CALC-SCNC: 7 MMOL/L — SIGNIFICANT CHANGE UP (ref 5–17)
AST SERPL-CCNC: 18 U/L — SIGNIFICANT CHANGE UP (ref 10–40)
BILIRUB SERPL-MCNC: 0.2 MG/DL — SIGNIFICANT CHANGE UP (ref 0.2–1.2)
BUN SERPL-MCNC: 20 MG/DL — SIGNIFICANT CHANGE UP (ref 7–23)
CALCIUM SERPL-MCNC: 9.1 MG/DL — SIGNIFICANT CHANGE UP (ref 8.4–10.5)
CHLORIDE SERPL-SCNC: 104 MMOL/L — SIGNIFICANT CHANGE UP (ref 96–108)
CO2 SERPL-SCNC: 33 MMOL/L — HIGH (ref 22–31)
CREAT SERPL-MCNC: 0.68 MG/DL — SIGNIFICANT CHANGE UP (ref 0.5–1.3)
GLUCOSE SERPL-MCNC: 86 MG/DL — SIGNIFICANT CHANGE UP (ref 70–99)
HCT VFR BLD CALC: 28 % — LOW (ref 34.5–45)
HGB BLD-MCNC: 8.1 G/DL — LOW (ref 11.5–15.5)
MCHC RBC-ENTMCNC: 25.1 PG — LOW (ref 27–34)
MCHC RBC-ENTMCNC: 28.9 GM/DL — LOW (ref 32–36)
MCV RBC AUTO: 86.7 FL — SIGNIFICANT CHANGE UP (ref 80–100)
NRBC # BLD: 0 /100 WBCS — SIGNIFICANT CHANGE UP (ref 0–0)
PLATELET # BLD AUTO: 280 K/UL — SIGNIFICANT CHANGE UP (ref 150–400)
POTASSIUM SERPL-MCNC: 4 MMOL/L — SIGNIFICANT CHANGE UP (ref 3.5–5.3)
POTASSIUM SERPL-SCNC: 4 MMOL/L — SIGNIFICANT CHANGE UP (ref 3.5–5.3)
PROT SERPL-MCNC: 6.2 G/DL — SIGNIFICANT CHANGE UP (ref 6–8.3)
RBC # BLD: 3.23 M/UL — LOW (ref 3.8–5.2)
RBC # FLD: 22.2 % — HIGH (ref 10.3–14.5)
SODIUM SERPL-SCNC: 144 MMOL/L — SIGNIFICANT CHANGE UP (ref 135–145)
WBC # BLD: 6.8 K/UL — SIGNIFICANT CHANGE UP (ref 3.8–10.5)
WBC # FLD AUTO: 6.8 K/UL — SIGNIFICANT CHANGE UP (ref 3.8–10.5)

## 2020-04-27 PROCEDURE — 99233 SBSQ HOSP IP/OBS HIGH 50: CPT | Mod: CS,GC

## 2020-04-27 RX ORDER — ALBUTEROL 90 UG/1
2 AEROSOL, METERED ORAL
Qty: 0 | Refills: 0 | DISCHARGE
Start: 2020-04-27

## 2020-04-27 RX ORDER — HYDROMORPHONE HYDROCHLORIDE 2 MG/ML
0.2 INJECTION INTRAMUSCULAR; INTRAVENOUS; SUBCUTANEOUS
Qty: 0 | Refills: 0 | DISCHARGE
Start: 2020-04-27

## 2020-04-27 RX ORDER — PYRIDOSTIGMINE BROMIDE 60 MG/5ML
1 SOLUTION ORAL
Qty: 0 | Refills: 0 | DISCHARGE
Start: 2020-04-27

## 2020-04-27 RX ORDER — ATORVASTATIN CALCIUM 80 MG/1
1 TABLET, FILM COATED ORAL
Qty: 0 | Refills: 0 | DISCHARGE
Start: 2020-04-27

## 2020-04-27 RX ORDER — PYRIDOSTIGMINE BROMIDE 60 MG/5ML
1 SOLUTION ORAL
Qty: 0 | Refills: 0 | DISCHARGE

## 2020-04-27 RX ORDER — ENOXAPARIN SODIUM 100 MG/ML
40 INJECTION SUBCUTANEOUS
Qty: 0 | Refills: 0 | DISCHARGE
Start: 2020-04-27

## 2020-04-27 RX ORDER — PYRIDOSTIGMINE BROMIDE 60 MG/5ML
1 SOLUTION ORAL
Qty: 30 | Refills: 1
Start: 2020-04-27 | End: 2020-06-25

## 2020-04-27 RX ORDER — ZINC OXIDE 200 MG/G
1 OINTMENT TOPICAL
Qty: 0 | Refills: 0 | DISCHARGE
Start: 2020-04-27

## 2020-04-27 RX ORDER — LOSARTAN/HYDROCHLOROTHIAZIDE 100MG-25MG
1 TABLET ORAL
Qty: 0 | Refills: 0 | DISCHARGE

## 2020-04-27 RX ORDER — LANOLIN ALCOHOL/MO/W.PET/CERES
1 CREAM (GRAM) TOPICAL
Qty: 0 | Refills: 0 | DISCHARGE
Start: 2020-04-27

## 2020-04-27 RX ORDER — SODIUM CHLORIDE 0.65 %
1 AEROSOL, SPRAY (ML) NASAL
Qty: 0 | Refills: 0 | DISCHARGE
Start: 2020-04-27

## 2020-04-27 RX ORDER — LIDOCAINE 4 G/100G
2 CREAM TOPICAL
Qty: 0 | Refills: 0 | DISCHARGE
Start: 2020-04-27

## 2020-04-27 RX ADMIN — Medication 10 MILLIGRAM(S): at 04:59

## 2020-04-27 RX ADMIN — Medication 2000 UNIT(S): at 13:54

## 2020-04-27 RX ADMIN — PYRIDOSTIGMINE BROMIDE 60 MILLIGRAM(S): 60 SOLUTION ORAL at 23:47

## 2020-04-27 RX ADMIN — ZINC OXIDE 1 APPLICATION(S): 200 OINTMENT TOPICAL at 14:26

## 2020-04-27 RX ADMIN — ALBUTEROL 2 PUFF(S): 90 AEROSOL, METERED ORAL at 20:39

## 2020-04-27 RX ADMIN — PANTOPRAZOLE SODIUM 40 MILLIGRAM(S): 20 TABLET, DELAYED RELEASE ORAL at 04:59

## 2020-04-27 RX ADMIN — LIDOCAINE 2 PATCH: 4 CREAM TOPICAL at 19:20

## 2020-04-27 RX ADMIN — LIDOCAINE 2 PATCH: 4 CREAM TOPICAL at 13:54

## 2020-04-27 RX ADMIN — ALBUTEROL 2 PUFF(S): 90 AEROSOL, METERED ORAL at 06:20

## 2020-04-27 RX ADMIN — Medication 50 MICROGRAM(S): at 04:59

## 2020-04-27 RX ADMIN — PYRIDOSTIGMINE BROMIDE 60 MILLIGRAM(S): 60 SOLUTION ORAL at 04:59

## 2020-04-27 RX ADMIN — PYRIDOSTIGMINE BROMIDE 180 MILLIGRAM(S): 60 SOLUTION ORAL at 14:26

## 2020-04-27 RX ADMIN — ENOXAPARIN SODIUM 40 MILLIGRAM(S): 100 INJECTION SUBCUTANEOUS at 13:54

## 2020-04-27 RX ADMIN — PYRIDOSTIGMINE BROMIDE 60 MILLIGRAM(S): 60 SOLUTION ORAL at 17:29

## 2020-04-27 RX ADMIN — LIDOCAINE 2 PATCH: 4 CREAM TOPICAL at 00:49

## 2020-04-27 RX ADMIN — Medication 650 MILLIGRAM(S): at 23:47

## 2020-04-27 RX ADMIN — Medication 5 MILLIGRAM(S): at 22:17

## 2020-04-27 RX ADMIN — ATORVASTATIN CALCIUM 20 MILLIGRAM(S): 80 TABLET, FILM COATED ORAL at 22:17

## 2020-04-27 NOTE — PROGRESS NOTE ADULT - SUBJECTIVE AND OBJECTIVE BOX
Patient is a 79y old  Female who presents with a chief complaint of Shortness of breath, fevers (23 Apr 2020 15:58)    SUBJECTIVE / OVERNIGHT EVENTS:      MEDICATIONS  (STANDING):  atorvastatin 20 milliGRAM(s) Oral at bedtime  cholecalciferol 2000 Unit(s) Oral daily  enoxaparin Injectable 40 milliGRAM(s) SubCutaneous daily  levothyroxine 50 MICROGram(s) Oral daily  lidocaine   Patch 2 Patch Transdermal daily  melatonin 5 milliGRAM(s) Oral at bedtime  pantoprazole    Tablet 40 milliGRAM(s) Oral before breakfast  polyethylene glycol 3350 17 Gram(s) Oral daily  predniSONE   Tablet 10 milliGRAM(s) Oral daily  pyridostigmine 60 milliGRAM(s) Oral three times a day  pyridostigmine  milliGRAM(s) Oral daily  zinc oxide 40% Ointment 1 Application(s) Topical daily    MEDICATIONS  (PRN):  acetaminophen   Tablet .. 650 milliGRAM(s) Oral every 4 hours PRN Temp greater or equal to 38C (100.4F), Moderate Pain (4 - 6)  ALBUTerol    90 MICROgram(s) HFA Inhaler 2 Puff(s) Inhalation every 4 hours PRN Shortness of Breath and/or Wheezing  benzonatate 100 milliGRAM(s) Oral three times a day PRN Cough  bisacodyl 5 milliGRAM(s) Oral every 12 hours PRN Constipation  HYDROmorphone  Injectable 0.2 milliGRAM(s) IV Push every 3 hours PRN moderate/severe pain  senna 2 Tablet(s) Oral at bedtime PRN Constipation  sodium chloride 0.65% Nasal 1 Spray(s) Both Nostrils two times a day PRN Nasal Congestion    Vital Signs Last 24 Hrs  T(C): 36.3 (27 Apr 2020 05:06), Max: 36.7 (26 Apr 2020 21:25)  T(F): 97.3 (27 Apr 2020 05:06), Max: 98 (26 Apr 2020 21:25)  HR: 60 (27 Apr 2020 05:06) (58 - 66)  BP: 123/72 (27 Apr 2020 05:06) (111/57 - 123/72)  BP(mean): --  RR: 18 (27 Apr 2020 05:06) (18 - 18)  SpO2: 97% (27 Apr 2020 05:06) (97% - 100%)    PHYSICAL EXAM:  GENERAL: Overweight, significant respiratory distress.   EYES: EOMI, PERRLA, conjunctiva and sclera clear  CHEST/LUNG: No wheezes, Bibasilar fine crackles R>L.  Moderate distress with increased work of breathing but not recruiting accessory abdominal or neck muscles of respiration.  Sitting up with both NC and NRB with SpO2 97%  HEART: Regular rate and rhythm; No murmurs, rubs, or gallops  ABDOMEN: Soft, mild tenderness to palpation in RLQ and LLQ.  No rebound or guarding.  Nondistended; Bowel sounds present  EXTREMITIES:  2+ Peripheral Pulses, No clubbing, cyanosis, or edema  NEUROLOGY: AAOx3, Neck flexion full strength,  strength full strength, proximal B/L arm strength 4-/5  SKIN: No rashes or lesions                          8.1    6.80  )-----------( 280      ( 27 Apr 2020 07:06 )             28.0     04-27    144  |  104  |  20  ----------------------------<  86  4.0   |  33<H>  |  0.68    Ca    9.1      27 Apr 2020 07:05  Phos  3.0     04-26  Mg     1.8     04-26    TPro  6.2  /  Alb  2.6<L>  /  TBili  0.2  /  DBili  x   /  AST  18  /  ALT  17  /  AlkPhos  64  04-27    COVID-19 LABS  T(C): 36.3 (04-27-20 @ 05:06), Max: 36.7 (04-26-20 @ 21:25)  RR: 18 (04-27-20 @ 05:06) (18 - 18)  SpO2: 97% (04-27-20 @ 05:06) (97% - 100%)    COVID-19 PCR: Detected (03-30)    D-Dimer Assay, Quantitative: 1788 ng/mL DDU <H> (04-26)  D-Dimer Assay, Quantitative: 2576 ng/mL DDU <H> (04-25)  D-Dimer Assay, Quantitative: 2906 ng/mL DDU <H> (04-24)  D-Dimer Assay, Quantitative: 3662 ng/mL DDU <H> (04-23)  D-Dimer Assay, Quantitative: 4566 ng/mL DDU <H> (04-19)    Ferritin, Serum: 412 ng/mL <H> (04-26)  Ferritin, Serum: 442 ng/mL <H> (04-25)  Ferritin, Serum: 457 ng/mL <H> (04-24)  Ferritin, Serum: 525 ng/mL <H> (04-19)  Ferritin, Serum: 560 ng/mL <H> (04-15)  Ferritin, Serum: 469 ng/mL <H> (04-13)    C-Reactive Protein, Serum: 3.37 mg/dL <H> (04-26)  C-Reactive Protein, Serum: 4.40 mg/dL <H> (04-25)  C-Reactive Protein, Serum: 4.79 mg/dL <H> (04-24)  C-Reactive Protein, Serum: 12.20 mg/dL <H> (04-19)  C-Reactive Protein, Serum: 20.82 mg/dL <H> (04-15)  C-Reactive Protein, Serum: 15.92 mg/dL <H> (04-13)    Auto Lymphocyte #: 0.57 K/uL <L> (04-26)  Auto Lymphocyte #: 0.90 K/uL <L> (04-25)  Auto Lymphocyte #: 0.86 K/uL <L> (04-24)  Auto Lymphocyte #: 1.35 K/uL (04-23)    Pro-Calcitonin (Superimposed bacterial infection)   Procalcitonin, Serum: 0.16 ng/mL <H> (04-19)  Procalcitonin, Serum: 0.27 ng/mL <H> (04-15)    Xray Chest 1 View- PORTABLE-Urgent (04.14.20 @ 11:32)   IMPRESSION:  Bilateral opacities are increased.  Bilateral shoulder arthroplasty and lumbar spine fusion.    Meds Received  predniSONE   Tablet: 10 milliGRAM(s) Oral (04-27),  10 milliGRAM(s) Oral (04-26),  10 milliGRAM(s) Oral (04-25),  10 milliGRAM(s) Oral (04-24),  10 milliGRAM(s) Oral (04-23),  10 milliGRAM(s) Oral (04-22),  10 milliGRAM(s) Oral (04-21)    Meds Received  -S/P Solumedrol 04/03/2020-04/07/2020  -S/P Plaquenil 03/31-04/04  -S/P Convalescent Plasma  -On chronic predniSONE 10 daily re: MG Patient is a 79y old  Female who presents with a chief complaint of Shortness of breath, fevers (23 Apr 2020 15:58)    SUBJECTIVE / OVERNIGHT EVENTS:  Continues to endorse SOB, reports minimally better than at admission but not much.  Gets very anxious and requires up to 15L NRB at those times    MEDICATIONS  (STANDING):  atorvastatin 20 milliGRAM(s) Oral at bedtime  cholecalciferol 2000 Unit(s) Oral daily  enoxaparin Injectable 40 milliGRAM(s) SubCutaneous daily  levothyroxine 50 MICROGram(s) Oral daily  lidocaine   Patch 2 Patch Transdermal daily  melatonin 5 milliGRAM(s) Oral at bedtime  pantoprazole    Tablet 40 milliGRAM(s) Oral before breakfast  polyethylene glycol 3350 17 Gram(s) Oral daily  predniSONE   Tablet 10 milliGRAM(s) Oral daily  pyridostigmine 60 milliGRAM(s) Oral three times a day  pyridostigmine  milliGRAM(s) Oral daily  zinc oxide 40% Ointment 1 Application(s) Topical daily    MEDICATIONS  (PRN):  acetaminophen   Tablet .. 650 milliGRAM(s) Oral every 4 hours PRN Temp greater or equal to 38C (100.4F), Moderate Pain (4 - 6)  ALBUTerol    90 MICROgram(s) HFA Inhaler 2 Puff(s) Inhalation every 4 hours PRN Shortness of Breath and/or Wheezing  benzonatate 100 milliGRAM(s) Oral three times a day PRN Cough  bisacodyl 5 milliGRAM(s) Oral every 12 hours PRN Constipation  HYDROmorphone  Injectable 0.2 milliGRAM(s) IV Push every 3 hours PRN moderate/severe pain  senna 2 Tablet(s) Oral at bedtime PRN Constipation  sodium chloride 0.65% Nasal 1 Spray(s) Both Nostrils two times a day PRN Nasal Congestion    Vital Signs Last 24 Hrs  T(C): 36.3 (27 Apr 2020 05:06), Max: 36.7 (26 Apr 2020 21:25)  T(F): 97.3 (27 Apr 2020 05:06), Max: 98 (26 Apr 2020 21:25)  HR: 60 (27 Apr 2020 05:06) (58 - 66)  BP: 123/72 (27 Apr 2020 05:06) (111/57 - 123/72)  BP(mean): --  RR: 18 (27 Apr 2020 05:06) (18 - 18)  SpO2: 97% (27 Apr 2020 05:06) (97% - 100%)    PHYSICAL EXAM:  GENERAL: Overweight, significant respiratory distress.   EYES: EOMI, PERRLA, conjunctiva and sclera clear  CHEST/LUNG: No wheezes, Bibasilar fine crackles R>L.  Moderate distress with increased work of breathing but not recruiting accessory abdominal or neck muscles of respiration.  Sitting up with both NC and NRB with SpO2 97%  HEART: Regular rate and rhythm; No murmurs, rubs, or gallops  ABDOMEN: Soft, mild tenderness to palpation in RLQ and LLQ.  No rebound or guarding.  Nondistended; Bowel sounds present  EXTREMITIES:  2+ Peripheral Pulses, No clubbing, cyanosis, or edema  NEUROLOGY: AAOx3, Neck flexion full strength,  strength full strength, proximal B/L arm strength 4-/5  SKIN: No rashes or lesions                          8.1    6.80  )-----------( 280      ( 27 Apr 2020 07:06 )             28.0     04-27    144  |  104  |  20  ----------------------------<  86  4.0   |  33<H>  |  0.68    Ca    9.1      27 Apr 2020 07:05  Phos  3.0     04-26  Mg     1.8     04-26    TPro  6.2  /  Alb  2.6<L>  /  TBili  0.2  /  DBili  x   /  AST  18  /  ALT  17  /  AlkPhos  64  04-27    COVID-19 LABS  T(C): 36.3 (04-27-20 @ 05:06), Max: 36.7 (04-26-20 @ 21:25)  RR: 18 (04-27-20 @ 05:06) (18 - 18)  SpO2: 97% (04-27-20 @ 05:06) (97% - 100%)    COVID-19 PCR: Detected (03-30)    D-Dimer Assay, Quantitative: 1788 ng/mL DDU <H> (04-26)  D-Dimer Assay, Quantitative: 2576 ng/mL DDU <H> (04-25)  D-Dimer Assay, Quantitative: 2906 ng/mL DDU <H> (04-24)  D-Dimer Assay, Quantitative: 3662 ng/mL DDU <H> (04-23)  D-Dimer Assay, Quantitative: 4566 ng/mL DDU <H> (04-19)    Ferritin, Serum: 412 ng/mL <H> (04-26)  Ferritin, Serum: 442 ng/mL <H> (04-25)  Ferritin, Serum: 457 ng/mL <H> (04-24)  Ferritin, Serum: 525 ng/mL <H> (04-19)  Ferritin, Serum: 560 ng/mL <H> (04-15)  Ferritin, Serum: 469 ng/mL <H> (04-13)    C-Reactive Protein, Serum: 3.37 mg/dL <H> (04-26)  C-Reactive Protein, Serum: 4.40 mg/dL <H> (04-25)  C-Reactive Protein, Serum: 4.79 mg/dL <H> (04-24)  C-Reactive Protein, Serum: 12.20 mg/dL <H> (04-19)  C-Reactive Protein, Serum: 20.82 mg/dL <H> (04-15)  C-Reactive Protein, Serum: 15.92 mg/dL <H> (04-13)    Auto Lymphocyte #: 0.57 K/uL <L> (04-26)  Auto Lymphocyte #: 0.90 K/uL <L> (04-25)  Auto Lymphocyte #: 0.86 K/uL <L> (04-24)  Auto Lymphocyte #: 1.35 K/uL (04-23)    Pro-Calcitonin (Superimposed bacterial infection)   Procalcitonin, Serum: 0.16 ng/mL <H> (04-19)  Procalcitonin, Serum: 0.27 ng/mL <H> (04-15)    Xray Chest 1 View- PORTABLE-Urgent (04.14.20 @ 11:32)   IMPRESSION:  Bilateral opacities are increased.  Bilateral shoulder arthroplasty and lumbar spine fusion.    Meds Received  predniSONE   Tablet: 10 milliGRAM(s) Oral (04-27),  10 milliGRAM(s) Oral (04-26),  10 milliGRAM(s) Oral (04-25),  10 milliGRAM(s) Oral (04-24),  10 milliGRAM(s) Oral (04-23),  10 milliGRAM(s) Oral (04-22),  10 milliGRAM(s) Oral (04-21)    Meds Received  -S/P Solumedrol 04/03/2020-04/07/2020  -S/P Plaquenil 03/31-04/04  -S/P Convalescent Plasma  -On chronic predniSONE 10 daily re: MG

## 2020-04-27 NOTE — PROGRESS NOTE ADULT - ATTENDING COMMENTS
79 year old woman with history of mysthenia gravis on pyridostigmine 60mg TID, s/p eculizumab tapering dose of prednisone, HTN and recent acute hypoxemic respiratory failure with sepsis in setting of COVID-19 infection. COVID-19 PCR positive 3/31. He has received solumedrol 4/3-4/7 and Plaquenil 3/31-4/4. Previous team has discussed with ID about Anakinra trial but was told to hold off given that he had  received eculizumab in the past for mysthenia gravis.    She was transferred from [facility] 4/22 evening for convalescent plasma transfusion.  On 4/24, s/p convalescent plasma. She is supine and is on NRB and alternating with NC  .     Plan:  - Patient to be transferred back to [facility]- s/p plasma Hemoc updated  - Biomarkers can be checked  2-3 days since they are downtrending.  - Currently on NRB continuous pulse Ox  (Goal oxygen sation low 90%)   - Continue with self- proning as needed   - Currently on pyridostigmine ER 180mg po daily and 60mg po and prednisone 10mg PO qd per neurology recs. Solaris treatment to be continued at [facility], discussed with her neuromuscular specialist, Dr. Fitzgerald and will help set up at [facility].  - Tylenol high dose Q8hr prn,  Lidoderm patch, and a low does dilaudid pushes for severe pain PRN as per palliative   - Continue levothyroxine  - DVT PPx: Lovenox daily  - Diet: S+S Eval recommended NPO, but patient and HCP prefer dysphagia 1 with honey thickened liquids despite risks of aspiration  - She is DNR/DNI; Daughter Patsy ( [phone]) .

## 2020-04-27 NOTE — PROGRESS NOTE ADULT - PROBLEM SELECTOR PLAN 1
-S/P Solumedrol 04/03/2020-04/07/2020  -S/P Plaquenil 03/31-04/04  -Transferred from [facility] for Convalescent Plasma Transfusion->S/p plasma transfusion 4/24-CRP and ferritin slightly decreasing.  No further plans for convalescent plasma treatments.   -Anakinra d/w ID but holding off 2/2 having already received immunosuppression Eculizumab (Solaris) for MG

## 2020-04-27 NOTE — PROGRESS NOTE ADULT - PROBLEM SELECTOR PLAN 4
Continue mestinon home dosing  Given solumedrol 40mg x 1  CLOSE MONITORING OF RR, respiratory effort, neck flexion, strength.   Neuro checks q4hr  NIF and VC should be done at minimum q6hrs, but due to current pandemic and DNR/DNI status, unable to perform.  Will discuss at least daily NIF and VC monitoring  As a proxy, can also ask PT to count from 20 to 0 in one breath and look for tachypnea, nasal tone intonation change, neck flexion weakness and hypercarbia.   PT's get hypercarbic and tachypneic before becoming hypoxic and thus SpO2 is not an accurate enough proxy of MG status Continue mestinon home dosing  Given solumedrol 40mg x 1  CLOSE MONITORING OF RR, respiratory effort, neck flexion, strength.   Neuro checks q4hr  NIF and VC should be done at minimum q6hrs, but due to current pandemic and DNR/DNI status, unable to perform.  Will discuss at least daily NIF and VC monitoring  As a proxy, can also ask PT to count from 20 to 0 in one breath and look for tachypnea, nasal tone intonation change, neck flexion weakness and hypercarbia.   PT's get hypercarbic and tachypneic before becoming hypoxic and thus SpO2 is not an accurate enough proxy of MG status  Solaris treatment to be continued at [facility], discussed with her neuromuscular specialist, Dr. Fitzgerald and will help set up at [facility].

## 2020-04-27 NOTE — CHART NOTE - NSCHARTNOTEFT_GEN_A_CORE
Overall patient doing better, oxygenation >95% on non rebreather.  Being transferred to [facility] for further management .  Continue current pain management , last dose of Dilaudid 4/26 at 5 am, vitals stable.   No further role for palliative care, will sign off.  Please include any known or completed advance care planning (e.g., MOLST, DNR, DNI, Full Code, no artificial nutrition, no HD, health care proxy form with name of decision makers, and other decisions made by the patient, health care proxy or surrogate) in the patient’s discharge summary. Thank you.

## 2020-04-28 DIAGNOSIS — R06.02 SHORTNESS OF BREATH: ICD-10-CM

## 2020-04-28 LAB
CRP SERPL-MCNC: <0.1 MG/DL — SIGNIFICANT CHANGE UP (ref 0–0.4)
FERRITIN SERPL-MCNC: 327 NG/ML — HIGH (ref 15–150)
PROCALCITONIN SERPL-MCNC: 0.06 NG/ML — SIGNIFICANT CHANGE UP (ref 0.02–0.1)

## 2020-04-28 RX ADMIN — PYRIDOSTIGMINE BROMIDE 180 MILLIGRAM(S): 60 SOLUTION ORAL at 18:02

## 2020-04-28 RX ADMIN — Medication 50 MICROGRAM(S): at 06:43

## 2020-04-28 RX ADMIN — LIDOCAINE 2 PATCH: 4 CREAM TOPICAL at 21:00

## 2020-04-28 RX ADMIN — Medication 5 MILLIGRAM(S): at 22:00

## 2020-04-28 RX ADMIN — HYDROMORPHONE HYDROCHLORIDE 0.2 MILLIGRAM(S): 2 INJECTION INTRAMUSCULAR; INTRAVENOUS; SUBCUTANEOUS at 22:00

## 2020-04-28 RX ADMIN — PYRIDOSTIGMINE BROMIDE 60 MILLIGRAM(S): 60 SOLUTION ORAL at 06:44

## 2020-04-28 RX ADMIN — PYRIDOSTIGMINE BROMIDE 60 MILLIGRAM(S): 60 SOLUTION ORAL at 18:02

## 2020-04-28 RX ADMIN — PANTOPRAZOLE SODIUM 40 MILLIGRAM(S): 20 TABLET, DELAYED RELEASE ORAL at 06:44

## 2020-04-28 RX ADMIN — ENOXAPARIN SODIUM 40 MILLIGRAM(S): 100 INJECTION SUBCUTANEOUS at 12:04

## 2020-04-28 RX ADMIN — LIDOCAINE 2 PATCH: 4 CREAM TOPICAL at 01:24

## 2020-04-28 RX ADMIN — ZINC OXIDE 1 APPLICATION(S): 200 OINTMENT TOPICAL at 12:52

## 2020-04-28 RX ADMIN — PYRIDOSTIGMINE BROMIDE 60 MILLIGRAM(S): 60 SOLUTION ORAL at 22:00

## 2020-04-28 RX ADMIN — LIDOCAINE 2 PATCH: 4 CREAM TOPICAL at 18:03

## 2020-04-28 RX ADMIN — Medication 2000 UNIT(S): at 12:04

## 2020-04-28 RX ADMIN — POLYETHYLENE GLYCOL 3350 17 GRAM(S): 17 POWDER, FOR SOLUTION ORAL at 18:02

## 2020-04-28 RX ADMIN — ATORVASTATIN CALCIUM 20 MILLIGRAM(S): 80 TABLET, FILM COATED ORAL at 22:00

## 2020-04-28 RX ADMIN — Medication 10 MILLIGRAM(S): at 06:44

## 2020-04-28 NOTE — PROGRESS NOTE ADULT - PROBLEM SELECTOR PLAN 2
On 15L NRB, no change from day prior. Still able to sit up in bed on 6L NC intermittently needing addition of NRB up to 15L when very anxious.     When in distress, SpO2 sometimes improves when prone, bed hyperinflated, frequent chest PT.   -PT is DNR/DNI and has severe COVID-19 associated hypoxic respiratory failure.   -PT also at very high risk of rapid decompensation secondary to superimposed neuromuscular respiratory failure due to myasthenia gravis.  Will discuss further with outpatient neuromuscular specialist, Dr. Fitzgerald, but on initial contact likely her current presentation is mostly due to COVID-19 rather than MG.   -Encourage intermittent Prone position 1-2hrs at a time as tolerated.   -PT has significant back pain when doing so due to hyperextension w/ lumbar stenosis.  First try adding pillow underneath and try alternating between cold or heating packs depending on what PT reports is more helpful.    For back pain during prone position:  -1st line pain med 1000mg IV ofirmev q8hr PRN.  -2nd line pain lidocaine patches PRN  -3rd line 0.2 mg IV dilaudid q3hrs PRN, hold for RR <18 and/or oversedation.  DO NOT GIVE ANY BENZODIAZAPINES GIVEN FURTHER respiratory depression with concominant use.   -If PT has severe pain not amenable to dilaudid can move timing of ofirmev to give at same time.   -GOC discussion with family and patient-->Palliative care on board, recs for intermittent Dilaudid for severe pain, caution with respiratory depression.   -Will continue weaning trials and if clinically improves will d/w [facility] for return. On 15L NRB, no change from day prior. Still able to sit up in bed on 6L NC intermittently needing addition of NRB up to 15L when very anxious.   -PT is DNR/DNI and has severe COVID-19 associated hypoxic respiratory failure.   -PT also at very high risk of rapid decompensation secondary to superimposed neuromuscular respiratory failure due to myasthenia gravis.  Will discuss further with outpatient neuromuscular specialist, Dr. Fitzgerald, but on initial contact likely her current presentation is mostly due to COVID-19 rather than MG.   -Will continue weaning trials and if clinically improves will d/w [facility] for return.

## 2020-04-28 NOTE — PROGRESS NOTE ADULT - SUBJECTIVE AND OBJECTIVE BOX
[facility] Division of Hospital Medicine Progress Note    Patient is a 79y old  Female who presents with a chief complaint of Shortness of breath, fevers (27 Apr 2020 08:07)    SUBJECTIVE / OVERNIGHT EVENTS:  Pt reported wanting to sit in the chair today  s/p convalescent plasma infusion  continues to require 100% NRBD    ADDITIONAL REVIEW OF SYSTEMS:    MEDICATIONS  (STANDING):  atorvastatin 20 milliGRAM(s) Oral at bedtime  cholecalciferol 2000 Unit(s) Oral daily  enoxaparin Injectable 40 milliGRAM(s) SubCutaneous daily  levothyroxine 50 MICROGram(s) Oral daily  lidocaine   Patch 2 Patch Transdermal daily  melatonin 5 milliGRAM(s) Oral at bedtime  pantoprazole    Tablet 40 milliGRAM(s) Oral before breakfast  polyethylene glycol 3350 17 Gram(s) Oral daily  predniSONE   Tablet 10 milliGRAM(s) Oral daily  pyridostigmine 60 milliGRAM(s) Oral three times a day  pyridostigmine  milliGRAM(s) Oral daily  zinc oxide 40% Ointment 1 Application(s) Topical daily    MEDICATIONS  (PRN):  acetaminophen   Tablet .. 650 milliGRAM(s) Oral every 4 hours PRN Temp greater or equal to 38C (100.4F), Moderate Pain (4 - 6)  ALBUTerol    90 MICROgram(s) HFA Inhaler 2 Puff(s) Inhalation every 4 hours PRN Shortness of Breath and/or Wheezing  benzonatate 100 milliGRAM(s) Oral three times a day PRN Cough  bisacodyl 5 milliGRAM(s) Oral every 12 hours PRN Constipation  HYDROmorphone  Injectable 0.2 milliGRAM(s) IV Push every 3 hours PRN moderate/severe pain  senna 2 Tablet(s) Oral at bedtime PRN Constipation  sodium chloride 0.65% Nasal 1 Spray(s) Both Nostrils two times a day PRN Nasal Congestion    PHYSICAL EXAM:  Vital Signs Last 24 Hrs  T(C): 36.4 (28 Apr 2020 08:39), Max: 36.6 (28 Apr 2020 00:48)  T(F): 97.5 (28 Apr 2020 08:39), Max: 97.8 (28 Apr 2020 00:48)  HR: 93 (28 Apr 2020 08:39) (58 - 93)  BP: 105/63 (28 Apr 2020 08:39) (105/63 - 122/63)  BP(mean): --  RR: 20 (28 Apr 2020 08:39) (20 - 20)  SpO2: 97% (28 Apr 2020 08:39) (97% - 100%)    CONSTITUTIONAL: NAD, well-developed, well-groomed  ENMT: Moist oral mucosa, no pharyngeal injection or exudates; normal dentition  RESPIRATORY: Normal respiratory effort; lungs are clear to auscultation bilaterally  CARDIOVASCULAR: Regular rate and rhythm, normal S1 and S2, no murmur/rub/gallop; No lower extremity edema; Peripheral pulses are 2+ bilaterally  ABDOMEN: Nontender to palpation, normoactive bowel sounds, no rebound/guarding; No hepatosplenomegaly  PSYCH: A+O to person, place, and time; affect appropriate  NEUROLOGY: CN 2-12 are intact and symmetric; no gross sensory deficits   SKIN: No rashes; no palpable lesions    LABS:                        8.1    6.80  )-----------( 280      ( 27 Apr 2020 07:06 )             28.0     04-27    144  |  104  |  20  ----------------------------<  86  4.0   |  33<H>  |  0.68    Ca    9.1      27 Apr 2020 07:05    TPro  6.2  /  Alb  2.6<L>  /  TBili  0.2  /  DBili  x   /  AST  18  /  ALT  17  /  AlkPhos  64  04-27    COVID-19 PCR: Detected (03-30-20 @ 21:41)    D-Dimer Assay, Quantitative: 1788 ng/mL DDU <H> (04-26)  D-Dimer Assay, Quantitative: 2576 ng/mL DDU <H> (04-25)  D-Dimer Assay, Quantitative: 2906 ng/mL DDU <H> (04-24)  D-Dimer Assay, Quantitative: 3662 ng/mL DDU <H> (04-23)  D-Dimer Assay, Quantitative: 4566 ng/mL DDU <H> (04-19)    Ferritin, Serum: 412 ng/mL <H> (04-26)  Ferritin, Serum: 442 ng/mL <H> (04-25)  Ferritin, Serum: 457 ng/mL <H> (04-24)  Ferritin, Serum: 525 ng/mL <H> (04-19)  Ferritin, Serum: 560 ng/mL <H> (04-15)  Ferritin, Serum: 469 ng/mL <H> (04-13)    C-Reactive Protein, Serum: 3.37 mg/dL <H> (04-26)  C-Reactive Protein, Serum: 4.40 mg/dL <H> (04-25)  C-Reactive Protein, Serum: 4.79 mg/dL <H> (04-24)  C-Reactive Protein, Serum: 12.20 mg/dL <H> (04-19)  C-Reactive Protein, Serum: 20.82 mg/dL <H> (04-15)  C-Reactive Protein, Serum: 15.92 mg/dL <H> (04-13)    Pro-Calcitonin (Superimposed bacterial infection)   Procalcitonin, Serum: 0.16 ng/mL <H> (04-19)  Procalcitonin, Serum: 0.27 ng/mL <H> (04-15)    RADIOLOGY & ADDITIONAL TESTS:  Imaging from Last 24 Hours:  Electrocardiogram/QTc Interval:    COORDINATION OF CARE:  Care Discussed with Consultants/Other Providers:

## 2020-04-28 NOTE — PROGRESS NOTE ADULT - PROBLEM SELECTOR PLAN 1
-S/P Solumedrol 04/03/2020-04/07/2020  -S/P Plaquenil 03/31-04/04  -Transferred from [facility] for Convalescent Plasma Transfusion->S/p plasma transfusion 4/24-CRP and ferritin slightly decreasing.  No further plans for convalescent plasma treatments.   -Anakinra d/w ID but holding off 2/2 having already received immunosuppression Eculizumab (Solaris) for MG

## 2020-04-28 NOTE — PROGRESS NOTE ADULT - PROBLEM SELECTOR PLAN 4
Continue mestinon home dosing  Given solumedrol 40mg x 1  CLOSE MONITORING OF RR, respiratory effort, neck flexion, strength.   Neuro checks q4hr  NIF and VC should be done at minimum q6hrs, but due to current pandemic and DNR/DNI status, unable to perform.  Will discuss at least daily NIF and VC monitoring  Solaris treatment to be continued at [facility], discussed with her neuromuscular specialist, Dr. Fitzgerald and will help set up at [facility]. Continue home meds

## 2020-04-28 NOTE — PROGRESS NOTE ADULT - PROBLEM SELECTOR PLAN 3
Continue home meds -Encourage intermittent Prone position 1-2hrs at a time as tolerated.   -PT has significant back pain when doing so due to hyperextension w/ lumbar stenosis.  First try adding pillow underneath and try alternating between cold or heating packs depending on what PT reports is more helpful.    For back pain during prone position:  -1st line pain med 1000mg IV ofirmev q8hr PRN.  -2nd line pain lidocaine patches PRN  -3rd line 0.2 mg IV dilaudid q3hrs PRN, hold for RR <18 and/or oversedation.   -If PT has severe pain not amenable to dilaudid can move timing of ofirmev to give at same time.

## 2020-04-28 NOTE — PROGRESS NOTE ADULT - PROBLEM SELECTOR PLAN 5
Continue home meds Continue mestinon home dosing  Given solumedrol 40mg x 1  CLOSE MONITORING OF RR, respiratory effort, neck flexion, strength.   Neuro checks q4hr  NIF and VC should be done at minimum q6hrs, but due to current pandemic and DNR/DNI status, unable to perform.  Will discuss at least daily NIF and VC monitoring  Solaris treatment to be continued at [facility], discussed with her neuromuscular specialist, Dr. Fitzgerald and will help set up at [facility].

## 2020-04-29 LAB
HCT VFR BLD CALC: 28.8 % — LOW (ref 34.5–45)
HGB BLD-MCNC: 8.3 G/DL — LOW (ref 11.5–15.5)
MCHC RBC-ENTMCNC: 25.2 PG — LOW (ref 27–34)
MCHC RBC-ENTMCNC: 28.8 GM/DL — LOW (ref 32–36)
MCV RBC AUTO: 87.3 FL — SIGNIFICANT CHANGE UP (ref 80–100)
NRBC # BLD: 0 /100 WBCS — SIGNIFICANT CHANGE UP (ref 0–0)
PLATELET # BLD AUTO: 267 K/UL — SIGNIFICANT CHANGE UP (ref 150–400)
RBC # BLD: 3.3 M/UL — LOW (ref 3.8–5.2)
RBC # FLD: 22.2 % — HIGH (ref 10.3–14.5)
WBC # BLD: 7.19 K/UL — SIGNIFICANT CHANGE UP (ref 3.8–10.5)
WBC # FLD AUTO: 7.19 K/UL — SIGNIFICANT CHANGE UP (ref 3.8–10.5)

## 2020-04-29 PROCEDURE — 99233 SBSQ HOSP IP/OBS HIGH 50: CPT | Mod: CS

## 2020-04-29 RX ORDER — ECULIZUMAB 300 MG/30ML
1200 INJECTION, SOLUTION, CONCENTRATE INTRAVENOUS ONCE
Refills: 0 | Status: COMPLETED | OUTPATIENT
Start: 2020-04-29 | End: 2020-04-29

## 2020-04-29 RX ADMIN — LIDOCAINE 2 PATCH: 4 CREAM TOPICAL at 08:11

## 2020-04-29 RX ADMIN — PYRIDOSTIGMINE BROMIDE 60 MILLIGRAM(S): 60 SOLUTION ORAL at 22:36

## 2020-04-29 RX ADMIN — ZINC OXIDE 1 APPLICATION(S): 200 OINTMENT TOPICAL at 11:47

## 2020-04-29 RX ADMIN — ATORVASTATIN CALCIUM 20 MILLIGRAM(S): 80 TABLET, FILM COATED ORAL at 22:35

## 2020-04-29 RX ADMIN — Medication 2000 UNIT(S): at 11:45

## 2020-04-29 RX ADMIN — Medication 5 MILLIGRAM(S): at 22:36

## 2020-04-29 RX ADMIN — POLYETHYLENE GLYCOL 3350 17 GRAM(S): 17 POWDER, FOR SOLUTION ORAL at 11:47

## 2020-04-29 RX ADMIN — PANTOPRAZOLE SODIUM 40 MILLIGRAM(S): 20 TABLET, DELAYED RELEASE ORAL at 06:22

## 2020-04-29 RX ADMIN — LIDOCAINE 2 PATCH: 4 CREAM TOPICAL at 11:51

## 2020-04-29 RX ADMIN — PYRIDOSTIGMINE BROMIDE 60 MILLIGRAM(S): 60 SOLUTION ORAL at 11:46

## 2020-04-29 RX ADMIN — Medication 50 MICROGRAM(S): at 06:21

## 2020-04-29 RX ADMIN — LIDOCAINE 2 PATCH: 4 CREAM TOPICAL at 19:34

## 2020-04-29 RX ADMIN — ENOXAPARIN SODIUM 40 MILLIGRAM(S): 100 INJECTION SUBCUTANEOUS at 11:45

## 2020-04-29 RX ADMIN — PYRIDOSTIGMINE BROMIDE 180 MILLIGRAM(S): 60 SOLUTION ORAL at 15:51

## 2020-04-29 RX ADMIN — ECULIZUMAB 480 MILLIGRAM(S): 300 INJECTION, SOLUTION, CONCENTRATE INTRAVENOUS at 16:30

## 2020-04-29 RX ADMIN — HYDROMORPHONE HYDROCHLORIDE 0.2 MILLIGRAM(S): 2 INJECTION INTRAMUSCULAR; INTRAVENOUS; SUBCUTANEOUS at 06:24

## 2020-04-29 RX ADMIN — Medication 100 MILLIGRAM(S): at 13:30

## 2020-04-29 RX ADMIN — PYRIDOSTIGMINE BROMIDE 60 MILLIGRAM(S): 60 SOLUTION ORAL at 06:22

## 2020-04-29 RX ADMIN — Medication 10 MILLIGRAM(S): at 06:22

## 2020-04-29 NOTE — PROGRESS NOTE ADULT - PROBLEM SELECTOR PLAN 2
On 13L NRB satting at 96%,  no change from day prior. Still able to sit up in bed on 6L NC intermittently needing addition of NRB up to 15L when very anxious.   -PT is DNR/DNI and has severe COVID-19 associated hypoxic respiratory failure.   -PT also at very high risk of rapid decompensation secondary to superimposed neuromuscular respiratory failure due to myasthenia gravis.  Will discuss further with outpatient neuromuscular specialist, Dr. Fitzgerald, but on initial contact likely her current presentation is mostly due to COVID-19 rather than MG.   -Will continue weaning trials and if clinically improves will d/w [facility] for return.

## 2020-04-29 NOTE — PROGRESS NOTE ADULT - SUBJECTIVE AND OBJECTIVE BOX
[facility] Division of Hospital Medicine Progress Note    Patient is a 79y old  Female who presents with a chief complaint of Shortness of breath, fevers     SUBJECTIVE / OVERNIGHT EVENTS:  patient seen and examined at bedside. s/p convalescent plasma infusion. Patient remains on NRB 13L  satting at 96 %  Reordered solaris (eculizimab) today      MEDICATIONS  (STANDING):  atorvastatin 20 milliGRAM(s) Oral at bedtime  cholecalciferol 2000 Unit(s) Oral daily  eculizumab IVPB 1200 milliGRAM(s) IV Intermittent once  enoxaparin Injectable 40 milliGRAM(s) SubCutaneous daily  levothyroxine 50 MICROGram(s) Oral daily  lidocaine   Patch 2 Patch Transdermal daily  melatonin 5 milliGRAM(s) Oral at bedtime  pantoprazole    Tablet 40 milliGRAM(s) Oral before breakfast  polyethylene glycol 3350 17 Gram(s) Oral daily  predniSONE   Tablet 10 milliGRAM(s) Oral daily  pyridostigmine 60 milliGRAM(s) Oral three times a day  pyridostigmine  milliGRAM(s) Oral daily  zinc oxide 40% Ointment 1 Application(s) Topical daily    MEDICATIONS  (PRN):  acetaminophen   Tablet .. 650 milliGRAM(s) Oral every 4 hours PRN Temp greater or equal to 38C (100.4F), Moderate Pain (4 - 6)  ALBUTerol    90 MICROgram(s) HFA Inhaler 2 Puff(s) Inhalation every 4 hours PRN Shortness of Breath and/or Wheezing  benzonatate 100 milliGRAM(s) Oral three times a day PRN Cough  bisacodyl 5 milliGRAM(s) Oral every 12 hours PRN Constipation  HYDROmorphone  Injectable 0.2 milliGRAM(s) IV Push every 3 hours PRN moderate/severe pain  senna 2 Tablet(s) Oral at bedtime PRN Constipation  sodium chloride 0.65% Nasal 1 Spray(s) Both Nostrils two times a day PRN Nasal Congestion      CAPILLARY BLOOD GLUCOSE    I&O's Summary      PHYSICAL EXAM:  Vital Signs Last 24 Hrs  T(C): 36.7 (29 Apr 2020 09:00), Max: 36.7 (29 Apr 2020 09:00)  T(F): 98 (29 Apr 2020 09:00), Max: 98 (29 Apr 2020 09:00)  HR: 74 (29 Apr 2020 09:00) (66 - 74)  BP: 110/78 (29 Apr 2020 09:00) (103/69 - 124/75)  BP(mean): --  RR: 20 (29 Apr 2020 09:00) (18 - 20)  SpO2: 96% (29 Apr 2020 12:39) (95% - 100%)  CONSTITUTIONAL: NAD, well-developed, well-groomed  ENMT: Moist oral mucosa, no pharyngeal injection or exudates; normal dentition  RESPIRATORY: Normal respiratory effort; lungs are clear to auscultation bilaterally  CARDIOVASCULAR: Regular rate and rhythm, normal S1 and S2, no murmur/rub/gallop; No lower extremity edema; Peripheral pulses are 2+ bilaterally  ABDOMEN: Nontender to palpation, normoactive bowel sounds, no rebound/guarding; No hepatosplenomegaly  PSYCH: A+O to person, place, and time; affect appropriate  NEUROLOGY: CN 2-12 are intact and symmetric; no gross sensory deficits   SKIN: No rashes; no palpable lesions    LABS:                        8.3    7.19  )-----------( 267      ( 29 Apr 2020 08:42 )             28.8                             8.1    6.80  )-----------( 280      ( 27 Apr 2020 07:06 )             28.0         Ca    9.1      27 Apr 2020 07:05    TPro  6.2  /  Alb  2.6<L>  /  TBili  0.2  /  DBili  x   /  AST  18  /  ALT  17  /  AlkPhos  64  04-27    COVID-19 PCR: Detected (03-30-20 @ 21:41)    D-Dimer Assay, Quantitative: 1788 ng/mL DDU <H> (04-26)  D-Dimer Assay, Quantitative: 2576 ng/mL DDU <H> (04-25)  D-Dimer Assay, Quantitative: 2906 ng/mL DDU <H> (04-24)  D-Dimer Assay, Quantitative: 3662 ng/mL DDU <H> (04-23)  D-Dimer Assay, Quantitative: 4566 ng/mL DDU <H> (04-19)    Ferritin, Serum: 412 ng/mL <H> (04-26)  Ferritin, Serum: 442 ng/mL <H> (04-25)  Ferritin, Serum: 457 ng/mL <H> (04-24)  Ferritin, Serum: 525 ng/mL <H> (04-19)  Ferritin, Serum: 560 ng/mL <H> (04-15)  Ferritin, Serum: 469 ng/mL <H> (04-13)    C-Reactive Protein, Serum: 3.37 mg/dL <H> (04-26)  C-Reactive Protein, Serum: 4.40 mg/dL <H> (04-25)  C-Reactive Protein, Serum: 4.79 mg/dL <H> (04-24)  C-Reactive Protein, Serum: 12.20 mg/dL <H> (04-19)  C-Reactive Protein, Serum: 20.82 mg/dL <H> (04-15)  C-Reactive Protein, Serum: 15.92 mg/dL <H> (04-13)    Pro-Calcitonin (Superimposed bacterial infection)   Procalcitonin, Serum: 0.16 ng/mL <H> (04-19)  Procalcitonin, Serum: 0.27 ng/mL <H> (04-15)    COVID-19 PCR: Detected (03-30-20 @ 21:41)      RADIOLOGY & ADDITIONAL TESTS:  Imaging from Last 24 Hours:  Electrocardiogram/QTc Interval:    COORDINATION OF CARE:  Care Discussed with Consultants/Other Providers:

## 2020-04-29 NOTE — PROGRESS NOTE ADULT - PROBLEM SELECTOR PLAN 5
Continue mestinon home dosing  Given solumedrol 40mg x 1  CLOSE MONITORING OF RR, respiratory effort, neck flexion, strength.   Neuro checks q4hr  NIF and VC should be done at minimum q6hrs, but due to current pandemic and DNR/DNI status, unable to perform.  Will discuss at least daily NIF and VC monitoring  Solaris treatment to be continued at [facility], discussed with her neuromuscular specialist, Dr. Fitzgerald and will help set up at [facility].

## 2020-04-29 NOTE — PROGRESS NOTE ADULT - ASSESSMENT
80 y/o RH-F w/ h/o Myasthenia Gravis on mestinon 60mg TID and 180 mg Mestinon extended release at night, s/p Solaris tapering dose of prednisone, HTN and recent acute hypoxemic respiratory failure with sepsis in setting of COVID-19 infection s/p solumedrol 4/3-4/7 + Plaquenil 3/31-4/4 who presented for plasma COVID therapy.

## 2020-04-29 NOTE — PROGRESS NOTE ADULT - PROBLEM SELECTOR PLAN 3
-Encourage intermittent Prone position 1-2hrs at a time as tolerated.   -PT has significant back pain when doing so due to hyperextension w/ lumbar stenosis.  First try adding pillow underneath and try alternating between cold or heating packs depending on what PT reports is more helpful.    For back pain during prone position:  -1st line pain med 1000mg IV ofirmev q8hr PRN.  -2nd line pain lidocaine patches PRN  -3rd line 0.2 mg IV dilaudid q3hrs PRN, hold for RR <18 and/or oversedation.   -If PT has severe pain not amenable to dilaudid can move timing of ofirmev to give at same time.

## 2020-04-29 NOTE — PROGRESS NOTE ADULT - PROBLEM SELECTOR PLAN 1
-S/P Solumedrol 04/03/2020-04/07/2020  -S/P Plaquenil 03/31-04/04  -Transferred from [facility] for Convalescent Plasma Transfusion->S/p plasma transfusion 4/24-CRP and ferritin slightly decreasing.  No further plans for convalescent plasma treatments.   -Anakinra d/w ID but holding off 2/2 having already received immunosuppression Eculizumab (Solaris) for MG

## 2020-04-30 PROCEDURE — 99233 SBSQ HOSP IP/OBS HIGH 50: CPT | Mod: CS

## 2020-04-30 RX ADMIN — Medication 5 MILLIGRAM(S): at 22:05

## 2020-04-30 RX ADMIN — HYDROMORPHONE HYDROCHLORIDE 0.2 MILLIGRAM(S): 2 INJECTION INTRAMUSCULAR; INTRAVENOUS; SUBCUTANEOUS at 00:37

## 2020-04-30 RX ADMIN — PYRIDOSTIGMINE BROMIDE 60 MILLIGRAM(S): 60 SOLUTION ORAL at 12:13

## 2020-04-30 RX ADMIN — ATORVASTATIN CALCIUM 20 MILLIGRAM(S): 80 TABLET, FILM COATED ORAL at 22:05

## 2020-04-30 RX ADMIN — Medication 1 SPRAY(S): at 20:59

## 2020-04-30 RX ADMIN — Medication 1 SPRAY(S): at 11:36

## 2020-04-30 RX ADMIN — Medication 2000 UNIT(S): at 11:11

## 2020-04-30 RX ADMIN — ALBUTEROL 2 PUFF(S): 90 AEROSOL, METERED ORAL at 09:15

## 2020-04-30 RX ADMIN — PYRIDOSTIGMINE BROMIDE 60 MILLIGRAM(S): 60 SOLUTION ORAL at 06:31

## 2020-04-30 RX ADMIN — ENOXAPARIN SODIUM 40 MILLIGRAM(S): 100 INJECTION SUBCUTANEOUS at 11:11

## 2020-04-30 RX ADMIN — Medication 50 MICROGRAM(S): at 06:30

## 2020-04-30 RX ADMIN — LIDOCAINE 2 PATCH: 4 CREAM TOPICAL at 11:11

## 2020-04-30 RX ADMIN — PANTOPRAZOLE SODIUM 40 MILLIGRAM(S): 20 TABLET, DELAYED RELEASE ORAL at 06:31

## 2020-04-30 RX ADMIN — Medication 100 MILLIGRAM(S): at 09:22

## 2020-04-30 RX ADMIN — LIDOCAINE 2 PATCH: 4 CREAM TOPICAL at 06:31

## 2020-04-30 RX ADMIN — PYRIDOSTIGMINE BROMIDE 60 MILLIGRAM(S): 60 SOLUTION ORAL at 22:05

## 2020-04-30 RX ADMIN — LIDOCAINE 2 PATCH: 4 CREAM TOPICAL at 23:45

## 2020-04-30 RX ADMIN — HYDROMORPHONE HYDROCHLORIDE 0.2 MILLIGRAM(S): 2 INJECTION INTRAMUSCULAR; INTRAVENOUS; SUBCUTANEOUS at 22:05

## 2020-04-30 RX ADMIN — PYRIDOSTIGMINE BROMIDE 180 MILLIGRAM(S): 60 SOLUTION ORAL at 11:11

## 2020-04-30 RX ADMIN — Medication 10 MILLIGRAM(S): at 06:31

## 2020-04-30 RX ADMIN — LIDOCAINE 2 PATCH: 4 CREAM TOPICAL at 19:38

## 2020-04-30 RX ADMIN — POLYETHYLENE GLYCOL 3350 17 GRAM(S): 17 POWDER, FOR SOLUTION ORAL at 11:11

## 2020-04-30 NOTE — PROGRESS NOTE ADULT - ASSESSMENT
78 y/o RH-F w/ h/o Myasthenia Gravis on mestinon 60mg TID and 180 mg Mestinon extended release at night, Continues on Eculizumab (Solaris) for MG, tapering dose of prednisone, HTN and recent acute hypoxemic respiratory failure with sepsis in setting of COVID-19 infection s/p solumedrol 4/3-4/7 + Plaquenil 3/31-4/4 who presented for plasma COVID therapy, S/P Convalescence Plasma 4/24

## 2020-04-30 NOTE — PROGRESS NOTE ADULT - PROBLEM SELECTOR PLAN 2
On 15L NRB satting at 95%   -PT is DNR/DNI and has severe COVID-19 associated hypoxic respiratory failure.   -PT also at very high risk of rapid decompensation secondary to superimposed neuromuscular respiratory failure due to myasthenia gravis.  Will discuss further with outpatient neuromuscular specialist, Dr. Fitzgerald, but on initial contact likely her current presentation is mostly due to COVID-19 rather than MG.   -Will continue weaning trials and if clinically improves will d/w [facility] for return.

## 2020-04-30 NOTE — PROGRESS NOTE ADULT - PROBLEM SELECTOR PLAN 5
Continue Mestinon home dosing  Given solumedrol 40mg x 1  CLOSE MONITORING OF RR, respiratory effort, neck flexion, strength.   Neuro checks q4hr  NIF and VC should be done at minimum q6hrs, but due to current pandemic and DNR/DNI status, unable to perform.  Will discuss at least daily NIF and VC monitoring  Solaris treatment to be continued at [facility], discussed with her neuromuscular specialist, Dr. Fitzgerald and will help set up at [facility].

## 2020-04-30 NOTE — CHART NOTE - NSCHARTNOTEFT_GEN_A_CORE
78 y/o RH-F w/ h/o Myasthenia Gravis on mestinon 60mg TID and 180 mg Mestinon extended release at night, Continues on Eculizumab (Solaris) for MG, tapering dose of prednisone, HTN and recent acute hypoxemic respiratory failure with sepsis in setting of COVID-19 infection s/p solumedrol 4/3-4/7 + Plaquenil 3/31-4/4 who presented for plasma COVID therapy, S/P Convalescence Plasma 4/24.    O2 sat on 15 L non-rebreather 95%.  Decreased O2 to 10 L and O2 sat dropped to 89%.  Increased to 12 L, O2 sat 91%.

## 2020-04-30 NOTE — PROGRESS NOTE ADULT - SUBJECTIVE AND OBJECTIVE BOX
[facility] Division of Hospital Medicine Progress Note    Patient is a 79y old  Female who presents with a chief complaint of Shortness of breath, fevers     SUBJECTIVE / OVERNIGHT EVENTS:  patient seen and examined at bedside. s/p convalescent plasma infusion 4/24. Patient remains on NRB 15L  satting at 95 %      MEDICATIONS  (STANDING):  atorvastatin 20 milliGRAM(s) Oral at bedtime  cholecalciferol 2000 Unit(s) Oral daily  enoxaparin Injectable 40 milliGRAM(s) SubCutaneous daily  levothyroxine 50 MICROGram(s) Oral daily  lidocaine   Patch 2 Patch Transdermal daily  melatonin 5 milliGRAM(s) Oral at bedtime  pantoprazole    Tablet 40 milliGRAM(s) Oral before breakfast  polyethylene glycol 3350 17 Gram(s) Oral daily  predniSONE   Tablet 10 milliGRAM(s) Oral daily  pyridostigmine 60 milliGRAM(s) Oral three times a day  pyridostigmine  milliGRAM(s) Oral daily  zinc oxide 40% Ointment 1 Application(s) Topical daily    MEDICATIONS  (PRN):  acetaminophen   Tablet .. 650 milliGRAM(s) Oral every 4 hours PRN Temp greater or equal to 38C (100.4F), Moderate Pain (4 - 6)  ALBUTerol    90 MICROgram(s) HFA Inhaler 2 Puff(s) Inhalation every 4 hours PRN Shortness of Breath and/or Wheezing  benzonatate 100 milliGRAM(s) Oral three times a day PRN Cough  bisacodyl 5 milliGRAM(s) Oral every 12 hours PRN Constipation  HYDROmorphone  Injectable 0.2 milliGRAM(s) IV Push every 3 hours PRN moderate/severe pain  senna 2 Tablet(s) Oral at bedtime PRN Constipation  sodium chloride 0.65% Nasal 1 Spray(s) Both Nostrils two times a day PRN Nasal Congestion      CAPILLARY BLOOD GLUCOSE    I&O's Summary      PHYSICAL EXAM:  Vital Signs Last 24 Hrs  T(C): 36.8 (30 Apr 2020 09:16), Max: 37.2 (30 Apr 2020 00:42)  T(F): 98.2 (30 Apr 2020 09:16), Max: 99 (30 Apr 2020 00:42)  HR: 86 (30 Apr 2020 09:16) (64 - 86)  BP: 100/62 (30 Apr 2020 11:35) (96/60 - 138/76)  BP(mean): --  RR: 20 (30 Apr 2020 11:35) (19 - 20)  SpO2: 95% (30 Apr 2020 11:35) (93% - 100%)  CONSTITUTIONAL: NAD, well-developed, well-groomed  ENMT: Moist oral mucosa, no pharyngeal injection or exudates; normal dentition  RESPIRATORY: Normal respiratory effort; lungs are clear to auscultation bilaterally  CARDIOVASCULAR: Regular rate and rhythm, normal S1 and S2, no murmur/rub/gallop; No lower extremity edema; Peripheral pulses are 2+ bilaterally  ABDOMEN: Nontender to palpation, normoactive bowel sounds, no rebound/guarding; No hepatosplenomegaly  PSYCH: A+O to person, place, and time; affect appropriate  NEUROLOGY: CN 2-12 are intact and symmetric; no gross sensory deficits   SKIN: No rashes; no palpable lesions    LABS:                        8.3    7.19  )-----------( 267      ( 29 Apr 2020 08:42 )             28.8               Ca    9.1      27 Apr 2020 07:05    TPro  6.2  /  Alb  2.6<L>  /  TBili  0.2  /  DBili  x   /  AST  18  /  ALT  17  /  AlkPhos  64  04-27    COVID-19 PCR: Detected (03-30-20 @ 21:41)    D-Dimer Assay, Quantitative: 1788 ng/mL DDU <H> (04-26)  D-Dimer Assay, Quantitative: 2576 ng/mL DDU <H> (04-25)  D-Dimer Assay, Quantitative: 2906 ng/mL DDU <H> (04-24)  D-Dimer Assay, Quantitative: 3662 ng/mL DDU <H> (04-23)  D-Dimer Assay, Quantitative: 4566 ng/mL DDU <H> (04-19)    Ferritin, Serum: 412 ng/mL <H> (04-26)  Ferritin, Serum: 442 ng/mL <H> (04-25)  Ferritin, Serum: 457 ng/mL <H> (04-24)  Ferritin, Serum: 525 ng/mL <H> (04-19)  Ferritin, Serum: 560 ng/mL <H> (04-15)  Ferritin, Serum: 469 ng/mL <H> (04-13)    C-Reactive Protein, Serum: 3.37 mg/dL <H> (04-26)  C-Reactive Protein, Serum: 4.40 mg/dL <H> (04-25)  C-Reactive Protein, Serum: 4.79 mg/dL <H> (04-24)  C-Reactive Protein, Serum: 12.20 mg/dL <H> (04-19)  C-Reactive Protein, Serum: 20.82 mg/dL <H> (04-15)  C-Reactive Protein, Serum: 15.92 mg/dL <H> (04-13)    Pro-Calcitonin (Superimposed bacterial infection)   Procalcitonin, Serum: 0.16 ng/mL <H> (04-19)  Procalcitonin, Serum: 0.27 ng/mL <H> (04-15)    COVID-19 PCR: Detected (03-30-20 @ 21:41)      RADIOLOGY & ADDITIONAL TESTS:  Imaging from Last 24 Hours:  Electrocardiogram/QTc Interval:    COORDINATION OF CARE:  Care Discussed with Consultants/Other Providers:

## 2020-05-01 LAB
ALBUMIN SERPL ELPH-MCNC: 2.7 G/DL — LOW (ref 3.3–5)
ALP SERPL-CCNC: 57 U/L — SIGNIFICANT CHANGE UP (ref 40–120)
ALT FLD-CCNC: 10 U/L — SIGNIFICANT CHANGE UP (ref 10–45)
ANION GAP SERPL CALC-SCNC: 10 MMOL/L — SIGNIFICANT CHANGE UP (ref 5–17)
AST SERPL-CCNC: 17 U/L — SIGNIFICANT CHANGE UP (ref 10–40)
BILIRUB SERPL-MCNC: 0.3 MG/DL — SIGNIFICANT CHANGE UP (ref 0.2–1.2)
BUN SERPL-MCNC: 11 MG/DL — SIGNIFICANT CHANGE UP (ref 7–23)
CALCIUM SERPL-MCNC: 9 MG/DL — SIGNIFICANT CHANGE UP (ref 8.4–10.5)
CHLORIDE SERPL-SCNC: 101 MMOL/L — SIGNIFICANT CHANGE UP (ref 96–108)
CO2 SERPL-SCNC: 29 MMOL/L — SIGNIFICANT CHANGE UP (ref 22–31)
CREAT SERPL-MCNC: 0.71 MG/DL — SIGNIFICANT CHANGE UP (ref 0.5–1.3)
CRP SERPL-MCNC: 2.92 MG/DL — HIGH (ref 0–0.4)
FERRITIN SERPL-MCNC: 215 NG/ML — HIGH (ref 15–150)
GLUCOSE SERPL-MCNC: 95 MG/DL — SIGNIFICANT CHANGE UP (ref 70–99)
HCT VFR BLD CALC: 26.9 % — LOW (ref 34.5–45)
HGB BLD-MCNC: 8.1 G/DL — LOW (ref 11.5–15.5)
MCHC RBC-ENTMCNC: 25.8 PG — LOW (ref 27–34)
MCHC RBC-ENTMCNC: 30.1 GM/DL — LOW (ref 32–36)
MCV RBC AUTO: 85.7 FL — SIGNIFICANT CHANGE UP (ref 80–100)
NRBC # BLD: 0 /100 WBCS — SIGNIFICANT CHANGE UP (ref 0–0)
PLATELET # BLD AUTO: 207 K/UL — SIGNIFICANT CHANGE UP (ref 150–400)
POTASSIUM SERPL-MCNC: 4 MMOL/L — SIGNIFICANT CHANGE UP (ref 3.5–5.3)
POTASSIUM SERPL-SCNC: 4 MMOL/L — SIGNIFICANT CHANGE UP (ref 3.5–5.3)
PROCALCITONIN SERPL-MCNC: 0.06 NG/ML — SIGNIFICANT CHANGE UP (ref 0.02–0.1)
PROT SERPL-MCNC: 6 G/DL — SIGNIFICANT CHANGE UP (ref 6–8.3)
RBC # BLD: 3.14 M/UL — LOW (ref 3.8–5.2)
RBC # FLD: 23 % — HIGH (ref 10.3–14.5)
SODIUM SERPL-SCNC: 140 MMOL/L — SIGNIFICANT CHANGE UP (ref 135–145)
WBC # BLD: 7.57 K/UL — SIGNIFICANT CHANGE UP (ref 3.8–10.5)
WBC # FLD AUTO: 7.57 K/UL — SIGNIFICANT CHANGE UP (ref 3.8–10.5)

## 2020-05-01 PROCEDURE — 99233 SBSQ HOSP IP/OBS HIGH 50: CPT | Mod: CS

## 2020-05-01 RX ADMIN — PANTOPRAZOLE SODIUM 40 MILLIGRAM(S): 20 TABLET, DELAYED RELEASE ORAL at 06:27

## 2020-05-01 RX ADMIN — ALBUTEROL 2 PUFF(S): 90 AEROSOL, METERED ORAL at 11:11

## 2020-05-01 RX ADMIN — ALBUTEROL 2 PUFF(S): 90 AEROSOL, METERED ORAL at 06:28

## 2020-05-01 RX ADMIN — PYRIDOSTIGMINE BROMIDE 60 MILLIGRAM(S): 60 SOLUTION ORAL at 17:43

## 2020-05-01 RX ADMIN — Medication 2000 UNIT(S): at 17:43

## 2020-05-01 RX ADMIN — PYRIDOSTIGMINE BROMIDE 60 MILLIGRAM(S): 60 SOLUTION ORAL at 22:07

## 2020-05-01 RX ADMIN — Medication 5 MILLIGRAM(S): at 22:08

## 2020-05-01 RX ADMIN — ZINC OXIDE 1 APPLICATION(S): 200 OINTMENT TOPICAL at 17:45

## 2020-05-01 RX ADMIN — ENOXAPARIN SODIUM 40 MILLIGRAM(S): 100 INJECTION SUBCUTANEOUS at 17:44

## 2020-05-01 RX ADMIN — Medication 10 MILLIGRAM(S): at 06:27

## 2020-05-01 RX ADMIN — ATORVASTATIN CALCIUM 20 MILLIGRAM(S): 80 TABLET, FILM COATED ORAL at 22:08

## 2020-05-01 RX ADMIN — PYRIDOSTIGMINE BROMIDE 180 MILLIGRAM(S): 60 SOLUTION ORAL at 17:43

## 2020-05-01 RX ADMIN — Medication 100 MILLIGRAM(S): at 06:28

## 2020-05-01 RX ADMIN — POLYETHYLENE GLYCOL 3350 17 GRAM(S): 17 POWDER, FOR SOLUTION ORAL at 17:43

## 2020-05-01 RX ADMIN — Medication 50 MICROGRAM(S): at 06:27

## 2020-05-01 RX ADMIN — PYRIDOSTIGMINE BROMIDE 60 MILLIGRAM(S): 60 SOLUTION ORAL at 06:28

## 2020-05-01 NOTE — PROGRESS NOTE ADULT - PROBLEM SELECTOR PLAN 5
Continue Mestinon home dosing  Given solumedrol 40mg x 1  CLOSE MONITORING OF RR, respiratory effort, neck flexion, strength.   Neuro checks q4hr  NIF and VC should be done at minimum q6hrs, but due to current pandemic and DNR/DNI status, unable to perform.  Will discuss at least daily NIF and VC monitoring  Solaris treatment to be continued at [facility], discussed with her neuromuscular specialist, Dr. Fitzgerald and will help set up at [facility].

## 2020-05-01 NOTE — PROGRESS NOTE ADULT - PROBLEM SELECTOR PLAN 2
On 15L NRB satting at 92%   -PT is DNR/DNI and has severe COVID-19 associated hypoxic respiratory failure.   -PT also at very high risk of rapid decompensation secondary to superimposed neuromuscular respiratory failure due to myasthenia gravis.  Will discuss further with outpatient neuromuscular specialist, Dr. Fitzgerald, but on initial contact likely her current presentation is mostly due to COVID-19 rather than MG.   -Will continue weaning trials and if clinically improves will d/w [facility] for return.

## 2020-05-01 NOTE — PROGRESS NOTE ADULT - SUBJECTIVE AND OBJECTIVE BOX
[facility] Division of Hospital Medicine Progress Note    Patient is a 79y old  Female who presents with a chief complaint of Shortness of breath, fevers    SUBJECTIVE / OVERNIGHT EVENTS:  patient seen and examined at bedside. s/p convalescent plasma infusion 4/24. Patient remains on NRB 15L satting at 92 %    MEDICATIONS  (STANDING):  atorvastatin 20 milliGRAM(s) Oral at bedtime  cholecalciferol 2000 Unit(s) Oral daily  enoxaparin Injectable 40 milliGRAM(s) SubCutaneous daily  levothyroxine 50 MICROGram(s) Oral daily  lidocaine   Patch 2 Patch Transdermal daily  melatonin 5 milliGRAM(s) Oral at bedtime  pantoprazole    Tablet 40 milliGRAM(s) Oral before breakfast  polyethylene glycol 3350 17 Gram(s) Oral daily  predniSONE   Tablet 10 milliGRAM(s) Oral daily  pyridostigmine 60 milliGRAM(s) Oral three times a day  pyridostigmine  milliGRAM(s) Oral daily  zinc oxide 40% Ointment 1 Application(s) Topical daily    MEDICATIONS  (PRN):  acetaminophen   Tablet .. 650 milliGRAM(s) Oral every 4 hours PRN Temp greater or equal to 38C (100.4F), Moderate Pain (4 - 6)  ALBUTerol    90 MICROgram(s) HFA Inhaler 2 Puff(s) Inhalation every 4 hours PRN Shortness of Breath and/or Wheezing  benzonatate 100 milliGRAM(s) Oral three times a day PRN Cough  bisacodyl 5 milliGRAM(s) Oral every 12 hours PRN Constipation  HYDROmorphone  Injectable 0.2 milliGRAM(s) IV Push every 3 hours PRN moderate/severe pain  senna 2 Tablet(s) Oral at bedtime PRN Constipation  sodium chloride 0.65% Nasal 1 Spray(s) Both Nostrils two times a day PRN Nasal Congestion      CAPILLARY BLOOD GLUCOSE    I&O's Summary    30 Apr 2020 07:01  -  01 May 2020 07:00  --------------------------------------------------------  IN: 240 mL / OUT: 0 mL / NET: 240 mL        PHYSICAL EXAM:  Vital Signs Last 24 Hrs  T(C): 36.7 (01 May 2020 08:39), Max: 36.7 (01 May 2020 08:39)  T(F): 98 (01 May 2020 08:39), Max: 98 (01 May 2020 08:39)  HR: 76 (01 May 2020 12:05) (64 - 76)  BP: 102/65 (01 May 2020 08:39) (102/65 - 109/68)  BP(mean): --  RR: 20 (01 May 2020 12:05) (20 - 22)  SpO2: 92% (01 May 2020 12:05) (92% - 100%)  CONSTITUTIONAL: NAD, well-developed, well-groomed  ENMT: Moist oral mucosa, no pharyngeal injection or exudates; normal dentition  RESPIRATORY: Normal respiratory effort; lungs are clear to auscultation bilaterally  CARDIOVASCULAR: Regular rate and rhythm, normal S1 and S2, no murmur/rub/gallop; No lower extremity edema; Peripheral pulses are 2+ bilaterally  ABDOMEN: Nontender to palpation, normoactive bowel sounds, no rebound/guarding; No hepatosplenomegaly  PSYCH: A+O to person, place, and time; affect appropriate  NEUROLOGY: CN 2-12 are intact and symmetric; no gross sensory deficits   SKIN: No rashes; no palpable lesions    LABS:                        8.1    7.57  )-----------( 207      ( 01 May 2020 08:25 )             26.9     05-01    140  |  101  |  11  ----------------------------<  95  4.0   |  29  |  0.71    Ca    9.0      01 May 2020 08:24    TPro  6.0  /  Alb  2.7<L>  /  TBili  0.3  /  DBili  x   /  AST  17  /  ALT  10  /  AlkPhos  57  05-01  D-Dimer Assay, Quantitative: 1788 ng/mL DDU <H> (04-26)  D-Dimer Assay, Quantitative: 2576 ng/mL DDU <H> (04-25)  D-Dimer Assay, Quantitative: 2906 ng/mL DDU <H> (04-24)  D-Dimer Assay, Quantitative: 3662 ng/mL DDU <H> (04-23)  D-Dimer Assay, Quantitative: 4566 ng/mL DDU <H> (04-19)    Ferritin, Serum: 412 ng/mL <H> (04-26)  Ferritin, Serum: 442 ng/mL <H> (04-25)  Ferritin, Serum: 457 ng/mL <H> (04-24)  Ferritin, Serum: 525 ng/mL <H> (04-19)  Ferritin, Serum: 560 ng/mL <H> (04-15)  Ferritin, Serum: 469 ng/mL <H> (04-13)    C-Reactive Protein, Serum: 3.37 mg/dL <H> (04-26)  C-Reactive Protein, Serum: 4.40 mg/dL <H> (04-25)  C-Reactive Protein, Serum: 4.79 mg/dL <H> (04-24)  C-Reactive Protein, Serum: 12.20 mg/dL <H> (04-19)  C-Reactive Protein, Serum: 20.82 mg/dL <H> (04-15)  C-Reactive Protein, Serum: 15.92 mg/dL <H> (04-13)    Pro-Calcitonin (Superimposed bacterial infection)   Procalcitonin, Serum: 0.16 ng/mL <H> (04-19)  Procalcitonin, Serum: 0.27 ng/mL <H> (04-15)    COVID-19 PCR: Detected (03-30-20 @ 21:41)      RADIOLOGY & ADDITIONAL TESTS:  Imaging from Last 24 Hours:  Electrocardiogram/QTc Interval:    COORDINATION OF CARE:  Care Discussed with Consultants/Other Providers:

## 2020-05-01 NOTE — PROGRESS NOTE ADULT - PROBLEM SELECTOR PLAN 1
-S/P Solumedrol 04/03/2020-04/07/2020  -S/P Plaquenil 03/31-04/04  -Transferred from [facility] for Convalescent Plasma Transfusion->S/p plasma transfusion 4/24-CRP and ferritin slightly decreasing.  No further plans for convalescent plasma treatments.   -Anakinra d/w ID but holding off 2/2 having already received immunosuppression Eculizumab (Solaris) for MG

## 2020-05-02 LAB
ANION GAP SERPL CALC-SCNC: 8 MMOL/L — SIGNIFICANT CHANGE UP (ref 5–17)
BUN SERPL-MCNC: 10 MG/DL — SIGNIFICANT CHANGE UP (ref 7–23)
CALCIUM SERPL-MCNC: 8.6 MG/DL — SIGNIFICANT CHANGE UP (ref 8.4–10.5)
CHLORIDE SERPL-SCNC: 101 MMOL/L — SIGNIFICANT CHANGE UP (ref 96–108)
CO2 SERPL-SCNC: 30 MMOL/L — SIGNIFICANT CHANGE UP (ref 22–31)
CREAT SERPL-MCNC: 0.79 MG/DL — SIGNIFICANT CHANGE UP (ref 0.5–1.3)
GLUCOSE SERPL-MCNC: 92 MG/DL — SIGNIFICANT CHANGE UP (ref 70–99)
HCT VFR BLD CALC: 26.9 % — LOW (ref 34.5–45)
HGB BLD-MCNC: 7.9 G/DL — LOW (ref 11.5–15.5)
MCHC RBC-ENTMCNC: 25.6 PG — LOW (ref 27–34)
MCHC RBC-ENTMCNC: 29.4 GM/DL — LOW (ref 32–36)
MCV RBC AUTO: 87.1 FL — SIGNIFICANT CHANGE UP (ref 80–100)
NRBC # BLD: 0 /100 WBCS — SIGNIFICANT CHANGE UP (ref 0–0)
PLATELET # BLD AUTO: 175 K/UL — SIGNIFICANT CHANGE UP (ref 150–400)
POTASSIUM SERPL-MCNC: 3.8 MMOL/L — SIGNIFICANT CHANGE UP (ref 3.5–5.3)
POTASSIUM SERPL-SCNC: 3.8 MMOL/L — SIGNIFICANT CHANGE UP (ref 3.5–5.3)
RBC # BLD: 3.09 M/UL — LOW (ref 3.8–5.2)
RBC # FLD: 22.9 % — HIGH (ref 10.3–14.5)
SODIUM SERPL-SCNC: 139 MMOL/L — SIGNIFICANT CHANGE UP (ref 135–145)
WBC # BLD: 6.37 K/UL — SIGNIFICANT CHANGE UP (ref 3.8–10.5)
WBC # FLD AUTO: 6.37 K/UL — SIGNIFICANT CHANGE UP (ref 3.8–10.5)

## 2020-05-02 RX ORDER — HYDROMORPHONE HYDROCHLORIDE 2 MG/ML
0.2 INJECTION INTRAMUSCULAR; INTRAVENOUS; SUBCUTANEOUS
Refills: 0 | Status: DISCONTINUED | OUTPATIENT
Start: 2020-05-02 | End: 2020-05-09

## 2020-05-02 RX ORDER — HYDROMORPHONE HYDROCHLORIDE 2 MG/ML
0.2 INJECTION INTRAMUSCULAR; INTRAVENOUS; SUBCUTANEOUS
Refills: 0 | Status: DISCONTINUED | OUTPATIENT
Start: 2020-05-02 | End: 2020-05-02

## 2020-05-02 RX ADMIN — ZINC OXIDE 1 APPLICATION(S): 200 OINTMENT TOPICAL at 12:16

## 2020-05-02 RX ADMIN — ALBUTEROL 2 PUFF(S): 90 AEROSOL, METERED ORAL at 11:01

## 2020-05-02 RX ADMIN — PYRIDOSTIGMINE BROMIDE 60 MILLIGRAM(S): 60 SOLUTION ORAL at 05:55

## 2020-05-02 RX ADMIN — PYRIDOSTIGMINE BROMIDE 180 MILLIGRAM(S): 60 SOLUTION ORAL at 12:16

## 2020-05-02 RX ADMIN — HYDROMORPHONE HYDROCHLORIDE 0.2 MILLIGRAM(S): 2 INJECTION INTRAMUSCULAR; INTRAVENOUS; SUBCUTANEOUS at 16:59

## 2020-05-02 RX ADMIN — ALBUTEROL 2 PUFF(S): 90 AEROSOL, METERED ORAL at 16:20

## 2020-05-02 RX ADMIN — Medication 100 MILLIGRAM(S): at 16:59

## 2020-05-02 RX ADMIN — Medication 100 MILLIGRAM(S): at 01:09

## 2020-05-02 RX ADMIN — Medication 10 MILLIGRAM(S): at 05:53

## 2020-05-02 RX ADMIN — PYRIDOSTIGMINE BROMIDE 60 MILLIGRAM(S): 60 SOLUTION ORAL at 22:11

## 2020-05-02 RX ADMIN — Medication 50 MICROGRAM(S): at 05:51

## 2020-05-02 RX ADMIN — Medication 2000 UNIT(S): at 12:18

## 2020-05-02 RX ADMIN — HYDROMORPHONE HYDROCHLORIDE 0.2 MILLIGRAM(S): 2 INJECTION INTRAMUSCULAR; INTRAVENOUS; SUBCUTANEOUS at 11:00

## 2020-05-02 RX ADMIN — PANTOPRAZOLE SODIUM 40 MILLIGRAM(S): 20 TABLET, DELAYED RELEASE ORAL at 06:52

## 2020-05-02 RX ADMIN — ATORVASTATIN CALCIUM 20 MILLIGRAM(S): 80 TABLET, FILM COATED ORAL at 22:11

## 2020-05-02 RX ADMIN — Medication 5 MILLIGRAM(S): at 22:11

## 2020-05-02 RX ADMIN — POLYETHYLENE GLYCOL 3350 17 GRAM(S): 17 POWDER, FOR SOLUTION ORAL at 12:17

## 2020-05-02 RX ADMIN — Medication 100 MILLIGRAM(S): at 11:01

## 2020-05-02 RX ADMIN — LIDOCAINE 2 PATCH: 4 CREAM TOPICAL at 21:10

## 2020-05-02 RX ADMIN — LIDOCAINE 2 PATCH: 4 CREAM TOPICAL at 12:17

## 2020-05-02 RX ADMIN — ENOXAPARIN SODIUM 40 MILLIGRAM(S): 100 INJECTION SUBCUTANEOUS at 12:17

## 2020-05-02 RX ADMIN — PYRIDOSTIGMINE BROMIDE 60 MILLIGRAM(S): 60 SOLUTION ORAL at 15:17

## 2020-05-02 NOTE — PROGRESS NOTE ADULT - PROBLEM SELECTOR PLAN 2
-On 13L NRB satting at 96%   -PT is DNR/DNI and has severe COVID-19 associated hypoxic respiratory failure.   -PT also at very high risk of rapid decompensation secondary to superimposed neuromuscular respiratory failure due to myasthenia gravis.  Will discuss further with outpatient neuromuscular specialist, Dr. Fitzgerald, but on initial contact likely her current presentation is mostly due to COVID-19 rather than MG.   -Will continue weaning trials and if clinically improves will d/w [facility] for return.

## 2020-05-02 NOTE — PROGRESS NOTE ADULT - SUBJECTIVE AND OBJECTIVE BOX
[facility] Division of Hospital Medicine Progress Note    Patient is a 79y old  Female who presents with a chief complaint of Shortness of breath, fevers    SUBJECTIVE / OVERNIGHT EVENTS:  patient seen and examined at bedside. s/p convalescent plasma infusion 4/24. Patient remains on NRB 13L satting at 96%    MEDICATIONS  (STANDING):  atorvastatin 20 milliGRAM(s) Oral at bedtime  cholecalciferol 2000 Unit(s) Oral daily  enoxaparin Injectable 40 milliGRAM(s) SubCutaneous daily  levothyroxine 50 MICROGram(s) Oral daily  lidocaine   Patch 2 Patch Transdermal daily  melatonin 5 milliGRAM(s) Oral at bedtime  pantoprazole    Tablet 40 milliGRAM(s) Oral before breakfast  polyethylene glycol 3350 17 Gram(s) Oral daily  predniSONE   Tablet 10 milliGRAM(s) Oral daily  pyridostigmine 60 milliGRAM(s) Oral three times a day  pyridostigmine  milliGRAM(s) Oral daily  zinc oxide 40% Ointment 1 Application(s) Topical daily    MEDICATIONS  (PRN):  acetaminophen   Tablet .. 650 milliGRAM(s) Oral every 4 hours PRN Temp greater or equal to 38C (100.4F), Moderate Pain (4 - 6)  ALBUTerol    90 MICROgram(s) HFA Inhaler 2 Puff(s) Inhalation every 4 hours PRN Shortness of Breath and/or Wheezing  benzonatate 100 milliGRAM(s) Oral three times a day PRN Cough  bisacodyl 5 milliGRAM(s) Oral every 12 hours PRN Constipation  HYDROmorphone  Injectable 0.2 milliGRAM(s) IV Push every 2 hours PRN Severe Pain (7 - 10)  senna 2 Tablet(s) Oral at bedtime PRN Constipation  sodium chloride 0.65% Nasal 1 Spray(s) Both Nostrils two times a day PRN Nasal Congestion      PHYSICAL EXAM:  Vital Signs Last 24 Hrs  T(C): 36.7 (02 May 2020 09:38), Max: 36.9 (02 May 2020 00:39)  T(F): 98 (02 May 2020 09:38), Max: 98.4 (02 May 2020 00:39)  HR: 78 (02 May 2020 09:38) (69 - 88)  BP: 96/59 (02 May 2020 09:38) (96/59 - 126/74)  BP(mean): --  RR: 20 (02 May 2020 09:38) (20 - 21)  SpO2: 96% (02 May 2020 09:38) (96% - 99%)    CONSTITUTIONAL: NAD, well-developed, well-groomed  ENMT: Moist oral mucosa, no pharyngeal injection or exudates; normal dentition  RESPIRATORY: Normal respiratory effort; lungs are clear to auscultation bilaterally  CARDIOVASCULAR: Regular rate and rhythm, normal S1 and S2, no murmur/rub/gallop; No lower extremity edema; Peripheral pulses are 2+ bilaterally  ABDOMEN: Nontender to palpation, normoactive bowel sounds, no rebound/guarding; No hepatosplenomegaly  PSYCH: A+O to person, place, and time; affect appropriate  NEUROLOGY: CN 2-12 are intact and symmetric; no gross sensory deficits   SKIN: No rashes; no palpable lesions    LABS:                                   7.9    6.37  )-----------( 175      ( 02 May 2020 07:20 )             26.9     05-02    139  |  101  |  10  ----------------------------<  92  3.8   |  30  |  0.79    Ca    8.6      02 May 2020 07:19    TPro  6.0  /  Alb  2.7<L>  /  TBili  0.3  /  DBili  x   /  AST  17  /  ALT  10  /  AlkPhos  57  05-01    D-Dimer Assay, Quantitative: 1788 ng/mL DDU <H> (04-26)  D-Dimer Assay, Quantitative: 2576 ng/mL DDU <H> (04-25)  D-Dimer Assay, Quantitative: 2906 ng/mL DDU <H> (04-24)  D-Dimer Assay, Quantitative: 3662 ng/mL DDU <H> (04-23)  D-Dimer Assay, Quantitative: 4566 ng/mL DDU <H> (04-19)    Ferritin, Serum: 412 ng/mL <H> (04-26)  Ferritin, Serum: 442 ng/mL <H> (04-25)  Ferritin, Serum: 457 ng/mL <H> (04-24)  Ferritin, Serum: 525 ng/mL <H> (04-19)  Ferritin, Serum: 560 ng/mL <H> (04-15)  Ferritin, Serum: 469 ng/mL <H> (04-13)    C-Reactive Protein, Serum: 3.37 mg/dL <H> (04-26)  C-Reactive Protein, Serum: 4.40 mg/dL <H> (04-25)  C-Reactive Protein, Serum: 4.79 mg/dL <H> (04-24)  C-Reactive Protein, Serum: 12.20 mg/dL <H> (04-19)  C-Reactive Protein, Serum: 20.82 mg/dL <H> (04-15)  C-Reactive Protein, Serum: 15.92 mg/dL <H> (04-13)    Pro-Calcitonin (Superimposed bacterial infection)   Procalcitonin, Serum: 0.16 ng/mL <H> (04-19)  Procalcitonin, Serum: 0.27 ng/mL <H> (04-15)    COVID-19 PCR: Detected (03-30-20 @ 21:41)    RADIOLOGY & ADDITIONAL TESTS:  Imaging from Last 24 Hours:  Electrocardiogram/QTc Interval:    COORDINATION OF CARE:  Care Discussed with Consultants/Other Providers:

## 2020-05-02 NOTE — PROGRESS NOTE ADULT - ASSESSMENT
80 y/o RH-F w/ h/o Myasthenia Gravis on mestinon 60mg TID and 180 mg Mestinon extended release at night, Continues on Eculizumab (Solaris) for MG, tapering dose of prednisone, HTN and recent acute hypoxemic respiratory failure with sepsis in setting of COVID-19 infection s/p solumedrol 4/3-4/7 + Plaquenil 3/31-4/4 who presented for plasma COVID therapy, S/P Convalescence Plasma 4/24

## 2020-05-02 NOTE — PROGRESS NOTE ADULT - PROBLEM SELECTOR PLAN 5
Continue Mestinon home dosing  Given solumedrol 40mg x 1  CLOSE MONITORING OF RR, respiratory effort, neck flexion, strength.   Neuro checks q4hr  NIF and VC should be done at minimum q6hrs, but due to current pandemic and DNR/DNI status, unable to perform.  Will discuss at least daily NIF and VC monitoring  Solaris treatment to be continued at [facility], discussed with her neuromuscular specialist, Dr. Fitzgerald and will help set up at [facility].

## 2020-05-02 NOTE — PROGRESS NOTE ADULT - PROBLEM SELECTOR PLAN 1
-S/P Solumedrol 04/03/2020-04/07/2020  -S/P Plaquenil 03/31-04/04  -Transferred from [facility] for Convalescent Plasma Transfusion->S/p plasma transfusion 4/24-CRP and ferritin slightly decreasing.  No further plans for convalescent plasma treatments.   -Anakinra d/w ID but holding off 2/2 having already received immunosuppression Eculizumab (Solaris) for MG

## 2020-05-03 LAB
ANION GAP SERPL CALC-SCNC: 10 MMOL/L — SIGNIFICANT CHANGE UP (ref 5–17)
BLD GP AB SCN SERPL QL: NEGATIVE — SIGNIFICANT CHANGE UP
BUN SERPL-MCNC: 11 MG/DL — SIGNIFICANT CHANGE UP (ref 7–23)
CALCIUM SERPL-MCNC: 8.5 MG/DL — SIGNIFICANT CHANGE UP (ref 8.4–10.5)
CHLORIDE SERPL-SCNC: 99 MMOL/L — SIGNIFICANT CHANGE UP (ref 96–108)
CO2 SERPL-SCNC: 29 MMOL/L — SIGNIFICANT CHANGE UP (ref 22–31)
CREAT SERPL-MCNC: 0.72 MG/DL — SIGNIFICANT CHANGE UP (ref 0.5–1.3)
GLUCOSE SERPL-MCNC: 93 MG/DL — SIGNIFICANT CHANGE UP (ref 70–99)
HCT VFR BLD CALC: 26.3 % — LOW (ref 34.5–45)
HGB BLD-MCNC: 7.8 G/DL — LOW (ref 11.5–15.5)
MCHC RBC-ENTMCNC: 25.5 PG — LOW (ref 27–34)
MCHC RBC-ENTMCNC: 29.7 GM/DL — LOW (ref 32–36)
MCV RBC AUTO: 85.9 FL — SIGNIFICANT CHANGE UP (ref 80–100)
NRBC # BLD: 0 /100 WBCS — SIGNIFICANT CHANGE UP (ref 0–0)
PLATELET # BLD AUTO: 177 K/UL — SIGNIFICANT CHANGE UP (ref 150–400)
POTASSIUM SERPL-MCNC: 3.7 MMOL/L — SIGNIFICANT CHANGE UP (ref 3.5–5.3)
POTASSIUM SERPL-SCNC: 3.7 MMOL/L — SIGNIFICANT CHANGE UP (ref 3.5–5.3)
RBC # BLD: 3.06 M/UL — LOW (ref 3.8–5.2)
RBC # FLD: 22.6 % — HIGH (ref 10.3–14.5)
RH IG SCN BLD-IMP: NEGATIVE — SIGNIFICANT CHANGE UP
SODIUM SERPL-SCNC: 138 MMOL/L — SIGNIFICANT CHANGE UP (ref 135–145)
WBC # BLD: 6.89 K/UL — SIGNIFICANT CHANGE UP (ref 3.8–10.5)
WBC # FLD AUTO: 6.89 K/UL — SIGNIFICANT CHANGE UP (ref 3.8–10.5)

## 2020-05-03 RX ADMIN — ATORVASTATIN CALCIUM 20 MILLIGRAM(S): 80 TABLET, FILM COATED ORAL at 21:34

## 2020-05-03 RX ADMIN — PYRIDOSTIGMINE BROMIDE 60 MILLIGRAM(S): 60 SOLUTION ORAL at 21:34

## 2020-05-03 RX ADMIN — LIDOCAINE 2 PATCH: 4 CREAM TOPICAL at 21:33

## 2020-05-03 RX ADMIN — PYRIDOSTIGMINE BROMIDE 180 MILLIGRAM(S): 60 SOLUTION ORAL at 07:58

## 2020-05-03 RX ADMIN — HYDROMORPHONE HYDROCHLORIDE 0.2 MILLIGRAM(S): 2 INJECTION INTRAMUSCULAR; INTRAVENOUS; SUBCUTANEOUS at 21:33

## 2020-05-03 RX ADMIN — Medication 2000 UNIT(S): at 07:57

## 2020-05-03 RX ADMIN — Medication 50 MICROGRAM(S): at 05:28

## 2020-05-03 RX ADMIN — ENOXAPARIN SODIUM 40 MILLIGRAM(S): 100 INJECTION SUBCUTANEOUS at 07:57

## 2020-05-03 RX ADMIN — PYRIDOSTIGMINE BROMIDE 60 MILLIGRAM(S): 60 SOLUTION ORAL at 07:58

## 2020-05-03 RX ADMIN — Medication 10 MILLIGRAM(S): at 05:28

## 2020-05-03 RX ADMIN — Medication 5 MILLIGRAM(S): at 21:33

## 2020-05-03 RX ADMIN — PANTOPRAZOLE SODIUM 40 MILLIGRAM(S): 20 TABLET, DELAYED RELEASE ORAL at 05:28

## 2020-05-03 RX ADMIN — Medication 100 MILLIGRAM(S): at 21:32

## 2020-05-03 RX ADMIN — ZINC OXIDE 1 APPLICATION(S): 200 OINTMENT TOPICAL at 07:58

## 2020-05-03 RX ADMIN — LIDOCAINE 2 PATCH: 4 CREAM TOPICAL at 07:57

## 2020-05-03 RX ADMIN — LIDOCAINE 2 PATCH: 4 CREAM TOPICAL at 00:28

## 2020-05-03 RX ADMIN — PYRIDOSTIGMINE BROMIDE 60 MILLIGRAM(S): 60 SOLUTION ORAL at 05:29

## 2020-05-03 RX ADMIN — POLYETHYLENE GLYCOL 3350 17 GRAM(S): 17 POWDER, FOR SOLUTION ORAL at 07:58

## 2020-05-03 NOTE — PROGRESS NOTE ADULT - SUBJECTIVE AND OBJECTIVE BOX
[facility] Division of Hospital Medicine Progress Note    Patient is a 79y old  Female who presents with a chief complaint of Shortness of breath, fevers    SUBJECTIVE / OVERNIGHT EVENTS:  patient seen and examined at bedside. s/p convalescent plasma infusion 4/24. Patient remains on NRB 13L satting at 96%. Hb dropped to 7.8 will give 1U of PRBC today. Called ID for possible Ramdesivir trial, Dr. Mejía     MEDICATIONS  (STANDING):  atorvastatin 20 milliGRAM(s) Oral at bedtime  cholecalciferol 2000 Unit(s) Oral daily  enoxaparin Injectable 40 milliGRAM(s) SubCutaneous daily  levothyroxine 50 MICROGram(s) Oral daily  lidocaine   Patch 2 Patch Transdermal daily  melatonin 5 milliGRAM(s) Oral at bedtime  pantoprazole    Tablet 40 milliGRAM(s) Oral before breakfast  polyethylene glycol 3350 17 Gram(s) Oral daily  predniSONE   Tablet 10 milliGRAM(s) Oral daily  pyridostigmine 60 milliGRAM(s) Oral three times a day  pyridostigmine  milliGRAM(s) Oral daily  zinc oxide 40% Ointment 1 Application(s) Topical daily    MEDICATIONS  (PRN):  acetaminophen   Tablet .. 650 milliGRAM(s) Oral every 4 hours PRN Temp greater or equal to 38C (100.4F), Moderate Pain (4 - 6)  ALBUTerol    90 MICROgram(s) HFA Inhaler 2 Puff(s) Inhalation every 4 hours PRN Shortness of Breath and/or Wheezing  benzonatate 100 milliGRAM(s) Oral three times a day PRN Cough  bisacodyl 5 milliGRAM(s) Oral every 12 hours PRN Constipation  HYDROmorphone  Injectable 0.2 milliGRAM(s) IV Push every 2 hours PRN Severe Pain (7 - 10)  senna 2 Tablet(s) Oral at bedtime PRN Constipation  sodium chloride 0.65% Nasal 1 Spray(s) Both Nostrils two times a day PRN Nasal Congestion    PHYSICAL EXAM:  Vital Signs Last 24 Hrs  T(C): 36.6 (03 May 2020 07:33), Max: 36.9 (03 May 2020 00:49)  T(F): 97.9 (03 May 2020 07:33), Max: 98.4 (03 May 2020 00:49)  HR: 84 (03 May 2020 09:15) (57 - 84)  BP: 99/64 (03 May 2020 07:33) (90/47 - 99/64)  BP(mean): --  RR: 20 (03 May 2020 07:33) (20 - 21)  SpO2: 87% (03 May 2020 09:15) (87% - 100%)    CONSTITUTIONAL: NAD, well-developed, well-groomed  ENMT: Moist oral mucosa, no pharyngeal injection or exudates; normal dentition  RESPIRATORY: Normal respiratory effort; lungs are clear to auscultation bilaterally  CARDIOVASCULAR: Regular rate and rhythm, normal S1 and S2, no murmur/rub/gallop; No lower extremity edema; Peripheral pulses are 2+ bilaterally  ABDOMEN: Nontender to palpation, normoactive bowel sounds, no rebound/guarding; No hepatosplenomegaly  PSYCH: A+O to person, place, and time; affect appropriate  NEUROLOGY: CN 2-12 are intact and symmetric; no gross sensory deficits   SKIN: No rashes; no palpable lesions    LABS:                                 7.8    6.89  )-----------( 177      ( 03 May 2020 07:29 )             26.3     05-03    138  |  99  |  11  ----------------------------<  93  3.7   |  29  |  0.72    Ca    8.5      03 May 2020 07:29          D-Dimer Assay, Quantitative: 1788 ng/mL DDU <H> (04-26)  D-Dimer Assay, Quantitative: 2576 ng/mL DDU <H> (04-25)  D-Dimer Assay, Quantitative: 2906 ng/mL DDU <H> (04-24)  D-Dimer Assay, Quantitative: 3662 ng/mL DDU <H> (04-23)  D-Dimer Assay, Quantitative: 4566 ng/mL DDU <H> (04-19)    Ferritin, Serum: 412 ng/mL <H> (04-26)  Ferritin, Serum: 442 ng/mL <H> (04-25)  Ferritin, Serum: 457 ng/mL <H> (04-24)  Ferritin, Serum: 525 ng/mL <H> (04-19)  Ferritin, Serum: 560 ng/mL <H> (04-15)  Ferritin, Serum: 469 ng/mL <H> (04-13)    C-Reactive Protein, Serum: 3.37 mg/dL <H> (04-26)  C-Reactive Protein, Serum: 4.40 mg/dL <H> (04-25)  C-Reactive Protein, Serum: 4.79 mg/dL <H> (04-24)  C-Reactive Protein, Serum: 12.20 mg/dL <H> (04-19)  C-Reactive Protein, Serum: 20.82 mg/dL <H> (04-15)  C-Reactive Protein, Serum: 15.92 mg/dL <H> (04-13)    Pro-Calcitonin (Superimposed bacterial infection)   Procalcitonin, Serum: 0.16 ng/mL <H> (04-19)  Procalcitonin, Serum: 0.27 ng/mL <H> (04-15)    COVID-19 PCR: Detected (03-30-20 @ 21:41)    RADIOLOGY & ADDITIONAL TESTS:  Imaging from Last 24 Hours:  Electrocardiogram/QTc Interval:    COORDINATION OF CARE:  Care Discussed with Consultants/Other Providers: [facility] Division of Hospital Medicine Progress Note    Patient is a 79y old  Female who presents with a chief complaint of Shortness of breath, fevers    SUBJECTIVE / OVERNIGHT EVENTS:  patient seen and examined at bedside. s/p convalescent plasma infusion 4/24. Patient remains on NRB 13L satting at 96%. Hb dropped to 7.8 will give 1U of PRBC today. Called ID for possible Ramdesivir trial, Dr. Mejía who stated pt not a candidate for Ramdesivir at this time as studies have shown improvement is seen in earlier of disease.     MEDICATIONS  (STANDING):  atorvastatin 20 milliGRAM(s) Oral at bedtime  cholecalciferol 2000 Unit(s) Oral daily  enoxaparin Injectable 40 milliGRAM(s) SubCutaneous daily  levothyroxine 50 MICROGram(s) Oral daily  lidocaine   Patch 2 Patch Transdermal daily  melatonin 5 milliGRAM(s) Oral at bedtime  pantoprazole    Tablet 40 milliGRAM(s) Oral before breakfast  polyethylene glycol 3350 17 Gram(s) Oral daily  predniSONE   Tablet 10 milliGRAM(s) Oral daily  pyridostigmine 60 milliGRAM(s) Oral three times a day  pyridostigmine  milliGRAM(s) Oral daily  zinc oxide 40% Ointment 1 Application(s) Topical daily    MEDICATIONS  (PRN):  acetaminophen   Tablet .. 650 milliGRAM(s) Oral every 4 hours PRN Temp greater or equal to 38C (100.4F), Moderate Pain (4 - 6)  ALBUTerol    90 MICROgram(s) HFA Inhaler 2 Puff(s) Inhalation every 4 hours PRN Shortness of Breath and/or Wheezing  benzonatate 100 milliGRAM(s) Oral three times a day PRN Cough  bisacodyl 5 milliGRAM(s) Oral every 12 hours PRN Constipation  HYDROmorphone  Injectable 0.2 milliGRAM(s) IV Push every 2 hours PRN Severe Pain (7 - 10)  senna 2 Tablet(s) Oral at bedtime PRN Constipation  sodium chloride 0.65% Nasal 1 Spray(s) Both Nostrils two times a day PRN Nasal Congestion    PHYSICAL EXAM:  Vital Signs Last 24 Hrs  T(C): 36.6 (03 May 2020 07:33), Max: 36.9 (03 May 2020 00:49)  T(F): 97.9 (03 May 2020 07:33), Max: 98.4 (03 May 2020 00:49)  HR: 84 (03 May 2020 09:15) (57 - 84)  BP: 99/64 (03 May 2020 07:33) (90/47 - 99/64)  BP(mean): --  RR: 20 (03 May 2020 07:33) (20 - 21)  SpO2: 87% (03 May 2020 09:15) (87% - 100%)    CONSTITUTIONAL: NAD, well-developed, well-groomed  ENMT: Moist oral mucosa, no pharyngeal injection or exudates; normal dentition  RESPIRATORY: Normal respiratory effort; lungs are clear to auscultation bilaterally  CARDIOVASCULAR: Regular rate and rhythm, normal S1 and S2, no murmur/rub/gallop; No lower extremity edema; Peripheral pulses are 2+ bilaterally  ABDOMEN: Nontender to palpation, normoactive bowel sounds, no rebound/guarding; No hepatosplenomegaly  PSYCH: A+O to person, place, and time; affect appropriate  NEUROLOGY: CN 2-12 are intact and symmetric; no gross sensory deficits   SKIN: No rashes; no palpable lesions    LABS:                                 7.8    6.89  )-----------( 177      ( 03 May 2020 07:29 )             26.3     05-03    138  |  99  |  11  ----------------------------<  93  3.7   |  29  |  0.72    Ca    8.5      03 May 2020 07:29          D-Dimer Assay, Quantitative: 1788 ng/mL DDU <H> (04-26)  D-Dimer Assay, Quantitative: 2576 ng/mL DDU <H> (04-25)  D-Dimer Assay, Quantitative: 2906 ng/mL DDU <H> (04-24)  D-Dimer Assay, Quantitative: 3662 ng/mL DDU <H> (04-23)  D-Dimer Assay, Quantitative: 4566 ng/mL DDU <H> (04-19)    Ferritin, Serum: 412 ng/mL <H> (04-26)  Ferritin, Serum: 442 ng/mL <H> (04-25)  Ferritin, Serum: 457 ng/mL <H> (04-24)  Ferritin, Serum: 525 ng/mL <H> (04-19)  Ferritin, Serum: 560 ng/mL <H> (04-15)  Ferritin, Serum: 469 ng/mL <H> (04-13)    C-Reactive Protein, Serum: 3.37 mg/dL <H> (04-26)  C-Reactive Protein, Serum: 4.40 mg/dL <H> (04-25)  C-Reactive Protein, Serum: 4.79 mg/dL <H> (04-24)  C-Reactive Protein, Serum: 12.20 mg/dL <H> (04-19)  C-Reactive Protein, Serum: 20.82 mg/dL <H> (04-15)  C-Reactive Protein, Serum: 15.92 mg/dL <H> (04-13)    Pro-Calcitonin (Superimposed bacterial infection)   Procalcitonin, Serum: 0.16 ng/mL <H> (04-19)  Procalcitonin, Serum: 0.27 ng/mL <H> (04-15)    COVID-19 PCR: Detected (03-30-20 @ 21:41)    RADIOLOGY & ADDITIONAL TESTS:  Imaging from Last 24 Hours:  Electrocardiogram/QTc Interval:    COORDINATION OF CARE:  Care Discussed with Consultants/Other Providers:

## 2020-05-03 NOTE — PROGRESS NOTE ADULT - PROBLEM SELECTOR PLAN 5
Continue Mestinon home dosing  Given solumedrol 40mg x 1  CLOSE MONITORING OF RR, respiratory effort, neck flexion, strength.   Neuro checks q4hr  NIF and VC should be done at minimum q6hrs, but due to current pandemic and DNR/DNI status, unable to perform.  Will discuss at least daily NIF and VC monitoring  Solaris treatment to be continued at [facility], discussed with her neuromuscular specialist, Dr. Fitzgerald and will help set up at [facility].

## 2020-05-04 LAB
HCT VFR BLD CALC: 30.5 % — LOW (ref 34.5–45)
HGB BLD-MCNC: 9.5 G/DL — LOW (ref 11.5–15.5)
MCHC RBC-ENTMCNC: 26.6 PG — LOW (ref 27–34)
MCHC RBC-ENTMCNC: 31.1 GM/DL — LOW (ref 32–36)
MCV RBC AUTO: 85.4 FL — SIGNIFICANT CHANGE UP (ref 80–100)
NRBC # BLD: 0 /100 WBCS — SIGNIFICANT CHANGE UP (ref 0–0)
PLATELET # BLD AUTO: 168 K/UL — SIGNIFICANT CHANGE UP (ref 150–400)
RBC # BLD: 3.57 M/UL — LOW (ref 3.8–5.2)
RBC # FLD: 21.4 % — HIGH (ref 10.3–14.5)
WBC # BLD: 6.67 K/UL — SIGNIFICANT CHANGE UP (ref 3.8–10.5)
WBC # FLD AUTO: 6.67 K/UL — SIGNIFICANT CHANGE UP (ref 3.8–10.5)

## 2020-05-04 PROCEDURE — 99233 SBSQ HOSP IP/OBS HIGH 50: CPT | Mod: CS

## 2020-05-04 RX ADMIN — Medication 10 MILLIGRAM(S): at 07:10

## 2020-05-04 RX ADMIN — POLYETHYLENE GLYCOL 3350 17 GRAM(S): 17 POWDER, FOR SOLUTION ORAL at 07:35

## 2020-05-04 RX ADMIN — ZINC OXIDE 1 APPLICATION(S): 200 OINTMENT TOPICAL at 07:36

## 2020-05-04 RX ADMIN — LIDOCAINE 2 PATCH: 4 CREAM TOPICAL at 19:51

## 2020-05-04 RX ADMIN — PANTOPRAZOLE SODIUM 40 MILLIGRAM(S): 20 TABLET, DELAYED RELEASE ORAL at 07:10

## 2020-05-04 RX ADMIN — PYRIDOSTIGMINE BROMIDE 60 MILLIGRAM(S): 60 SOLUTION ORAL at 07:11

## 2020-05-04 RX ADMIN — Medication 50 MICROGRAM(S): at 07:10

## 2020-05-04 RX ADMIN — PYRIDOSTIGMINE BROMIDE 60 MILLIGRAM(S): 60 SOLUTION ORAL at 22:25

## 2020-05-04 RX ADMIN — HYDROMORPHONE HYDROCHLORIDE 0.2 MILLIGRAM(S): 2 INJECTION INTRAMUSCULAR; INTRAVENOUS; SUBCUTANEOUS at 22:25

## 2020-05-04 RX ADMIN — ATORVASTATIN CALCIUM 20 MILLIGRAM(S): 80 TABLET, FILM COATED ORAL at 22:25

## 2020-05-04 RX ADMIN — LIDOCAINE 2 PATCH: 4 CREAM TOPICAL at 07:35

## 2020-05-04 RX ADMIN — PYRIDOSTIGMINE BROMIDE 60 MILLIGRAM(S): 60 SOLUTION ORAL at 12:46

## 2020-05-04 RX ADMIN — Medication 5 MILLIGRAM(S): at 22:25

## 2020-05-04 RX ADMIN — ENOXAPARIN SODIUM 40 MILLIGRAM(S): 100 INJECTION SUBCUTANEOUS at 07:35

## 2020-05-04 RX ADMIN — Medication 2000 UNIT(S): at 07:35

## 2020-05-04 RX ADMIN — PYRIDOSTIGMINE BROMIDE 180 MILLIGRAM(S): 60 SOLUTION ORAL at 07:36

## 2020-05-04 NOTE — PROGRESS NOTE ADULT - PROBLEM SELECTOR PLAN 2
-On 12L NRB satting at 93%   -PT is DNR/DNI and has severe COVID-19 associated hypoxic respiratory failure.   -PT also at very high risk of rapid decompensation secondary to superimposed neuromuscular respiratory failure due to myasthenia gravis.  Will discuss further with outpatient neuromuscular specialist, Dr. Fitzgerald, but on initial contact likely her current presentation is mostly due to COVID-19 rather than MG.   -Will continue weaning trials and if clinically improves will d/w [facility] for return.

## 2020-05-04 NOTE — PROGRESS NOTE ADULT - SUBJECTIVE AND OBJECTIVE BOX
[facility] Division of Hospital Medicine Progress Note    Patient is a 79y old  Female who presents with a chief complaint of Shortness of breath, fevers     SUBJECTIVE / OVERNIGHT EVENTS:  patient seen and examined at bedside. s/p convalescent plasma infusion 4/24. Patient remains on NRB 12L satting at 93%.  S/P 1 Unit of PRBC on 5/3.      MEDICATIONS  (STANDING):  atorvastatin 20 milliGRAM(s) Oral at bedtime  cholecalciferol 2000 Unit(s) Oral daily  enoxaparin Injectable 40 milliGRAM(s) SubCutaneous daily  levothyroxine 50 MICROGram(s) Oral daily  lidocaine   Patch 2 Patch Transdermal daily  melatonin 5 milliGRAM(s) Oral at bedtime  pantoprazole    Tablet 40 milliGRAM(s) Oral before breakfast  polyethylene glycol 3350 17 Gram(s) Oral daily  predniSONE   Tablet 10 milliGRAM(s) Oral daily  pyridostigmine 60 milliGRAM(s) Oral three times a day  pyridostigmine  milliGRAM(s) Oral daily  zinc oxide 40% Ointment 1 Application(s) Topical daily    MEDICATIONS  (PRN):  acetaminophen   Tablet .. 650 milliGRAM(s) Oral every 4 hours PRN Temp greater or equal to 38C (100.4F), Moderate Pain (4 - 6)  ALBUTerol    90 MICROgram(s) HFA Inhaler 2 Puff(s) Inhalation every 4 hours PRN Shortness of Breath and/or Wheezing  benzonatate 100 milliGRAM(s) Oral three times a day PRN Cough  bisacodyl 5 milliGRAM(s) Oral every 12 hours PRN Constipation  HYDROmorphone  Injectable 0.2 milliGRAM(s) IV Push every 2 hours PRN Severe Pain (7 - 10)  senna 2 Tablet(s) Oral at bedtime PRN Constipation  sodium chloride 0.65% Nasal 1 Spray(s) Both Nostrils two times a day PRN Nasal Congestion      CAPILLARY BLOOD GLUCOSE        I&O's Summary    03 May 2020 07:01  -  04 May 2020 07:00  --------------------------------------------------------  IN: 1310 mL / OUT: 0 mL / NET: 1310 mL    04 May 2020 07:01  -  04 May 2020 10:27  --------------------------------------------------------  IN: 150 mL / OUT: 0 mL / NET: 150 mL        PHYSICAL EXAM:  Vital Signs Last 24 Hrs  T(C): 36.9 (04 May 2020 08:03), Max: 37.1 (04 May 2020 00:30)  T(F): 98.5 (04 May 2020 08:03), Max: 98.7 (04 May 2020 00:30)  HR: 65 (04 May 2020 08:03) (57 - 70)  BP: 116/66 (04 May 2020 08:03) (101/62 - 135/70)  BP(mean): --  RR: 20 (04 May 2020 08:03) (18 - 20)  SpO2: 93% (04 May 2020 08:03) (93% - 100%)  CONSTITUTIONAL: NAD, well-developed, well-groomed  ENMT: Moist oral mucosa, no pharyngeal injection or exudates; normal dentition  RESPIRATORY: Normal respiratory effort; lungs are clear to auscultation bilaterally  CARDIOVASCULAR: Regular rate and rhythm, normal S1 and S2, no murmur/rub/gallop; No lower extremity edema; Peripheral pulses are 2+ bilaterally  ABDOMEN: Nontender to palpation, normoactive bowel sounds, no rebound/guarding; No hepatosplenomegaly  PSYCH: A+O to person, place, and time; affect appropriate  NEUROLOGY: CN 2-12 are intact and symmetric; no gross sensory deficits   SKIN: No rashes; no palpable lesions    LABS:                        9.5    6.67  )-----------( 168      ( 04 May 2020 08:52 )             30.5     05-03    138  |  99  |  11  ----------------------------<  93  3.7   |  29  |  0.72    Ca    8.5      03 May 2020 07:29    D-Dimer Assay, Quantitative: 1788 ng/mL DDU <H> (04-26)  D-Dimer Assay, Quantitative: 2576 ng/mL DDU <H> (04-25)  D-Dimer Assay, Quantitative: 2906 ng/mL DDU <H> (04-24)  D-Dimer Assay, Quantitative: 3662 ng/mL DDU <H> (04-23)  D-Dimer Assay, Quantitative: 4566 ng/mL DDU <H> (04-19)    Ferritin, Serum: 412 ng/mL <H> (04-26)  Ferritin, Serum: 442 ng/mL <H> (04-25)  Ferritin, Serum: 457 ng/mL <H> (04-24)  Ferritin, Serum: 525 ng/mL <H> (04-19)  Ferritin, Serum: 560 ng/mL <H> (04-15)  Ferritin, Serum: 469 ng/mL <H> (04-13)    C-Reactive Protein, Serum: 3.37 mg/dL <H> (04-26)  C-Reactive Protein, Serum: 4.40 mg/dL <H> (04-25)  C-Reactive Protein, Serum: 4.79 mg/dL <H> (04-24)  C-Reactive Protein, Serum: 12.20 mg/dL <H> (04-19)  C-Reactive Protein, Serum: 20.82 mg/dL <H> (04-15)  C-Reactive Protein, Serum: 15.92 mg/dL <H> (04-13)    Pro-Calcitonin (Superimposed bacterial infection)   Procalcitonin, Serum: 0.16 ng/mL <H> (04-19)  Procalcitonin, Serum: 0.27 ng/mL <H> (04-15)    COVID-19 PCR: Detected (03-30-20 @ 21:41)      RADIOLOGY & ADDITIONAL TESTS:  Imaging from Last 24 Hours:  Electrocardiogram/QTc Interval:    COORDINATION OF CARE:  Care Discussed with Consultants/Other Providers:

## 2020-05-04 NOTE — CHART NOTE - NSCHARTNOTEFT_GEN_A_CORE
Nutrition Follow-up Note  Patient seen for:  Nutrition Follow-up    Source of Information:     Current Diet:  Dysphagia 1 puree, Honey Consistency Fluids    Subjective Information:    Objective Information:    Labs:    GI Issues:    Skin:    Previous Nutrition Diagnosis:    New Nutrition Diagnosis:    Nutrition Care Plan:  [X] In progress   [ ] Achieved    Nutrition Interventions:  1)  Continue to provide Dysphagia 1, puree; Honey Consistency Fluids Diet  2)  Provide food preferences when requested.   3)  Provide tray supplement of Magic Cup 2 times a day (580 kcals/18 grams protein daily) to help meet needs.   4)  Assist with feeding and encourage adequate po intakes at mealtimes as tolerated.      Monitoring and Evaluation:   Continue to monitor Nutritional intake, Tolerance to diet prescription, weights, labs, skin integrity    RD remains available upon request and will follow up per protocol    Aissatou Adamson RDN, CDN,    # [phone] Nutrition Follow-up Note  Patient seen for:  Nutrition Follow-up    Source of Information:  Medical record review, including previous RD notes; phone call to RN.  Unable to conduct a face to face interview due to limited contact restrictions related to patient's medical condition and isolation precautions.        Current Diet:  Dysphagia 1 puree, Honey Consistency Fluids    Subjective Information:    Objective Information:    Labs: 5/03/20:  Na 138    K+ 3.7    Glucose 93    GI Issues:  None noted     Last recorded BM:  5/04/20     Skin:  No pressure injury    Edema:  None noted    Previous Nutrition Diagnosis:    New Nutrition Diagnosis:    Nutrition Care Plan:  [X] In progress   [ ] Achieved    Nutrition Interventions:  1)  Continue to provide Dysphagia 1, puree; Honey Consistency Fluids Diet  2)  Provide food preferences when requested.   3)  Provide tray supplement of Magic Cup 2 times a day (580 kcals/18 grams protein daily) to help meet needs.   4)  Assist with feeding and encourage adequate po intakes at mealtimes as tolerated.      Monitoring and Evaluation:   Continue to monitor Nutritional intake, Tolerance to diet prescription, weights, labs, skin integrity    RD remains available upon request and will follow up per protocol    Aissatou Adamson, SAHARAN, CDN,    # [phone] Nutrition Follow-up Note  Patient seen for:  Nutrition Follow-up    Source of Information:  Medical record review, including previous RD notes; phone call to RN.  Unable to conduct a face to face interview due to limited contact restrictions related to patient's medical condition and isolation precautions.        Current Diet:  Dysphagia 1 puree, Honey Consistency Fluids    Subjective Information:    Objective Information:    Labs: 5/03/20:  Na 138    K+ 3.7    Glucose 93    GI Issues:  None noted     Last recorded BM:  5/04/20     Skin:  No pressure injury    Edema:  None noted    Previous Nutrition Diagnosis:  Inadequate Protein-Energy Intake    New Nutrition Diagnosis:    Nutrition Care Plan:  [X] In progress   [ ] Achieved    Nutrition Interventions:  1)  Continue to provide Dysphagia 1, puree; Honey Consistency Fluids Diet  2)  Provide food preferences when requested.   3)  Provide tray supplement of Magic Cup 2 times a day (580 kcals/18 grams protein daily) to help meet needs.   4)  Assist with feeding and encourage adequate po intakes at mealtimes as tolerated.      Monitoring and Evaluation:   Continue to monitor Nutritional intake, Tolerance to diet prescription, weights, labs, skin integrity    RD remains available upon request and will follow up per protocol    Aissatou Adamson, SAHARAN, CDN,    # [phone] Nutrition Follow-up Note  Patient seen for:  Nutrition Follow-up    Source of Information:  Medical record review, including previous RD notes; phone call to RN.  Unable to conduct a face to face interview due to limited contact restrictions related to patient's medical condition and isolation precautions.        Current Diet:  Dysphagia 1 puree, Honey Consistency Fluids    Subjective Information:  RN reported patient has been consuming 25 - 50% of her meals and tolerated current diet prescription.  Additional tray items provided, Magic Cup, yogurt and pudding.  Per RN, patient will often prefer to eat her entree.      Objective Information:    Labs: 5/03/20:  Na 138    K+ 3.7    Glucose 93    GI Issues:  None noted     Last recorded BM:  5/04/20     Skin:  No pressure injury    Edema:  None noted    Previous Nutrition Diagnosis:  Inadequate Protein-Energy Intake    New Nutrition Diagnosis:    Nutrition Care Plan:  [X] In progress   [ ] Achieved    Nutrition Interventions:  1)  Continue to provide Dysphagia 1, puree; Honey Consistency Fluids Diet  2)  Provide food preferences when requested.   3)  Provide tray supplement of Magic Cup 2 times a day (580 kcals/18 grams protein daily) to help meet needs.   4)  Assist with feeding and encourage adequate po intakes at mealtimes as tolerated.      Monitoring and Evaluation:   Continue to monitor Nutritional intake, Tolerance to diet prescription, weights, labs, skin integrity    RD remains available upon request and will follow up per protocol    Aissatou Adamson RDN, CDN,    # [phone] Nutrition Follow-up Note  Patient seen for:  Nutrition Follow-up    Source of Information:  Medical record review, including previous RD notes; phone call to RN.  Unable to conduct a face to face interview due to limited contact restrictions related to patient's medical condition and isolation precautions.        Current Diet:  Dysphagia 1 puree, Honey Consistency Fluids    Subjective Information:  RN reported patient has been consuming 25 - 50% of her meals and tolerated current diet prescription.  Additional tray items provided, Magic Cup, yogurt and pudding.  Per RN, patient will often prefer eating her entree rather than Other pertinent information: tray items.      Objective Information:   79 year old female with h/o Myasthenia Gravis presented with Shortness of breath and fevers.   MD note this morning indicated patient continues to require NRB and is s/p convalescence plasma on 4/24/20.    Labs: 5/03/20:  Na 138    K+ 3.7    Glucose 93    GI Issues:  None noted     Last recorded BM:  5/04/20     Skin:  No pressure injury    Edema:  None noted    Previous Nutrition Diagnosis:  Inadequate Protein-Energy Intake    New Nutrition Diagnosis:    Nutrition Care Plan:  [X] In progress   [ ] Achieved    Nutrition Interventions:  1)  Continue to provide Low Sodium, Dysphagia 1, puree; Honey Consistency Fluids Diet  2)  Provide food preferences when requested.   3)  Provide tray supplement of Magic Cup 2 times a day (580 kcals/18 grams protein daily) to help meet needs.   4)  Assist with feeding and encourage adequate po intakes at mealtimes as tolerated.      Monitoring and Evaluation:   Continue to monitor Nutritional intake, Tolerance to diet prescription, weights, labs, skin integrity    RD remains available upon request and will follow up per protocol    Aissatou Adamson, SAHARAN, CDN,    # [phone]

## 2020-05-04 NOTE — PROGRESS NOTE ADULT - PROBLEM SELECTOR PLAN 1
-S/P Solumedrol 04/03/2020-04/07/2020  -S/P Plaquenil 03/31-04/04  -Transferred from [facility] for Convalescent Plasma Transfusion->S/p plasma transfusion 4/24-CRP and ferritin slightly decreasing. No further plans for convalescent plasma treatments.   -Anakinra d/w ID but holding off 2/2 having already received immunosuppression Eculizumab (Solaris) for MG  -Not a candidate for Remdisivir as has shown improvement most in earlier stages of disease.

## 2020-05-05 LAB
CRP SERPL-MCNC: 2.09 MG/DL — HIGH (ref 0–0.4)
D DIMER BLD IA.RAPID-MCNC: 797 NG/ML DDU — HIGH
FERRITIN SERPL-MCNC: 225 NG/ML — HIGH (ref 15–150)
HCT VFR BLD CALC: 32.3 % — LOW (ref 34.5–45)
HGB BLD-MCNC: 10.1 G/DL — LOW (ref 11.5–15.5)
MCHC RBC-ENTMCNC: 26.7 PG — LOW (ref 27–34)
MCHC RBC-ENTMCNC: 31.3 GM/DL — LOW (ref 32–36)
MCV RBC AUTO: 85.4 FL — SIGNIFICANT CHANGE UP (ref 80–100)
NRBC # BLD: 0 /100 WBCS — SIGNIFICANT CHANGE UP (ref 0–0)
PLATELET # BLD AUTO: 167 K/UL — SIGNIFICANT CHANGE UP (ref 150–400)
PROCALCITONIN SERPL-MCNC: 0.07 NG/ML — SIGNIFICANT CHANGE UP (ref 0.02–0.1)
RBC # BLD: 3.78 M/UL — LOW (ref 3.8–5.2)
RBC # FLD: 20.9 % — HIGH (ref 10.3–14.5)
WBC # BLD: 6.88 K/UL — SIGNIFICANT CHANGE UP (ref 3.8–10.5)
WBC # FLD AUTO: 6.88 K/UL — SIGNIFICANT CHANGE UP (ref 3.8–10.5)

## 2020-05-05 PROCEDURE — 99233 SBSQ HOSP IP/OBS HIGH 50: CPT | Mod: CS

## 2020-05-05 RX ADMIN — PYRIDOSTIGMINE BROMIDE 60 MILLIGRAM(S): 60 SOLUTION ORAL at 22:03

## 2020-05-05 RX ADMIN — PYRIDOSTIGMINE BROMIDE 60 MILLIGRAM(S): 60 SOLUTION ORAL at 06:29

## 2020-05-05 RX ADMIN — ENOXAPARIN SODIUM 40 MILLIGRAM(S): 100 INJECTION SUBCUTANEOUS at 11:59

## 2020-05-05 RX ADMIN — Medication 100 MILLIGRAM(S): at 22:05

## 2020-05-05 RX ADMIN — PYRIDOSTIGMINE BROMIDE 60 MILLIGRAM(S): 60 SOLUTION ORAL at 15:51

## 2020-05-05 RX ADMIN — PANTOPRAZOLE SODIUM 40 MILLIGRAM(S): 20 TABLET, DELAYED RELEASE ORAL at 06:29

## 2020-05-05 RX ADMIN — Medication 2000 UNIT(S): at 11:59

## 2020-05-05 RX ADMIN — PYRIDOSTIGMINE BROMIDE 180 MILLIGRAM(S): 60 SOLUTION ORAL at 12:00

## 2020-05-05 RX ADMIN — LIDOCAINE 2 PATCH: 4 CREAM TOPICAL at 11:59

## 2020-05-05 RX ADMIN — Medication 50 MICROGRAM(S): at 06:29

## 2020-05-05 RX ADMIN — ATORVASTATIN CALCIUM 20 MILLIGRAM(S): 80 TABLET, FILM COATED ORAL at 22:03

## 2020-05-05 RX ADMIN — LIDOCAINE 2 PATCH: 4 CREAM TOPICAL at 22:03

## 2020-05-05 RX ADMIN — Medication 5 MILLIGRAM(S): at 22:03

## 2020-05-05 RX ADMIN — LIDOCAINE 2 PATCH: 4 CREAM TOPICAL at 19:02

## 2020-05-05 RX ADMIN — Medication 10 MILLIGRAM(S): at 06:29

## 2020-05-05 RX ADMIN — ZINC OXIDE 1 APPLICATION(S): 200 OINTMENT TOPICAL at 12:00

## 2020-05-05 NOTE — PROGRESS NOTE ADULT - PROBLEM SELECTOR PLAN 2
-On 10L NRB satting at 95%   -PT is DNR/DNI and has severe COVID-19 associated hypoxic respiratory failure.   -PT also at very high risk of rapid decompensation secondary to superimposed neuromuscular respiratory failure due to myasthenia gravis.  Will discuss further with outpatient neuromuscular specialist, Dr. Fitzgerald, but on initial contact likely her current presentation is mostly due to COVID-19 rather than MG.   -Will continue weaning trials and if clinically improves will d/w [facility] for return.

## 2020-05-05 NOTE — PROGRESS NOTE ADULT - PROBLEM SELECTOR PLAN 1
-S/P Solumedrol 04/03/2020-04/07/2020  -S/P Plaquenil 03/31-04/04  -Transferred from [facility] for Convalescent Plasma Transfusion->S/p plasma transfusion 4/24-CRP and ferritin slightly decreasing. No further plans for convalescent plasma treatments.   -Anakinra d/w ID but holding off 2/2 having already received immunosuppression Eculizumab (Solaris) for MG  -Not a candidate for Remdisivir as has shown improvement most in earlier stages of disease.

## 2020-05-05 NOTE — PROGRESS NOTE ADULT - PROBLEM SELECTOR PLAN 5
Continue Mestinon home dosing  Given solumedrol 40mg x 1  CLOSE MONITORING OF RR, respiratory effort, neck flexion, strength.   Neuro checks q4hr  NIF and VC should be done at minimum q6hrs, but due to current pandemic and DNR/DNI status, unable to perform.  Will discuss at least daily NIF and VC monitoring  Solaris treatment to be continued at [facility], discussed with her neuromuscular specialist, Dr. Fitzgerald and will help set up at [facility].

## 2020-05-05 NOTE — PROGRESS NOTE ADULT - PROBLEM SELECTOR PROBLEM 5
Eben Andrew (MD)  Cardiology; Cardiovascular Disease; Internal Medicine  [address]  [place] [address]  Phone: [phone]  Fax: [phone]  Follow Up Time: Myasthenia gravis

## 2020-05-05 NOTE — PROGRESS NOTE ADULT - SUBJECTIVE AND OBJECTIVE BOX
[facility] Division of Hospital Medicine Progress Note    Patient is a 79y old  Female who presents with a chief complaint of Shortness of breath, fevers     SUBJECTIVE / OVERNIGHT EVENTS:  patient seen and examined at bedside. s/p convalescent plasma infusion 4/24. Patient remains on NRB 10L satting at 95%.  S/P 1 Unit of PRBC on 5/3.    -Will continue weaning trials and if clinically improves will d/w [facility] for return.    MEDICATIONS  (STANDING):  atorvastatin 20 milliGRAM(s) Oral at bedtime  cholecalciferol 2000 Unit(s) Oral daily  enoxaparin Injectable 40 milliGRAM(s) SubCutaneous daily  levothyroxine 50 MICROGram(s) Oral daily  lidocaine   Patch 2 Patch Transdermal daily  melatonin 5 milliGRAM(s) Oral at bedtime  pantoprazole    Tablet 40 milliGRAM(s) Oral before breakfast  polyethylene glycol 3350 17 Gram(s) Oral daily  predniSONE   Tablet 10 milliGRAM(s) Oral daily  pyridostigmine 60 milliGRAM(s) Oral three times a day  pyridostigmine  milliGRAM(s) Oral daily  zinc oxide 40% Ointment 1 Application(s) Topical daily    MEDICATIONS  (PRN):  acetaminophen   Tablet .. 650 milliGRAM(s) Oral every 4 hours PRN Temp greater or equal to 38C (100.4F), Moderate Pain (4 - 6)  ALBUTerol    90 MICROgram(s) HFA Inhaler 2 Puff(s) Inhalation every 4 hours PRN Shortness of Breath and/or Wheezing  benzonatate 100 milliGRAM(s) Oral three times a day PRN Cough  bisacodyl 5 milliGRAM(s) Oral every 12 hours PRN Constipation  HYDROmorphone  Injectable 0.2 milliGRAM(s) IV Push every 2 hours PRN Severe Pain (7 - 10)  senna 2 Tablet(s) Oral at bedtime PRN Constipation  sodium chloride 0.65% Nasal 1 Spray(s) Both Nostrils two times a day PRN Nasal Congestion      CAPILLARY BLOOD GLUCOSE        I&O's Summary    04 May 2020 07:01  -  05 May 2020 07:00  --------------------------------------------------------  IN: 500 mL / OUT: 0 mL / NET: 500 mL        PHYSICAL EXAM:  Vital Signs Last 24 Hrs  T(C): 36.9 (05 May 2020 08:20), Max: 36.9 (05 May 2020 00:15)  T(F): 98.4 (05 May 2020 08:20), Max: 98.4 (05 May 2020 00:15)  HR: 67 (05 May 2020 08:20) (60 - 67)  BP: 107/63 (05 May 2020 08:20) (107/63 - 118/57)  BP(mean): --  RR: 22 (05 May 2020 08:20) (20 - 22)  SpO2: 95% (05 May 2020 08:20) (95% - 100%)    CONSTITUTIONAL: NAD, well-developed, well-groomed  ENMT: Moist oral mucosa, no pharyngeal injection or exudates; normal dentition  RESPIRATORY: Normal respiratory effort; lungs are clear to auscultation bilaterally  CARDIOVASCULAR: Regular rate and rhythm, normal S1 and S2, no murmur/rub/gallop; No lower extremity edema; Peripheral pulses are 2+ bilaterally  ABDOMEN: Nontender to palpation, normoactive bowel sounds, no rebound/guarding; No hepatosplenomegaly  PSYCH: A+O to person, place, and time; affect appropriate  NEUROLOGY: CN 2-12 are intact and symmetric; no gross sensory deficits   SKIN: No rashes; no palpable lesions      LABS:                        9.5    6.67  )-----------( 168      ( 04 May 2020 08:52 )             30.5                  05-03    138  |  99  |  11  ----------------------------<  93  3.7   |  29  |  0.72    Ca    8.5      03 May 2020 07:29    D-Dimer Assay, Quantitative: 1788 ng/mL DDU <H> (04-26)  D-Dimer Assay, Quantitative: 2576 ng/mL DDU <H> (04-25)  D-Dimer Assay, Quantitative: 2906 ng/mL DDU <H> (04-24)  D-Dimer Assay, Quantitative: 3662 ng/mL DDU <H> (04-23)  D-Dimer Assay, Quantitative: 4566 ng/mL DDU <H> (04-19)    Ferritin, Serum: 412 ng/mL <H> (04-26)  Ferritin, Serum: 442 ng/mL <H> (04-25)  Ferritin, Serum: 457 ng/mL <H> (04-24)  Ferritin, Serum: 525 ng/mL <H> (04-19)  Ferritin, Serum: 560 ng/mL <H> (04-15)  Ferritin, Serum: 469 ng/mL <H> (04-13)    C-Reactive Protein, Serum: 3.37 mg/dL <H> (04-26)  C-Reactive Protein, Serum: 4.40 mg/dL <H> (04-25)  C-Reactive Protein, Serum: 4.79 mg/dL <H> (04-24)  C-Reactive Protein, Serum: 12.20 mg/dL <H> (04-19)  C-Reactive Protein, Serum: 20.82 mg/dL <H> (04-15)  C-Reactive Protein, Serum: 15.92 mg/dL <H> (04-13)    Pro-Calcitonin (Superimposed bacterial infection)   Procalcitonin, Serum: 0.16 ng/mL <H> (04-19)  Procalcitonin, Serum: 0.27 ng/mL <H> (04-15)    COVID-19 PCR: Detected (03-30-20 @ 21:41)      RADIOLOGY & ADDITIONAL TESTS:  Imaging from Last 24 Hours:  Electrocardiogram/QTc Interval:    COORDINATION OF CARE:  Care Discussed with Consultants/Other Providers: [facility] Division of Hospital Medicine Progress Note    Patient is a 79y old  Female who presents with a chief complaint of Shortness of breath, fevers     SUBJECTIVE / OVERNIGHT EVENTS:  patient seen and examined at bedside. s/p convalescent plasma infusion 4/24. Patient remains on NRB 10L satting at 95%.  S/P 1 Unit of PRBC on 5/3.    -Will continue weaning trials and if clinically improves will d/w [facility] for return.  -Updated daughter Patsy Stevenson ([phone])      MEDICATIONS  (STANDING):  atorvastatin 20 milliGRAM(s) Oral at bedtime  cholecalciferol 2000 Unit(s) Oral daily  enoxaparin Injectable 40 milliGRAM(s) SubCutaneous daily  levothyroxine 50 MICROGram(s) Oral daily  lidocaine   Patch 2 Patch Transdermal daily  melatonin 5 milliGRAM(s) Oral at bedtime  pantoprazole    Tablet 40 milliGRAM(s) Oral before breakfast  polyethylene glycol 3350 17 Gram(s) Oral daily  predniSONE   Tablet 10 milliGRAM(s) Oral daily  pyridostigmine 60 milliGRAM(s) Oral three times a day  pyridostigmine  milliGRAM(s) Oral daily  zinc oxide 40% Ointment 1 Application(s) Topical daily    MEDICATIONS  (PRN):  acetaminophen   Tablet .. 650 milliGRAM(s) Oral every 4 hours PRN Temp greater or equal to 38C (100.4F), Moderate Pain (4 - 6)  ALBUTerol    90 MICROgram(s) HFA Inhaler 2 Puff(s) Inhalation every 4 hours PRN Shortness of Breath and/or Wheezing  benzonatate 100 milliGRAM(s) Oral three times a day PRN Cough  bisacodyl 5 milliGRAM(s) Oral every 12 hours PRN Constipation  HYDROmorphone  Injectable 0.2 milliGRAM(s) IV Push every 2 hours PRN Severe Pain (7 - 10)  senna 2 Tablet(s) Oral at bedtime PRN Constipation  sodium chloride 0.65% Nasal 1 Spray(s) Both Nostrils two times a day PRN Nasal Congestion      CAPILLARY BLOOD GLUCOSE        I&O's Summary    04 May 2020 07:01  -  05 May 2020 07:00  --------------------------------------------------------  IN: 500 mL / OUT: 0 mL / NET: 500 mL        PHYSICAL EXAM:  Vital Signs Last 24 Hrs  T(C): 36.9 (05 May 2020 08:20), Max: 36.9 (05 May 2020 00:15)  T(F): 98.4 (05 May 2020 08:20), Max: 98.4 (05 May 2020 00:15)  HR: 67 (05 May 2020 08:20) (60 - 67)  BP: 107/63 (05 May 2020 08:20) (107/63 - 118/57)  BP(mean): --  RR: 22 (05 May 2020 08:20) (20 - 22)  SpO2: 95% (05 May 2020 08:20) (95% - 100%)    CONSTITUTIONAL: NAD, well-developed, well-groomed  ENMT: Moist oral mucosa, no pharyngeal injection or exudates; normal dentition  RESPIRATORY: Normal respiratory effort; lungs are clear to auscultation bilaterally  CARDIOVASCULAR: Regular rate and rhythm, normal S1 and S2, no murmur/rub/gallop; No lower extremity edema; Peripheral pulses are 2+ bilaterally  ABDOMEN: Nontender to palpation, normoactive bowel sounds, no rebound/guarding; No hepatosplenomegaly  PSYCH: A+O to person, place, and time; affect appropriate  NEUROLOGY: CN 2-12 are intact and symmetric; no gross sensory deficits   SKIN: No rashes; no palpable lesions      LABS:                        9.5    6.67  )-----------( 168      ( 04 May 2020 08:52 )             30.5                  05-03    138  |  99  |  11  ----------------------------<  93  3.7   |  29  |  0.72    Ca    8.5      03 May 2020 07:29    D-Dimer Assay, Quantitative: 1788 ng/mL DDU <H> (04-26)  D-Dimer Assay, Quantitative: 2576 ng/mL DDU <H> (04-25)  D-Dimer Assay, Quantitative: 2906 ng/mL DDU <H> (04-24)  D-Dimer Assay, Quantitative: 3662 ng/mL DDU <H> (04-23)  D-Dimer Assay, Quantitative: 4566 ng/mL DDU <H> (04-19)    Ferritin, Serum: 412 ng/mL <H> (04-26)  Ferritin, Serum: 442 ng/mL <H> (04-25)  Ferritin, Serum: 457 ng/mL <H> (04-24)  Ferritin, Serum: 525 ng/mL <H> (04-19)  Ferritin, Serum: 560 ng/mL <H> (04-15)  Ferritin, Serum: 469 ng/mL <H> (04-13)    C-Reactive Protein, Serum: 3.37 mg/dL <H> (04-26)  C-Reactive Protein, Serum: 4.40 mg/dL <H> (04-25)  C-Reactive Protein, Serum: 4.79 mg/dL <H> (04-24)  C-Reactive Protein, Serum: 12.20 mg/dL <H> (04-19)  C-Reactive Protein, Serum: 20.82 mg/dL <H> (04-15)  C-Reactive Protein, Serum: 15.92 mg/dL <H> (04-13)    Pro-Calcitonin (Superimposed bacterial infection)   Procalcitonin, Serum: 0.16 ng/mL <H> (04-19)  Procalcitonin, Serum: 0.27 ng/mL <H> (04-15)    COVID-19 PCR: Detected (03-30-20 @ 21:41)      RADIOLOGY & ADDITIONAL TESTS:  Imaging from Last 24 Hours:  Electrocardiogram/QTc Interval:    COORDINATION OF CARE:  Care Discussed with Consultants/Other Providers:

## 2020-05-06 DIAGNOSIS — E78.5 HYPERLIPIDEMIA, UNSPECIFIED: ICD-10-CM

## 2020-05-06 DIAGNOSIS — K21.9 GASTRO-ESOPHAGEAL REFLUX DISEASE WITHOUT ESOPHAGITIS: ICD-10-CM

## 2020-05-06 LAB
ANION GAP SERPL CALC-SCNC: 10 MMOL/L — SIGNIFICANT CHANGE UP (ref 5–17)
BUN SERPL-MCNC: 13 MG/DL — SIGNIFICANT CHANGE UP (ref 7–23)
CALCIUM SERPL-MCNC: 9.2 MG/DL — SIGNIFICANT CHANGE UP (ref 8.4–10.5)
CHLORIDE SERPL-SCNC: 103 MMOL/L — SIGNIFICANT CHANGE UP (ref 96–108)
CO2 SERPL-SCNC: 28 MMOL/L — SIGNIFICANT CHANGE UP (ref 22–31)
CREAT SERPL-MCNC: 0.78 MG/DL — SIGNIFICANT CHANGE UP (ref 0.5–1.3)
GLUCOSE SERPL-MCNC: 86 MG/DL — SIGNIFICANT CHANGE UP (ref 70–99)
HCT VFR BLD CALC: 31 % — LOW (ref 34.5–45)
HGB BLD-MCNC: 9.2 G/DL — LOW (ref 11.5–15.5)
MCHC RBC-ENTMCNC: 26 PG — LOW (ref 27–34)
MCHC RBC-ENTMCNC: 29.7 GM/DL — LOW (ref 32–36)
MCV RBC AUTO: 87.6 FL — SIGNIFICANT CHANGE UP (ref 80–100)
NRBC # BLD: 0 /100 WBCS — SIGNIFICANT CHANGE UP (ref 0–0)
PLATELET # BLD AUTO: 144 K/UL — LOW (ref 150–400)
POTASSIUM SERPL-MCNC: 3.8 MMOL/L — SIGNIFICANT CHANGE UP (ref 3.5–5.3)
POTASSIUM SERPL-SCNC: 3.8 MMOL/L — SIGNIFICANT CHANGE UP (ref 3.5–5.3)
RBC # BLD: 3.54 M/UL — LOW (ref 3.8–5.2)
RBC # FLD: 21 % — HIGH (ref 10.3–14.5)
SODIUM SERPL-SCNC: 141 MMOL/L — SIGNIFICANT CHANGE UP (ref 135–145)
WBC # BLD: 5.59 K/UL — SIGNIFICANT CHANGE UP (ref 3.8–10.5)
WBC # FLD AUTO: 5.59 K/UL — SIGNIFICANT CHANGE UP (ref 3.8–10.5)

## 2020-05-06 RX ADMIN — Medication 50 MICROGRAM(S): at 06:37

## 2020-05-06 RX ADMIN — ATORVASTATIN CALCIUM 20 MILLIGRAM(S): 80 TABLET, FILM COATED ORAL at 22:57

## 2020-05-06 RX ADMIN — Medication 100 MILLIGRAM(S): at 22:56

## 2020-05-06 RX ADMIN — ZINC OXIDE 1 APPLICATION(S): 200 OINTMENT TOPICAL at 14:45

## 2020-05-06 RX ADMIN — Medication 5 MILLIGRAM(S): at 22:55

## 2020-05-06 RX ADMIN — Medication 10 MILLIGRAM(S): at 06:38

## 2020-05-06 RX ADMIN — PYRIDOSTIGMINE BROMIDE 60 MILLIGRAM(S): 60 SOLUTION ORAL at 22:55

## 2020-05-06 RX ADMIN — Medication 2000 UNIT(S): at 14:46

## 2020-05-06 RX ADMIN — PYRIDOSTIGMINE BROMIDE 180 MILLIGRAM(S): 60 SOLUTION ORAL at 15:34

## 2020-05-06 RX ADMIN — PYRIDOSTIGMINE BROMIDE 60 MILLIGRAM(S): 60 SOLUTION ORAL at 15:34

## 2020-05-06 RX ADMIN — PYRIDOSTIGMINE BROMIDE 60 MILLIGRAM(S): 60 SOLUTION ORAL at 06:38

## 2020-05-06 RX ADMIN — HYDROMORPHONE HYDROCHLORIDE 0.2 MILLIGRAM(S): 2 INJECTION INTRAMUSCULAR; INTRAVENOUS; SUBCUTANEOUS at 22:56

## 2020-05-06 RX ADMIN — ENOXAPARIN SODIUM 40 MILLIGRAM(S): 100 INJECTION SUBCUTANEOUS at 14:46

## 2020-05-06 RX ADMIN — LIDOCAINE 2 PATCH: 4 CREAM TOPICAL at 14:46

## 2020-05-06 RX ADMIN — PANTOPRAZOLE SODIUM 40 MILLIGRAM(S): 20 TABLET, DELAYED RELEASE ORAL at 06:38

## 2020-05-06 RX ADMIN — POLYETHYLENE GLYCOL 3350 17 GRAM(S): 17 POWDER, FOR SOLUTION ORAL at 14:45

## 2020-05-06 RX ADMIN — ALBUTEROL 2 PUFF(S): 90 AEROSOL, METERED ORAL at 22:57

## 2020-05-06 RX ADMIN — LIDOCAINE 2 PATCH: 4 CREAM TOPICAL at 19:54

## 2020-05-06 NOTE — PROGRESS NOTE ADULT - PROBLEM SELECTOR PLAN 9
DVT: lovenox 40 subq daily  Diet: dysphagia 1 honey consistency   Dispo: home PT    Transitions of Care Status:  1.  Name of PCP: Carrillo Hannon  2.  PCP Contacted on Admission: [ ] Y    [ ] N    3.  PCP contacted at Discharge: [ ] Y    [ ] N    [ ] N/A  4.  Post-Discharge Appointment Date and Location:  5.  Summary of Handoff given to PCP:

## 2020-05-06 NOTE — PROGRESS NOTE ADULT - PROBLEM SELECTOR PLAN 1
-S/P Solumedrol 04/03/2020-04/07/2020  -S/P Plaquenil 03/31-04/04  -Transferred from [facility] for Convalescent Plasma Transfusion->S/p plasma transfusion 4/24-CRP and ferritin slightly decreasing. No further plans for convalescent plasma treatments.   -Anakinra d/w ID but holding off 2/2 having already received immunosuppression Eculizumab (Solaris) for MG  -Not a candidate for Remdisivir as has shown improvement most in earlier stages of disease.

## 2020-05-06 NOTE — PROGRESS NOTE ADULT - SUBJECTIVE AND OBJECTIVE BOX
Jorge Mckinley MD, PGY-1  [place] Contact Information  [facility] Pager: [phone]   After 7 PM on weekdays and 12 PM on weekends, for URGENT issues, low teams page #[phone], high teams #[phone]   --------------------------------------------------------------------------------------------  [facility] Contact Information  [facility] Pager: [phone]  After 7 PM on weekdays and 12 PM on weekends, for URGENT issues, low teams #[id number], high teams #[phone]  ---------------------------------------------------------------------------------------------  Patient is a 79y old  Female who presents with a chief complaint of Shortness of breath, fevers (06 May 2020 10:46)      SUBJECTIVE / OVERNIGHT EVENTS: Received as a transfer from [facility]. Pt endorses chronic "pain all over", no acute focal changes, denies any shortness of breath or chest pain.      MEDICATIONS  (STANDING):  atorvastatin 20 milliGRAM(s) Oral at bedtime  cholecalciferol 2000 Unit(s) Oral daily  enoxaparin Injectable 40 milliGRAM(s) SubCutaneous daily  levothyroxine 50 MICROGram(s) Oral daily  lidocaine   Patch 2 Patch Transdermal daily  melatonin 5 milliGRAM(s) Oral at bedtime  pantoprazole    Tablet 40 milliGRAM(s) Oral before breakfast  polyethylene glycol 3350 17 Gram(s) Oral daily  predniSONE   Tablet 10 milliGRAM(s) Oral daily  pyridostigmine 60 milliGRAM(s) Oral three times a day  pyridostigmine  milliGRAM(s) Oral daily  zinc oxide 40% Ointment 1 Application(s) Topical daily    MEDICATIONS  (PRN):  acetaminophen   Tablet .. 650 milliGRAM(s) Oral every 4 hours PRN Temp greater or equal to 38C (100.4F), Moderate Pain (4 - 6)  ALBUTerol    90 MICROgram(s) HFA Inhaler 2 Puff(s) Inhalation every 4 hours PRN Shortness of Breath and/or Wheezing  benzonatate 100 milliGRAM(s) Oral three times a day PRN Cough  bisacodyl 5 milliGRAM(s) Oral every 12 hours PRN Constipation  HYDROmorphone  Injectable 0.2 milliGRAM(s) IV Push every 2 hours PRN Severe Pain (7 - 10)  senna 2 Tablet(s) Oral at bedtime PRN Constipation  sodium chloride 0.65% Nasal 1 Spray(s) Both Nostrils two times a day PRN Nasal Congestion      CAPILLARY BLOOD GLUCOSE    I&O's Summary    PHYSICAL EXAM:  Vital Signs Last 24 Hrs  T(C): 36.4 (06 May 2020 12:25), Max: 36.9 (05 May 2020 16:24)  T(F): 97.6 (06 May 2020 12:25), Max: 98.4 (05 May 2020 16:24)  HR: 51 (06 May 2020 12:25) (51 - 63)  BP: 120/58 (06 May 2020 12:25) (111/62 - 125/62)  RR: 20 (06 May 2020 12:25) (19 - 20)  SpO2: 100% (06 May 2020 12:25) (100% - 100%)    GENERAL: NAD on 6L NC  CHEST/LUNG: Clear to auscultation bilaterally; No wheeze  HEART: Regular rate and rhythm; No murmurs, rubs, or gallops  ABDOMEN: soft, nt, nd  EXTREMITIES:  1+ pitting edema b/l, no calf tenderness  PSYCH: AAOx3  NEUROLOGY: non-focal    LABS:                        9.2    5.59  )-----------( 144      ( 06 May 2020 08:08 )             31.0     05-06    141  |  103  |  13  ----------------------------<  86  3.8   |  28  |  0.78    Ca    9.2      06 May 2020 08:08    RADIOLOGY & ADDITIONAL TESTS:  Results Reviewed:   Imaging Personally Reviewed:  Electrocardiogram Personally Reviewed:    COORDINATION OF CARE:  Care Discussed with Consultants/Other Providers [Y/N]:  Prior or Outpatient Records Reviewed [Y/N]:

## 2020-05-06 NOTE — PROGRESS NOTE ADULT - PROBLEM SELECTOR PLAN 5
Continue Mestinon home dosing  Given solumedrol 40mg x 1  CLOSE MONITORING OF RR, respiratory effort, neck flexion, strength.   Neuro checks q4hr  - c/w Solaris treatment, set up by Dr. Fitzgerald  - c/w prednisone 10 mg daily (home dose)

## 2020-05-06 NOTE — PROGRESS NOTE ADULT - PROBLEM SELECTOR PLAN 2
-PT is DNR/DNI and has severe COVID-19 associated hypoxic respiratory failure.   -PT also at very high risk of rapid decompensation secondary to superimposed neuromuscular respiratory failure due to myasthenia gravis.  Will discuss further with outpatient neuromuscular specialist, Dr. Fitzgerald, but on initial contact likely her current presentation is mostly due to COVID-19 rather than MG.  - Supplemental O2 as needed

## 2020-05-06 NOTE — PROGRESS NOTE ADULT - SUBJECTIVE AND OBJECTIVE BOX
[facility] Division of Hospital Medicine Progress Note    Patient is a 79y old  Female who presents with a chief complaint of Shortness of breath, fevers     SUBJECTIVE / OVERNIGHT EVENTS:  patient seen and examined at bedside. s/p convalescent plasma infusion 4/24. Patient remains on NRB 10L satting at 100%.  S/P 1 Unit of PRBC on 5/3.  Updated daughter Patsy Stevenson ([phone]), daughter updated on transfer.     MEDICATIONS  (STANDING):  atorvastatin 20 milliGRAM(s) Oral at bedtime  cholecalciferol 2000 Unit(s) Oral daily  enoxaparin Injectable 40 milliGRAM(s) SubCutaneous daily  levothyroxine 50 MICROGram(s) Oral daily  lidocaine   Patch 2 Patch Transdermal daily  melatonin 5 milliGRAM(s) Oral at bedtime  pantoprazole    Tablet 40 milliGRAM(s) Oral before breakfast  polyethylene glycol 3350 17 Gram(s) Oral daily  predniSONE   Tablet 10 milliGRAM(s) Oral daily  pyridostigmine 60 milliGRAM(s) Oral three times a day  pyridostigmine  milliGRAM(s) Oral daily  zinc oxide 40% Ointment 1 Application(s) Topical daily    MEDICATIONS  (PRN):  acetaminophen   Tablet .. 650 milliGRAM(s) Oral every 4 hours PRN Temp greater or equal to 38C (100.4F), Moderate Pain (4 - 6)  ALBUTerol    90 MICROgram(s) HFA Inhaler 2 Puff(s) Inhalation every 4 hours PRN Shortness of Breath and/or Wheezing  benzonatate 100 milliGRAM(s) Oral three times a day PRN Cough  bisacodyl 5 milliGRAM(s) Oral every 12 hours PRN Constipation  HYDROmorphone  Injectable 0.2 milliGRAM(s) IV Push every 2 hours PRN Severe Pain (7 - 10)  senna 2 Tablet(s) Oral at bedtime PRN Constipation  sodium chloride 0.65% Nasal 1 Spray(s) Both Nostrils two times a day PRN Nasal Congestion    PHYSICAL EXAM:  Vital Signs Last 24 Hrs  T(C): 36.5 (06 May 2020 07:48), Max: 36.9 (05 May 2020 16:24)  T(F): 97.7 (06 May 2020 07:48), Max: 98.4 (05 May 2020 16:24)  HR: 61 (06 May 2020 09:05) (53 - 63)  BP: 118/72 (06 May 2020 07:48) (111/62 - 125/62)  BP(mean): --  RR: 19 (06 May 2020 09:05) (19 - 20)  SpO2: 100% (06 May 2020 09:05) (100% - 100%)    CONSTITUTIONAL: NAD, well-developed, well-groomed  ENMT: Moist oral mucosa, no pharyngeal injection or exudates; normal dentition  RESPIRATORY: Normal respiratory effort; lungs are clear to auscultation bilaterally  CARDIOVASCULAR: Regular rate and rhythm, normal S1 and S2, no murmur/rub/gallop; No lower extremity edema; Peripheral pulses are 2+ bilaterally  ABDOMEN: Nontender to palpation, normoactive bowel sounds, no rebound/guarding; No hepatosplenomegaly  PSYCH: A+O to person, place, and time; affect appropriate  NEUROLOGY: CN 2-12 are intact and symmetric; no gross sensory deficits   SKIN: No rashes; no palpable lesions    LABS:                                 9.2    5.59  )-----------( 144      ( 06 May 2020 08:08 )             31.0     05-06    141  |  103  |  13  ----------------------------<  86  3.8   |  28  |  0.78    Ca    9.2      06 May 2020 08:08    D-Dimer Assay, Quantitative: 1788 ng/mL DDU <H> (04-26)  D-Dimer Assay, Quantitative: 2576 ng/mL DDU <H> (04-25)  D-Dimer Assay, Quantitative: 2906 ng/mL DDU <H> (04-24)  D-Dimer Assay, Quantitative: 3662 ng/mL DDU <H> (04-23)  D-Dimer Assay, Quantitative: 4566 ng/mL DDU <H> (04-19)    Ferritin, Serum: 412 ng/mL <H> (04-26)  Ferritin, Serum: 442 ng/mL <H> (04-25)  Ferritin, Serum: 457 ng/mL <H> (04-24)  Ferritin, Serum: 525 ng/mL <H> (04-19)  Ferritin, Serum: 560 ng/mL <H> (04-15)  Ferritin, Serum: 469 ng/mL <H> (04-13)    C-Reactive Protein, Serum: 3.37 mg/dL <H> (04-26)  C-Reactive Protein, Serum: 4.40 mg/dL <H> (04-25)  C-Reactive Protein, Serum: 4.79 mg/dL <H> (04-24)  C-Reactive Protein, Serum: 12.20 mg/dL <H> (04-19)  C-Reactive Protein, Serum: 20.82 mg/dL <H> (04-15)  C-Reactive Protein, Serum: 15.92 mg/dL <H> (04-13)    Pro-Calcitonin (Superimposed bacterial infection)   Procalcitonin, Serum: 0.16 ng/mL <H> (04-19)  Procalcitonin, Serum: 0.27 ng/mL <H> (04-15)    COVID-19 PCR: Detected (03-30-20 @ 21:41)      RADIOLOGY & ADDITIONAL TESTS:  Imaging from Last 24 Hours:  Electrocardiogram/QTc Interval:    COORDINATION OF CARE:  Care Discussed with Consultants/Other Providers:

## 2020-05-06 NOTE — PROGRESS NOTE ADULT - PROBLEM SELECTOR PLAN 2
-On 10L NRB satting at 100%   -PT is DNR/DNI and has severe COVID-19 associated hypoxic respiratory failure.   -PT also at very high risk of rapid decompensation secondary to superimposed neuromuscular respiratory failure due to myasthenia gravis.  Will discuss further with outpatient neuromuscular specialist, Dr. Fitzgerald, but on initial contact likely her current presentation is mostly due to COVID-19 rather than MG.

## 2020-05-06 NOTE — PROGRESS NOTE ADULT - ASSESSMENT
78 y/o RH-F w/ h/o Myasthenia Gravis on mestinon 60mg TID and 180 mg Mestinon extended release at night, Continues on Eculizumab (Solaris) for MG, tapering dose of prednisone, HTN and recent acute hypoxemic respiratory failure with sepsis in setting of COVID-19 infection s/p solumedrol 4/3-4/7 + Plaquenil 3/31-4/4 who presented for plasma COVID therapy, S/P Convalescence Plasma 4/24, here for continued hypoxia.

## 2020-05-06 NOTE — PROGRESS NOTE ADULT - PROBLEM SELECTOR PLAN 1
-S/P Solumedrol 04/03/2020-04/07/2020  -S/P Plaquenil 03/31-04/04  -Transferred from [facility] for Convalescent Plasma Transfusion->S/p plasma transfusion 4/24-CRP and ferritin slightly decreasing. No further plans for convalescent plasma treatments.   -Anakinra d/w ID but holding off 2/2 having already received immunosuppression Eculizumab (Solaris) for MG  -Not a candidate for Remdisivir as has shown improvement most in earlier stages of disease.

## 2020-05-06 NOTE — PROGRESS NOTE ADULT - PROBLEM SELECTOR PLAN 5
Continue Mestinon home dosing  Given solumedrol 40mg x 1  CLOSE MONITORING OF RR, respiratory effort, neck flexion, strength.   Neuro checks q4hr  NIF and VC should be done at minimum q6hrs, but due to current pandemic and DNR/DNI status, unable to perform.  Will discuss at least daily NIF and VC monitoring  Solaris treatment to be continued at [facility], discussed with her neuromuscular specialist, Dr. Fitzgerald and will help set up at [facility].

## 2020-05-07 LAB
ANION GAP SERPL CALC-SCNC: 8 MMOL/L — SIGNIFICANT CHANGE UP (ref 5–17)
BUN SERPL-MCNC: 10 MG/DL — SIGNIFICANT CHANGE UP (ref 7–23)
CALCIUM SERPL-MCNC: 8.7 MG/DL — SIGNIFICANT CHANGE UP (ref 8.4–10.5)
CHLORIDE SERPL-SCNC: 102 MMOL/L — SIGNIFICANT CHANGE UP (ref 96–108)
CO2 SERPL-SCNC: 29 MMOL/L — SIGNIFICANT CHANGE UP (ref 22–31)
CREAT SERPL-MCNC: 0.69 MG/DL — SIGNIFICANT CHANGE UP (ref 0.5–1.3)
GLUCOSE SERPL-MCNC: 82 MG/DL — SIGNIFICANT CHANGE UP (ref 70–99)
HCT VFR BLD CALC: 30.8 % — LOW (ref 34.5–45)
HGB BLD-MCNC: 9.7 G/DL — LOW (ref 11.5–15.5)
MAGNESIUM SERPL-MCNC: 1.9 MG/DL — SIGNIFICANT CHANGE UP (ref 1.6–2.6)
MCHC RBC-ENTMCNC: 26.9 PG — LOW (ref 27–34)
MCHC RBC-ENTMCNC: 31.5 GM/DL — LOW (ref 32–36)
MCV RBC AUTO: 85.6 FL — SIGNIFICANT CHANGE UP (ref 80–100)
NRBC # BLD: 0 /100 WBCS — SIGNIFICANT CHANGE UP (ref 0–0)
PHOSPHATE SERPL-MCNC: 3.2 MG/DL — SIGNIFICANT CHANGE UP (ref 2.5–4.5)
PLATELET # BLD AUTO: 130 K/UL — LOW (ref 150–400)
POTASSIUM SERPL-MCNC: 3.6 MMOL/L — SIGNIFICANT CHANGE UP (ref 3.5–5.3)
POTASSIUM SERPL-SCNC: 3.6 MMOL/L — SIGNIFICANT CHANGE UP (ref 3.5–5.3)
RBC # BLD: 3.6 M/UL — LOW (ref 3.8–5.2)
RBC # FLD: 21 % — HIGH (ref 10.3–14.5)
SODIUM SERPL-SCNC: 139 MMOL/L — SIGNIFICANT CHANGE UP (ref 135–145)
WBC # BLD: 5.46 K/UL — SIGNIFICANT CHANGE UP (ref 3.8–10.5)
WBC # FLD AUTO: 5.46 K/UL — SIGNIFICANT CHANGE UP (ref 3.8–10.5)

## 2020-05-07 RX ORDER — PYRIDOSTIGMINE BROMIDE 60 MG/5ML
180 SOLUTION ORAL
Refills: 0 | Status: DISCONTINUED | OUTPATIENT
Start: 2020-05-07 | End: 2020-05-19

## 2020-05-07 RX ADMIN — ATORVASTATIN CALCIUM 20 MILLIGRAM(S): 80 TABLET, FILM COATED ORAL at 20:52

## 2020-05-07 RX ADMIN — PYRIDOSTIGMINE BROMIDE 60 MILLIGRAM(S): 60 SOLUTION ORAL at 14:57

## 2020-05-07 RX ADMIN — ZINC OXIDE 1 APPLICATION(S): 200 OINTMENT TOPICAL at 12:51

## 2020-05-07 RX ADMIN — PYRIDOSTIGMINE BROMIDE 60 MILLIGRAM(S): 60 SOLUTION ORAL at 06:23

## 2020-05-07 RX ADMIN — Medication 2000 UNIT(S): at 12:50

## 2020-05-07 RX ADMIN — Medication 50 MICROGRAM(S): at 06:22

## 2020-05-07 RX ADMIN — Medication 5 MILLIGRAM(S): at 20:52

## 2020-05-07 RX ADMIN — LIDOCAINE 2 PATCH: 4 CREAM TOPICAL at 12:50

## 2020-05-07 RX ADMIN — ENOXAPARIN SODIUM 40 MILLIGRAM(S): 100 INJECTION SUBCUTANEOUS at 12:50

## 2020-05-07 RX ADMIN — PYRIDOSTIGMINE BROMIDE 180 MILLIGRAM(S): 60 SOLUTION ORAL at 12:51

## 2020-05-07 RX ADMIN — Medication 10 MILLIGRAM(S): at 06:23

## 2020-05-07 RX ADMIN — LIDOCAINE 2 PATCH: 4 CREAM TOPICAL at 02:23

## 2020-05-07 RX ADMIN — LIDOCAINE 2 PATCH: 4 CREAM TOPICAL at 20:23

## 2020-05-07 RX ADMIN — PANTOPRAZOLE SODIUM 40 MILLIGRAM(S): 20 TABLET, DELAYED RELEASE ORAL at 06:23

## 2020-05-07 RX ADMIN — PYRIDOSTIGMINE BROMIDE 60 MILLIGRAM(S): 60 SOLUTION ORAL at 20:52

## 2020-05-07 NOTE — CHART NOTE - NSCHARTNOTEFT_GEN_A_CORE
Attempted ambulation w/ patient. Upon sitting up in bed, desaturated to 83% on 3L NC, symptomatic w/ dizziness, weakness. SaO2 93% on 3L. Placed on 6L, SaO2 96% at rest.

## 2020-05-07 NOTE — PROVIDER CONTACT NOTE (OTHER) - DATE AND TIME:
01-Apr-2020 15:50
04-Apr-2020 08:40
01-Apr-2020 20:50
04-Apr-2020 04:28
04-Apr-2020 05:29
05-Apr-2020 06:27
07-May-2020 22:30
16-Apr-2020 10:26
17-Apr-2020 01:14
18-Apr-2020 01:40
23-Apr-2020 00:20

## 2020-05-07 NOTE — PROGRESS NOTE ADULT - SUBJECTIVE AND OBJECTIVE BOX
Jorge Mckinley MD, PGY-1  [place] Contact Information  [facility] Pager: [phone]   After 7 PM on weekdays and 12 PM on weekends, for URGENT issues, low teams page #[phone], high teams #[phone]   --------------------------------------------------------------------------------------------  [facility] Contact Information  [facility] Pager: [phone]  After 7 PM on weekdays and 12 PM on weekends, for URGENT issues, low teams #[id number], high teams #[phone]  ---------------------------------------------------------------------------------------------  Patient is a 79y old  Female who presents with a chief complaint of Shortness of breath, fevers (06 May 2020 14:48)      SUBJECTIVE / OVERNIGHT EVENTS:  ADDITIONAL REVIEW OF SYSTEMS:    MEDICATIONS  (STANDING):  atorvastatin 20 milliGRAM(s) Oral at bedtime  cholecalciferol 2000 Unit(s) Oral daily  enoxaparin Injectable 40 milliGRAM(s) SubCutaneous daily  levothyroxine 50 MICROGram(s) Oral daily  lidocaine   Patch 2 Patch Transdermal daily  melatonin 5 milliGRAM(s) Oral at bedtime  pantoprazole    Tablet 40 milliGRAM(s) Oral before breakfast  polyethylene glycol 3350 17 Gram(s) Oral daily  predniSONE   Tablet 10 milliGRAM(s) Oral daily  pyridostigmine 60 milliGRAM(s) Oral three times a day  pyridostigmine  milliGRAM(s) Oral daily  zinc oxide 40% Ointment 1 Application(s) Topical daily    MEDICATIONS  (PRN):  acetaminophen   Tablet .. 650 milliGRAM(s) Oral every 4 hours PRN Temp greater or equal to 38C (100.4F), Moderate Pain (4 - 6)  ALBUTerol    90 MICROgram(s) HFA Inhaler 2 Puff(s) Inhalation every 4 hours PRN Shortness of Breath and/or Wheezing  benzonatate 100 milliGRAM(s) Oral three times a day PRN Cough  bisacodyl 5 milliGRAM(s) Oral every 12 hours PRN Constipation  HYDROmorphone  Injectable 0.2 milliGRAM(s) IV Push every 2 hours PRN Severe Pain (7 - 10)  senna 2 Tablet(s) Oral at bedtime PRN Constipation  sodium chloride 0.65% Nasal 1 Spray(s) Both Nostrils two times a day PRN Nasal Congestion      CAPILLARY BLOOD GLUCOSE        I&O's Summary    06 May 2020 07:01  -  07 May 2020 07:00  --------------------------------------------------------  IN: 100 mL / OUT: 0 mL / NET: 100 mL        PHYSICAL EXAM:  Vital Signs Last 24 Hrs  T(C): 36.4 (07 May 2020 04:51), Max: 36.5 (06 May 2020 21:01)  T(F): 97.5 (07 May 2020 04:51), Max: 97.7 (06 May 2020 21:01)  HR: 55 (07 May 2020 04:51) (51 - 60)  BP: 106/66 (07 May 2020 04:51) (106/66 - 126/72)  BP(mean): --  RR: 18 (07 May 2020 04:51) (18 - 20)  SpO2: 96% (07 May 2020 04:51) (96% - 100%)    LABS:                        9.7    5.46  )-----------( 130      ( 07 May 2020 06:58 )             30.8     05-07    139  |  102  |  10  ----------------------------<  82  3.6   |  29  |  0.69    Ca    8.7      07 May 2020 06:57  Phos  3.2     05-07  Mg     1.9     05-07      RADIOLOGY & ADDITIONAL TESTS:  Results Reviewed:   Imaging Personally Reviewed:  Electrocardiogram Personally Reviewed:    COORDINATION OF CARE:  Care Discussed with Consultants/Other Providers [Y/N]:  Prior or Outpatient Records Reviewed [Y/N]: Jorge Mckinley MD, PGY-1  [place] Contact Information  [facility] Pager: [phone]   After 7 PM on weekdays and 12 PM on weekends, for URGENT issues, low teams page #[phone], high teams #[phone]   --------------------------------------------------------------------------------------------  [facility] Contact Information  [facility] Pager: [phone]  After 7 PM on weekdays and 12 PM on weekends, for URGENT issues, low teams #[id number], high teams #[phone]  ---------------------------------------------------------------------------------------------  Patient is a 79y old  Female who presents with a chief complaint of Shortness of breath, fevers (06 May 2020 14:48)      SUBJECTIVE / OVERNIGHT EVENTS: No acute events overnight. Pt seen and examined at bedside. Pt denies any acute complaints including headache, fever, chills, nausea, vomiting, diarrhea, constipation, chest pain, shortness of breath. Mild abdominal pain, persistent.     MEDICATIONS  (STANDING):  atorvastatin 20 milliGRAM(s) Oral at bedtime  cholecalciferol 2000 Unit(s) Oral daily  enoxaparin Injectable 40 milliGRAM(s) SubCutaneous daily  levothyroxine 50 MICROGram(s) Oral daily  lidocaine   Patch 2 Patch Transdermal daily  melatonin 5 milliGRAM(s) Oral at bedtime  pantoprazole    Tablet 40 milliGRAM(s) Oral before breakfast  polyethylene glycol 3350 17 Gram(s) Oral daily  predniSONE   Tablet 10 milliGRAM(s) Oral daily  pyridostigmine 60 milliGRAM(s) Oral three times a day  pyridostigmine  milliGRAM(s) Oral daily  zinc oxide 40% Ointment 1 Application(s) Topical daily    MEDICATIONS  (PRN):  acetaminophen   Tablet .. 650 milliGRAM(s) Oral every 4 hours PRN Temp greater or equal to 38C (100.4F), Moderate Pain (4 - 6)  ALBUTerol    90 MICROgram(s) HFA Inhaler 2 Puff(s) Inhalation every 4 hours PRN Shortness of Breath and/or Wheezing  benzonatate 100 milliGRAM(s) Oral three times a day PRN Cough  bisacodyl 5 milliGRAM(s) Oral every 12 hours PRN Constipation  HYDROmorphone  Injectable 0.2 milliGRAM(s) IV Push every 2 hours PRN Severe Pain (7 - 10)  senna 2 Tablet(s) Oral at bedtime PRN Constipation  sodium chloride 0.65% Nasal 1 Spray(s) Both Nostrils two times a day PRN Nasal Congestion    CAPILLARY BLOOD GLUCOSE    I&O's Summary    06 May 2020 07:01  -  07 May 2020 07:00  --------------------------------------------------------  IN: 100 mL / OUT: 0 mL / NET: 100 mL    PHYSICAL EXAM:  Vital Signs Last 24 Hrs  T(C): 36.4 (07 May 2020 04:51), Max: 36.5 (06 May 2020 21:01)  T(F): 97.5 (07 May 2020 04:51), Max: 97.7 (06 May 2020 21:01)  HR: 55 (07 May 2020 04:51) (51 - 60)  BP: 106/66 (07 May 2020 04:51) (106/66 - 126/72)  RR: 18 (07 May 2020 04:51) (18 - 20)  SpO2: 96% (07 May 2020 04:51) (96% - 100%)    GENERAL: NAD on 3L NC  CHEST/LUNG: Clear to auscultation bilaterally; No wheeze  HEART: Regular rate and rhythm; No murmurs, rubs, or gallops  ABDOMEN: soft, nt, nd  EXTREMITIES:  no calf tenderness, no swelling  PSYCH: AAOx3  NEUROLOGY: non-focal    LABS:                        9.7    5.46  )-----------( 130      ( 07 May 2020 06:58 )             30.8     05-07    139  |  102  |  10  ----------------------------<  82  3.6   |  29  |  0.69    Ca    8.7      07 May 2020 06:57  Phos  3.2     05-07  Mg     1.9     05-07      RADIOLOGY & ADDITIONAL TESTS:  Results Reviewed:   Imaging Personally Reviewed:  Electrocardiogram Personally Reviewed:    COORDINATION OF CARE:  Care Discussed with Consultants/Other Providers [Y/N]:  Prior or Outpatient Records Reviewed [Y/N]:

## 2020-05-07 NOTE — PROGRESS NOTE ADULT - PROBLEM SELECTOR PLAN 5
Continue Mestinon home dosing - will clarify with daughter or Dr. Fitzgerald regarding dosing  - s/p solumedrol 40mg x 1  CLOSE MONITORING OF RR, respiratory effort, neck flexion, strength.   - c/w Solaris treatment, set up by Dr. Fitzgerald  - c/w prednisone 10 mg daily (home dose) Continue Mestinon  daily, mestinon 60 TID  - s/p solumedrol 40mg x 1  CLOSE MONITORING OF RR, respiratory effort, neck flexion, strength.   - c/w Solaris treatment, last dose 4/1, 4/15, 4/29, set up by Dr. Fitzgerald  - c/w prednisone 10 mg daily (home dose)

## 2020-05-07 NOTE — PROGRESS NOTE ADULT - ATTENDING COMMENTS
79F with MS on mestinon/solaris/prednisone/HTN/HLD/hypothyroid spinal stenosis s/p laminectomy/fusion admitted for COVID + PNA s/p plaquenil, zosyn, steroid trial. plaquenil now improved, on 3 L NC with O2 sat 96%.  - Monitor O2 sat  - continue to wean as tolerated  - continue mestinon  in AM  - continue mestinon 60 TID  - continue solaris 2x/month

## 2020-05-07 NOTE — PROVIDER CONTACT NOTE (OTHER) - ASSESSMENT
pt appears anxious and tachypneic
Pt AOx3. Sp02 73% on 15L NRB. HR: 81. Pt complains of SOB.
AO x 4. VSS. On 15 L via NRB. No respiratory distress noted.
All other VSS, a&ox4, no c/o pain or discomfort
All other VSS, a&ox4, no c/o pain or discomfort.
HR decreased, no c/o pain or discomfort, All other VSS
Pt is tachypnic, appears anxious, shaking, audible wheezing, c/o inability to breathe
patient A&O x3, patient is dyspnia. patient denies cp. /75, , pox 70-80% with NRB. patient is anxious.
pt a&o4, other VSS, pt baseline HR 60s. pt afebrile. pt asleep prior to vitals. pt denies SOB, chest pain, distress. pt is asymptomatic
pt stable, vitals stable, pt c/o sob and chest pain, provider at bedside

## 2020-05-07 NOTE — PROGRESS NOTE ADULT - ASSESSMENT
80 y/o RH-F w/ h/o Myasthenia Gravis on mestinon 60mg TID and 180 mg Mestinon extended release at night, Continues on Eculizumab (Solaris) for MG, tapering dose of prednisone, HTN and recent acute hypoxemic respiratory failure with sepsis in setting of COVID-19 infection s/p solumedrol 4/3-4/7 + Plaquenil 3/31-4/4 who presented for plasma COVID therapy, S/P Convalescence Plasma 4/24, here for continued hypoxia.

## 2020-05-07 NOTE — PROVIDER CONTACT NOTE (OTHER) - NAME OF MD/NP/PA/DO NOTIFIED:
tyler
yohana
BRIAN Fritz and MD Rojas
BRIAN Nicholson
BRIAN Rodriguez
MD Barrett
MD Barrett
MD Mike Rosario
NP Wan
NP Wan
Sangita Uriostegui NP

## 2020-05-07 NOTE — PROVIDER CONTACT NOTE (OTHER) - ACTION/TREATMENT ORDERED:
md aware at bedside
MD made aware. Patient placed prone. Sp02 increased to 94% on 15L NRB. PRN dilaudid to be given. Will continue to monitor.
md aware, Dr muller at bedside to assess
MD notified. Will continue to monitor
6L n/c  added with 100% NRB. will closely monitor the patient.
As per MD, no bolus to be administered at this time. MD will notify day shift team of  bradycardia. No further action, will continue to monitor.
EKG ordered. Will continue to monitor.
NP aware, NP at bedside, EKG done, will order pain meds, continue to monitor
No new orders. Continue to monitor.
Pt placed on nonrebreather, albuterol administered as ordered, prone position maintained.
as per NP, 250 NS bolus to be ordered and administered. No further action, will continue to monitor.

## 2020-05-08 ENCOUNTER — TRANSCRIPTION ENCOUNTER (OUTPATIENT)
Age: 80
End: 2020-05-08

## 2020-05-08 LAB
ANION GAP SERPL CALC-SCNC: 10 MMOL/L — SIGNIFICANT CHANGE UP (ref 5–17)
BUN SERPL-MCNC: 9 MG/DL — SIGNIFICANT CHANGE UP (ref 7–23)
CALCIUM SERPL-MCNC: 9.5 MG/DL — SIGNIFICANT CHANGE UP (ref 8.4–10.5)
CHLORIDE SERPL-SCNC: 100 MMOL/L — SIGNIFICANT CHANGE UP (ref 96–108)
CO2 SERPL-SCNC: 28 MMOL/L — SIGNIFICANT CHANGE UP (ref 22–31)
CREAT SERPL-MCNC: 0.77 MG/DL — SIGNIFICANT CHANGE UP (ref 0.5–1.3)
CRP SERPL-MCNC: 0.69 MG/DL — HIGH (ref 0–0.4)
FERRITIN SERPL-MCNC: 194 NG/ML — HIGH (ref 15–150)
GLUCOSE SERPL-MCNC: 151 MG/DL — HIGH (ref 70–99)
HCT VFR BLD CALC: 34.4 % — LOW (ref 34.5–45)
HGB BLD-MCNC: 10.5 G/DL — LOW (ref 11.5–15.5)
MAGNESIUM SERPL-MCNC: 2 MG/DL — SIGNIFICANT CHANGE UP (ref 1.6–2.6)
MCHC RBC-ENTMCNC: 26.7 PG — LOW (ref 27–34)
MCHC RBC-ENTMCNC: 30.5 GM/DL — LOW (ref 32–36)
MCV RBC AUTO: 87.5 FL — SIGNIFICANT CHANGE UP (ref 80–100)
NRBC # BLD: 0 /100 WBCS — SIGNIFICANT CHANGE UP (ref 0–0)
PHOSPHATE SERPL-MCNC: 3.2 MG/DL — SIGNIFICANT CHANGE UP (ref 2.5–4.5)
PLATELET # BLD AUTO: 148 K/UL — LOW (ref 150–400)
POTASSIUM SERPL-MCNC: 3.7 MMOL/L — SIGNIFICANT CHANGE UP (ref 3.5–5.3)
POTASSIUM SERPL-SCNC: 3.7 MMOL/L — SIGNIFICANT CHANGE UP (ref 3.5–5.3)
PROCALCITONIN SERPL-MCNC: 0.07 NG/ML — SIGNIFICANT CHANGE UP (ref 0.02–0.1)
RBC # BLD: 3.93 M/UL — SIGNIFICANT CHANGE UP (ref 3.8–5.2)
RBC # FLD: 20.6 % — HIGH (ref 10.3–14.5)
SODIUM SERPL-SCNC: 138 MMOL/L — SIGNIFICANT CHANGE UP (ref 135–145)
WBC # BLD: 6.85 K/UL — SIGNIFICANT CHANGE UP (ref 3.8–10.5)
WBC # FLD AUTO: 6.85 K/UL — SIGNIFICANT CHANGE UP (ref 3.8–10.5)

## 2020-05-08 PROCEDURE — 71045 X-RAY EXAM CHEST 1 VIEW: CPT | Mod: 26

## 2020-05-08 RX ADMIN — LIDOCAINE 2 PATCH: 4 CREAM TOPICAL at 12:03

## 2020-05-08 RX ADMIN — Medication 5 MILLIGRAM(S): at 22:22

## 2020-05-08 RX ADMIN — Medication 10 MILLIGRAM(S): at 05:07

## 2020-05-08 RX ADMIN — ZINC OXIDE 1 APPLICATION(S): 200 OINTMENT TOPICAL at 12:03

## 2020-05-08 RX ADMIN — PYRIDOSTIGMINE BROMIDE 60 MILLIGRAM(S): 60 SOLUTION ORAL at 05:07

## 2020-05-08 RX ADMIN — Medication 5 MILLIGRAM(S): at 12:04

## 2020-05-08 RX ADMIN — LIDOCAINE 2 PATCH: 4 CREAM TOPICAL at 00:00

## 2020-05-08 RX ADMIN — Medication 50 MICROGRAM(S): at 05:07

## 2020-05-08 RX ADMIN — PYRIDOSTIGMINE BROMIDE 60 MILLIGRAM(S): 60 SOLUTION ORAL at 12:03

## 2020-05-08 RX ADMIN — Medication 100 MILLIGRAM(S): at 22:23

## 2020-05-08 RX ADMIN — Medication 100 MILLIGRAM(S): at 05:07

## 2020-05-08 RX ADMIN — PYRIDOSTIGMINE BROMIDE 180 MILLIGRAM(S): 60 SOLUTION ORAL at 06:19

## 2020-05-08 RX ADMIN — ENOXAPARIN SODIUM 40 MILLIGRAM(S): 100 INJECTION SUBCUTANEOUS at 12:03

## 2020-05-08 RX ADMIN — ATORVASTATIN CALCIUM 20 MILLIGRAM(S): 80 TABLET, FILM COATED ORAL at 22:23

## 2020-05-08 RX ADMIN — HYDROMORPHONE HYDROCHLORIDE 0.2 MILLIGRAM(S): 2 INJECTION INTRAMUSCULAR; INTRAVENOUS; SUBCUTANEOUS at 01:12

## 2020-05-08 RX ADMIN — HYDROMORPHONE HYDROCHLORIDE 0.2 MILLIGRAM(S): 2 INJECTION INTRAMUSCULAR; INTRAVENOUS; SUBCUTANEOUS at 23:43

## 2020-05-08 RX ADMIN — Medication 2000 UNIT(S): at 12:02

## 2020-05-08 RX ADMIN — PYRIDOSTIGMINE BROMIDE 60 MILLIGRAM(S): 60 SOLUTION ORAL at 22:22

## 2020-05-08 RX ADMIN — PANTOPRAZOLE SODIUM 40 MILLIGRAM(S): 20 TABLET, DELAYED RELEASE ORAL at 05:07

## 2020-05-08 NOTE — PROGRESS NOTE ADULT - PROBLEM SELECTOR PLAN 1
-S/P Solumedrol 04/03/2020-04/07/2020  -S/P Plaquenil 03/31-04/04  -Transferred from [facility] for Convalescent Plasma Transfusion->S/p plasma transfusion 4/24-CRP and ferritin slightly decreasing. No further plans for convalescent plasma treatments.   -Anakinra d/w ID but holding off 2/2 having already received immunosuppression Eculizumab (Solaris) for MG  -Not a candidate for Remdisivir as has shown improvement most in earlier stages of disease.  - Persistent cough - repeat CXR 5/8 w/ improving consolidation, no new consolidation. no new fevers, downtrending CRP. less likely bacterial superinfection. will continue with current mgmt, reordered cough suppression standing x 3 days.

## 2020-05-08 NOTE — DISCHARGE NOTE NURSING/CASE MANAGEMENT/SOCIAL WORK - NSDCPEPTCAREGIVEDUMATLIST _GEN_ALL_CORE
Coronavirus/COVID19/Influenza Vaccination Coronavirus/COVID19/Influenza Vaccination/Enoxaparin/Lovenox

## 2020-05-08 NOTE — PROGRESS NOTE ADULT - ATTENDING COMMENTS
79F with MS on mestinon/solaris/prednisone/HTN/HLD/hypothyroid spinal stenosis s/p laminectomy/fusion admitted for COVID + PNA s/p plaquenil, zosyn, steroid trial, s/p convalescent plasma plaquenil now improved, on 4 L NC with O2 sat 96% but desaturates on ambulation.     - Monitor O2 sat, CXR equivocal to admission CXR  - continue to wean as tolerated  - continue mestinon  in AM  - continue mestinon 60 TID  - continue solaris 2x/month

## 2020-05-08 NOTE — PROGRESS NOTE ADULT - SUBJECTIVE AND OBJECTIVE BOX
Jorge Mckinley MD, PGY-1  [place] Contact Information  [place] Pager: [phone]   After 7 PM on weekdays and 12 PM on weekends, for URGENT issues, low teams page #[phone], high teams #[phone]   --------------------------------------------------------------------------------------------  [facility] Contact Information  [facility] Pager: [phone]  After 7 PM on weekdays and 12 PM on weekends, for URGENT issues, low teams #[phone], high teams #[phone]  ---------------------------------------------------------------------------------------------  Patient is a 79y old  Female who presents with a chief complaint of Shortness of breath, fevers (07 May 2020 10:12)      SUBJECTIVE / OVERNIGHT EVENTS: No acute events overnight. Pt seen and examined at bedside. Feels tired this morning, complaining of persistent cough and sharp RUQ pain. Denies any chest pain, shortness of breath, weakness, headache, fever, chills, nausea, vomiting.      MEDICATIONS  (STANDING):  atorvastatin 20 milliGRAM(s) Oral at bedtime  cholecalciferol 2000 Unit(s) Oral daily  enoxaparin Injectable 40 milliGRAM(s) SubCutaneous daily  levothyroxine 50 MICROGram(s) Oral daily  lidocaine   Patch 2 Patch Transdermal daily  melatonin 5 milliGRAM(s) Oral at bedtime  pantoprazole    Tablet 40 milliGRAM(s) Oral before breakfast  polyethylene glycol 3350 17 Gram(s) Oral daily  predniSONE   Tablet 10 milliGRAM(s) Oral daily  pyridostigmine 60 milliGRAM(s) Oral three times a day  pyridostigmine  milliGRAM(s) Oral <User Schedule>  zinc oxide 40% Ointment 1 Application(s) Topical daily    MEDICATIONS  (PRN):  acetaminophen   Tablet .. 650 milliGRAM(s) Oral every 4 hours PRN Temp greater or equal to 38C (100.4F), Moderate Pain (4 - 6)  ALBUTerol    90 MICROgram(s) HFA Inhaler 2 Puff(s) Inhalation every 4 hours PRN Shortness of Breath and/or Wheezing  benzonatate 100 milliGRAM(s) Oral three times a day PRN Cough  bisacodyl 5 milliGRAM(s) Oral every 12 hours PRN Constipation  HYDROmorphone  Injectable 0.2 milliGRAM(s) IV Push every 2 hours PRN Severe Pain (7 - 10)  senna 2 Tablet(s) Oral at bedtime PRN Constipation  sodium chloride 0.65% Nasal 1 Spray(s) Both Nostrils two times a day PRN Nasal Congestion      CAPILLARY BLOOD GLUCOSE    I&O's Summary    07 May 2020 07:01  -  08 May 2020 07:00  --------------------------------------------------------  IN: 640 mL / OUT: 0 mL / NET: 640 mL    PHYSICAL EXAM:  Vital Signs Last 24 Hrs  T(C): 36.4 (08 May 2020 13:15), Max: 36.6 (08 May 2020 04:56)  T(F): 97.5 (08 May 2020 13:15), Max: 97.9 (08 May 2020 04:56)  HR: 52 (08 May 2020 13:15) (50 - 60)  BP: 109/67 (08 May 2020 13:15) (109/67 - 121/75)  RR: 18 (08 May 2020 13:15) (18 - 18)  SpO2: 96% (08 May 2020 13:15) (96% - 99%)    GENERAL: NAD on 4L NC, dried blood under nose  CHEST/LUNG: Clear to auscultation bilaterally; No wheeze  HEART: Regular rate and rhythm; No murmurs, rubs, or gallops  ABDOMEN: soft, nt, nd, neg morrell sign  EXTREMITIES:  no calf tenderness, no swelling  PSYCH: AAOx3  NEUROLOGY: non-focal    LABS:                        10.5   6.85  )-----------( 148      ( 08 May 2020 09:14 )             34.4     05-08    138  |  100  |  9   ----------------------------<  151<H>  3.7   |  28  |  0.77    Ca    9.5      08 May 2020 09:14  Phos  3.2     05-08  Mg     2.0     05-08      RADIOLOGY & ADDITIONAL TESTS:  Results Reviewed:   Imaging Personally Reviewed:  Electrocardiogram Personally Reviewed:    COORDINATION OF CARE:  Care Discussed with Consultants/Other Providers [Y/N]:  Prior or Outpatient Records Reviewed [Y/N]:

## 2020-05-08 NOTE — DISCHARGE NOTE NURSING/CASE MANAGEMENT/SOCIAL WORK - PATIENT PORTAL LINK FT
You can access the FollowMyHealth Patient Portal offered by [facility] by registering at the following website: [email or web address]. By joining [employer]’s FollowMyHealth portal, you will also be able to view your health information using other applications (apps) compatible with our system.

## 2020-05-08 NOTE — PROGRESS NOTE ADULT - PROBLEM SELECTOR PLAN 7
- c/w pantoprazole 40 mg qAM  - pt w/ persistent abd pain for several months. Previous CTAP 1/2020 w/o identifiable cause of abdominal pain. Abd Xray 4/2020 w/o obstruction. Will defer imaging for now as pain is not acutely worsening and unconcerning abdominal exam.

## 2020-05-08 NOTE — PROGRESS NOTE ADULT - PROBLEM SELECTOR PLAN 9
Physical Therapy Daily Treatment    Visit Count: 9  Plan of Care:9/11/2019 Through: 11/6/2019  Insurance Information:     [employer] is not requiring authorization for OT/PT/ST for the months of July, August or September 2019.  This does not override a yearly visit limit, just the auth requirement.               Note     Therapy Benefits     Payor: See [facility]  Authorization Needed: Aim  Maximum Visit Limit Per Year: 60 visits per calendar year - hard max  CoPay: $0  Call Ref #: Kayla - [id number]     Hyper link to: insurance WIKI            Referred by: Joanne Powell MD; Next provider visit (if known/scheduled): prn  Medical Diagnosis (from order):       Diagnosis Information             Diagnosis      724.1 (ICD-9-CM) - M54.6 (ICD-10-CM) - Midline thoracic back pain, unspecified chronicity                Treatment Diagnosis: Thoracolumbar symptoms with increased pain/symptoms, impaired posture, impaired strength, impaired muscle length/flexibility, impaired activity tolerance, impaired body mechanics     Date of onset/injury: Just decided to mention at last check up  Diagnosis Precautions: none  Chart reviewed at time of initial evaluation (relevant co-morbidities, allergies, tests and medications listed): x-rays negative, ibuprofen prn    SUBJECTIVE   Notes she has been feeling pretty good this week.  Less pain than last visit, has only had 2 days of school so far this week.     Current Pain (0-10 scale): 2 after school.    Functional Change:  Able to stand longer.     OBJECTIVE    better upright throughout the thoracic spine,   still with Increased lumbar lordosis, poor core stability   supine pelvic assessment =  ASIS equal, no apparent inflare or outflare.   Leg length equal.    Treatment   Therapeutic Exercise:   Elliptical crossramp of 5, resistance of 3 x 5 min forward,   subjective obtained    Step hamstring stretch each LE x 1 x 30 sec   Step calf stretches each LE x 1 x 30 sec     4 point to  child's pose x 2, 30 second hold (cues for neutral spine alignment).  Child's pose, UEs to left side 2x30 second hold  4 point Thread the needle, right arm under left 2x20 sec hold  4 pt, thoracic rotation into thread the needle, both sides 2x15 sec each  4 point alternate Upper extremity/le lifts, 5 second holds . Notices right lower extremity stabilization is weaker and more challenging.   Manual Therapy:   Prone:    STM to right mid and lower lumbar paraspinals, focus on right quadratus.   Sacral border clearing, slight increased pain right.      Starburst K-Tape to trigger point on right quadratus lumborum. Instructed patient on proper doffing of tape and instructions of proper use. Patient verbalized consent.      Neuromuscular Reeducation:  N/A    Therapeutic Activity:  Reinforced neutral posture with all activities  Encouraged regular exercise at the gym  Education on proper hip hinging at work, school, daily activities.    Skilled input: verbal instruction/cues, tactile instruction/cues, posture correction, as detailed above    Home Program:   Access Code: [email or web address]   URL: [email or web address]   Date: 10/04/2019   Prepared by: Samanta Enriquez     Exercises  shoulder W - 15 reps - 2 sets - 5 hold - 2x daily - 7x weekly  Prone Scapular Slide with Shoulder Extension - 10 reps - 1 sets - 5 hold - 2x daily - 7x weekly  Doorway Pec Stretch at 90 Degrees Abduction - 3 reps - 1 sets - 20 hold - 2x daily - 7x weekly  Shoulder PNF D2 Extension - 15 reps - 1 sets - 3 hold - 2x daily - 7x weekly  Seated Hamstring Stretch - 2 reps - 1 sets - 30 hold - 1x daily - 7x weekly  Standing Hamstring Stretch with Step - 2 reps - 1 sets - 30 hold - 1x daily - 7x weekly  Gastroc Stretch on Step - 2 reps - 1 sets - 30 hold - 1x daily - 7x weekly  Supine Piriformis Stretch Puling Heel to Hip - 2 reps - 1 sets - 30 hold - 1x daily - 5x weekly  Supine Hip External Rotation Stretch - 2 reps - 1 sets - 30 hold - 1x daily - 5x  weekly  Standard Plank - 5 reps - 10 hold - 1x daily - 4x weekly    Writer verbally educated the patient and received verbal consent from the patient on hand placement, positioning of patient, and techniques to be performed today including clothing adjustments for techniques, hand placement and palpation for techniques as described above and how they are pertinent to the patient's plan of care.      Suggestions for next session as indicated: progress per plan of care,   Manual work prn  Check hip stretches  Progress activity, hip and core strength  Spread appointments to increase time for HEP - 1x/week    ASSESSMENT   Patient feeling much better, but still with significant lower abdominal weakness.   Consider spacing out PT appts to 1 x per week X 3 weeks prn to progress.       Pain after treatment (patient reported, 0-10 scale): not rated     Result of above outlined education: Verbalizes understanding and Needs reinforcement    THERAPY DAILY BILLING   Insurance: [place] 2. N/A    Evaluation Procedures:  No evaluation codes were used on this date of service    Timed Procedures:  Manual Therapy, 14 minutes  Therapeutic Exercise, 24 minutes    Untimed Procedures:  No untimed codes were used on this date of service    Total Treatment Time: 38  minutes   DVT: lovenox 40 subq daily  Diet: dysphagia 1 honey consistency   Dispo: home PT    Transitions of Care Status:  1.  Name of PCP: Carrillo Hannon  2.  PCP Contacted on Admission: [ ] Y    [ ] N    3.  PCP contacted at Discharge: [ ] Y    [ ] N    [ ] N/A  4.  Post-Discharge Appointment Date and Location:  5.  Summary of Handoff given to PCP:

## 2020-05-08 NOTE — PROGRESS NOTE ADULT - PROBLEM SELECTOR PLAN 5
Continue Mestinon  daily, mestinon 60 TID  - s/p solumedrol 40mg x 1  CLOSE MONITORING OF RR, respiratory effort, neck flexion, strength.   - c/w Solaris treatment, last dose 4/1, 4/15, 4/29, set up by Dr. Fitzgerald  - c/w prednisone 10 mg daily (home dose)

## 2020-05-09 PROCEDURE — 99233 SBSQ HOSP IP/OBS HIGH 50: CPT | Mod: CS,GC

## 2020-05-09 RX ADMIN — PYRIDOSTIGMINE BROMIDE 60 MILLIGRAM(S): 60 SOLUTION ORAL at 13:05

## 2020-05-09 RX ADMIN — Medication 100 MILLIGRAM(S): at 05:59

## 2020-05-09 RX ADMIN — LIDOCAINE 2 PATCH: 4 CREAM TOPICAL at 00:30

## 2020-05-09 RX ADMIN — Medication 100 MILLIGRAM(S): at 13:03

## 2020-05-09 RX ADMIN — Medication 10 MILLIGRAM(S): at 06:00

## 2020-05-09 RX ADMIN — ZINC OXIDE 1 APPLICATION(S): 200 OINTMENT TOPICAL at 13:05

## 2020-05-09 RX ADMIN — Medication 2000 UNIT(S): at 13:04

## 2020-05-09 RX ADMIN — PANTOPRAZOLE SODIUM 40 MILLIGRAM(S): 20 TABLET, DELAYED RELEASE ORAL at 05:59

## 2020-05-09 RX ADMIN — PYRIDOSTIGMINE BROMIDE 60 MILLIGRAM(S): 60 SOLUTION ORAL at 21:44

## 2020-05-09 RX ADMIN — Medication 5 MILLIGRAM(S): at 21:44

## 2020-05-09 RX ADMIN — PYRIDOSTIGMINE BROMIDE 180 MILLIGRAM(S): 60 SOLUTION ORAL at 13:04

## 2020-05-09 RX ADMIN — LIDOCAINE 2 PATCH: 4 CREAM TOPICAL at 13:04

## 2020-05-09 RX ADMIN — Medication 50 MICROGRAM(S): at 06:00

## 2020-05-09 RX ADMIN — Medication 100 MILLIGRAM(S): at 21:43

## 2020-05-09 RX ADMIN — ENOXAPARIN SODIUM 40 MILLIGRAM(S): 100 INJECTION SUBCUTANEOUS at 13:04

## 2020-05-09 RX ADMIN — PYRIDOSTIGMINE BROMIDE 60 MILLIGRAM(S): 60 SOLUTION ORAL at 06:00

## 2020-05-09 RX ADMIN — ATORVASTATIN CALCIUM 20 MILLIGRAM(S): 80 TABLET, FILM COATED ORAL at 21:43

## 2020-05-09 RX ADMIN — LIDOCAINE 2 PATCH: 4 CREAM TOPICAL at 21:43

## 2020-05-09 RX ADMIN — HYDROMORPHONE HYDROCHLORIDE 0.2 MILLIGRAM(S): 2 INJECTION INTRAMUSCULAR; INTRAVENOUS; SUBCUTANEOUS at 20:35

## 2020-05-09 NOTE — PROGRESS NOTE ADULT - PROBLEM SELECTOR PLAN 1
-S/P Solumedrol 04/03/2020-04/07/2020  -S/P Plaquenil 03/31-04/04  -Transferred from [facility] for Convalescent Plasma Transfusion->S/p plasma transfusion 4/24-CRP and ferritin slightly decreasing. No further plans for convalescent plasma treatments.   -Anakinra d/w ID but holding off 2/2 having already received immunosuppression Eculizumab (Solaris) for MG  -Not a candidate for Remdisivir as has shown improvement most in earlier stages of disease.  - Persistent cough - repeat CXR 5/8 w/ improving consolidation, no new consolidation. no new fevers, downtrending CRP. less likely bacterial superinfection. will continue with current mgmt, reordered cough suppression standing x 3 days.

## 2020-05-09 NOTE — PROGRESS NOTE ADULT - ASSESSMENT
80 y/o RH-F w/ h/o Myasthenia Gravis on mestinon 60mg TID and 180 mg Mestinon extended release at night, Continues on Eculizumab (Solaris) for MG, tapering dose of prednisone, HTN and recent acute hypoxemic respiratory failure with sepsis in setting of COVID-19 infection s/p solumedrol 4/3-4/7 + Plaquenil 3/31-4/4 who presented for plasma COVID therapy, S/P Convalescence Plasma 4/24, here for continued hypoxia, improving slowly

## 2020-05-09 NOTE — PROGRESS NOTE ADULT - SUBJECTIVE AND OBJECTIVE BOX
Patient is a 79y old  Female who presents with a chief complaint of Shortness of breath, fevers (08 May 2020 14:41)      SUBJECTIVE / OVERNIGHT EVENTS: No overnight events. Patient feels well. Currently on NC. No complaints this AM. Patient denies CP, SOB.    MEDICATIONS  (STANDING):  atorvastatin 20 milliGRAM(s) Oral at bedtime  benzonatate 100 milliGRAM(s) Oral three times a day  cholecalciferol 2000 Unit(s) Oral daily  enoxaparin Injectable 40 milliGRAM(s) SubCutaneous daily  levothyroxine 50 MICROGram(s) Oral daily  lidocaine   Patch 2 Patch Transdermal daily  melatonin 5 milliGRAM(s) Oral at bedtime  pantoprazole    Tablet 40 milliGRAM(s) Oral before breakfast  predniSONE   Tablet 10 milliGRAM(s) Oral daily  pyridostigmine 60 milliGRAM(s) Oral three times a day  pyridostigmine  milliGRAM(s) Oral <User Schedule>  zinc oxide 40% Ointment 1 Application(s) Topical daily    MEDICATIONS  (PRN):  acetaminophen   Tablet .. 650 milliGRAM(s) Oral every 4 hours PRN Temp greater or equal to 38C (100.4F), Moderate Pain (4 - 6)  ALBUTerol    90 MICROgram(s) HFA Inhaler 2 Puff(s) Inhalation every 4 hours PRN Shortness of Breath and/or Wheezing  HYDROmorphone  Injectable 0.2 milliGRAM(s) IV Push every 2 hours PRN Severe Pain (7 - 10)  sodium chloride 0.65% Nasal 1 Spray(s) Both Nostrils two times a day PRN Nasal Congestion      T(C): 36.4 (05-09-20 @ 05:45), Max: 36.9 (05-08-20 @ 21:50)  HR: 66 (05-09-20 @ 05:45) (52 - 82)  BP: 113/67 (05-09-20 @ 05:45) (109/67 - 127/68)  RR: 18 (05-09-20 @ 05:45) (18 - 18)  SpO2: 99% (05-09-20 @ 05:45) (92% - 99%)    PHYSICAL EXAM  GENERAL: NAD, well-developed  NEURO: AO x3, PERRLA, EOMI, motor strength in tact in 4/4 extremities, sensation in tact  HEAD:  Atraumatic, Normocephalic  EYES: conjunctiva and sclera clear  NECK: Supple, No JVD, no lymphadenopathy, no thyromegaly  CHEST/LUNG: Clear to auscultation bilaterally; No wheezes, rales or rhonchi  HEART: Regular rate and rhythm; No murmurs, rubs, or gallops  ABDOMEN: Soft, Nontender, Nondistended; Bowel sounds present, no masses.  EXTREMITIES:  2+ Peripheral Pulses, No clubbing, cyanosis, or edema  SKIN: Warm, dry, in tact, no rashes or lesions  PSYCH: affect appropriate    LABS:                        10.5   6.85  )-----------( 148      ( 08 May 2020 09:14 )             34.4     05-08    138  |  100  |  9   ----------------------------<  151<H>  3.7   |  28  |  0.77    Ca    9.5      08 May 2020 09:14  Phos  3.2     05-08  Mg     2.0     05-08              I&O's Summary    08 May 2020 07:01  -  09 May 2020 07:00  --------------------------------------------------------  IN: 360 mL / OUT: 0 mL / NET: 360 mL        Gale Peña MD  Internal Medicine Resident, PGY3  Pager: [phone] / [phone]

## 2020-05-09 NOTE — PROGRESS NOTE ADULT - ATTENDING COMMENTS
c/o feeling weak but breathing easily.  c/o constipation-  Agree with plans as outlined.  can use Miralax and/or Senna for constipation.

## 2020-05-10 LAB
ANION GAP SERPL CALC-SCNC: 9 MMOL/L — SIGNIFICANT CHANGE UP (ref 5–17)
BUN SERPL-MCNC: 12 MG/DL — SIGNIFICANT CHANGE UP (ref 7–23)
CALCIUM SERPL-MCNC: 8.8 MG/DL — SIGNIFICANT CHANGE UP (ref 8.4–10.5)
CHLORIDE SERPL-SCNC: 102 MMOL/L — SIGNIFICANT CHANGE UP (ref 96–108)
CO2 SERPL-SCNC: 29 MMOL/L — SIGNIFICANT CHANGE UP (ref 22–31)
CREAT SERPL-MCNC: 0.75 MG/DL — SIGNIFICANT CHANGE UP (ref 0.5–1.3)
CRP SERPL-MCNC: 0.68 MG/DL — HIGH (ref 0–0.4)
FERRITIN SERPL-MCNC: 145 NG/ML — SIGNIFICANT CHANGE UP (ref 15–150)
GLUCOSE SERPL-MCNC: 90 MG/DL — SIGNIFICANT CHANGE UP (ref 70–99)
HCT VFR BLD CALC: 32.3 % — LOW (ref 34.5–45)
HGB BLD-MCNC: 10.1 G/DL — LOW (ref 11.5–15.5)
MAGNESIUM SERPL-MCNC: 1.7 MG/DL — SIGNIFICANT CHANGE UP (ref 1.6–2.6)
MCHC RBC-ENTMCNC: 26.6 PG — LOW (ref 27–34)
MCHC RBC-ENTMCNC: 31.3 GM/DL — LOW (ref 32–36)
MCV RBC AUTO: 85.2 FL — SIGNIFICANT CHANGE UP (ref 80–100)
NRBC # BLD: 0 /100 WBCS — SIGNIFICANT CHANGE UP (ref 0–0)
PHOSPHATE SERPL-MCNC: 3.1 MG/DL — SIGNIFICANT CHANGE UP (ref 2.5–4.5)
PLATELET # BLD AUTO: 143 K/UL — LOW (ref 150–400)
POTASSIUM SERPL-MCNC: 3.5 MMOL/L — SIGNIFICANT CHANGE UP (ref 3.5–5.3)
POTASSIUM SERPL-SCNC: 3.5 MMOL/L — SIGNIFICANT CHANGE UP (ref 3.5–5.3)
PROCALCITONIN SERPL-MCNC: 0.08 NG/ML — SIGNIFICANT CHANGE UP (ref 0.02–0.1)
RBC # BLD: 3.79 M/UL — LOW (ref 3.8–5.2)
RBC # FLD: 20.4 % — HIGH (ref 10.3–14.5)
SODIUM SERPL-SCNC: 140 MMOL/L — SIGNIFICANT CHANGE UP (ref 135–145)
WBC # BLD: 5.04 K/UL — SIGNIFICANT CHANGE UP (ref 3.8–10.5)
WBC # FLD AUTO: 5.04 K/UL — SIGNIFICANT CHANGE UP (ref 3.8–10.5)

## 2020-05-10 PROCEDURE — 99233 SBSQ HOSP IP/OBS HIGH 50: CPT | Mod: CS,GC

## 2020-05-10 RX ORDER — POLYETHYLENE GLYCOL 3350 17 G/17G
17 POWDER, FOR SOLUTION ORAL DAILY
Refills: 0 | Status: DISCONTINUED | OUTPATIENT
Start: 2020-05-10 | End: 2020-05-19

## 2020-05-10 RX ORDER — HYDROMORPHONE HYDROCHLORIDE 2 MG/ML
0.2 INJECTION INTRAMUSCULAR; INTRAVENOUS; SUBCUTANEOUS
Refills: 0 | Status: DISCONTINUED | OUTPATIENT
Start: 2020-05-10 | End: 2020-05-11

## 2020-05-10 RX ADMIN — Medication 10 MILLIGRAM(S): at 05:39

## 2020-05-10 RX ADMIN — PYRIDOSTIGMINE BROMIDE 60 MILLIGRAM(S): 60 SOLUTION ORAL at 05:39

## 2020-05-10 RX ADMIN — ATORVASTATIN CALCIUM 20 MILLIGRAM(S): 80 TABLET, FILM COATED ORAL at 21:54

## 2020-05-10 RX ADMIN — Medication 5 MILLIGRAM(S): at 21:55

## 2020-05-10 RX ADMIN — HYDROMORPHONE HYDROCHLORIDE 0.2 MILLIGRAM(S): 2 INJECTION INTRAMUSCULAR; INTRAVENOUS; SUBCUTANEOUS at 22:38

## 2020-05-10 RX ADMIN — POLYETHYLENE GLYCOL 3350 17 GRAM(S): 17 POWDER, FOR SOLUTION ORAL at 12:48

## 2020-05-10 RX ADMIN — LIDOCAINE 2 PATCH: 4 CREAM TOPICAL at 05:40

## 2020-05-10 RX ADMIN — Medication 50 MICROGRAM(S): at 05:40

## 2020-05-10 RX ADMIN — Medication 5 MILLIGRAM(S): at 21:54

## 2020-05-10 RX ADMIN — Medication 100 MILLIGRAM(S): at 05:39

## 2020-05-10 RX ADMIN — Medication 2000 UNIT(S): at 12:49

## 2020-05-10 RX ADMIN — ZINC OXIDE 1 APPLICATION(S): 200 OINTMENT TOPICAL at 12:49

## 2020-05-10 RX ADMIN — PYRIDOSTIGMINE BROMIDE 60 MILLIGRAM(S): 60 SOLUTION ORAL at 21:55

## 2020-05-10 RX ADMIN — PYRIDOSTIGMINE BROMIDE 60 MILLIGRAM(S): 60 SOLUTION ORAL at 12:49

## 2020-05-10 RX ADMIN — ENOXAPARIN SODIUM 40 MILLIGRAM(S): 100 INJECTION SUBCUTANEOUS at 12:48

## 2020-05-10 RX ADMIN — Medication 100 MILLIGRAM(S): at 21:54

## 2020-05-10 RX ADMIN — LIDOCAINE 2 PATCH: 4 CREAM TOPICAL at 12:48

## 2020-05-10 RX ADMIN — LIDOCAINE 2 PATCH: 4 CREAM TOPICAL at 21:54

## 2020-05-10 RX ADMIN — PANTOPRAZOLE SODIUM 40 MILLIGRAM(S): 20 TABLET, DELAYED RELEASE ORAL at 05:39

## 2020-05-10 RX ADMIN — PYRIDOSTIGMINE BROMIDE 180 MILLIGRAM(S): 60 SOLUTION ORAL at 05:39

## 2020-05-10 RX ADMIN — Medication 100 MILLIGRAM(S): at 12:49

## 2020-05-10 NOTE — PROGRESS NOTE ADULT - PROBLEM SELECTOR PLAN 7
- c/w pantoprazole 40 mg qAM - c/w pantoprazole 40 mg qAM    # RUQ pain, worsened w/ meals  - long history of RUQ pain, previously CTAP 1/2020 without identifiable cause, AXR 4/2020 w/o identifiable cause  - cholecystectomy clips visualized  - outpatient f/u

## 2020-05-10 NOTE — PROGRESS NOTE ADULT - ASSESSMENT
78 y/o RH-F w/ h/o Myasthenia Gravis on mestinon 60mg TID and 180 mg Mestinon extended release at night, Continues on Eculizumab (Solaris) for MG, tapering dose of prednisone, HTN and recent acute hypoxemic respiratory failure with sepsis in setting of COVID-19 infection s/p solumedrol 4/3-4/7 + Plaquenil 3/31-4/4 who presented for plasma COVID therapy, S/P Convalescence Plasma 4/24, here for continued hypoxia, improving slowly

## 2020-05-10 NOTE — PROGRESS NOTE ADULT - PROBLEM SELECTOR PLAN 5
Continue Mestinon  daily, mestinon 60 TID  - s/p solumedrol 40mg x 1  CLOSE MONITORING OF RR, respiratory effort, neck flexion, strength.   - c/w Solaris treatment, last dose 4/1, 4/15, 4/29, set up by Dr. Fitzgerald  - c/w prednisone 10 mg daily (home dose) Continue Mestinon  q7AM, Mestinon 60 TID  - s/p solumedrol 40mg x 1  CLOSE MONITORING OF RR, respiratory effort, neck flexion, strength.   - c/w Solaris treatment, last dose 4/1, 4/15, 4/29, set up by Dr. Fitzgerald  - c/w prednisone 10 mg daily (home dose)

## 2020-05-10 NOTE — PROGRESS NOTE ADULT - PROBLEM SELECTOR PLAN 9
DVT: lovenox 40 subq daily  Diet: dysphagia 1 honey consistency   Dispo: home PT    Transitions of Care Status:  1.  Name of PCP: Carrillo Hannon  2.  PCP Contacted on Admission: [ ] Y    [ ] N    3.  PCP contacted at Discharge: [ ] Y    [ ] N    [ ] N/A  4.  Post-Discharge Appointment Date and Location:  5.  Summary of Handoff given to PCP: DVT: lovenox 40 subq daily  Diet: dysphagia 1 honey consistency   Dispo: home PT and home O2 TBD    Transitions of Care Status:  1.  Name of PCP: Carrillo Hannon  2.  PCP Contacted on Admission: [ ] Y    [ ] N    3.  PCP contacted at Discharge: [ ] Y    [ ] N    [ ] N/A  4.  Post-Discharge Appointment Date and Location:  5.  Summary of Handoff given to PCP:

## 2020-05-10 NOTE — PROGRESS NOTE ADULT - SUBJECTIVE AND OBJECTIVE BOX
Jorge Mckinley MD, PGY-1  [place] Contact Information  [place] Pager: [phone]   After 7 PM on weekdays and 12 PM on weekends, for URGENT issues, low teams page #[phone], high teams #[phone]   --------------------------------------------------------------------------------------------  [facility] Contact Information  [place] Pager: [phone]  After 7 PM on weekdays and 12 PM on weekends, for URGENT issues, low teams #[id number], high teams #[phone]  ---------------------------------------------------------------------------------------------  Patient is a 79y old  Female who presents with a chief complaint of Shortness of breath, fevers (09 May 2020 09:25)      SUBJECTIVE / OVERNIGHT EVENTS:   ADDITIONAL REVIEW OF SYSTEMS:    MEDICATIONS  (STANDING):  atorvastatin 20 milliGRAM(s) Oral at bedtime  benzonatate 100 milliGRAM(s) Oral three times a day  cholecalciferol 2000 Unit(s) Oral daily  enoxaparin Injectable 40 milliGRAM(s) SubCutaneous daily  levothyroxine 50 MICROGram(s) Oral daily  lidocaine   Patch 2 Patch Transdermal daily  melatonin 5 milliGRAM(s) Oral at bedtime  pantoprazole    Tablet 40 milliGRAM(s) Oral before breakfast  predniSONE   Tablet 10 milliGRAM(s) Oral daily  pyridostigmine 60 milliGRAM(s) Oral three times a day  pyridostigmine  milliGRAM(s) Oral <User Schedule>  zinc oxide 40% Ointment 1 Application(s) Topical daily    MEDICATIONS  (PRN):  acetaminophen   Tablet .. 650 milliGRAM(s) Oral every 4 hours PRN Temp greater or equal to 38C (100.4F), Moderate Pain (4 - 6)  ALBUTerol    90 MICROgram(s) HFA Inhaler 2 Puff(s) Inhalation every 4 hours PRN Shortness of Breath and/or Wheezing  sodium chloride 0.65% Nasal 1 Spray(s) Both Nostrils two times a day PRN Nasal Congestion      CAPILLARY BLOOD GLUCOSE        I&O's Summary    09 May 2020 07:01  -  10 May 2020 07:00  --------------------------------------------------------  IN: 360 mL / OUT: 2 mL / NET: 358 mL        PHYSICAL EXAM:  Vital Signs Last 24 Hrs  T(C): 36.8 (10 May 2020 04:34), Max: 36.8 (10 May 2020 04:34)  T(F): 98.3 (10 May 2020 04:34), Max: 98.3 (10 May 2020 04:34)  HR: 61 (10 May 2020 04:34) (51 - 61)  BP: 105/62 (10 May 2020 04:34) (103/53 - 123/70)  RR: 18 (10 May 2020 04:34) (18 - 18)  SpO2: 100% (10 May 2020 04:34) (99% - 100%)    PHYSICAL EXAM  GENERAL: NAD, well-developed  NEURO: AO x3, PERRLA, EOMI, motor strength in tact in 4/4 extremities, sensation in tact  HEAD:  Atraumatic, Normocephalic  EYES: conjunctiva and sclera clear  NECK: Supple, No JVD, no lymphadenopathy, no thyromegaly  CHEST/LUNG: Clear to auscultation bilaterally; No wheezes, rales or rhonchi  HEART: Regular rate and rhythm; No murmurs, rubs, or gallops  ABDOMEN: Soft, Nontender, Nondistended; Bowel sounds present, no masses.  EXTREMITIES:  2+ Peripheral Pulses, No clubbing, cyanosis, or edema  SKIN: Warm, dry, in tact, no rashes or lesions  PSYCH: affect appropriate  LABS:                        10.1   5.04  )-----------( 143      ( 10 May 2020 06:44 )             32.3     05-10    140  |  102  |  12  ----------------------------<  90  3.5   |  29  |  0.75    Ca    8.8      10 May 2020 06:44  Phos  3.1     05-10  Mg     1.7     05-10      RADIOLOGY & ADDITIONAL TESTS:  Results Reviewed:   Imaging Personally Reviewed:  Electrocardiogram Personally Reviewed:    COORDINATION OF CARE:  Care Discussed with Consultants/Other Providers [Y/N]:  Prior or Outpatient Records Reviewed [Y/N]: Jorge Mckinley MD, PGY-1  [place] Contact Information  [place] Pager: [phone]   After 7 PM on weekdays and 12 PM on weekends, for URGENT issues, low teams page #[phone], high teams #[phone]   --------------------------------------------------------------------------------------------  [facility] Contact Information  [facility] Pager: [phone]  After 7 PM on weekdays and 12 PM on weekends, for URGENT issues, low teams #[phone], high teams #[phone]  ---------------------------------------------------------------------------------------------  Patient is a 79y old  Female who presents with a chief complaint of Shortness of breath, fevers (09 May 2020 09:25)    SUBJECTIVE / OVERNIGHT EVENTS: No acute events overnight. Pt seen and examined at bedside. Complaining of constipation and nasal dryness. Denies any other acute complaints including nausea, vomiting, diarrhea, chest pain, sob, weakness.      MEDICATIONS  (STANDING):  atorvastatin 20 milliGRAM(s) Oral at bedtime  benzonatate 100 milliGRAM(s) Oral three times a day  cholecalciferol 2000 Unit(s) Oral daily  enoxaparin Injectable 40 milliGRAM(s) SubCutaneous daily  levothyroxine 50 MICROGram(s) Oral daily  lidocaine   Patch 2 Patch Transdermal daily  melatonin 5 milliGRAM(s) Oral at bedtime  pantoprazole    Tablet 40 milliGRAM(s) Oral before breakfast  predniSONE   Tablet 10 milliGRAM(s) Oral daily  pyridostigmine 60 milliGRAM(s) Oral three times a day  pyridostigmine  milliGRAM(s) Oral <User Schedule>  zinc oxide 40% Ointment 1 Application(s) Topical daily    MEDICATIONS  (PRN):  acetaminophen   Tablet .. 650 milliGRAM(s) Oral every 4 hours PRN Temp greater or equal to 38C (100.4F), Moderate Pain (4 - 6)  ALBUTerol    90 MICROgram(s) HFA Inhaler 2 Puff(s) Inhalation every 4 hours PRN Shortness of Breath and/or Wheezing  sodium chloride 0.65% Nasal 1 Spray(s) Both Nostrils two times a day PRN Nasal Congestion    CAPILLARY BLOOD GLUCOSE    I&O's Summary    09 May 2020 07:01  -  10 May 2020 07:00  --------------------------------------------------------  IN: 360 mL / OUT: 2 mL / NET: 358 mL    PHYSICAL EXAM:  Vital Signs Last 24 Hrs  T(C): 36.8 (10 May 2020 04:34), Max: 36.8 (10 May 2020 04:34)  T(F): 98.3 (10 May 2020 04:34), Max: 98.3 (10 May 2020 04:34)  HR: 61 (10 May 2020 04:34) (51 - 61)  BP: 105/62 (10 May 2020 04:34) (103/53 - 123/70)  RR: 18 (10 May 2020 04:34) (18 - 18)  SpO2: 100% (10 May 2020 04:34) (99% - 100%)    PHYSICAL EXAM  GENERAL: NAD on 4L NC  CHEST/LUNG: Clear to auscultation bilaterally; No wheeze  HEART: Regular rate and rhythm; No murmurs, rubs, or gallops  ABDOMEN: soft, nt, nd, neg morrell sign  EXTREMITIES:  no calf tenderness, no swelling  PSYCH: AAOx3  NEUROLOGY: non-focal    LABS:                        10.1   5.04  )-----------( 143      ( 10 May 2020 06:44 )             32.3     05-10    140  |  102  |  12  ----------------------------<  90  3.5   |  29  |  0.75    Ca    8.8      10 May 2020 06:44  Phos  3.1     05-10  Mg     1.7     05-10      RADIOLOGY & ADDITIONAL TESTS:  Results Reviewed:   Imaging Personally Reviewed:  Electrocardiogram Personally Reviewed:    COORDINATION OF CARE:  Care Discussed with Consultants/Other Providers [Y/N]:  Prior or Outpatient Records Reviewed [Y/N]:

## 2020-05-10 NOTE — PROGRESS NOTE ADULT - PROBLEM SELECTOR PLAN 1
-S/P Solumedrol 04/03/2020-04/07/2020  -S/P Plaquenil 03/31-04/04  -Transferred from [facility] for Convalescent Plasma Transfusion->S/p plasma transfusion 4/24-CRP and ferritin slightly decreasing. No further plans for convalescent plasma treatments.   -Anakinra d/w ID but holding off 2/2 having already received immunosuppression Eculizumab (Solaris) for MG  -Not a candidate for Remdisivir as has shown improvement most in earlier stages of disease.  - Persistent cough - repeat CXR 5/8 w/ improving consolidation, no new consolidation. no new fevers, downtrending CRP. less likely bacterial superinfection. will continue with current mgmt, reordered cough suppression standing x 3 days.

## 2020-05-11 LAB
BUN SERPL-MCNC: 12 MG/DL — SIGNIFICANT CHANGE UP (ref 7–23)
CALCIUM SERPL-MCNC: 8.6 MG/DL — SIGNIFICANT CHANGE UP (ref 8.4–10.5)
CHLORIDE SERPL-SCNC: 101 MMOL/L — SIGNIFICANT CHANGE UP (ref 96–108)
CO2 SERPL-SCNC: 16 MMOL/L — LOW (ref 22–31)
CREAT SERPL-MCNC: 0.63 MG/DL — SIGNIFICANT CHANGE UP (ref 0.5–1.3)
GLUCOSE SERPL-MCNC: 164 MG/DL — HIGH (ref 70–99)
MAGNESIUM SERPL-MCNC: 1.8 MG/DL — SIGNIFICANT CHANGE UP (ref 1.6–2.6)
PHOSPHATE SERPL-MCNC: 3.2 MG/DL — SIGNIFICANT CHANGE UP (ref 2.5–4.5)
POTASSIUM SERPL-MCNC: 4.3 MMOL/L — SIGNIFICANT CHANGE UP (ref 3.5–5.3)
POTASSIUM SERPL-SCNC: 4.3 MMOL/L — SIGNIFICANT CHANGE UP (ref 3.5–5.3)
SODIUM SERPL-SCNC: 135 MMOL/L — SIGNIFICANT CHANGE UP (ref 135–145)

## 2020-05-11 PROCEDURE — 99232 SBSQ HOSP IP/OBS MODERATE 35: CPT | Mod: CS

## 2020-05-11 RX ORDER — HYDROMORPHONE HYDROCHLORIDE 2 MG/ML
0.2 INJECTION INTRAMUSCULAR; INTRAVENOUS; SUBCUTANEOUS ONCE
Refills: 0 | Status: DISCONTINUED | OUTPATIENT
Start: 2020-05-11 | End: 2020-05-12

## 2020-05-11 RX ADMIN — PANTOPRAZOLE SODIUM 40 MILLIGRAM(S): 20 TABLET, DELAYED RELEASE ORAL at 06:06

## 2020-05-11 RX ADMIN — Medication 2000 UNIT(S): at 13:00

## 2020-05-11 RX ADMIN — ZINC OXIDE 1 APPLICATION(S): 200 OINTMENT TOPICAL at 12:48

## 2020-05-11 RX ADMIN — Medication 100 MILLIGRAM(S): at 13:00

## 2020-05-11 RX ADMIN — Medication 100 MILLIGRAM(S): at 06:06

## 2020-05-11 RX ADMIN — LIDOCAINE 2 PATCH: 4 CREAM TOPICAL at 00:00

## 2020-05-11 RX ADMIN — PYRIDOSTIGMINE BROMIDE 60 MILLIGRAM(S): 60 SOLUTION ORAL at 12:48

## 2020-05-11 RX ADMIN — Medication 50 MICROGRAM(S): at 06:06

## 2020-05-11 RX ADMIN — Medication 5 MILLIGRAM(S): at 22:57

## 2020-05-11 RX ADMIN — ATORVASTATIN CALCIUM 20 MILLIGRAM(S): 80 TABLET, FILM COATED ORAL at 22:57

## 2020-05-11 RX ADMIN — Medication 10 MILLIGRAM(S): at 06:06

## 2020-05-11 RX ADMIN — PYRIDOSTIGMINE BROMIDE 60 MILLIGRAM(S): 60 SOLUTION ORAL at 18:00

## 2020-05-11 RX ADMIN — PYRIDOSTIGMINE BROMIDE 60 MILLIGRAM(S): 60 SOLUTION ORAL at 22:58

## 2020-05-11 RX ADMIN — POLYETHYLENE GLYCOL 3350 17 GRAM(S): 17 POWDER, FOR SOLUTION ORAL at 12:48

## 2020-05-11 RX ADMIN — PYRIDOSTIGMINE BROMIDE 180 MILLIGRAM(S): 60 SOLUTION ORAL at 06:06

## 2020-05-11 RX ADMIN — LIDOCAINE 2 PATCH: 4 CREAM TOPICAL at 12:48

## 2020-05-11 RX ADMIN — ENOXAPARIN SODIUM 40 MILLIGRAM(S): 100 INJECTION SUBCUTANEOUS at 13:00

## 2020-05-11 NOTE — PROGRESS NOTE ADULT - PROBLEM SELECTOR PLAN 3
-0.2 mg IV Dilaudid q3hrs PRN, hold for RR <18 and/or oversedation. Monitor for signs symptoms of respiratory distress

## 2020-05-11 NOTE — PROGRESS NOTE ADULT - PROBLEM SELECTOR PLAN 9
DVT: lovenox 40 subq daily  Diet: dysphagia 1 honey consistency   Dispo: home PT and home O2 TBD    Transitions of Care Status:  1.  Name of PCP: Carrillo Hannon  2.  PCP Contacted on Admission: [ ] Y    [ ] N    3.  PCP contacted at Discharge: [ ] Y    [ ] N    [ ] N/A  4.  Post-Discharge Appointment Date and Location:  5.  Summary of Handoff given to PCP:

## 2020-05-11 NOTE — PROGRESS NOTE ADULT - PROBLEM SELECTOR PLAN 1
-S/P Solumedrol 04/03/2020-04/07/2020  -S/P Plaquenil 03/31-04/04  -Transferred from [facility] for Convalescent Plasma Transfusion->S/p plasma transfusion 4/24-CRP and ferritin slightly decreasing. No further plans for convalescent plasma treatments.   -Anakinra d/w ID but holding off 2/2 having already received immunosuppression Eculizumab (Solaris) for MG  -Not a candidate for Remdisivir as has shown improvement most in earlier stages of disease.  - Persistent cough - repeat CXR 5/8 w/ improving consolidation, no new consolidation. no new fevers, downtrending CRP. less likely bacterial superinfection. will continue with current mgmt, reordered cough suppression standing x 3 days. -S/P Solumedrol 04/03/2020-04/07/2020  -S/P Plaquenil 03/31-04/04  -Transferred from [facility] for Convalescent Plasma Transfusion->S/p plasma transfusion 4/24-CRP and ferritin slightly decreasing. No further plans for convalescent plasma treatments.   -Anakinra d/w ID but holding off 2/2 having already received immunosuppression Eculizumab (Solaris) for Myasthania Gravis  -Not a candidate for Remdisivir as has shown improvement most in earlier stages of disease.  - Persistent cough - repeat CXR 5/8 w/ improving consolidation, no new consolidation. no new fevers, downtrending CRP. less likely bacterial superinfection. will continue with current mgmt, reordered cough suppression standing x 3 days.

## 2020-05-11 NOTE — PROGRESS NOTE ADULT - SUBJECTIVE AND OBJECTIVE BOX
Oracio Flores  PGY1 Internal Medicine  [phone] / [phone]    Patient is a 79y old  Female who presents with a chief complaint of Shortness of breath, fevers (10 May 2020 07:52)      SUBJECTIVE / OVERNIGHT EVENTS: On 5L. No events ON. No BM x2 days. Pt says that she cannot feel her legs, endorsed pain and weakness in LE, however sensation and pulses intact. Endorses chest heaviness, SOB. No fever/chills. Endorses abd discomfort, generalized.       REVIEW OF SYSTEMS:  as above    MEDICATIONS  (STANDING):  atorvastatin 20 milliGRAM(s) Oral at bedtime  benzonatate 100 milliGRAM(s) Oral three times a day  bisacodyl 5 milliGRAM(s) Oral at bedtime  cholecalciferol 2000 Unit(s) Oral daily  enoxaparin Injectable 40 milliGRAM(s) SubCutaneous daily  levothyroxine 50 MICROGram(s) Oral daily  lidocaine   Patch 2 Patch Transdermal daily  melatonin 5 milliGRAM(s) Oral at bedtime  pantoprazole    Tablet 40 milliGRAM(s) Oral before breakfast  polyethylene glycol 3350 17 Gram(s) Oral daily  predniSONE   Tablet 10 milliGRAM(s) Oral daily  pyridostigmine 60 milliGRAM(s) Oral three times a day  pyridostigmine  milliGRAM(s) Oral <User Schedule>  zinc oxide 40% Ointment 1 Application(s) Topical daily    MEDICATIONS  (PRN):  acetaminophen   Tablet .. 650 milliGRAM(s) Oral every 4 hours PRN Temp greater or equal to 38C (100.4F), Moderate Pain (4 - 6)  ALBUTerol    90 MICROgram(s) HFA Inhaler 2 Puff(s) Inhalation every 4 hours PRN Shortness of Breath and/or Wheezing  HYDROmorphone  Injectable 0.2 milliGRAM(s) IV Push every 2 hours PRN Severe Pain (7 - 10)  sodium chloride 0.65% Nasal 1 Spray(s) Both Nostrils two times a day PRN Nasal Congestion      CAPILLARY BLOOD GLUCOSE        I&O's Summary      PHYSICAL EXAM:  Vital Signs Last 24 Hrs  T(C): 37 (11 May 2020 04:39), Max: 37.3 (10 May 2020 14:43)  T(F): 98.6 (11 May 2020 04:39), Max: 99.1 (10 May 2020 14:43)  HR: 54 (11 May 2020 04:39) (51 - 92)  BP: 104/61 (11 May 2020 04:39) (104/61 - 130/68)  BP(mean): --  RR: 18 (11 May 2020 04:39) (16 - 26)  SpO2: 100% (11 May 2020 04:39) (87% - 100%)    PHYSICAL EXAM:  GENERAL: NAD on 5L  CHEST/LUNG: Clear to auscultation bilaterally; No wheeze  HEART: Regular rate and rhythm; No murmurs, rubs, or gallops  ABDOMEN: soft, nt, nd, neg morrell sign  EXTREMITIES:  no calf tenderness, no swelling  PSYCH: AAOx3  NEUROLOGY: non-focal      LABS:                        10.1   5.04  )-----------( 143      ( 10 May 2020 06:44 )             32.3     05-10    140  |  102  |  12  ----------------------------<  90  3.5   |  29  |  0.75    Ca    8.8      10 May 2020 06:44  Phos  3.1     05-10  Mg     1.7     05-10                  RADIOLOGY & ADDITIONAL TESTS:  Results Reviewed:   Imaging Personally Reviewed:  Electrocardiogram Personally Reviewed:    COORDINATION OF CARE:  Care Discussed with Consultants/Other Providers [Y/N]:  Prior or Outpatient Records Reviewed [Y/N]: Oracio Flores  PGY1 Internal Medicine  [phone] / [phone]    Patient is a 79y old  Female who presents with a chief complaint of acute hypoxic respiratory failure       SUBJECTIVE / OVERNIGHT EVENTS: On 5L. No events ON. No BM x2 days. Pt says that she cannot feel her legs, endorsed pain and weakness in LE, however sensation and pulses intact. Endorses chest heaviness, SOB. No fever/chills. Endorses abd discomfort, generalized.       REVIEW OF SYSTEMS:  as above    MEDICATIONS  (STANDING):  atorvastatin 20 milliGRAM(s) Oral at bedtime  benzonatate 100 milliGRAM(s) Oral three times a day  bisacodyl 5 milliGRAM(s) Oral at bedtime  cholecalciferol 2000 Unit(s) Oral daily  enoxaparin Injectable 40 milliGRAM(s) SubCutaneous daily  levothyroxine 50 MICROGram(s) Oral daily  lidocaine   Patch 2 Patch Transdermal daily  melatonin 5 milliGRAM(s) Oral at bedtime  pantoprazole    Tablet 40 milliGRAM(s) Oral before breakfast  polyethylene glycol 3350 17 Gram(s) Oral daily  predniSONE   Tablet 10 milliGRAM(s) Oral daily  pyridostigmine 60 milliGRAM(s) Oral three times a day  pyridostigmine  milliGRAM(s) Oral <User Schedule>  zinc oxide 40% Ointment 1 Application(s) Topical daily    MEDICATIONS  (PRN):  acetaminophen   Tablet .. 650 milliGRAM(s) Oral every 4 hours PRN Temp greater or equal to 38C (100.4F), Moderate Pain (4 - 6)  ALBUTerol    90 MICROgram(s) HFA Inhaler 2 Puff(s) Inhalation every 4 hours PRN Shortness of Breath and/or Wheezing  HYDROmorphone  Injectable 0.2 milliGRAM(s) IV Push every 2 hours PRN Severe Pain (7 - 10)  sodium chloride 0.65% Nasal 1 Spray(s) Both Nostrils two times a day PRN Nasal Congestion      CAPILLARY BLOOD GLUCOSE        I&O's Summary      PHYSICAL EXAM:  Vital Signs Last 24 Hrs  T(C): 37 (11 May 2020 04:39), Max: 37.3 (10 May 2020 14:43)  T(F): 98.6 (11 May 2020 04:39), Max: 99.1 (10 May 2020 14:43)  HR: 54 (11 May 2020 04:39) (51 - 92)  BP: 104/61 (11 May 2020 04:39) (104/61 - 130/68)  BP(mean): --  RR: 18 (11 May 2020 04:39) (16 - 26)  SpO2: 100% (11 May 2020 04:39) (87% - 100%)    PHYSICAL EXAM:  GENERAL: NAD on 5L  CHEST/LUNG: Clear to auscultation bilaterally; No wheeze  HEART: Regular rate and rhythm; No murmurs, rubs, or gallops  ABDOMEN: soft, nt, nd, neg morrell sign  EXTREMITIES:  no calf tenderness, no swelling  PSYCH: AAOx3  NEUROLOGY: non-focal      LABS:                        10.1   5.04  )-----------( 143      ( 10 May 2020 06:44 )             32.3     05-10    140  |  102  |  12  ----------------------------<  90  3.5   |  29  |  0.75    Ca    8.8      10 May 2020 06:44  Phos  3.1     05-10  Mg     1.7     05-10                  RADIOLOGY & ADDITIONAL TESTS:  Results Reviewed:   Imaging Personally Reviewed:  Electrocardiogram Personally Reviewed:    COORDINATION OF CARE:  Care Discussed with Consultants/Other Providers [Y/N]:  Prior or Outpatient Records Reviewed [Y/N]:

## 2020-05-11 NOTE — PROGRESS NOTE ADULT - PROBLEM SELECTOR PLAN 7
- c/w pantoprazole 40 mg qAM    # RUQ pain, worsened w/ meals  - long history of RUQ pain, previously CTAP 1/2020 without identifiable cause, AXR 4/2020 w/o identifiable cause  - cholecystectomy clips visualized  - outpatient f/u

## 2020-05-11 NOTE — PROGRESS NOTE ADULT - PROBLEM SELECTOR PLAN 5
Continue Mestinon  q7AM, Mestinon 60 TID  - s/p solumedrol 40mg x 1  CLOSE MONITORING OF RR, respiratory effort, neck flexion, strength.   - c/w Solaris treatment, last dose 4/1, 4/15, 4/29, set up by Dr. Fitzgerald  - c/w prednisone 10 mg daily (home dose)

## 2020-05-12 LAB
CRP SERPL-MCNC: 0.57 MG/DL — HIGH (ref 0–0.4)
FERRITIN SERPL-MCNC: 124 NG/ML — SIGNIFICANT CHANGE UP (ref 15–150)
PROCALCITONIN SERPL-MCNC: 0.08 NG/ML — SIGNIFICANT CHANGE UP (ref 0.02–0.1)

## 2020-05-12 PROCEDURE — 99232 SBSQ HOSP IP/OBS MODERATE 35: CPT | Mod: CS

## 2020-05-12 RX ORDER — ECULIZUMAB 300 MG/30ML
1200 INJECTION, SOLUTION, CONCENTRATE INTRAVENOUS ONCE
Refills: 0 | Status: COMPLETED | OUTPATIENT
Start: 2020-05-12 | End: 2020-05-12

## 2020-05-12 RX ORDER — LACTULOSE 10 G/15ML
10 SOLUTION ORAL EVERY 6 HOURS
Refills: 0 | Status: DISCONTINUED | OUTPATIENT
Start: 2020-05-12 | End: 2020-05-13

## 2020-05-12 RX ORDER — SENNA PLUS 8.6 MG/1
2 TABLET ORAL AT BEDTIME
Refills: 0 | Status: DISCONTINUED | OUTPATIENT
Start: 2020-05-12 | End: 2020-05-19

## 2020-05-12 RX ADMIN — Medication 10 MILLIGRAM(S): at 06:25

## 2020-05-12 RX ADMIN — LIDOCAINE 2 PATCH: 4 CREAM TOPICAL at 00:44

## 2020-05-12 RX ADMIN — Medication 5 MILLIGRAM(S): at 21:46

## 2020-05-12 RX ADMIN — LIDOCAINE 2 PATCH: 4 CREAM TOPICAL at 21:47

## 2020-05-12 RX ADMIN — LIDOCAINE 2 PATCH: 4 CREAM TOPICAL at 17:37

## 2020-05-12 RX ADMIN — ZINC OXIDE 1 APPLICATION(S): 200 OINTMENT TOPICAL at 11:00

## 2020-05-12 RX ADMIN — ENOXAPARIN SODIUM 40 MILLIGRAM(S): 100 INJECTION SUBCUTANEOUS at 10:59

## 2020-05-12 RX ADMIN — ATORVASTATIN CALCIUM 20 MILLIGRAM(S): 80 TABLET, FILM COATED ORAL at 21:46

## 2020-05-12 RX ADMIN — LIDOCAINE 2 PATCH: 4 CREAM TOPICAL at 10:56

## 2020-05-12 RX ADMIN — PYRIDOSTIGMINE BROMIDE 60 MILLIGRAM(S): 60 SOLUTION ORAL at 21:46

## 2020-05-12 RX ADMIN — PYRIDOSTIGMINE BROMIDE 60 MILLIGRAM(S): 60 SOLUTION ORAL at 12:32

## 2020-05-12 RX ADMIN — Medication 50 MICROGRAM(S): at 06:26

## 2020-05-12 RX ADMIN — ECULIZUMAB 411.43 MILLIGRAM(S): 300 INJECTION, SOLUTION, CONCENTRATE INTRAVENOUS at 12:32

## 2020-05-12 RX ADMIN — POLYETHYLENE GLYCOL 3350 17 GRAM(S): 17 POWDER, FOR SOLUTION ORAL at 10:59

## 2020-05-12 RX ADMIN — PANTOPRAZOLE SODIUM 40 MILLIGRAM(S): 20 TABLET, DELAYED RELEASE ORAL at 06:26

## 2020-05-12 RX ADMIN — HYDROMORPHONE HYDROCHLORIDE 0.2 MILLIGRAM(S): 2 INJECTION INTRAMUSCULAR; INTRAVENOUS; SUBCUTANEOUS at 00:43

## 2020-05-12 RX ADMIN — Medication 2000 UNIT(S): at 10:59

## 2020-05-12 RX ADMIN — PYRIDOSTIGMINE BROMIDE 180 MILLIGRAM(S): 60 SOLUTION ORAL at 06:25

## 2020-05-12 RX ADMIN — PYRIDOSTIGMINE BROMIDE 60 MILLIGRAM(S): 60 SOLUTION ORAL at 07:40

## 2020-05-12 NOTE — PROGRESS NOTE ADULT - SUBJECTIVE AND OBJECTIVE BOX
Oracio Flores  PGY1 Internal Medicine  [phone] / [phone]    Patient is a 79y old  Female who presents with a chief complaint of cute hypoxic respiratory failure, COVID (11 May 2020 08:51)      SUBJECTIVE / OVERNIGHT EVENTS: Yesterday destaurated to 85 with ambulation. Comfortable at rest on 5L. Dilaudid 0.2 IV push x1 ON for abdominal pain which she has had during her admission. Not reporting any focal weakness, f/c/n/v. Abd pain described as dull, across entire abdomen, not related to eating. No BMx3 days. SOB improved.      REVIEW OF SYSTEMS:    as above      MEDICATIONS  (STANDING):  atorvastatin 20 milliGRAM(s) Oral at bedtime  bisacodyl 5 milliGRAM(s) Oral at bedtime  cholecalciferol 2000 Unit(s) Oral daily  enoxaparin Injectable 40 milliGRAM(s) SubCutaneous daily  levothyroxine 50 MICROGram(s) Oral daily  lidocaine   Patch 2 Patch Transdermal daily  melatonin 5 milliGRAM(s) Oral at bedtime  pantoprazole    Tablet 40 milliGRAM(s) Oral before breakfast  polyethylene glycol 3350 17 Gram(s) Oral daily  predniSONE   Tablet 10 milliGRAM(s) Oral daily  pyridostigmine 60 milliGRAM(s) Oral three times a day  pyridostigmine  milliGRAM(s) Oral <User Schedule>  zinc oxide 40% Ointment 1 Application(s) Topical daily    MEDICATIONS  (PRN):  acetaminophen   Tablet .. 650 milliGRAM(s) Oral every 4 hours PRN Temp greater or equal to 38C (100.4F), Moderate Pain (4 - 6)  ALBUTerol    90 MICROgram(s) HFA Inhaler 2 Puff(s) Inhalation every 4 hours PRN Shortness of Breath and/or Wheezing  sodium chloride 0.65% Nasal 1 Spray(s) Both Nostrils two times a day PRN Nasal Congestion      CAPILLARY BLOOD GLUCOSE        I&O's Summary    11 May 2020 07:01  -  12 May 2020 07:00  --------------------------------------------------------  IN: 240 mL / OUT: 300 mL / NET: -60 mL        PHYSICAL EXAM:  Vital Signs Last 24 Hrs  T(C): 36.5 (12 May 2020 04:10), Max: 36.8 (11 May 2020 13:24)  T(F): 97.7 (12 May 2020 04:10), Max: 98.2 (11 May 2020 13:24)  HR: 53 (12 May 2020 04:10) (53 - 61)  BP: 91/52 (12 May 2020 04:10) (91/52 - 111/63)  BP(mean): --  RR: 18 (12 May 2020 04:10) (18 - 18)  SpO2: 99% (12 May 2020 04:10) (98% - 100%)    PHYSICAL EXAM:  GENERAL: NAD on 5L  CHEST/LUNG: Clear to auscultation bilaterally; No wheeze  HEART: Regular rate and rhythm; No murmurs, rubs, or gallops  ABDOMEN: soft, nt, nd, neg morrell sign  EXTREMITIES:  no calf tenderness, no swelling  PSYCH: AAOx3  NEUROLOGY: non-focal, neck flexion intact, strength/sensation grossly intact      LABS:    05-11    135  |  101  |  12  ----------------------------<  164<H>  4.3   |  16<L>  |  0.63    Ca    8.6      11 May 2020 10:10  Phos  3.2     05-11  Mg     1.8     05-11                  RADIOLOGY & ADDITIONAL TESTS:  Results Reviewed:   Imaging Personally Reviewed:  Electrocardiogram Personally Reviewed:    COORDINATION OF CARE:  Care Discussed with Consultants/Other Providers [Y/N]:  Prior or Outpatient Records Reviewed [Y/N]: Oracio Flores  PGY1 Internal Medicine  [phone] / [phone]    Patient is a 79y old  Female who presents with a chief complaint of acute hypoxic respiratory failure, COVID (11 May 2020 08:51)      SUBJECTIVE / OVERNIGHT EVENTS: Yesterday destaurated to 85 with ambulation. Comfortable at rest on 5L. Dilaudid 0.2 IV push x1 ON for abdominal pain which she has had during her admission. Not reporting any focal weakness, f/c/n/v. Abd pain described as dull, across entire abdomen, not related to eating. No BMx3 days. SOB improved.      REVIEW OF SYSTEMS:    as above      MEDICATIONS  (STANDING):  atorvastatin 20 milliGRAM(s) Oral at bedtime  bisacodyl 5 milliGRAM(s) Oral at bedtime  cholecalciferol 2000 Unit(s) Oral daily  enoxaparin Injectable 40 milliGRAM(s) SubCutaneous daily  levothyroxine 50 MICROGram(s) Oral daily  lidocaine   Patch 2 Patch Transdermal daily  melatonin 5 milliGRAM(s) Oral at bedtime  pantoprazole    Tablet 40 milliGRAM(s) Oral before breakfast  polyethylene glycol 3350 17 Gram(s) Oral daily  predniSONE   Tablet 10 milliGRAM(s) Oral daily  pyridostigmine 60 milliGRAM(s) Oral three times a day  pyridostigmine  milliGRAM(s) Oral <User Schedule>  zinc oxide 40% Ointment 1 Application(s) Topical daily    MEDICATIONS  (PRN):  acetaminophen   Tablet .. 650 milliGRAM(s) Oral every 4 hours PRN Temp greater or equal to 38C (100.4F), Moderate Pain (4 - 6)  ALBUTerol    90 MICROgram(s) HFA Inhaler 2 Puff(s) Inhalation every 4 hours PRN Shortness of Breath and/or Wheezing  sodium chloride 0.65% Nasal 1 Spray(s) Both Nostrils two times a day PRN Nasal Congestion      CAPILLARY BLOOD GLUCOSE        I&O's Summary    11 May 2020 07:01  -  12 May 2020 07:00  --------------------------------------------------------  IN: 240 mL / OUT: 300 mL / NET: -60 mL        PHYSICAL EXAM:  Vital Signs Last 24 Hrs  T(C): 36.5 (12 May 2020 04:10), Max: 36.8 (11 May 2020 13:24)  T(F): 97.7 (12 May 2020 04:10), Max: 98.2 (11 May 2020 13:24)  HR: 53 (12 May 2020 04:10) (53 - 61)  BP: 91/52 (12 May 2020 04:10) (91/52 - 111/63)  BP(mean): --  RR: 18 (12 May 2020 04:10) (18 - 18)  SpO2: 99% (12 May 2020 04:10) (98% - 100%)    PHYSICAL EXAM:  GENERAL: NAD on 5L  CHEST/LUNG: Clear to auscultation bilaterally; No wheeze  HEART: Regular rate and rhythm; No murmurs, rubs, or gallops  ABDOMEN: soft, nt, nd, neg morrell sign  EXTREMITIES:  no calf tenderness, no swelling  PSYCH: AAOx3  NEUROLOGY: non-focal, neck flexion intact, strength/sensation grossly intact      LABS:    05-11    135  |  101  |  12  ----------------------------<  164<H>  4.3   |  16<L>  |  0.63    Ca    8.6      11 May 2020 10:10  Phos  3.2     05-11  Mg     1.8     05-11                  RADIOLOGY & ADDITIONAL TESTS:  Results Reviewed:   Imaging Personally Reviewed:  Electrocardiogram Personally Reviewed:    COORDINATION OF CARE:  Care Discussed with Consultants/Other Providers [Y/N]:  Prior or Outpatient Records Reviewed [Y/N]:

## 2020-05-12 NOTE — PROGRESS NOTE ADULT - PROBLEM SELECTOR PLAN 3
Monitor for signs symptoms of respiratory distress Monitor for signs symptoms of respiratory distress; hold dilaudid IV For now

## 2020-05-12 NOTE — PROGRESS NOTE ADULT - PROBLEM SELECTOR PLAN 5
Continue Mestinon  q7AM, Mestinon 60 TID  - s/p solumedrol 40mg x 1  CLOSE MONITORING OF RR, respiratory effort, neck flexion, strength.   - c/w Solaris treatment, last dose 4/1, 4/15, 4/29, set up by Dr. Fitzgerald  - c/w prednisone 10 mg daily (home dose) Continue Mestinon  q7AM, Mestinon 60 TID  - s/p solumedrol 40mg x 1  CLOSE MONITORING OF RR, respiratory effort, neck flexion, strength.   - c/w Solaris treatment, last dose 4/1, 4/15, 4/29, set up by Dr. Fitzgerald; dose 5/12  - c/w prednisone 10 mg daily (home dose)

## 2020-05-12 NOTE — PROGRESS NOTE ADULT - PROBLEM SELECTOR PLAN 2
-PT is DNR/DNI and has severe COVID-19 associated hypoxic respiratory failure.   -PT also at very high risk of rapid decompensation secondary to superimposed neuromuscular respiratory failure due to myasthenia gravis.  Will discuss further with outpatient neuromuscular specialist, Dr. Fitzgerald, but on initial contact likely her current presentation is mostly due to COVID-19 rather than MG.  - Supplemental O2 as needed -PT is DNR/DNI and has severe COVID-19 associated hypoxic respiratory failure.   -PT also at very high risk of rapid decompensation secondary to superimposed neuromuscular respiratory failure due to myasthenia gravis.  Will discuss further with outpatient neuromuscular specialist, Dr. Fitzgerald, but on initial contact likely her current presentation is mostly due to COVID-19 rather than MG.  - Supplemental O2 as needed  - comfortable on 5L NC, continue to wean; desaturation below 90 with ambulation, will need saturations >90 on 4L NC or less prior to dc home

## 2020-05-12 NOTE — PROGRESS NOTE ADULT - PROBLEM SELECTOR PLAN 1
-S/P Solumedrol 04/03/2020-04/07/2020  -S/P Plaquenil 03/31-04/04  -Transferred from [facility] for Convalescent Plasma Transfusion->S/p plasma transfusion 4/24-CRP and ferritin slightly decreasing. No further plans for convalescent plasma treatments.   -Anakinra d/w ID but holding off 2/2 having already received immunosuppression Eculizumab (Solaris) for Myasthania Gravis  -Not a candidate for Remdisivir as has shown improvement most in earlier stages of disease.  - Persistent cough - repeat CXR 5/8 w/ improving consolidation, no new consolidation. no new fevers, downtrending CRP. less likely bacterial superinfection. will continue with current mgmt, reordered cough suppression standing x 3 days.

## 2020-05-12 NOTE — PROGRESS NOTE ADULT - PROBLEM SELECTOR PLAN 7
- c/w pantoprazole 40 mg qAM    # RUQ pain, worsened w/ meals  - long history of RUQ pain, previously CTAP 1/2020 without identifiable cause, AXR 4/2020 w/o identifiable cause  - cholecystectomy clips visualized  - outpatient f/u - c/w pantoprazole 40 mg qAM    # RUQ pain, worsened w/ meals  - long history of RUQ pain, previously CTAP 1/2020 without identifiable cause, AXR 4/2020 w/o identifiable cause  - cholecystectomy clips visualized  - outpatient f/u  - bowel regimen

## 2020-05-12 NOTE — PROGRESS NOTE ADULT - ATTENDING COMMENTS
Whitney Peng DO    CPT 60353  J96.01 Acute Hypoxic Respiratory Failure   u07.1 2019 Novel Coronavirus  J12.89 viral pneumonia  Myasthenia Gravis  Asthma  HTN

## 2020-05-13 PROCEDURE — 99232 SBSQ HOSP IP/OBS MODERATE 35: CPT | Mod: CS

## 2020-05-13 RX ADMIN — Medication 10 MILLIGRAM(S): at 06:55

## 2020-05-13 RX ADMIN — PYRIDOSTIGMINE BROMIDE 180 MILLIGRAM(S): 60 SOLUTION ORAL at 06:55

## 2020-05-13 RX ADMIN — LIDOCAINE 2 PATCH: 4 CREAM TOPICAL at 20:00

## 2020-05-13 RX ADMIN — ENOXAPARIN SODIUM 40 MILLIGRAM(S): 100 INJECTION SUBCUTANEOUS at 13:02

## 2020-05-13 RX ADMIN — PYRIDOSTIGMINE BROMIDE 60 MILLIGRAM(S): 60 SOLUTION ORAL at 06:55

## 2020-05-13 RX ADMIN — PYRIDOSTIGMINE BROMIDE 60 MILLIGRAM(S): 60 SOLUTION ORAL at 13:04

## 2020-05-13 RX ADMIN — Medication 2000 UNIT(S): at 13:02

## 2020-05-13 RX ADMIN — PANTOPRAZOLE SODIUM 40 MILLIGRAM(S): 20 TABLET, DELAYED RELEASE ORAL at 06:55

## 2020-05-13 RX ADMIN — Medication 50 MICROGRAM(S): at 06:55

## 2020-05-13 RX ADMIN — LIDOCAINE 2 PATCH: 4 CREAM TOPICAL at 13:03

## 2020-05-13 RX ADMIN — ZINC OXIDE 1 APPLICATION(S): 200 OINTMENT TOPICAL at 13:04

## 2020-05-13 NOTE — PROGRESS NOTE ADULT - PROBLEM SELECTOR PLAN 5
Continue Mestinon  q7AM, Mestinon 60 TID  - s/p solumedrol 40mg x 1  CLOSE MONITORING OF RR, respiratory effort, neck flexion, strength.   - c/w Solaris treatment, last dose 4/1, 4/15, 4/29, set up by Dr. Fitzgerald; dose 5/12  - c/w prednisone 10 mg daily (home dose)

## 2020-05-13 NOTE — PROGRESS NOTE ADULT - PROBLEM SELECTOR PLAN 7
- c/w pantoprazole 40 mg qAM    # RUQ pain, worsened w/ meals  - long history of RUQ pain, previously CTAP 1/2020 without identifiable cause, AXR 4/2020 w/o identifiable cause  - cholecystectomy clips visualized  - outpatient f/u  - bowel regimen - c/w pantoprazole 40 mg qAM    # RUQ pain, worsened w/ meals  - long history of RUQ pain, previously CTAP 1/2020 without identifiable cause, AXR 4/2020 w/o identifiable cause  - cholecystectomy clips visualized  - outpatient f/u  - pt constipated - c/w bowel regimen, can give lactulose   - bowel regimen

## 2020-05-13 NOTE — CHART NOTE - NSCHARTNOTEFT_GEN_A_CORE
Nutrition Follow-up Note  Patient seen for: nutrition follow up    Chart reviewed, events noted.  80 y/o RH-F w/ h/o Myasthenia Gravis...HTN and recent acute hypoxemic respiratory failure with sepsis in setting of COVID-19 infection s/p solumedrol 4/3-4/7 + Plaquenil 3/31-4/4 who presented for plasma COVID therapy, S/P Convalescence Plasma 4/24, here for continued hypoxia, improving slowly     Source of Information:  Face-to-face RD interview deferred at this time secondary to restricted isolation precautions with pt medical condition.    Pt does not answer phone following multiple attempts. Information obtained from RN, electronic medical record.    Current Diet: Soft + Low Sodium (since 5-11)  Pt previously ordered for Dysphagia 1 Pureed + Honey Consistency fluids. No SLP re-evaluation noted in chart since diet upgraded.     GI: c/o constipation per chart, last BM 5/13 per chart. c/o RUQ pain- team aware.    PO intake: fair/good, RN reports pt eating well and tolerating current diet texture with no issues.    Weights: no new wt to assess  66.1 kG (dosing wt 4/22)    Previous Nutrition Diagnosis: Inadequate Protein-Energy Intake    New Nutrition Diagnosis: n/a    Nutrition Care Plan:  [ ] In progress   [x] Achieved    Nutrition Recommendations:  1. Continue low sodium diet as indicated, texture per team  2. Continue to provide Magic Cup 2 times a day (580 kcals/18 grams protein daily) to promote adequate po intake    Monitoring and Evaluation:   Continue to monitor Nutritional intake, Tolerance to diet prescription, weights, labs, skin integrity    RD remains available upon request and will follow up per protocol. Muriel Santos RD, [phone]

## 2020-05-13 NOTE — PROGRESS NOTE ADULT - PROBLEM SELECTOR PLAN 1
-S/P Solumedrol 04/03/2020-04/07/2020  -S/P Plaquenil 03/31-04/04  -Transferred from [facility] for Convalescent Plasma Transfusion->S/p plasma transfusion 4/24-CRP and ferritin slightly decreasing. No further plans for convalescent plasma treatments.   -Anakinra d/w ID but holding off 2/2 having already received immunosuppression Eculizumab (Solaris) for Myasthania Gravis  -Not a candidate for Remdisivir as has shown improvement most in earlier stages of disease.  - Persistent cough - repeat CXR 5/8 w/ improving consolidation, no new consolidation. no new fevers, downtrending CRP. less likely bacterial superinfection. will continue with current mgmt, reordered cough suppression standing x 3 days.

## 2020-05-13 NOTE — CHART NOTE - NSCHARTNOTESELECT_GEN_ALL_CORE
Nutrition Services
Event Note
Event Note/Medicine
Event Note/research
NIF/Event Note
Nutrition Services
palliative care/Event Note

## 2020-05-13 NOTE — PROGRESS NOTE ADULT - PROBLEM SELECTOR PLAN 2
-PT is DNR/DNI and has severe COVID-19 associated hypoxic respiratory failure.   -PT also at very high risk of rapid decompensation secondary to superimposed neuromuscular respiratory failure due to myasthenia gravis.  Will discuss further with outpatient neuromuscular specialist, Dr. Fitzgerald, but on initial contact likely her current presentation is mostly due to COVID-19 rather than MG.  - Supplemental O2 as needed  - comfortable on 5L NC, continue to wean; desaturation below 90 with ambulation, will need saturations >90 on 4L NC or less prior to dc home

## 2020-05-13 NOTE — PROGRESS NOTE ADULT - SUBJECTIVE AND OBJECTIVE BOX
Oracio Flores  PGY1 Internal Medicine  [phone] / [phone]    Patient is a 79y old  Female who presents with a chief complaint of Shortness of breath, fevers (12 May 2020 08:44)      SUBJECTIVE / OVERNIGHT EVENTS: On 5L NC. No events ON. Reports some difficulty sleeping. Reporting abdominal pain similar as per previous days. No f/c. SOB improved. Reporting lower back tightness. Reports one small, hard BM this morning, still feeling constipated.      REVIEW OF SYSTEMS:  negative unless noted above    MEDICATIONS  (STANDING):  atorvastatin 20 milliGRAM(s) Oral at bedtime  bisacodyl 5 milliGRAM(s) Oral at bedtime  cholecalciferol 2000 Unit(s) Oral daily  enoxaparin Injectable 40 milliGRAM(s) SubCutaneous daily  levothyroxine 50 MICROGram(s) Oral daily  lidocaine   Patch 2 Patch Transdermal daily  melatonin 5 milliGRAM(s) Oral at bedtime  pantoprazole    Tablet 40 milliGRAM(s) Oral before breakfast  polyethylene glycol 3350 17 Gram(s) Oral daily  predniSONE   Tablet 10 milliGRAM(s) Oral daily  pyridostigmine 60 milliGRAM(s) Oral three times a day  pyridostigmine  milliGRAM(s) Oral <User Schedule>  senna 2 Tablet(s) Oral at bedtime  zinc oxide 40% Ointment 1 Application(s) Topical daily    MEDICATIONS  (PRN):  acetaminophen   Tablet .. 650 milliGRAM(s) Oral every 4 hours PRN Temp greater or equal to 38C (100.4F), Moderate Pain (4 - 6)  ALBUTerol    90 MICROgram(s) HFA Inhaler 2 Puff(s) Inhalation every 4 hours PRN Shortness of Breath and/or Wheezing  aluminum hydroxide/magnesium hydroxide/simethicone Suspension 30 milliLiter(s) Oral every 6 hours PRN Dyspepsia  lactulose Syrup 10 Gram(s) Oral every 6 hours PRN constipation  sodium chloride 0.65% Nasal 1 Spray(s) Both Nostrils two times a day PRN Nasal Congestion      CAPILLARY BLOOD GLUCOSE        I&O's Summary    12 May 2020 07:01  -  13 May 2020 07:00  --------------------------------------------------------  IN: 730 mL / OUT: 0 mL / NET: 730 mL        PHYSICAL EXAM:  Vital Signs Last 24 Hrs  T(C): 36.4 (13 May 2020 05:41), Max: 37.8 (12 May 2020 13:23)  T(F): 97.5 (13 May 2020 05:41), Max: 100 (12 May 2020 13:23)  HR: 60 (13 May 2020 05:41) (53 - 60)  BP: 112/68 (13 May 2020 05:41) (97/58 - 114/63)  BP(mean): --  RR: 18 (13 May 2020 05:41) (18 - 18)  SpO2: 99% (13 May 2020 05:41) (96% - 100%)    PHYSICAL EXAM:  GENERAL: NAD on 5L  CHEST/LUNG: Clear to auscultation bilaterally; No wheeze  HEART: Regular rate and rhythm; No murmurs, rubs, or gallops  ABDOMEN: soft, mild TTP diffusely, nd, neg morrell sign  EXTREMITIES:  no calf tenderness, no swelling  BACK: TTP along thoracic paraspinals  PSYCH: AAOx3  NEUROLOGY: non-focal, neck flexion intact, strength/sensation grossly intact    LABS:    05-11    135  |  101  |  12  ----------------------------<  164<H>  4.3   |  16<L>  |  0.63    Ca    8.6      11 May 2020 10:10  Phos  3.2     05-11  Mg     1.8     05-11                  RADIOLOGY & ADDITIONAL TESTS:  Results Reviewed:   Imaging Personally Reviewed:  Electrocardiogram Personally Reviewed:    COORDINATION OF CARE:  Care Discussed with Consultants/Other Providers [Y/N]:  Prior or Outpatient Records Reviewed [Y/N]: Oracio Flores  PGY1 Internal Medicine  [phone] / [phone]    Patient is a 79y old  Female who presents with a chief complaint of Shortness of breath, fevers (12 May 2020 08:44)      SUBJECTIVE / OVERNIGHT EVENTS: On 5L NC. No events ON. Reports some difficulty sleeping. Reporting abdominal pain similar as per previous days. No f/c. SOB improved. Reporting lower back tightness. Reports one small, hard BM this morning, still feeling constipated. Reporting cough.       REVIEW OF SYSTEMS:  negative unless noted above    MEDICATIONS  (STANDING):  atorvastatin 20 milliGRAM(s) Oral at bedtime  bisacodyl 5 milliGRAM(s) Oral at bedtime  cholecalciferol 2000 Unit(s) Oral daily  enoxaparin Injectable 40 milliGRAM(s) SubCutaneous daily  levothyroxine 50 MICROGram(s) Oral daily  lidocaine   Patch 2 Patch Transdermal daily  melatonin 5 milliGRAM(s) Oral at bedtime  pantoprazole    Tablet 40 milliGRAM(s) Oral before breakfast  polyethylene glycol 3350 17 Gram(s) Oral daily  predniSONE   Tablet 10 milliGRAM(s) Oral daily  pyridostigmine 60 milliGRAM(s) Oral three times a day  pyridostigmine  milliGRAM(s) Oral <User Schedule>  senna 2 Tablet(s) Oral at bedtime  zinc oxide 40% Ointment 1 Application(s) Topical daily    MEDICATIONS  (PRN):  acetaminophen   Tablet .. 650 milliGRAM(s) Oral every 4 hours PRN Temp greater or equal to 38C (100.4F), Moderate Pain (4 - 6)  ALBUTerol    90 MICROgram(s) HFA Inhaler 2 Puff(s) Inhalation every 4 hours PRN Shortness of Breath and/or Wheezing  aluminum hydroxide/magnesium hydroxide/simethicone Suspension 30 milliLiter(s) Oral every 6 hours PRN Dyspepsia  lactulose Syrup 10 Gram(s) Oral every 6 hours PRN constipation  sodium chloride 0.65% Nasal 1 Spray(s) Both Nostrils two times a day PRN Nasal Congestion      CAPILLARY BLOOD GLUCOSE        I&O's Summary    12 May 2020 07:01  -  13 May 2020 07:00  --------------------------------------------------------  IN: 730 mL / OUT: 0 mL / NET: 730 mL        PHYSICAL EXAM:  Vital Signs Last 24 Hrs  T(C): 36.4 (13 May 2020 05:41), Max: 37.8 (12 May 2020 13:23)  T(F): 97.5 (13 May 2020 05:41), Max: 100 (12 May 2020 13:23)  HR: 60 (13 May 2020 05:41) (53 - 60)  BP: 112/68 (13 May 2020 05:41) (97/58 - 114/63)  BP(mean): --  RR: 18 (13 May 2020 05:41) (18 - 18)  SpO2: 99% (13 May 2020 05:41) (96% - 100%)    PHYSICAL EXAM:  GENERAL: NAD on 5L  CHEST/LUNG: Clear to auscultation bilaterally; No wheeze  HEART: Regular rate and rhythm; No murmurs, rubs, or gallops  ABDOMEN: soft, mild TTP diffusely, nd, neg morrell sign  EXTREMITIES:  no calf tenderness, no swelling  BACK: TTP along thoracic paraspinals  PSYCH: AAOx3  NEUROLOGY: non-focal, neck flexion intact, strength/sensation grossly intact    LABS:    05-11    135  |  101  |  12  ----------------------------<  164<H>  4.3   |  16<L>  |  0.63    Ca    8.6      11 May 2020 10:10  Phos  3.2     05-11  Mg     1.8     05-11                  RADIOLOGY & ADDITIONAL TESTS:  Results Reviewed:   Imaging Personally Reviewed:  Electrocardiogram Personally Reviewed:    COORDINATION OF CARE:  Care Discussed with Consultants/Other Providers [Y/N]:  Prior or Outpatient Records Reviewed [Y/N]:

## 2020-05-14 PROCEDURE — 99232 SBSQ HOSP IP/OBS MODERATE 35: CPT | Mod: CS

## 2020-05-14 RX ADMIN — Medication 5 MILLIGRAM(S): at 00:08

## 2020-05-14 RX ADMIN — SENNA PLUS 2 TABLET(S): 8.6 TABLET ORAL at 00:09

## 2020-05-14 RX ADMIN — Medication 100 MILLIGRAM(S): at 12:10

## 2020-05-14 RX ADMIN — ATORVASTATIN CALCIUM 20 MILLIGRAM(S): 80 TABLET, FILM COATED ORAL at 00:08

## 2020-05-14 RX ADMIN — Medication 100 MILLIGRAM(S): at 01:54

## 2020-05-14 RX ADMIN — LIDOCAINE 1 PATCH: 4 CREAM TOPICAL at 12:14

## 2020-05-14 RX ADMIN — LIDOCAINE 2 PATCH: 4 CREAM TOPICAL at 16:43

## 2020-05-14 RX ADMIN — Medication 2000 UNIT(S): at 12:16

## 2020-05-14 RX ADMIN — ZINC OXIDE 1 APPLICATION(S): 200 OINTMENT TOPICAL at 12:17

## 2020-05-14 RX ADMIN — PYRIDOSTIGMINE BROMIDE 60 MILLIGRAM(S): 60 SOLUTION ORAL at 12:17

## 2020-05-14 RX ADMIN — PYRIDOSTIGMINE BROMIDE 60 MILLIGRAM(S): 60 SOLUTION ORAL at 21:11

## 2020-05-14 RX ADMIN — PYRIDOSTIGMINE BROMIDE 60 MILLIGRAM(S): 60 SOLUTION ORAL at 05:20

## 2020-05-14 RX ADMIN — PANTOPRAZOLE SODIUM 40 MILLIGRAM(S): 20 TABLET, DELAYED RELEASE ORAL at 05:20

## 2020-05-14 RX ADMIN — SENNA PLUS 2 TABLET(S): 8.6 TABLET ORAL at 21:11

## 2020-05-14 RX ADMIN — Medication 5 MILLIGRAM(S): at 21:11

## 2020-05-14 RX ADMIN — ATORVASTATIN CALCIUM 20 MILLIGRAM(S): 80 TABLET, FILM COATED ORAL at 21:10

## 2020-05-14 RX ADMIN — POLYETHYLENE GLYCOL 3350 17 GRAM(S): 17 POWDER, FOR SOLUTION ORAL at 12:16

## 2020-05-14 RX ADMIN — Medication 5 MILLIGRAM(S): at 21:10

## 2020-05-14 RX ADMIN — Medication 50 MICROGRAM(S): at 05:19

## 2020-05-14 RX ADMIN — LIDOCAINE 2 PATCH: 4 CREAM TOPICAL at 01:51

## 2020-05-14 RX ADMIN — ENOXAPARIN SODIUM 40 MILLIGRAM(S): 100 INJECTION SUBCUTANEOUS at 12:16

## 2020-05-14 RX ADMIN — PYRIDOSTIGMINE BROMIDE 60 MILLIGRAM(S): 60 SOLUTION ORAL at 00:09

## 2020-05-14 RX ADMIN — Medication 10 MILLIGRAM(S): at 05:20

## 2020-05-14 RX ADMIN — PYRIDOSTIGMINE BROMIDE 180 MILLIGRAM(S): 60 SOLUTION ORAL at 06:56

## 2020-05-14 NOTE — PROGRESS NOTE ADULT - PROBLEM SELECTOR PLAN 7
- c/w pantoprazole 40 mg qAM    # RUQ pain, worsened w/ meals  - long history of RUQ pain, previously CTAP 1/2020 without identifiable cause, AXR 4/2020 w/o identifiable cause  - cholecystectomy clips visualized  - outpatient f/u  - pt constipated - c/w bowel regimen, can give lactulose   - bowel regimen

## 2020-05-14 NOTE — PROGRESS NOTE ADULT - ATTENDING COMMENTS
Normoxemia at rest but with pronounced hypoxemia with exertion - unable to verify oximeter tracing. If persists would pursue CTA as this may change anticoagulant therapy.    Supportive care for other symptoms.

## 2020-05-14 NOTE — PROGRESS NOTE ADULT - PROBLEM SELECTOR PLAN 2
-PT is DNR/DNI and has severe COVID-19 associated hypoxic respiratory failure.   -PT also at very high risk of rapid decompensation secondary to superimposed neuromuscular respiratory failure due to myasthenia gravis.  Will discuss further with outpatient neuromuscular specialist, Dr. Fitzgerald, but on initial contact likely her current presentation is mostly due to COVID-19 rather than MG.  - Supplemental O2 as needed  - comfortable on 5L NC, continue to wean; desaturation below 90 with ambulation, will need saturations >90 on 4L NC or less prior to dc home -PT is DNR/DNI and has severe COVID-19 associated hypoxic respiratory failure.   -PT also at very high risk of rapid decompensation secondary to superimposed neuromuscular respiratory failure due to myasthenia gravis.  Will discuss further with outpatient neuromuscular specialist, Dr. Fitzgerald, but on initial contact likely her current presentation is mostly due to COVID-19 rather than MG.  - Supplemental O2 as needed  - comfortable on 5L NC, continue to wean; desaturation below 90 with ambulation, will need saturations >90 on 4L NC or less prior to dc home  - f/u labs, consider CTA PE if desaturation with ambulation persists.

## 2020-05-14 NOTE — PROGRESS NOTE ADULT - PROBLEM SELECTOR PLAN 1
-S/P Solumedrol 04/03/2020-04/07/2020  -S/P Plaquenil 03/31-04/04  -Transferred from [facility] for Convalescent Plasma Transfusion->S/p plasma transfusion 4/24-CRP and ferritin slightly decreasing. No further plans for convalescent plasma treatments.   -Anakinra d/w ID but holding off 2/2 having already received immunosuppression Eculizumab (Solaris) for Myasthania Gravis  -Not a candidate for Remdisivir as has shown improvement most in earlier stages of disease.  - Persistent cough - repeat CXR 5/8 w/ improving consolidation, no new consolidation. no new fevers, downtrending CRP. less likely bacterial superinfection. will continue with current mgmt, reordered cough suppression standing x 3 days.

## 2020-05-14 NOTE — PROGRESS NOTE ADULT - PROBLEM SELECTOR PLAN 9
DVT: lovenox 40 subq daily  Diet: dysphagia 1 honey consistency   Dispo: home PT and home O2 TBD    Transitions of Care Status:  1.  Name of PCP: Carrillo Hannon  2.  PCP Contacted on Admission: [ ] Y    [ ] N    3.  PCP contacted at Discharge: [ ] Y    [ ] N    [ ] N/A  4.  Post-Discharge Appointment Date and Location:  5.  Summary of Handoff given to PCP: DVT: lovenox 40 subq daily  Diet: dysphagia 1 honey consistency   Dispo: home PT and home O2 TBD; consider inpatient rehab     Transitions of Care Status:  1.  Name of PCP: Carrillo Hannon  2.  PCP Contacted on Admission: [ ] Y    [ ] N    3.  PCP contacted at Discharge: [ ] Y    [ ] N    [ ] N/A  4.  Post-Discharge Appointment Date and Location:  5.  Summary of Handoff given to PCP:

## 2020-05-14 NOTE — PROGRESS NOTE ADULT - SUBJECTIVE AND OBJECTIVE BOX
Oraciomanuel NortonFlores  PGY1 Internal Medicine  [phone] / [phone]    Patient is a 79y old  Female who presents with a chief complaint of Shortness of breath, fevers (13 May 2020 08:56)      SUBJECTIVE / OVERNIGHT EVENTS: ON patient reports trouble sleeping, mostly 2/2 LE pain she describes as muscle spasms and leg weakness. Tolerated PT well and desat to 80% with ambulation. Some abd discomfort still. Had several soft formed BMs over past 24 hours. SOB improved at rest, comfortable on 4L NC. Some back tightness. No f/c/n/v.       REVIEW OF SYSTEMS:  negative unless noted above    MEDICATIONS  (STANDING):  atorvastatin 20 milliGRAM(s) Oral at bedtime  bisacodyl 5 milliGRAM(s) Oral at bedtime  cholecalciferol 2000 Unit(s) Oral daily  enoxaparin Injectable 40 milliGRAM(s) SubCutaneous daily  levothyroxine 50 MICROGram(s) Oral daily  lidocaine   Patch 2 Patch Transdermal daily  melatonin 5 milliGRAM(s) Oral at bedtime  pantoprazole    Tablet 40 milliGRAM(s) Oral before breakfast  polyethylene glycol 3350 17 Gram(s) Oral daily  predniSONE   Tablet 10 milliGRAM(s) Oral daily  pyridostigmine 60 milliGRAM(s) Oral three times a day  pyridostigmine  milliGRAM(s) Oral <User Schedule>  senna 2 Tablet(s) Oral at bedtime  zinc oxide 40% Ointment 1 Application(s) Topical daily    MEDICATIONS  (PRN):  acetaminophen   Tablet .. 650 milliGRAM(s) Oral every 4 hours PRN Temp greater or equal to 38C (100.4F), Moderate Pain (4 - 6)  ALBUTerol    90 MICROgram(s) HFA Inhaler 2 Puff(s) Inhalation every 4 hours PRN Shortness of Breath and/or Wheezing  aluminum hydroxide/magnesium hydroxide/simethicone Suspension 30 milliLiter(s) Oral every 6 hours PRN Dyspepsia  benzonatate 100 milliGRAM(s) Oral three times a day PRN Cough  guaiFENesin   Syrup  (Sugar-Free) 100 milliGRAM(s) Oral every 6 hours PRN Cough  sodium chloride 0.65% Nasal 1 Spray(s) Both Nostrils two times a day PRN Nasal Congestion      CAPILLARY BLOOD GLUCOSE        I&O's Summary    13 May 2020 07:01  -  14 May 2020 07:00  --------------------------------------------------------  IN: 240 mL / OUT: 0 mL / NET: 240 mL        PHYSICAL EXAM:  Vital Signs Last 24 Hrs  T(C): 36.5 (14 May 2020 04:56), Max: 36.7 (13 May 2020 22:15)  T(F): 97.7 (14 May 2020 04:56), Max: 98.1 (13 May 2020 22:15)  HR: 48 (14 May 2020 04:56) (48 - 66)  BP: 119/63 (14 May 2020 04:56) (90/53 - 119/63)  BP(mean): --  RR: 18 (14 May 2020 04:56) (18 - 18)  SpO2: 100% (14 May 2020 04:56) (99% - 100%)    PHYSICAL EXAM:  GENERAL: NAD on 4L  CHEST/LUNG: Clear to auscultation bilaterally; No wheeze  HEART: Regular rate and rhythm; No murmurs, rubs, or gallops  ABDOMEN: soft, mild TTP diffusely, nd, neg morrell sign  EXTREMITIES:  no calf tenderness, no swelling  BACK: TTP along thoracic paraspinals  PSYCH: AAOx3  NEUROLOGY: non-focal, neck flexion intact, strength/sensation grossly intact    LABS:                      RADIOLOGY & ADDITIONAL TESTS:  Results Reviewed:   Imaging Personally Reviewed:  Electrocardiogram Personally Reviewed:    COORDINATION OF CARE:  Care Discussed with Consultants/Other Providers [Y/N]:  Prior or Outpatient Records Reviewed [Y/N]:

## 2020-05-15 LAB
ALBUMIN SERPL ELPH-MCNC: 2.7 G/DL — LOW (ref 3.3–5)
ALP SERPL-CCNC: 55 U/L — SIGNIFICANT CHANGE UP (ref 40–120)
ALT FLD-CCNC: 12 U/L — SIGNIFICANT CHANGE UP (ref 10–45)
ANION GAP SERPL CALC-SCNC: 8 MMOL/L — SIGNIFICANT CHANGE UP (ref 5–17)
AST SERPL-CCNC: 13 U/L — SIGNIFICANT CHANGE UP (ref 10–40)
BILIRUB SERPL-MCNC: 0.2 MG/DL — SIGNIFICANT CHANGE UP (ref 0.2–1.2)
BUN SERPL-MCNC: 13 MG/DL — SIGNIFICANT CHANGE UP (ref 7–23)
CALCIUM SERPL-MCNC: 8.9 MG/DL — SIGNIFICANT CHANGE UP (ref 8.4–10.5)
CHLORIDE SERPL-SCNC: 105 MMOL/L — SIGNIFICANT CHANGE UP (ref 96–108)
CO2 SERPL-SCNC: 27 MMOL/L — SIGNIFICANT CHANGE UP (ref 22–31)
CREAT SERPL-MCNC: 0.88 MG/DL — SIGNIFICANT CHANGE UP (ref 0.5–1.3)
D DIMER BLD IA.RAPID-MCNC: 551 NG/ML DDU — HIGH
GLUCOSE SERPL-MCNC: 88 MG/DL — SIGNIFICANT CHANGE UP (ref 70–99)
HCT VFR BLD CALC: 31.6 % — LOW (ref 34.5–45)
HGB BLD-MCNC: 9.6 G/DL — LOW (ref 11.5–15.5)
MAGNESIUM SERPL-MCNC: 1.7 MG/DL — SIGNIFICANT CHANGE UP (ref 1.6–2.6)
MCHC RBC-ENTMCNC: 26.7 PG — LOW (ref 27–34)
MCHC RBC-ENTMCNC: 30.4 GM/DL — LOW (ref 32–36)
MCV RBC AUTO: 88 FL — SIGNIFICANT CHANGE UP (ref 80–100)
NRBC # BLD: 0 /100 WBCS — SIGNIFICANT CHANGE UP (ref 0–0)
PHOSPHATE SERPL-MCNC: 3.2 MG/DL — SIGNIFICANT CHANGE UP (ref 2.5–4.5)
PLATELET # BLD AUTO: 127 K/UL — LOW (ref 150–400)
POTASSIUM SERPL-MCNC: 3.8 MMOL/L — SIGNIFICANT CHANGE UP (ref 3.5–5.3)
POTASSIUM SERPL-SCNC: 3.8 MMOL/L — SIGNIFICANT CHANGE UP (ref 3.5–5.3)
PROT SERPL-MCNC: 5.7 G/DL — LOW (ref 6–8.3)
RBC # BLD: 3.59 M/UL — LOW (ref 3.8–5.2)
RBC # FLD: 20.2 % — HIGH (ref 10.3–14.5)
SODIUM SERPL-SCNC: 140 MMOL/L — SIGNIFICANT CHANGE UP (ref 135–145)
WBC # BLD: 4.43 K/UL — SIGNIFICANT CHANGE UP (ref 3.8–10.5)
WBC # FLD AUTO: 4.43 K/UL — SIGNIFICANT CHANGE UP (ref 3.8–10.5)

## 2020-05-15 PROCEDURE — 99232 SBSQ HOSP IP/OBS MODERATE 35: CPT | Mod: CS

## 2020-05-15 PROCEDURE — 71275 CT ANGIOGRAPHY CHEST: CPT | Mod: 26,CS

## 2020-05-15 RX ORDER — DIPHENHYDRAMINE HCL 50 MG
50 CAPSULE ORAL ONCE
Refills: 0 | Status: COMPLETED | OUTPATIENT
Start: 2020-05-15 | End: 2021-04-13

## 2020-05-15 RX ORDER — DIPHENHYDRAMINE HCL 50 MG
50 CAPSULE ORAL ONCE
Refills: 0 | Status: COMPLETED | OUTPATIENT
Start: 2020-05-15 | End: 2020-05-15

## 2020-05-15 RX ADMIN — PYRIDOSTIGMINE BROMIDE 60 MILLIGRAM(S): 60 SOLUTION ORAL at 20:43

## 2020-05-15 RX ADMIN — Medication 2000 UNIT(S): at 14:13

## 2020-05-15 RX ADMIN — Medication 40 MILLIGRAM(S): at 15:18

## 2020-05-15 RX ADMIN — Medication 10 MILLIGRAM(S): at 06:32

## 2020-05-15 RX ADMIN — ATORVASTATIN CALCIUM 20 MILLIGRAM(S): 80 TABLET, FILM COATED ORAL at 20:42

## 2020-05-15 RX ADMIN — PANTOPRAZOLE SODIUM 40 MILLIGRAM(S): 20 TABLET, DELAYED RELEASE ORAL at 06:32

## 2020-05-15 RX ADMIN — Medication 50 MICROGRAM(S): at 06:32

## 2020-05-15 RX ADMIN — Medication 50 MILLIGRAM(S): at 18:25

## 2020-05-15 RX ADMIN — PYRIDOSTIGMINE BROMIDE 180 MILLIGRAM(S): 60 SOLUTION ORAL at 06:48

## 2020-05-15 RX ADMIN — ZINC OXIDE 1 APPLICATION(S): 200 OINTMENT TOPICAL at 14:12

## 2020-05-15 RX ADMIN — PYRIDOSTIGMINE BROMIDE 60 MILLIGRAM(S): 60 SOLUTION ORAL at 14:12

## 2020-05-15 RX ADMIN — Medication 5 MILLIGRAM(S): at 20:43

## 2020-05-15 RX ADMIN — LIDOCAINE 2 PATCH: 4 CREAM TOPICAL at 19:35

## 2020-05-15 RX ADMIN — ENOXAPARIN SODIUM 40 MILLIGRAM(S): 100 INJECTION SUBCUTANEOUS at 12:03

## 2020-05-15 RX ADMIN — Medication 650 MILLIGRAM(S): at 16:31

## 2020-05-15 RX ADMIN — LIDOCAINE 2 PATCH: 4 CREAM TOPICAL at 14:13

## 2020-05-15 RX ADMIN — PYRIDOSTIGMINE BROMIDE 60 MILLIGRAM(S): 60 SOLUTION ORAL at 06:32

## 2020-05-15 RX ADMIN — SENNA PLUS 2 TABLET(S): 8.6 TABLET ORAL at 20:43

## 2020-05-15 RX ADMIN — LIDOCAINE 2 PATCH: 4 CREAM TOPICAL at 00:18

## 2020-05-15 RX ADMIN — Medication 5 MILLIGRAM(S): at 20:42

## 2020-05-15 NOTE — PROGRESS NOTE ADULT - PROBLEM SELECTOR PLAN 2
-PT is DNR/DNI and has severe COVID-19 associated hypoxic respiratory failure.   -PT also at very high risk of rapid decompensation secondary to superimposed neuromuscular respiratory failure due to myasthenia gravis.  Will discuss further with outpatient neuromuscular specialist, Dr. Fitzgerald, but on initial contact likely her current presentation is mostly due to COVID-19 rather than MG.  - Supplemental O2 as needed  - comfortable on 5L NC, continue to wean; desaturation below 90 with ambulation, will need saturations >90 on 4L NC or less prior to dc home  - f/u labs, consider CTA PE if desaturation with ambulation persists. -PT is DNR/DNI and has severe COVID-19 associated hypoxic respiratory failure.   -PT also at very high risk of rapid decompensation secondary to superimposed neuromuscular respiratory failure due to myasthenia gravis.  Will discuss further with outpatient neuromuscular specialist, Dr. Fitzgerald, but on initial contact likely her current presentation is mostly due to COVID-19 rather than MG.  - Supplemental O2 as needed  - comfortable on 5L NC, continue to wean; desaturation below 90 with ambulation, will need saturations >90 on 4L NC or less prior to dc home  - will order CTA today- pt with hx of reaction to IV contrast (Rash, itchiness) in past, has since tolerated IV contrast with premedication; will coordinate with CT with scheduling

## 2020-05-15 NOTE — PROGRESS NOTE ADULT - PROBLEM SELECTOR PLAN 9
DVT: lovenox 40 subq daily  Diet: dysphagia 1 honey consistency   Dispo: home PT and home O2 TBD; consider inpatient rehab     Transitions of Care Status:  1.  Name of PCP: Carrillo Hannon  2.  PCP Contacted on Admission: [ ] Y    [ ] N    3.  PCP contacted at Discharge: [ ] Y    [ ] N    [ ] N/A  4.  Post-Discharge Appointment Date and Location:  5.  Summary of Handoff given to PCP:

## 2020-05-15 NOTE — PROGRESS NOTE ADULT - ATTENDING COMMENTS
Normoxemia at rest with stable NC O2 but with continued pronounced hypoxemia with exertion. d-dimer, while improved, remains elevated. would pursue CTA chest to evaluate for pulmonary embolus.

## 2020-05-15 NOTE — PROGRESS NOTE ADULT - PROBLEM SELECTOR PLAN 1
-S/P Solumedrol 04/03/2020-04/07/2020  -S/P Plaquenil 03/31-04/04  -Transferred from [facility] for Convalescent Plasma Transfusion->S/p plasma transfusion 4/24-CRP and ferritin slightly decreasing. No further plans for convalescent plasma treatments.   -Anakinra d/w ID but holding off 2/2 having already received immunosuppression Eculizumab (Solaris) for Myasthania Gravis  -Not a candidate for Remdisivir as has shown improvement most in earlier stages of disease.  - Persistent cough - repeat CXR 5/8 w/ improving consolidation, no new consolidation. no new fevers, downtrending CRP. less likely bacterial superinfection. will continue with current mgmt, reordered cough suppression standing x 3 days. -S/P Solumedrol 04/03/2020-04/07/2020  -S/P Plaquenil 03/31-04/04  -Transferred from [facility] for Convalescent Plasma Transfusion->S/p plasma transfusion 4/24-CRP and ferritin slightly decreasing. No further plans for convalescent plasma treatments.   -Anakinra d/w ID but holding off 2/2 having already received immunosuppression Eculizumab (Solaris) for Myasthania Gravis  -Not a candidate for Remdisivir as has shown improvement most in earlier stages of disease.  - Persistent cough - repeat CXR 5/8 w/ improving consolidation, no new consolidation. no new fevers, downtrending CRP. less likely bacterial superinfection. will continue with current mgmt, reordered cough suppression

## 2020-05-16 LAB
ALBUMIN SERPL ELPH-MCNC: 2.9 G/DL — LOW (ref 3.3–5)
ALP SERPL-CCNC: 50 U/L — SIGNIFICANT CHANGE UP (ref 40–120)
ALT FLD-CCNC: 10 U/L — SIGNIFICANT CHANGE UP (ref 10–45)
ANION GAP SERPL CALC-SCNC: 11 MMOL/L — SIGNIFICANT CHANGE UP (ref 5–17)
AST SERPL-CCNC: 11 U/L — SIGNIFICANT CHANGE UP (ref 10–40)
BILIRUB SERPL-MCNC: 0.2 MG/DL — SIGNIFICANT CHANGE UP (ref 0.2–1.2)
BUN SERPL-MCNC: 13 MG/DL — SIGNIFICANT CHANGE UP (ref 7–23)
CALCIUM SERPL-MCNC: 9.3 MG/DL — SIGNIFICANT CHANGE UP (ref 8.4–10.5)
CHLORIDE SERPL-SCNC: 104 MMOL/L — SIGNIFICANT CHANGE UP (ref 96–108)
CO2 SERPL-SCNC: 26 MMOL/L — SIGNIFICANT CHANGE UP (ref 22–31)
CREAT SERPL-MCNC: 0.76 MG/DL — SIGNIFICANT CHANGE UP (ref 0.5–1.3)
GLUCOSE SERPL-MCNC: 103 MG/DL — HIGH (ref 70–99)
HCT VFR BLD CALC: 30.8 % — LOW (ref 34.5–45)
HGB BLD-MCNC: 9.6 G/DL — LOW (ref 11.5–15.5)
MAGNESIUM SERPL-MCNC: 1.8 MG/DL — SIGNIFICANT CHANGE UP (ref 1.6–2.6)
MCHC RBC-ENTMCNC: 27 PG — SIGNIFICANT CHANGE UP (ref 27–34)
MCHC RBC-ENTMCNC: 31.2 GM/DL — LOW (ref 32–36)
MCV RBC AUTO: 86.8 FL — SIGNIFICANT CHANGE UP (ref 80–100)
NRBC # BLD: 0 /100 WBCS — SIGNIFICANT CHANGE UP (ref 0–0)
PHOSPHATE SERPL-MCNC: 3.8 MG/DL — SIGNIFICANT CHANGE UP (ref 2.5–4.5)
PLATELET # BLD AUTO: 142 K/UL — LOW (ref 150–400)
POTASSIUM SERPL-MCNC: 4.2 MMOL/L — SIGNIFICANT CHANGE UP (ref 3.5–5.3)
POTASSIUM SERPL-SCNC: 4.2 MMOL/L — SIGNIFICANT CHANGE UP (ref 3.5–5.3)
PROT SERPL-MCNC: 5.9 G/DL — LOW (ref 6–8.3)
RBC # BLD: 3.55 M/UL — LOW (ref 3.8–5.2)
RBC # FLD: 20.1 % — HIGH (ref 10.3–14.5)
SODIUM SERPL-SCNC: 141 MMOL/L — SIGNIFICANT CHANGE UP (ref 135–145)
WBC # BLD: 6.47 K/UL — SIGNIFICANT CHANGE UP (ref 3.8–10.5)
WBC # FLD AUTO: 6.47 K/UL — SIGNIFICANT CHANGE UP (ref 3.8–10.5)

## 2020-05-16 PROCEDURE — 99232 SBSQ HOSP IP/OBS MODERATE 35: CPT | Mod: CS

## 2020-05-16 RX ADMIN — Medication 50 MICROGRAM(S): at 06:28

## 2020-05-16 RX ADMIN — PYRIDOSTIGMINE BROMIDE 60 MILLIGRAM(S): 60 SOLUTION ORAL at 06:28

## 2020-05-16 RX ADMIN — Medication 5 MILLIGRAM(S): at 21:49

## 2020-05-16 RX ADMIN — LIDOCAINE 2 PATCH: 4 CREAM TOPICAL at 02:04

## 2020-05-16 RX ADMIN — Medication 5 MILLIGRAM(S): at 21:48

## 2020-05-16 RX ADMIN — SENNA PLUS 2 TABLET(S): 8.6 TABLET ORAL at 21:49

## 2020-05-16 RX ADMIN — ENOXAPARIN SODIUM 40 MILLIGRAM(S): 100 INJECTION SUBCUTANEOUS at 13:01

## 2020-05-16 RX ADMIN — ATORVASTATIN CALCIUM 20 MILLIGRAM(S): 80 TABLET, FILM COATED ORAL at 21:48

## 2020-05-16 RX ADMIN — PANTOPRAZOLE SODIUM 40 MILLIGRAM(S): 20 TABLET, DELAYED RELEASE ORAL at 06:28

## 2020-05-16 RX ADMIN — Medication 2000 UNIT(S): at 13:01

## 2020-05-16 RX ADMIN — ZINC OXIDE 1 APPLICATION(S): 200 OINTMENT TOPICAL at 13:02

## 2020-05-16 RX ADMIN — POLYETHYLENE GLYCOL 3350 17 GRAM(S): 17 POWDER, FOR SOLUTION ORAL at 15:13

## 2020-05-16 RX ADMIN — Medication 10 MILLIGRAM(S): at 06:28

## 2020-05-16 RX ADMIN — LIDOCAINE 2 PATCH: 4 CREAM TOPICAL at 13:01

## 2020-05-16 RX ADMIN — PYRIDOSTIGMINE BROMIDE 180 MILLIGRAM(S): 60 SOLUTION ORAL at 06:29

## 2020-05-16 RX ADMIN — Medication 650 MILLIGRAM(S): at 14:59

## 2020-05-16 RX ADMIN — PYRIDOSTIGMINE BROMIDE 60 MILLIGRAM(S): 60 SOLUTION ORAL at 21:49

## 2020-05-16 RX ADMIN — PYRIDOSTIGMINE BROMIDE 60 MILLIGRAM(S): 60 SOLUTION ORAL at 13:00

## 2020-05-16 RX ADMIN — LIDOCAINE 2 PATCH: 4 CREAM TOPICAL at 17:38

## 2020-05-16 NOTE — PROGRESS NOTE ADULT - PROBLEM SELECTOR PLAN 1
-S/P Solumedrol 04/03/2020-04/07/2020  -S/P Plaquenil 03/31-04/04  -Transferred from [facility] for Convalescent Plasma Transfusion->S/p plasma transfusion 4/24-CRP and ferritin slightly decreasing. No further plans for convalescent plasma treatments.   -Anakinra d/w ID but holding off 2/2 having already received immunosuppression Eculizumab (Solaris) for Myasthania Gravis  -Not a candidate for Remdisivir as has shown improvement most in earlier stages of disease.  - Persistent cough - repeat CXR 5/8 w/ improving consolidation, no new consolidation. no new fevers, downtrending CRP. less likely bacterial superinfection. will continue with current mgmt, reordered cough suppression - S/P Solumedrol 04/03/2020-04/07/2020  - S/P Plaquenil 03/31-04/04  - Transferred from [facility] for Convalescent Plasma Transfusion->S/p plasma transfusion 4/24-CRP and ferritin slightly decreasing. No further plans for convalescent plasma treatments.   - Anakinra d/w ID but holding off 2/2 having already received immunosuppression Eculizumab (Solaris) for Myasthania Gravis  - Not a candidate for Remdisivir as has shown improvement most in earlier stages of disease.  - Persistent cough - repeat CXR 5/8 w/ improving consolidation, no new consolidation. no new fevers, downtrending CRP. less likely bacterial superinfection. will continue with current mgmt, reordered cough suppression  - wean off of oxygen as tolerated.

## 2020-05-16 NOTE — PROGRESS NOTE ADULT - PROBLEM SELECTOR PLAN 2
-PT is DNR/DNI and has severe COVID-19 associated hypoxic respiratory failure.   -PT also at very high risk of rapid decompensation secondary to superimposed neuromuscular respiratory failure due to myasthenia gravis.  Will discuss further with outpatient neuromuscular specialist, Dr. Fitzgerald, but on initial contact likely her current presentation is mostly due to COVID-19 rather than MG.  - Supplemental O2 as needed  - comfortable on 5L NC, continue to wean; desaturation below 90 with ambulation, will need saturations >90 on 4L NC or less prior to dc home  - will order CTA today- pt with hx of reaction to IV contrast (Rash, itchiness) in past, has since tolerated IV contrast with premedication; will coordinate with CT with scheduling -PT is DNR/DNI and has severe COVID-19 associated hypoxic respiratory failure.   -PT also at very high risk of rapid decompensation secondary to superimposed neuromuscular respiratory failure due to myasthenia gravis.  Will discuss further with outpatient neuromuscular specialist, Dr. Fitzgerald, but on initial contact likely her current presentation is mostly due to COVID-19 rather than MG.  - Supplemental O2 as needed  - comfortable on NC, continue to wean; desaturation below 90 with ambulation, will need saturations >90 on 4L NC or less prior to dc home  - CTA (5/15) negative for PE.

## 2020-05-16 NOTE — PROGRESS NOTE ADULT - ATTENDING COMMENTS
Whitney Peng DO    CPT Code 17031  J96.01 Acute Hypoxic Respiratory Failure  U07.1 2019 Novel Coronavirus  G70.01 Myasthenia Gravis

## 2020-05-16 NOTE — PROGRESS NOTE ADULT - PROBLEM SELECTOR PLAN 9
DVT: lovenox 40 subq daily  Diet: dysphagia 1 honey consistency   Dispo: home PT and home O2 TBD; consider inpatient rehab     Transitions of Care Status:  1.  Name of PCP: Carrillo Hannon  2.  PCP Contacted on Admission: [ ] Y    [ ] N    3.  PCP contacted at Discharge: [ ] Y    [ ] N    [ ] N/A  4.  Post-Discharge Appointment Date and Location:  5.  Summary of Handoff given to PCP: DVT: lovenox 40 subq daily  Diet: dysphagia 1 honey consistency   PT: home PT  Dispo: home PT and home O2 TBD; consider inpatient rehab Patient adamantly wants to go to rehab and refuse to go home as she "can't walk."     Transitions of Care Status:  1.  Name of PCP: Carrillo Hannon  2.  PCP Contacted on Admission: [ ] Y    [ ] N    3.  PCP contacted at Discharge: [ ] Y    [ ] N    [ ] N/A  4.  Post-Discharge Appointment Date and Location:  5.  Summary of Handoff given to PCP:

## 2020-05-16 NOTE — PROGRESS NOTE ADULT - SUBJECTIVE AND OBJECTIVE BOX
Leonel Nova Loren  PGY-2  Pager: [phone]    Patient is a 79y old  Female who presents with a chief complaint of Shortness of breath, fevers (15 May 2020 09:05)      SUBJECTIVE / OVERNIGHT EVENTS:    MEDICATIONS  (STANDING):  atorvastatin 20 milliGRAM(s) Oral at bedtime  bisacodyl 5 milliGRAM(s) Oral at bedtime  cholecalciferol 2000 Unit(s) Oral daily  enoxaparin Injectable 40 milliGRAM(s) SubCutaneous daily  levothyroxine 50 MICROGram(s) Oral daily  lidocaine   Patch 2 Patch Transdermal daily  melatonin 5 milliGRAM(s) Oral at bedtime  pantoprazole    Tablet 40 milliGRAM(s) Oral before breakfast  polyethylene glycol 3350 17 Gram(s) Oral daily  predniSONE   Tablet 10 milliGRAM(s) Oral daily  pyridostigmine 60 milliGRAM(s) Oral three times a day  pyridostigmine  milliGRAM(s) Oral <User Schedule>  senna 2 Tablet(s) Oral at bedtime  zinc oxide 40% Ointment 1 Application(s) Topical daily    MEDICATIONS  (PRN):  acetaminophen   Tablet .. 650 milliGRAM(s) Oral every 4 hours PRN Temp greater or equal to 38C (100.4F), Moderate Pain (4 - 6)  ALBUTerol    90 MICROgram(s) HFA Inhaler 2 Puff(s) Inhalation every 4 hours PRN Shortness of Breath and/or Wheezing  aluminum hydroxide/magnesium hydroxide/simethicone Suspension 30 milliLiter(s) Oral every 6 hours PRN Dyspepsia  benzonatate 100 milliGRAM(s) Oral three times a day PRN Cough  guaiFENesin   Syrup  (Sugar-Free) 100 milliGRAM(s) Oral every 6 hours PRN Cough  sodium chloride 0.65% Nasal 1 Spray(s) Both Nostrils two times a day PRN Nasal Congestion      T(C): 36.4 (05-16-20 @ 04:27), Max: 36.6 (05-15-20 @ 22:02)  HR: 49 (05-16-20 @ 04:27) (49 - 75)  BP: 134/62 (05-16-20 @ 04:27) (102/65 - 134/62)  RR: 18 (05-16-20 @ 04:27) (18 - 18)  SpO2: 100% (05-16-20 @ 04:27) (99% - 100%)  CAPILLARY BLOOD GLUCOSE        I&O's Summary    15 May 2020 07:01  -  16 May 2020 07:00  --------------------------------------------------------  IN: 0 mL / OUT: 1200 mL / NET: -1200 mL        PHYSICAL EXAM:  PHYSICAL EXAM:    Constitutional: NAD. well-developed; well-groomed; well-nourished;  HEENT: AT/NC, PERRLA; EOMI, MMM, no oropharyngeal lesions, no erythema, no exudates, Supple neck; normal thyroid gland, no cervical lymphadenopathy  Respiratory: CTAB. equal aeration bilaterally. no wheezing, no crackes, no rhonchi. no increase in WOB  Cardiovascular: RRR, no M/R/G. 2+ distal pulses. Cap refill < 2 seconds. no JVD  Gastrointestinal: soft; NT/ND, +BS, no rebounding tenderness / guarding / HSM / mass / ascites.  Genitourinary: not examined.  Extremities: no clubbing; no cyanosis; no pedal edema, non-tender to palpation, DP and Radial pulses intact.  Skin: warm and dry; color normal: no rash: no ulcers  Neurological: A&Ox 3; responds to pain; responds to verbal commands; epicritic and protopathic sensation intact: CN nerves grossly intact.   MSK/Back: no deformities. Active and passive ROM intact; strength intact, no CVA tenderness, No joint tenderness, swelling, erythema  Psychiatric: normal mood/affect. Denies SI/HI    LABS:                        9.6    6.47  )-----------( 142      ( 16 May 2020 06:55 )             30.8     05-16    141  |  104  |  13  ----------------------------<  103<H>  4.2   |  26  |  0.76    Ca    9.3      16 May 2020 06:55  Phos  3.8     05-16  Mg     1.8     05-16    TPro  5.9<L>  /  Alb  2.9<L>  /  TBili  0.2  /  DBili  x   /  AST  11  /  ALT  10  /  AlkPhos  50  05-16              RADIOLOGY & ADDITIONAL TESTS:    Imaging Personally Reviewed: NA    Consultant(s) Notes Reviewed:  LYNN    Care Discussed with Consultants/Other Providers: LYNN Leonel Nova Loren  PGY-2  Pager: [phone]    Patient is a 79y old  Female who presents with a chief complaint of Shortness of breath, fevers (15 May 2020 09:05)      SUBJECTIVE / OVERNIGHT EVENTS:  no acute events overnight.  Patient is subjectively doing well with sitting on bed, eating breakfast.  When discussing regarding discharge planning and possibly going home with home PT, patient gets anxious and claims that she cannot walk, despite participating in physical therapy and tolerating well.  Other than weakness she denies any acute concerns.      MEDICATIONS  (STANDING):  atorvastatin 20 milliGRAM(s) Oral at bedtime  bisacodyl 5 milliGRAM(s) Oral at bedtime  cholecalciferol 2000 Unit(s) Oral daily  enoxaparin Injectable 40 milliGRAM(s) SubCutaneous daily  levothyroxine 50 MICROGram(s) Oral daily  lidocaine   Patch 2 Patch Transdermal daily  melatonin 5 milliGRAM(s) Oral at bedtime  pantoprazole    Tablet 40 milliGRAM(s) Oral before breakfast  polyethylene glycol 3350 17 Gram(s) Oral daily  predniSONE   Tablet 10 milliGRAM(s) Oral daily  pyridostigmine 60 milliGRAM(s) Oral three times a day  pyridostigmine  milliGRAM(s) Oral <User Schedule>  senna 2 Tablet(s) Oral at bedtime  zinc oxide 40% Ointment 1 Application(s) Topical daily    MEDICATIONS  (PRN):  acetaminophen   Tablet .. 650 milliGRAM(s) Oral every 4 hours PRN Temp greater or equal to 38C (100.4F), Moderate Pain (4 - 6)  ALBUTerol    90 MICROgram(s) HFA Inhaler 2 Puff(s) Inhalation every 4 hours PRN Shortness of Breath and/or Wheezing  aluminum hydroxide/magnesium hydroxide/simethicone Suspension 30 milliLiter(s) Oral every 6 hours PRN Dyspepsia  benzonatate 100 milliGRAM(s) Oral three times a day PRN Cough  guaiFENesin   Syrup  (Sugar-Free) 100 milliGRAM(s) Oral every 6 hours PRN Cough  sodium chloride 0.65% Nasal 1 Spray(s) Both Nostrils two times a day PRN Nasal Congestion      T(C): 36.4 (05-16-20 @ 04:27), Max: 36.6 (05-15-20 @ 22:02)  HR: 49 (05-16-20 @ 04:27) (49 - 75)  BP: 134/62 (05-16-20 @ 04:27) (102/65 - 134/62)  RR: 18 (05-16-20 @ 04:27) (18 - 18)  SpO2: 100% (05-16-20 @ 04:27) (99% - 100%)  CAPILLARY BLOOD GLUCOSE        I&O's Summary    15 May 2020 07:01  -  16 May 2020 07:00  --------------------------------------------------------  IN: 0 mL / OUT: 1200 mL / NET: -1200 mL        PHYSICAL EXAM:  PHYSICAL EXAM:    Constitutional: NAD. elderly lady sitting on her bed on NC.  HEENT: AT/NC, EOMI, supple neck.  Respiratory: bilateral crackles. no increase in WOB  Cardiovascular: RRR  Gastrointestinal: soft; NT/ND, +BS  Extremities: no cyanosis; DP and Radial pulses intact.  Neurological: A&Ox 3; responds to verbal commands; CN nerves grossly intact. moving all four extremities against gravity.  Psychiatric: normal mood/affect.    LABS:                        9.6    6.47  )-----------( 142      ( 16 May 2020 06:55 )             30.8     05-16    141  |  104  |  13  ----------------------------<  103<H>  4.2   |  26  |  0.76    Ca    9.3      16 May 2020 06:55  Phos  3.8     05-16  Mg     1.8     05-16    TPro  5.9<L>  /  Alb  2.9<L>  /  TBili  0.2  /  DBili  x   /  AST  11  /  ALT  10  /  AlkPhos  50  05-16              RADIOLOGY & ADDITIONAL TESTS:    Imaging Personally Reviewed: LYNN    Consultant(s) Notes Reviewed:  LYNN    Care Discussed with Consultants/Other Providers: LYNN

## 2020-05-17 LAB
ALBUMIN SERPL ELPH-MCNC: 2.9 G/DL — LOW (ref 3.3–5)
ALP SERPL-CCNC: 47 U/L — SIGNIFICANT CHANGE UP (ref 40–120)
ALT FLD-CCNC: 10 U/L — SIGNIFICANT CHANGE UP (ref 10–45)
ANION GAP SERPL CALC-SCNC: 10 MMOL/L — SIGNIFICANT CHANGE UP (ref 5–17)
AST SERPL-CCNC: 12 U/L — SIGNIFICANT CHANGE UP (ref 10–40)
BILIRUB SERPL-MCNC: 0.2 MG/DL — SIGNIFICANT CHANGE UP (ref 0.2–1.2)
BUN SERPL-MCNC: 20 MG/DL — SIGNIFICANT CHANGE UP (ref 7–23)
CALCIUM SERPL-MCNC: 9.4 MG/DL — SIGNIFICANT CHANGE UP (ref 8.4–10.5)
CHLORIDE SERPL-SCNC: 104 MMOL/L — SIGNIFICANT CHANGE UP (ref 96–108)
CO2 SERPL-SCNC: 26 MMOL/L — SIGNIFICANT CHANGE UP (ref 22–31)
CREAT SERPL-MCNC: 0.86 MG/DL — SIGNIFICANT CHANGE UP (ref 0.5–1.3)
GLUCOSE SERPL-MCNC: 83 MG/DL — SIGNIFICANT CHANGE UP (ref 70–99)
HCT VFR BLD CALC: 33.1 % — LOW (ref 34.5–45)
HGB BLD-MCNC: 10 G/DL — LOW (ref 11.5–15.5)
MAGNESIUM SERPL-MCNC: 1.8 MG/DL — SIGNIFICANT CHANGE UP (ref 1.6–2.6)
MCHC RBC-ENTMCNC: 26.7 PG — LOW (ref 27–34)
MCHC RBC-ENTMCNC: 30.2 GM/DL — LOW (ref 32–36)
MCV RBC AUTO: 88.3 FL — SIGNIFICANT CHANGE UP (ref 80–100)
NRBC # BLD: 0 /100 WBCS — SIGNIFICANT CHANGE UP (ref 0–0)
PHOSPHATE SERPL-MCNC: 4.4 MG/DL — SIGNIFICANT CHANGE UP (ref 2.5–4.5)
PLATELET # BLD AUTO: 121 K/UL — LOW (ref 150–400)
POTASSIUM SERPL-MCNC: 4.2 MMOL/L — SIGNIFICANT CHANGE UP (ref 3.5–5.3)
POTASSIUM SERPL-SCNC: 4.2 MMOL/L — SIGNIFICANT CHANGE UP (ref 3.5–5.3)
PROT SERPL-MCNC: 5.7 G/DL — LOW (ref 6–8.3)
RBC # BLD: 3.75 M/UL — LOW (ref 3.8–5.2)
RBC # FLD: 20.2 % — HIGH (ref 10.3–14.5)
SODIUM SERPL-SCNC: 140 MMOL/L — SIGNIFICANT CHANGE UP (ref 135–145)
WBC # BLD: 4.17 K/UL — SIGNIFICANT CHANGE UP (ref 3.8–10.5)
WBC # FLD AUTO: 4.17 K/UL — SIGNIFICANT CHANGE UP (ref 3.8–10.5)

## 2020-05-17 PROCEDURE — 99232 SBSQ HOSP IP/OBS MODERATE 35: CPT | Mod: CS

## 2020-05-17 RX ADMIN — Medication 50 MICROGRAM(S): at 05:57

## 2020-05-17 RX ADMIN — ATORVASTATIN CALCIUM 20 MILLIGRAM(S): 80 TABLET, FILM COATED ORAL at 22:40

## 2020-05-17 RX ADMIN — POLYETHYLENE GLYCOL 3350 17 GRAM(S): 17 POWDER, FOR SOLUTION ORAL at 10:27

## 2020-05-17 RX ADMIN — Medication 650 MILLIGRAM(S): at 21:57

## 2020-05-17 RX ADMIN — Medication 100 MILLIGRAM(S): at 18:00

## 2020-05-17 RX ADMIN — LIDOCAINE 2 PATCH: 4 CREAM TOPICAL at 22:00

## 2020-05-17 RX ADMIN — ZINC OXIDE 1 APPLICATION(S): 200 OINTMENT TOPICAL at 10:26

## 2020-05-17 RX ADMIN — Medication 650 MILLIGRAM(S): at 13:17

## 2020-05-17 RX ADMIN — PYRIDOSTIGMINE BROMIDE 180 MILLIGRAM(S): 60 SOLUTION ORAL at 06:00

## 2020-05-17 RX ADMIN — LIDOCAINE 2 PATCH: 4 CREAM TOPICAL at 10:25

## 2020-05-17 RX ADMIN — Medication 5 MILLIGRAM(S): at 21:56

## 2020-05-17 RX ADMIN — Medication 2000 UNIT(S): at 10:26

## 2020-05-17 RX ADMIN — LIDOCAINE 2 PATCH: 4 CREAM TOPICAL at 01:00

## 2020-05-17 RX ADMIN — PYRIDOSTIGMINE BROMIDE 60 MILLIGRAM(S): 60 SOLUTION ORAL at 13:17

## 2020-05-17 RX ADMIN — PYRIDOSTIGMINE BROMIDE 60 MILLIGRAM(S): 60 SOLUTION ORAL at 05:57

## 2020-05-17 RX ADMIN — PANTOPRAZOLE SODIUM 40 MILLIGRAM(S): 20 TABLET, DELAYED RELEASE ORAL at 05:57

## 2020-05-17 RX ADMIN — Medication 10 MILLIGRAM(S): at 05:57

## 2020-05-17 RX ADMIN — Medication 650 MILLIGRAM(S): at 18:11

## 2020-05-17 RX ADMIN — ENOXAPARIN SODIUM 40 MILLIGRAM(S): 100 INJECTION SUBCUTANEOUS at 10:26

## 2020-05-17 RX ADMIN — PYRIDOSTIGMINE BROMIDE 60 MILLIGRAM(S): 60 SOLUTION ORAL at 21:57

## 2020-05-17 NOTE — PROGRESS NOTE ADULT - SUBJECTIVE AND OBJECTIVE BOX
Oracio Flores  PGY1 Internal Medicine  [phone] / [phone]    Patient is a 79y old  Female who presents with a chief complaint of Acute Hypoxic Respiratory Failure, COVD + (16 May 2020 08:22)      SUBJECTIVE / OVERNIGHT EVENTS: No events ON. Patient reports feeling like she "has a cold," endorses mild HA/ear congestion, no rhinorrhea, sore throat. Denies SOB. Endorses general weakness. No f/c/n/v. Still with abd pain.       REVIEW OF SYSTEMS:  negative unless noted above    MEDICATIONS  (STANDING):  atorvastatin 20 milliGRAM(s) Oral at bedtime  bisacodyl 5 milliGRAM(s) Oral at bedtime  cholecalciferol 2000 Unit(s) Oral daily  enoxaparin Injectable 40 milliGRAM(s) SubCutaneous daily  levothyroxine 50 MICROGram(s) Oral daily  lidocaine   Patch 2 Patch Transdermal daily  melatonin 5 milliGRAM(s) Oral at bedtime  pantoprazole    Tablet 40 milliGRAM(s) Oral before breakfast  polyethylene glycol 3350 17 Gram(s) Oral daily  predniSONE   Tablet 10 milliGRAM(s) Oral daily  pyridostigmine 60 milliGRAM(s) Oral three times a day  pyridostigmine  milliGRAM(s) Oral <User Schedule>  senna 2 Tablet(s) Oral at bedtime  zinc oxide 40% Ointment 1 Application(s) Topical daily    MEDICATIONS  (PRN):  acetaminophen   Tablet .. 650 milliGRAM(s) Oral every 4 hours PRN Temp greater or equal to 38C (100.4F), Moderate Pain (4 - 6)  ALBUTerol    90 MICROgram(s) HFA Inhaler 2 Puff(s) Inhalation every 4 hours PRN Shortness of Breath and/or Wheezing  aluminum hydroxide/magnesium hydroxide/simethicone Suspension 30 milliLiter(s) Oral every 6 hours PRN Dyspepsia  benzonatate 100 milliGRAM(s) Oral three times a day PRN Cough  guaiFENesin   Syrup  (Sugar-Free) 100 milliGRAM(s) Oral every 6 hours PRN Cough  sodium chloride 0.65% Nasal 1 Spray(s) Both Nostrils two times a day PRN Nasal Congestion      CAPILLARY BLOOD GLUCOSE        I&O's Summary    16 May 2020 07:01  -  17 May 2020 07:00  --------------------------------------------------------  IN: 600 mL / OUT: 1000 mL / NET: -400 mL        PHYSICAL EXAM:  Vital Signs Last 24 Hrs  T(C): 36.7 (17 May 2020 06:13), Max: 36.9 (16 May 2020 13:17)  T(F): 98.1 (17 May 2020 06:13), Max: 98.5 (16 May 2020 13:17)  HR: 51 (17 May 2020 06:13) (51 - 62)  BP: 109/56 (17 May 2020 06:13) (109/56 - 134/81)  BP(mean): --  RR: 18 (17 May 2020 06:13) (18 - 18)  SpO2: 100% (17 May 2020 06:13) (98% - 100%)    PHYSICAL EXAM:  GENERAL: NAD on 4L  CHEST/LUNG: Clear to auscultation bilaterally; No wheeze  HEART: Regular rate and rhythm; No murmurs, rubs, or gallops  ABDOMEN: soft, mild TTP diffusely, nd, neg morrell sign  EXTREMITIES:  no calf tenderness, no swelling  BACK: TTP along thoracic paraspinals  PSYCH: AAOx3  NEUROLOGY: non-focal, neck flexion intact, strength/sensation grossly intact    LABS:                        10.0   4.17  )-----------( 121      ( 17 May 2020 06:30 )             33.1     05-17    140  |  104  |  20  ----------------------------<  83  4.2   |  26  |  0.86    Ca    9.4      17 May 2020 06:30  Phos  4.4     05-17  Mg     1.8     05-17    TPro  5.7<L>  /  Alb  2.9<L>  /  TBili  0.2  /  DBili  x   /  AST  12  /  ALT  10  /  AlkPhos  47  05-17                RADIOLOGY & ADDITIONAL TESTS:  Results Reviewed:   Imaging Personally Reviewed:  Electrocardiogram Personally Reviewed:    COORDINATION OF CARE:  Care Discussed with Consultants/Other Providers [Y/N]:  Prior or Outpatient Records Reviewed [Y/N]:

## 2020-05-17 NOTE — PROGRESS NOTE ADULT - PROBLEM SELECTOR PLAN 9
DVT: lovenox 40 subq daily  Diet: dysphagia 1 honey consistency   PT: home PT  Dispo: home PT and home O2 TBD; consider inpatient rehab Patient adamantly wants to go to rehab and refuse to go home as she "can't walk."     Transitions of Care Status:  1.  Name of PCP: Carrillo Hannon  2.  PCP Contacted on Admission: [ ] Y    [ ] N    3.  PCP contacted at Discharge: [ ] Y    [ ] N    [ ] N/A  4.  Post-Discharge Appointment Date and Location:  5.  Summary of Handoff given to PCP:

## 2020-05-17 NOTE — PROGRESS NOTE ADULT - PROBLEM SELECTOR PLAN 2
-PT is DNR/DNI and has severe COVID-19 associated hypoxic respiratory failure.   -PT also at very high risk of rapid decompensation secondary to superimposed neuromuscular respiratory failure due to myasthenia gravis.  Will discuss further with outpatient neuromuscular specialist, Dr. Fitzgerald, but on initial contact likely her current presentation is mostly due to COVID-19 rather than MG.  - Supplemental O2 as needed  - comfortable on NC, continue to wean; desaturation below 90 with ambulation, will need saturations >90 on 4L NC or less prior to dc home  - CTA (5/15) negative for PE. -PT is DNR/DNI and has severe COVID-19 associated hypoxic respiratory failure.   -PT also at very high risk of rapid decompensation secondary to superimposed neuromuscular respiratory failure due to myasthenia gravis.  Will discuss further with outpatient neuromuscular specialist, Dr. Fitzgerald, but on initial contact likely her current presentation is mostly due to COVID-19 rather than MG.  - Supplemental O2 as needed  - comfortable on NC, continue to wean; desaturation below 90 with ambulation, will need saturations >90 on 4L NC or less prior to dc home (documented 5/15, patient saturated 92 on 4L with ambulation)  - CTA (5/15) negative for PE.

## 2020-05-17 NOTE — PROGRESS NOTE ADULT - PROBLEM SELECTOR PLAN 3
Monitor for signs symptoms of respiratory distress; hold dilaudid IV For now Monitor for signs symptoms of respiratory distress; hold dilaudid IV For now unless in significant inc WOB

## 2020-05-17 NOTE — PROGRESS NOTE ADULT - NSHPATTENDINGPLANDISCUSS_GEN_ALL_CORE
Resident, Dr. Almas Isaacs
Dr. Isaacs
resident
resident, Dr. Isaacs
Dr. Mcdonald
Dr. Valdes
Dr. Mcdonald
Dr. Mcdonald
PGY1,  PGY 2
Dr. Mcdonald
PGY1,  PGY 2
PGY 1, PGY 2
PGY 1, PGY 2

## 2020-05-17 NOTE — PROGRESS NOTE ADULT - ATTENDING COMMENTS
Whitney Peng DO    CPT Code 82890  J96.01 Acute Hypoxic Respiratory Failure  U07.1 2019 Novel Coronavirus  G70.01 Myasthenia Gravis

## 2020-05-17 NOTE — PROGRESS NOTE ADULT - PROBLEM SELECTOR PLAN 1
- S/P Solumedrol 04/03/2020-04/07/2020  - S/P Plaquenil 03/31-04/04  - Transferred from [facility] for Convalescent Plasma Transfusion->S/p plasma transfusion 4/24-CRP and ferritin slightly decreasing. No further plans for convalescent plasma treatments.   - Anakinra d/w ID but holding off 2/2 having already received immunosuppression Eculizumab (Solaris) for Myasthania Gravis  - Not a candidate for Remdisivir as has shown improvement most in earlier stages of disease.  - Persistent cough - repeat CXR 5/8 w/ improving consolidation, no new consolidation. no new fevers, downtrending CRP. less likely bacterial superinfection. will continue with current mgmt, reordered cough suppression  - wean off of oxygen as tolerated.

## 2020-05-18 LAB — SARS-COV-2 RNA SPEC QL NAA+PROBE: SIGNIFICANT CHANGE UP

## 2020-05-18 PROCEDURE — 99232 SBSQ HOSP IP/OBS MODERATE 35: CPT | Mod: CS

## 2020-05-18 RX ADMIN — Medication 5 MILLIGRAM(S): at 21:58

## 2020-05-18 RX ADMIN — Medication 100 MILLIGRAM(S): at 22:21

## 2020-05-18 RX ADMIN — PYRIDOSTIGMINE BROMIDE 60 MILLIGRAM(S): 60 SOLUTION ORAL at 06:39

## 2020-05-18 RX ADMIN — LIDOCAINE 2 PATCH: 4 CREAM TOPICAL at 19:58

## 2020-05-18 RX ADMIN — Medication 10 MILLIGRAM(S): at 06:39

## 2020-05-18 RX ADMIN — ATORVASTATIN CALCIUM 20 MILLIGRAM(S): 80 TABLET, FILM COATED ORAL at 21:59

## 2020-05-18 RX ADMIN — Medication 5 MILLIGRAM(S): at 21:59

## 2020-05-18 RX ADMIN — POLYETHYLENE GLYCOL 3350 17 GRAM(S): 17 POWDER, FOR SOLUTION ORAL at 12:25

## 2020-05-18 RX ADMIN — Medication 650 MILLIGRAM(S): at 22:21

## 2020-05-18 RX ADMIN — PANTOPRAZOLE SODIUM 40 MILLIGRAM(S): 20 TABLET, DELAYED RELEASE ORAL at 06:39

## 2020-05-18 RX ADMIN — PYRIDOSTIGMINE BROMIDE 180 MILLIGRAM(S): 60 SOLUTION ORAL at 06:39

## 2020-05-18 RX ADMIN — SENNA PLUS 2 TABLET(S): 8.6 TABLET ORAL at 21:58

## 2020-05-18 RX ADMIN — PYRIDOSTIGMINE BROMIDE 60 MILLIGRAM(S): 60 SOLUTION ORAL at 21:58

## 2020-05-18 RX ADMIN — ENOXAPARIN SODIUM 40 MILLIGRAM(S): 100 INJECTION SUBCUTANEOUS at 12:24

## 2020-05-18 RX ADMIN — LIDOCAINE 2 PATCH: 4 CREAM TOPICAL at 13:45

## 2020-05-18 RX ADMIN — PYRIDOSTIGMINE BROMIDE 60 MILLIGRAM(S): 60 SOLUTION ORAL at 12:24

## 2020-05-18 RX ADMIN — Medication 2000 UNIT(S): at 12:24

## 2020-05-18 RX ADMIN — ZINC OXIDE 1 APPLICATION(S): 200 OINTMENT TOPICAL at 12:25

## 2020-05-18 RX ADMIN — Medication 100 MILLIGRAM(S): at 13:07

## 2020-05-18 RX ADMIN — Medication 50 MICROGRAM(S): at 06:39

## 2020-05-18 RX ADMIN — Medication 650 MILLIGRAM(S): at 12:53

## 2020-05-18 NOTE — PROGRESS NOTE ADULT - PROBLEM SELECTOR PLAN 1
- S/P Solumedrol 04/03/2020-04/07/2020  - S/P Plaquenil 03/31-04/04  - Transferred from [facility] for Convalescent Plasma Transfusion->S/p plasma transfusion 4/24-CRP and ferritin slightly decreasing. No further plans for convalescent plasma treatments.   - Anakinra d/w ID but holding off 2/2 having already received immunosuppression Eculizumab (Solaris) for Myasthania Gravis  - Not a candidate for Remdisivir as has shown improvement most in earlier stages of disease.  - Persistent cough - repeat CXR 5/8 w/ improving consolidation, no new consolidation. no new fevers, downtrending CRP. less likely bacterial superinfection. will continue with current mgmt, reordered cough suppression  - wean off of oxygen as tolerated.

## 2020-05-18 NOTE — PROGRESS NOTE ADULT - PROBLEM SELECTOR PLAN 9
DVT: lovenox 40 subq daily  Diet: dysphagia 1 honey consistency   PT: home PT  Dispo: home PT and home O2 TBD; consider inpatient rehab Patient adamantly wants to go to rehab and refuse to go home as she "can't walk."     Transitions of Care Status:  1.  Name of PCP: Carrillo Hannon  2.  PCP Contacted on Admission: [ ] Y    [ ] N    3.  PCP contacted at Discharge: [ ] Y    [ ] N    [ ] N/A  4.  Post-Discharge Appointment Date and Location:  5.  Summary of Handoff given to PCP: DVT: lovenox 40 subq daily  Diet: dysphagia 1 honey consistency   PT: home PT  Dispo: home PT and home O2 TBD; consider inpatient rehab Patient adamantly wants to go to rehab and refuse to go home as she "can't walk." - will reach out to PT fo re-eval    Transitions of Care Status:  1.  Name of PCP: Carrillo Hannon  2.  PCP Contacted on Admission: [ ] Y    [ ] N    3.  PCP contacted at Discharge: [ ] Y    [ ] N    [ ] N/A  4.  Post-Discharge Appointment Date and Location:  5.  Summary of Handoff given to PCP:

## 2020-05-18 NOTE — PROGRESS NOTE ADULT - PROBLEM SELECTOR PLAN 3
Monitor for signs symptoms of respiratory distress; hold dilaudid IV For now unless in significant inc WOB

## 2020-05-18 NOTE — PROGRESS NOTE ADULT - SUBJECTIVE AND OBJECTIVE BOX
Oracio Flores  PGY1 Internal Medicine  [phone] / [phone]    Patient is a 79y old  Female who presents with a chief complaint of Acute Hypoxic Respiratory Failure, COVID+ (17 May 2020 09:09)      SUBJECTIVE / OVERNIGHT EVENTS: No acute events ON. Patient reporting difficulty sleeping, abd pain, legs feel cold. SOB when she coughs. Denies f/c/n/v. Reports chest heaviness. Repeat covid negative.      REVIEW OF SYSTEMS:    negative unless noted above    MEDICATIONS  (STANDING):  atorvastatin 20 milliGRAM(s) Oral at bedtime  bisacodyl 5 milliGRAM(s) Oral at bedtime  cholecalciferol 2000 Unit(s) Oral daily  enoxaparin Injectable 40 milliGRAM(s) SubCutaneous daily  levothyroxine 50 MICROGram(s) Oral daily  lidocaine   Patch 2 Patch Transdermal daily  melatonin 5 milliGRAM(s) Oral at bedtime  pantoprazole    Tablet 40 milliGRAM(s) Oral before breakfast  polyethylene glycol 3350 17 Gram(s) Oral daily  predniSONE   Tablet 10 milliGRAM(s) Oral daily  pyridostigmine 60 milliGRAM(s) Oral three times a day  pyridostigmine  milliGRAM(s) Oral <User Schedule>  senna 2 Tablet(s) Oral at bedtime  zinc oxide 40% Ointment 1 Application(s) Topical daily    MEDICATIONS  (PRN):  acetaminophen   Tablet .. 650 milliGRAM(s) Oral every 4 hours PRN Temp greater or equal to 38C (100.4F), Moderate Pain (4 - 6)  ALBUTerol    90 MICROgram(s) HFA Inhaler 2 Puff(s) Inhalation every 4 hours PRN Shortness of Breath and/or Wheezing  aluminum hydroxide/magnesium hydroxide/simethicone Suspension 30 milliLiter(s) Oral every 6 hours PRN Dyspepsia  benzonatate 100 milliGRAM(s) Oral three times a day PRN Cough  guaiFENesin   Syrup  (Sugar-Free) 100 milliGRAM(s) Oral every 6 hours PRN Cough  sodium chloride 0.65% Nasal 1 Spray(s) Both Nostrils two times a day PRN Nasal Congestion      CAPILLARY BLOOD GLUCOSE        I&O's Summary    17 May 2020 07:01  -  18 May 2020 07:00  --------------------------------------------------------  IN: 0 mL / OUT: 950 mL / NET: -950 mL        PHYSICAL EXAM:  Vital Signs Last 24 Hrs  T(C): 36.4 (18 May 2020 04:10), Max: 36.9 (17 May 2020 13:34)  T(F): 97.6 (18 May 2020 04:10), Max: 98.4 (17 May 2020 13:34)  HR: 56 (18 May 2020 04:10) (56 - 71)  BP: 108/61 (18 May 2020 04:10) (100/65 - 108/61)  BP(mean): --  RR: 18 (18 May 2020 04:10) (18 - 20)  SpO2: 99% (18 May 2020 04:10) (90% - 100%)    PHYSICAL EXAM:  GENERAL: NAD on 2L  CHEST/LUNG: Clear to auscultation bilaterally; No wheeze  HEART: Regular rate and rhythm; No murmurs, rubs, or gallops  ABDOMEN: soft, mild TTP diffusely, nd, neg morrell sign  EXTREMITIES:  no calf tenderness, no swelling  BACK: TTP along thoracic paraspinals  PSYCH: AAOx3  NEUROLOGY: non-focal, neck flexion intact, strength/sensation grossly intact        LABS:                        10.0   4.17  )-----------( 121      ( 17 May 2020 06:30 )             33.1     05-17    140  |  104  |  20  ----------------------------<  83  4.2   |  26  |  0.86    Ca    9.4      17 May 2020 06:30  Phos  4.4     05-17  Mg     1.8     05-17    TPro  5.7<L>  /  Alb  2.9<L>  /  TBili  0.2  /  DBili  x   /  AST  12  /  ALT  10  /  AlkPhos  47  05-17                RADIOLOGY & ADDITIONAL TESTS:  Results Reviewed:   Imaging Personally Reviewed:  Electrocardiogram Personally Reviewed:    COORDINATION OF CARE:  Care Discussed with Consultants/Other Providers [Y/N]:  Prior or Outpatient Records Reviewed [Y/N]:

## 2020-05-18 NOTE — PROGRESS NOTE ADULT - PROBLEM SELECTOR PLAN 2
-PT is DNR/DNI and has severe COVID-19 associated hypoxic respiratory failure.   -PT also at very high risk of rapid decompensation secondary to superimposed neuromuscular respiratory failure due to myasthenia gravis.  Will discuss further with outpatient neuromuscular specialist, Dr. Fitzgerald, but on initial contact likely her current presentation is mostly due to COVID-19 rather than MG.  - Supplemental O2 as needed  - comfortable on NC, continue to wean; desaturation below 90 with ambulation, will need saturations >90 on 4L NC or less prior to dc home (documented 5/15, patient saturated 92 on 4L with ambulation)  - CTA (5/15) negative for PE.

## 2020-05-19 VITALS
DIASTOLIC BLOOD PRESSURE: 61 MMHG | HEART RATE: 67 BPM | OXYGEN SATURATION: 92 % | TEMPERATURE: 98 F | SYSTOLIC BLOOD PRESSURE: 115 MMHG

## 2020-05-19 LAB — SARS-COV-2 RNA SPEC QL NAA+PROBE: SIGNIFICANT CHANGE UP

## 2020-05-19 PROCEDURE — 99232 SBSQ HOSP IP/OBS MODERATE 35: CPT | Mod: CS

## 2020-05-19 RX ADMIN — LIDOCAINE 2 PATCH: 4 CREAM TOPICAL at 12:07

## 2020-05-19 RX ADMIN — Medication 50 MICROGRAM(S): at 06:00

## 2020-05-19 RX ADMIN — Medication 100 MILLIGRAM(S): at 06:01

## 2020-05-19 RX ADMIN — LIDOCAINE 2 PATCH: 4 CREAM TOPICAL at 00:39

## 2020-05-19 RX ADMIN — ZINC OXIDE 1 APPLICATION(S): 200 OINTMENT TOPICAL at 12:07

## 2020-05-19 RX ADMIN — Medication 2000 UNIT(S): at 12:06

## 2020-05-19 RX ADMIN — PYRIDOSTIGMINE BROMIDE 60 MILLIGRAM(S): 60 SOLUTION ORAL at 15:37

## 2020-05-19 RX ADMIN — PYRIDOSTIGMINE BROMIDE 60 MILLIGRAM(S): 60 SOLUTION ORAL at 06:00

## 2020-05-19 RX ADMIN — ALBUTEROL 2 PUFF(S): 90 AEROSOL, METERED ORAL at 06:01

## 2020-05-19 RX ADMIN — Medication 100 MILLIGRAM(S): at 12:10

## 2020-05-19 RX ADMIN — PYRIDOSTIGMINE BROMIDE 180 MILLIGRAM(S): 60 SOLUTION ORAL at 08:41

## 2020-05-19 RX ADMIN — Medication 650 MILLIGRAM(S): at 12:09

## 2020-05-19 RX ADMIN — Medication 10 MILLIGRAM(S): at 06:00

## 2020-05-19 RX ADMIN — PANTOPRAZOLE SODIUM 40 MILLIGRAM(S): 20 TABLET, DELAYED RELEASE ORAL at 06:00

## 2020-05-19 RX ADMIN — ENOXAPARIN SODIUM 40 MILLIGRAM(S): 100 INJECTION SUBCUTANEOUS at 12:06

## 2020-05-19 RX ADMIN — Medication 650 MILLIGRAM(S): at 06:01

## 2020-05-19 NOTE — PROGRESS NOTE ADULT - PROBLEM SELECTOR PLAN 9
DVT: lovenox 40 subq daily  Diet: dysphagia 1 honey consistency   PT: home PT  Dispo: home PT and home O2 TBD; consider inpatient rehab Patient adamantly wants to go to rehab and refuse to go home as she "can't walk." - will reach out to PT fo re-eval    Transitions of Care Status:  1.  Name of PCP: Carrillo Hannon  2.  PCP Contacted on Admission: [ ] Y    [ ] N    3.  PCP contacted at Discharge: [ ] Y    [ ] N    [ ] N/A  4.  Post-Discharge Appointment Date and Location:  5.  Summary of Handoff given to PCP: DVT: lovenox 40 subq daily  Diet: dysphagia 1 honey consistency   PT: home PT  Dispo: home PT, pending second negative covid test    Transitions of Care Status:  1.  Name of PCP: Carrillo Hannon  2.  PCP Contacted on Admission: [ ] Y    [ ] N    3.  PCP contacted at Discharge: [ ] Y    [ ] N    [ ] N/A  4.  Post-Discharge Appointment Date and Location:  5.  Summary of Handoff given to PCP:

## 2020-05-19 NOTE — PROGRESS NOTE ADULT - PROBLEM SELECTOR PLAN 1
- S/P Solumedrol 04/03/2020-04/07/2020  - S/P Plaquenil 03/31-04/04  - Transferred from [facility] for Convalescent Plasma Transfusion->S/p plasma transfusion 4/24-CRP and ferritin slightly decreasing. No further plans for convalescent plasma treatments.   - Anakinra d/w ID but holding off 2/2 having already received immunosuppression Eculizumab (Solaris) for Myasthania Gravis  - Not a candidate for Remdisivir as has shown improvement most in earlier stages of disease.  - Persistent cough - repeat CXR 5/8 w/ improving consolidation, no new consolidation. no new fevers, downtrending CRP. less likely bacterial superinfection. will continue with current mgmt, reordered cough suppression  - wean off of oxygen as tolerated.

## 2020-05-19 NOTE — PROGRESS NOTE ADULT - PROBLEM SELECTOR PROBLEM 9
Advance care planning
Prophylactic measure
Advance care planning

## 2020-05-19 NOTE — PROGRESS NOTE ADULT - PROBLEM SELECTOR PROBLEM 1
2019 novel coronavirus disease (COVID-19)
Acute respiratory distress syndrome (ARDS) due to COVID-19 virus
Respiratory failure
2019 novel coronavirus disease (COVID-19)
Respiratory failure

## 2020-05-19 NOTE — PROGRESS NOTE ADULT - SUBJECTIVE AND OBJECTIVE BOX
Oracio Flores  PGY1 Internal Medicine  [phone] / [phone]    Patient is a 79y old  Female who presents with a chief complaint of Shortness of breath, fevers (18 May 2020 09:18)      SUBJECTIVE / OVERNIGHT EVENTS: No events ON. Patient reporting some SOB with coughing, otherwise comfortable on 1-2L NC. Denies f/c/n/v. Some abd pain. Pt pending dispo to [facility].      REVIEW OF SYSTEMS:    negative unless noted above    MEDICATIONS  (STANDING):  atorvastatin 20 milliGRAM(s) Oral at bedtime  bisacodyl 5 milliGRAM(s) Oral at bedtime  cholecalciferol 2000 Unit(s) Oral daily  enoxaparin Injectable 40 milliGRAM(s) SubCutaneous daily  levothyroxine 50 MICROGram(s) Oral daily  lidocaine   Patch 2 Patch Transdermal daily  melatonin 5 milliGRAM(s) Oral at bedtime  pantoprazole    Tablet 40 milliGRAM(s) Oral before breakfast  polyethylene glycol 3350 17 Gram(s) Oral daily  predniSONE   Tablet 10 milliGRAM(s) Oral daily  pyridostigmine 60 milliGRAM(s) Oral three times a day  pyridostigmine  milliGRAM(s) Oral <User Schedule>  senna 2 Tablet(s) Oral at bedtime  zinc oxide 40% Ointment 1 Application(s) Topical daily    MEDICATIONS  (PRN):  acetaminophen   Tablet .. 650 milliGRAM(s) Oral every 4 hours PRN Temp greater or equal to 38C (100.4F), Moderate Pain (4 - 6)  ALBUTerol    90 MICROgram(s) HFA Inhaler 2 Puff(s) Inhalation every 4 hours PRN Shortness of Breath and/or Wheezing  aluminum hydroxide/magnesium hydroxide/simethicone Suspension 30 milliLiter(s) Oral every 6 hours PRN Dyspepsia  benzonatate 100 milliGRAM(s) Oral three times a day PRN Cough  guaiFENesin   Syrup  (Sugar-Free) 100 milliGRAM(s) Oral every 6 hours PRN Cough  sodium chloride 0.65% Nasal 1 Spray(s) Both Nostrils two times a day PRN Nasal Congestion      CAPILLARY BLOOD GLUCOSE        I&O's Summary    18 May 2020 07:01  -  19 May 2020 07:00  --------------------------------------------------------  IN: 0 mL / OUT: 1600 mL / NET: -1600 mL        PHYSICAL EXAM:  Vital Signs Last 24 Hrs  T(C): 36.6 (19 May 2020 05:27), Max: 36.6 (19 May 2020 05:27)  T(F): 97.8 (19 May 2020 05:27), Max: 97.8 (19 May 2020 05:27)  HR: 54 (19 May 2020 05:27) (54 - 58)  BP: 118/73 (19 May 2020 05:27) (102/69 - 118/73)  BP(mean): --  RR: 18 (19 May 2020 05:27) (18 - 18)  SpO2: 99% (19 May 2020 05:27) (99% - 100%)    PHYSICAL EXAM:  GENERAL: NAD on 2L  CHEST/LUNG: Clear to auscultation bilaterally; No wheeze  HEART: Regular rate and rhythm; No murmurs, rubs, or gallops  ABDOMEN: soft, mild TTP diffusely, nd, neg morrell sign  EXTREMITIES:  no calf tenderness, no swelling  BACK: TTP along thoracic paraspinals  PSYCH: AAOx3  NEUROLOGY: non-focal, neck flexion intact, strength/sensation grossly intact    LABS:                      RADIOLOGY & ADDITIONAL TESTS:  Results Reviewed:   Imaging Personally Reviewed:  Electrocardiogram Personally Reviewed:    COORDINATION OF CARE:  Care Discussed with Consultants/Other Providers [Y/N]:  Prior or Outpatient Records Reviewed [Y/N]:

## 2020-05-19 NOTE — PROGRESS NOTE ADULT - PROVIDER SPECIALTY LIST ADULT
Hospitalist
Internal Medicine
Palliative Care
Internal Medicine
Internal Medicine

## 2020-05-19 NOTE — PROGRESS NOTE ADULT - I WAS PHYSICALLY PRESENT FOR THE KEY PORTIONS OF THE EVALUATION AND MANAGEMENT (E/M) SERVICE PROVIDED.  I AGREE WITH THE ABOVE HISTORY, PHYSICAL, AND PLAN WHICH I HAVE REVIEWED AND EDITED WHERE APPROPRIATE
Statement Selected
[Negative] : Psychiatric
Statement Selected

## 2020-05-29 ENCOUNTER — APPOINTMENT (OUTPATIENT)
Dept: INTERNAL MEDICINE | Facility: CLINIC | Age: 80
End: 2020-05-29

## 2020-06-02 PROCEDURE — 82947 ASSAY GLUCOSE BLOOD QUANT: CPT

## 2020-06-02 PROCEDURE — 85730 THROMBOPLASTIN TIME PARTIAL: CPT

## 2020-06-02 PROCEDURE — U0003: CPT

## 2020-06-02 PROCEDURE — 97116 GAIT TRAINING THERAPY: CPT

## 2020-06-02 PROCEDURE — 80053 COMPREHEN METABOLIC PANEL: CPT

## 2020-06-02 PROCEDURE — 36430 TRANSFUSION BLD/BLD COMPNT: CPT

## 2020-06-02 PROCEDURE — 85027 COMPLETE CBC AUTOMATED: CPT

## 2020-06-02 PROCEDURE — 97110 THERAPEUTIC EXERCISES: CPT

## 2020-06-02 PROCEDURE — 94640 AIRWAY INHALATION TREATMENT: CPT

## 2020-06-02 PROCEDURE — 85379 FIBRIN DEGRADATION QUANT: CPT

## 2020-06-02 PROCEDURE — 82330 ASSAY OF CALCIUM: CPT

## 2020-06-02 PROCEDURE — 82728 ASSAY OF FERRITIN: CPT

## 2020-06-02 PROCEDURE — 83880 ASSAY OF NATRIURETIC PEPTIDE: CPT

## 2020-06-02 PROCEDURE — 85385 FIBRINOGEN ANTIGEN: CPT

## 2020-06-02 PROCEDURE — 84132 ASSAY OF SERUM POTASSIUM: CPT

## 2020-06-02 PROCEDURE — 86140 C-REACTIVE PROTEIN: CPT

## 2020-06-02 PROCEDURE — 85652 RBC SED RATE AUTOMATED: CPT

## 2020-06-02 PROCEDURE — 83615 LACTATE (LD) (LDH) ENZYME: CPT

## 2020-06-02 PROCEDURE — 71045 X-RAY EXAM CHEST 1 VIEW: CPT

## 2020-06-02 PROCEDURE — 87040 BLOOD CULTURE FOR BACTERIA: CPT

## 2020-06-02 PROCEDURE — 80048 BASIC METABOLIC PNL TOTAL CA: CPT

## 2020-06-02 PROCEDURE — 82962 GLUCOSE BLOOD TEST: CPT

## 2020-06-02 PROCEDURE — 92610 EVALUATE SWALLOWING FUNCTION: CPT

## 2020-06-02 PROCEDURE — 82803 BLOOD GASES ANY COMBINATION: CPT

## 2020-06-02 PROCEDURE — 97530 THERAPEUTIC ACTIVITIES: CPT

## 2020-06-02 PROCEDURE — 82435 ASSAY OF BLOOD CHLORIDE: CPT

## 2020-06-02 PROCEDURE — 84145 PROCALCITONIN (PCT): CPT

## 2020-06-02 PROCEDURE — 74018 RADEX ABDOMEN 1 VIEW: CPT

## 2020-06-02 PROCEDURE — 99285 EMERGENCY DEPT VISIT HI MDM: CPT | Mod: 25

## 2020-06-02 PROCEDURE — 81001 URINALYSIS AUTO W/SCOPE: CPT

## 2020-06-02 PROCEDURE — 86923 COMPATIBILITY TEST ELECTRIC: CPT

## 2020-06-02 PROCEDURE — 84484 ASSAY OF TROPONIN QUANT: CPT

## 2020-06-02 PROCEDURE — 86901 BLOOD TYPING SEROLOGIC RH(D): CPT

## 2020-06-02 PROCEDURE — 87635 SARS-COV-2 COVID-19 AMP PRB: CPT

## 2020-06-02 PROCEDURE — P9011: CPT

## 2020-06-02 PROCEDURE — 85014 HEMATOCRIT: CPT

## 2020-06-02 PROCEDURE — 84295 ASSAY OF SERUM SODIUM: CPT

## 2020-06-02 PROCEDURE — 71275 CT ANGIOGRAPHY CHEST: CPT

## 2020-06-02 PROCEDURE — 93005 ELECTROCARDIOGRAM TRACING: CPT

## 2020-06-02 PROCEDURE — 84100 ASSAY OF PHOSPHORUS: CPT

## 2020-06-02 PROCEDURE — 83735 ASSAY OF MAGNESIUM: CPT

## 2020-06-02 PROCEDURE — P9016: CPT

## 2020-06-02 PROCEDURE — 86850 RBC ANTIBODY SCREEN: CPT

## 2020-06-02 PROCEDURE — 85610 PROTHROMBIN TIME: CPT

## 2020-06-02 PROCEDURE — 86900 BLOOD TYPING SEROLOGIC ABO: CPT

## 2020-06-02 PROCEDURE — 97162 PT EVAL MOD COMPLEX 30 MIN: CPT

## 2020-06-02 PROCEDURE — 83605 ASSAY OF LACTIC ACID: CPT

## 2020-06-26 ENCOUNTER — APPOINTMENT (OUTPATIENT)
Dept: NEUROLOGY | Facility: CLINIC | Age: 80
End: 2020-06-26
Payer: MEDICARE

## 2020-06-26 PROCEDURE — 96413 CHEMO IV INFUSION 1 HR: CPT

## 2020-07-01 ENCOUNTER — LABORATORY RESULT (OUTPATIENT)
Age: 80
End: 2020-07-01

## 2020-07-01 ENCOUNTER — APPOINTMENT (OUTPATIENT)
Dept: INTERNAL MEDICINE | Facility: CLINIC | Age: 80
End: 2020-07-01
Payer: MEDICARE

## 2020-07-01 ENCOUNTER — NON-APPOINTMENT (OUTPATIENT)
Age: 80
End: 2020-07-01

## 2020-07-01 VITALS — DIASTOLIC BLOOD PRESSURE: 75 MMHG | SYSTOLIC BLOOD PRESSURE: 130 MMHG

## 2020-07-01 VITALS
BODY MASS INDEX: 27.5 KG/M2 | HEIGHT: 58 IN | DIASTOLIC BLOOD PRESSURE: 72 MMHG | SYSTOLIC BLOOD PRESSURE: 156 MMHG | RESPIRATION RATE: 14 BRPM | WEIGHT: 131 LBS | HEART RATE: 69 BPM | TEMPERATURE: 98.1 F | OXYGEN SATURATION: 91 %

## 2020-07-01 DIAGNOSIS — J32.9 CHRONIC SINUSITIS, UNSPECIFIED: ICD-10-CM

## 2020-07-01 PROCEDURE — 93000 ELECTROCARDIOGRAM COMPLETE: CPT | Mod: CS

## 2020-07-01 PROCEDURE — 99214 OFFICE O/P EST MOD 30 MIN: CPT | Mod: CS,25

## 2020-07-01 RX ORDER — AZITHROMYCIN 250 MG/1
250 TABLET, FILM COATED ORAL
Qty: 1 | Refills: 0 | Status: DISCONTINUED | COMMUNITY
Start: 2020-03-28 | End: 2020-07-01

## 2020-07-01 RX ORDER — SULFAMETHOXAZOLE AND TRIMETHOPRIM 800; 160 MG/1; MG/1
800-160 TABLET ORAL TWICE DAILY
Qty: 20 | Refills: 0 | Status: DISCONTINUED | COMMUNITY
Start: 2020-02-27 | End: 2020-07-01

## 2020-07-01 NOTE — REVIEW OF SYSTEMS
[Joint Pain] : joint pain [Joint Stiffness] : joint stiffness [Unsteady Walking] : ataxia [Anxiety] : anxiety [Negative] : Heme/Lymph [Joint Swelling] : no joint swelling [Muscle Weakness] : no muscle weakness [Back Pain] : no back pain [Muscle Pain] : no muscle pain [Fainting] : no fainting [Headache] : no headache [Dizziness] : no dizziness [Confusion] : no confusion [Memory Loss] : no memory loss [Suicidal] : not suicidal [Depression] : no depression [de-identified] : NEEDS ASSISTANCE [FreeTextEntry9] : RIGHT HIP

## 2020-07-01 NOTE — PHYSICAL EXAM
[Well Nourished] : well nourished [No Acute Distress] : no acute distress [Well Developed] : well developed [Normal Sclera/Conjunctiva] : normal sclera/conjunctiva [Well-Appearing] : well-appearing [PERRL] : pupils equal round and reactive to light [EOMI] : extraocular movements intact [Normal Outer Ear/Nose] : the outer ears and nose were normal in appearance [Normal Oropharynx] : the oropharynx was normal [Supple] : supple [No JVD] : no jugular venous distention [No Lymphadenopathy] : no lymphadenopathy [Thyroid Normal, No Nodules] : the thyroid was normal and there were no nodules present [No Respiratory Distress] : no respiratory distress  [No Accessory Muscle Use] : no accessory muscle use [Clear to Auscultation] : lungs were clear to auscultation bilaterally [Normal Rate] : normal rate  [Regular Rhythm] : with a regular rhythm [Normal S1, S2] : normal S1 and S2 [No Murmur] : no murmur heard [No Carotid Bruits] : no carotid bruits [No Abdominal Bruit] : a ~M bruit was not heard ~T in the abdomen [No Varicosities] : no varicosities [Pedal Pulses Present] : the pedal pulses are present [No Palpable Aorta] : no palpable aorta [No Edema] : there was no peripheral edema [Soft] : abdomen soft [No Extremity Clubbing/Cyanosis] : no extremity clubbing/cyanosis [Non Tender] : non-tender [No Masses] : no abdominal mass palpated [No HSM] : no HSM [Non-distended] : non-distended [Normal Bowel Sounds] : normal bowel sounds [Normal Posterior Cervical Nodes] : no posterior cervical lymphadenopathy [Normal Anterior Cervical Nodes] : no anterior cervical lymphadenopathy [No Spinal Tenderness] : no spinal tenderness [No Joint Swelling] : no joint swelling [Grossly Normal Strength/Tone] : grossly normal strength/tone [No CVA Tenderness] : no CVA  tenderness [Coordination Grossly Intact] : coordination grossly intact [No Rash] : no rash [No Focal Deficits] : no focal deficits [Normal Affect] : the affect was normal [Normal Insight/Judgement] : insight and judgment were intact [Alert and Oriented x3] : oriented to person, place, and time [de-identified] : PAIN WITH ROM RIGHT HIP [de-identified] : NEEDS ASSISTANCE

## 2020-07-03 LAB
25(OH)D3 SERPL-MCNC: 53.1 NG/ML
ALBUMIN SERPL ELPH-MCNC: 4.4 G/DL
ALP BLD-CCNC: 60 U/L
ALT SERPL-CCNC: 26 U/L
ANION GAP SERPL CALC-SCNC: 12 MMOL/L
APPEARANCE: CLEAR
AST SERPL-CCNC: 20 U/L
BASOPHILS # BLD AUTO: 0.01 K/UL
BASOPHILS NFR BLD AUTO: 0.2 %
BILIRUB SERPL-MCNC: 0.3 MG/DL
BILIRUBIN URINE: NEGATIVE
BLOOD URINE: NEGATIVE
BUN SERPL-MCNC: 20 MG/DL
CALCIUM SERPL-MCNC: 9.8 MG/DL
CHLORIDE SERPL-SCNC: 106 MMOL/L
CO2 SERPL-SCNC: 25 MMOL/L
COLOR: YELLOW
CREAT SERPL-MCNC: 0.84 MG/DL
CREAT SPEC-SCNC: 78 MG/DL
EOSINOPHIL # BLD AUTO: 0.11 K/UL
EOSINOPHIL NFR BLD AUTO: 2 %
ESTIMATED AVERAGE GLUCOSE: 91 MG/DL
FERRITIN SERPL-MCNC: 27 NG/ML
FOLATE SERPL-MCNC: >20 NG/ML
GLUCOSE QUALITATIVE U: NEGATIVE
GLUCOSE SERPL-MCNC: 98 MG/DL
HBA1C MFR BLD HPLC: 4.8 %
HCT VFR BLD CALC: 38.3 %
HGB BLD-MCNC: 11.7 G/DL
IMM GRANULOCYTES NFR BLD AUTO: 0.4 %
IRON SERPL-MCNC: 32 UG/DL
KETONES URINE: NEGATIVE
LEUKOCYTE ESTERASE URINE: NEGATIVE
LYMPHOCYTES # BLD AUTO: 1.56 K/UL
LYMPHOCYTES NFR BLD AUTO: 29.1 %
MAN DIFF?: NORMAL
MCHC RBC-ENTMCNC: 28.5 PG
MCHC RBC-ENTMCNC: 30.5 GM/DL
MCV RBC AUTO: 93.2 FL
MICROALBUMIN 24H UR DL<=1MG/L-MCNC: <1.2 MG/DL
MICROALBUMIN/CREAT 24H UR-RTO: NORMAL MG/G
MONOCYTES # BLD AUTO: 0.36 K/UL
MONOCYTES NFR BLD AUTO: 6.7 %
NEUTROPHILS # BLD AUTO: 3.31 K/UL
NEUTROPHILS NFR BLD AUTO: 61.6 %
NITRITE URINE: POSITIVE
PH URINE: 6
PLATELET # BLD AUTO: 199 K/UL
POTASSIUM SERPL-SCNC: 4.2 MMOL/L
PROT SERPL-MCNC: 6.8 G/DL
PROTEIN URINE: NEGATIVE
RBC # BLD: 4.11 M/UL
RBC # FLD: 16 %
SARS-COV-2 IGG SERPL IA-ACNC: 51.6 INDEX
SARS-COV-2 IGG SERPL QL IA: POSITIVE
SODIUM SERPL-SCNC: 144 MMOL/L
SPECIFIC GRAVITY URINE: 1.02
TSH SERPL-ACNC: 1.07 UIU/ML
UROBILINOGEN URINE: NORMAL
VIT B12 SERPL-MCNC: 704 PG/ML
WBC # FLD AUTO: 5.37 K/UL

## 2020-07-05 ENCOUNTER — NON-APPOINTMENT (OUTPATIENT)
Age: 80
End: 2020-07-05

## 2020-07-05 RX ORDER — CELECOXIB 100 MG/1
100 CAPSULE ORAL
Qty: 12 | Refills: 0 | Status: DISCONTINUED | COMMUNITY
Start: 2020-03-06

## 2020-07-05 RX ORDER — MINOCYCLINE HYDROCHLORIDE 100 MG/1
100 CAPSULE ORAL
Qty: 20 | Refills: 0 | Status: DISCONTINUED | COMMUNITY
Start: 2020-03-06

## 2020-07-05 RX ORDER — PREDNISONE 10 MG/1
10 TABLET ORAL
Qty: 30 | Refills: 0 | Status: DISCONTINUED | COMMUNITY
Start: 2020-06-15

## 2020-07-07 LAB
CHOLEST SERPL-MCNC: 190 MG/DL
CHOLEST/HDLC SERPL: 2.8 RATIO
HDLC SERPL-MCNC: 68 MG/DL
LDLC SERPL CALC-MCNC: 106 MG/DL
TRIGL SERPL-MCNC: 80 MG/DL

## 2020-07-08 ENCOUNTER — APPOINTMENT (OUTPATIENT)
Dept: NEUROLOGY | Facility: CLINIC | Age: 80
End: 2020-07-08
Payer: MEDICARE

## 2020-07-08 VITALS — RESPIRATION RATE: 18 BRPM | HEART RATE: 66 BPM | SYSTOLIC BLOOD PRESSURE: 101 MMHG | DIASTOLIC BLOOD PRESSURE: 58 MMHG

## 2020-07-08 PROCEDURE — 96413 CHEMO IV INFUSION 1 HR: CPT

## 2020-07-08 RX ORDER — ECULIZUMAB 300 MG/30ML
300 INJECTION, SOLUTION, CONCENTRATE INTRAVENOUS
Refills: 0 | Status: COMPLETED | OUTPATIENT
Start: 2020-07-08

## 2020-07-08 RX ADMIN — ECULIZUMAB 0 MG/30ML: 300 INJECTION, SOLUTION, CONCENTRATE INTRAVENOUS at 00:00

## 2020-07-21 ENCOUNTER — APPOINTMENT (OUTPATIENT)
Dept: ORTHOPEDIC SURGERY | Facility: CLINIC | Age: 80
End: 2020-07-21
Payer: MEDICARE

## 2020-07-21 VITALS — SYSTOLIC BLOOD PRESSURE: 121 MMHG | DIASTOLIC BLOOD PRESSURE: 70 MMHG | TEMPERATURE: 97.2 F | HEART RATE: 64 BPM

## 2020-07-21 VITALS — HEART RATE: 64 BPM | DIASTOLIC BLOOD PRESSURE: 70 MMHG | TEMPERATURE: 97.2 F | SYSTOLIC BLOOD PRESSURE: 121 MMHG

## 2020-07-21 VITALS — DIASTOLIC BLOOD PRESSURE: 100 MMHG | HEART RATE: 88 BPM | SYSTOLIC BLOOD PRESSURE: 203 MMHG | TEMPERATURE: 98.1 F

## 2020-07-21 DIAGNOSIS — R29.898 OTHER SYMPTOMS AND SIGNS INVOLVING THE MUSCULOSKELETAL SYSTEM: ICD-10-CM

## 2020-07-21 PROCEDURE — 99213 OFFICE O/P EST LOW 20 MIN: CPT

## 2020-07-21 PROCEDURE — 73502 X-RAY EXAM HIP UNI 2-3 VIEWS: CPT | Mod: RT

## 2020-07-22 ENCOUNTER — APPOINTMENT (OUTPATIENT)
Dept: NEUROLOGY | Facility: CLINIC | Age: 80
End: 2020-07-22
Payer: MEDICARE

## 2020-07-22 VITALS — RESPIRATION RATE: 18 BRPM | DIASTOLIC BLOOD PRESSURE: 56 MMHG | HEART RATE: 66 BPM | SYSTOLIC BLOOD PRESSURE: 91 MMHG

## 2020-07-22 PROCEDURE — 96413 CHEMO IV INFUSION 1 HR: CPT

## 2020-07-22 RX ORDER — ECULIZUMAB 300 MG/30ML
300 INJECTION, SOLUTION, CONCENTRATE INTRAVENOUS
Refills: 0 | Status: COMPLETED | OUTPATIENT
Start: 2020-07-22

## 2020-07-22 RX ADMIN — ECULIZUMAB 0 MG/30ML: 300 INJECTION, SOLUTION, CONCENTRATE INTRAVENOUS at 00:00

## 2020-08-05 ENCOUNTER — APPOINTMENT (OUTPATIENT)
Dept: NEUROLOGY | Facility: CLINIC | Age: 80
End: 2020-08-05
Payer: MEDICARE

## 2020-08-05 VITALS — RESPIRATION RATE: 18 BRPM | HEART RATE: 66 BPM | DIASTOLIC BLOOD PRESSURE: 60 MMHG | SYSTOLIC BLOOD PRESSURE: 111 MMHG

## 2020-08-05 PROCEDURE — 96413 CHEMO IV INFUSION 1 HR: CPT

## 2020-08-05 RX ORDER — ECULIZUMAB 300 MG/30ML
300 INJECTION, SOLUTION, CONCENTRATE INTRAVENOUS
Refills: 0 | Status: COMPLETED | OUTPATIENT
Start: 2020-08-05

## 2020-08-05 RX ADMIN — ECULIZUMAB 0 MG/30ML: 300 INJECTION, SOLUTION, CONCENTRATE INTRAVENOUS at 00:00

## 2020-08-19 ENCOUNTER — APPOINTMENT (OUTPATIENT)
Dept: NEUROLOGY | Facility: CLINIC | Age: 80
End: 2020-08-19
Payer: MEDICARE

## 2020-08-19 VITALS — DIASTOLIC BLOOD PRESSURE: 65 MMHG | RESPIRATION RATE: 18 BRPM | HEART RATE: 63 BPM | SYSTOLIC BLOOD PRESSURE: 116 MMHG

## 2020-08-19 VITALS — TEMPERATURE: 97.3 F

## 2020-08-19 PROCEDURE — 96413 CHEMO IV INFUSION 1 HR: CPT

## 2020-08-19 RX ORDER — ECULIZUMAB 300 MG/30ML
300 INJECTION, SOLUTION, CONCENTRATE INTRAVENOUS
Refills: 0 | Status: COMPLETED | OUTPATIENT
Start: 2020-08-19

## 2020-08-19 RX ADMIN — ECULIZUMAB 0 MG/30ML: 300 INJECTION, SOLUTION, CONCENTRATE INTRAVENOUS at 00:00

## 2020-09-02 ENCOUNTER — APPOINTMENT (OUTPATIENT)
Dept: NEUROLOGY | Facility: CLINIC | Age: 80
End: 2020-09-02
Payer: MEDICARE

## 2020-09-02 PROCEDURE — 96413 CHEMO IV INFUSION 1 HR: CPT

## 2020-09-16 ENCOUNTER — APPOINTMENT (OUTPATIENT)
Dept: NEUROLOGY | Facility: CLINIC | Age: 80
End: 2020-09-16
Payer: MEDICARE

## 2020-09-16 VITALS — TEMPERATURE: 97.3 F

## 2020-09-16 PROCEDURE — 96413 CHEMO IV INFUSION 1 HR: CPT

## 2020-09-30 ENCOUNTER — APPOINTMENT (OUTPATIENT)
Dept: NEUROLOGY | Facility: CLINIC | Age: 80
End: 2020-09-30
Payer: MEDICARE

## 2020-09-30 PROCEDURE — 96413 CHEMO IV INFUSION 1 HR: CPT

## 2020-09-30 RX ORDER — ECULIZUMAB 300 MG/30ML
300 INJECTION, SOLUTION, CONCENTRATE INTRAVENOUS
Refills: 0 | Status: COMPLETED | OUTPATIENT
Start: 2020-09-30

## 2020-09-30 RX ADMIN — ECULIZUMAB 0 MG/30ML: 300 INJECTION, SOLUTION, CONCENTRATE INTRAVENOUS at 00:00

## 2020-10-01 ENCOUNTER — APPOINTMENT (OUTPATIENT)
Dept: INTERNAL MEDICINE | Facility: CLINIC | Age: 80
End: 2020-10-01
Payer: MEDICARE

## 2020-10-01 VITALS — SYSTOLIC BLOOD PRESSURE: 130 MMHG | DIASTOLIC BLOOD PRESSURE: 80 MMHG

## 2020-10-01 VITALS
HEIGHT: 58 IN | DIASTOLIC BLOOD PRESSURE: 75 MMHG | TEMPERATURE: 97.5 F | SYSTOLIC BLOOD PRESSURE: 156 MMHG | WEIGHT: 146 LBS | HEART RATE: 73 BPM | OXYGEN SATURATION: 97 % | BODY MASS INDEX: 30.64 KG/M2 | RESPIRATION RATE: 12 BRPM

## 2020-10-01 DIAGNOSIS — Z23 ENCOUNTER FOR IMMUNIZATION: ICD-10-CM

## 2020-10-01 DIAGNOSIS — L65.9 NONSCARRING HAIR LOSS, UNSPECIFIED: ICD-10-CM

## 2020-10-01 PROCEDURE — G0008: CPT

## 2020-10-01 PROCEDURE — 99214 OFFICE O/P EST MOD 30 MIN: CPT | Mod: CS,25

## 2020-10-01 PROCEDURE — 90686 IIV4 VACC NO PRSV 0.5 ML IM: CPT

## 2020-10-01 RX ORDER — TRAMADOL HYDROCHLORIDE 50 MG/1
50 TABLET, COATED ORAL AT BEDTIME
Qty: 30 | Refills: 5 | Status: DISCONTINUED | COMMUNITY
Start: 2019-09-10 | End: 2020-10-01

## 2020-10-01 RX ORDER — ECULIZUMAB 300 MG/30ML
300 INJECTION, SOLUTION, CONCENTRATE INTRAVENOUS
Qty: 8 | Refills: 12 | Status: DISCONTINUED | COMMUNITY
Start: 2019-09-19 | End: 2020-10-01

## 2020-10-01 RX ORDER — TRAMADOL HYDROCHLORIDE 50 MG/1
50 TABLET, COATED ORAL
Qty: 60 | Refills: 0 | Status: DISCONTINUED | COMMUNITY
Start: 2020-02-11 | End: 2020-10-01

## 2020-10-01 RX ORDER — ECULIZUMAB 300 MG/30ML
300 INJECTION, SOLUTION, CONCENTRATE INTRAVENOUS
Qty: 5 | Refills: 5 | Status: DISCONTINUED | COMMUNITY
Start: 2019-09-12 | End: 2020-10-01

## 2020-10-01 RX ORDER — TRAMADOL HYDROCHLORIDE 50 MG/1
50 TABLET, COATED ORAL
Refills: 0 | Status: DISCONTINUED | COMMUNITY
End: 2020-10-01

## 2020-10-01 RX ORDER — TRAMADOL HYDROCHLORIDE 50 MG/1
50 TABLET, COATED ORAL
Qty: 20 | Refills: 0 | Status: DISCONTINUED | COMMUNITY
Start: 2020-07-23 | End: 2020-10-01

## 2020-10-01 RX ORDER — CIPROFLOXACIN HYDROCHLORIDE 500 MG/1
500 TABLET, FILM COATED ORAL
Qty: 14 | Refills: 0 | Status: DISCONTINUED | COMMUNITY
Start: 2020-07-05 | End: 2020-10-01

## 2020-10-01 NOTE — PHYSICAL EXAM
[No Acute Distress] : no acute distress [Well Nourished] : well nourished [Well Developed] : well developed [Well-Appearing] : well-appearing [Normal Sclera/Conjunctiva] : normal sclera/conjunctiva [PERRL] : pupils equal round and reactive to light [EOMI] : extraocular movements intact [Normal Outer Ear/Nose] : the outer ears and nose were normal in appearance [Normal Oropharynx] : the oropharynx was normal [No JVD] : no jugular venous distention [No Lymphadenopathy] : no lymphadenopathy [Supple] : supple [Thyroid Normal, No Nodules] : the thyroid was normal and there were no nodules present [No Respiratory Distress] : no respiratory distress  [No Accessory Muscle Use] : no accessory muscle use [Clear to Auscultation] : lungs were clear to auscultation bilaterally [Normal Rate] : normal rate  [Regular Rhythm] : with a regular rhythm [Normal S1, S2] : normal S1 and S2 [No Murmur] : no murmur heard [No Carotid Bruits] : no carotid bruits [No Abdominal Bruit] : a ~M bruit was not heard ~T in the abdomen [No Varicosities] : no varicosities [Pedal Pulses Present] : the pedal pulses are present [No Edema] : there was no peripheral edema [No Palpable Aorta] : no palpable aorta [No Extremity Clubbing/Cyanosis] : no extremity clubbing/cyanosis [Soft] : abdomen soft [Non Tender] : non-tender [Non-distended] : non-distended [No Masses] : no abdominal mass palpated [No HSM] : no HSM [Normal Bowel Sounds] : normal bowel sounds [Normal Posterior Cervical Nodes] : no posterior cervical lymphadenopathy [Normal Anterior Cervical Nodes] : no anterior cervical lymphadenopathy [No CVA Tenderness] : no CVA  tenderness [No Spinal Tenderness] : no spinal tenderness [No Joint Swelling] : no joint swelling [Grossly Normal Strength/Tone] : grossly normal strength/tone [No Rash] : no rash [Coordination Grossly Intact] : coordination grossly intact [No Focal Deficits] : no focal deficits [Normal Affect] : the affect was normal [Alert and Oriented x3] : oriented to person, place, and time [Normal Insight/Judgement] : insight and judgment were intact [de-identified] : PAIN WITH ROM RIGHT HIP [de-identified] : NEEDS ASSISTANCE

## 2020-10-01 NOTE — REVIEW OF SYSTEMS
[Joint Pain] : joint pain [Joint Stiffness] : joint stiffness [Unsteady Walking] : ataxia [Anxiety] : anxiety [Negative] : Heme/Lymph [Joint Swelling] : no joint swelling [Muscle Weakness] : no muscle weakness [Muscle Pain] : no muscle pain [Back Pain] : no back pain [Headache] : no headache [Dizziness] : no dizziness [Fainting] : no fainting [Confusion] : no confusion [Memory Loss] : no memory loss [Suicidal] : not suicidal [Depression] : no depression [FreeTextEntry9] : RIGHT HIP [de-identified] : NEEDS ASSISTANCE

## 2020-10-02 LAB
25(OH)D3 SERPL-MCNC: 44.4 NG/ML
ALBUMIN SERPL ELPH-MCNC: 4.2 G/DL
ALP BLD-CCNC: 64 U/L
ALT SERPL-CCNC: 18 U/L
ANION GAP SERPL CALC-SCNC: 16 MMOL/L
APPEARANCE: CLEAR
AST SERPL-CCNC: 24 U/L
BASOPHILS # BLD AUTO: 0.03 K/UL
BASOPHILS NFR BLD AUTO: 0.5 %
BILIRUB SERPL-MCNC: 0.5 MG/DL
BILIRUBIN URINE: NEGATIVE
BLOOD URINE: NEGATIVE
BUN SERPL-MCNC: 19 MG/DL
CALCIUM SERPL-MCNC: 9.6 MG/DL
CHLORIDE SERPL-SCNC: 107 MMOL/L
CHOLEST SERPL-MCNC: 196 MG/DL
CHOLEST/HDLC SERPL: 3 RATIO
CO2 SERPL-SCNC: 20 MMOL/L
COLOR: NORMAL
CREAT SERPL-MCNC: 0.92 MG/DL
EOSINOPHIL # BLD AUTO: 0.06 K/UL
EOSINOPHIL NFR BLD AUTO: 1.1 %
ESTIMATED AVERAGE GLUCOSE: 103 MG/DL
FERRITIN SERPL-MCNC: 29 NG/ML
FOLATE SERPL-MCNC: >20 NG/ML
GLUCOSE QUALITATIVE U: NEGATIVE
GLUCOSE SERPL-MCNC: 82 MG/DL
GLUCOSE SERPL-MCNC: 87 MG/DL
HBA1C MFR BLD HPLC: 5.2 %
HCT VFR BLD CALC: 40 %
HDLC SERPL-MCNC: 65 MG/DL
HGB BLD-MCNC: 12.4 G/DL
IMM GRANULOCYTES NFR BLD AUTO: 0.5 %
IRON SERPL-MCNC: 64 UG/DL
KETONES URINE: NEGATIVE
LDLC SERPL CALC-MCNC: 111 MG/DL
LEUKOCYTE ESTERASE URINE: NEGATIVE
LYMPHOCYTES # BLD AUTO: 1.73 K/UL
LYMPHOCYTES NFR BLD AUTO: 31 %
MAGNESIUM SERPL-MCNC: 2 MG/DL
MAN DIFF?: NORMAL
MCHC RBC-ENTMCNC: 28.8 PG
MCHC RBC-ENTMCNC: 31 GM/DL
MCV RBC AUTO: 93 FL
MONOCYTES # BLD AUTO: 0.6 K/UL
MONOCYTES NFR BLD AUTO: 10.8 %
NEUTROPHILS # BLD AUTO: 3.13 K/UL
NEUTROPHILS NFR BLD AUTO: 56.1 %
NITRITE URINE: NEGATIVE
PH URINE: 6
PLATELET # BLD AUTO: 176 K/UL
POTASSIUM SERPL-SCNC: 4 MMOL/L
PROT SERPL-MCNC: 6.5 G/DL
PROTEIN URINE: NEGATIVE
RBC # BLD: 4.3 M/UL
RBC # FLD: 16.7 %
SARS-COV-2 IGG SERPL IA-ACNC: 35.7 INDEX
SARS-COV-2 IGG SERPL QL IA: POSITIVE
SODIUM SERPL-SCNC: 142 MMOL/L
SPECIFIC GRAVITY URINE: 1.02
TRIGL SERPL-MCNC: 100 MG/DL
TSH SERPL-ACNC: 4.48 UIU/ML
URATE SERPL-MCNC: 5.3 MG/DL
UROBILINOGEN URINE: NORMAL
VIT B12 SERPL-MCNC: 552 PG/ML
WBC # FLD AUTO: 5.58 K/UL

## 2020-10-02 RX ORDER — PREDNISONE 5 MG/1
5 TABLET ORAL
Qty: 90 | Refills: 0 | Status: DISCONTINUED | COMMUNITY
Start: 2020-07-21

## 2020-10-08 LAB
CREAT SPEC-SCNC: 63 MG/DL
MICROALBUMIN 24H UR DL<=1MG/L-MCNC: <1.2 MG/DL
MICROALBUMIN/CREAT 24H UR-RTO: NORMAL MG/G

## 2020-10-14 ENCOUNTER — APPOINTMENT (OUTPATIENT)
Dept: NEUROLOGY | Facility: CLINIC | Age: 80
End: 2020-10-14
Payer: MEDICARE

## 2020-10-14 VITALS — HEART RATE: 69 BPM | RESPIRATION RATE: 18 BRPM | SYSTOLIC BLOOD PRESSURE: 121 MMHG | DIASTOLIC BLOOD PRESSURE: 65 MMHG

## 2020-10-14 PROCEDURE — 96413 CHEMO IV INFUSION 1 HR: CPT

## 2020-10-14 RX ORDER — ECULIZUMAB 300 MG/30ML
300 INJECTION, SOLUTION, CONCENTRATE INTRAVENOUS
Refills: 0 | Status: COMPLETED | OUTPATIENT
Start: 2020-10-14

## 2020-10-14 RX ADMIN — ECULIZUMAB 0 MG/30ML: 300 INJECTION, SOLUTION, CONCENTRATE INTRAVENOUS at 00:00

## 2020-10-16 ENCOUNTER — APPOINTMENT (OUTPATIENT)
Dept: INTERNAL MEDICINE | Facility: CLINIC | Age: 80
End: 2020-10-16

## 2020-10-16 ENCOUNTER — APPOINTMENT (OUTPATIENT)
Dept: INTERNAL MEDICINE | Facility: CLINIC | Age: 80
End: 2020-10-16
Payer: MEDICARE

## 2020-10-16 PROCEDURE — 90715 TDAP VACCINE 7 YRS/> IM: CPT

## 2020-10-16 PROCEDURE — 90471 IMMUNIZATION ADMIN: CPT

## 2020-10-26 ENCOUNTER — APPOINTMENT (OUTPATIENT)
Dept: INTERNAL MEDICINE | Facility: CLINIC | Age: 80
End: 2020-10-26
Payer: MEDICARE

## 2020-10-26 PROCEDURE — 90732 PPSV23 VACC 2 YRS+ SUBQ/IM: CPT

## 2020-10-26 PROCEDURE — G0009: CPT

## 2020-10-28 ENCOUNTER — APPOINTMENT (OUTPATIENT)
Dept: NEUROLOGY | Facility: CLINIC | Age: 80
End: 2020-10-28
Payer: MEDICARE

## 2020-10-28 VITALS — SYSTOLIC BLOOD PRESSURE: 103 MMHG | DIASTOLIC BLOOD PRESSURE: 54 MMHG | RESPIRATION RATE: 18 BRPM | HEART RATE: 63 BPM

## 2020-10-28 PROCEDURE — 96413 CHEMO IV INFUSION 1 HR: CPT

## 2020-11-05 NOTE — DISCHARGE NOTE NURSING/CASE MANAGEMENT/SOCIAL WORK - CAREGIVER NAME
HBA1C is improved still glucose intolerance watch carbohydrates and repeat in 6 months.   Order future tests/medications sent to my chart
78
kell Stevenson

## 2020-11-11 ENCOUNTER — APPOINTMENT (OUTPATIENT)
Dept: NEUROLOGY | Facility: CLINIC | Age: 80
End: 2020-11-11
Payer: MEDICARE

## 2020-11-11 VITALS — SYSTOLIC BLOOD PRESSURE: 110 MMHG | RESPIRATION RATE: 18 BRPM | HEART RATE: 62 BPM | DIASTOLIC BLOOD PRESSURE: 63 MMHG

## 2020-11-11 PROCEDURE — 96413 CHEMO IV INFUSION 1 HR: CPT

## 2020-11-25 ENCOUNTER — APPOINTMENT (OUTPATIENT)
Dept: NEUROLOGY | Facility: CLINIC | Age: 80
End: 2020-11-25
Payer: MEDICARE

## 2020-11-25 VITALS — SYSTOLIC BLOOD PRESSURE: 113 MMHG | DIASTOLIC BLOOD PRESSURE: 62 MMHG | RESPIRATION RATE: 18 BRPM | HEART RATE: 62 BPM

## 2020-11-25 VITALS — TEMPERATURE: 97.6 F

## 2020-11-25 PROCEDURE — 96413 CHEMO IV INFUSION 1 HR: CPT

## 2020-11-25 RX ORDER — ECULIZUMAB 300 MG/30ML
300 INJECTION, SOLUTION, CONCENTRATE INTRAVENOUS
Refills: 0 | Status: COMPLETED | OUTPATIENT
Start: 2020-11-25

## 2020-11-25 RX ADMIN — ECULIZUMAB 0 MG/30ML: 300 INJECTION, SOLUTION, CONCENTRATE INTRAVENOUS at 00:00

## 2020-12-01 ENCOUNTER — APPOINTMENT (OUTPATIENT)
Dept: ORTHOPEDIC SURGERY | Facility: CLINIC | Age: 80
End: 2020-12-01
Payer: MEDICARE

## 2020-12-01 VITALS — DIASTOLIC BLOOD PRESSURE: 60 MMHG | TEMPERATURE: 96.6 F | SYSTOLIC BLOOD PRESSURE: 115 MMHG | HEART RATE: 60 BPM

## 2020-12-01 DIAGNOSIS — Z96.641 PRESENCE OF RIGHT ARTIFICIAL HIP JOINT: ICD-10-CM

## 2020-12-01 PROCEDURE — 73502 X-RAY EXAM HIP UNI 2-3 VIEWS: CPT | Mod: RT

## 2020-12-01 PROCEDURE — 99213 OFFICE O/P EST LOW 20 MIN: CPT

## 2020-12-09 ENCOUNTER — APPOINTMENT (OUTPATIENT)
Dept: NEUROLOGY | Facility: CLINIC | Age: 80
End: 2020-12-09
Payer: MEDICARE

## 2020-12-09 VITALS — TEMPERATURE: 97.3 F

## 2020-12-09 PROCEDURE — 96413 CHEMO IV INFUSION 1 HR: CPT

## 2020-12-22 ENCOUNTER — APPOINTMENT (OUTPATIENT)
Dept: NEUROLOGY | Facility: CLINIC | Age: 80
End: 2020-12-22
Payer: MEDICARE

## 2020-12-22 VITALS
WEIGHT: 145 LBS | HEIGHT: 58 IN | BODY MASS INDEX: 30.44 KG/M2 | DIASTOLIC BLOOD PRESSURE: 79 MMHG | SYSTOLIC BLOOD PRESSURE: 134 MMHG | HEART RATE: 70 BPM

## 2020-12-22 VITALS — TEMPERATURE: 96.3 F

## 2020-12-22 PROCEDURE — 99214 OFFICE O/P EST MOD 30 MIN: CPT

## 2020-12-22 NOTE — ASSESSMENT
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

## 2020-12-22 NOTE — HISTORY OF PRESENT ILLNESS
[FreeTextEntry1] : Patient here for f/u, she is doing well with motor strength.  No SOB.  ADL's are fairly normal.  \par \par Prednisone 2.5mg QOD, mestinon 180mg QD and 60mg TID

## 2020-12-22 NOTE — PHYSICAL EXAM
[Person] : oriented to person [Place] : oriented to place [Time] : oriented to time [Short Term Intact] : short term memory intact [Remote Intact] : remote memory intact [Registration Intact] : recent registration memory intact [Concentration Intact] : normal concentrating ability [Visual Intact] : visual attention was ~T not ~L decreased [Naming Objects] : no difficulty naming common objects [Repeating Phrases] : no difficulty repeating a phrase [Writing A Sentence] : no difficulty writing a sentence [Fluency] : fluency intact [Comprehension] : comprehension intact [Reading] : reading intact [Past History] : adequate knowledge of personal past history [Cranial Nerves Optic (II)] : visual acuity intact bilaterally,  visual fields full to confrontation, pupils equal round and reactive to light [Cranial Nerves Oculomotor (III)] : extraocular motion intact [Cranial Nerves Trigeminal (V)] : facial sensation intact symmetrically [Cranial Nerves Facial (VII)] : face symmetrical [Cranial Nerves Vestibulocochlear (VIII)] : hearing was intact bilaterally [Cranial Nerves Glossopharyngeal (IX)] : tongue and palate midline [Cranial Nerves Accessory (XI - Cranial And Spinal)] : head turning and shoulder shrug symmetric [Cranial Nerves Hypoglossal (XII)] : there was no tongue deviation with protrusion [Motor Tone] : muscle tone was normal in all four extremities [Motor Strength] : muscle strength was normal in all four extremities [No Muscle Atrophy] : normal bulk in all four extremities [Motor Handedness Right-Handed] : the patient is right hand dominant [4] : hip flexion 4/5 [+4] : knee extension +4/5 [5] : ankle inversion 5/5 [Sensation Tactile Decrease] : light touch was intact [Abnormal Walk] : normal gait [Balance] : balance was intact [2+] : Ankle jerk left 2+ [Hand Weakness Right] : normal hand  [Hand Weakness Left] : normal hand  [Past-pointing] : there was no past-pointing [Tremor] : no tremor present [Plantar Reflex Right Only] : normal on the right [Plantar Reflex Left Only] : normal on the left [FreeTextEntry6] : MG-ADL 0

## 2020-12-23 ENCOUNTER — APPOINTMENT (OUTPATIENT)
Dept: NEUROLOGY | Facility: CLINIC | Age: 80
End: 2020-12-23
Payer: MEDICARE

## 2020-12-23 PROCEDURE — 96413 CHEMO IV INFUSION 1 HR: CPT

## 2021-01-06 ENCOUNTER — APPOINTMENT (OUTPATIENT)
Dept: NEUROLOGY | Facility: CLINIC | Age: 81
End: 2021-01-06
Payer: MEDICARE

## 2021-01-06 VITALS — TEMPERATURE: 97.1 F

## 2021-01-06 PROCEDURE — 96413 CHEMO IV INFUSION 1 HR: CPT

## 2021-01-16 NOTE — PROGRESS NOTE ADULT - ASSESSMENT
Physical Therapy  Visit Type: initial evaluation  Precautions:  Medical precautions:  fall risk; standard precautions.  PPE worn throughout session: gloves, procedure mask and face shield    Lines:       Lines in chart and on patient reviewed, cautions maintained throughout session.  Safety Measures: bed alarm      SUBJECTIVE                                                                                                            Patient agreed to participate in therapy this date.  \"Put me back.\"  Patient / Family Goal: return home    Pain   RN informed on pain level  Face, Legs, Activity, Cry, Consolability Scale (FLACC)     Face: 1 - Occasional grimace or frown, withdrawn, disinterested    Legs: 1 - Uneasy, restless, tense    Activity: 0 - Lying quietly, normal position, moves easily    Cry: 1 - Moans or whimpers; occasional complaint    Consolability: 0 - Content, relaxed    Score: 3      OBJECTIVE                                                                                                              Level of consciousness: lethargic    Oriented to person, place, time and situation     Affect/Behavior: flat, irritable and sleepy  Functional Communication/Cognition    Overall status:  Within functional limits    Form of communication:  Verbal    Attention span:     Attention Span Impairment: fatigue    Commands: follows one step commands with increased time and follows one step commands with repetition.    Safety judgement: decreased awareness of need for assistance.  Strength (out of 5 unless otherwise indicated)   WFL: LLE, RLE, except as noted  Knee:   - Extension:      • Left: 1, pain  Ankle:    - Dorsiflexion:      • Left: 1, pain   - Plantar Flexion:      • Left: 1, pain  Comments / Details:  Very limited L AROM d/t pain   Balance    Sitting: Static: supervision double upper extremity support    Bed Mobility:      Repositioning in bed: total assist - dependent      Supine to sit: minimal assist    Sit  to supine: minimal assist  Training completed:    Tasks: all aspects of bed mobility    Education details: patient safety and patient requires additional training    Pt requiring max encouragement for participation and requires increased time for mobility this date. Maintains sitting balance at EOB for ~3 min before returning to supine.         Interventions                                                                                                       Training provided: activity tolerance, balance retraining, bed mobility training, safety training and transfer training    Skilled input: Verbal instruction/cues and tactile instruction/cues  Verbal Consent: Writer verbally educated and received verbal consent for hand placement, positioning of patient, and techniques to be performed today from patient for clothing adjustments for techniques and therapist position for techniques as described above and how they are pertinent to the patient's plan of care.        ASSESSMENT                                                                                                                Impairments: range of motion, strength, balance deficits, pain, activity tolerance, endurance, cognition and safety awareness  Functional Limitations: all functional mobility     Discharge Recommendations   Recommendation for Discharge: PT IL: Patient needs daily, skilled therapy for 1-3 hours a day by at least two disciplines        PT/OT Mobility Equipment for Discharge: TBD      PT Identified Barriers to Discharge: Pain, motivation, social support.     Skilled therapy is required to address these limitations in attempt to maximize the patient's independence.  Progress: improving as expected  Predicted patient presentation: Low (stable) - Patient comorbidities and complexities, as defined above, will have little effect on progress for prescribed plan of care.    End of Session:   Location: in bed  Safety measures: alarm system in  77 yo female with MG admitted with shortness of breath place/re-engaged, bed rails x2, call light within reach, lines intact and equipment intact  Handoff to: nurse            PLAN                                                                                                                            Suggestions for next session as indicated: PT Frequency: 5 days/week  Frequency Comments: 1/5, LS 1/16, HENNY (AR)      Interventions: balance, bed mobility, endurance training, energy conservation, equipment eval/education, functional transfer training, gait training, HEP train/position, neuromuscular re-education, patient/family training, ROM, strengthening, safety education and stairs retraining  Agreement to plan and goals: patient agrees with goals and treatment plan        GOALS:  Review Date: 1/23/2021  Long Term Goals: (to be met by time of discharge from hospital)  Sit to supine: Patient will complete sit to supine supervision.  Status: progressing/ongoing  Supine to sit: Patient will complete supine to sit supervision.  Status: progressing/ongoing  Sit to stand: Patient will complete sit to stand transfer with 2-wheeled walker and gait belt, minimal assist.   Status: progressing/ongoing  Stand to sit: Patient will complete stand to sit transfer with gait belt and 2-wheeled walker, minimal assist.   Status: progressing/ongoing  Stand pivot: Patient will complete stand pivot transfer with gait belt and 2-wheeled walker, minimal assist.   Status: progressing/ongoing  Ambulation (even): Patient will ambulate on even surface for 25 feet with gait belt and 2-wheeled walker, minimal assist.   Status: progressing/ongoing    Documented in the chart in the following areas: Prior Level of Function. Assessment. Patient Education.

## 2021-01-20 ENCOUNTER — APPOINTMENT (OUTPATIENT)
Dept: NEUROLOGY | Facility: CLINIC | Age: 81
End: 2021-01-20
Payer: MEDICARE

## 2021-01-20 VITALS — TEMPERATURE: 96.9 F

## 2021-01-20 PROCEDURE — 96413 CHEMO IV INFUSION 1 HR: CPT

## 2021-02-03 ENCOUNTER — APPOINTMENT (OUTPATIENT)
Dept: NEUROLOGY | Facility: CLINIC | Age: 81
End: 2021-02-03
Payer: MEDICARE

## 2021-02-03 PROCEDURE — 96413 CHEMO IV INFUSION 1 HR: CPT

## 2021-02-08 NOTE — PATIENT PROFILE ADULT - NSPROPTRIGHTREPNAME_GEN_A__NUR
Received medical record request from [facility] [address] on 2/5/21. Faxed request to Ains[facility] on 2/8/21 and received confirmation.
Patsy Abraham

## 2021-02-17 ENCOUNTER — APPOINTMENT (OUTPATIENT)
Dept: NEUROLOGY | Facility: CLINIC | Age: 81
End: 2021-02-17
Payer: MEDICARE

## 2021-02-17 PROCEDURE — 96413 CHEMO IV INFUSION 1 HR: CPT

## 2021-03-03 ENCOUNTER — APPOINTMENT (OUTPATIENT)
Dept: NEUROLOGY | Facility: CLINIC | Age: 81
End: 2021-03-03
Payer: MEDICARE

## 2021-03-03 PROCEDURE — 96413 CHEMO IV INFUSION 1 HR: CPT

## 2021-03-17 ENCOUNTER — APPOINTMENT (OUTPATIENT)
Dept: NEUROLOGY | Facility: CLINIC | Age: 81
End: 2021-03-17
Payer: MEDICARE

## 2021-03-17 PROCEDURE — 96413 CHEMO IV INFUSION 1 HR: CPT

## 2021-03-23 ENCOUNTER — APPOINTMENT (OUTPATIENT)
Dept: NEUROLOGY | Facility: CLINIC | Age: 81
End: 2021-03-23
Payer: MEDICARE

## 2021-03-23 VITALS
HEART RATE: 66 BPM | WEIGHT: 148 LBS | DIASTOLIC BLOOD PRESSURE: 75 MMHG | SYSTOLIC BLOOD PRESSURE: 128 MMHG | BODY MASS INDEX: 31.07 KG/M2 | HEIGHT: 58 IN

## 2021-03-23 PROCEDURE — 99214 OFFICE O/P EST MOD 30 MIN: CPT

## 2021-03-23 RX ORDER — PREDNISONE 2.5 MG/1
2.5 TABLET ORAL
Qty: 30 | Refills: 5 | Status: DISCONTINUED | COMMUNITY
Start: 2019-12-11 | End: 2021-03-23

## 2021-03-23 NOTE — ASSESSMENT
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 \par \par f/u 4 months

## 2021-03-23 NOTE — HISTORY OF PRESENT ILLNESS
[FreeTextEntry1] : Patient doing well.  Getting Soliris every two weeks.  Takes mestinon 180mg QAM, then 60mg TID.  \par \par She states for a couple months she has right neck pain that shoots and radiates up to top of head.

## 2021-03-23 NOTE — PHYSICAL EXAM
[Person] : oriented to person [Place] : oriented to place [Time] : oriented to time [Short Term Intact] : short term memory intact [Remote Intact] : remote memory intact [Registration Intact] : recent registration memory intact [Concentration Intact] : normal concentrating ability [Visual Intact] : visual attention was ~T not ~L decreased [Naming Objects] : no difficulty naming common objects [Repeating Phrases] : no difficulty repeating a phrase [Writing A Sentence] : no difficulty writing a sentence [Fluency] : fluency intact [Comprehension] : comprehension intact [Reading] : reading intact [Past History] : adequate knowledge of personal past history [Cranial Nerves Optic (II)] : visual acuity intact bilaterally,  visual fields full to confrontation, pupils equal round and reactive to light [Cranial Nerves Oculomotor (III)] : extraocular motion intact [Cranial Nerves Trigeminal (V)] : facial sensation intact symmetrically [Cranial Nerves Facial (VII)] : face symmetrical [Cranial Nerves Vestibulocochlear (VIII)] : hearing was intact bilaterally [Cranial Nerves Glossopharyngeal (IX)] : tongue and palate midline [Cranial Nerves Accessory (XI - Cranial And Spinal)] : head turning and shoulder shrug symmetric [Cranial Nerves Hypoglossal (XII)] : there was no tongue deviation with protrusion [Motor Tone] : muscle tone was normal in all four extremities [Motor Strength] : muscle strength was normal in all four extremities [No Muscle Atrophy] : normal bulk in all four extremities [Motor Handedness Right-Handed] : the patient is right hand dominant [Hand Weakness Right] : normal hand  [Hand Weakness Left] : normal hand  [4] : hip flexion 4/5 [+4] : knee extension +4/5 [5] : ankle inversion 5/5 [Sensation Tactile Decrease] : light touch was intact [Abnormal Walk] : normal gait [Balance] : balance was intact [Past-pointing] : there was no past-pointing [Tremor] : no tremor present [2+] : Ankle jerk left 2+ [Plantar Reflex Right Only] : normal on the right [Plantar Reflex Left Only] : normal on the left [FreeTextEntry6] : MG-ADL 0, tender right sub-occipital notch which reproduces her pain

## 2021-03-25 ENCOUNTER — APPOINTMENT (OUTPATIENT)
Dept: INTERNAL MEDICINE | Facility: CLINIC | Age: 81
End: 2021-03-25
Payer: MEDICARE

## 2021-03-25 ENCOUNTER — LABORATORY RESULT (OUTPATIENT)
Age: 81
End: 2021-03-25

## 2021-03-25 VITALS
HEIGHT: 58 IN | WEIGHT: 146 LBS | SYSTOLIC BLOOD PRESSURE: 135 MMHG | RESPIRATION RATE: 14 BRPM | BODY MASS INDEX: 30.64 KG/M2 | HEART RATE: 64 BPM | DIASTOLIC BLOOD PRESSURE: 76 MMHG | TEMPERATURE: 97.1 F | OXYGEN SATURATION: 97 %

## 2021-03-25 VITALS — DIASTOLIC BLOOD PRESSURE: 70 MMHG | SYSTOLIC BLOOD PRESSURE: 130 MMHG

## 2021-03-25 PROCEDURE — 99214 OFFICE O/P EST MOD 30 MIN: CPT

## 2021-03-25 RX ORDER — ZOSTER VACCINE RECOMBINANT, ADJUVANTED 50 MCG/0.5
50 KIT INTRAMUSCULAR
Qty: 1 | Refills: 1 | Status: DISCONTINUED | COMMUNITY
Start: 2020-10-01 | End: 2021-03-25

## 2021-03-25 NOTE — REVIEW OF SYSTEMS
[Joint Pain] : joint pain [Joint Stiffness] : joint stiffness [Unsteady Walking] : ataxia [Anxiety] : anxiety [Negative] : Heme/Lymph [Joint Swelling] : no joint swelling [Muscle Weakness] : no muscle weakness [Muscle Pain] : no muscle pain [Back Pain] : no back pain [Headache] : no headache [Dizziness] : no dizziness [Fainting] : no fainting [Confusion] : no confusion [Memory Loss] : no memory loss [Suicidal] : not suicidal [Depression] : no depression [FreeTextEntry9] : RIGHT HIP [de-identified] : NEEDS ASSISTANCE

## 2021-03-25 NOTE — PHYSICAL EXAM

## 2021-03-29 DIAGNOSIS — N30.01 ACUTE CYSTITIS WITH HEMATURIA: ICD-10-CM

## 2021-03-29 LAB
25(OH)D3 SERPL-MCNC: 61.6 NG/ML
ALBUMIN SERPL ELPH-MCNC: 4.1 G/DL
ALP BLD-CCNC: 65 U/L
ALT SERPL-CCNC: 22 U/L
ANION GAP SERPL CALC-SCNC: 9 MMOL/L
APPEARANCE: ABNORMAL
AST SERPL-CCNC: 25 U/L
BASOPHILS # BLD AUTO: 0.02 K/UL
BASOPHILS NFR BLD AUTO: 0.4 %
BILIRUB SERPL-MCNC: 0.4 MG/DL
BILIRUBIN URINE: NEGATIVE
BLOOD URINE: NORMAL
BUN SERPL-MCNC: 22 MG/DL
CALCIUM SERPL-MCNC: 9.8 MG/DL
CHLORIDE SERPL-SCNC: 108 MMOL/L
CHOLEST SERPL-MCNC: 204 MG/DL
CO2 SERPL-SCNC: 24 MMOL/L
COLOR: YELLOW
CREAT SERPL-MCNC: 0.93 MG/DL
CREAT SPEC-SCNC: 93 MG/DL
EOSINOPHIL # BLD AUTO: 0.06 K/UL
EOSINOPHIL NFR BLD AUTO: 1.2 %
ESTIMATED AVERAGE GLUCOSE: 103 MG/DL
FERRITIN SERPL-MCNC: 56 NG/ML
FOLATE SERPL-MCNC: >20 NG/ML
GLUCOSE QUALITATIVE U: NEGATIVE
GLUCOSE SERPL-MCNC: 84 MG/DL
GLUCOSE SERPL-MCNC: 90 MG/DL
HBA1C MFR BLD HPLC: 5.2 %
HCT VFR BLD CALC: 35.3 %
HDLC SERPL-MCNC: 47 MG/DL
HGB BLD-MCNC: 11.5 G/DL
IMM GRANULOCYTES NFR BLD AUTO: 0.2 %
IRON SERPL-MCNC: 92 UG/DL
KETONES URINE: NEGATIVE
LDLC SERPL CALC-MCNC: 136 MG/DL
LEUKOCYTE ESTERASE URINE: ABNORMAL
LYMPHOCYTES # BLD AUTO: 1.69 K/UL
LYMPHOCYTES NFR BLD AUTO: 33.3 %
MAGNESIUM SERPL-MCNC: 2.2 MG/DL
MAN DIFF?: NORMAL
MCHC RBC-ENTMCNC: 30.2 PG
MCHC RBC-ENTMCNC: 32.6 GM/DL
MCV RBC AUTO: 92.7 FL
MICROALBUMIN 24H UR DL<=1MG/L-MCNC: 2.2 MG/DL
MICROALBUMIN/CREAT 24H UR-RTO: 23 MG/G
MONOCYTES # BLD AUTO: 0.48 K/UL
MONOCYTES NFR BLD AUTO: 9.5 %
NEUTROPHILS # BLD AUTO: 2.81 K/UL
NEUTROPHILS NFR BLD AUTO: 55.4 %
NITRITE URINE: POSITIVE
NONHDLC SERPL-MCNC: 157 MG/DL
PH URINE: 5.5
PLATELET # BLD AUTO: 159 K/UL
POTASSIUM SERPL-SCNC: 4.6 MMOL/L
PROT SERPL-MCNC: 7 G/DL
PROTEIN URINE: NEGATIVE
RBC # BLD: 3.81 M/UL
RBC # FLD: 13.9 %
SODIUM SERPL-SCNC: 141 MMOL/L
SPECIFIC GRAVITY URINE: 1.02
T4 FREE SERPL-MCNC: 0.7 NG/DL
TRIGL SERPL-MCNC: 108 MG/DL
TSH SERPL-ACNC: 27.9 UIU/ML
UROBILINOGEN URINE: NORMAL
VIT B12 SERPL-MCNC: 604 PG/ML
WBC # FLD AUTO: 5.07 K/UL

## 2021-03-31 ENCOUNTER — APPOINTMENT (OUTPATIENT)
Dept: NEUROLOGY | Facility: CLINIC | Age: 81
End: 2021-03-31
Payer: MEDICARE

## 2021-03-31 PROCEDURE — 96413 CHEMO IV INFUSION 1 HR: CPT

## 2021-04-07 ENCOUNTER — NON-APPOINTMENT (OUTPATIENT)
Age: 81
End: 2021-04-07

## 2021-04-07 ENCOUNTER — APPOINTMENT (OUTPATIENT)
Dept: CARDIOLOGY | Facility: CLINIC | Age: 81
End: 2021-04-07
Payer: MEDICARE

## 2021-04-07 VITALS
HEART RATE: 64 BPM | WEIGHT: 149 LBS | DIASTOLIC BLOOD PRESSURE: 74 MMHG | SYSTOLIC BLOOD PRESSURE: 139 MMHG | TEMPERATURE: 97.5 F | OXYGEN SATURATION: 94 % | HEIGHT: 58 IN | RESPIRATION RATE: 12 BRPM | BODY MASS INDEX: 31.28 KG/M2

## 2021-04-07 PROCEDURE — 99204 OFFICE O/P NEW MOD 45 MIN: CPT | Mod: CS

## 2021-04-07 PROCEDURE — 93000 ELECTROCARDIOGRAM COMPLETE: CPT

## 2021-04-07 NOTE — HISTORY OF PRESENT ILLNESS
[FreeTextEntry1] : Alanna is an 80-year-old female htn, hyperlipidemia, MG,, s/p COVID one year ago with persistent shortness of breath. She was at [facility] for 56 days and received convalescent plasma. Recently vaccinated. No chest pain, palpitations or dizziness.

## 2021-04-07 NOTE — PHYSICAL EXAM
[General Appearance - Well Developed] : well developed [Normal Appearance] : normal appearance [Well Groomed] : well groomed [General Appearance - Well Nourished] : well nourished [No Deformities] : no deformities [General Appearance - In No Acute Distress] : no acute distress [Normal Conjunctiva] : the conjunctiva exhibited no abnormalities [Eyelids - No Xanthelasma] : the eyelids demonstrated no xanthelasmas [Normal Oral Mucosa] : normal oral mucosa [No Oral Pallor] : no oral pallor [No Oral Cyanosis] : no oral cyanosis [Normal Jugular Venous A Waves Present] : normal jugular venous A waves present [Normal Jugular Venous V Waves Present] : normal jugular venous V waves present [No Jugular Venous Pete A Waves] : no jugular venous pete A waves [Heart Rate And Rhythm] : heart rate and rhythm were normal [Heart Sounds] : normal S1 and S2 [Murmurs] : no murmurs present [Respiration, Rhythm And Depth] : normal respiratory rhythm and effort [Exaggerated Use Of Accessory Muscles For Inspiration] : no accessory muscle use [Auscultation Breath Sounds / Voice Sounds] : lungs were clear to auscultation bilaterally [Abdomen Soft] : soft [Abdomen Tenderness] : non-tender [Abdomen Mass (___ Cm)] : no abdominal mass palpated [Abnormal Walk] : normal gait [Gait - Sufficient For Exercise Testing] : the gait was sufficient for exercise testing [Nail Clubbing] : no clubbing of the fingernails [Cyanosis, Localized] : no localized cyanosis [Petechial Hemorrhages (___cm)] : no petechial hemorrhages [Skin Color & Pigmentation] : normal skin color and pigmentation [] : no rash [No Venous Stasis] : no venous stasis [Skin Lesions] : no skin lesions [No Skin Ulcers] : no skin ulcer [No Xanthoma] : no  xanthoma was observed [Oriented To Time, Place, And Person] : oriented to person, place, and time [Affect] : the affect was normal [Mood] : the mood was normal [No Anxiety] : not feeling anxious

## 2021-04-07 NOTE — DISCUSSION/SUMMARY
[FreeTextEntry1] : The patient is an 80-year-old female myasthenia gravis, hypertension hyperlipidemia, hypothyroid, s/p COVID one year ago with shortness of breath.\par #1 CV- euvolemic on exam, ECHO ordered\par #2 Pulm- s/p COVID pneumonia 13 months ago with prolonged hospitalization, ordered f/u CT chest\par #3 Htn- continue losartan 50/12.5mg\par #4 Lipids- diet\par #5 Hypothyroid- continue levothyroxine\par #6 MG- on pyridostigmine, daughter supportive, received COVID vaccines

## 2021-04-14 ENCOUNTER — APPOINTMENT (OUTPATIENT)
Dept: NEUROLOGY | Facility: CLINIC | Age: 81
End: 2021-04-14
Payer: MEDICARE

## 2021-04-14 VITALS — TEMPERATURE: 97.2 F

## 2021-04-14 PROCEDURE — 96413 CHEMO IV INFUSION 1 HR: CPT

## 2021-04-16 LAB
APPEARANCE: CLEAR
BACTERIA: NEGATIVE
BILIRUBIN URINE: NEGATIVE
BLOOD URINE: NEGATIVE
COLOR: NORMAL
GLUCOSE QUALITATIVE U: NEGATIVE
HYALINE CASTS: 0 /LPF
KETONES URINE: NEGATIVE
LEUKOCYTE ESTERASE URINE: NEGATIVE
MICROSCOPIC-UA: NORMAL
NITRITE URINE: NEGATIVE
PH URINE: 6
PROTEIN URINE: NORMAL
RED BLOOD CELLS URINE: 3 /HPF
SPECIFIC GRAVITY URINE: 1.02
SQUAMOUS EPITHELIAL CELLS: 1 /HPF
UROBILINOGEN URINE: NORMAL
WHITE BLOOD CELLS URINE: 0 /HPF

## 2021-04-19 LAB
BACTERIA UR CULT: NORMAL
URINE CYTOLOGY: NORMAL

## 2021-04-20 ENCOUNTER — APPOINTMENT (OUTPATIENT)
Dept: PULMONOLOGY | Facility: CLINIC | Age: 81
End: 2021-04-20
Payer: MEDICARE

## 2021-04-20 VITALS
TEMPERATURE: 98.2 F | HEART RATE: 72 BPM | BODY MASS INDEX: 31.56 KG/M2 | OXYGEN SATURATION: 95 % | SYSTOLIC BLOOD PRESSURE: 138 MMHG | DIASTOLIC BLOOD PRESSURE: 72 MMHG | WEIGHT: 151 LBS

## 2021-04-20 DIAGNOSIS — J44.9 CHRONIC OBSTRUCTIVE PULMONARY DISEASE, UNSPECIFIED: ICD-10-CM

## 2021-04-20 PROCEDURE — 99213 OFFICE O/P EST LOW 20 MIN: CPT | Mod: 25

## 2021-04-20 PROCEDURE — 94729 DIFFUSING CAPACITY: CPT

## 2021-04-20 PROCEDURE — 94727 GAS DIL/WSHOT DETER LNG VOL: CPT

## 2021-04-20 PROCEDURE — 94060 EVALUATION OF WHEEZING: CPT

## 2021-04-28 ENCOUNTER — APPOINTMENT (OUTPATIENT)
Dept: NEUROLOGY | Facility: CLINIC | Age: 81
End: 2021-04-28
Payer: MEDICARE

## 2021-04-28 PROCEDURE — 96413 CHEMO IV INFUSION 1 HR: CPT

## 2021-05-04 ENCOUNTER — APPOINTMENT (OUTPATIENT)
Dept: CARDIOLOGY | Facility: CLINIC | Age: 81
End: 2021-05-04
Payer: MEDICARE

## 2021-05-04 PROCEDURE — 93306 TTE W/DOPPLER COMPLETE: CPT

## 2021-05-05 ENCOUNTER — APPOINTMENT (OUTPATIENT)
Dept: UROLOGY | Facility: CLINIC | Age: 81
End: 2021-05-05
Payer: MEDICARE

## 2021-05-05 DIAGNOSIS — Z87.440 PERSONAL HISTORY OF URINARY (TRACT) INFECTIONS: ICD-10-CM

## 2021-05-05 DIAGNOSIS — Z87.442 PERSONAL HISTORY OF URINARY CALCULI: ICD-10-CM

## 2021-05-05 PROCEDURE — 99204 OFFICE O/P NEW MOD 45 MIN: CPT

## 2021-05-05 NOTE — HISTORY OF PRESENT ILLNESS
[FreeTextEntry1] : Very pleasant 80yof with PMH significant for urolithiasis who presents for evaluation of recurrent UTI and flank pain.  She states for the past few years every few months she experiencing burning dysuria and urgency incontinence.  She most recently experienced this in March.  Urine from that time grew E. coli.  She additionally reports severe R sided flank pain which has intermittently occurred over the past few months.  Hx of kidney stones in the past which passed spontaneously.

## 2021-05-05 NOTE — ASSESSMENT
[FreeTextEntry1] : Very pleasant 80yof with recurrent UTI and hx of kidney stones.\par -Discussed mechanism formation of urinary tract infections in postmenopausal women\par - Discussed treatment for recurrent UTI including methenamine, vaginal estrogen, Hip-Abram and cranberry pills.  Patient elected to start taking cranberry pills.\par - CT scan to assess for kidney stones given history of stones and right flank pain\par -  f/u after CT\par -Urine culture\par -Prior urine culture reviewed–contaminated\par -Urinalysis reviewed–3 red blood cells per high-powered field, repeat

## 2021-05-05 NOTE — REVIEW OF SYSTEMS
[Urine Infection (bladder/kidney)] : bladder/kidney infection [Negative] : Heme/Lymph [FreeTextEntry6] : pain on right side / reoccurring UTI

## 2021-05-07 LAB
APPEARANCE: CLEAR
BACTERIA UR CULT: NORMAL
BACTERIA: NEGATIVE
BILIRUBIN URINE: NEGATIVE
BLOOD URINE: NEGATIVE
COLOR: YELLOW
GLUCOSE QUALITATIVE U: NEGATIVE
HYALINE CASTS: 1 /LPF
KETONES URINE: NEGATIVE
LEUKOCYTE ESTERASE URINE: ABNORMAL
MICROSCOPIC-UA: NORMAL
NITRITE URINE: NEGATIVE
PH URINE: 6
PROTEIN URINE: NORMAL
RED BLOOD CELLS URINE: 8 /HPF
SPECIFIC GRAVITY URINE: 1.02
SQUAMOUS EPITHELIAL CELLS: 1 /HPF
UROBILINOGEN URINE: NORMAL
WHITE BLOOD CELLS URINE: 4 /HPF

## 2021-05-09 PROBLEM — J44.9 OAD (OBSTRUCTIVE AIRWAY DISEASE): Status: ACTIVE | Noted: 2018-11-11

## 2021-05-09 NOTE — REASON FOR VISIT
[Follow-Up] : a follow-up visit [Abnormal CXR/ Chest CT] : an abnormal CXR/ chest CT [TextBox_44] : post covid 19 infection

## 2021-05-09 NOTE — PROCEDURE
Xray in for portable chest. Picc difficult to visualize related to patients anatomy of bony prominence on left and right side pacer.  Do not use picc until confirmed by radiology [FreeTextEntry1] : PFT results: Mild restriction with decreased diffusion. Bronchodilator response noted

## 2021-05-09 NOTE — DISCUSSION/SUMMARY
[FreeTextEntry1] : 79 yo female with mild OAD, with post covid 19 infection related chest imaging findings. I reviewed the PFT with the patient and discussed the latest chest CT findings which were viewed on line. She was prescribed breo 200 with PRN albuterol MDI. Treatment adjustment will depend on symptomatic needs. She is to follow up with her PMD as before.

## 2021-05-09 NOTE — HISTORY OF PRESENT ILLNESS
[Never] : never [TextBox_4] : 79 yo female post covid 19 infection last year, presents for follow up of recent chest CT. The patient was hospitalized for 56 days, treated with high flow oxygen, but never intubated.She had a repeat chest CT recently which revealed abnormalities.Presently she feels "better" complaining of PRN productive cough with MAXWELL. She was seen by me in October 2018, treated with albuterol MDI which she did not use. She has a hx of myasthenia on treatment. [TextBox_29] : Denies snoring, daytime somnolence, apneic episodes, AM headaches

## 2021-05-09 NOTE — PHYSICAL EXAM
[No Acute Distress] : no acute distress [Normal Oropharynx] : normal oropharynx [Normal Appearance] : normal appearance [No Neck Mass] : no neck mass [Normal Rate/Rhythm] : normal rate/rhythm [Murmur ___ / 6] : murmur [unfilled] / 6 [Normal S1, S2] : normal s1, s2 [No Resp Distress] : no resp distress [Rales] : rales [No Abnormalities] : no abnormalities [Benign] : benign [Normal Gait] : normal gait [No Clubbing] : no clubbing [No Cyanosis] : no cyanosis [No Edema] : no edema [FROM] : FROM [Normal Color/ Pigmentation] : normal color/ pigmentation [No Focal Deficits] : no focal deficits [Oriented x3] : oriented x3 [Normal Affect] : normal affect

## 2021-05-12 ENCOUNTER — NON-APPOINTMENT (OUTPATIENT)
Age: 81
End: 2021-05-12

## 2021-05-12 ENCOUNTER — APPOINTMENT (OUTPATIENT)
Dept: NEUROLOGY | Facility: CLINIC | Age: 81
End: 2021-05-12
Payer: MEDICARE

## 2021-05-12 ENCOUNTER — APPOINTMENT (OUTPATIENT)
Dept: CARDIOLOGY | Facility: CLINIC | Age: 81
End: 2021-05-12
Payer: MEDICARE

## 2021-05-12 VITALS
TEMPERATURE: 97.3 F | SYSTOLIC BLOOD PRESSURE: 144 MMHG | OXYGEN SATURATION: 94 % | RESPIRATION RATE: 12 BRPM | WEIGHT: 150 LBS | DIASTOLIC BLOOD PRESSURE: 74 MMHG | HEART RATE: 70 BPM | BODY MASS INDEX: 31.49 KG/M2 | HEIGHT: 58 IN

## 2021-05-12 VITALS — SYSTOLIC BLOOD PRESSURE: 132 MMHG | DIASTOLIC BLOOD PRESSURE: 68 MMHG

## 2021-05-12 DIAGNOSIS — U07.1 COVID-19: ICD-10-CM

## 2021-05-12 PROCEDURE — 99214 OFFICE O/P EST MOD 30 MIN: CPT | Mod: CS

## 2021-05-12 PROCEDURE — 96413 CHEMO IV INFUSION 1 HR: CPT

## 2021-05-12 PROCEDURE — 93000 ELECTROCARDIOGRAM COMPLETE: CPT

## 2021-05-12 NOTE — PHYSICAL EXAM
[Well Developed] : well developed [Well Nourished] : well nourished [No Acute Distress] : no acute distress [Normal Conjunctiva] : normal conjunctiva [Normal Venous Pressure] : normal venous pressure [No Carotid Bruit] : no carotid bruit [Normal S1, S2] : normal S1, S2 [No Rub] : no rub [No Gallop] : no gallop [Murmur] : murmur [Clear Lung Fields] : clear lung fields [Good Air Entry] : good air entry [No Respiratory Distress] : no respiratory distress  [Soft] : abdomen soft [Non Tender] : non-tender [Normal Bowel Sounds] : normal bowel sounds [No Masses/organomegaly] : no masses/organomegaly [Normal Gait] : normal gait [No Edema] : no edema [No Cyanosis] : no cyanosis [No Clubbing] : no clubbing [No Varicosities] : no varicosities [No Rash] : no rash [No Skin Lesions] : no skin lesions [Moves all extremities] : moves all extremities [No Focal Deficits] : no focal deficits [Alert and Oriented] : alert and oriented [Normal Speech] : normal speech [Normal memory] : normal memory [de-identified] : 2/6 systolic

## 2021-05-12 NOTE — HISTORY OF PRESENT ILLNESS
[FreeTextEntry1] : Pasqualina continue to be very short of breath. Pulmonary evaluation reviewed and has scarring from COVID pneumonia. No chest pain, palpitations, lightheadedness or dizziness.

## 2021-05-12 NOTE — DISCUSSION/SUMMARY
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

## 2021-05-26 ENCOUNTER — APPOINTMENT (OUTPATIENT)
Dept: NEUROLOGY | Facility: CLINIC | Age: 81
End: 2021-05-26
Payer: MEDICARE

## 2021-05-26 PROCEDURE — 96413 CHEMO IV INFUSION 1 HR: CPT

## 2021-05-27 NOTE — DIETITIAN INITIAL EVALUATION ADULT. - FEEDING SKILL
Detail Level: Generalized Detail Level: Detailed independent Include Pregnancy/Lactation Warning?: No Topical Clindamycin Pregnancy And Lactation Text: This medication is Pregnancy Category B and is considered safe during pregnancy. It is unknown if it is excreted in breast milk. Tetracycline Counseling: Patient counseled regarding possible photosensitivity and increased risk for sunburn.  Patient instructed to avoid sunlight, if possible.  When exposed to sunlight, patients should wear protective clothing, sunglasses, and sunscreen.  The patient was instructed to call the office immediately if the following severe adverse effects occur:  hearing changes, easy bruising/bleeding, severe headache, or vision changes.  The patient verbalized understanding of the proper use and possible adverse effects of tetracycline.  All of the patient's questions and concerns were addressed. Patient understands to avoid pregnancy while on therapy due to potential birth defects. Isotretinoin Pregnancy And Lactation Text: This medication is Pregnancy Category X and is considered extremely dangerous during pregnancy. It is unknown if it is excreted in breast milk. Isotretinoin Counseling: Patient should get monthly blood tests, not donate blood, not drive at night if vision affected, not share medication, and not undergo elective surgery for 6 months after tx completed. Side effects reviewed, pt to contact office should one occur. Bactrim Pregnancy And Lactation Text: This medication is Pregnancy Category D and is known to cause fetal risk.  It is also excreted in breast milk. Minocycline Pregnancy And Lactation Text: This medication is Pregnancy Category D and not consider safe during pregnancy. It is also excreted in breast milk. Topical Retinoid counseling:  Patient advised to apply a pea-sized amount only at bedtime and wait 30 minutes after washing their face before applying.  If too drying, patient may add a non-comedogenic moisturizer. The patient verbalized understanding of the proper use and possible adverse effects of retinoids.  All of the patient's questions and concerns were addressed. Doxycycline Counseling:  Patient counseled regarding possible photosensitivity and increased risk for sunburn.  Patient instructed to avoid sunlight, if possible.  When exposed to sunlight, patients should wear protective clothing, sunglasses, and sunscreen.  The patient was instructed to call the office immediately if the following severe adverse effects occur:  hearing changes, easy bruising/bleeding, severe headache, or vision changes.  The patient verbalized understanding of the proper use and possible adverse effects of doxycycline.  All of the patient's questions and concerns were addressed. Topical Sulfur Applications Counseling: Topical Sulfur Counseling: Patient counseled that this medication may cause skin irritation or allergic reactions.  In the event of skin irritation, the patient was advised to reduce the amount of the drug applied or use it less frequently.   The patient verbalized understanding of the proper use and possible adverse effects of topical sulfur application.  All of the patient's questions and concerns were addressed. High Dose Vitamin A Counseling: Side effects reviewed, pt to contact office should one occur. Birth Control Pills Pregnancy And Lactation Text: This medication should be avoided if pregnant and for the first 30 days post-partum. Tazorac Counseling:  Patient advised that medication is irritating and drying.  Patient may need to apply sparingly and wash off after an hour before eventually leaving it on overnight.  The patient verbalized understanding of the proper use and possible adverse effects of tazorac.  All of the patient's questions and concerns were addressed. Sarecycline Counseling: Patient advised regarding possible photosensitivity and discoloration of the teeth, skin, lips, tongue and gums.  Patient instructed to avoid sunlight, if possible.  When exposed to sunlight, patients should wear protective clothing, sunglasses, and sunscreen.  The patient was instructed to call the office immediately if the following severe adverse effects occur:  hearing changes, easy bruising/bleeding, severe headache, or vision changes.  The patient verbalized understanding of the proper use and possible adverse effects of sarecycline.  All of the patient's questions and concerns were addressed. Birth Control Pills Counseling: Birth Control Pill Counseling: I discussed with the patient the potential side effects of OCPs including but not limited to increased risk of stroke, heart attack, thrombophlebitis, deep venous thrombosis, hepatic adenomas, breast changes, GI upset, headaches, and depression.  The patient verbalized understanding of the proper use and possible adverse effects of OCPs. All of the patient's questions and concerns were addressed. Topical Retinoid Pregnancy And Lactation Text: This medication is Pregnancy Category C. It is unknown if this medication is excreted in breast milk. Doxycycline Pregnancy And Lactation Text: This medication is Pregnancy Category D and not consider safe during pregnancy. It is also excreted in breast milk but is considered safe for shorter treatment courses. Azithromycin Counseling:  I discussed with the patient the risks of azithromycin including but not limited to GI upset, allergic reaction, drug rash, diarrhea, and yeast infections. High Dose Vitamin A Pregnancy And Lactation Text: High dose vitamin A therapy is contraindicated during pregnancy and breast feeding. Benzoyl Peroxide Counseling: Patient counseled that medicine may cause skin irritation and bleach clothing.  In the event of skin irritation, the patient was advised to reduce the amount of the drug applied or use it less frequently.   The patient verbalized understanding of the proper use and possible adverse effects of benzoyl peroxide.  All of the patient's questions and concerns were addressed. Detail Level: Simple Dapsone Counseling: I discussed with the patient the risks of dapsone including but not limited to hemolytic anemia, agranulocytosis, rashes, methemoglobinemia, kidney failure, peripheral neuropathy, headaches, GI upset, and liver toxicity.  Patients who start dapsone require monitoring including baseline LFTs and weekly CBCs for the first month, then every month thereafter.  The patient verbalized understanding of the proper use and possible adverse effects of dapsone.  All of the patient's questions and concerns were addressed. Erythromycin Counseling:  I discussed with the patient the risks of erythromycin including but not limited to GI upset, allergic reaction, drug rash, diarrhea, increase in liver enzymes, and yeast infections. Azithromycin Pregnancy And Lactation Text: This medication is considered safe during pregnancy and is also secreted in breast milk. Topical Sulfur Applications Pregnancy And Lactation Text: This medication is Pregnancy Category C and has an unknown safety profile during pregnancy. It is unknown if this topical medication is excreted in breast milk. Dapsone Pregnancy And Lactation Text: This medication is Pregnancy Category C and is not considered safe during pregnancy or breast feeding. Spironolactone Pregnancy And Lactation Text: This medication can cause feminization of the male fetus and should be avoided during pregnancy. The active metabolite is also found in breast milk. Topical Clindamycin Counseling: Patient counseled that this medication may cause skin irritation or allergic reactions.  In the event of skin irritation, the patient was advised to reduce the amount of the drug applied or use it less frequently.   The patient verbalized understanding of the proper use and possible adverse effects of clindamycin.  All of the patient's questions and concerns were addressed. Spironolactone Counseling: Patient advised regarding risks of diarrhea, abdominal pain, hyperkalemia, birth defects (for female patients), liver toxicity and renal toxicity. The patient may need blood work to monitor liver and kidney function and potassium levels while on therapy. The patient verbalized understanding of the proper use and possible adverse effects of spironolactone.  All of the patient's questions and concerns were addressed. Tazorac Pregnancy And Lactation Text: This medication is not safe during pregnancy. It is unknown if this medication is excreted in breast milk. Erythromycin Pregnancy And Lactation Text: This medication is Pregnancy Category B and is considered safe during pregnancy. It is also excreted in breast milk. Bactrim Counseling:  I discussed with the patient the risks of sulfa antibiotics including but not limited to GI upset, allergic reaction, drug rash, diarrhea, dizziness, photosensitivity, and yeast infections.  Rarely, more serious reactions can occur including but not limited to aplastic anemia, agranulocytosis, methemoglobinemia, blood dyscrasias, liver or kidney failure, lung infiltrates or desquamative/blistering drug rashes. Minocycline Counseling: Patient advised regarding possible photosensitivity and discoloration of the teeth, skin, lips, tongue and gums.  Patient instructed to avoid sunlight, if possible.  When exposed to sunlight, patients should wear protective clothing, sunglasses, and sunscreen.  The patient was instructed to call the office immediately if the following severe adverse effects occur:  hearing changes, easy bruising/bleeding, severe headache, or vision changes.  The patient verbalized understanding of the proper use and possible adverse effects of minocycline.  All of the patient's questions and concerns were addressed. Benzoyl Peroxide Pregnancy And Lactation Text: This medication is Pregnancy Category C. It is unknown if benzoyl peroxide is excreted in breast milk.

## 2021-06-07 ENCOUNTER — APPOINTMENT (OUTPATIENT)
Dept: UROLOGY | Facility: CLINIC | Age: 81
End: 2021-06-07
Payer: MEDICARE

## 2021-06-07 VITALS
OXYGEN SATURATION: 95 % | DIASTOLIC BLOOD PRESSURE: 65 MMHG | HEIGHT: 58 IN | HEART RATE: 77 BPM | SYSTOLIC BLOOD PRESSURE: 113 MMHG | WEIGHT: 146 LBS | BODY MASS INDEX: 30.64 KG/M2 | TEMPERATURE: 97.3 F

## 2021-06-07 PROCEDURE — 99214 OFFICE O/P EST MOD 30 MIN: CPT

## 2021-06-07 NOTE — HISTORY OF PRESENT ILLNESS
[FreeTextEntry1] : Very pleasant 80yof with PMH significant for urolithiasis who presents for follow-up of recurrent UTI and flank pain.  For the last few years every few months she experiencing dysuria and urgency incontinence which is bothersome.  She most recently experienced this in March.  Urine culture at this time grew E. coli.  She additionally reports severe right sided flank pain which has intermittently occurred over the past few months.  Hx of kidney stones in the past which passed spontaneously.

## 2021-06-07 NOTE — ASSESSMENT
[FreeTextEntry1] : Very pleasant 80yof with recurrent UTI and hx of kidney stones, microscopic hematuria\par -We again discussed mechanism formation of urinary tract infections in postmenopausal women\par -CT images from [facility] radiology reviewed demonstrating no hydronephrosis, kidney stones, or ureteral stones.  It did demonstrates benign-appearing right renal cysts\par -Urine culture reviewed–negative\par -Urinalysis reviewed–8 red blood cells per high-powered field\par -Cystoscopy to complete work-up for microscopic hematuria\par

## 2021-06-09 ENCOUNTER — APPOINTMENT (OUTPATIENT)
Dept: NEUROLOGY | Facility: CLINIC | Age: 81
End: 2021-06-09
Payer: MEDICARE

## 2021-06-09 PROCEDURE — 96413 CHEMO IV INFUSION 1 HR: CPT

## 2021-06-09 NOTE — PATIENT PROFILE ADULT - NSPROPTRIGHTNOTIFY_GEN_A_NUR
Provider Review: Anticoagulation Annual Referral Renewal    ACM Renewal Decision:  Renew ACM warfarin management      INR Range:   Continue management at current INR goal   Anticipated Duration of Therapy (from today):  Long-term anticoagulation      Roc Mares MD  6:30 AM   declines

## 2021-06-23 ENCOUNTER — APPOINTMENT (OUTPATIENT)
Dept: NEUROLOGY | Facility: CLINIC | Age: 81
End: 2021-06-23
Payer: MEDICARE

## 2021-06-23 PROCEDURE — 96413 CHEMO IV INFUSION 1 HR: CPT

## 2021-06-25 ENCOUNTER — APPOINTMENT (OUTPATIENT)
Dept: UROLOGY | Facility: CLINIC | Age: 81
End: 2021-06-25
Payer: MEDICARE

## 2021-06-25 DIAGNOSIS — R31.29 OTHER MICROSCOPIC HEMATURIA: ICD-10-CM

## 2021-06-25 PROCEDURE — 52000 CYSTOURETHROSCOPY: CPT

## 2021-07-07 ENCOUNTER — APPOINTMENT (OUTPATIENT)
Dept: NEUROLOGY | Facility: CLINIC | Age: 81
End: 2021-07-07
Payer: MEDICARE

## 2021-07-07 PROCEDURE — 96413 CHEMO IV INFUSION 1 HR: CPT

## 2021-07-21 ENCOUNTER — APPOINTMENT (OUTPATIENT)
Dept: NEUROLOGY | Facility: CLINIC | Age: 81
End: 2021-07-21
Payer: MEDICARE

## 2021-07-21 PROCEDURE — 96413 CHEMO IV INFUSION 1 HR: CPT

## 2021-07-27 ENCOUNTER — APPOINTMENT (OUTPATIENT)
Dept: PULMONOLOGY | Facility: CLINIC | Age: 81
End: 2021-07-27
Payer: MEDICARE

## 2021-07-27 VITALS
DIASTOLIC BLOOD PRESSURE: 62 MMHG | HEART RATE: 77 BPM | TEMPERATURE: 96.6 F | WEIGHT: 150 LBS | OXYGEN SATURATION: 95 % | BODY MASS INDEX: 31.35 KG/M2 | SYSTOLIC BLOOD PRESSURE: 128 MMHG

## 2021-07-27 DIAGNOSIS — J98.4 OTHER DISORDERS OF LUNG: ICD-10-CM

## 2021-07-27 DIAGNOSIS — R93.89 ABNORMAL FINDINGS ON DIAGNOSTIC IMAGING OF OTHER SPECIFIED BODY STRUCTURES: ICD-10-CM

## 2021-07-27 PROCEDURE — 99213 OFFICE O/P EST LOW 20 MIN: CPT

## 2021-07-28 ENCOUNTER — APPOINTMENT (OUTPATIENT)
Dept: NEUROLOGY | Facility: CLINIC | Age: 81
End: 2021-07-28
Payer: MEDICARE

## 2021-07-28 DIAGNOSIS — M54.81 OCCIPITAL NEURALGIA: ICD-10-CM

## 2021-07-28 PROBLEM — R93.89 ABNORMAL CHEST CT: Status: ACTIVE | Noted: 2021-05-09

## 2021-07-28 PROBLEM — J98.4 RESTRICTIVE AIRWAY DISEASE: Status: ACTIVE | Noted: 2021-05-09

## 2021-07-28 PROCEDURE — 99214 OFFICE O/P EST MOD 30 MIN: CPT

## 2021-07-28 NOTE — DISCUSSION/SUMMARY
[FreeTextEntry1] : 79 yo female post covid 19 infection with overall improvement in pulmonary condition. Bilateral inspiratory crackles persist on lung auscultation, likely chronic, given prior chest CT findings. At present she is to continue use of ICS/LABA with PRN albuterol. PFT will be repeated in the future with CT of the chest after if needed. She is to follow up with her PMD as before. Her daughter was present.

## 2021-07-28 NOTE — HISTORY OF PRESENT ILLNESS
[Never] : never [TextBox_4] : 81 yo female with hx of covid 19 infection presents for follow up. The patient  complains of MAXWELL without cough or chest pain. She uses breo 200 daily with PRN albuterol MDI. [TextBox_29] : Denies snoring, daytime somnolence, apneic episodes, AM headaches

## 2021-07-28 NOTE — HISTORY OF PRESENT ILLNESS
[FreeTextEntry1] : Patient doing well, no MG symptoms.  \par \par Right occipital pain persists, trigger point injections helped for a few days (DAVID).  \par \par Trileptal helped but bothers her stomach.  Pain comes and go daily.  \par \par Soliris, mestinon 180mg QD, 60mg TID\par

## 2021-07-28 NOTE — ASSESSMENT
[FreeTextEntry1] : Patient doing well, continue current meds. \par \par Change trileptal to tregretol 200mg

## 2021-07-28 NOTE — PHYSICAL EXAM
[Person] : oriented to person [Place] : oriented to place [Time] : oriented to time [Short Term Intact] : short term memory intact [Remote Intact] : remote memory intact [Registration Intact] : recent registration memory intact [Concentration Intact] : normal concentrating ability [Visual Intact] : visual attention was ~T not ~L decreased [Naming Objects] : no difficulty naming common objects [Repeating Phrases] : no difficulty repeating a phrase [Writing A Sentence] : no difficulty writing a sentence [Fluency] : fluency intact [Comprehension] : comprehension intact [Reading] : reading intact [Past History] : adequate knowledge of personal past history [Cranial Nerves Optic (II)] : visual acuity intact bilaterally,  visual fields full to confrontation, pupils equal round and reactive to light [Cranial Nerves Oculomotor (III)] : extraocular motion intact [Cranial Nerves Trigeminal (V)] : facial sensation intact symmetrically [Cranial Nerves Facial (VII)] : face symmetrical [Cranial Nerves Vestibulocochlear (VIII)] : hearing was intact bilaterally [Cranial Nerves Glossopharyngeal (IX)] : tongue and palate midline [Cranial Nerves Accessory (XI - Cranial And Spinal)] : head turning and shoulder shrug symmetric [Cranial Nerves Hypoglossal (XII)] : there was no tongue deviation with protrusion [Motor Tone] : muscle tone was normal in all four extremities [Motor Strength] : muscle strength was normal in all four extremities [No Muscle Atrophy] : normal bulk in all four extremities [Motor Handedness Right-Handed] : the patient is right hand dominant [Hand Weakness Right] : normal hand  [Hand Weakness Left] : normal hand  [4] : hip flexion 4/5 [+4] : knee extension +4/5 [5] : ankle inversion 5/5 [Sensation Tactile Decrease] : light touch was intact [Balance] : balance was intact [Abnormal Walk] : normal gait [Past-pointing] : there was no past-pointing [Tremor] : no tremor present [2+] : Ankle jerk left 2+ [Plantar Reflex Right Only] : normal on the right [Plantar Reflex Left Only] : normal on the left [FreeTextEntry6] : MG-ADL 0, tender right sub-occipital notch which reproduces her pain

## 2021-07-28 NOTE — PHYSICAL EXAM
[No Acute Distress] : no acute distress [Normal Oropharynx] : normal oropharynx [Normal Appearance] : normal appearance [No Neck Mass] : no neck mass [Normal Rate/Rhythm] : normal rate/rhythm [Murmur ___ / 6] : murmur [unfilled] / 6 [No Resp Distress] : no resp distress [Normal S1, S2] : normal s1, s2 [Rales] : rales [No Abnormalities] : no abnormalities [Benign] : benign [Normal Gait] : normal gait [No Cyanosis] : no cyanosis [No Clubbing] : no clubbing [No Edema] : no edema [FROM] : FROM [Normal Color/ Pigmentation] : normal color/ pigmentation [No Focal Deficits] : no focal deficits [Oriented x3] : oriented x3 [Normal Affect] : normal affect

## 2021-07-29 ENCOUNTER — APPOINTMENT (OUTPATIENT)
Dept: INTERNAL MEDICINE | Facility: CLINIC | Age: 81
End: 2021-07-29
Payer: MEDICARE

## 2021-07-29 VITALS
BODY MASS INDEX: 31.28 KG/M2 | RESPIRATION RATE: 12 BRPM | SYSTOLIC BLOOD PRESSURE: 151 MMHG | HEIGHT: 58 IN | WEIGHT: 149 LBS | TEMPERATURE: 97.8 F | OXYGEN SATURATION: 95 % | HEART RATE: 66 BPM | DIASTOLIC BLOOD PRESSURE: 80 MMHG

## 2021-07-29 VITALS — DIASTOLIC BLOOD PRESSURE: 80 MMHG | SYSTOLIC BLOOD PRESSURE: 135 MMHG

## 2021-07-29 DIAGNOSIS — N20.0 CALCULUS OF KIDNEY: ICD-10-CM

## 2021-07-29 PROCEDURE — 99214 OFFICE O/P EST MOD 30 MIN: CPT

## 2021-07-29 RX ORDER — CIPROFLOXACIN HYDROCHLORIDE 500 MG/1
500 TABLET, FILM COATED ORAL
Qty: 10 | Refills: 0 | Status: DISCONTINUED | COMMUNITY
Start: 2021-03-29 | End: 2021-07-29

## 2021-07-29 RX ORDER — LEVOTHYROXINE SODIUM 50 UG/1
50 TABLET ORAL
Qty: 90 | Refills: 0 | Status: DISCONTINUED | COMMUNITY
Start: 2021-03-25 | End: 2021-07-29

## 2021-07-29 RX ORDER — OXCARBAZEPINE 150 MG/1
150 TABLET, FILM COATED ORAL TWICE DAILY
Qty: 60 | Refills: 5 | Status: DISCONTINUED | COMMUNITY
Start: 2021-03-23 | End: 2021-07-29

## 2021-07-29 NOTE — REVIEW OF SYSTEMS
[Joint Pain] : joint pain [Joint Stiffness] : joint stiffness [Unsteady Walking] : ataxia [Anxiety] : anxiety [Negative] : Heme/Lymph [Joint Swelling] : no joint swelling [Muscle Weakness] : no muscle weakness [Muscle Pain] : no muscle pain [Back Pain] : no back pain [Headache] : no headache [Dizziness] : no dizziness [Fainting] : no fainting [Confusion] : no confusion [Memory Loss] : no memory loss [Suicidal] : not suicidal [Depression] : no depression [FreeTextEntry9] : RIGHT HIP [de-identified] : NEEDS ASSISTANCE

## 2021-07-29 NOTE — PHYSICAL EXAM

## 2021-07-30 LAB
25(OH)D3 SERPL-MCNC: 50.1 NG/ML
ALBUMIN SERPL ELPH-MCNC: 3.9 G/DL
ALP BLD-CCNC: 77 U/L
ALT SERPL-CCNC: 27 U/L
ANION GAP SERPL CALC-SCNC: 15 MMOL/L
AST SERPL-CCNC: 34 U/L
BILIRUB SERPL-MCNC: 0.4 MG/DL
BUN SERPL-MCNC: 24 MG/DL
CALCIUM SERPL-MCNC: 9.1 MG/DL
CHLORIDE SERPL-SCNC: 107 MMOL/L
CHOLEST SERPL-MCNC: 196 MG/DL
CO2 SERPL-SCNC: 22 MMOL/L
CREAT SERPL-MCNC: 0.91 MG/DL
ESTIMATED AVERAGE GLUCOSE: 103 MG/DL
FERRITIN SERPL-MCNC: 41 NG/ML
FOLATE SERPL-MCNC: >20 NG/ML
GLUCOSE SERPL-MCNC: 75 MG/DL
GLUCOSE SERPL-MCNC: 84 MG/DL
HBA1C MFR BLD HPLC: 5.2 %
HDLC SERPL-MCNC: 48 MG/DL
IRON SERPL-MCNC: 99 UG/DL
LDLC SERPL CALC-MCNC: 128 MG/DL
MAGNESIUM SERPL-MCNC: 2.2 MG/DL
NONHDLC SERPL-MCNC: 148 MG/DL
POTASSIUM SERPL-SCNC: 4.7 MMOL/L
PROT SERPL-MCNC: 6.2 G/DL
SODIUM SERPL-SCNC: 143 MMOL/L
T4 FREE SERPL-MCNC: 0.9 NG/DL
TRIGL SERPL-MCNC: 96 MG/DL
TSH SERPL-ACNC: 4.03 UIU/ML
URATE SERPL-MCNC: 6.4 MG/DL
VIT B12 SERPL-MCNC: 601 PG/ML

## 2021-08-04 ENCOUNTER — APPOINTMENT (OUTPATIENT)
Dept: NEUROLOGY | Facility: CLINIC | Age: 81
End: 2021-08-04
Payer: MEDICARE

## 2021-08-04 VITALS — RESPIRATION RATE: 16 BRPM | DIASTOLIC BLOOD PRESSURE: 75 MMHG | HEART RATE: 59 BPM | SYSTOLIC BLOOD PRESSURE: 126 MMHG

## 2021-08-04 PROCEDURE — 96413 CHEMO IV INFUSION 1 HR: CPT

## 2021-08-04 RX ORDER — ECULIZUMAB 300 MG/30ML
300 INJECTION, SOLUTION, CONCENTRATE INTRAVENOUS
Refills: 0 | Status: COMPLETED | OUTPATIENT
Start: 2021-08-04

## 2021-08-04 RX ADMIN — ECULIZUMAB 0 MG/30ML: 300 INJECTION, SOLUTION, CONCENTRATE INTRAVENOUS at 00:00

## 2021-08-18 ENCOUNTER — APPOINTMENT (OUTPATIENT)
Dept: NEUROLOGY | Facility: CLINIC | Age: 81
End: 2021-08-18
Payer: MEDICARE

## 2021-08-18 PROCEDURE — 96413 CHEMO IV INFUSION 1 HR: CPT

## 2021-08-26 LAB
BASOPHILS # BLD AUTO: 0.03 K/UL
BASOPHILS NFR BLD AUTO: 0.6 %
EOSINOPHIL # BLD AUTO: 0.12 K/UL
EOSINOPHIL NFR BLD AUTO: 2.4 %
HCT VFR BLD CALC: 36.6 %
HGB BLD-MCNC: 11.6 G/DL
IMM GRANULOCYTES NFR BLD AUTO: 0.2 %
LYMPHOCYTES # BLD AUTO: 1.45 K/UL
LYMPHOCYTES NFR BLD AUTO: 28.9 %
MAN DIFF?: NORMAL
MCHC RBC-ENTMCNC: 30.6 PG
MCHC RBC-ENTMCNC: 31.7 GM/DL
MCV RBC AUTO: 96.6 FL
MONOCYTES # BLD AUTO: 0.57 K/UL
MONOCYTES NFR BLD AUTO: 11.4 %
NEUTROPHILS # BLD AUTO: 2.84 K/UL
NEUTROPHILS NFR BLD AUTO: 56.5 %
PLATELET # BLD AUTO: 143 K/UL
RBC # BLD: 3.79 M/UL
RBC # FLD: 13.7 %
WBC # FLD AUTO: 5.02 K/UL

## 2021-09-01 ENCOUNTER — APPOINTMENT (OUTPATIENT)
Dept: NEUROLOGY | Facility: CLINIC | Age: 81
End: 2021-09-01
Payer: MEDICARE

## 2021-09-01 PROCEDURE — 96413 CHEMO IV INFUSION 1 HR: CPT

## 2021-09-15 ENCOUNTER — APPOINTMENT (OUTPATIENT)
Dept: NEUROLOGY | Facility: CLINIC | Age: 81
End: 2021-09-15
Payer: MEDICARE

## 2021-09-15 VITALS — DIASTOLIC BLOOD PRESSURE: 52 MMHG | SYSTOLIC BLOOD PRESSURE: 109 MMHG | HEART RATE: 60 BPM | RESPIRATION RATE: 16 BRPM

## 2021-09-15 PROCEDURE — 96413 CHEMO IV INFUSION 1 HR: CPT

## 2021-09-15 RX ORDER — ECULIZUMAB 300 MG/30ML
300 INJECTION, SOLUTION, CONCENTRATE INTRAVENOUS
Refills: 0 | Status: COMPLETED | OUTPATIENT
Start: 2021-09-15

## 2021-09-15 RX ADMIN — ECULIZUMAB 0 MG/30ML: 300 INJECTION, SOLUTION, CONCENTRATE INTRAVENOUS at 00:00

## 2021-09-22 ENCOUNTER — NON-APPOINTMENT (OUTPATIENT)
Age: 81
End: 2021-09-22

## 2021-09-23 ENCOUNTER — NON-APPOINTMENT (OUTPATIENT)
Age: 81
End: 2021-09-23

## 2021-09-23 ENCOUNTER — LABORATORY RESULT (OUTPATIENT)
Age: 81
End: 2021-09-23

## 2021-09-23 ENCOUNTER — APPOINTMENT (OUTPATIENT)
Dept: ELECTROPHYSIOLOGY | Facility: CLINIC | Age: 81
End: 2021-09-23
Payer: MEDICARE

## 2021-09-23 ENCOUNTER — APPOINTMENT (OUTPATIENT)
Dept: CARDIOLOGY | Facility: CLINIC | Age: 81
End: 2021-09-23
Payer: MEDICARE

## 2021-09-23 VITALS
WEIGHT: 149 LBS | DIASTOLIC BLOOD PRESSURE: 73 MMHG | HEART RATE: 59 BPM | OXYGEN SATURATION: 95 % | BODY MASS INDEX: 31.28 KG/M2 | SYSTOLIC BLOOD PRESSURE: 122 MMHG | HEIGHT: 58 IN

## 2021-09-23 DIAGNOSIS — I49.3 VENTRICULAR PREMATURE DEPOLARIZATION: ICD-10-CM

## 2021-09-23 LAB
ALBUMIN SERPL ELPH-MCNC: 3.9 G/DL
ALP BLD-CCNC: 79 U/L
ALT SERPL-CCNC: 23 U/L
ANION GAP SERPL CALC-SCNC: 8 MMOL/L
APPEARANCE: CLEAR
AST SERPL-CCNC: 26 U/L
BASOPHILS # BLD AUTO: 0.03 K/UL
BASOPHILS NFR BLD AUTO: 0.6 %
BILIRUB SERPL-MCNC: 0.2 MG/DL
BILIRUBIN URINE: NEGATIVE
BLOOD URINE: NORMAL
BUN SERPL-MCNC: 25 MG/DL
CALCIUM SERPL-MCNC: 9.3 MG/DL
CHLORIDE SERPL-SCNC: 108 MMOL/L
CO2 SERPL-SCNC: 25 MMOL/L
COLOR: NORMAL
CREAT SERPL-MCNC: 0.91 MG/DL
EOSINOPHIL # BLD AUTO: 0.1 K/UL
EOSINOPHIL NFR BLD AUTO: 2 %
ESTIMATED AVERAGE GLUCOSE: 105 MG/DL
GLUCOSE QUALITATIVE U: NEGATIVE
GLUCOSE SERPL-MCNC: 88 MG/DL
HBA1C MFR BLD HPLC: 5.3 %
HCT VFR BLD CALC: 37.3 %
HGB BLD-MCNC: 12.1 G/DL
IMM GRANULOCYTES NFR BLD AUTO: 0.4 %
KETONES URINE: NEGATIVE
LEUKOCYTE ESTERASE URINE: NEGATIVE
LYMPHOCYTES # BLD AUTO: 1.53 K/UL
LYMPHOCYTES NFR BLD AUTO: 30 %
MAN DIFF?: NORMAL
MCHC RBC-ENTMCNC: 30.6 PG
MCHC RBC-ENTMCNC: 32.4 GM/DL
MCV RBC AUTO: 94.4 FL
MONOCYTES # BLD AUTO: 0.52 K/UL
MONOCYTES NFR BLD AUTO: 10.2 %
NEUTROPHILS # BLD AUTO: 2.9 K/UL
NEUTROPHILS NFR BLD AUTO: 56.8 %
NITRITE URINE: NEGATIVE
PH URINE: 5
PLATELET # BLD AUTO: 161 K/UL
POTASSIUM SERPL-SCNC: 4.4 MMOL/L
PROT SERPL-MCNC: 6.5 G/DL
PROTEIN URINE: NEGATIVE
RBC # BLD: 3.95 M/UL
RBC # FLD: 13.6 %
SODIUM SERPL-SCNC: 141 MMOL/L
SPECIFIC GRAVITY URINE: 1.02
TSH SERPL-ACNC: 5.89 UIU/ML
UROBILINOGEN URINE: NORMAL
WBC # FLD AUTO: 5.1 K/UL

## 2021-09-23 PROCEDURE — 93000 ELECTROCARDIOGRAM COMPLETE: CPT

## 2021-09-23 PROCEDURE — 99214 OFFICE O/P EST MOD 30 MIN: CPT

## 2021-09-23 PROCEDURE — 93244 EXT ECG>48HR<7D REV&INTERPJ: CPT

## 2021-09-23 RX ORDER — LOSARTAN POTASSIUM AND HYDROCHLOROTHIAZIDE 100; 12.5 MG/1; MG/1
TABLET, FILM COATED ORAL
Refills: 0 | Status: DISCONTINUED | COMMUNITY
End: 2021-09-23

## 2021-09-24 PROBLEM — I49.3 PVC (PREMATURE VENTRICULAR CONTRACTION): Status: ACTIVE | Noted: 2021-09-23

## 2021-09-24 NOTE — DISCUSSION/SUMMARY
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

## 2021-09-24 NOTE — PHYSICAL EXAM
[Well Developed] : well developed [Well Nourished] : well nourished [No Acute Distress] : no acute distress [Normal Conjunctiva] : normal conjunctiva [Normal Venous Pressure] : normal venous pressure [No Carotid Bruit] : no carotid bruit [Normal S1, S2] : normal S1, S2 [No Rub] : no rub [No Gallop] : no gallop [Murmur] : murmur [Clear Lung Fields] : clear lung fields [Good Air Entry] : good air entry [No Respiratory Distress] : no respiratory distress  [Non Tender] : non-tender [Soft] : abdomen soft [No Masses/organomegaly] : no masses/organomegaly [Normal Bowel Sounds] : normal bowel sounds [Normal Gait] : normal gait [No Edema] : no edema [No Cyanosis] : no cyanosis [No Clubbing] : no clubbing [No Varicosities] : no varicosities [No Rash] : no rash [No Skin Lesions] : no skin lesions [Moves all extremities] : moves all extremities [No Focal Deficits] : no focal deficits [Normal Speech] : normal speech [Alert and Oriented] : alert and oriented [Normal memory] : normal memory [de-identified] : 2/6 systolic

## 2021-09-24 NOTE — HISTORY OF PRESENT ILLNESS
[FreeTextEntry1] : Alanna is here urgently secondary to episode of syncope yesterday. She got up in the morning, had breakfast, was feeling fine, then lunch still felt ok. Suddenly she felt everything closing in on her no chest pain, dizziness or shortness of breath. Daughter is a nurse and found her on the floor. She does not remember that. BP was low. TOday she feels ok. .

## 2021-09-29 ENCOUNTER — APPOINTMENT (OUTPATIENT)
Dept: NEUROLOGY | Facility: CLINIC | Age: 81
End: 2021-09-29
Payer: MEDICARE

## 2021-09-29 PROCEDURE — 96413 CHEMO IV INFUSION 1 HR: CPT

## 2021-10-05 ENCOUNTER — APPOINTMENT (OUTPATIENT)
Dept: CARDIOLOGY | Facility: CLINIC | Age: 81
End: 2021-10-05
Payer: MEDICARE

## 2021-10-05 ENCOUNTER — NON-APPOINTMENT (OUTPATIENT)
Age: 81
End: 2021-10-05

## 2021-10-05 VITALS
DIASTOLIC BLOOD PRESSURE: 64 MMHG | OXYGEN SATURATION: 98 % | HEIGHT: 58 IN | SYSTOLIC BLOOD PRESSURE: 116 MMHG | WEIGHT: 149 LBS | BODY MASS INDEX: 31.28 KG/M2

## 2021-10-05 DIAGNOSIS — R09.89 OTHER SPECIFIED SYMPTOMS AND SIGNS INVOLVING THE CIRCULATORY AND RESPIRATORY SYSTEMS: ICD-10-CM

## 2021-10-05 PROCEDURE — 99214 OFFICE O/P EST MOD 30 MIN: CPT

## 2021-10-05 PROCEDURE — 93000 ELECTROCARDIOGRAM COMPLETE: CPT

## 2021-10-06 NOTE — PHYSICAL EXAM
[Well Developed] : well developed [Well Nourished] : well nourished [No Acute Distress] : no acute distress [Normal Conjunctiva] : normal conjunctiva [Normal Venous Pressure] : normal venous pressure [No Carotid Bruit] : no carotid bruit [No Rub] : no rub [Normal S1, S2] : normal S1, S2 [No Gallop] : no gallop [Murmur] : murmur [Clear Lung Fields] : clear lung fields [Good Air Entry] : good air entry [No Respiratory Distress] : no respiratory distress  [Soft] : abdomen soft [Non Tender] : non-tender [No Masses/organomegaly] : no masses/organomegaly [Normal Bowel Sounds] : normal bowel sounds [Normal Gait] : normal gait [No Edema] : no edema [No Cyanosis] : no cyanosis [No Clubbing] : no clubbing [No Varicosities] : no varicosities [No Rash] : no rash [No Skin Lesions] : no skin lesions [Moves all extremities] : moves all extremities [No Focal Deficits] : no focal deficits [Normal Speech] : normal speech [Alert and Oriented] : alert and oriented [Normal memory] : normal memory [de-identified] : 2/6 systolic

## 2021-10-06 NOTE — DISCUSSION/SUMMARY
[FreeTextEntry1] : The patient is an 80-year-old female myasthenia gravis, hypertension hyperlipidemia, hypothyroid, s/p COVID one year ago with fibrosis, aortic stenosis s/p syncope with no recurrence\par #1 AS- euvolemic on exam, ECHO progression to moderate AS, repeat ECHO ordered, event monitor negative, ordered carotid doppler\par #2 Pulm- s/p COVID pneumonia 2020 with prolonged hospitalization, now with scarring,  on inhaler\par #3 Htn- continue losartan 50/12.5mg\par #4 Lipids- continue crestor 10mg\par #5 Hypothyroid- continue levothyroxine\par #6 MG- on pyridostigmine, daughter supportive, received COVID vaccines, walk daily

## 2021-10-08 ENCOUNTER — APPOINTMENT (OUTPATIENT)
Dept: INTERNAL MEDICINE | Facility: CLINIC | Age: 81
End: 2021-10-08
Payer: MEDICARE

## 2021-10-08 ENCOUNTER — MED ADMIN CHARGE (OUTPATIENT)
Age: 81
End: 2021-10-08

## 2021-10-08 PROCEDURE — G0008: CPT

## 2021-10-08 PROCEDURE — 90662 IIV NO PRSV INCREASED AG IM: CPT

## 2021-10-08 RX ORDER — METOPROLOL SUCCINATE 25 MG/1
25 TABLET, EXTENDED RELEASE ORAL DAILY
Qty: 45 | Refills: 3 | Status: DISCONTINUED | COMMUNITY
Start: 2021-09-23 | End: 2021-10-08

## 2021-10-13 ENCOUNTER — APPOINTMENT (OUTPATIENT)
Dept: NEUROLOGY | Facility: CLINIC | Age: 81
End: 2021-10-13
Payer: MEDICARE

## 2021-10-13 PROCEDURE — 96413 CHEMO IV INFUSION 1 HR: CPT

## 2021-10-15 NOTE — PRE-ANESTHESIA EVALUATION ADULT - NSANTHPEFT_GEN_ALL_CORE
- Follow up with primary care provider, Avis Travis, within 7 days for hospital follow- up and referral to cardiology.     - Follow up with Dr. Lua  (podiatry) in 1-2 weeks  for continuing care of Left Toe Infection.    - Follow up with Dr. Kat (Infectious Disease) on 11/3/2021 at 12PM  for ant-biotic management.     - You need these weekly labs BMP, CRP, CBC to be followed by Dr. Kat (Infectious Disease)      
Alert.  NAD.  RRR  CTAB

## 2021-10-27 ENCOUNTER — APPOINTMENT (OUTPATIENT)
Dept: NEUROLOGY | Facility: CLINIC | Age: 81
End: 2021-10-27
Payer: MEDICARE

## 2021-10-27 ENCOUNTER — APPOINTMENT (OUTPATIENT)
Dept: CARDIOLOGY | Facility: CLINIC | Age: 81
End: 2021-10-27
Payer: MEDICARE

## 2021-10-27 PROCEDURE — 93880 EXTRACRANIAL BILAT STUDY: CPT

## 2021-10-27 PROCEDURE — 96413 CHEMO IV INFUSION 1 HR: CPT

## 2021-11-01 ENCOUNTER — APPOINTMENT (OUTPATIENT)
Dept: CARDIOLOGY | Facility: CLINIC | Age: 81
End: 2021-11-01
Payer: MEDICARE

## 2021-11-01 PROCEDURE — 93306 TTE W/DOPPLER COMPLETE: CPT

## 2021-11-10 ENCOUNTER — APPOINTMENT (OUTPATIENT)
Dept: CARDIOLOGY | Facility: CLINIC | Age: 81
End: 2021-11-10
Payer: MEDICARE

## 2021-11-10 ENCOUNTER — APPOINTMENT (OUTPATIENT)
Dept: NEUROLOGY | Facility: CLINIC | Age: 81
End: 2021-11-10
Payer: MEDICARE

## 2021-11-10 ENCOUNTER — NON-APPOINTMENT (OUTPATIENT)
Age: 81
End: 2021-11-10

## 2021-11-10 VITALS
BODY MASS INDEX: 31.28 KG/M2 | HEART RATE: 66 BPM | RESPIRATION RATE: 12 BRPM | TEMPERATURE: 97.1 F | DIASTOLIC BLOOD PRESSURE: 71 MMHG | SYSTOLIC BLOOD PRESSURE: 152 MMHG | HEIGHT: 58 IN | WEIGHT: 149 LBS | OXYGEN SATURATION: 95 %

## 2021-11-10 VITALS — DIASTOLIC BLOOD PRESSURE: 68 MMHG | SYSTOLIC BLOOD PRESSURE: 131 MMHG

## 2021-11-10 PROCEDURE — 93000 ELECTROCARDIOGRAM COMPLETE: CPT

## 2021-11-10 PROCEDURE — 96365 THER/PROPH/DIAG IV INF INIT: CPT

## 2021-11-10 PROCEDURE — 99214 OFFICE O/P EST MOD 30 MIN: CPT

## 2021-11-10 NOTE — DISCUSSION/SUMMARY
[___ Month(s)] : in [unfilled] month(s) [FreeTextEntry1] : The patient is an 80-year-old female myasthenia gravis, hypertension hyperlipidemia, hypothyroid, s/p COVID one year ago with fibrosis, aortic stenosis s/p syncope with no recurrence\par #1 AS- euvolemic on exam, ECHO progression SANJAY 1.0, event monitor negative, negative carotid doppler, f/u 3 months and echo in 6 months if remains asymptomatic\par #2 Pulm- s/p COVID pneumonia 2020 with prolonged hospitalization, now with scarring,  on inhaler\par #3 Htn- continue losartan 25mg\par #4 Lipids- continue crestor 10mg\par #5 Hypothyroid- continue levothyroxine\par #6 MG- on pyridostigmine, daughter supportive, received COVID vaccines, walk daily

## 2021-11-10 NOTE — PHYSICAL EXAM
[Well Developed] : well developed [No Acute Distress] : no acute distress [Well Nourished] : well nourished [Normal Conjunctiva] : normal conjunctiva [Normal Venous Pressure] : normal venous pressure [No Carotid Bruit] : no carotid bruit [No Rub] : no rub [Normal S1, S2] : normal S1, S2 [No Gallop] : no gallop [Murmur] : murmur [Clear Lung Fields] : clear lung fields [Good Air Entry] : good air entry [No Respiratory Distress] : no respiratory distress  [Soft] : abdomen soft [Non Tender] : non-tender [No Masses/organomegaly] : no masses/organomegaly [Normal Bowel Sounds] : normal bowel sounds [Normal Gait] : normal gait [No Edema] : no edema [No Cyanosis] : no cyanosis [No Clubbing] : no clubbing [No Varicosities] : no varicosities [No Rash] : no rash [No Skin Lesions] : no skin lesions [Moves all extremities] : moves all extremities [No Focal Deficits] : no focal deficits [Normal Speech] : normal speech [Alert and Oriented] : alert and oriented [Normal memory] : normal memory [de-identified] : 2/6 systolic

## 2021-11-10 NOTE — HISTORY OF PRESENT ILLNESS
[FreeTextEntry1] : Alanna has not had any further episodes of syncope since the summer and when we eliminated the HCTZ and lowered the losartan dose. Her daughter, a nurse, continues to monitor her BP which has been therapeutic. No CP, palpitations, lightheadedness, dizziness or SOB.

## 2021-11-23 ENCOUNTER — APPOINTMENT (OUTPATIENT)
Dept: PULMONOLOGY | Facility: CLINIC | Age: 81
End: 2021-11-23
Payer: MEDICARE

## 2021-11-23 VITALS
SYSTOLIC BLOOD PRESSURE: 140 MMHG | DIASTOLIC BLOOD PRESSURE: 72 MMHG | TEMPERATURE: 96.6 F | WEIGHT: 148 LBS | BODY MASS INDEX: 31.07 KG/M2 | HEART RATE: 73 BPM | HEIGHT: 58 IN | OXYGEN SATURATION: 94 % | RESPIRATION RATE: 14 BRPM

## 2021-11-23 PROCEDURE — 99213 OFFICE O/P EST LOW 20 MIN: CPT

## 2021-11-24 ENCOUNTER — APPOINTMENT (OUTPATIENT)
Dept: NEUROLOGY | Facility: CLINIC | Age: 81
End: 2021-11-24
Payer: MEDICARE

## 2021-11-24 VITALS — RESPIRATION RATE: 16 BRPM | DIASTOLIC BLOOD PRESSURE: 67 MMHG | SYSTOLIC BLOOD PRESSURE: 130 MMHG | HEART RATE: 66 BPM

## 2021-11-24 PROCEDURE — 96365 THER/PROPH/DIAG IV INF INIT: CPT

## 2021-11-24 RX ORDER — ECULIZUMAB 300 MG/30ML
300 INJECTION, SOLUTION, CONCENTRATE INTRAVENOUS
Refills: 0 | Status: COMPLETED | OUTPATIENT
Start: 2021-11-24

## 2021-11-24 RX ADMIN — ECULIZUMAB 0 MG/30ML: 300 INJECTION, SOLUTION, CONCENTRATE INTRAVENOUS at 00:00

## 2021-11-26 NOTE — HISTORY OF PRESENT ILLNESS
[Never] : never [TextBox_4] : 82 yo female seen in the past for pulmonary evaluation post covid 19 infection presents for follow up. The patient continues to cough when eating and drinking without SOB, fever or chest pain. She uses breo daily with PRN albuterol MDI. She is being followed by neurology for myasthenia gravis, stable on meds. [TextBox_29] : Denies snoring, daytime somnolence, apneic episodes, AM headaches

## 2021-11-26 NOTE — DISCUSSION/SUMMARY
[FreeTextEntry1] : 80 yo female with PRN cough associated with eating, likely due to aspiration given hx of myasthenia. Follow up with neurology as before. Swallowing evaluation will be performed if symptoms worsen. She is to continue breo daily> PFT will be performed in the future. She is to follow up with her PMD as before. Her daughter was present.

## 2021-11-30 ENCOUNTER — APPOINTMENT (OUTPATIENT)
Dept: INTERNAL MEDICINE | Facility: CLINIC | Age: 81
End: 2021-11-30
Payer: MEDICARE

## 2021-11-30 VITALS
OXYGEN SATURATION: 97 % | HEIGHT: 58 IN | BODY MASS INDEX: 31.49 KG/M2 | DIASTOLIC BLOOD PRESSURE: 75 MMHG | TEMPERATURE: 96.9 F | RESPIRATION RATE: 12 BRPM | SYSTOLIC BLOOD PRESSURE: 157 MMHG | HEART RATE: 79 BPM | WEIGHT: 150 LBS

## 2021-11-30 VITALS — DIASTOLIC BLOOD PRESSURE: 80 MMHG | SYSTOLIC BLOOD PRESSURE: 130 MMHG

## 2021-11-30 PROCEDURE — G0439: CPT

## 2021-11-30 RX ORDER — CARBAMAZEPINE 200 MG/1
200 TABLET ORAL DAILY
Qty: 30 | Refills: 2 | Status: DISCONTINUED | COMMUNITY
Start: 2021-07-28 | End: 2021-11-30

## 2021-11-30 RX ORDER — ZOSTER VACCINE RECOMBINANT, ADJUVANTED 50 MCG/0.5
50 KIT INTRAMUSCULAR
Qty: 0.5 | Refills: 0 | Status: DISCONTINUED | COMMUNITY
Start: 2021-03-25 | End: 2021-11-30

## 2021-11-30 NOTE — REVIEW OF SYSTEMS
[Joint Pain] : joint pain [Joint Stiffness] : joint stiffness [Unsteady Walking] : ataxia [Anxiety] : anxiety [Negative] : Heme/Lymph [Joint Swelling] : no joint swelling [Muscle Weakness] : no muscle weakness [Muscle Pain] : no muscle pain [Back Pain] : no back pain [Headache] : no headache [Dizziness] : no dizziness [Fainting] : no fainting [Confusion] : no confusion [Memory Loss] : no memory loss [Suicidal] : not suicidal [Depression] : no depression [FreeTextEntry9] : RIGHT HIP [de-identified] : NEEDS ASSISTANCE

## 2021-11-30 NOTE — PHYSICAL EXAM
[Well Nourished] : well nourished [Well Developed] : well developed [Well-Appearing] : well-appearing [Normal Sclera/Conjunctiva] : normal sclera/conjunctiva [PERRL] : pupils equal round and reactive to light [EOMI] : extraocular movements intact [Normal Outer Ear/Nose] : the outer ears and nose were normal in appearance [Normal Oropharynx] : the oropharynx was normal [No JVD] : no jugular venous distention [No Lymphadenopathy] : no lymphadenopathy [Supple] : supple [Thyroid Normal, No Nodules] : the thyroid was normal and there were no nodules present [No Respiratory Distress] : no respiratory distress  [No Accessory Muscle Use] : no accessory muscle use [Clear to Auscultation] : lungs were clear to auscultation bilaterally [Normal Rate] : normal rate  [Regular Rhythm] : with a regular rhythm [Normal S1, S2] : normal S1 and S2 [II] : a grade 2 [No Carotid Bruits] : no carotid bruits [No Abdominal Bruit] : a ~M bruit was not heard ~T in the abdomen [No Varicosities] : no varicosities [Pedal Pulses Present] : the pedal pulses are present [No Edema] : there was no peripheral edema [No Palpable Aorta] : no palpable aorta [No Extremity Clubbing/Cyanosis] : no extremity clubbing/cyanosis [Soft] : abdomen soft [Non Tender] : non-tender [Non-distended] : non-distended [No Masses] : no abdominal mass palpated [No HSM] : no HSM [Normal Bowel Sounds] : normal bowel sounds [Normal Posterior Cervical Nodes] : no posterior cervical lymphadenopathy [Normal Anterior Cervical Nodes] : no anterior cervical lymphadenopathy [No CVA Tenderness] : no CVA  tenderness [No Spinal Tenderness] : no spinal tenderness [No Joint Swelling] : no joint swelling [Grossly Normal Strength/Tone] : grossly normal strength/tone [No Rash] : no rash [Coordination Grossly Intact] : coordination grossly intact [No Focal Deficits] : no focal deficits [Normal Affect] : the affect was normal [Alert and Oriented x3] : oriented to person, place, and time [Normal Insight/Judgement] : insight and judgment were intact [de-identified] : PAIN WITH ROM RIGHT HIP [de-identified] : NEEDS ASSISTANCE

## 2021-12-01 ENCOUNTER — APPOINTMENT (OUTPATIENT)
Dept: NEUROLOGY | Facility: CLINIC | Age: 81
End: 2021-12-01
Payer: MEDICARE

## 2021-12-01 VITALS
DIASTOLIC BLOOD PRESSURE: 80 MMHG | SYSTOLIC BLOOD PRESSURE: 160 MMHG | WEIGHT: 150 LBS | HEART RATE: 64 BPM | HEIGHT: 58 IN | BODY MASS INDEX: 31.49 KG/M2

## 2021-12-01 PROCEDURE — 99213 OFFICE O/P EST LOW 20 MIN: CPT

## 2021-12-01 NOTE — ASSESSMENT
[FreeTextEntry1] : Patient doing well on current meds and will continue.  \par \par Cont Soliris every 2 weeks. \par \par Try Tegretol at 200mg BID to see if she has a response for her neck pain.

## 2021-12-01 NOTE — HISTORY OF PRESENT ILLNESS
[FreeTextEntry1] : Patient is doing well and has no MG symptoms, has some MAXWELL due to cardiac causes (valvular disease).  \par No diplopia or ptosis, no dysphagia.  \par \par Taking mestinon 60mg TID, 180mg QOD, Soliris\par \par Still has some right neck pain, tegretol didn't help.

## 2021-12-06 ENCOUNTER — LABORATORY RESULT (OUTPATIENT)
Age: 81
End: 2021-12-06

## 2021-12-07 ENCOUNTER — LABORATORY RESULT (OUTPATIENT)
Age: 81
End: 2021-12-07

## 2021-12-07 LAB
25(OH)D3 SERPL-MCNC: 44.4 NG/ML
ALBUMIN SERPL ELPH-MCNC: 4 G/DL
ALP BLD-CCNC: 77 U/L
ALT SERPL-CCNC: 19 U/L
ANION GAP SERPL CALC-SCNC: 11 MMOL/L
AST SERPL-CCNC: 22 U/L
BASOPHILS # BLD AUTO: 0.03 K/UL
BASOPHILS NFR BLD AUTO: 0.7 %
BILIRUB SERPL-MCNC: 0.2 MG/DL
BUN SERPL-MCNC: 21 MG/DL
CALCIUM SERPL-MCNC: 9.7 MG/DL
CHLORIDE SERPL-SCNC: 107 MMOL/L
CHOLEST SERPL-MCNC: 170 MG/DL
CO2 SERPL-SCNC: 23 MMOL/L
COVID-19 NUCLEOCAPSID  GAM ANTIBODY INTERPRETATION: POSITIVE
COVID-19 SPIKE DOMAIN ANTIBODY INTERPRETATION: POSITIVE
CREAT SERPL-MCNC: 0.91 MG/DL
EOSINOPHIL # BLD AUTO: 0.07 K/UL
EOSINOPHIL NFR BLD AUTO: 1.7 %
ESTIMATED AVERAGE GLUCOSE: 105 MG/DL
FERRITIN SERPL-MCNC: 44 NG/ML
FOLATE SERPL-MCNC: >20 NG/ML
GLUCOSE SERPL-MCNC: 75 MG/DL
GLUCOSE SERPL-MCNC: 79 MG/DL
HBA1C MFR BLD HPLC: 5.3 %
HCT VFR BLD CALC: 39.4 %
HDLC SERPL-MCNC: 41 MG/DL
HGB BLD-MCNC: 12 G/DL
IMM GRANULOCYTES NFR BLD AUTO: 0.5 %
IRON SERPL-MCNC: 66 UG/DL
LDLC SERPL CALC-MCNC: 112 MG/DL
LYMPHOCYTES # BLD AUTO: 1.22 K/UL
LYMPHOCYTES NFR BLD AUTO: 28.8 %
MAGNESIUM SERPL-MCNC: 2.1 MG/DL
MAN DIFF?: NORMAL
MCHC RBC-ENTMCNC: 30.2 PG
MCHC RBC-ENTMCNC: 30.5 GM/DL
MCV RBC AUTO: 99.2 FL
MONOCYTES # BLD AUTO: 0.51 K/UL
MONOCYTES NFR BLD AUTO: 12 %
NEUTROPHILS # BLD AUTO: 2.39 K/UL
NEUTROPHILS NFR BLD AUTO: 56.3 %
NONHDLC SERPL-MCNC: 129 MG/DL
PLATELET # BLD AUTO: 154 K/UL
POTASSIUM SERPL-SCNC: 4.4 MMOL/L
PROT SERPL-MCNC: 6.6 G/DL
RBC # BLD: 3.97 M/UL
RBC # FLD: 14.2 %
SARS-COV-2 AB SERPL IA-ACNC: >250 U/ML
SARS-COV-2 AB SERPL QL IA: 36.1 INDEX
SODIUM SERPL-SCNC: 142 MMOL/L
T4 FREE SERPL-MCNC: 1 NG/DL
TRIGL SERPL-MCNC: 85 MG/DL
TSH SERPL-ACNC: 3.42 UIU/ML
VIT B12 SERPL-MCNC: 604 PG/ML
WBC # FLD AUTO: 4.24 K/UL

## 2021-12-08 ENCOUNTER — APPOINTMENT (OUTPATIENT)
Dept: NEUROLOGY | Facility: CLINIC | Age: 81
End: 2021-12-08
Payer: MEDICARE

## 2021-12-08 VITALS — HEART RATE: 61 BPM | SYSTOLIC BLOOD PRESSURE: 115 MMHG | DIASTOLIC BLOOD PRESSURE: 69 MMHG | RESPIRATION RATE: 18 BRPM

## 2021-12-08 PROCEDURE — 96365 THER/PROPH/DIAG IV INF INIT: CPT

## 2021-12-08 RX ORDER — ECULIZUMAB 300 MG/30ML
300 INJECTION, SOLUTION, CONCENTRATE INTRAVENOUS
Refills: 0 | Status: COMPLETED | OUTPATIENT
Start: 2021-12-08

## 2021-12-08 RX ADMIN — ECULIZUMAB 0 MG/30ML: 300 INJECTION, SOLUTION, CONCENTRATE INTRAVENOUS at 00:00

## 2021-12-09 LAB
APPEARANCE: ABNORMAL
BILIRUBIN URINE: NEGATIVE
BLOOD URINE: NEGATIVE
COLOR: YELLOW
GLUCOSE QUALITATIVE U: NEGATIVE
KETONES URINE: NEGATIVE
LEUKOCYTE ESTERASE URINE: ABNORMAL
NITRITE URINE: POSITIVE
PH URINE: 5
PROTEIN URINE: NEGATIVE
SPECIFIC GRAVITY URINE: 1.02
UROBILINOGEN URINE: NORMAL

## 2021-12-20 DIAGNOSIS — N30.00 ACUTE CYSTITIS W/OUT HEMATURIA: ICD-10-CM

## 2021-12-22 ENCOUNTER — APPOINTMENT (OUTPATIENT)
Dept: NEUROLOGY | Facility: CLINIC | Age: 81
End: 2021-12-22
Payer: MEDICARE

## 2021-12-22 VITALS — HEART RATE: 58 BPM | DIASTOLIC BLOOD PRESSURE: 65 MMHG | SYSTOLIC BLOOD PRESSURE: 112 MMHG | RESPIRATION RATE: 16 BRPM

## 2021-12-22 PROCEDURE — 96365 THER/PROPH/DIAG IV INF INIT: CPT

## 2021-12-22 RX ORDER — EPTINEZUMAB-JJMR 100 MG/ML
100 INJECTION INTRAVENOUS
Refills: 0 | Status: COMPLETED | OUTPATIENT
Start: 2021-12-22

## 2021-12-22 RX ADMIN — EPTINEZUMAB-JJMR 0 MG/ML: 100 INJECTION INTRAVENOUS at 00:00

## 2022-01-05 ENCOUNTER — APPOINTMENT (OUTPATIENT)
Dept: NEUROLOGY | Facility: CLINIC | Age: 82
End: 2022-01-05
Payer: MEDICARE

## 2022-01-05 VITALS — DIASTOLIC BLOOD PRESSURE: 66 MMHG | HEART RATE: 62 BPM | RESPIRATION RATE: 16 BRPM | SYSTOLIC BLOOD PRESSURE: 123 MMHG

## 2022-01-05 PROCEDURE — 96365 THER/PROPH/DIAG IV INF INIT: CPT

## 2022-01-05 RX ORDER — ECULIZUMAB 300 MG/30ML
300 INJECTION, SOLUTION, CONCENTRATE INTRAVENOUS
Refills: 0 | Status: COMPLETED | OUTPATIENT
Start: 2022-01-05

## 2022-01-05 RX ADMIN — ECULIZUMAB 0 MG/30ML: 300 INJECTION, SOLUTION, CONCENTRATE INTRAVENOUS at 00:00

## 2022-01-19 ENCOUNTER — APPOINTMENT (OUTPATIENT)
Dept: NEUROLOGY | Facility: CLINIC | Age: 82
End: 2022-01-19
Payer: MEDICARE

## 2022-01-19 VITALS
RESPIRATION RATE: 19 BRPM | HEIGHT: 58 IN | SYSTOLIC BLOOD PRESSURE: 149 MMHG | DIASTOLIC BLOOD PRESSURE: 69 MMHG | HEART RATE: 57 BPM

## 2022-01-19 PROCEDURE — 96365 THER/PROPH/DIAG IV INF INIT: CPT

## 2022-02-02 ENCOUNTER — APPOINTMENT (OUTPATIENT)
Dept: NEUROLOGY | Facility: CLINIC | Age: 82
End: 2022-02-02
Payer: MEDICARE

## 2022-02-02 PROCEDURE — 96365 THER/PROPH/DIAG IV INF INIT: CPT

## 2022-02-11 LAB
APPEARANCE: CLEAR
BACTERIA: NEGATIVE
BILIRUBIN URINE: NEGATIVE
BLOOD URINE: NEGATIVE
COLOR: NORMAL
GLUCOSE QUALITATIVE U: NEGATIVE
HYALINE CASTS: 1 /LPF
KETONES URINE: NEGATIVE
LEUKOCYTE ESTERASE URINE: NEGATIVE
MICROSCOPIC-UA: NORMAL
NITRITE URINE: NEGATIVE
PH URINE: 5
PROTEIN URINE: NEGATIVE
RED BLOOD CELLS URINE: 1 /HPF
SPECIFIC GRAVITY URINE: 1.02
SQUAMOUS EPITHELIAL CELLS: 1 /HPF
UROBILINOGEN URINE: NORMAL
WHITE BLOOD CELLS URINE: 1 /HPF

## 2022-02-12 LAB — BACTERIA UR CULT: NORMAL

## 2022-02-17 ENCOUNTER — APPOINTMENT (OUTPATIENT)
Dept: NEUROLOGY | Facility: CLINIC | Age: 82
End: 2022-02-17
Payer: MEDICARE

## 2022-02-17 PROCEDURE — 96365 THER/PROPH/DIAG IV INF INIT: CPT

## 2022-02-22 ENCOUNTER — APPOINTMENT (OUTPATIENT)
Dept: INTERNAL MEDICINE | Facility: CLINIC | Age: 82
End: 2022-02-22
Payer: MEDICARE

## 2022-02-22 VITALS
HEART RATE: 72 BPM | WEIGHT: 152 LBS | RESPIRATION RATE: 14 BRPM | TEMPERATURE: 96.9 F | HEIGHT: 58 IN | SYSTOLIC BLOOD PRESSURE: 166 MMHG | BODY MASS INDEX: 31.91 KG/M2 | DIASTOLIC BLOOD PRESSURE: 90 MMHG | OXYGEN SATURATION: 96 %

## 2022-02-22 VITALS — SYSTOLIC BLOOD PRESSURE: 145 MMHG | DIASTOLIC BLOOD PRESSURE: 85 MMHG

## 2022-02-22 PROCEDURE — 99214 OFFICE O/P EST MOD 30 MIN: CPT

## 2022-02-22 RX ORDER — ALBUTEROL SULFATE 90 UG/1
108 (90 BASE) AEROSOL, METERED RESPIRATORY (INHALATION)
Qty: 1 | Refills: 1 | Status: DISCONTINUED | COMMUNITY
Start: 2020-03-28 | End: 2022-02-22

## 2022-02-22 RX ORDER — FLUTICASONE FUROATE AND VILANTEROL TRIFENATATE 200; 25 UG/1; UG/1
200-25 POWDER RESPIRATORY (INHALATION) DAILY
Qty: 3 | Refills: 5 | Status: DISCONTINUED | COMMUNITY
Start: 2021-07-27 | End: 2022-02-22

## 2022-02-22 RX ORDER — ACETAMINOPHEN 325 MG/1
TABLET, FILM COATED ORAL
Refills: 0 | Status: DISCONTINUED | COMMUNITY
End: 2022-02-22

## 2022-02-22 RX ORDER — AMOXICILLIN 500 MG/1
500 TABLET, FILM COATED ORAL 3 TIMES DAILY
Qty: 21 | Refills: 0 | Status: DISCONTINUED | COMMUNITY
Start: 2021-12-20 | End: 2022-02-22

## 2022-02-22 RX ORDER — FLUTICASONE FUROATE AND VILANTEROL TRIFENATATE 200; 25 UG/1; UG/1
200-25 POWDER RESPIRATORY (INHALATION)
Qty: 1 | Refills: 3 | Status: DISCONTINUED | COMMUNITY
Start: 2021-04-20 | End: 2022-02-22

## 2022-02-22 RX ORDER — SODIUM CHLORIDE 0.65 %
0.65 AEROSOL, SPRAY (ML) NASAL TWICE DAILY
Qty: 1 | Refills: 2 | Status: DISCONTINUED | COMMUNITY
Start: 2020-07-01 | End: 2022-02-22

## 2022-02-22 NOTE — ASSESSMENT
[FreeTextEntry1] : 81 Y OLD FEM S/P UTI = REPEAT UA NORMAL \par HTN NOT AT GOAL = FORMERLY ON HYZAAR ;CONTINUE LOSARTAN ,START HCTZ 12.5 MG DAILY TILL SEEN BY CARDIO \par RTO IN 1 M

## 2022-02-22 NOTE — HISTORY OF PRESENT ILLNESS
[de-identified] : PT COMES FOR F/U UA 2 WEEKS AGO \par ALSO CONCERN ABOUT BP BEEN ELEVATED LAST 2 WEEKS WITH SBP AS HIGH  WITH DBP ABOVE 90 ALL THE TIME  ALONG WITH HA

## 2022-02-22 NOTE — PHYSICAL EXAM
[No Acute Distress] : no acute distress [Well Nourished] : well nourished [Well Developed] : well developed [Well-Appearing] : well-appearing [Normal Sclera/Conjunctiva] : normal sclera/conjunctiva [PERRL] : pupils equal round and reactive to light [EOMI] : extraocular movements intact [No JVD] : no jugular venous distention [No Lymphadenopathy] : no lymphadenopathy [Supple] : supple [Thyroid Normal, No Nodules] : the thyroid was normal and there were no nodules present [No Respiratory Distress] : no respiratory distress  [No Accessory Muscle Use] : no accessory muscle use [Clear to Auscultation] : lungs were clear to auscultation bilaterally [Normal Rate] : normal rate  [Regular Rhythm] : with a regular rhythm [Normal S1, S2] : normal S1 and S2 [No Carotid Bruits] : no carotid bruits [No Abdominal Bruit] : a ~M bruit was not heard ~T in the abdomen [No Varicosities] : no varicosities [Pedal Pulses Present] : the pedal pulses are present [No Edema] : there was no peripheral edema [No Palpable Aorta] : no palpable aorta [No Extremity Clubbing/Cyanosis] : no extremity clubbing/cyanosis [Soft] : abdomen soft [Non Tender] : non-tender [Non-distended] : non-distended [No Masses] : no abdominal mass palpated [No HSM] : no HSM [Normal Bowel Sounds] : normal bowel sounds [Normal Posterior Cervical Nodes] : no posterior cervical lymphadenopathy [Normal Anterior Cervical Nodes] : no anterior cervical lymphadenopathy [No CVA Tenderness] : no CVA  tenderness [No Spinal Tenderness] : no spinal tenderness [No Joint Swelling] : no joint swelling [No Rash] : no rash [Coordination Grossly Intact] : coordination grossly intact [No Focal Deficits] : no focal deficits [Normal Affect] : the affect was normal [Normal Insight/Judgement] : insight and judgment were intact [de-identified] : DECREASED ROM  [de-identified] : OLGA 2/6 RUSB  [de-identified] : TRAPEZIUS MUSCLE TENSION

## 2022-03-03 ENCOUNTER — APPOINTMENT (OUTPATIENT)
Dept: NEUROLOGY | Facility: CLINIC | Age: 82
End: 2022-03-03
Payer: MEDICARE

## 2022-03-03 PROCEDURE — 96365 THER/PROPH/DIAG IV INF INIT: CPT

## 2022-03-16 ENCOUNTER — APPOINTMENT (OUTPATIENT)
Dept: NEUROLOGY | Facility: CLINIC | Age: 82
End: 2022-03-16
Payer: MEDICARE

## 2022-03-16 PROCEDURE — 96365 THER/PROPH/DIAG IV INF INIT: CPT

## 2022-03-20 LAB
ALBUMIN SERPL ELPH-MCNC: 4.2 G/DL
ALP BLD-CCNC: 92 U/L
ALT SERPL-CCNC: 28 U/L
ANION GAP SERPL CALC-SCNC: 11 MMOL/L
AST SERPL-CCNC: 29 U/L
BILIRUB SERPL-MCNC: 0.2 MG/DL
BUN SERPL-MCNC: 22 MG/DL
CALCIUM SERPL-MCNC: 9.5 MG/DL
CHLORIDE SERPL-SCNC: 106 MMOL/L
CO2 SERPL-SCNC: 26 MMOL/L
CREAT SERPL-MCNC: 0.89 MG/DL
EGFR: 64 ML/MIN/1.73M2
GLUCOSE SERPL-MCNC: 83 MG/DL
POTASSIUM SERPL-SCNC: 4.3 MMOL/L
PROT SERPL-MCNC: 6.6 G/DL
SODIUM SERPL-SCNC: 143 MMOL/L

## 2022-03-21 LAB
CHOLEST SERPL-MCNC: 226 MG/DL
HDLC SERPL-MCNC: 51 MG/DL
LDLC SERPL CALC-MCNC: 149 MG/DL
NONHDLC SERPL-MCNC: 175 MG/DL
TRIGL SERPL-MCNC: 129 MG/DL

## 2022-03-23 ENCOUNTER — APPOINTMENT (OUTPATIENT)
Dept: CARDIOLOGY | Facility: CLINIC | Age: 82
End: 2022-03-23
Payer: MEDICARE

## 2022-03-23 ENCOUNTER — NON-APPOINTMENT (OUTPATIENT)
Age: 82
End: 2022-03-23

## 2022-03-23 VITALS
DIASTOLIC BLOOD PRESSURE: 72 MMHG | WEIGHT: 153 LBS | BODY MASS INDEX: 32.12 KG/M2 | SYSTOLIC BLOOD PRESSURE: 135 MMHG | OXYGEN SATURATION: 98 % | HEIGHT: 58 IN | HEART RATE: 68 BPM | RESPIRATION RATE: 12 BRPM

## 2022-03-23 DIAGNOSIS — I08.0 RHEUMATIC DISORDERS OF BOTH MITRAL AND AORTIC VALVES: ICD-10-CM

## 2022-03-23 PROCEDURE — 99214 OFFICE O/P EST MOD 30 MIN: CPT

## 2022-03-23 PROCEDURE — 93000 ELECTROCARDIOGRAM COMPLETE: CPT

## 2022-03-23 NOTE — HISTORY OF PRESENT ILLNESS
[FreeTextEntry1] : Alanna had MOHS on her nose. Recent increase in BP so restarted on HCTZ daily. However, last week did not feel well and after Solaris her BP was low. Occasional midsternal CP while at rest in one spot nonradiating without associated symptoms that resolves spontaneously.

## 2022-03-23 NOTE — DISCUSSION/SUMMARY
[___ Month(s)] : in [unfilled] month(s) [FreeTextEntry1] : The patient is an 81-year-old female myasthenia gravis, hypertension hyperlipidemia, hypothyroid, s/p COVID with fibrosis, aortic stenosis s/p syncope with reactive htn,\par #1 AS- euvolemic on exam, ECHO 11/21 progression SANJAY 1.0, event monitor negative, negative carotid doppler, f/u 3 months with ECHO\par #2 Pulm- s/p COVID pneumonia 2020 with prolonged hospitalization,and with scarring,  on inhaler, f/u Dr. Salcedo\par #3 Htn- continue losartan 25mg, decrease HCTZ to every other day\par #4 Lipids- continue crestor but increase to 20mg in view of AS\par #5 Hypothyroid- continue levothyroxine\par #6 MG- on pyridostigmine, daughter supportive, received COVID vaccines, walk daily

## 2022-03-23 NOTE — PHYSICAL EXAM

## 2022-03-30 ENCOUNTER — APPOINTMENT (OUTPATIENT)
Dept: NEUROLOGY | Facility: CLINIC | Age: 82
End: 2022-03-30
Payer: MEDICARE

## 2022-03-30 PROCEDURE — 96365 THER/PROPH/DIAG IV INF INIT: CPT

## 2022-04-13 ENCOUNTER — APPOINTMENT (OUTPATIENT)
Dept: NEUROLOGY | Facility: CLINIC | Age: 82
End: 2022-04-13
Payer: MEDICARE

## 2022-04-13 PROCEDURE — 96365 THER/PROPH/DIAG IV INF INIT: CPT

## 2022-04-19 ENCOUNTER — APPOINTMENT (OUTPATIENT)
Dept: NEUROLOGY | Facility: CLINIC | Age: 82
End: 2022-04-19
Payer: MEDICARE

## 2022-04-19 VITALS
HEIGHT: 57 IN | BODY MASS INDEX: 34.09 KG/M2 | WEIGHT: 158 LBS | HEART RATE: 64 BPM | DIASTOLIC BLOOD PRESSURE: 68 MMHG | SYSTOLIC BLOOD PRESSURE: 154 MMHG

## 2022-04-19 PROCEDURE — 99214 OFFICE O/P EST MOD 30 MIN: CPT

## 2022-04-19 NOTE — ASSESSMENT
[FreeTextEntry1] : Mrs. Mahan is an 81-year-old woman who was undergone multiple spinal and orthopedic procedures and has chronic pain.  She was diagnosed with myasthenia gravis acetylcholine receptor antibody positive in 2018.  She has done well on a regimen of short acting and extended release pyridostigmine and eculizumab.  She has been off steroids for over a year.  In view of her excellent functional status, no new intervention is indicated.  She will be reevaluated in 4 to 6 months.  Telephone contact will be maintained in the meantime.

## 2022-04-19 NOTE — REVIEW OF SYSTEMS
[Facial Weakness] : facial weakness [Arm Weakness] : arm weakness [Difficulty Walking] : difficulty walking [Negative] : Heme/Lymph

## 2022-04-19 NOTE — PHYSICAL EXAM
[FreeTextEntry1] : Constitutional:  Patient was well-developed, well-nourished and in no acute distress. \par \par Head:  Normocephalic, atraumatic. Tympanic membranes were clear. \par \par Neck:  Supple with limited movement.\par \par Cardiovascular:  Cardiac rhythm was regular without murmur. There were no carotid bruits. Peripheral pulses were full and symmetric. \par \par Respiratory:  Lungs were clear. \par \par Abdomen:  Soft and nontender. \par \par Spine:  Nontender. \par \par Skin:  There were no rashes. \par \par NEUROLOGICAL EXAMINATION:\par \par Mental Status: Patient was alert and oriented. Speech was fluent. There was no dysarthria. \par \par Cranial Nerves: \par \par II: She could finger count bilaterally.  Pupils were surgical but reactive. Visual fields were full. Funduscopic examination was normal. \par \par III, IV, VI:  Eye movements were full without nystagmus. \par \par V: Facial sensation was intact. \par \par VII: Facial strength was normal except for bilateral orbicularis oculi weakness. \par \par VIII: Hearing was equal. \par \par IX, X: Palatal movement was normal. Phonation was normal. \par \par XI: Sternocleidomastoids and trapezii were normal. \par \par XII: Tongue was midline and movements normal. There was no lingual atrophy or fasciculations. \par \par Motor Examination: Muscle bulk, tone and strength were normal except for limitations of shoulder movements.  Her hands were arthritic. \par \par Sensory Examination: Pinprick, vibration and joint position sense were intact. \par \par Reflexes: DTRs were 2+ throughout. \par \par Plantar Responses: Plantar responses were flexor. \par \par Coordination/Cerebellar Function: There was no dysmetria on finger to nose testing. \par \par Gait/Stance: Gait was mildly small stepped and stable.  Romberg was negative.  Tandem was not tested.\par

## 2022-04-19 NOTE — HISTORY OF PRESENT ILLNESS
[FreeTextEntry1] : Mrs. Alanna Mahan is an 81-year-old right-handed patient with been under the care of Dr. Baron Fitzgerald since September 20, 2019.  She was accompanied to the office by her daughter Patsy.\par \par I previously evaluated Mrs. Mahan approximately a decade ago for carpal tunnel syndrome.  She was lost to follow-up.\par \par In approximately 2018, she developed hoarseness, dysphagia, mastication fatigue and limb weakness.  She was diagnosed with myasthenia gravis by Dr. Long Reyes and was treated with pyridostigmine.  In August 2019, she developed myasthenic crisis prompting admission to [facility].  She was treated with IVIG and prednisone.  Pyridostigmine was continued.  She was then vaccinated against meningococcus and begun on eculizumab.\par \par According to her daughter, there was miraculous improvement.  She still has a variety of complaints but is functioning very well.  She complains of eye closure and hand weakness.  She occasionally chokes.  She denies ptosis or diplopia.  She has been short of breath but this has been attributed to COVID 19 which she contracted in 2020.  She was hospitalized but not intubated.\par \par Past surgical history is notable for bilateral cataract extractions, bilateral carpal tunnel release, hysterectomy, bladder resuspension, tonsillectomy, bilateral shoulder replacements, 4 lumbar and 1 cervical procedure, right hip replacement, procedure on her left foot, cholecystectomy, herniorrhaphy and excision of squamous and basal carcinomas.\par \par Medical problems include hypertension, hyperlipidemia, hypothyroidism and COVID-19 in 2020.\par \par Medications include pyridostigmine extended release 180 mg every other day, pyridostigmine 60 mg 3 times a day, levothyroxine 75 mcg daily, Cozaar 50 mg daily, hydrochlorothiazide 12.5 mg every other day, rosuvastatin 20 mg/day and eculizumab infusions every 2 weeks.\par \par She has an allergy to iodine.\par \par She is  and is a retired seamstress.  She is a non-smoker and drinks wine.  Family history is notable for stroke thyroid disease and bladder cancer.

## 2022-04-27 ENCOUNTER — APPOINTMENT (OUTPATIENT)
Dept: NEUROLOGY | Facility: CLINIC | Age: 82
End: 2022-04-27
Payer: MEDICARE

## 2022-04-27 PROCEDURE — 96365 THER/PROPH/DIAG IV INF INIT: CPT

## 2022-05-10 ENCOUNTER — APPOINTMENT (OUTPATIENT)
Dept: PULMONOLOGY | Facility: CLINIC | Age: 82
End: 2022-05-10
Payer: MEDICARE

## 2022-05-10 VITALS
OXYGEN SATURATION: 95 % | HEART RATE: 73 BPM | WEIGHT: 154 LBS | DIASTOLIC BLOOD PRESSURE: 70 MMHG | SYSTOLIC BLOOD PRESSURE: 126 MMHG | BODY MASS INDEX: 33.33 KG/M2 | TEMPERATURE: 97 F

## 2022-05-10 PROCEDURE — 94060 EVALUATION OF WHEEZING: CPT

## 2022-05-10 PROCEDURE — 94727 GAS DIL/WSHOT DETER LNG VOL: CPT

## 2022-05-10 PROCEDURE — 94729 DIFFUSING CAPACITY: CPT

## 2022-05-10 PROCEDURE — 99214 OFFICE O/P EST MOD 30 MIN: CPT | Mod: 25

## 2022-05-11 ENCOUNTER — APPOINTMENT (OUTPATIENT)
Dept: NEUROLOGY | Facility: CLINIC | Age: 82
End: 2022-05-11
Payer: MEDICARE

## 2022-05-11 PROCEDURE — 96365 THER/PROPH/DIAG IV INF INIT: CPT

## 2022-05-17 ENCOUNTER — LABORATORY RESULT (OUTPATIENT)
Age: 82
End: 2022-05-17

## 2022-05-17 ENCOUNTER — APPOINTMENT (OUTPATIENT)
Dept: INTERNAL MEDICINE | Facility: CLINIC | Age: 82
End: 2022-05-17
Payer: MEDICARE

## 2022-05-17 VITALS
BODY MASS INDEX: 34.09 KG/M2 | DIASTOLIC BLOOD PRESSURE: 77 MMHG | HEIGHT: 57 IN | OXYGEN SATURATION: 95 % | RESPIRATION RATE: 14 BRPM | SYSTOLIC BLOOD PRESSURE: 144 MMHG | HEART RATE: 74 BPM | WEIGHT: 158 LBS

## 2022-05-17 PROCEDURE — 99214 OFFICE O/P EST MOD 30 MIN: CPT

## 2022-05-17 RX ORDER — LINACLOTIDE 145 UG/1
145 CAPSULE, GELATIN COATED ORAL
Qty: 90 | Refills: 3 | Status: ACTIVE | COMMUNITY
Start: 2020-07-01 | End: 1900-01-01

## 2022-05-17 NOTE — ASSESSMENT
[FreeTextEntry1] : 81 Y OLD FEM WITH CHRONIC ARTHRALGIAS = PT AND ORTHO OFFERED\par AS= F/U CARDIO \par DYSLIPIDEMIA ,HYPOTHYROID = LABS \par GERD AND CONSTIPATION = RX SENT \par RTO 3-4 M OR PRN

## 2022-05-17 NOTE — HISTORY OF PRESENT ILLNESS
[de-identified] : CC OF NECK PAIN AND LUMBAR \par CC OF R HIP PAIN S/P SURGERY 2 Y AGO \par CC OF ABD PAIN ,SEEN BY GI 2 M AGO ON PPI AND CONSTIPATION RX \par CC OF FEELING COLD ALL THE TIME

## 2022-05-17 NOTE — REVIEW OF SYSTEMS
[Fatigue] : fatigue [Dyspnea on Exertion] : dyspnea on exertion [Abdominal Pain] : abdominal pain [Constipation] : constipation [Heartburn] : heartburn [Joint Pain] : joint pain [Joint Stiffness] : joint stiffness [Back Pain] : back pain [Negative] : Heme/Lymph

## 2022-05-17 NOTE — PHYSICAL EXAM
[Normal] : no respiratory distress, lungs were clear to auscultation bilaterally and no accessory muscle use [Normal Rate] : normal [Normal S1] : normal S1 [Normal S2] : normal S2 [S3] : no S3 [S4] : no S4 [II] : a grade 2 [No Pitting Edema] : no pitting edema present [Right Carotid Bruit] : no bruit heard over the right carotid [Left Carotid Bruit] : no bruit heard over the left carotid [Right Femoral Bruit] : no bruit heard over the right femoral artery [Left Femoral Bruit] : no bruit heard over the left femoral artery [2+] : left 2+ [No Abnormalities] : the abdominal aorta was not enlarged and no bruit was heard [Soft] : abdomen soft [Non Tender] : non-tender [Normal Bowel Sounds] : normal bowel sounds [No Joint Swelling] : no joint swelling [No Rash] : no rash [Coordination Grossly Intact] : coordination grossly intact [Anxious] : anxious [Depressed] : depressed [Labile] : labile

## 2022-05-25 ENCOUNTER — APPOINTMENT (OUTPATIENT)
Dept: NEUROLOGY | Facility: CLINIC | Age: 82
End: 2022-05-25
Payer: MEDICARE

## 2022-05-25 PROCEDURE — 96365 THER/PROPH/DIAG IV INF INIT: CPT

## 2022-05-29 NOTE — PROGRESS NOTE ADULT - PROBLEM SELECTOR PLAN 4
There are no Wet Read(s) to document. Continue mestinon home dosing  Given solumedrol 40mg x 1  CLOSE MONITORING OF RR and daily VBGs  NIF and VC should be done at minimum q6hrs, but due to current pandemic and DNR/DNI status, unable to perform.  Will discuss at least daily NIF and VC monitoring  As a proxy, can also ask PT to count from 20 to 0 in one breath and look for tachypnea, nasal tone intonation change, neck flexion weakness and hypercarbia.   PT's get hypercarbic and tachypneic before becoming hypoxic and thus SpO2 is not an accurate enough proxy of MG status

## 2022-06-08 ENCOUNTER — APPOINTMENT (OUTPATIENT)
Dept: NEUROLOGY | Facility: CLINIC | Age: 82
End: 2022-06-08
Payer: MEDICARE

## 2022-06-08 PROCEDURE — ZZZZZ: CPT

## 2022-06-08 PROCEDURE — 96365 THER/PROPH/DIAG IV INF INIT: CPT

## 2022-06-17 LAB
ALBUMIN SERPL ELPH-MCNC: 4.2 G/DL
ALP BLD-CCNC: 77 U/L
ALT SERPL-CCNC: 22 U/L
ANION GAP SERPL CALC-SCNC: 13 MMOL/L
APPEARANCE: CLEAR
AST SERPL-CCNC: 26 U/L
BILIRUB SERPL-MCNC: 0.2 MG/DL
BILIRUBIN URINE: NEGATIVE
BLOOD URINE: NEGATIVE
BUN SERPL-MCNC: 27 MG/DL
CALCIUM SERPL-MCNC: 9.8 MG/DL
CHLORIDE SERPL-SCNC: 108 MMOL/L
CHOLEST SERPL-MCNC: 187 MG/DL
CO2 SERPL-SCNC: 22 MMOL/L
COLOR: NORMAL
CREAT SERPL-MCNC: 0.86 MG/DL
EGFR: 67 ML/MIN/1.73M2
GLUCOSE QUALITATIVE U: NEGATIVE
GLUCOSE SERPL-MCNC: 88 MG/DL
HDLC SERPL-MCNC: 47 MG/DL
KETONES URINE: NEGATIVE
LDLC SERPL CALC-MCNC: 124 MG/DL
LEUKOCYTE ESTERASE URINE: NEGATIVE
NITRITE URINE: NEGATIVE
NONHDLC SERPL-MCNC: 140 MG/DL
PH URINE: 6
POTASSIUM SERPL-SCNC: 3.9 MMOL/L
PROT SERPL-MCNC: 7.4 G/DL
PROTEIN URINE: NEGATIVE
SODIUM SERPL-SCNC: 143 MMOL/L
SPECIFIC GRAVITY URINE: 1.02
TRIGL SERPL-MCNC: 82 MG/DL
TSH SERPL-ACNC: 6.26 UIU/ML
UROBILINOGEN URINE: NORMAL

## 2022-06-22 ENCOUNTER — APPOINTMENT (OUTPATIENT)
Dept: NEUROLOGY | Facility: CLINIC | Age: 82
End: 2022-06-22
Payer: MEDICARE

## 2022-06-22 ENCOUNTER — APPOINTMENT (OUTPATIENT)
Dept: CARDIOLOGY | Facility: CLINIC | Age: 82
End: 2022-06-22
Payer: MEDICARE

## 2022-06-22 ENCOUNTER — NON-APPOINTMENT (OUTPATIENT)
Age: 82
End: 2022-06-22

## 2022-06-22 VITALS
BODY MASS INDEX: 34.52 KG/M2 | RESPIRATION RATE: 12 BRPM | SYSTOLIC BLOOD PRESSURE: 146 MMHG | WEIGHT: 160 LBS | OXYGEN SATURATION: 95 % | DIASTOLIC BLOOD PRESSURE: 78 MMHG | HEIGHT: 57 IN | HEART RATE: 83 BPM

## 2022-06-22 DIAGNOSIS — R55 SYNCOPE AND COLLAPSE: ICD-10-CM

## 2022-06-22 PROCEDURE — 99214 OFFICE O/P EST MOD 30 MIN: CPT

## 2022-06-22 PROCEDURE — 96365 THER/PROPH/DIAG IV INF INIT: CPT

## 2022-06-22 PROCEDURE — 93306 TTE W/DOPPLER COMPLETE: CPT

## 2022-06-22 PROCEDURE — 93000 ELECTROCARDIOGRAM COMPLETE: CPT

## 2022-06-22 RX ORDER — ROSUVASTATIN CALCIUM 10 MG/1
10 TABLET, FILM COATED ORAL
Qty: 90 | Refills: 0 | Status: DISCONTINUED | COMMUNITY
Start: 2022-01-03

## 2022-06-22 RX ORDER — LIFITEGRAST 50 MG/ML
5 SOLUTION/ DROPS OPHTHALMIC
Qty: 180 | Refills: 0 | Status: DISCONTINUED | COMMUNITY
Start: 2022-06-15

## 2022-06-22 RX ORDER — LOTEPREDNOL ETABONATE 2.5 MG/ML
0.25 SUSPENSION/ DROPS OPHTHALMIC
Qty: 8 | Refills: 0 | Status: DISCONTINUED | COMMUNITY
Start: 2022-05-18

## 2022-06-22 NOTE — DISCUSSION/SUMMARY
[___ Month(s)] : in [unfilled] month(s) [FreeTextEntry1] : The patient is an 81-year-old female myasthenia gravis, hypertension hyperlipidemia, hypothyroid, s/p COVID with fibrosis, aortic stenosis s/p syncope with no further episodes\par #1 AS- euvolemic on exam, ECHO 11/21 progression SANJAY 1.0, event monitor negative, negative carotid doppler, preliminary ECHO unchanged\par #2 Pulm- s/p COVID pneumonia 2020 with prolonged hospitalization,and with scarring,  on inhaler, f/u Dr. Salcedo\par #3 Htn- continue losartan 25mg, HCTZ  every other day\par #4 Lipids- continue crestor 20mg in view of AS, discussed increase to 40mg pending final results echo\par #5 Hypothyroid- continue levothyroxine\par #6 MG- on pyridostigmine, daughter supportive, received COVID vaccines, walk daily [EKG obtained to assist in diagnosis and management of assessed problem(s)] : EKG obtained to assist in diagnosis and management of assessed problem(s)

## 2022-06-22 NOTE — HISTORY OF PRESENT ILLNESS
[FreeTextEntry1] : Pasqualina has been c/o arthritis pains. Sometimes event midsternal bone. No CP, lightheadedness or dizziness. More fatigued.

## 2022-06-22 NOTE — PHYSICAL EXAM

## 2022-06-24 NOTE — REVIEW OF SYSTEMS
[Cough] : cough [SOB on Exertion] : sob on exertion [GERD] : gerd [Negative] : Endocrine [Sputum] : no sputum [Dyspnea] : no dyspnea

## 2022-06-24 NOTE — DISCUSSION/SUMMARY
[FreeTextEntry1] : 82 yo female with MAXWELL and nocturnal cough with bronchodilator response on PFT. I reviewed the results with the patient.She is to restart breo daily with PRN albuterol MDI. GERD treatment discussed. Follow up with cardiology and her PMD as before.

## 2022-06-24 NOTE — HISTORY OF PRESENT ILLNESS
[Never] : never [TextBox_4] : 80 yo female with hx of chronic mostly nocturnal cough with MAXWELL, presents for follow up. The patient continues to have similar complaints since last visit six months ago. She uses breo 200 (ran out two weeks ago) with PRN albuterol MDI. She continues to have GERD related complaints on PPI. She is being followed by neurology for MG which appears to be well controlled as per latest neuro note. [TextBox_29] : Denies snoring, daytime somnolence, apneic episodes, AM headaches

## 2022-06-24 NOTE — PROCEDURE
Recurrent admissions with diabetic ketoacidosis  Lantus and sliding scale  Improving control   twice a day Lantus to 15 twice a day  Continue sliding scale insulin  Diabetic educator    Demario lactated Ringer    [FreeTextEntry1] : PFT results:Significant bronchodilator response

## 2022-07-06 ENCOUNTER — APPOINTMENT (OUTPATIENT)
Dept: NEUROLOGY | Facility: CLINIC | Age: 82
End: 2022-07-06

## 2022-07-06 PROCEDURE — 96365 THER/PROPH/DIAG IV INF INIT: CPT

## 2022-07-20 ENCOUNTER — APPOINTMENT (OUTPATIENT)
Dept: NEUROLOGY | Facility: CLINIC | Age: 82
End: 2022-07-20

## 2022-07-20 PROCEDURE — 96365 THER/PROPH/DIAG IV INF INIT: CPT

## 2022-08-04 ENCOUNTER — APPOINTMENT (OUTPATIENT)
Dept: NEUROLOGY | Facility: CLINIC | Age: 82
End: 2022-08-04

## 2022-08-04 PROCEDURE — 96365 THER/PROPH/DIAG IV INF INIT: CPT

## 2022-08-17 ENCOUNTER — APPOINTMENT (OUTPATIENT)
Dept: NEUROLOGY | Facility: CLINIC | Age: 82
End: 2022-08-17

## 2022-08-17 PROCEDURE — 96365 THER/PROPH/DIAG IV INF INIT: CPT

## 2022-08-30 LAB
APPEARANCE: CLEAR
BACTERIA: NEGATIVE
BILIRUBIN URINE: NEGATIVE
BLOOD URINE: NEGATIVE
COLOR: YELLOW
GLUCOSE QUALITATIVE U: NEGATIVE
HYALINE CASTS: 0 /LPF
KETONES URINE: NEGATIVE
LEUKOCYTE ESTERASE URINE: NEGATIVE
MICROSCOPIC-UA: NORMAL
NITRITE URINE: NEGATIVE
PH URINE: 6.5
PROTEIN URINE: NEGATIVE
RED BLOOD CELLS URINE: 5 /HPF
SPECIFIC GRAVITY URINE: 1.02
SQUAMOUS EPITHELIAL CELLS: 1 /HPF
UROBILINOGEN URINE: NORMAL
WHITE BLOOD CELLS URINE: 6 /HPF

## 2022-09-01 LAB — BACTERIA UR CULT: ABNORMAL

## 2022-09-06 ENCOUNTER — APPOINTMENT (OUTPATIENT)
Dept: NEUROLOGY | Facility: CLINIC | Age: 82
End: 2022-09-06

## 2022-09-06 PROCEDURE — 96365 THER/PROPH/DIAG IV INF INIT: CPT

## 2022-09-09 ENCOUNTER — APPOINTMENT (OUTPATIENT)
Dept: INTERNAL MEDICINE | Facility: CLINIC | Age: 82
End: 2022-09-09

## 2022-09-09 ENCOUNTER — LABORATORY RESULT (OUTPATIENT)
Age: 82
End: 2022-09-09

## 2022-09-09 VITALS
HEIGHT: 55 IN | RESPIRATION RATE: 12 BRPM | OXYGEN SATURATION: 98 % | SYSTOLIC BLOOD PRESSURE: 153 MMHG | WEIGHT: 156 LBS | HEART RATE: 56 BPM | BODY MASS INDEX: 36.1 KG/M2 | TEMPERATURE: 97.2 F | DIASTOLIC BLOOD PRESSURE: 79 MMHG

## 2022-09-09 DIAGNOSIS — Z87.19 PERSONAL HISTORY OF OTHER DISEASES OF THE DIGESTIVE SYSTEM: ICD-10-CM

## 2022-09-09 DIAGNOSIS — K59.09 OTHER CONSTIPATION: ICD-10-CM

## 2022-09-09 PROCEDURE — 99214 OFFICE O/P EST MOD 30 MIN: CPT

## 2022-09-09 NOTE — ASSESSMENT
[FreeTextEntry1] : 81 Y OLD FEM WITH OF HTN ,DYSLIPIDEMIA ,GERD ,OBESITY ,OA ,MYASTHENIA GRAVIS AND DJD L SPINE AND R HIP = LABS ,PT ,RX ALL MEDS \par RECOMM 4TH COVID-19 VACCINE \par RTO 3 M

## 2022-09-09 NOTE — PHYSICAL EXAM
[Normal Rate] : normal [Normal S1] : normal S1 [Normal S2] : normal S2 [S3] : no S3 [S4] : no S4 [II] : a grade 2 [No Pitting Edema] : no pitting edema present [Right Carotid Bruit] : no bruit heard over the right carotid [Left Carotid Bruit] : no bruit heard over the left carotid [Right Femoral Bruit] : no bruit heard over the right femoral artery [Left Femoral Bruit] : no bruit heard over the left femoral artery [2+] : left 2+ [No Abnormalities] : the abdominal aorta was not enlarged and no bruit was heard [No Edema] : there was no peripheral edema [Obese] : obese [Normal] : normal [Soft, Nontender] : the abdomen was soft and nontender [No Mass] : no masses were palpated [No HSM] : no hepatosplenomegaly noted [No CVA Tenderness] : no CVA  tenderness [Coordination Grossly Intact] : coordination grossly intact [No Focal Deficits] : no focal deficits [Anxious] : anxious [Depressed] : depressed

## 2022-09-09 NOTE — HISTORY OF PRESENT ILLNESS
[de-identified] : COMES WITH DAUGHTER ;CC OF PERSISTENT R HIP AND LOWER BACK PAIN ,STATES THS SHE HAD SEEN PAIN MANAGEMENT ,ORTHO ,PT ,NEURO AND FAILED TO IMPROVED WITH ANY TREATMENT \par HAD 3ER COVID-19 VACCINE OVER 9 M AGO

## 2022-09-09 NOTE — REVIEW OF SYSTEMS
[Fatigue] : fatigue [Joint Pain] : joint pain [Muscle Pain] : muscle pain [Back Pain] : back pain [Unsteady Walking] : ataxia [Negative] : Heme/Lymph

## 2022-09-10 LAB
ALBUMIN SERPL ELPH-MCNC: 4.1 G/DL
ALP BLD-CCNC: 82 U/L
ALT SERPL-CCNC: 24 U/L
ANION GAP SERPL CALC-SCNC: 10 MMOL/L
AST SERPL-CCNC: 33 U/L
BILIRUB SERPL-MCNC: 0.2 MG/DL
BUN SERPL-MCNC: 22 MG/DL
CALCIUM SERPL-MCNC: 9.8 MG/DL
CHLORIDE SERPL-SCNC: 105 MMOL/L
CHOLEST SERPL-MCNC: 202 MG/DL
CO2 SERPL-SCNC: 25 MMOL/L
CREAT SERPL-MCNC: 0.92 MG/DL
EGFR: 62 ML/MIN/1.73M2
GLUCOSE SERPL-MCNC: 88 MG/DL
HDLC SERPL-MCNC: 49 MG/DL
LDLC SERPL CALC-MCNC: 127 MG/DL
NONHDLC SERPL-MCNC: 152 MG/DL
POTASSIUM SERPL-SCNC: 4.7 MMOL/L
PROT SERPL-MCNC: 7 G/DL
SODIUM SERPL-SCNC: 140 MMOL/L
TRIGL SERPL-MCNC: 126 MG/DL
TSH SERPL-ACNC: 5.78 UIU/ML

## 2022-09-14 ENCOUNTER — APPOINTMENT (OUTPATIENT)
Dept: NEUROLOGY | Facility: CLINIC | Age: 82
End: 2022-09-14

## 2022-09-14 PROCEDURE — 96365 THER/PROPH/DIAG IV INF INIT: CPT

## 2022-09-28 ENCOUNTER — APPOINTMENT (OUTPATIENT)
Dept: SPINE | Facility: CLINIC | Age: 82
End: 2022-09-28

## 2022-09-28 ENCOUNTER — APPOINTMENT (OUTPATIENT)
Dept: NEUROLOGY | Facility: CLINIC | Age: 82
End: 2022-09-28

## 2022-09-28 VITALS
SYSTOLIC BLOOD PRESSURE: 147 MMHG | OXYGEN SATURATION: 95 % | HEART RATE: 70 BPM | HEIGHT: 55 IN | WEIGHT: 156 LBS | BODY MASS INDEX: 36.1 KG/M2 | DIASTOLIC BLOOD PRESSURE: 78 MMHG

## 2022-09-28 PROCEDURE — 96365 THER/PROPH/DIAG IV INF INIT: CPT

## 2022-09-28 PROCEDURE — 99213 OFFICE O/P EST LOW 20 MIN: CPT

## 2022-10-05 ENCOUNTER — MED ADMIN CHARGE (OUTPATIENT)
Age: 82
End: 2022-10-05

## 2022-10-05 ENCOUNTER — APPOINTMENT (OUTPATIENT)
Dept: INTERNAL MEDICINE | Facility: CLINIC | Age: 82
End: 2022-10-05

## 2022-10-05 PROCEDURE — G0008: CPT

## 2022-10-05 PROCEDURE — 90662 IIV NO PRSV INCREASED AG IM: CPT

## 2022-10-08 ENCOUNTER — APPOINTMENT (OUTPATIENT)
Dept: MRI IMAGING | Facility: CLINIC | Age: 82
End: 2022-10-08

## 2022-10-08 ENCOUNTER — APPOINTMENT (OUTPATIENT)
Dept: CT IMAGING | Facility: CLINIC | Age: 82
End: 2022-10-08

## 2022-10-08 ENCOUNTER — OUTPATIENT (OUTPATIENT)
Dept: OUTPATIENT SERVICES | Facility: HOSPITAL | Age: 82
LOS: 1 days | End: 2022-10-08
Payer: MEDICARE

## 2022-10-08 ENCOUNTER — RESULT REVIEW (OUTPATIENT)
Age: 82
End: 2022-10-08

## 2022-10-08 DIAGNOSIS — M54.12 RADICULOPATHY, CERVICAL REGION: ICD-10-CM

## 2022-10-08 DIAGNOSIS — Z96.619 PRESENCE OF UNSPECIFIED ARTIFICIAL SHOULDER JOINT: Chronic | ICD-10-CM

## 2022-10-08 DIAGNOSIS — Z90.89 ACQUIRED ABSENCE OF OTHER ORGANS: Chronic | ICD-10-CM

## 2022-10-08 DIAGNOSIS — Z98.890 OTHER SPECIFIED POSTPROCEDURAL STATES: Chronic | ICD-10-CM

## 2022-10-08 PROCEDURE — 72141 MRI NECK SPINE W/O DYE: CPT

## 2022-10-08 PROCEDURE — 72131 CT LUMBAR SPINE W/O DYE: CPT | Mod: 26,MH

## 2022-10-08 PROCEDURE — 72141 MRI NECK SPINE W/O DYE: CPT | Mod: 26,MH

## 2022-10-08 PROCEDURE — 72148 MRI LUMBAR SPINE W/O DYE: CPT | Mod: 26,MH

## 2022-10-08 PROCEDURE — 76376 3D RENDER W/INTRP POSTPROCES: CPT | Mod: 26

## 2022-10-08 PROCEDURE — 72131 CT LUMBAR SPINE W/O DYE: CPT

## 2022-10-08 PROCEDURE — 72148 MRI LUMBAR SPINE W/O DYE: CPT

## 2022-10-08 PROCEDURE — 76376 3D RENDER W/INTRP POSTPROCES: CPT

## 2022-10-12 ENCOUNTER — APPOINTMENT (OUTPATIENT)
Dept: NEUROLOGY | Facility: CLINIC | Age: 82
End: 2022-10-12

## 2022-10-12 PROCEDURE — 96365 THER/PROPH/DIAG IV INF INIT: CPT

## 2022-10-17 ENCOUNTER — APPOINTMENT (OUTPATIENT)
Dept: SPINE | Facility: CLINIC | Age: 82
End: 2022-10-17

## 2022-10-19 ENCOUNTER — APPOINTMENT (OUTPATIENT)
Dept: SPINE | Facility: CLINIC | Age: 82
End: 2022-10-19

## 2022-10-19 VITALS
HEART RATE: 62 BPM | WEIGHT: 156 LBS | HEIGHT: 55 IN | OXYGEN SATURATION: 97 % | BODY MASS INDEX: 36.1 KG/M2 | SYSTOLIC BLOOD PRESSURE: 157 MMHG | DIASTOLIC BLOOD PRESSURE: 80 MMHG

## 2022-10-19 DIAGNOSIS — M48.061 SPINAL STENOSIS, LUMBAR REGION WITHOUT NEUROGENIC CLAUDICATION: ICD-10-CM

## 2022-10-19 DIAGNOSIS — M48.02 SPINAL STENOSIS, CERVICAL REGION: ICD-10-CM

## 2022-10-19 DIAGNOSIS — M54.12 RADICULOPATHY, CERVICAL REGION: ICD-10-CM

## 2022-10-19 PROCEDURE — 99213 OFFICE O/P EST LOW 20 MIN: CPT

## 2022-10-26 ENCOUNTER — APPOINTMENT (OUTPATIENT)
Dept: NEUROLOGY | Facility: CLINIC | Age: 82
End: 2022-10-26

## 2022-10-26 PROCEDURE — 96365 THER/PROPH/DIAG IV INF INIT: CPT

## 2022-11-01 ENCOUNTER — APPOINTMENT (OUTPATIENT)
Dept: ORTHOPEDIC SURGERY | Facility: CLINIC | Age: 82
End: 2022-11-01

## 2022-11-09 ENCOUNTER — APPOINTMENT (OUTPATIENT)
Dept: NEUROLOGY | Facility: CLINIC | Age: 82
End: 2022-11-09

## 2022-11-09 PROCEDURE — 96365 THER/PROPH/DIAG IV INF INIT: CPT

## 2022-11-10 ENCOUNTER — APPOINTMENT (OUTPATIENT)
Dept: PULMONOLOGY | Facility: CLINIC | Age: 82
End: 2022-11-10
Payer: MEDICARE

## 2022-11-10 VITALS
HEART RATE: 75 BPM | DIASTOLIC BLOOD PRESSURE: 78 MMHG | WEIGHT: 146 LBS | SYSTOLIC BLOOD PRESSURE: 146 MMHG | TEMPERATURE: 96.9 F | BODY MASS INDEX: 35.2 KG/M2 | OXYGEN SATURATION: 97 %

## 2022-11-10 PROCEDURE — 99214 OFFICE O/P EST MOD 30 MIN: CPT

## 2022-11-10 RX ORDER — FLUTICASONE FUROATE AND VILANTEROL TRIFENATATE 200; 25 UG/1; UG/1
200-25 POWDER RESPIRATORY (INHALATION)
Qty: 3 | Refills: 3 | Status: ACTIVE | COMMUNITY
Start: 2022-11-10 | End: 1900-01-01

## 2022-11-15 ENCOUNTER — APPOINTMENT (OUTPATIENT)
Dept: NEUROLOGY | Facility: CLINIC | Age: 82
End: 2022-11-15

## 2022-11-15 VITALS
SYSTOLIC BLOOD PRESSURE: 128 MMHG | HEIGHT: 55 IN | WEIGHT: 157 LBS | BODY MASS INDEX: 36.33 KG/M2 | HEART RATE: 60 BPM | DIASTOLIC BLOOD PRESSURE: 60 MMHG

## 2022-11-15 PROCEDURE — 99214 OFFICE O/P EST MOD 30 MIN: CPT

## 2022-11-15 PROCEDURE — ZZZZZ: CPT

## 2022-11-15 RX ORDER — PYRIDOSTIGMINE BROMIDE 180 MG/1
180 TABLET ORAL DAILY
Qty: 90 | Refills: 3 | Status: ACTIVE | COMMUNITY
Start: 2022-11-15 | End: 1900-01-01

## 2022-11-15 RX ORDER — PYRIDOSTIGMINE BROMIDE 60 MG/1
60 TABLET ORAL 3 TIMES DAILY
Qty: 180 | Refills: 3 | Status: ACTIVE | COMMUNITY
Start: 2022-11-15 | End: 1900-01-01

## 2022-11-15 NOTE — ASSESSMENT
[FreeTextEntry1] : Mrs. Mahan is an 82-year-old woman who suffers from spondylosis and well-controlled acetylcholine receptor positive myasthenia gravis.  Given her clinical stability, I would not recommend any modification of her myasthenic gravis regimen of extended and immediate release pyridostigmine and eculizumab.  She will return to the office in 6 months for routine follow-up.  Telephone contact will be maintained in the meantime.

## 2022-11-15 NOTE — HISTORY OF PRESENT ILLNESS
[FreeTextEntry1] : Mrs. Alanna Mahan returned to the office having been initially evaluated on April 19, 2022.  She is an 82-year-old right-handed patient who was under the care of Dr. Baron Fitzgerald since September 20, 2019.  She was accompanied to the office by her daughter Patsy.\par \par In approximately 2018, she developed hoarseness, dysphagia, mastication fatigue and limb weakness.  She was diagnosed with myasthenia gravis by Dr. Long Reyes and was treated with pyridostigmine.  In August 2019, she developed myasthenic crisis prompting admission to [facility].  She was treated with IVIG and prednisone.  Pyridostigmine was continued.  She was then vaccinated against meningococcus and begun on eculizumab.\par \par She is currently on pyridostigmine 180 mg every other day and 60 mg 3 times a day.  She receives eculizumab every 2 weeks.  She complains of occasional hand weakness and chronic low back pain.  She was recently evaluated by pain management and orthopedics and was administered an epidural steroid injection.\par \par Past surgical history is notable for bilateral cataract extractions, bilateral carpal tunnel release, hysterectomy, bladder resuspension, tonsillectomy, bilateral shoulder replacements, 4 lumbar and 1 cervical procedure, right hip replacement, procedure on her left foot, cholecystectomy, herniorrhaphy and excision of squamous and basal carcinomas.\par \par Medical problems include hypertension, hyperlipidemia, hypothyroidism and COVID-19 in 2020.\par \par Medications include pyridostigmine extended release 180 mg every other day, pyridostigmine 60 mg 3 times a day, levothyroxine 75 mcg daily, Cozaar 50 mg daily, hydrochlorothiazide 12.5 mg every other day, rosuvastatin 20 mg/day and eculizumab infusions every 2 weeks.\par \par She has an allergy to iodine.\par \par She is  and is a retired seamstress.  She is a non-smoker and drinks wine.  Family history is notable for stroke thyroid disease and bladder cancer.

## 2022-11-15 NOTE — PHYSICAL EXAM
[FreeTextEntry1] : Constitutional:  Patient was well-developed, well-nourished and in no acute distress. \par \par Head:  Normocephalic, atraumatic. Tympanic membranes were clear. \par \par Neck:  Supple with limited movement.\par \par Cardiovascular:  Cardiac rhythm was regular without murmur. There were no carotid bruits. Peripheral pulses were full and symmetric. \par \par Respiratory:  Lungs were clear. \par \par Abdomen:  Soft and nontender. \par \par Spine:  Nontender. \par \par Skin:  There were no rashes. \par \par NEUROLOGICAL EXAMINATION:\par \par Mental Status: Patient was alert and oriented. Speech was fluent. There was no dysarthria. \par \par Cranial Nerves: \par \par II: She could finger count bilaterally.  Pupils were surgical but reactive. Visual fields were full. Funduscopic examination was normal. \par \par III, IV, VI:  Eye movements were full without nystagmus. \par \par V: Facial sensation was intact. \par \par VII: Facial strength was normal except for bilateral orbicularis oculi weakness. \par \par VIII: Hearing was equal. \par \par IX, X: Palatal movement was normal. Phonation was normal. \par \par XI: Sternocleidomastoids and trapezii were normal. \par \par XII: Tongue was midline and movements normal. There was no lingual atrophy or fasciculations. \par \par Motor Examination: Muscle bulk, tone and strength were normal except for limitations of shoulder movements.  Her hands were arthritic. \par \par Sensory Examination: Pinprick, vibration and joint position sense were intact. \par \par Reflexes: DTRs were 2 throughout. \par \par Plantar Responses: Plantar responses were flexor. \par \par Coordination/Cerebellar Function: There was no dysmetria on finger to nose testing. \par \par Gait/Stance: Gait was mildly small stepped and stable.  Romberg was negative.  Tandem was not tested.\par

## 2022-11-23 ENCOUNTER — APPOINTMENT (OUTPATIENT)
Dept: NEUROLOGY | Facility: CLINIC | Age: 82
End: 2022-11-23

## 2022-11-23 PROCEDURE — 96365 THER/PROPH/DIAG IV INF INIT: CPT

## 2022-12-07 ENCOUNTER — APPOINTMENT (OUTPATIENT)
Dept: NEUROLOGY | Facility: CLINIC | Age: 82
End: 2022-12-07

## 2022-12-07 ENCOUNTER — APPOINTMENT (OUTPATIENT)
Dept: CARDIOLOGY | Facility: CLINIC | Age: 82
End: 2022-12-07

## 2022-12-07 ENCOUNTER — NON-APPOINTMENT (OUTPATIENT)
Age: 82
End: 2022-12-07

## 2022-12-07 VITALS
HEIGHT: 55 IN | OXYGEN SATURATION: 99 % | WEIGHT: 152 LBS | BODY MASS INDEX: 35.18 KG/M2 | RESPIRATION RATE: 12 BRPM | HEART RATE: 66 BPM | DIASTOLIC BLOOD PRESSURE: 76 MMHG | SYSTOLIC BLOOD PRESSURE: 138 MMHG

## 2022-12-07 PROCEDURE — 99214 OFFICE O/P EST MOD 30 MIN: CPT

## 2022-12-07 PROCEDURE — 93000 ELECTROCARDIOGRAM COMPLETE: CPT

## 2022-12-07 PROCEDURE — 96365 THER/PROPH/DIAG IV INF INIT: CPT

## 2022-12-07 RX ORDER — DOXEPIN HYDROCHLORIDE 100 MG/1
100 CAPSULE ORAL
Qty: 108 | Refills: 0 | Status: DISCONTINUED | COMMUNITY
Start: 2022-11-07

## 2022-12-07 RX ORDER — PREDNISONE 50 MG/1
50 TABLET ORAL
Qty: 3 | Refills: 0 | Status: DISCONTINUED | COMMUNITY
Start: 2022-11-07

## 2022-12-07 RX ORDER — FLUTICASONE FUROATE AND VILANTEROL TRIFENATATE 200; 25 UG/1; UG/1
200-25 POWDER RESPIRATORY (INHALATION)
Qty: 3 | Refills: 3 | Status: DISCONTINUED | COMMUNITY
Start: 2021-11-23 | End: 2022-12-07

## 2022-12-07 RX ORDER — PYRIDOSTIGMINE BROMIDE 180 MG/1
180 TABLET ORAL
Qty: 90 | Refills: 3 | Status: DISCONTINUED | COMMUNITY
Start: 2019-09-10 | End: 2022-12-07

## 2022-12-07 RX ORDER — PYRIDOSTIGMINE BROMIDE 60 MG/1
60 TABLET ORAL
Qty: 270 | Refills: 3 | Status: DISCONTINUED | COMMUNITY
Start: 2019-09-10 | End: 2022-12-07

## 2022-12-07 RX ORDER — RAVULIZUMAB 1100 MG/11ML
1100 SOLUTION, CONCENTRATE INTRAVENOUS
Qty: 3 | Refills: 6 | Status: DISCONTINUED | COMMUNITY
Start: 2022-08-19 | End: 2022-12-07

## 2022-12-07 RX ORDER — RAVULIZUMAB 300 MG/3ML
300 SOLUTION, CONCENTRATE INTRAVENOUS
Qty: 9 | Refills: 0 | Status: DISCONTINUED | COMMUNITY
Start: 2022-08-19 | End: 2022-12-07

## 2022-12-07 RX ORDER — CARBAMAZEPINE 200 MG/1
200 TABLET ORAL TWICE DAILY
Qty: 60 | Refills: 5 | Status: DISCONTINUED | COMMUNITY
Start: 2021-12-01 | End: 2022-12-07

## 2022-12-07 RX ORDER — DIPHENHYDRAMINE HCL 50 MG/1
50 CAPSULE ORAL
Qty: 1 | Refills: 0 | Status: DISCONTINUED | COMMUNITY
Start: 2022-11-07

## 2022-12-07 NOTE — DISCUSSION/SUMMARY
[FreeTextEntry1] : The patient is an 82-year-old female myasthenia gravis, hypertension hyperlipidemia, hypothyroid, s/p COVID with fibrosis, aortic stenosis with dyspnea most likely secondary to deconditioning.\par #1 AS- euvolemic on exam, ECHO 11/21 progression SANJAY 1.0, event monitor negative, negative carotid doppler, repeat echo next visit.\par #2 Pulm- s/p COVID pneumonia 2020 with prolonged hospitalization,and with scarring,  on inhaler, f/u Dr. Salcedo\par #3 Htn- continue losartan 25mg, HCTZ  every other day\par #4 Lipids- continue crestor 20mg, unable to tolerate higher dose\par #5 Hypothyroid- continue levothyroxine\par #6 MG- on pyridostigmine, daughter supportive, received COVID vaccines, walking limited, will refer to ortho. [EKG obtained to assist in diagnosis and management of assessed problem(s)] : EKG obtained to assist in diagnosis and management of assessed problem(s)

## 2022-12-07 NOTE — PHYSICAL EXAM

## 2022-12-10 ENCOUNTER — NON-APPOINTMENT (OUTPATIENT)
Age: 82
End: 2022-12-10

## 2022-12-10 ENCOUNTER — APPOINTMENT (OUTPATIENT)
Dept: ORTHOPEDIC SURGERY | Facility: CLINIC | Age: 82
End: 2022-12-10
Payer: MEDICARE

## 2022-12-10 VITALS
SYSTOLIC BLOOD PRESSURE: 126 MMHG | WEIGHT: 152 LBS | HEART RATE: 65 BPM | DIASTOLIC BLOOD PRESSURE: 77 MMHG | BODY MASS INDEX: 32.79 KG/M2 | HEIGHT: 57 IN

## 2022-12-10 DIAGNOSIS — M25.552 PAIN IN LEFT HIP: ICD-10-CM

## 2022-12-10 DIAGNOSIS — M16.12 UNILATERAL PRIMARY OSTEOARTHRITIS, LEFT HIP: ICD-10-CM

## 2022-12-10 PROCEDURE — 99214 OFFICE O/P EST MOD 30 MIN: CPT

## 2022-12-10 PROCEDURE — 73501 X-RAY EXAM HIP UNI 1 VIEW: CPT

## 2022-12-10 PROCEDURE — 72170 X-RAY EXAM OF PELVIS: CPT | Mod: LT

## 2022-12-13 ENCOUNTER — APPOINTMENT (OUTPATIENT)
Dept: INTERNAL MEDICINE | Facility: CLINIC | Age: 82
End: 2022-12-13

## 2022-12-13 VITALS
HEART RATE: 69 BPM | OXYGEN SATURATION: 96 % | DIASTOLIC BLOOD PRESSURE: 77 MMHG | WEIGHT: 153 LBS | RESPIRATION RATE: 12 BRPM | BODY MASS INDEX: 33.01 KG/M2 | HEIGHT: 57 IN | SYSTOLIC BLOOD PRESSURE: 144 MMHG

## 2022-12-13 PROCEDURE — 99214 OFFICE O/P EST MOD 30 MIN: CPT

## 2022-12-13 NOTE — ASSESSMENT
[FreeTextEntry1] : 82 Y OLD FEM WITH PMX OF HTN ,AS ,DYSLIPIDEMIA ,HYPOTHYROID ,OBESITY AND HIP- OA AND DJD L SPINE = LABS ORDERED\par RTO FOR PREOP OR 3 M

## 2022-12-13 NOTE — HISTORY OF PRESENT ILLNESS
[de-identified] : COMES FOR F/U \par WILL HAVE HIP SURGERY 3/20/23\par HAD COVID-19 AND INFLUENZA VACCINE RECENTLY

## 2022-12-14 LAB
ALBUMIN SERPL ELPH-MCNC: 4.2 G/DL
ALP BLD-CCNC: 84 U/L
ALT SERPL-CCNC: 23 U/L
ANION GAP SERPL CALC-SCNC: 8 MMOL/L
AST SERPL-CCNC: 28 U/L
BILIRUB SERPL-MCNC: 0.4 MG/DL
BUN SERPL-MCNC: 21 MG/DL
CALCIUM SERPL-MCNC: 9.4 MG/DL
CHLORIDE SERPL-SCNC: 105 MMOL/L
CO2 SERPL-SCNC: 28 MMOL/L
CREAT SERPL-MCNC: 0.92 MG/DL
EGFR: 61 ML/MIN/1.73M2
GLUCOSE SERPL-MCNC: 90 MG/DL
POTASSIUM SERPL-SCNC: 4.2 MMOL/L
PROT SERPL-MCNC: 6.3 G/DL
SODIUM SERPL-SCNC: 141 MMOL/L

## 2022-12-20 LAB
CHOLEST SERPL-MCNC: 176 MG/DL
HDLC SERPL-MCNC: 40 MG/DL
LDLC SERPL CALC-MCNC: 117 MG/DL
NONHDLC SERPL-MCNC: 136 MG/DL
TRIGL SERPL-MCNC: 96 MG/DL

## 2022-12-21 ENCOUNTER — APPOINTMENT (OUTPATIENT)
Dept: NEUROLOGY | Facility: CLINIC | Age: 82
End: 2022-12-21

## 2022-12-21 PROCEDURE — 96365 THER/PROPH/DIAG IV INF INIT: CPT

## 2023-01-04 ENCOUNTER — APPOINTMENT (OUTPATIENT)
Dept: NEUROLOGY | Facility: CLINIC | Age: 83
End: 2023-01-04
Payer: MEDICARE

## 2023-01-04 ENCOUNTER — APPOINTMENT (OUTPATIENT)
Dept: MRI IMAGING | Facility: CLINIC | Age: 83
End: 2023-01-04
Payer: MEDICARE

## 2023-01-04 ENCOUNTER — OUTPATIENT (OUTPATIENT)
Dept: OUTPATIENT SERVICES | Facility: HOSPITAL | Age: 83
LOS: 1 days | End: 2023-01-04
Payer: MEDICARE

## 2023-01-04 DIAGNOSIS — M16.12 UNILATERAL PRIMARY OSTEOARTHRITIS, LEFT HIP: ICD-10-CM

## 2023-01-04 PROCEDURE — 73721 MRI JNT OF LWR EXTRE W/O DYE: CPT | Mod: 26,LT,MH

## 2023-01-04 PROCEDURE — 96365 THER/PROPH/DIAG IV INF INIT: CPT

## 2023-01-04 PROCEDURE — 73721 MRI JNT OF LWR EXTRE W/O DYE: CPT

## 2023-01-18 ENCOUNTER — APPOINTMENT (OUTPATIENT)
Dept: NEUROLOGY | Facility: CLINIC | Age: 83
End: 2023-01-18
Payer: MEDICARE

## 2023-01-18 PROCEDURE — 96365 THER/PROPH/DIAG IV INF INIT: CPT

## 2023-02-01 ENCOUNTER — APPOINTMENT (OUTPATIENT)
Dept: NEUROLOGY | Facility: CLINIC | Age: 83
End: 2023-02-01
Payer: MEDICARE

## 2023-02-01 PROCEDURE — 96365 THER/PROPH/DIAG IV INF INIT: CPT

## 2023-02-04 NOTE — REVIEW OF SYSTEMS
[Cough] : cough [Sputum] : sputum [Dyspnea] : no dyspnea [SOB on Exertion] : sob on exertion [GERD] : gerd [Negative] : Endocrine

## 2023-02-04 NOTE — PHYSICAL EXAM
[No Acute Distress] : no acute distress [Normal Oropharynx] : normal oropharynx [Normal Appearance] : normal appearance [No Neck Mass] : no neck mass [Normal Rate/Rhythm] : normal rate/rhythm [Murmur ___ / 6] : murmur [unfilled] / 6 [Normal S1, S2] : normal s1, s2 [No Resp Distress] : no resp distress [Wheeze] : wheeze [No Abnormalities] : no abnormalities [Benign] : benign [Normal Gait] : normal gait [No Clubbing] : no clubbing [No Cyanosis] : no cyanosis [No Edema] : no edema [FROM] : FROM [Normal Color/ Pigmentation] : normal color/ pigmentation [No Focal Deficits] : no focal deficits [Oriented x3] : oriented x3 [Normal Affect] : normal affect

## 2023-02-04 NOTE — DISCUSSION/SUMMARY
[FreeTextEntry1] : 83 yo female with recent increase in respiratory symptoms. She was given a two week sample of  trelegy 200 after which she is to continue with breo as before with PRN albuterol MDI. She is to follow up with her PMD as before.

## 2023-02-04 NOTE — HISTORY OF PRESENT ILLNESS
[Never] : never [TextBox_4] : 81 yo female with hx of PRN productive cough and MAXWELL presents for follow up. The patient complains of PRN productive cough without fever, chest pain or hemoptysis. She uses breo daily with rare albuterol MDI use. [TextBox_29] : Denies snoring, daytime somnolence, apneic episodes, AM headaches

## 2023-02-15 ENCOUNTER — APPOINTMENT (OUTPATIENT)
Dept: NEUROLOGY | Facility: CLINIC | Age: 83
End: 2023-02-15
Payer: MEDICARE

## 2023-02-15 PROCEDURE — 96365 THER/PROPH/DIAG IV INF INIT: CPT

## 2023-02-22 ENCOUNTER — NON-APPOINTMENT (OUTPATIENT)
Age: 83
End: 2023-02-22

## 2023-02-22 ENCOUNTER — APPOINTMENT (OUTPATIENT)
Dept: CARDIOLOGY | Facility: CLINIC | Age: 83
End: 2023-02-22
Payer: MEDICARE

## 2023-02-22 VITALS
SYSTOLIC BLOOD PRESSURE: 140 MMHG | RESPIRATION RATE: 14 BRPM | OXYGEN SATURATION: 97 % | TEMPERATURE: 97 F | HEART RATE: 55 BPM | DIASTOLIC BLOOD PRESSURE: 75 MMHG

## 2023-02-22 DIAGNOSIS — M16.10 UNILATERAL PRIMARY OSTEOARTHRITIS, UNSPECIFIED HIP: ICD-10-CM

## 2023-02-22 DIAGNOSIS — R06.00 DYSPNEA, UNSPECIFIED: ICD-10-CM

## 2023-02-22 PROCEDURE — 93306 TTE W/DOPPLER COMPLETE: CPT

## 2023-02-22 PROCEDURE — 99214 OFFICE O/P EST MOD 30 MIN: CPT

## 2023-02-22 PROCEDURE — 93000 ELECTROCARDIOGRAM COMPLETE: CPT

## 2023-02-22 RX ORDER — METOPROLOL SUCCINATE 25 MG/1
25 TABLET, EXTENDED RELEASE ORAL DAILY
Qty: 45 | Refills: 3 | Status: COMPLETED | COMMUNITY
Start: 2021-10-08 | End: 2023-02-22

## 2023-02-22 NOTE — HISTORY OF PRESENT ILLNESS
[Preoperative Visit] : for a medical evaluation prior to surgery [Scheduled Procedure ___] : a [unfilled] [Date of Surgery ___] : on [unfilled] [Surgeon Name ___] : surgeon: [unfilled] [FreeTextEntry1] : Alanna is anxious to have her hip replaced secondary to severe pain.  She denies any chest pain, palpitations or shortness of breath.

## 2023-02-22 NOTE — DISCUSSION/SUMMARY
[Procedure Intermediate Risk] : the procedure risk is intermediate [Patient Intermediate Risk] : the patient is an intermediate risk [Additional Diagnostics Recommended] : additional diagnostics recommended [FreeTextEntry1] : The patient is an 82-year-old female myasthenia gravis, hypertension hyperlipidemia, hypothyroid, s/p COVID with fibrosis, aortic stenosis awaiting hip surgery\par #1 AS- euvolemic on exam, ECHO 11/21 progression SANJAY 1.0, event monitor negative, negative carotid doppler, repeat echo today before final clearance, PVC resolved but more bradycardia, d/c toprol\par #2 Pulm- s/p COVID pneumonia 2020 with prolonged hospitalization,and with scarring,  on inhaler, f/u Dr. Salcedo\par #3 Htn- continue losartan 25mg, HCTZ  every other day\par #4 Lipids- continue crestor 20mg, unable to tolerate higher dose\par #5 Hypothyroid- continue levothyroxine\par #6 MG- on pyridostigmine, daughter supportive, received COVID vaccines\par #7 Ortho- awaiting anterior approach hip surgery pending echo results\par Addendum: SANJAY 1.0 unchanged and asymptomatic.\par There are no cardiac contraindication to proceeding with THR.

## 2023-02-22 NOTE — PHYSICAL EXAM
[General Appearance - Well Developed] : well developed [Normal Appearance] : normal appearance [Well Groomed] : well groomed [General Appearance - Well Nourished] : well nourished [No Deformities] : no deformities [General Appearance - In No Acute Distress] : no acute distress [Normal Conjunctiva] : the conjunctiva exhibited no abnormalities [Eyelids - No Xanthelasma] : the eyelids demonstrated no xanthelasmas [Normal Oral Mucosa] : normal oral mucosa [No Oral Pallor] : no oral pallor [No Oral Cyanosis] : no oral cyanosis [Normal Jugular Venous A Waves Present] : normal jugular venous A waves present [Normal Jugular Venous V Waves Present] : normal jugular venous V waves present [No Jugular Venous Pete A Waves] : no jugular venous pete A waves [Respiration, Rhythm And Depth] : normal respiratory rhythm and effort [Exaggerated Use Of Accessory Muscles For Inspiration] : no accessory muscle use [Auscultation Breath Sounds / Voice Sounds] : lungs were clear to auscultation bilaterally [Heart Rate And Rhythm] : heart rate and rhythm were normal [Heart Sounds] : normal S1 and S2 [Abdomen Soft] : soft [Abdomen Tenderness] : non-tender [Abdomen Mass (___ Cm)] : no abdominal mass palpated [Abnormal Walk] : normal gait [Gait - Sufficient For Exercise Testing] : the gait was sufficient for exercise testing [Nail Clubbing] : no clubbing of the fingernails [Cyanosis, Localized] : no localized cyanosis [Petechial Hemorrhages (___cm)] : no petechial hemorrhages [Skin Color & Pigmentation] : normal skin color and pigmentation [] : no rash [No Venous Stasis] : no venous stasis [Skin Lesions] : no skin lesions [No Skin Ulcers] : no skin ulcer [No Xanthoma] : no  xanthoma was observed [Oriented To Time, Place, And Person] : oriented to person, place, and time [Affect] : the affect was normal [Mood] : the mood was normal [No Anxiety] : not feeling anxious [FreeTextEntry1] : 3/6 systolic murmur

## 2023-02-28 ENCOUNTER — OUTPATIENT (OUTPATIENT)
Dept: OUTPATIENT SERVICES | Facility: HOSPITAL | Age: 83
LOS: 1 days | End: 2023-02-28
Payer: MEDICARE

## 2023-02-28 VITALS
HEIGHT: 58 IN | TEMPERATURE: 98 F | RESPIRATION RATE: 16 BRPM | SYSTOLIC BLOOD PRESSURE: 150 MMHG | HEART RATE: 68 BPM | OXYGEN SATURATION: 99 % | DIASTOLIC BLOOD PRESSURE: 70 MMHG | WEIGHT: 156.97 LBS

## 2023-02-28 DIAGNOSIS — M16.9 OSTEOARTHRITIS OF HIP, UNSPECIFIED: ICD-10-CM

## 2023-02-28 DIAGNOSIS — Z90.89 ACQUIRED ABSENCE OF OTHER ORGANS: Chronic | ICD-10-CM

## 2023-02-28 DIAGNOSIS — Z01.818 ENCOUNTER FOR OTHER PREPROCEDURAL EXAMINATION: ICD-10-CM

## 2023-02-28 DIAGNOSIS — Z98.890 OTHER SPECIFIED POSTPROCEDURAL STATES: Chronic | ICD-10-CM

## 2023-02-28 DIAGNOSIS — Z96.641 PRESENCE OF RIGHT ARTIFICIAL HIP JOINT: Chronic | ICD-10-CM

## 2023-02-28 DIAGNOSIS — M16.12 UNILATERAL PRIMARY OSTEOARTHRITIS, LEFT HIP: ICD-10-CM

## 2023-02-28 DIAGNOSIS — Z96.619 PRESENCE OF UNSPECIFIED ARTIFICIAL SHOULDER JOINT: Chronic | ICD-10-CM

## 2023-02-28 LAB
A1C WITH ESTIMATED AVERAGE GLUCOSE RESULT: 5.3 % — SIGNIFICANT CHANGE UP (ref 4–5.6)
ALBUMIN SERPL ELPH-MCNC: 3.6 G/DL — SIGNIFICANT CHANGE UP (ref 3.3–5)
ALP SERPL-CCNC: 93 U/L — SIGNIFICANT CHANGE UP (ref 30–120)
ALT FLD-CCNC: 42 U/L DA — SIGNIFICANT CHANGE UP (ref 10–60)
ANION GAP SERPL CALC-SCNC: 6 MMOL/L — SIGNIFICANT CHANGE UP (ref 5–17)
APTT BLD: 31.6 SEC — SIGNIFICANT CHANGE UP (ref 27.5–35.5)
AST SERPL-CCNC: 31 U/L — SIGNIFICANT CHANGE UP (ref 10–40)
BILIRUB SERPL-MCNC: 0.3 MG/DL — SIGNIFICANT CHANGE UP (ref 0.2–1.2)
BLD GP AB SCN SERPL QL: SIGNIFICANT CHANGE UP
BUN SERPL-MCNC: 23 MG/DL — SIGNIFICANT CHANGE UP (ref 7–23)
CALCIUM SERPL-MCNC: 9.1 MG/DL — SIGNIFICANT CHANGE UP (ref 8.4–10.5)
CHLORIDE SERPL-SCNC: 106 MMOL/L — SIGNIFICANT CHANGE UP (ref 96–108)
CO2 SERPL-SCNC: 27 MMOL/L — SIGNIFICANT CHANGE UP (ref 22–31)
CREAT SERPL-MCNC: 0.93 MG/DL — SIGNIFICANT CHANGE UP (ref 0.5–1.3)
EGFR: 61 ML/MIN/1.73M2 — SIGNIFICANT CHANGE UP
ESTIMATED AVERAGE GLUCOSE: 105 MG/DL — SIGNIFICANT CHANGE UP (ref 68–114)
GLUCOSE SERPL-MCNC: 103 MG/DL — HIGH (ref 70–99)
HCT VFR BLD CALC: 38.6 % — SIGNIFICANT CHANGE UP (ref 34.5–45)
HGB BLD-MCNC: 12.6 G/DL — SIGNIFICANT CHANGE UP (ref 11.5–15.5)
INR BLD: 0.98 RATIO — SIGNIFICANT CHANGE UP (ref 0.88–1.16)
MCHC RBC-ENTMCNC: 30.7 PG — SIGNIFICANT CHANGE UP (ref 27–34)
MCHC RBC-ENTMCNC: 32.6 GM/DL — SIGNIFICANT CHANGE UP (ref 32–36)
MCV RBC AUTO: 93.9 FL — SIGNIFICANT CHANGE UP (ref 80–100)
NRBC # BLD: 0 /100 WBCS — SIGNIFICANT CHANGE UP (ref 0–0)
PLATELET # BLD AUTO: 146 K/UL — LOW (ref 150–400)
POTASSIUM SERPL-MCNC: 5.4 MMOL/L — HIGH (ref 3.5–5.3)
POTASSIUM SERPL-SCNC: 5.4 MMOL/L — HIGH (ref 3.5–5.3)
PROT SERPL-MCNC: 6.5 G/DL — SIGNIFICANT CHANGE UP (ref 6–8.3)
PROTHROM AB SERPL-ACNC: 11.6 SEC — SIGNIFICANT CHANGE UP (ref 10.5–13.4)
RBC # BLD: 4.11 M/UL — SIGNIFICANT CHANGE UP (ref 3.8–5.2)
RBC # FLD: 12.6 % — SIGNIFICANT CHANGE UP (ref 10.3–14.5)
SODIUM SERPL-SCNC: 139 MMOL/L — SIGNIFICANT CHANGE UP (ref 135–145)
WBC # BLD: 5.61 K/UL — SIGNIFICANT CHANGE UP (ref 3.8–10.5)
WBC # FLD AUTO: 5.61 K/UL — SIGNIFICANT CHANGE UP (ref 3.8–10.5)

## 2023-02-28 PROCEDURE — G0463: CPT

## 2023-02-28 RX ORDER — CHOLECALCIFEROL (VITAMIN D3) 125 MCG
1 CAPSULE ORAL
Qty: 0 | Refills: 0 | DISCHARGE

## 2023-02-28 RX ORDER — ROSUVASTATIN CALCIUM 5 MG/1
1 TABLET ORAL
Qty: 0 | Refills: 0 | DISCHARGE

## 2023-02-28 RX ORDER — LEVOTHYROXINE SODIUM 125 MCG
1 TABLET ORAL
Qty: 0 | Refills: 0 | DISCHARGE

## 2023-02-28 NOTE — H&P PST ADULT - NSICDXPASTMEDICALHX_GEN_ALL_CORE_FT
PAST MEDICAL HISTORY:  Aortic stenosis, mild asper daughter    Diverticulitis large intestine denies any recent exacerbations    GERD (Gastroesophageal Reflux Disease) hiatal hernia    Hyperlipidemia     Hypertension     Hypothyroid     Kidney Calculi     Lumbar Radiculopathy     Lumbar stenosis     Myasthenia gravis dx 10/2018 symptoms: intermittent loss of voice/ weakness, negative ENT studies, now stable on Pyridostigmine    Osteoarthritis of left hip     Pneumonia due to COVID-19 virus     Reactive airway disease that is not asthma mild as per pulm note on [employer], patient and daughter denies any inhaler use; thought to be related to myasthena gravis     PAST MEDICAL HISTORY:  Aortic stenosis, mild asper daughter    Diverticulitis large intestine denies any recent exacerbations    GERD (Gastroesophageal Reflux Disease) hiatal hernia    Hyperlipidemia     Hypertension     Hypothyroid     Kidney Calculi     Lumbar Radiculopathy     Lumbar stenosis     Myasthenia gravis dx 10/2018 symptoms: intermittent loss of voice/ weakness, negative ENT studies, now stable on Pyridostigmine    Osteoarthritis of left hip     Pneumonia due to COVID-19 virus     Reactive airway disease that is not asthma mild as per pulm note on [employer], patient and daughter denies any inhaler use; thought to be related to myasthena gravis    Sinus bradycardia

## 2023-02-28 NOTE — H&P PST ADULT - NEUROLOGICAL COMMENTS
myasthenia gravis dx 2017 , s/p Iv infusion soliris eveery 2 weeks myasthenia gravis dx 2017 , s/p Iv infusion soliris every 2 weeks

## 2023-02-28 NOTE — H&P PST ADULT - CHARACTER OF SYSTOLIC MURMUR
murmur loudness: III/VI h/o aortic stenosis/murmur loudness: III/VI/upper lsb/lower lsb/radiation to:

## 2023-02-28 NOTE — H&P PST ADULT - HISTORY OF PRESENT ILLNESS
This  is a 81 y/o female who present with complaint of left hip pain and difficulty walking for the past one year . Reports constant pain and worse pian when walking , standing ,sitting andd getting up from sitting position 7/10 . scheduled for left knee replacement on 3/20/23

## 2023-02-28 NOTE — H&P PST ADULT - ASSESSMENT
this is a 78 y/o female who is scheduled forreplacement on 12/30/19 This is a 83 y/o female who is scheduled for left hip replacement on 3/20/23

## 2023-02-28 NOTE — H&P PST ADULT - NSICDXPASTSURGICALHX_GEN_ALL_CORE_FT
PAST SURGICAL HISTORY:  Bilateral Cataracts removed    H/O laminectomy cervical  1998  lumbar 1986,1998,2000, 2/19 with fusion    H/O shoulder replacement b/l 2015/2016    H/O umbilical hernia repair     History of tonsillectomy     Prolapse, Uterovaginal s/p sling    S/P Appendectomy     S/P Cholecystectomy     S/P foot surgery     S/P hip replacement, right     S/P Hysterectomy Partial 1974    S/P Laparoscopic Fundoplication

## 2023-02-28 NOTE — H&P PST ADULT - PROBLEM SELECTOR PLAN 1
scheduled for left hip replacement on 3/20/23  will obtain medical , cardiac clearance  and neuro note   Preop instructions on wash, medications   instructed to take levothyroxine, pyridostigmine 60mg and 180 mg dose , esomeprazole   COVID testing on 3/17/23 at [place]

## 2023-02-28 NOTE — H&P PST ADULT - MUSCULOSKELETAL
right hip/no calf tenderness/decreased ROM due to pain details… left hip/no calf tenderness/decreased ROM/decreased ROM due to pain

## 2023-03-01 ENCOUNTER — APPOINTMENT (OUTPATIENT)
Dept: NEUROLOGY | Facility: CLINIC | Age: 83
End: 2023-03-01
Payer: MEDICARE

## 2023-03-01 PROBLEM — R00.1 BRADYCARDIA, UNSPECIFIED: Chronic | Status: ACTIVE | Noted: 2023-02-28

## 2023-03-01 PROBLEM — M16.12 UNILATERAL PRIMARY OSTEOARTHRITIS, LEFT HIP: Chronic | Status: ACTIVE | Noted: 2023-02-28

## 2023-03-01 PROBLEM — U07.1 COVID-19: Chronic | Status: ACTIVE | Noted: 2023-02-28

## 2023-03-01 LAB
MRSA PCR RESULT.: SIGNIFICANT CHANGE UP
S AUREUS DNA NOSE QL NAA+PROBE: SIGNIFICANT CHANGE UP

## 2023-03-01 PROCEDURE — 96365 THER/PROPH/DIAG IV INF INIT: CPT

## 2023-03-08 ENCOUNTER — APPOINTMENT (OUTPATIENT)
Dept: INTERNAL MEDICINE | Facility: CLINIC | Age: 83
End: 2023-03-08
Payer: MEDICARE

## 2023-03-08 VITALS
HEART RATE: 70 BPM | RESPIRATION RATE: 12 BRPM | DIASTOLIC BLOOD PRESSURE: 76 MMHG | TEMPERATURE: 97.2 F | BODY MASS INDEX: 33.44 KG/M2 | WEIGHT: 155 LBS | HEIGHT: 57 IN | SYSTOLIC BLOOD PRESSURE: 138 MMHG | OXYGEN SATURATION: 97 %

## 2023-03-08 DIAGNOSIS — Z01.818 ENCOUNTER FOR OTHER PREPROCEDURAL EXAMINATION: ICD-10-CM

## 2023-03-08 PROCEDURE — 99214 OFFICE O/P EST MOD 30 MIN: CPT

## 2023-03-08 RX ORDER — ALBUTEROL SULFATE 90 UG/1
108 (90 BASE) INHALANT RESPIRATORY (INHALATION)
Qty: 3 | Refills: 3 | Status: DISCONTINUED | COMMUNITY
Start: 2021-04-20 | End: 2023-03-08

## 2023-03-09 ENCOUNTER — NON-APPOINTMENT (OUTPATIENT)
Age: 83
End: 2023-03-09

## 2023-03-12 NOTE — PHYSICAL EXAM
[Normal Sclera/Conjunctiva] : normal sclera/conjunctiva [Normal Outer Ear/Nose] : the outer ears and nose were normal in appearance [Normal] : no respiratory distress, lungs were clear to auscultation bilaterally and no accessory muscle use [Normal Rate] : normal rate  [Regular Rhythm] : with a regular rhythm [Normal S1, S2] : normal S1 and S2 [No Edema] : there was no peripheral edema [Soft] : abdomen soft [Non Tender] : non-tender [Non-distended] : non-distended [Normal Anterior Cervical Nodes] : no anterior cervical lymphadenopathy [No CVA Tenderness] : no CVA  tenderness [No Rash] : no rash [Speech Grossly Normal] : speech grossly normal [Normal Affect] : the affect was normal [Alert and Oriented x3] : oriented to person, place, and time [Normal Insight/Judgement] : insight and judgment were intact [de-identified] : + murmur [de-identified] : hip pain

## 2023-03-12 NOTE — ASSESSMENT
[Patient Optimized for Surgery] : Patient optimized for surgery [FreeTextEntry4] : \par Cardiology clearance done- see cardiology notes 2/22/23

## 2023-03-12 NOTE — HISTORY OF PRESENT ILLNESS
[Aortic Stenosis] : aortic stenosis [Chronic Anticoagulation] : no chronic anticoagulation [Chronic Kidney Disease] : no chronic kidney disease [Diabetes] : no diabetes [FreeTextEntry1] : Left Hip replacement Surgery [FreeTextEntry2] : 3/20/2023 [FreeTextEntry3] : Dr Nathanael Dinero [FreeTextEntry4] : \par Ms. HÉCTOR LYONS is a 82 year old female, with hx of Aortic stenosis, HTN, HLD, hypothyroid, Myasthenia gravis, presents for preoperative medical clearance for left hip replacement surgery\par She is doing well. \par Denies SOB, chest pain, abdominal pain, N/V/D, leg swelling, headache, dizziness \par Offers no complaints\par \par \par

## 2023-03-15 ENCOUNTER — APPOINTMENT (OUTPATIENT)
Dept: NEUROLOGY | Facility: CLINIC | Age: 83
End: 2023-03-15
Payer: MEDICARE

## 2023-03-15 PROCEDURE — 96365 THER/PROPH/DIAG IV INF INIT: CPT

## 2023-03-17 LAB — SARS-COV-2 N GENE NPH QL NAA+PROBE: NOT DETECTED

## 2023-03-20 ENCOUNTER — TRANSCRIPTION ENCOUNTER (OUTPATIENT)
Age: 83
End: 2023-03-20

## 2023-03-20 ENCOUNTER — APPOINTMENT (OUTPATIENT)
Dept: ORTHOPEDIC SURGERY | Facility: HOSPITAL | Age: 83
End: 2023-03-20

## 2023-03-20 ENCOUNTER — INPATIENT (INPATIENT)
Facility: HOSPITAL | Age: 83
LOS: 0 days | Discharge: HOME | DRG: 554 | End: 2023-03-21
Attending: ORTHOPAEDIC SURGERY | Admitting: ORTHOPAEDIC SURGERY
Payer: COMMERCIAL

## 2023-03-20 VITALS
DIASTOLIC BLOOD PRESSURE: 58 MMHG | RESPIRATION RATE: 21 BRPM | HEART RATE: 55 BPM | SYSTOLIC BLOOD PRESSURE: 151 MMHG | HEIGHT: 58 IN | OXYGEN SATURATION: 100 % | TEMPERATURE: 98 F | WEIGHT: 155.21 LBS

## 2023-03-20 DIAGNOSIS — Z98.890 OTHER SPECIFIED POSTPROCEDURAL STATES: Chronic | ICD-10-CM

## 2023-03-20 DIAGNOSIS — Z90.89 ACQUIRED ABSENCE OF OTHER ORGANS: Chronic | ICD-10-CM

## 2023-03-20 DIAGNOSIS — Z96.619 PRESENCE OF UNSPECIFIED ARTIFICIAL SHOULDER JOINT: Chronic | ICD-10-CM

## 2023-03-20 DIAGNOSIS — Z01.818 ENCOUNTER FOR OTHER PREPROCEDURAL EXAMINATION: ICD-10-CM

## 2023-03-20 DIAGNOSIS — M16.9 OSTEOARTHRITIS OF HIP, UNSPECIFIED: ICD-10-CM

## 2023-03-20 DIAGNOSIS — Z96.641 PRESENCE OF RIGHT ARTIFICIAL HIP JOINT: Chronic | ICD-10-CM

## 2023-03-20 LAB
ALBUMIN SERPL ELPH-MCNC: 3.2 G/DL — LOW (ref 3.3–5)
ALP SERPL-CCNC: 85 U/L — SIGNIFICANT CHANGE UP (ref 30–120)
ALT FLD-CCNC: 134 U/L DA — HIGH (ref 10–60)
ANION GAP SERPL CALC-SCNC: 7 MMOL/L — SIGNIFICANT CHANGE UP (ref 5–17)
AST SERPL-CCNC: 227 U/L — HIGH (ref 10–40)
BILIRUB SERPL-MCNC: 0.4 MG/DL — SIGNIFICANT CHANGE UP (ref 0.2–1.2)
BUN SERPL-MCNC: 25 MG/DL — HIGH (ref 7–23)
CALCIUM SERPL-MCNC: 8.8 MG/DL — SIGNIFICANT CHANGE UP (ref 8.4–10.5)
CHLORIDE SERPL-SCNC: 106 MMOL/L — SIGNIFICANT CHANGE UP (ref 96–108)
CK MB CFR SERPL CALC: 10 NG/ML — HIGH (ref 0–3.6)
CO2 SERPL-SCNC: 27 MMOL/L — SIGNIFICANT CHANGE UP (ref 22–31)
CREAT SERPL-MCNC: 0.86 MG/DL — SIGNIFICANT CHANGE UP (ref 0.5–1.3)
EGFR: 67 ML/MIN/1.73M2 — SIGNIFICANT CHANGE UP
GLUCOSE SERPL-MCNC: 119 MG/DL — HIGH (ref 70–99)
MAGNESIUM SERPL-MCNC: 2 MG/DL — SIGNIFICANT CHANGE UP (ref 1.6–2.6)
POTASSIUM SERPL-MCNC: 3.8 MMOL/L — SIGNIFICANT CHANGE UP (ref 3.5–5.3)
POTASSIUM SERPL-SCNC: 3.8 MMOL/L — SIGNIFICANT CHANGE UP (ref 3.5–5.3)
PROT SERPL-MCNC: 6.3 G/DL — SIGNIFICANT CHANGE UP (ref 6–8.3)
SODIUM SERPL-SCNC: 140 MMOL/L — SIGNIFICANT CHANGE UP (ref 135–145)
TROPONIN I, HIGH SENSITIVITY RESULT: 50.9 NG/L — SIGNIFICANT CHANGE UP

## 2023-03-20 PROCEDURE — 93010 ELECTROCARDIOGRAM REPORT: CPT

## 2023-03-20 PROCEDURE — 99223 1ST HOSP IP/OBS HIGH 75: CPT

## 2023-03-20 DEVICE — K-WIRE MICROAIRE (THREADED) SINGLE TROCAR 4.8MM X 9": Type: IMPLANTABLE DEVICE | Site: LEFT | Status: FUNCTIONAL

## 2023-03-20 DEVICE — BONE WAX 2.5GM: Type: IMPLANTABLE DEVICE | Site: LEFT | Status: FUNCTIONAL

## 2023-03-20 RX ORDER — ACETAMINOPHEN 500 MG
650 TABLET ORAL EVERY 6 HOURS
Refills: 0 | Status: DISCONTINUED | OUTPATIENT
Start: 2023-03-20 | End: 2023-03-21

## 2023-03-20 RX ORDER — LOSARTAN POTASSIUM 100 MG/1
25 TABLET, FILM COATED ORAL DAILY
Refills: 0 | Status: DISCONTINUED | OUTPATIENT
Start: 2023-03-20 | End: 2023-03-21

## 2023-03-20 RX ORDER — PYRIDOSTIGMINE BROMIDE 60 MG/5ML
180 SOLUTION ORAL DAILY
Refills: 0 | Status: DISCONTINUED | OUTPATIENT
Start: 2023-03-20 | End: 2023-03-21

## 2023-03-20 RX ORDER — ALBUTEROL 90 UG/1
2 AEROSOL, METERED ORAL EVERY 6 HOURS
Refills: 0 | Status: DISCONTINUED | OUTPATIENT
Start: 2023-03-20 | End: 2023-03-21

## 2023-03-20 RX ORDER — ACETAMINOPHEN 500 MG
1000 TABLET ORAL ONCE
Refills: 0 | Status: COMPLETED | OUTPATIENT
Start: 2023-03-20 | End: 2023-03-20

## 2023-03-20 RX ORDER — CEFAZOLIN SODIUM 1 G
2000 VIAL (EA) INJECTION ONCE
Refills: 0 | Status: COMPLETED | OUTPATIENT
Start: 2023-03-20 | End: 2023-03-20

## 2023-03-20 RX ORDER — PYRIDOSTIGMINE BROMIDE 60 MG/5ML
60 SOLUTION ORAL THREE TIMES A DAY
Refills: 0 | Status: DISCONTINUED | OUTPATIENT
Start: 2023-03-20 | End: 2023-03-21

## 2023-03-20 RX ORDER — SODIUM CHLORIDE 9 MG/ML
1000 INJECTION, SOLUTION INTRAVENOUS
Refills: 0 | Status: DISCONTINUED | OUTPATIENT
Start: 2023-03-20 | End: 2023-03-20

## 2023-03-20 RX ORDER — DOPAMINE HYDROCHLORIDE 40 MG/ML
3 INJECTION, SOLUTION, CONCENTRATE INTRAVENOUS
Qty: 400 | Refills: 0 | Status: DISCONTINUED | OUTPATIENT
Start: 2023-03-20 | End: 2023-03-20

## 2023-03-20 RX ORDER — LANOLIN ALCOHOL/MO/W.PET/CERES
3 CREAM (GRAM) TOPICAL AT BEDTIME
Refills: 0 | Status: DISCONTINUED | OUTPATIENT
Start: 2023-03-20 | End: 2023-03-21

## 2023-03-20 RX ORDER — TRANEXAMIC ACID 100 MG/ML
1000 INJECTION, SOLUTION INTRAVENOUS ONCE
Refills: 0 | Status: COMPLETED | OUTPATIENT
Start: 2023-03-20 | End: 2023-03-20

## 2023-03-20 RX ORDER — PANTOPRAZOLE SODIUM 20 MG/1
40 TABLET, DELAYED RELEASE ORAL
Refills: 0 | Status: DISCONTINUED | OUTPATIENT
Start: 2023-03-20 | End: 2023-03-21

## 2023-03-20 RX ORDER — BUDESONIDE AND FORMOTEROL FUMARATE DIHYDRATE 160; 4.5 UG/1; UG/1
2 AEROSOL RESPIRATORY (INHALATION)
Refills: 0 | Status: DISCONTINUED | OUTPATIENT
Start: 2023-03-20 | End: 2023-03-21

## 2023-03-20 RX ORDER — APREPITANT 80 MG/1
40 CAPSULE ORAL ONCE
Refills: 0 | Status: COMPLETED | OUTPATIENT
Start: 2023-03-20 | End: 2023-03-20

## 2023-03-20 RX ORDER — LEVOTHYROXINE SODIUM 125 MCG
75 TABLET ORAL DAILY
Refills: 0 | Status: DISCONTINUED | OUTPATIENT
Start: 2023-03-20 | End: 2023-03-21

## 2023-03-20 RX ORDER — FAMOTIDINE 10 MG/ML
20 INJECTION INTRAVENOUS ONCE
Refills: 0 | Status: COMPLETED | OUTPATIENT
Start: 2023-03-20 | End: 2023-03-20

## 2023-03-20 RX ORDER — CALCIUM CARBONATE 500(1250)
1 TABLET ORAL EVERY 6 HOURS
Refills: 0 | Status: DISCONTINUED | OUTPATIENT
Start: 2023-03-20 | End: 2023-03-21

## 2023-03-20 RX ORDER — ONDANSETRON 8 MG/1
4 TABLET, FILM COATED ORAL EVERY 8 HOURS
Refills: 0 | Status: DISCONTINUED | OUTPATIENT
Start: 2023-03-20 | End: 2023-03-21

## 2023-03-20 RX ADMIN — Medication 650 MILLIGRAM(S): at 21:35

## 2023-03-20 RX ADMIN — APREPITANT 40 MILLIGRAM(S): 80 CAPSULE ORAL at 07:02

## 2023-03-20 RX ADMIN — SODIUM CHLORIDE 75 MILLILITER(S): 9 INJECTION, SOLUTION INTRAVENOUS at 09:17

## 2023-03-20 RX ADMIN — PYRIDOSTIGMINE BROMIDE 60 MILLIGRAM(S): 60 SOLUTION ORAL at 20:35

## 2023-03-20 RX ADMIN — FAMOTIDINE 20 MILLIGRAM(S): 10 INJECTION INTRAVENOUS at 17:24

## 2023-03-20 RX ADMIN — Medication 30 MILLILITER(S): at 16:13

## 2023-03-20 RX ADMIN — Medication 650 MILLIGRAM(S): at 20:34

## 2023-03-20 RX ADMIN — PYRIDOSTIGMINE BROMIDE 60 MILLIGRAM(S): 60 SOLUTION ORAL at 13:16

## 2023-03-20 NOTE — DISCHARGE NOTE NURSING/CASE MANAGEMENT/SOCIAL WORK - NSSCNAMETXT_GEN_ALL_CORE
[facility] care agency [phone] will reach out to you within 24-72 hours of your discharge to schedule home care visit/eval appointment with you. Please call agency for any queries regarding home care services

## 2023-03-20 NOTE — DISCHARGE NOTE NURSING/CASE MANAGEMENT/SOCIAL WORK - PATIENT PORTAL LINK FT
You can access the FollowMyHealth Patient Portal offered by [facility] by registering at the following website: [email or web address]. By joining [facility]’s FollowMyHealth portal, you will also be able to view your health information using other applications (apps) compatible with our system.

## 2023-03-20 NOTE — PROGRESS NOTE ADULT - ASSESSMENT
81 y/o Female with PMH of HTN, HLD, Mod-Severe AS, Myasthenia Gravis, Reactive Airway Disease, Chronic GERD, Hypothyroid, OA of Left Hip presents for  ED for planned left hip THR with:    1. Sinus Bradycardia with Prolonged Pauses   2. Junctional Escape Beats  3. NICO      Neuro: Alert and oriented, mentating at baseline. Tylenol PRN for pain.   Resp: Maintaining O2>93% on RA. Titrate FiO2 as tolerated. C/W Symbicort and Albuterol PRN. Incentive spirometry. Encourage OOB. No active issues.   CV: Presented for scheduled L HIP THR; however, with episode of bradycardia, pauses with escape beats and atrial ectopy post induction. s/p Epinephrine and transcutaneous pacing. Rhythm stabilized with patients re-arousal. Likely secondary to significant sedation. Patient remains in sinus bradycardia however no further episodes of ectopy/ irregular rhythms. Not candidate for PPM at this time. Surgery cancelled, patient transferred to SPCU for cardiac monitoring. EKG in PACU with sinus bradycardia. C/W Losartan. Outpatient TTE with normal LVEF, mod-severe AS. Likely need further workup outpatient.   GI: DASH diet. Tolerating well. GI prophylaxis with Protonix.   Renal: NICO post surgery, likely secondary to dehydration. Voiding independently. Adequate urine output. Trend labs, replete lytes as needed to maintain K>4, Mg>3 and Phos >2.   Endo: Maintain euthyroid. C/W Synthroid and Pyridostigmine.     Heme: H/H stable, Trend H/H, transfuse for hgb <7 if necessary. DVT prophylaxis with Protonix.    83 y/o Female with PMH of HTN, HLD, Mod-Severe AS, Myasthenia Gravis, Reactive Airway Disease, Chronic GERD, Hypothyroid, OA of Left Hip presents for  ED for planned left hip THR with:    1. Sinus Bradycardia with Prolonged Pauses   2. Junctional Escape Beats  3. NICO  4. Transaminitis      Neuro: Alert and oriented, mentating at baseline. Tylenol PRN for pain.   Resp: Maintaining O2>93% on RA. Titrate FiO2 as tolerated. C/W Symbicort and Albuterol PRN. Incentive spirometry. Encourage OOB. No active issues.   CV: Presented for scheduled L HIP THR; however, with episode of bradycardia, pauses with escape beats and atrial ectopy post induction. s/p Epinephrine and transcutaneous pacing. Rhythm stabilized with patients re-arousal. Likely secondary to significant sedation. Patient remains in sinus bradycardia however no further episodes of ectopy/ irregular rhythms. Not candidate for PPM at this time. Surgery cancelled, patient transferred to SPCU for cardiac monitoring. EKG in PACU with sinus bradycardia. C/W Losartan. Outpatient TTE with normal LVEF, mod-severe AS. Likely need further workup outpatient. Monitor clinical and laboratory end points of perfusion, maintain MAP> 65.   GI: DASH diet. Tolerating well. GI prophylaxis with Protonix. Transaminitis, likely shock liver from low perfusion state post-induction.   Renal: NICO post surgery, likely secondary to dehydration v ATN. Voiding independently. Adequate urine output. Trend labs, replete lytes as needed to maintain K>4, Mg>3 and Phos >2.   Endo: Maintain euthyroid. C/W Synthroid and Pyridostigmine.     Heme: H/H stable, Trend H/H, transfuse for hgb <7 if necessary. DVT prophylaxis with Protonix.

## 2023-03-20 NOTE — CAREGIVER ENGAGEMENT NOTE - CAREGIVER OUTREACH NOTES - FREE TEXT
Pt declined the call to her daughter as the daughter was called by the surgeon and aware of the cancelled surgery and reason why. The pt states she didn't want her daughter disturbed again to sleep after work and that she will be at the hospital later.

## 2023-03-20 NOTE — PROGRESS NOTE ADULT - SUBJECTIVE AND OBJECTIVE BOX
Patient is a 82y old  Female who presents with a chief complaint of     BRIEF HOSPITAL COURSE: ***    Events last 24 hours: ***    Review of Systems:  CONSTITUTIONAL: No fever, chills, or fatigue  EYES: No eye pain, visual disturbances, or discharge  ENMT:  No difficulty hearing, tinnitus, vertigo; No sinus or throat pain  NECK: No pain or stiffness  RESPIRATORY: No cough, wheezing, chills or hemoptysis; No shortness of breath  CARDIOVASCULAR: No chest pain, palpitations, dizziness, or leg swelling  GASTROINTESTINAL: No abdominal or epigastric pain. No nausea, vomiting, or hematemesis; No diarrhea or constipation. No melena or hematochezia.  GENITOURINARY: No dysuria, frequency, hematuria, or incontinence  NEUROLOGICAL: No headaches, memory loss, loss of strength, numbness, or tremors  SKIN: No itching, burning, rashes, or lesions   MUSCULOSKELETAL: No joint pain or swelling; No muscle, back, or extremity pain  PSYCHIATRIC: No depression, anxiety, mood swings, or difficulty sleeping    PAST MEDICAL & SURGICAL HISTORY:  Hypertension      Hyperlipidemia      GERD (Gastroesophageal Reflux Disease)  hiatal hernia      Lumbar Radiculopathy      Kidney Calculi      Myasthenia gravis  dx 10/2018 symptoms: intermittent loss of voice/ weakness, negative ENT studies, now stable on Pyridostigmine      Lumbar stenosis      Diverticulitis large intestine  denies any recent exacerbations      Aortic stenosis, mild  asper daughter      Reactive airway disease that is not asthma  mild as per pulm note on [employer], patient and daughter denies any inhaler use; thought to be related to myasthena gravis      Hypothyroid      Osteoarthritis of left hip      Pneumonia due to COVID-19 virus      Sinus bradycardia      S/P Cholecystectomy      S/P Appendectomy      Bilateral Cataracts  removed      S/P Hysterectomy Partial  1974      S/P Laparoscopic Fundoplication      Prolapse, Uterovaginal  s/p sling      H/O laminectomy  cervical  1998  lumbar 1986,1998,2000, 2/19 with fusion      H/O shoulder replacement  b/l 2015/2016      S/P foot surgery      History of tonsillectomy      H/O umbilical hernia repair      S/P hip replacement, right          Medications:    losartan 25 milliGRAM(s) Oral daily    albuterol    90 MICROgram(s) HFA Inhaler 2 Puff(s) Inhalation every 6 hours PRN  budesonide 160 MICROgram(s)/formoterol 4.5 MICROgram(s) Inhaler 2 Puff(s) Inhalation two times a day    acetaminophen     Tablet .. 650 milliGRAM(s) Oral every 6 hours PRN  melatonin 3 milliGRAM(s) Oral at bedtime PRN  ondansetron Injectable 4 milliGRAM(s) IV Push every 8 hours PRN        aluminum hydroxide/magnesium hydroxide/simethicone Suspension 30 milliLiter(s) Oral every 4 hours PRN  calcium carbonate    500 mG (Tums) Chewable 1 Tablet(s) Chew every 6 hours PRN  pantoprazole    Tablet 40 milliGRAM(s) Oral before breakfast      levothyroxine 75 MICROGram(s) Oral daily          pyridostigmine 60 milliGRAM(s) Oral three times a day  pyridostigmine  milliGRAM(s) Oral daily          ICU Vital Signs Last 24 Hrs  T(C): 36.6 (20 Mar 2023 15:38), Max: 36.6 (20 Mar 2023 15:38)  T(F): 97.9 (20 Mar 2023 15:38), Max: 97.9 (20 Mar 2023 15:38)  HR: 54 (20 Mar 2023 19:00) (48 - 71)  BP: 112/54 (20 Mar 2023 19:00) (106/46 - 151/58)  BP(mean): 71 (20 Mar 2023 19:00) (67 - 93)  ABP: --  ABP(mean): --  RR: 18 (20 Mar 2023 19:00) (14 - 22)  SpO2: 96% (20 Mar 2023 19:00) (96% - 100%)    O2 Parameters below as of 20 Mar 2023 10:00  Patient On (Oxygen Delivery Method): nasal cannula  O2 Flow (L/min): 2              I&O's Detail    20 Mar 2023 07:01  -  20 Mar 2023 19:31  --------------------------------------------------------  IN:    Lactated Ringers: 810 mL  Total IN: 810 mL    OUT:    Voided (mL): 450 mL  Total OUT: 450 mL    Total NET: 360 mL          LABS:    03-20    140  |  106  |  25<H>  ----------------------------<  119<H>  3.8   |  27  |  0.86    Ca    8.8      20 Mar 2023 08:54  Mg     2.0     03-20    TPro  6.3  /  Alb  3.2<L>  /  TBili  0.4  /  DBili  x   /  AST  227<H>  /  ALT  134<H>  /  AlkPhos  85  03-20      CARDIAC MARKERS ( 20 Mar 2023 08:54 )  x     / x     / x     / x     / 10.0 ng/mL      CAPILLARY BLOOD GLUCOSE            CULTURES:      Physical Examination:  General: No acute distress.    HEENT: Pupils equal, reactive to light.  Symmetric.  PULM: Clear to auscultation bilaterally, no significant sputum production  NECK: Supple, no lymphadenopathy, trachea midline  CVS: Regular rate and rhythm, no murmurs, rubs, or gallops  ABD: Soft, nondistended, nontender, normoactive bowel sounds, no masses  EXT: No edema, nontender  SKIN: Warm and well perfused, no rashes noted.  NEURO: Alert, oriented, interactive, nonfocal  DEVICES:     RADIOLOGY: ***    CRITICAL CARE TIME SPENT: ** minutes assessing presenting problems of acute illness, which pose high probability of life threatening deterioration or end organ damage/dysfunction, as well as medical decision making including initiating plan of care, reviewing data, reviewing radiologic exams, discussing with multidisciplinary team,  discussing goals of care with patient/family, and writing this note.  Non-inclusive of procedures performed,   Patient is a 82y old  Female who presents with a chief complaint of     BRIEF HOSPITAL COURSE-  83 y/o Female with PMH of HTN, HLD, Mod-Severe AS, Myasthenia Gravis, Reactive Airway Disease, Chronic GERD, Hypothyroid, OA of Left Hip presents for  ED for planned left hip THR.  After induction and intubation by anesthesia patient with bradycardia, pauses with escape beats and atrial ectopy.  Patient had pushes of atropine and ephedrine.  No compressions.  As anesthesia wore off patient woke up and patient returned to baseline sinus bradycardia in the 50s.       Events last 24 hours:   L hip pain significantly reduced, patient able to ambulate to bathroom this evening. No further episodes of bradyarrhythmia Likely will need holter monitor outpatient.       Review of Systems:  CONSTITUTIONAL: No fever, chills, or fatigue.  EYES: No eye pain, visual disturbances, or discharge.  ENMT:  No difficulty hearing, tinnitus, vertigo. No sinus or throat pain.  NECK: No pain or stiffness.  RESPIRATORY: No cough, wheezing, chills or hemoptysis. No shortness of breath  CARDIOVASCULAR: No chest pain, palpitations, dizziness, or leg swelling.  GASTROINTESTINAL: No abdominal or epigastric pain. No nausea, vomiting, or hematemesis. No diarrhea or constipation. No melena or hematochezia.  GENITOURINARY: No dysuria, frequency, hematuria, or incontinence.  NEUROLOGICAL: No headaches, memory loss, loss of strength, numbness, or tremors.  SKIN: No itching, burning, rashes, or lesions.   MUSCULOSKELETAL: +L hip pain. No swelling; No muscle, back, or extremity pain  PSYCHIATRIC: No depression, anxiety, mood swings, or difficulty sleeping.        PAST MEDICAL & SURGICAL HISTORY-  Hypertension      Hyperlipidemia      GERD (Gastroesophageal Reflux Disease)  hiatal hernia      Lumbar Radiculopathy      Kidney Calculi      Myasthenia gravis  dx 10/2018 symptoms: intermittent loss of voice/ weakness, negative ENT studies, now stable on Pyridostigmine      Lumbar stenosis      Diverticulitis large intestine  denies any recent exacerbations      Aortic stenosis, mild  asper daughter      Reactive airway disease that is not asthma  mild as per pulm note on [employer], patient and daughter denies any inhaler use; thought to be related to myasthena gravis      Hypothyroid      Osteoarthritis of left hip      Pneumonia due to COVID-19 virus      Sinus bradycardia      S/P Cholecystectomy      S/P Appendectomy      Bilateral Cataracts  removed      S/P Hysterectomy Partial  1974      S/P Laparoscopic Fundoplication      Prolapse, Uterovaginal  s/p sling      H/O laminectomy  cervical  1998  lumbar 1986,1998,2000, 2/19 with fusion      H/O shoulder replacement  b/l 2015/2016      S/P foot surgery      History of tonsillectomy      H/O umbilical hernia repair      S/P hip replacement, right          Medications:    losartan 25 milliGRAM(s) Oral daily    albuterol    90 MICROgram(s) HFA Inhaler 2 Puff(s) Inhalation every 6 hours PRN  budesonide 160 MICROgram(s)/formoterol 4.5 MICROgram(s) Inhaler 2 Puff(s) Inhalation two times a day    acetaminophen     Tablet .. 650 milliGRAM(s) Oral every 6 hours PRN  melatonin 3 milliGRAM(s) Oral at bedtime PRN  ondansetron Injectable 4 milliGRAM(s) IV Push every 8 hours PRN      aluminum hydroxide/magnesium hydroxide/simethicone Suspension 30 milliLiter(s) Oral every 4 hours PRN  calcium carbonate    500 mG (Tums) Chewable 1 Tablet(s) Chew every 6 hours PRN  pantoprazole    Tablet 40 milliGRAM(s) Oral before breakfast      levothyroxine 75 MICROGram(s) Oral daily      pyridostigmine 60 milliGRAM(s) Oral three times a day  pyridostigmine  milliGRAM(s) Oral daily          ICU Vital Signs Last 24 Hrs  T(C): 36.6 (20 Mar 2023 15:38), Max: 36.6 (20 Mar 2023 15:38)  T(F): 97.9 (20 Mar 2023 15:38), Max: 97.9 (20 Mar 2023 15:38)  HR: 54 (20 Mar 2023 19:00) (48 - 71)  BP: 112/54 (20 Mar 2023 19:00) (106/46 - 151/58)  BP(mean): 71 (20 Mar 2023 19:00) (67 - 93)  ABP: --  ABP(mean): --  RR: 18 (20 Mar 2023 19:00) (14 - 22)  SpO2: 96% (20 Mar 2023 19:00) (96% - 100%)    O2 Parameters below as of 20 Mar 2023 10:00  Patient On (Oxygen Delivery Method): nasal cannula  O2 Flow (L/min): 2            I&O's Detail    20 Mar 2023 07:01  -  20 Mar 2023 19:31  --------------------------------------------------------  IN:    Lactated Ringers: 810 mL  Total IN: 810 mL    OUT:    Voided (mL): 450 mL  Total OUT: 450 mL    Total NET: 360 mL          LABS:    03-20    140  |  106  |  25<H>  ----------------------------<  119<H>  3.8   |  27  |  0.86    Ca    8.8      20 Mar 2023 08:54  Mg     2.0     03-20    TPro  6.3  /  Alb  3.2<L>  /  TBili  0.4  /  DBili  x   /  AST  227<H>  /  ALT  134<H>  /  AlkPhos  85  03-20      CARDIAC MARKERS ( 20 Mar 2023 08:54 )  x     / x     / x     / x     / 10.0 ng/mL        CAPILLARY BLOOD GLUCOSE          CULTURES:        Physical Examination:  General: Elderly female, well developed. In no acute distress, resting comfortably in bed.    HEENT: Pupils equal, reactive to light. Symmetric.  PULM: Clear to auscultation bilaterally, no adventitious sounds. No significant sputum production.  NECK: Supple, no lymphadenopathy, trachea midline.  CVS: Irregular rate, regular rhythm. No murmurs, rubs, or gallops. S1, S3 2 intact.   ABD: Soft, nondistended, nontender, normoactive bowel sounds. No masses palpable.   EXT: No edema, nontender. L hip swelling with associated erythema.   SKIN: Warm and well perfused, no rashes noted.  NEURO: Alert, oriented, interactive, nonfocal.  DEVICES:       RADIOLOGY-

## 2023-03-20 NOTE — CARE COORDINATION ASSESSMENT. - NSCAREPROVIDERS_GEN_ALL_CORE_FT
CARE PROVIDERS:  Access Services: Alexandria Johnson  Administration: Vernon Guerrero  Administration: Ashely Pop  Admitting: Nathanael Dinero  Attending: Nathaanel Dinero  Case Management: Lyn Samano  Consultant: Baron Aguilar  Consultant: Ap Flores  Consultant: Marek Kumar  Consultant: Praful Galindo  Food & Nutrition Services: Marcia Duncan  Infection Control: Cleopatra Oakley  Nurse: Micha Wray  Nurse: Nuha Vidales  Nurse: Baldemar Louis  Nurse: Nikki Moreira  Nurse: Beatrice See  Nurse: Pippa Foster  Nurse: Neelima Balderas  Nurse: Arely Andujar  Nurse: Luly Luong  Outpatient Provider: Amanuel Null  PCA/Nursing Assistant: Lexie Tubbs  Physical Therapy: Martinez Junior  Primary Team: Niko Mendoza  Primary Team: Lyn Waddell  Primary Team: Praful Pruitt  Primary Team: Tessy Paiz  Primary Team: Mony Crouch  : Mary Cruz  : Audrey Lebron  Team: [facility], Team  Team: [facility] Hospitalists, Team  UR// Supp. Assoc.: Elizabeth Dove   CARE PROVIDERS:  Access Services: Alexandria Johnson  Administration: Vernon Guerrero  Administration: Ashely Pop  Admitting: Nathanael Dinero  Attending: Nathanael Dinero  Case Management: Lyn Samano  Consultant: Praful Galindo  Consultant: Amanuel Null  Consultant: Marek Kumar  Consultant: Baron Aguilar  Consultant: Ap Flores  Food & Nutrition Services: Marcia Duncan  Infection Control: Cleopatra Oakley  Nurse: Micha Wray  Nurse: Nuha Vidales  Nurse: Beatrice See  Nurse: Pippa Foster  Nurse: Baldemar Louis  Nurse: Nikki Moreira  Nurse: Arely Andujar  Nurse: Luly Luong  Nurse: Neelima Balderas  Outpatient Provider: Amanuel Null  PCA/Nursing Assistant: Lexie Tubbs  Physical Therapy: Martinez Junior  Primary Team: Lyn Waddell  Primary Team: Niko Mendoza  Primary Team: Tessy Paiz  Primary Team: Praful Pruitt  Primary Team: Mony Crouch  Respiratory Therapy: Victoriano Knight  : Mary Cruz  : Audrey Lebron  Team: [facility], Team  Team: [facility] Hospitalists, Team  UR// Supp. Assoc.: Elizabeth Dove

## 2023-03-20 NOTE — CONSULT NOTE ADULT - ASSESSMENT
The patient is an 82 year old female with a history of HTN, HL, MG, aortic stenosis who presented for elevated THR.     Plan:  - ECG done in PACU with mild sinus bradycardia; no evidence of ischemia or infarction  - Rhythm disturbances were transient and provoked by sedation/intubation  - Epinephrine was given in the OR - likely cause of brief frequent atrial ectopy  - Brief transcutaneous pacing was attempted but rhythm stabilized and this was turned off  - Rhythm currently mild sinus bradycardia  - Avoid rate lowering agents for now unless rhythm changes  - Given the transient and provoked nature (either from medications or respiratory related) of bradycardia, no indication for pacemaker at this time  - Monitor on telemetry for any further rhythm issues  - Check TSH  - Continue losartan 25 mg daily  - Recent outpatient echo with normal LV systolic function, mod/severe aortic stenosis  - If rhythm remains stable, then no further inpatient cardiac intervention indicated at this time. She can then be re-evaluated by her cardiologist and/or EP as outpatient. The patient is an 82 year old female with a history of HTN, HL, MG, aortic stenosis who presented for elective THR.     Plan:  - ECG done in PACU with mild sinus bradycardia; no evidence of ischemia or infarction  - Rhythm disturbances were transient and provoked by sedation/intubation  - Epinephrine was given in the OR - likely cause of brief frequent atrial ectopy  - Brief transcutaneous pacing was attempted but rhythm stabilized and this was turned off  - Rhythm currently mild sinus bradycardia  - Avoid rate lowering agents for now unless rhythm changes  - Given the transient and provoked nature (either from medications or respiratory related) of bradycardia, no indication for pacemaker at this time  - Monitor on telemetry for any further rhythm issues  - Check TSH  - Continue losartan 25 mg daily  - Recent outpatient echo with normal LV systolic function, mod/severe aortic stenosis  - If rhythm remains stable, then no further inpatient cardiac intervention indicated at this time. She can then be re-evaluated by her cardiologist and/or EP as outpatient.

## 2023-03-20 NOTE — CONSULT NOTE ADULT - SUBJECTIVE AND OBJECTIVE BOX
Date/Time Patient Seen:  		  Referring MD:   Data Reviewed	       Patient is a 82y old  Female who presents with a chief complaint of     Subjective/HPI    History of Present Illness:  History of Present Illness	  This  is a 83 y/o female who present with complaint of left hip pain and difficulty walking for the past one year . Reports constant pain and worse pian when walking , standing ,sitting andd getting up from sitting position 7/10 . scheduled for left knee replacement on 3/20/23    PAST MEDICAL & SURGICAL HISTORY:  Hypertension    Genital Ulcer, Female    Hyperlipidemia    GERD (Gastroesophageal Reflux Disease)  hiatal hernia    Lumbar Radiculopathy    Kidney Calculi    Hammertoe Right foot    Gastric Ulcer  nissen fundoplication    Carpal Tunnel Syndrome Right  release 4/10 and left    Myasthenia gravis  dx 10/2018 symptoms: intermittent loss of voice/ weakness, negative ENT studies, now stable on Pyridostigmine    Lumbar stenosis    Diverticulitis large intestine  denies any recent exacerbations    Aortic stenosis, mild  asper daughter    Near syncope  2018 negative ENT work up, negative cardiac work ups as per daughter    Reactive airway disease that is not asthma  mild as per pulm note on [employer], patient and daughter denies any inhaler use; thought to be related to myasthena gravis    Hypothyroid    Osteoarthritis    Osteoarthritis of left hip     novel coronavirus disease (COVID-19)    Pneumonia due to COVID-19 virus    Sinus bradycardia    S/P Cholecystectomy    S/P Hernia Repair Umbikical    S/P Appendectomy    S/P Laminectomy with Spinal Fusion cervcical and lumbar  cervical  1998  lumbar ,,    Bilateral Cataracts  removed    S/P Hysterectomy Partial  1974    S/P Laparoscopic Fundoplication    Prolapse, Uterovaginal  s/p sling    H/O laminectomy  cervical  1998  lumbar ,,,  with fusion    H/O shoulder replacement  b/l     S/P foot surgery    History of tonsillectomy    H/O umbilical hernia repair    S/P hip replacement, right    FAMILY HISTORY:  Family history of bladder cancer, brother   Family history of stroke, father   FH: early death, mother  age 50s.     Anesthesia History:  · Previous Reaction to Anesthesia	nausea/vomiting  · Family History of Anesthesia Reaction/Malignant Hyperthermia	No  · Latex Allergy	No    Social History:    Substance Use History:  · Substance Use	never used     Alcohol Use History:  · Have you ever consumed alcohol	never     Tobacco Usage:  · Tobacco Usage: Never smoker    Other Pertinent History:    Advance Directives:  · Does patient have Advance Directive	Yes  · Indicate Type	Health Care Proxy (HCP)  · Agent's Name	kell Stevenson daughter  · Phone #	[phone]  · Are any of the items on the chart	No     Transfusion History:  · Blood Avoidance/Restrictions	none  · Previous Blood Transfusion	yes  · Previous Transfusion Reaction	no  · Transfusion History Comment	shoulder and hip surgery     Health Management:  · Health Management/Screening	mammogram  · Last Mammogram	-  · Results of Last Mammogram	normal  · Pap smear test done in past 3 years	no  · Pap smear NOT done within the last 3 years	patient refuses to have a pap smear     Reproductive, Female:  · Is Patient Pregnant?	no  · Is Patient Lactating?	no  · Last Menstrual Date	s/p hysterectomy        Medication list         MEDICATIONS  (STANDING):  budesonide 160 MICROgram(s)/formoterol 4.5 MICROgram(s) Inhaler 2 Puff(s) Inhalation two times a day  levothyroxine 75 MICROGram(s) Oral daily  losartan 25 milliGRAM(s) Oral daily  pantoprazole    Tablet 40 milliGRAM(s) Oral before breakfast  pyridostigmine 60 milliGRAM(s) Oral three times a day  pyridostigmine  milliGRAM(s) Oral daily    MEDICATIONS  (PRN):  acetaminophen     Tablet .. 650 milliGRAM(s) Oral every 6 hours PRN Temp greater or equal to 38C (100.4F), Mild Pain (1 - 3)  albuterol    90 MICROgram(s) HFA Inhaler 2 Puff(s) Inhalation every 6 hours PRN Shortness of Breath and/or Wheezing  aluminum hydroxide/magnesium hydroxide/simethicone Suspension 30 milliLiter(s) Oral every 4 hours PRN Dyspepsia  calcium carbonate    500 mG (Tums) Chewable 1 Tablet(s) Chew every 6 hours PRN Heartburn  melatonin 3 milliGRAM(s) Oral at bedtime PRN Insomnia  ondansetron Injectable 4 milliGRAM(s) IV Push every 8 hours PRN Nausea and/or Vomiting         Vitals log        ICU Vital Signs Last 24 Hrs  T(C): 36.5 (20 Mar 2023 08:23), Max: 36.5 (20 Mar 2023 08:23)  T(F): 97.7 (20 Mar 2023 08:23), Max: 97.7 (20 Mar 2023 08:23)  HR: 56 (20 Mar 2023 10:42) (48 - 56)  BP: 106/53 (20 Mar 2023 10:42) (106/46 - 151/58)  BP(mean): 70 (20 Mar 2023 10:42) (70 - 73)  ABP: --  ABP(mean): --  RR: 21 (20 Mar 2023 10:42) (16 - 21)  SpO2: 100% (20 Mar 2023 10:42) (97% - 100%)    O2 Parameters below as of 20 Mar 2023 10:00  Patient On (Oxygen Delivery Method): nasal cannula  O2 Flow (L/min): 2               Input and Output:  I&O's Detail    20 Mar 2023 07:01  -  20 Mar 2023 10:59  --------------------------------------------------------  IN:    Lactated Ringers: 810 mL  Total IN: 810 mL    OUT:    Voided (mL): 150 mL  Total OUT: 150 mL    Total NET: 660 mL          Lab Data        140  |  106  |  25<H>  ----------------------------<  119<H>  3.8   |  27  |  0.86    Ca    8.8      20 Mar 2023 08:54  Mg     2.0     03-    TPro  6.3  /  Alb  3.2<L>  /  TBili  0.4  /  DBili  x   /  AST  227<H>  /  ALT  134<H>  /  AlkPhos  85  03-20      CARDIAC MARKERS ( 20 Mar 2023 08:54 )  x     / x     / x     / x     / 10.0 ng/mL        Review of Systems	  on o2 support      Objective     Physical Examination    heart s1s2  lung dec BS  head nc  verbal  alert  cn grossly int  moves all extr      Pertinent Lab findings & Imaging      Paul:  NO   Adequate UO     I&O's Detail    20 Mar 2023 07:01  -  20 Mar 2023 10:59  --------------------------------------------------------  IN:    Lactated Ringers: 810 mL  Total IN: 810 mL    OUT:    Voided (mL): 150 mL  Total OUT: 150 mL    Total NET: 660 mL               Discussed with:     Cultures:	        Radiology      EXAM:  CT ANGIO CHEST (W)AW IC                            PROCEDURE DATE:  05/15/2020            INTERPRETATION:  CTA CHEST WITH CONTRAST (CT PULMONARY EMBOLUS)    INDICATION: History of enteric available  :  Stress chest lucency no shortness of breath. Covid 19 infection. Evaluate for pulmonary embolus.    TECHNIQUE: Enhanced helical images were obtained of the chest. Coronal and sagittal images were reconstructed.  Images were obtained after the uneventful administration of 50 cc of nonionic intravenous contrast (Omnipaque 350). 50 cc of nonionic intravenous contrast (Omnipaque 350) was discarded. Maximum intensity projection images were generated.    COMPARISON: Radiograph chest 2020. CT abdomen and pelvis .    FINDINGS:     Pulmonary Artery: There is no main, central, lobar, or proximal to mid segmental pulmonary embolus within the bilateral upper lobes, lingula, and right middle lobe. There are is no bilateral lower lobe are pulmonary emboli, however, the segmental and subsegmental vessels are obscured secondary to respiratory motion.    Tubes/Lines: None.    Lungs, pleura, and airways: There are bilateral foci of consolidation, groundglass opacities, septal thickening and bronchiectasis.    Small bilateral pleural effusions. No pneumothorax.    Mediastinum: Not enlarged chest lymph nodes. Left lobe thyroid calcification. Hiatal hernia.    Heart and Vasculature: The heart is normal in size. Aortic valve calcifications. Coronary artery calcifications. No pericardial effusion. Aorta is normal in caliber. Aortic calcifications.    Upper Abdomen: The upper abdomen is unremarkable.    Bones And Soft Tissues: Degenerative change of the spine. Bilateral glenohumeral arthroplasties.    IMPRESSION:     1.  No pulmonary embolus as reported above, however, the bilateral lower lobe segmental and subsegmental vessels are not well evaluated secondary to motion.  2.  Bilateral opacities can occur in the setting of lung injury or alternatively can BE the sequelae of infection                    ELKIN SÁNCHEZ M.D., ATTENDING RADIOLOGIST  This document has been electronically signed. May 15 2020  8:45PM

## 2023-03-20 NOTE — ASU PREOP CHECKLIST - AICD PRESENT
"Clinic Care Coordination Contact  OUTREACH    Referral Information:  Referral Source: ED Follow-Up  Reason for Contact: initial   Care Conference: No     Universal Utilization:   ED Visits in last year: 11 (in [place], more outside )  Hospital visits in last year: 1  Last PCP appointment: 08/21/17  Missed Appointments: 4  Concerns: frequent ED visits  Multiple Providers or Specialists: yes    Clinical Concerns:  Current Medical Concerns: Recent surgery and incision. Went to the ER yesterday ( out of ) after her incision rahul open. May be going to a TCU soon to aid in recovery. Has an RN from [facility] that is helping her pack the incision and clean it. RN comes in once a week.  Current Behavioral Concerns: PTSD, ADD, Depression, borderline personality disorder, Anxiety, Hx of self harm  Education Provided to patient: CCSW outreached to the patient and introduced her role. The patient wanted to know why this CCSW was calling and if her PCP prompted this call. Pt expressed frustration stating she feels her provider is \"pushing for guardianship\" and that if her provider does she will \"fight back\". Feels she has all the support she needs and expresses no concern over frequent ED visits.     Patient was willing to provide contact information for her involved care team: (CCSW updated the care teams tab)  Psychiatrist ([facility]) - Loni Hill [phone]  Therapist ([facility]) - Lizz AGUIRRE worker ( [facility]) - Valencia Puentes [phone]  Mental health CM ([facility]) - mabel Dumont [phone]  Home care RN (Roland Jayjay)    Only [facility] has a consent to speak with the patient on file. The patient is willing to sign an JOAQUIM but is not sure when she can get to the clinic to do this. CCSW agreed to Email her a blank  JOAQUIM but stated she could not accept the completed form back by email as it was not secure. Pt consents and agrees to send the forms directly to this CCSW to " "review and send to medical records. Patient confirms she received the JOAQUIM by email.     Patient states she has all the support she needs and feels she is in a supportive environment. The patient does have a care team involved with her care but her fv provider is worried the patient needs a more supportive living environment and a possible guardian due to frequent ED visits. APS report was made #[id number].     PAtient agrees to a call in 1 week  For check in    Medication Management:  Pt's housing manages her medications but the pt states in September they are switching from IOS to [facility] workers who will not manage her medications. Pt reports no concerns and states she can manage it. HAs a weekly RN to help if needed     Functional Status:  Mobility Status: Independent  Equipment Currently Used at Home: none (n)  Transportation: unkown     Psychosocial:  Current living arrangement:: Other ([facility] apartments)  Financial/Insurance: Medicare/ Straight medicaid    Lives in an independent housing facility, [facility] apartments ( a [facility] living facility. Was very upset when the CCSW called it a \"group home\" stating it is not a group home and does not provide services like a group home. States they manage her medications, until September, and she can call attendants if she needs them but they do not check in on her.      Resources and Interventions:  Current Resources:  (na); OP Mental Health, County Worker  PAS Number:  (na)  Senior Linkage Line Referral Placed:  (na)  Advanced Care Plans/Directives on file:: No  Referrals Placed:  (none)       Barriers: Frequent ED visits, several avoidable due to mental health and risky behaviors.   Strengths: strong support network with [facility] professionals  Patient/Caregiver understanding: fair  Frequency of Care Coordination: prn  Upcoming appointment:  (none)     PLAN:  CCSW will route note to the PCP  Pt will outreach as needed  CCSW will follow up in 1 week    Nevin " Leonardo, Social Work Care Coordinator, [facility], MSW  [place] [facility]: [facility] [place]   P:[phone]/ Signed August 24, 2017      no

## 2023-03-20 NOTE — CONSULT NOTE ADULT - ASSESSMENT
82F HTN, HL, mod-severe AS, Myasthenia Gravis, reactive airway disease, Chronic GERD, Hypothyroid, OA of left hip presented for planned left hip THR with arrhythmia in OR after induction.      Bradyarrhythmia  possibly related to anesthesia induction  monitor for now on monitor  if no events in 24 hours will discharge home and to follow up with Dr Montes for holter.   Hold Toprol for now.     Chronic GERD  continue PPI    Myasthenia Gravis  continue pyridostigmine at home doses    Hypothyroid  continue synthroid    HLD  continue Statin    Reactive airway disease  Breo non-formulary  will put on symbicort and albuterol prn      Encourage ambulation

## 2023-03-20 NOTE — CONSULT NOTE ADULT - SUBJECTIVE AND OBJECTIVE BOX
History of Present Illness: The patient is an 82 year old female with a history of HTN, HL, MG, aortic stenosis who presented for elective THR. I was called to evaluate the patient emergently in the operating room. She was intubated and received fentanyl, propofol, and rocuronium. After this, she became bradycardic down to 30s. She was given small boluses of epinephrine. There was intermittent sinus bradycardia and junctional beats. There was frequent atrial ectopy. After 5-10 minutes, sedation was wearing off, patient started to wake up, and rhythm became only mild sinus bradycardia to 50s with no more junctional beats. She was extubated and had no complaints.    Past Medical/Surgical History:  HTN, HL, MG, aortic stenosis    Medications:  Home Medications:  albuterol 90 mcg/inh inhalation aerosol: 2 puff(s) inhaled every 4 hours, As needed, Shortness of Breath and/or Wheezing (20 Mar 2023 06:40)  esomeprazole 40 mg oral delayed release capsule: 1 tab(s) orally once a day (20 Mar 2023 06:40)  hydroCHLOROthiazide 12.5 mg oral capsule: 1 cap(s) orally once a day (20 Mar 2023 06:40)  levothyroxine 75 mcg (0.075 mg) oral tablet: 1 tab(s) orally once a day (20 Mar 2023 06:40)  losartan 25 mg oral tablet: 1 tab(s) orally once a day (20 Mar 2023 06:40)  pyridostigmine 180 mg oral tablet, extended release: orally once a day (20 Mar 2023 06:40)  pyridostigmine 60 mg oral tablet: 1 tab(s) orally 3 times a day  (7am, 1pm ,6 pm ) (20 Mar 2023 06:40)  rosuvastatin 20 mg oral capsule: orally once a day (at bedtime) (20 Mar 2023 06:40)      Family History: Non-contributory family history of premature cardiovascular atherosclerotic disease    Social History: No tobacco, alcohol or drug use    Review of Systems:  General: No fevers, chills, weight gain  Skin: No rashes, color changes  Cardiovascular: No chest pain, orthopnea  Respiratory: No shortness of breath, cough  Gastrointestinal: No nausea, abdominal pain  Genitourinary: No incontinence, pain with urination  Musculoskeletal: No pain, swelling, decreased range of motion  Neurological: No headache, weakness  Psychiatric: No depression, anxiety  Endocrine: No weight gain, increased thirst  All other systems are comprehensively negative.    Physical Exam:  Vitals:        Vital Signs Last 24 Hrs  T(C): 36.5 (20 Mar 2023 08:23), Max: 36.5 (20 Mar 2023 08:23)  T(F): 97.7 (20 Mar 2023 08:23), Max: 97.7 (20 Mar 2023 08:23)  HR: 48 (20 Mar 2023 09:45) (48 - 55)  BP: 125/48 (20 Mar 2023 09:45) (106/46 - 151/58)  BP(mean): --  RR: 18 (20 Mar 2023 09:45) (16 - 21)  SpO2: 100% (20 Mar 2023 09:45) (97% - 100%)  Parameters below as of 20 Mar 2023 09:45  Patient On (Oxygen Delivery Method): nasal cannula  O2 Flow (L/min): 3  General: NAD  HEENT: MMM  Neck: No JVD, no carotid bruit  Lungs: CTAB  CV: RRR, nl S1/S2, no M/R/G  Abdomen: S/NT/ND, +BS  Extremities: No LE edema, no cyanosis  Neuro: AAOx3, non-focal  Skin: No rash    Labs:    03-20    140  |  106  |  25<H>  ----------------------------<  119<H>  3.8   |  27  |  0.86    Ca    8.8      20 Mar 2023 08:54  Mg     2.0     03-20    TPro  6.3  /  Alb  3.2<L>  /  TBili  0.4  /  DBili  x   /  AST  227<H>  /  ALT  134<H>  /  AlkPhos  85  03-20    CARDIAC MARKERS ( 20 Mar 2023 08:54 )  x     / x     / x     / x     / 10.0 ng/mL          ECG/Telemetry: Sinus bradycardia, normal axis, no ST abnormality

## 2023-03-20 NOTE — CONSULT NOTE ADULT - SUBJECTIVE AND OBJECTIVE BOX
Patient is a 82y old  Female who presents with a chief complaint of left hip pain.     HPI:  82F HTN, HL, mod-severe AS, Myasthenia Gravis, reactive airway disease, Chronic GERD, Hypothyroid, OA of left hip presented for planned left hip THR.  After induction and intubation by anesthesia patient with bradycardia, pauses with escape beats and atrial ectopy.  Patient had pushes of atropine and ephedrine.  No compressions.  As anesthesia wore off patient woke up and patient returned to baseline sinus bradycardia in the 50s.  Currently in bed tolerating clear liquid tray complaining of GERD symptoms (did report taking her Nexium at home this morning).   Denies recent chest pain, SOB, palpitations, lightheadedness, dizziness.  However her ability to ambulate is restricted by her left hip pain.       REVIEW OF SYSTEMS:  CONSTITUTIONAL: No fever, weight loss, or fatigue  EYES: No eye pain, visual disturbances, or discharge  ENMT:  No difficulty hearing, tinnitus, vertigo; No sinus or throat pain  NECK: No pain or stiffness  BREASTS: No pain, masses, or nipple discharge  RESPIRATORY: No cough, wheezing, chills or hemoptysis; No shortness of breath  CARDIOVASCULAR: No chest pain, palpitations, dizziness, or leg swelling  GASTROINTESTINAL: No abdominal or epigastric pain. No nausea, vomiting, or hematemesis; No diarrhea or constipation. No melena or hematochezia.  GENITOURINARY: No dysuria, frequency, hematuria, or incontinence  NEUROLOGICAL: No headaches, memory loss, loss of strength, numbness, or tremors  SKIN: No itching, burning, rashes, or lesions   LYMPH NODES: No enlarged glands  ENDOCRINE: No heat or cold intolerance; No hair loss  MUSCULOSKELETAL: No muscle or back pain  PSYCHIATRIC: No depression, anxiety, mood swings, or difficulty sleeping  HEME/LYMPH: No easy bruising, or bleeding gums  ALLERGY AND IMMUNOLOGIC: No hives or eczema    PAST MEDICAL & SURGICAL HISTORY:  Hypertension    Hyperlipidemia    GERD (Gastroesophageal Reflux Disease)  hiatal hernia    Lumbar Radiculopathy    Kidney Calculi    Myasthenia gravis  dx 10/2018 symptoms: intermittent loss of voice/ weakness, negative ENT studies, now stable on Pyridostigmine    Lumbar stenosis    Diverticulitis large intestine  denies any recent exacerbations    Aortic stenosis, mild  asper daughter    Reactive airway disease that is not asthma  mild as per pulm note on [employer], patient and daughter denies any inhaler use; thought to be related to myasthena gravis    Hypothyroid    Osteoarthritis of left hip    Pneumonia due to COVID-19 virus    Sinus bradycardia    S/P Cholecystectomy    S/P Appendectomy    Bilateral Cataracts  removed    S/P Hysterectomy Partial      S/P Laparoscopic Fundoplication    Prolapse, Uterovaginal  s/p sling    H/O laminectomy  cervical  1998  lumbar ,,,  with fusion      H/O shoulder replacement  b/l     S/P foot surgery    History of tonsillectomy    H/O umbilical hernia repair    S/P hip replacement, right          SOCIAL HISTORY:  Residence: [ ] [facility]  [ ] SNF  [x ] Community  [ ] Substance abuse: denies  [ ] Tobacco: denies  [ ] Alcohol use: denies    Allergies    IODINE (Rash)  No Known Drug Allergies  shellfish (Rash)    Intolerances        MEDICATIONS  (STANDING):  budesonide 160 MICROgram(s)/formoterol 4.5 MICROgram(s) Inhaler 2 Puff(s) Inhalation two times a day  levothyroxine 75 MICROGram(s) Oral daily  losartan 25 milliGRAM(s) Oral daily  pantoprazole    Tablet 40 milliGRAM(s) Oral before breakfast  pyridostigmine 60 milliGRAM(s) Oral three times a day  pyridostigmine  milliGRAM(s) Oral daily    MEDICATIONS  (PRN):  acetaminophen     Tablet .. 650 milliGRAM(s) Oral every 6 hours PRN Temp greater or equal to 38C (100.4F), Mild Pain (1 - 3)  albuterol    90 MICROgram(s) HFA Inhaler 2 Puff(s) Inhalation every 6 hours PRN Shortness of Breath and/or Wheezing  aluminum hydroxide/magnesium hydroxide/simethicone Suspension 30 milliLiter(s) Oral every 4 hours PRN Dyspepsia  calcium carbonate    500 mG (Tums) Chewable 1 Tablet(s) Chew every 6 hours PRN Heartburn  melatonin 3 milliGRAM(s) Oral at bedtime PRN Insomnia  ondansetron Injectable 4 milliGRAM(s) IV Push every 8 hours PRN Nausea and/or Vomiting      FAMILY HISTORY:  Family history of stroke  father     FH: early death  mother  age 50s    Family history of bladder cancer  brother         Vital Signs Last 24 Hrs  T(C): 36.5 (20 Mar 2023 08:23), Max: 36.5 (20 Mar 2023 08:23)  T(F): 97.7 (20 Mar 2023 08:23), Max: 97.7 (20 Mar 2023 08:23)  HR: 56 (20 Mar 2023 10:42) (48 - 56)  BP: 106/53 (20 Mar 2023 10:42) (106/46 - 151/58)  BP(mean): 70 (20 Mar 2023 10:42) (70 - 73)  RR: 21 (20 Mar 2023 10:42) (16 - 21)  SpO2: 100% (20 Mar 2023 10:42) (97% - 100%)    Parameters below as of 20 Mar 2023 10:00  Patient On (Oxygen Delivery Method): nasal cannula  O2 Flow (L/min): 2      PHYSICAL EXAM:    GENERAL: NAD, well-groomed, well-developed  HEAD:  Atraumatic, Normocephalic  EYES:  conjunctiva and sclera clear  ENMT: Moist mucous membranes  NECK: Supple, No JVD  NERVOUS SYSTEM:  Alert & Oriented X3, Good concentration; Moving all 4 extremities; No gross sensory deficits  CHEST/LUNG: Clear to auscultation bilaterally;   HEART: Regular rate and rhythm; No murmurs, rubs, or gallops  ABDOMEN: Soft, Nontender, Nondistended; Bowel sounds present  EXTREMITIES:  2+ Peripheral Pulses, No clubbing, cyanosis, or edema  LYMPH: No lymphadenopathy noted  /RECTAL: Not examined  BREAST: Not examined  SKIN: No rashes or lesions      LABS:        140  |  106  |  25<H>  ----------------------------<  119<H>  3.8   |  27  |  0.86    Ca    8.8      20 Mar 2023 08:54  Mg     2.0     03-20    TPro  6.3  /  Alb  3.2<L>  /  TBili  0.4  /  DBili  x   /  AST  227<H>  /  ALT  134<H>  /  AlkPhos  85  03-20        CAPILLARY BLOOD GLUCOSE          RADIOLOGY & ADDITIONAL STUDIES:    EKG:   Personally Reviewed:  [ ] YES     Imaging:   Personally Reviewed:  [ ] YES     Consultant(s) Notes Reviewed:      Care Discussed with Consultants/Other Providers:                Patient is a 82y old  Female who presents with a chief complaint of left hip pain.     HPI:  82F HTN, HL, mod-severe AS, Myasthenia Gravis, reactive airway disease, Chronic GERD, Hypothyroid, OA of left hip presented for planned left hip THR.  After induction and intubation by anesthesia patient with bradycardia, pauses with escape beats and atrial ectopy.  Patient had pushes of atropine and ephedrine.  No compressions.  As anesthesia wore off patient woke up and patient returned to baseline sinus bradycardia in the 50s.  Currently in bed tolerating clear liquid tray complaining of GERD symptoms (did report taking her Nexium at home this morning).   Denies recent chest pain, SOB, palpitations, lightheadedness, dizziness.  However her ability to ambulate is restricted by her left hip pain.       REVIEW OF SYSTEMS:  CONSTITUTIONAL: No fever, weight loss, or fatigue  EYES: No eye pain, visual disturbances, or discharge  ENMT:  No difficulty hearing, tinnitus, vertigo; No sinus or throat pain  NECK: No pain or stiffness  BREASTS: No pain, masses, or nipple discharge  RESPIRATORY: No cough, wheezing, chills or hemoptysis; No shortness of breath  CARDIOVASCULAR: No chest pain, palpitations, dizziness, or leg swelling  GASTROINTESTINAL: No abdominal or epigastric pain. No nausea, vomiting, or hematemesis; No diarrhea or constipation. No melena or hematochezia.  GENITOURINARY: No dysuria, frequency, hematuria, or incontinence  NEUROLOGICAL: No headaches, memory loss, loss of strength, numbness, or tremors  SKIN: No itching, burning, rashes, or lesions   LYMPH NODES: No enlarged glands  ENDOCRINE: No heat or cold intolerance; No hair loss  MUSCULOSKELETAL: No muscle or back pain  PSYCHIATRIC: No depression, anxiety, mood swings, or difficulty sleeping  HEME/LYMPH: No easy bruising, or bleeding gums  ALLERGY AND IMMUNOLOGIC: No hives or eczema    PAST MEDICAL & SURGICAL HISTORY:  Hypertension    Hyperlipidemia    GERD (Gastroesophageal Reflux Disease)  hiatal hernia    Lumbar Radiculopathy    Kidney Calculi    Myasthenia gravis  dx 10/2018 symptoms: intermittent loss of voice/ weakness, negative ENT studies, now stable on Pyridostigmine    Lumbar stenosis    Diverticulitis large intestine  denies any recent exacerbations    Aortic stenosis, mild  asper daughter    Reactive airway disease that is not asthma  mild as per pulm note on [employer], patient and daughter denies any inhaler use; thought to be related to myasthena gravis    Hypothyroid    Osteoarthritis of left hip    Pneumonia due to COVID-19 virus    Sinus bradycardia    S/P Cholecystectomy    S/P Appendectomy    Bilateral Cataracts  removed    S/P Hysterectomy Partial      S/P Laparoscopic Fundoplication    Prolapse, Uterovaginal  s/p sling    H/O laminectomy  cervical  1998  lumbar ,,,  with fusion      H/O shoulder replacement  b/l     S/P foot surgery    History of tonsillectomy    H/O umbilical hernia repair    S/P hip replacement, right          SOCIAL HISTORY:  Residence: [ ] [facility]  [ ] SNF  [x ] Community  [ ] Substance abuse: denies  [ ] Tobacco: denies  [ ] Alcohol use: denies    Allergies    IODINE (Rash)  No Known Drug Allergies  shellfish (Rash)    Intolerances        MEDICATIONS  (STANDING):  budesonide 160 MICROgram(s)/formoterol 4.5 MICROgram(s) Inhaler 2 Puff(s) Inhalation two times a day  levothyroxine 75 MICROGram(s) Oral daily  losartan 25 milliGRAM(s) Oral daily  pantoprazole    Tablet 40 milliGRAM(s) Oral before breakfast  pyridostigmine 60 milliGRAM(s) Oral three times a day  pyridostigmine  milliGRAM(s) Oral daily    MEDICATIONS  (PRN):  acetaminophen     Tablet .. 650 milliGRAM(s) Oral every 6 hours PRN Temp greater or equal to 38C (100.4F), Mild Pain (1 - 3)  albuterol    90 MICROgram(s) HFA Inhaler 2 Puff(s) Inhalation every 6 hours PRN Shortness of Breath and/or Wheezing  aluminum hydroxide/magnesium hydroxide/simethicone Suspension 30 milliLiter(s) Oral every 4 hours PRN Dyspepsia  calcium carbonate    500 mG (Tums) Chewable 1 Tablet(s) Chew every 6 hours PRN Heartburn  melatonin 3 milliGRAM(s) Oral at bedtime PRN Insomnia  ondansetron Injectable 4 milliGRAM(s) IV Push every 8 hours PRN Nausea and/or Vomiting      FAMILY HISTORY:  Family history of stroke  father     FH: early death  mother  age 50s    Family history of bladder cancer  brother         Vital Signs Last 24 Hrs  T(C): 36.5 (20 Mar 2023 08:23), Max: 36.5 (20 Mar 2023 08:23)  T(F): 97.7 (20 Mar 2023 08:23), Max: 97.7 (20 Mar 2023 08:23)  HR: 56 (20 Mar 2023 10:42) (48 - 56)  BP: 106/53 (20 Mar 2023 10:42) (106/46 - 151/58)  BP(mean): 70 (20 Mar 2023 10:42) (70 - 73)  RR: 21 (20 Mar 2023 10:42) (16 - 21)  SpO2: 100% (20 Mar 2023 10:42) (97% - 100%)    Parameters below as of 20 Mar 2023 10:00  Patient On (Oxygen Delivery Method): nasal cannula  O2 Flow (L/min): 2      PHYSICAL EXAM:    GENERAL: NAD, well-groomed, well-developed  HEAD:  Atraumatic, Normocephalic  EYES:  conjunctiva and sclera clear  ENMT: Moist mucous membranes  NECK: Supple, No JVD  NERVOUS SYSTEM:  Alert & Oriented X3, Good concentration; Moving all 4 extremities; No gross sensory deficits  CHEST/LUNG: Clear to auscultation bilaterally;   HEART: Regular rate and rhythm; No murmurs, rubs, or gallops  ABDOMEN: Soft, Nontender, Nondistended; Bowel sounds present  EXTREMITIES:  2+ Peripheral Pulses, No clubbing, cyanosis, or edema  LYMPH: No lymphadenopathy noted  /RECTAL: Not examined  BREAST: Not examined  SKIN: No rashes or lesions      LABS:        140  |  106  |  25<H>  ----------------------------<  119<H>  3.8   |  27  |  0.86    Ca    8.8      20 Mar 2023 08:54  Mg     2.0     03-20    TPro  6.3  /  Alb  3.2<L>  /  TBili  0.4  /  DBili  x   /  AST  227<H>  /  ALT  134<H>  /  AlkPhos  85  03-20        CAPILLARY BLOOD GLUCOSE          RADIOLOGY & ADDITIONAL STUDIES:    EKG: Sinus bob  Personally Reviewed:  [ ] YES     Imaging:   Personally Reviewed:  [ ] YES     Consultant(s) Notes Reviewed:      Care Discussed with Consultants/Other Providers:  Dr ledesma and dr julian

## 2023-03-20 NOTE — CARE COORDINATION ASSESSMENT. - NSPASTMEDSURGHISTORY_GEN_ALL_CORE_FT
PAST MEDICAL & SURGICAL HISTORY:  Kidney Calculi      Lumbar Radiculopathy      GERD (Gastroesophageal Reflux Disease)  hiatal hernia      Hyperlipidemia      Hypertension      Prolapse, Uterovaginal  s/p sling      S/P Laparoscopic Fundoplication      S/P Hysterectomy Partial  1974      Bilateral Cataracts  removed      S/P Appendectomy      S/P Cholecystectomy      Reactive airway disease that is not asthma  mild as per pulm note on [employer], patient and daughter denies any inhaler use; thought to be related to myasthena gravis      Aortic stenosis, mild  asper daughter      Diverticulitis large intestine  denies any recent exacerbations      Lumbar stenosis      Myasthenia gravis  dx 10/2018 symptoms: intermittent loss of voice/ weakness, negative ENT studies, now stable on Pyridostigmine      H/O umbilical hernia repair      History of tonsillectomy      S/P foot surgery      H/O shoulder replacement  b/l 2015/2016      H/O laminectomy  cervical  1998  lumbar 1986,1998,2000, 2/19 with fusion      Hypothyroid      Sinus bradycardia      Pneumonia due to COVID-19 virus      Osteoarthritis of left hip      S/P hip replacement, right

## 2023-03-20 NOTE — CONSULT NOTE ADULT - ASSESSMENT
81 y/o female who present with complaint of left hip pain and difficulty walking for the past one year . Reports constant pain and worse pian when walking    arrhythmia  bob  asthma  myasthenia gravis  OP  OA    ? reaction to anesthesia med  cardio eval noted  inhaler rx regimen   monitor VS and Sat  off Dopamine  I and O  prob transfer to tertiary care center with EP service

## 2023-03-20 NOTE — CARE COORDINATION ASSESSMENT. - NSDCPLANSERVICES_GEN_ALL_CORE
Anticipated Needs Unclear at Present This CM met at the bedside with the pt. Introduced myself as the CM explained my role and left contact information. The pt verbalized understanding. The pt lives in a private home with her son Eliot and is independent at home. The pt has a daughter Patsy who lives close by and comes to the pt's home daily. The daughter is also an RN.     The pt came to [place] today for a scheduled THR. The pt had some event after medication was given by anesthesia and the pt was given medication to get her heart rate stable. The surgery was cancelled and pt is in SPCU to continue to monitor her heart rate./Anticipated Needs Unclear at Present This CM met at the bedside with the pt. Introduced myself as the CM explained my role and left contact information. The pt verbalized understanding. The pt lives in a private home with her son Eliot and is independent at home. The pt has a daughter Patsy who lives close by and comes to the pt's home daily. The daughter is also an RN.     The pt came to [place] today for a scheduled THR. After induction and intubation by anesthesia patient with bradycardia, pauses with escape beats and atrial ectopy.  Patient had pushes of atropine and ephedrine.  No compressions.  As anesthesia wore off patient woke up and patient returned to baseline sinus bradycardia in the 50s. The pt is now tolerating her clear liquid tray.  The surgery was cancelled and pt is in SPCU for continuous monitoring of her heart rate. Plan is to monitor her and if stable as per Dr. Flores cardiology, pt can be discharged home to follow up as an out-patient. CM asked the pt if this CM needed to call her daughter and the pt nicely declined, stating that her daughter is aware of what happened and that her surgery was cancelled as the daughter spoke to the surgeon Dr. Dinero. No skilled needs upon transition home.     The PCP is Dr. Tyler Knott @ [phone] and the pharmacy is [facility] in [place]. CM team to remain available for post acute needs./Anticipated Needs Unclear at Present

## 2023-03-21 ENCOUNTER — APPOINTMENT (OUTPATIENT)
Dept: CARDIOLOGY | Facility: CLINIC | Age: 83
End: 2023-03-21
Payer: MEDICARE

## 2023-03-21 ENCOUNTER — NON-APPOINTMENT (OUTPATIENT)
Age: 83
End: 2023-03-21

## 2023-03-21 ENCOUNTER — TRANSCRIPTION ENCOUNTER (OUTPATIENT)
Age: 83
End: 2023-03-21

## 2023-03-21 ENCOUNTER — APPOINTMENT (OUTPATIENT)
Dept: ELECTROPHYSIOLOGY | Facility: CLINIC | Age: 83
End: 2023-03-21
Payer: MEDICARE

## 2023-03-21 VITALS
DIASTOLIC BLOOD PRESSURE: 71 MMHG | HEART RATE: 98 BPM | RESPIRATION RATE: 18 BRPM | SYSTOLIC BLOOD PRESSURE: 144 MMHG | OXYGEN SATURATION: 100 % | TEMPERATURE: 98 F

## 2023-03-21 VITALS — SYSTOLIC BLOOD PRESSURE: 153 MMHG | DIASTOLIC BLOOD PRESSURE: 75 MMHG

## 2023-03-21 VITALS
WEIGHT: 155 LBS | SYSTOLIC BLOOD PRESSURE: 150 MMHG | BODY MASS INDEX: 33.44 KG/M2 | DIASTOLIC BLOOD PRESSURE: 80 MMHG | HEIGHT: 57 IN | OXYGEN SATURATION: 97 % | HEART RATE: 72 BPM

## 2023-03-21 DIAGNOSIS — R00.1 BRADYCARDIA, UNSPECIFIED: ICD-10-CM

## 2023-03-21 LAB
ALBUMIN SERPL ELPH-MCNC: 2.9 G/DL — LOW (ref 3.3–5)
ALP SERPL-CCNC: 106 U/L — SIGNIFICANT CHANGE UP (ref 30–120)
ALT FLD-CCNC: 642 U/L DA — HIGH (ref 10–60)
ANION GAP SERPL CALC-SCNC: 6 MMOL/L — SIGNIFICANT CHANGE UP (ref 5–17)
AST SERPL-CCNC: 647 U/L — HIGH (ref 10–40)
BASOPHILS # BLD AUTO: 0.02 K/UL — SIGNIFICANT CHANGE UP (ref 0–0.2)
BASOPHILS NFR BLD AUTO: 0.6 % — SIGNIFICANT CHANGE UP (ref 0–2)
BILIRUB SERPL-MCNC: 0.4 MG/DL — SIGNIFICANT CHANGE UP (ref 0.2–1.2)
BUN SERPL-MCNC: 17 MG/DL — SIGNIFICANT CHANGE UP (ref 7–23)
CALCIUM SERPL-MCNC: 8.9 MG/DL — SIGNIFICANT CHANGE UP (ref 8.4–10.5)
CHLORIDE SERPL-SCNC: 107 MMOL/L — SIGNIFICANT CHANGE UP (ref 96–108)
CO2 SERPL-SCNC: 27 MMOL/L — SIGNIFICANT CHANGE UP (ref 22–31)
CREAT SERPL-MCNC: 0.73 MG/DL — SIGNIFICANT CHANGE UP (ref 0.5–1.3)
EGFR: 81 ML/MIN/1.73M2 — SIGNIFICANT CHANGE UP
EOSINOPHIL # BLD AUTO: 0.12 K/UL — SIGNIFICANT CHANGE UP (ref 0–0.5)
EOSINOPHIL NFR BLD AUTO: 3.5 % — SIGNIFICANT CHANGE UP (ref 0–6)
GLUCOSE SERPL-MCNC: 91 MG/DL — SIGNIFICANT CHANGE UP (ref 70–99)
HCT VFR BLD CALC: 34.8 % — SIGNIFICANT CHANGE UP (ref 34.5–45)
HGB BLD-MCNC: 11.7 G/DL — SIGNIFICANT CHANGE UP (ref 11.5–15.5)
IMM GRANULOCYTES NFR BLD AUTO: 0.3 % — SIGNIFICANT CHANGE UP (ref 0–0.9)
LYMPHOCYTES # BLD AUTO: 1 K/UL — SIGNIFICANT CHANGE UP (ref 1–3.3)
LYMPHOCYTES # BLD AUTO: 29.2 % — SIGNIFICANT CHANGE UP (ref 13–44)
MAGNESIUM SERPL-MCNC: 2.1 MG/DL — SIGNIFICANT CHANGE UP (ref 1.6–2.6)
MCHC RBC-ENTMCNC: 31.4 PG — SIGNIFICANT CHANGE UP (ref 27–34)
MCHC RBC-ENTMCNC: 33.6 GM/DL — SIGNIFICANT CHANGE UP (ref 32–36)
MCV RBC AUTO: 93.3 FL — SIGNIFICANT CHANGE UP (ref 80–100)
MONOCYTES # BLD AUTO: 0.38 K/UL — SIGNIFICANT CHANGE UP (ref 0–0.9)
MONOCYTES NFR BLD AUTO: 11.1 % — SIGNIFICANT CHANGE UP (ref 2–14)
NEUTROPHILS # BLD AUTO: 1.89 K/UL — SIGNIFICANT CHANGE UP (ref 1.8–7.4)
NEUTROPHILS NFR BLD AUTO: 55.3 % — SIGNIFICANT CHANGE UP (ref 43–77)
NRBC # BLD: 0 /100 WBCS — SIGNIFICANT CHANGE UP (ref 0–0)
PHOSPHATE SERPL-MCNC: 4.2 MG/DL — SIGNIFICANT CHANGE UP (ref 2.5–4.5)
PLATELET # BLD AUTO: 111 K/UL — LOW (ref 150–400)
POTASSIUM SERPL-MCNC: 3.7 MMOL/L — SIGNIFICANT CHANGE UP (ref 3.5–5.3)
POTASSIUM SERPL-SCNC: 3.7 MMOL/L — SIGNIFICANT CHANGE UP (ref 3.5–5.3)
PROT SERPL-MCNC: 5.9 G/DL — LOW (ref 6–8.3)
RBC # BLD: 3.73 M/UL — LOW (ref 3.8–5.2)
RBC # FLD: 12.9 % — SIGNIFICANT CHANGE UP (ref 10.3–14.5)
SODIUM SERPL-SCNC: 140 MMOL/L — SIGNIFICANT CHANGE UP (ref 135–145)
WBC # BLD: 3.42 K/UL — LOW (ref 3.8–10.5)
WBC # FLD AUTO: 3.42 K/UL — LOW (ref 3.8–10.5)

## 2023-03-21 PROCEDURE — 27130 TOTAL HIP ARTHROPLASTY: CPT

## 2023-03-21 PROCEDURE — 85025 COMPLETE CBC W/AUTO DIFF WBC: CPT

## 2023-03-21 PROCEDURE — 94640 AIRWAY INHALATION TREATMENT: CPT

## 2023-03-21 PROCEDURE — 83735 ASSAY OF MAGNESIUM: CPT

## 2023-03-21 PROCEDURE — 36415 COLL VENOUS BLD VENIPUNCTURE: CPT

## 2023-03-21 PROCEDURE — 84484 ASSAY OF TROPONIN QUANT: CPT

## 2023-03-21 PROCEDURE — 93000 ELECTROCARDIOGRAM COMPLETE: CPT

## 2023-03-21 PROCEDURE — 99214 OFFICE O/P EST MOD 30 MIN: CPT

## 2023-03-21 PROCEDURE — 99239 HOSP IP/OBS DSCHRG MGMT >30: CPT

## 2023-03-21 PROCEDURE — 80053 COMPREHEN METABOLIC PANEL: CPT

## 2023-03-21 PROCEDURE — 93005 ELECTROCARDIOGRAM TRACING: CPT

## 2023-03-21 PROCEDURE — 84100 ASSAY OF PHOSPHORUS: CPT

## 2023-03-21 PROCEDURE — 82553 CREATINE MB FRACTION: CPT

## 2023-03-21 RX ORDER — ATORVASTATIN CALCIUM 80 MG/1
80 TABLET, FILM COATED ORAL AT BEDTIME
Refills: 0 | Status: DISCONTINUED | OUTPATIENT
Start: 2023-03-21 | End: 2023-03-21

## 2023-03-21 RX ORDER — ACETAMINOPHEN 500 MG
2 TABLET ORAL
Qty: 0 | Refills: 0 | DISCHARGE
Start: 2023-03-21

## 2023-03-21 RX ADMIN — LOSARTAN POTASSIUM 25 MILLIGRAM(S): 100 TABLET, FILM COATED ORAL at 06:20

## 2023-03-21 RX ADMIN — PANTOPRAZOLE SODIUM 40 MILLIGRAM(S): 20 TABLET, DELAYED RELEASE ORAL at 07:42

## 2023-03-21 RX ADMIN — PYRIDOSTIGMINE BROMIDE 60 MILLIGRAM(S): 60 SOLUTION ORAL at 06:20

## 2023-03-21 RX ADMIN — BUDESONIDE AND FORMOTEROL FUMARATE DIHYDRATE 2 PUFF(S): 160; 4.5 AEROSOL RESPIRATORY (INHALATION) at 07:30

## 2023-03-21 RX ADMIN — Medication 75 MICROGRAM(S): at 05:23

## 2023-03-21 RX ADMIN — PYRIDOSTIGMINE BROMIDE 180 MILLIGRAM(S): 60 SOLUTION ORAL at 11:02

## 2023-03-21 NOTE — DISCHARGE NOTE PROVIDER - NSDCQMERRANDS_GEN_ALL_CORE
Pt admitted to  4K7 from [facility]. Complaints: Shortness of breath. IV site free of s/s of infection or infiltration. Vital signs obtained. Assessment and data collection initiated. Two nurse skin assessment performed by Gayle Siddiqi and Ken JENKINS. Oriented to room. Policies and procedures for  explained. All questions answered with no further questions at this time. Fall prevention and safety brochure discussed with patient. Bed alarm on. Call light in reach. Yes

## 2023-03-21 NOTE — PROGRESS NOTE ADULT - SUBJECTIVE AND OBJECTIVE BOX
Date/Time Patient Seen:  		  Referring MD:   Data Reviewed	       Patient is a 82y old  Female who presents with a chief complaint of     Subjective/HPI     PAST MEDICAL & SURGICAL HISTORY:  Hypertension    Genital Ulcer, Female    Hyperlipidemia    GERD (Gastroesophageal Reflux Disease)  hiatal hernia    Lumbar Radiculopathy    Kidney Calculi    Hammertoe Right foot    Gastric Ulcer  nissen fundoplication    Carpal Tunnel Syndrome Right  release 4/10 and left    Myasthenia gravis  dx 10/2018 symptoms: intermittent loss of voice/ weakness, negative ENT studies, now stable on Pyridostigmine    Lumbar stenosis    Diverticulitis large intestine  denies any recent exacerbations    Aortic stenosis, mild  asper daughter    Near syncope  8/2018 negative ENT work up, negative cardiac work ups as per daughter    Reactive airway disease that is not asthma  mild as per pulm note on [employer], patient and daughter denies any inhaler use; thought to be related to myasthena gravis    Hypothyroid    Osteoarthritis    Osteoarthritis of left hip    2019 novel coronavirus disease (COVID-19)    Pneumonia due to COVID-19 virus    Sinus bradycardia    S/P Cholecystectomy    S/P Hernia Repair Umbikical    S/P Appendectomy    S/P Laminectomy with Spinal Fusion cervcical and lumbar  cervical  1998  lumbar 1986,1998,2000    Bilateral Cataracts  removed    S/P Hysterectomy Partial  1974    S/P Laparoscopic Fundoplication    Prolapse, Uterovaginal  s/p sling    H/O laminectomy  cervical  1998  lumbar 1986,1998,2000, 2/19 with fusion    H/O shoulder replacement  b/l 2015/2016    S/P foot surgery    History of tonsillectomy    H/O umbilical hernia repair    S/P hip replacement, right          Medication list         MEDICATIONS  (STANDING):  budesonide 160 MICROgram(s)/formoterol 4.5 MICROgram(s) Inhaler 2 Puff(s) Inhalation two times a day  levothyroxine 75 MICROGram(s) Oral daily  losartan 25 milliGRAM(s) Oral daily  pantoprazole    Tablet 40 milliGRAM(s) Oral before breakfast  pyridostigmine 60 milliGRAM(s) Oral three times a day  pyridostigmine  milliGRAM(s) Oral daily    MEDICATIONS  (PRN):  acetaminophen     Tablet .. 650 milliGRAM(s) Oral every 6 hours PRN Temp greater or equal to 38C (100.4F), Mild Pain (1 - 3)  albuterol    90 MICROgram(s) HFA Inhaler 2 Puff(s) Inhalation every 6 hours PRN Shortness of Breath and/or Wheezing  aluminum hydroxide/magnesium hydroxide/simethicone Suspension 30 milliLiter(s) Oral every 4 hours PRN Dyspepsia  calcium carbonate    500 mG (Tums) Chewable 1 Tablet(s) Chew every 6 hours PRN Heartburn  melatonin 3 milliGRAM(s) Oral at bedtime PRN Insomnia  ondansetron Injectable 4 milliGRAM(s) IV Push every 8 hours PRN Nausea and/or Vomiting         Vitals log        ICU Vital Signs Last 24 Hrs  T(C): 36.6 (21 Mar 2023 05:00), Max: 36.8 (20 Mar 2023 23:00)  T(F): 97.8 (21 Mar 2023 05:00), Max: 98.2 (20 Mar 2023 23:00)  HR: 49 (21 Mar 2023 06:00) (48 - 71)  BP: 138/68 (21 Mar 2023 06:00) (106/46 - 152/57)  BP(mean): 89 (21 Mar 2023 06:00) (67 - 93)  ABP: --  ABP(mean): --  RR: 18 (21 Mar 2023 06:00) (14 - 22)  SpO2: 96% (21 Mar 2023 06:00) (94% - 100%)    O2 Parameters below as of 21 Mar 2023 03:00  Patient On (Oxygen Delivery Method): room air                 Input and Output:  I&O's Detail    20 Mar 2023 07:01  -  21 Mar 2023 06:31  --------------------------------------------------------  IN:    Lactated Ringers: 810 mL    Oral Fluid: 300 mL  Total IN: 1110 mL    OUT:    Voided (mL): 900 mL  Total OUT: 900 mL    Total NET: 210 mL          Lab Data                        11.7   3.42  )-----------( 111      ( 21 Mar 2023 06:00 )             34.8     03-20    140  |  106  |  25<H>  ----------------------------<  119<H>  3.8   |  27  |  0.86    Ca    8.8      20 Mar 2023 08:54  Mg     2.0     03-20    TPro  6.3  /  Alb  3.2<L>  /  TBili  0.4  /  DBili  x   /  AST  227<H>  /  ALT  134<H>  /  AlkPhos  85  03-20      CARDIAC MARKERS ( 20 Mar 2023 08:54 )  x     / x     / x     / x     / 10.0 ng/mL        Review of Systems	      Objective     Physical Examination    heart s1s2  lung dec BS  head nc      Pertinent Lab findings & Imaging      Paul:  NO   Adequate UO     I&O's Detail    20 Mar 2023 07:01  -  21 Mar 2023 06:31  --------------------------------------------------------  IN:    Lactated Ringers: 810 mL    Oral Fluid: 300 mL  Total IN: 1110 mL    OUT:    Voided (mL): 900 mL  Total OUT: 900 mL    Total NET: 210 mL               Discussed with:     Cultures:	        Radiology

## 2023-03-21 NOTE — PROGRESS NOTE ADULT - ASSESSMENT
83 y/o female who present with complaint of left hip pain and difficulty walking for the past one year . Reports constant pain and worse pian when walking    arrhythmia  bob  asthma  myasthenia gravis  OP  OA    ? reaction to anesthesia med  cardio eval noted  inhaler rx regimen   monitor VS and Sat  off Dopamine  I and O

## 2023-03-21 NOTE — CASE MANAGEMENT PROGRESS NOTE - NSCMPROGRESSNOTE_GEN_ALL_CORE
Patient medically cleared for DC. Spoke with patient at bedside. Discussed [facility] services. Pt declined need for F/U. She has all needed DME and dtr transporting home. DC needs in place

## 2023-03-21 NOTE — DISCHARGE NOTE PROVIDER - NSDCCPCAREPLAN_GEN_ALL_CORE_FT
PRINCIPAL DISCHARGE DIAGNOSIS  Diagnosis: Bradycardia  Assessment and Plan of Treatment: Do not take Toprol/ Metoprolol unless otherwise discussed with Dr Montes      SECONDARY DISCHARGE DIAGNOSES  Diagnosis: Elevated liver function tests  Assessment and Plan of Treatment: likely because of what happened with low heart rate.  Have your doctor recheck your bloodwork in a few days before restarting your Crestor

## 2023-03-21 NOTE — DISCHARGE NOTE PROVIDER - NSDCQMAMI_CARD_ALL_CORE
Fax received from [facility]  11/17/22 for review and signature.  Put in Dr. Bingham's yellow folder.   No

## 2023-03-21 NOTE — DISCHARGE NOTE PROVIDER - NSDCMRMEDTOKEN_GEN_ALL_CORE_FT
acetaminophen 325 mg oral tablet: 2 tab(s) orally every 6 hours, As needed, Temp greater or equal to 38C (100.4F), Mild Pain (1 - 3)  albuterol 90 mcg/inh inhalation aerosol: 2 puff(s) inhaled every 4 hours, As needed, Shortness of Breath and/or Wheezing  Breo Ellipta 200 mcg-25 mcg/inh inhalation powder: 1 puff(s) inhaled once a day  esomeprazole 40 mg oral delayed release capsule: 1 tab(s) orally once a day  hydroCHLOROthiazide 12.5 mg oral capsule: 1 cap(s) orally once a day  levothyroxine 75 mcg (0.075 mg) oral tablet: 1 tab(s) orally once a day  losartan 25 mg oral tablet: 1 tab(s) orally once a day  pyridostigmine 180 mg oral tablet, extended release: orally once a day  pyridostigmine 60 mg oral tablet: 1 tab(s) orally 3 times a day  (7am, 1pm ,6 pm )  rosuvastatin 20 mg oral capsule: orally once a day (at bedtime)

## 2023-03-21 NOTE — DISCHARGE NOTE PROVIDER - ATTENDING DISCHARGE PHYSICAL EXAMINATION:
PHYSICAL EXAM:  Vital Signs Last 24 Hrs  T(C): 36.4 (21 Mar 2023 07:51), Max: 36.8 (20 Mar 2023 23:00)  T(F): 97.6 (21 Mar 2023 07:51), Max: 98.2 (20 Mar 2023 23:00)  HR: 49 (21 Mar 2023 06:00) (48 - 71)  BP: 138/68 (21 Mar 2023 06:00) (110/48 - 152/57)  BP(mean): 89 (21 Mar 2023 06:00) (67 - 93)  RR: 18 (21 Mar 2023 06:00) (14 - 22)  SpO2: 96% (21 Mar 2023 06:00) (94% - 100%)    Parameters below as of 21 Mar 2023 03:00  Patient On (Oxygen Delivery Method): room air    GENERAL: NAD, well-groomed, well-developed  HEAD:  Atraumatic, Normocephalic  EYES: conjunctiva and sclera clear  ENMT: Moist mucous membranes  NECK: Supple, No JVD  NERVOUS SYSTEM:  Alert & Oriented X3, Good concentration; Moving all extremities  CHEST/LUNG: Clear to auscultation bilaterally;   HEART: Regular rate and rhythm;   ABDOMEN: Soft, Nontender, Nondistended; Bowel sounds present  EXTREMITIES:  2+ Peripheral Pulses, No clubbing, cyanosis, or edema  LYMPH: No lymphadenopathy noted  SKIN: No rashes or lesions

## 2023-03-21 NOTE — DISCHARGE NOTE PROVIDER - HOSPITAL COURSE
82F HTN, HL, mod-severe AS, Myasthenia Gravis, reactive airway disease, Chronic GERD, Hypothyroid, OA of left hip presented for planned left hip THR.  After induction and intubation by anesthesia patient with bradycardia, pauses with escape beats and atrial ectopy.  Patient had pushes of atropine and ephedrine.  No compressions.  As anesthesia wore off patient woke up and patient returned to baseline sinus bradycardia in the 50s.  Currently in bed tolerating clear liquid tray complaining of GERD symptoms (did report taking her Nexium at home this morning).   Denies recent chest pain, SOB, palpitations, lightheadedness, dizziness.  However her ability to ambulate is restricted by her left hip pain.     Bradyarrhythmia  possibly related to anesthesia induction  No events on monitor on the floor   has appointment with Dr Montes for holter  Hold Toprol for now.     Chronic GERD  continue PPI    Myasthenia Gravis  continue pyridostigmine at home doses    Hypothyroid  continue synthroid    HLD  continue Statin    Reactive airway disease  Breo non-formulary  will put on symbicort and albuterol prn

## 2023-03-21 NOTE — DISCHARGE NOTE PROVIDER - NSDCFUSCHEDAPPT_GEN_ALL_CORE_FT
Leonor Montes  [facility]  CARDIOLOGY [address]  Scheduled Appointment: 03/21/2023    [facility]  NEUROLOGY [address] [facility]  Scheduled Appointment: 03/29/2023    Hossein Salcedo  [facility]  PULMMED [address]  Scheduled Appointment: 05/09/2023    Tyler Knott  [facility]  INTMED [address]  Scheduled Appointment: 06/06/2023

## 2023-03-21 NOTE — PROGRESS NOTE ADULT - SUBJECTIVE AND OBJECTIVE BOX
Chief Complaint: THR    Interval Events: No events overnight.    Review of Systems:  General: No fevers, chills, weight gain  Skin: No rashes, color changes  Cardiovascular: No chest pain, orthopnea  Respiratory: No shortness of breath, cough  Gastrointestinal: No nausea, abdominal pain  Genitourinary: No incontinence, pain with urination  Musculoskeletal: No pain, swelling, decreased range of motion  Neurological: No headache, weakness  Psychiatric: No depression, anxiety  Endocrine: No weight gain, increased thirst  All other systems are comprehensively negative.    Physical Exam:  Vitals:        Vital Signs Last 24 Hrs  T(C): 36.4 (21 Mar 2023 07:51), Max: 36.8 (20 Mar 2023 23:00)  T(F): 97.6 (21 Mar 2023 07:51), Max: 98.2 (20 Mar 2023 23:00)  HR: 49 (21 Mar 2023 06:00) (48 - 71)  BP: 138/68 (21 Mar 2023 06:00) (106/53 - 152/57)  BP(mean): 89 (21 Mar 2023 06:00) (67 - 93)  RR: 18 (21 Mar 2023 06:00) (14 - 22)  SpO2: 96% (21 Mar 2023 06:00) (94% - 100%)  Parameters below as of 21 Mar 2023 03:00  Patient On (Oxygen Delivery Method): room air  General: NAD  HEENT: MMM  Neck: No JVD, no carotid bruit  Lungs: CTAB  CV: RRR, nl S1/S2, no M/R/G  Abdomen: S/NT/ND, +BS  Extremities: No LE edema, no cyanosis  Neuro: AAOx3, non-focal  Skin: No rash    Labs:                        11.7   3.42  )-----------( 111      ( 21 Mar 2023 06:00 )             34.8     03-21    140  |  107  |  17  ----------------------------<  91  3.7   |  27  |  0.73    Ca    8.9      21 Mar 2023 06:00  Phos  4.2     03-21  Mg     2.1     03-21    TPro  5.9<L>  /  Alb  2.9<L>  /  TBili  0.4  /  DBili  x   /  AST  647<H>  /  ALT  642<H>  /  AlkPhos  106  03-21    CARDIAC MARKERS ( 20 Mar 2023 08:54 )  x     / x     / x     / x     / 10.0 ng/mL          ECG/Telemetry: Sinus rhythm, intermittent bradycardia

## 2023-03-21 NOTE — PROGRESS NOTE ADULT - ASSESSMENT
The patient is an 82 year old female with a history of HTN, HL, MG, aortic stenosis who presented for elective THR.     Plan:  - ECG done in PACU with mild sinus bradycardia; no evidence of ischemia or infarction  - Rhythm disturbances were transient and provoked by sedation/intubation  - Epinephrine was given in the OR - likely cause of brief frequent atrial ectopy  - Brief transcutaneous pacing was attempted but rhythm stabilized and this was turned off  - Rhythm currently sinus rhythm with mild sinus bradycardia at times  - Avoid rate lowering agents for now unless rhythm changes  - Given the transient and provoked nature (either from medications or respiratory related) of bradycardia, no indication for pacemaker at this time  - Monitor on telemetry for any further rhythm issues  - Check TSH  - Continue losartan 25 mg daily  - Recent outpatient echo with normal LV systolic function, mod/severe aortic stenosis  - If rhythm remains stable, then no further inpatient cardiac intervention indicated at this time. She can then be re-evaluated by her cardiologist and/or EP as outpatient.  - Ok for discharge from a cardiac standpoint

## 2023-03-22 ENCOUNTER — TRANSCRIPTION ENCOUNTER (OUTPATIENT)
Age: 83
End: 2023-03-22

## 2023-03-23 ENCOUNTER — NON-APPOINTMENT (OUTPATIENT)
Age: 83
End: 2023-03-23

## 2023-03-23 PROCEDURE — 93227 XTRNL ECG REC<48 HR R&I: CPT

## 2023-03-23 NOTE — HISTORY OF PRESENT ILLNESS
[FreeTextEntry1] : Alanna went for hip surgery but had bradycardia and hypertension upon induction . Did not respond to atropine. Almost had to give dopamine but hypertensive, woke patient up and completely recovered. She has had surgery several times in the past and not had a problem but did not take her medication until she was at the hospital last time. This time took it at 4AM.

## 2023-03-23 NOTE — REVIEW OF SYSTEMS
[Chest Discomfort] : no chest discomfort [Lower Ext Edema] : no extremity edema [Leg Claudication] : no intermittent leg claudication [Palpitations] : no palpitations [Orthopnea] : no orthopnea [PND] : no PND [Syncope] : no syncope

## 2023-03-23 NOTE — DISCUSSION/SUMMARY
[EKG obtained to assist in diagnosis and management of assessed problem(s)] : EKG obtained to assist in diagnosis and management of assessed problem(s) [FreeTextEntry1] : The patient is an 82-year-old female myasthenia gravis, HTN, HLD, hypothyroid, s/p COVID with fibrosis, AS with bradycardia at the time of induction. \par #1 AS- euvolemic on exam and asymptomatic, 2/2023 ECHO SANJAY =1.0 unchanged, PVC now off toprol, most likely interaction between atropine and pyridostigmine, 24 holter to confirm no indication for PPM\par #2 Pulm- s/p COVID pneumonia 2020 with prolonged hospitalization,and with scarring,  on inhaler, f/u Dr. Salcedo\par #3 Htn- continue losartan 25mg, HCTZ  every other day\par #4 Lipids- continue crestor 20mg, unable to tolerate higher dose\par #5 Hypothyroid- continue levothyroxine\par #6 MG- on pyridostigmine, daughter supportive, received COVID vaccines\par #7 Ortho- awaiting anterior approach hip surgery \par There are no cardiac contraindication to proceeding with THR pending holter results.\par 3/23/23 Addendum Holter heart range  with no pauses. There are no cardiac contraindications to proceeding with THR.

## 2023-03-27 ENCOUNTER — INPATIENT (INPATIENT)
Facility: HOSPITAL | Age: 83
LOS: 1 days | Discharge: NURSING, REHAB OR LONG-TERM CARE | DRG: 470 | End: 2023-03-29
Attending: ORTHOPAEDIC SURGERY | Admitting: ORTHOPAEDIC SURGERY
Payer: MEDICARE

## 2023-03-27 ENCOUNTER — APPOINTMENT (OUTPATIENT)
Dept: ORTHOPEDIC SURGERY | Facility: HOSPITAL | Age: 83
End: 2023-03-27

## 2023-03-27 ENCOUNTER — TRANSCRIPTION ENCOUNTER (OUTPATIENT)
Age: 83
End: 2023-03-27

## 2023-03-27 VITALS
DIASTOLIC BLOOD PRESSURE: 61 MMHG | HEART RATE: 59 BPM | OXYGEN SATURATION: 100 % | WEIGHT: 151.9 LBS | RESPIRATION RATE: 16 BRPM | TEMPERATURE: 98 F | SYSTOLIC BLOOD PRESSURE: 155 MMHG | HEIGHT: 57 IN

## 2023-03-27 DIAGNOSIS — Z96.619 PRESENCE OF UNSPECIFIED ARTIFICIAL SHOULDER JOINT: Chronic | ICD-10-CM

## 2023-03-27 DIAGNOSIS — M16.9 OSTEOARTHRITIS OF HIP, UNSPECIFIED: ICD-10-CM

## 2023-03-27 DIAGNOSIS — Z98.890 OTHER SPECIFIED POSTPROCEDURAL STATES: Chronic | ICD-10-CM

## 2023-03-27 DIAGNOSIS — Z96.641 PRESENCE OF RIGHT ARTIFICIAL HIP JOINT: Chronic | ICD-10-CM

## 2023-03-27 DIAGNOSIS — Z90.89 ACQUIRED ABSENCE OF OTHER ORGANS: Chronic | ICD-10-CM

## 2023-03-27 LAB
ALBUMIN SERPL ELPH-MCNC: 3 G/DL — LOW (ref 3.3–5)
ALP SERPL-CCNC: 80 U/L — SIGNIFICANT CHANGE UP (ref 30–120)
ALT FLD-CCNC: 78 U/L DA — HIGH (ref 10–60)
ANION GAP SERPL CALC-SCNC: 12 MMOL/L — SIGNIFICANT CHANGE UP (ref 5–17)
AST SERPL-CCNC: 31 U/L — SIGNIFICANT CHANGE UP (ref 10–40)
BILIRUB DIRECT SERPL-MCNC: 0.1 MG/DL — SIGNIFICANT CHANGE UP (ref 0–0.3)
BILIRUB INDIRECT FLD-MCNC: 0.1 MG/DL — LOW (ref 0.2–1)
BILIRUB SERPL-MCNC: 0.2 MG/DL — SIGNIFICANT CHANGE UP (ref 0.2–1.2)
BLD GP AB SCN SERPL QL: SIGNIFICANT CHANGE UP
BUN SERPL-MCNC: 21 MG/DL — SIGNIFICANT CHANGE UP (ref 7–23)
CALCIUM SERPL-MCNC: 8.4 MG/DL — SIGNIFICANT CHANGE UP (ref 8.4–10.5)
CHLORIDE SERPL-SCNC: 107 MMOL/L — SIGNIFICANT CHANGE UP (ref 96–108)
CO2 SERPL-SCNC: 21 MMOL/L — LOW (ref 22–31)
CREAT SERPL-MCNC: 1.09 MG/DL — SIGNIFICANT CHANGE UP (ref 0.5–1.3)
EGFR: 50 ML/MIN/1.73M2 — LOW
GLUCOSE SERPL-MCNC: 188 MG/DL — HIGH (ref 70–99)
HCT VFR BLD CALC: 31.8 % — LOW (ref 34.5–45)
HGB BLD-MCNC: 10.5 G/DL — LOW (ref 11.5–15.5)
MCHC RBC-ENTMCNC: 30.9 PG — SIGNIFICANT CHANGE UP (ref 27–34)
MCHC RBC-ENTMCNC: 33 GM/DL — SIGNIFICANT CHANGE UP (ref 32–36)
MCV RBC AUTO: 93.5 FL — SIGNIFICANT CHANGE UP (ref 80–100)
NRBC # BLD: 0 /100 WBCS — SIGNIFICANT CHANGE UP (ref 0–0)
PLATELET # BLD AUTO: 133 K/UL — LOW (ref 150–400)
POTASSIUM SERPL-MCNC: 4.4 MMOL/L — SIGNIFICANT CHANGE UP (ref 3.5–5.3)
POTASSIUM SERPL-SCNC: 4.4 MMOL/L — SIGNIFICANT CHANGE UP (ref 3.5–5.3)
PROT SERPL-MCNC: 5.4 G/DL — LOW (ref 6–8.3)
RBC # BLD: 3.4 M/UL — LOW (ref 3.8–5.2)
RBC # FLD: 12.9 % — SIGNIFICANT CHANGE UP (ref 10.3–14.5)
SODIUM SERPL-SCNC: 140 MMOL/L — SIGNIFICANT CHANGE UP (ref 135–145)
WBC # BLD: 8.85 K/UL — SIGNIFICANT CHANGE UP (ref 3.8–10.5)
WBC # FLD AUTO: 8.85 K/UL — SIGNIFICANT CHANGE UP (ref 3.8–10.5)

## 2023-03-27 PROCEDURE — 27130 TOTAL HIP ARTHROPLASTY: CPT | Mod: AS,LT

## 2023-03-27 PROCEDURE — 27130 TOTAL HIP ARTHROPLASTY: CPT | Mod: LT

## 2023-03-27 PROCEDURE — 99223 1ST HOSP IP/OBS HIGH 75: CPT

## 2023-03-27 DEVICE — CUP ACET EMPOWR CLUSTER 48MM ALPHA D: Type: IMPLANTABLE DEVICE | Status: FUNCTIONAL

## 2023-03-27 DEVICE — SLEEVE BIOLOX DELTA OPTION SZ -3: Type: IMPLANTABLE DEVICE | Status: FUNCTIONAL

## 2023-03-27 DEVICE — K-WIRE MICROAIRE (THREADED) SINGLE TROCAR 4.8MM X 9": Type: IMPLANTABLE DEVICE | Status: FUNCTIONAL

## 2023-03-27 DEVICE — BEARING EMPWR DUAL MOBILITY 32 ALPHA D: Type: IMPLANTABLE DEVICE | Status: FUNCTIONAL

## 2023-03-27 DEVICE — CABLE CABLE-RDY PLT TROCH 1.8X635: Type: IMPLANTABLE DEVICE | Status: FUNCTIONAL

## 2023-03-27 DEVICE — LINER ACET EMPWR METAL DUAL MOBILITY 38 ALPHA D: Type: IMPLANTABLE DEVICE | Status: FUNCTIONAL

## 2023-03-27 DEVICE — STEM FEM P2 LAT OFFSET SZ 12: Type: IMPLANTABLE DEVICE | Status: FUNCTIONAL

## 2023-03-27 DEVICE — SCREW ACET SZ 6.5X30MM: Type: IMPLANTABLE DEVICE | Status: FUNCTIONAL

## 2023-03-27 DEVICE — FEM HD BIOLOX DELTA 28MM: Type: IMPLANTABLE DEVICE | Status: FUNCTIONAL

## 2023-03-27 DEVICE — BONE WAX 2.5GM: Type: IMPLANTABLE DEVICE | Status: FUNCTIONAL

## 2023-03-27 RX ORDER — SENNA PLUS 8.6 MG/1
2 TABLET ORAL AT BEDTIME
Refills: 0 | Status: DISCONTINUED | OUTPATIENT
Start: 2023-03-27 | End: 2023-03-29

## 2023-03-27 RX ORDER — ONDANSETRON 8 MG/1
4 TABLET, FILM COATED ORAL EVERY 6 HOURS
Refills: 0 | Status: DISCONTINUED | OUTPATIENT
Start: 2023-03-27 | End: 2023-03-29

## 2023-03-27 RX ORDER — HYDROMORPHONE HYDROCHLORIDE 2 MG/ML
0.5 INJECTION INTRAMUSCULAR; INTRAVENOUS; SUBCUTANEOUS
Refills: 0 | Status: DISCONTINUED | OUTPATIENT
Start: 2023-03-27 | End: 2023-03-29

## 2023-03-27 RX ORDER — POLYETHYLENE GLYCOL 3350 17 G/17G
17 POWDER, FOR SOLUTION ORAL AT BEDTIME
Refills: 0 | Status: DISCONTINUED | OUTPATIENT
Start: 2023-03-27 | End: 2023-03-29

## 2023-03-27 RX ORDER — FLUTICASONE FUROATE AND VILANTEROL TRIFENATATE 100; 25 UG/1; UG/1
1 POWDER RESPIRATORY (INHALATION) DAILY
Refills: 0 | Status: DISCONTINUED | OUTPATIENT
Start: 2023-03-27 | End: 2023-03-28

## 2023-03-27 RX ORDER — PYRIDOSTIGMINE BROMIDE 60 MG/5ML
180 SOLUTION ORAL DAILY
Refills: 0 | Status: DISCONTINUED | OUTPATIENT
Start: 2023-03-27 | End: 2023-03-29

## 2023-03-27 RX ORDER — CEFAZOLIN SODIUM 1 G
2000 VIAL (EA) INJECTION EVERY 8 HOURS
Refills: 0 | Status: COMPLETED | OUTPATIENT
Start: 2023-03-27 | End: 2023-03-28

## 2023-03-27 RX ORDER — SODIUM CHLORIDE 9 MG/ML
500 INJECTION INTRAMUSCULAR; INTRAVENOUS; SUBCUTANEOUS ONCE
Refills: 0 | Status: COMPLETED | OUTPATIENT
Start: 2023-03-27 | End: 2023-03-27

## 2023-03-27 RX ORDER — HYDROMORPHONE HYDROCHLORIDE 2 MG/ML
0.5 INJECTION INTRAMUSCULAR; INTRAVENOUS; SUBCUTANEOUS
Refills: 0 | Status: DISCONTINUED | OUTPATIENT
Start: 2023-03-27 | End: 2023-03-27

## 2023-03-27 RX ORDER — OXYCODONE HYDROCHLORIDE 5 MG/1
10 TABLET ORAL
Refills: 0 | Status: DISCONTINUED | OUTPATIENT
Start: 2023-03-27 | End: 2023-03-29

## 2023-03-27 RX ORDER — DEXAMETHASONE 0.5 MG/5ML
8 ELIXIR ORAL ONCE
Refills: 0 | Status: COMPLETED | OUTPATIENT
Start: 2023-03-28 | End: 2023-03-28

## 2023-03-27 RX ORDER — DIPHENHYDRAMINE HCL 50 MG
25 CAPSULE ORAL ONCE
Refills: 0 | Status: COMPLETED | OUTPATIENT
Start: 2023-03-27 | End: 2023-03-27

## 2023-03-27 RX ORDER — PANTOPRAZOLE SODIUM 20 MG/1
40 TABLET, DELAYED RELEASE ORAL
Refills: 0 | Status: DISCONTINUED | OUTPATIENT
Start: 2023-03-27 | End: 2023-03-29

## 2023-03-27 RX ORDER — OXYCODONE HYDROCHLORIDE 5 MG/1
5 TABLET ORAL
Refills: 0 | Status: DISCONTINUED | OUTPATIENT
Start: 2023-03-27 | End: 2023-03-29

## 2023-03-27 RX ORDER — HYDROMORPHONE HYDROCHLORIDE 2 MG/ML
0.25 INJECTION INTRAMUSCULAR; INTRAVENOUS; SUBCUTANEOUS
Refills: 0 | Status: DISCONTINUED | OUTPATIENT
Start: 2023-03-27 | End: 2023-03-27

## 2023-03-27 RX ORDER — APREPITANT 80 MG/1
40 CAPSULE ORAL ONCE
Refills: 0 | Status: COMPLETED | OUTPATIENT
Start: 2023-03-27 | End: 2023-03-27

## 2023-03-27 RX ORDER — MAGNESIUM HYDROXIDE 400 MG/1
30 TABLET, CHEWABLE ORAL DAILY
Refills: 0 | Status: DISCONTINUED | OUTPATIENT
Start: 2023-03-27 | End: 2023-03-29

## 2023-03-27 RX ORDER — TRANEXAMIC ACID 100 MG/ML
1000 INJECTION, SOLUTION INTRAVENOUS ONCE
Refills: 0 | Status: COMPLETED | OUTPATIENT
Start: 2023-03-27 | End: 2023-03-27

## 2023-03-27 RX ORDER — CELECOXIB 200 MG/1
200 CAPSULE ORAL EVERY 12 HOURS
Refills: 0 | Status: DISCONTINUED | OUTPATIENT
Start: 2023-03-27 | End: 2023-03-29

## 2023-03-27 RX ORDER — CEFAZOLIN SODIUM 1 G
2000 VIAL (EA) INJECTION ONCE
Refills: 0 | Status: COMPLETED | OUTPATIENT
Start: 2023-03-27 | End: 2023-03-27

## 2023-03-27 RX ORDER — ALBUTEROL 90 UG/1
2 AEROSOL, METERED ORAL EVERY 6 HOURS
Refills: 0 | Status: DISCONTINUED | OUTPATIENT
Start: 2023-03-27 | End: 2023-03-28

## 2023-03-27 RX ORDER — PYRIDOSTIGMINE BROMIDE 60 MG/5ML
60 SOLUTION ORAL THREE TIMES A DAY
Refills: 0 | Status: DISCONTINUED | OUTPATIENT
Start: 2023-03-27 | End: 2023-03-29

## 2023-03-27 RX ORDER — SODIUM CHLORIDE 9 MG/ML
1000 INJECTION, SOLUTION INTRAVENOUS
Refills: 0 | Status: DISCONTINUED | OUTPATIENT
Start: 2023-03-27 | End: 2023-03-27

## 2023-03-27 RX ORDER — LEVOTHYROXINE SODIUM 125 MCG
75 TABLET ORAL DAILY
Refills: 0 | Status: DISCONTINUED | OUTPATIENT
Start: 2023-03-27 | End: 2023-03-29

## 2023-03-27 RX ORDER — CHLORHEXIDINE GLUCONATE 213 G/1000ML
1 SOLUTION TOPICAL ONCE
Refills: 0 | Status: COMPLETED | OUTPATIENT
Start: 2023-03-27 | End: 2023-03-27

## 2023-03-27 RX ORDER — APIXABAN 2.5 MG/1
2.5 TABLET, FILM COATED ORAL EVERY 12 HOURS
Refills: 0 | Status: DISCONTINUED | OUTPATIENT
Start: 2023-03-28 | End: 2023-03-29

## 2023-03-27 RX ORDER — SODIUM CHLORIDE 9 MG/ML
1000 INJECTION, SOLUTION INTRAVENOUS
Refills: 0 | Status: DISCONTINUED | OUTPATIENT
Start: 2023-03-27 | End: 2023-03-28

## 2023-03-27 RX ORDER — ACETAMINOPHEN 500 MG
1000 TABLET ORAL ONCE
Refills: 0 | Status: COMPLETED | OUTPATIENT
Start: 2023-03-27 | End: 2023-03-27

## 2023-03-27 RX ORDER — ONDANSETRON 8 MG/1
4 TABLET, FILM COATED ORAL ONCE
Refills: 0 | Status: COMPLETED | OUTPATIENT
Start: 2023-03-27 | End: 2023-03-27

## 2023-03-27 RX ADMIN — CELECOXIB 200 MILLIGRAM(S): 200 CAPSULE ORAL at 21:14

## 2023-03-27 RX ADMIN — PYRIDOSTIGMINE BROMIDE 60 MILLIGRAM(S): 60 SOLUTION ORAL at 21:14

## 2023-03-27 RX ADMIN — Medication 25 MILLIGRAM(S): at 21:01

## 2023-03-27 RX ADMIN — CHLORHEXIDINE GLUCONATE 1 APPLICATION(S): 213 SOLUTION TOPICAL at 09:14

## 2023-03-27 RX ADMIN — OXYCODONE HYDROCHLORIDE 5 MILLIGRAM(S): 5 TABLET ORAL at 15:20

## 2023-03-27 RX ADMIN — CELECOXIB 200 MILLIGRAM(S): 200 CAPSULE ORAL at 21:15

## 2023-03-27 RX ADMIN — Medication 100 MILLIGRAM(S): at 18:52

## 2023-03-27 RX ADMIN — SODIUM CHLORIDE 500 MILLILITER(S): 9 INJECTION INTRAMUSCULAR; INTRAVENOUS; SUBCUTANEOUS at 13:51

## 2023-03-27 RX ADMIN — SODIUM CHLORIDE 125 MILLILITER(S): 9 INJECTION, SOLUTION INTRAVENOUS at 15:13

## 2023-03-27 RX ADMIN — ONDANSETRON 4 MILLIGRAM(S): 8 TABLET, FILM COATED ORAL at 13:04

## 2023-03-27 RX ADMIN — SODIUM CHLORIDE 125 MILLILITER(S): 9 INJECTION, SOLUTION INTRAVENOUS at 20:00

## 2023-03-27 RX ADMIN — HYDROMORPHONE HYDROCHLORIDE 0.5 MILLIGRAM(S): 2 INJECTION INTRAMUSCULAR; INTRAVENOUS; SUBCUTANEOUS at 13:10

## 2023-03-27 RX ADMIN — SENNA PLUS 2 TABLET(S): 8.6 TABLET ORAL at 21:14

## 2023-03-27 RX ADMIN — PYRIDOSTIGMINE BROMIDE 180 MILLIGRAM(S): 60 SOLUTION ORAL at 17:28

## 2023-03-27 RX ADMIN — OXYCODONE HYDROCHLORIDE 5 MILLIGRAM(S): 5 TABLET ORAL at 16:15

## 2023-03-27 RX ADMIN — APREPITANT 40 MILLIGRAM(S): 80 CAPSULE ORAL at 09:15

## 2023-03-27 RX ADMIN — Medication 100 MILLIGRAM(S): at 21:15

## 2023-03-27 RX ADMIN — SODIUM CHLORIDE 500 MILLILITER(S): 9 INJECTION INTRAMUSCULAR; INTRAVENOUS; SUBCUTANEOUS at 15:13

## 2023-03-27 RX ADMIN — HYDROMORPHONE HYDROCHLORIDE 0.5 MILLIGRAM(S): 2 INJECTION INTRAMUSCULAR; INTRAVENOUS; SUBCUTANEOUS at 13:09

## 2023-03-27 NOTE — OCCUPATIONAL THERAPY INITIAL EVALUATION ADULT - GENERAL OBSERVATIONS, REHAB EVAL
Pt found semi-supine in bed, +TORITO, +SCDs, +IV. Cheiloplasty (Less Than 50%) Text: A decision was made to reconstruct the defect with a  cheiloplasty.  The defect was undermined extensively.  Additional obicularis oris muscle was excised with a 15 blade scalpel.  The defect was converted into a full thickness wedge, of less than 50% of the vertical height of the lip, to facilite a better cosmetic result.  Small vessels were then tied off with 5-0 monocyrl. The obicularis oris, superficial fascia, adipose and dermis were then reapproximated.  After the deeper layers were approximated the epidermis was reapproximated with particular care given to realign the vermillion border.

## 2023-03-27 NOTE — BRIEF OPERATIVE NOTE - NSICDXBRIEFPROCEDURE_GEN_ALL_CORE_FT
PROCEDURES:  Total replacement of left hip joint by anterior approach 27-Mar-2023 10:27:04  Jaden Llamas

## 2023-03-27 NOTE — OCCUPATIONAL THERAPY INITIAL EVALUATION ADULT - LIVES WITH, PROFILE
Pt lives with her son in a private home, 5 steps to enter with all needs met on the 1st floor with both a walk in shower and tub. Pt was independent with ADLs, functional mobility/transfers without AD prior to admission./children

## 2023-03-27 NOTE — CARE COORDINATION ASSESSMENT. - ASSESSMENT CONCERNS TO BE ADDRESSED
Pt is a 82 year old  female who lives home with her son.  Pt admitted for left THR.  She is alert and oriented. Independent pta.  Pt and daughter are requesting MEENU.  They want [facility] or [facility].  Pt has 5 steps outside her home and none inside.  Pt states her son is unable to care for her at home while she recovers.  Daughter is aware that she may need to give additional MEENU choices if there are no beds.  Her email is [email or web address]./discharge planning

## 2023-03-27 NOTE — PROGRESS NOTE ADULT - SUBJECTIVE AND OBJECTIVE BOX
Post Op     HÉCTOR CARMELALEXANDER      82y        Female                                                                                                                 T(C): 36.7 (03-27-23 @ 14:40), Max: 36.7 (03-27-23 @ 09:15)  HR: 62 (03-27-23 @ 14:40) (59 - 94)  BP: 120/43 (03-27-23 @ 14:40) (115/50 - 155/61)  RR: 16 (03-27-23 @ 14:40) (11 - 17)  SpO2: 96% (03-27-23 @ 14:40) (96% - 100%)  Wt(kg): --    S/P left anterior total hip    Patient denies shortness of breath, chest pain, dyspnea, No complaints  Pain is 3/10    Physical Exam    Extremity: Bilaterally:  No holmon                                           No Cord                                          PAS on                                          Neurovascular intact                                          Motor intact EHL/FHL                                          Sensation intact                                          Pulses intact DP/PT                                         Calves Soft                                         Dressing Clean / Dry / Intact juanita prineo                                         Capillary refill with 5 seconds                A/P  -- S/P total hip    -  Medicine To Follow   - DVT prophylaxis PAS elqiuis  no tylenol increase LFTS  - PT & OT   - Analagesia  - Incentive Spirometry  - Discharge Planning  - Safety Precautions  -  CBC , BMP daily     Post Op     HÉCTOR CARMELALEXANDER      82y        Female                                                                                                                 T(C): 36.7 (03-27-23 @ 14:40), Max: 36.7 (03-27-23 @ 09:15)  HR: 62 (03-27-23 @ 14:40) (59 - 94)  BP: 120/43 (03-27-23 @ 14:40) (115/50 - 155/61)  RR: 16 (03-27-23 @ 14:40) (11 - 17)  SpO2: 96% (03-27-23 @ 14:40) (96% - 100%)  Wt(kg): --    S/P left anterior total hip    Patient denies shortness of breath, chest pain, dyspnea, No complaints  Pain is 3/10    Physical Exam    Extremity: Bilaterally:  No holmon                                           No Cord                                          PAS on                                          Neurovascular intact                                          Motor intact EHL/FHL                                          Sensation intact                                          Pulses intact DP/PT                                         Calves Soft                                         Dressing Clean / Dry / Intact juanita prineo                                         Capillary refill with 5 seconds                A/P  -- S/P total hip    -  Medicine To Follow   - DVT prophylaxis PAS elqiuis  no tylenol increase LFTS  LFTS ordered   - PT & OT   - Analagesia  - Incentive Spirometry  - Discharge Planning  - Safety Precautions  -  CBC , BMP daily

## 2023-03-27 NOTE — PHYSICAL THERAPY INITIAL EVALUATION ADULT - GAIT TRAINING, PT EVAL
Patient will ambulate 100 feet with cg and rolling walker within 1-2 days for safe community ambulation.

## 2023-03-27 NOTE — CARE COORDINATION ASSESSMENT. - NSCAREPROVIDERS_GEN_ALL_CORE_FT
CARE PROVIDERS:  Administration: Tessy Paiz  Administration: Ashely Pop  Admitting: Nathanael Dinero  Attending: Nathanael Dinero  Consultant: Praful Galindo  Consultant: Juan A Mosher  Nurse: Elizabeth Rojas  Nurse: Sharon Montgomery  Nurse: Cleopatra Morris  Nurse: Paz Vasquez  Nurse: Eliza Toscano  Nurse: Ricardo Cabrales  Nurse: Nuha Vidales  Occupational Therapy: Lili White  Occupational Therapy: Nany Thomas  Outpatient Provider: Amanuel Null  Override: Sharon Montgomery  Override: Elizabeth Rojas  Physical Therapy: Victoria Gutierrez  Physical Therapy: Erinn Gillette  Primary Team: Dianna Dobbs  Primary Team: Niko Mendoza  Primary Team: Gilbert Smith  Primary Team: Anastasia Melvin  Primary Team: Jaden Llamas  Primary Team: Victoriano Burrows  Primary Team: Oskar Stewart  : Freedom Estrada  Team: PORSCHE  Hospitalists, Team

## 2023-03-27 NOTE — DISCHARGE NOTE PROVIDER - NSDCMRMEDTOKEN_GEN_ALL_CORE_FT
acetaminophen 325 mg oral tablet: 2 tab(s) orally every 6 hours, As needed, Temp greater or equal to 38C (100.4F), Mild Pain (1 - 3)  albuterol 90 mcg/inh inhalation aerosol: 2 puff(s) inhaled every 4 hours, As needed, Shortness of Breath and/or Wheezing  Breo Ellipta 200 mcg-25 mcg/inh inhalation powder: 1 puff(s) inhaled once a day  esomeprazole 40 mg oral delayed release capsule: 1 tab(s) orally once a day  hydroCHLOROthiazide 12.5 mg oral capsule: 1 cap(s) orally once a day  levothyroxine 75 mcg (0.075 mg) oral tablet: 1 tab(s) orally once a day  losartan 25 mg oral tablet: 1 tab(s) orally once a day  pyridostigmine 180 mg oral tablet, extended release: orally once a day  pyridostigmine 60 mg oral tablet: 1 tab(s) orally 3 times a day  (7am, 1pm ,6 pm )  rosuvastatin 20 mg oral capsule: orally once a day (at bedtime)   acetaminophen 500 mg oral tablet: 1 tab(s) orally every 8 hours  albuterol 90 mcg/inh inhalation aerosol: 2 puff(s) inhaled every 4 hours, As needed, Shortness of Breath and/or Wheezing  apixaban 2.5 mg oral tablet: 1 tab(s) orally every 12 hours  Breo Ellipta 200 mcg-25 mcg/inh inhalation powder: 1 puff(s) inhaled once a day  celecoxib 200 mg oral capsule: 1 cap(s) orally every 12 hours  esomeprazole 40 mg oral delayed release capsule: 1 tab(s) orally once a day  hydroCHLOROthiazide 12.5 mg oral capsule: 1 cap(s) orally once a day  levothyroxine 75 mcg (0.075 mg) oral tablet: 1 tab(s) orally once a day  losartan 25 mg oral tablet: 1 tab(s) orally once a day  oxyCODONE 5 mg oral tablet: 1 tab(s) orally every 3 hours As needed Moderate Pain (4 - 6)  pyridostigmine 180 mg oral tablet, extended release: orally once a day  pyridostigmine 60 mg oral tablet: 1 tab(s) orally 3 times a day  (7am, 1pm ,6 pm )  rosuvastatin 20 mg oral capsule: orally once a day (at bedtime)  senna leaf extract oral tablet: 2 tab(s) orally once a day (at bedtime)

## 2023-03-27 NOTE — DISCHARGE NOTE PROVIDER - CARE PROVIDER_API CALL
Nathanael Dinero)  Orthopedics  [address], [address]  [place] [address]  Phone: [phone]  Fax: [phone]  Scheduled Appointment: 04/11/2023 02:00 PM

## 2023-03-27 NOTE — PATIENT CHOICE NOTE. - NSPTCHOICESTATE_GEN_ALL_CORE

## 2023-03-27 NOTE — PHYSICAL THERAPY INITIAL EVALUATION ADULT - ADDITIONAL COMMENTS
Pt lives with son in a private home, there are 5 stairs +HR to enter and no stairs to navigate within. Pt has a walk in shower. PTA pt was independent with ADLs and ambulation. Pt owns a RW, cane, commode and shower chair.

## 2023-03-27 NOTE — PHYSICAL THERAPY INITIAL EVALUATION ADULT - TRANSFER TRAINING, PT EVAL
Patient will perform transfers with cg and rolling walker within 1-2 days to promote safety and reduce risk of falls.

## 2023-03-27 NOTE — DISCHARGE NOTE PROVIDER - HOSPITAL COURSE
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  Patient underwent Pre-Surgical Testing and was medically cleared to undergo elective procedure. Patient underwent Left anterior total hip by Dr. Dinero on 3/27/23  . Procedure was well tolerated.  No operative or zonia-operative complications arose during patient's hospital course.  Patient received antibiotic according to SCIP guidelines for infection prevention.  Eliquis 2.5mg po bid  was given for DVT prophylaxis, in addition to the use of SCDs.  Anesthesia, Medical Hospitalist, Physical Therapy and Occupational Therapy were consulted. Patient is stable for discharge with a good prognosis.  Appropriate discharge instructions and medications are provided in this document.  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  Patient underwent Pre-Surgical Testing and was medically cleared to undergo elective procedure. Patient underwent Left anterior total hip by Dr. Dinero on 3/27/23  . Procedure was well tolerated.  No operative or zonia-operative complications arose during patient's hospital course.  Patient received antibiotic according to SCIP guidelines for infection prevention.  Eliquis 2.5mg po bid  was given for DVT prophylaxis, in addition to the use of SCDs.  Anesthesia, Medical Hospitalist, Physical Therapy and Occupational Therapy were consulted. Patient is stable for discharge with a good prognosis.  Appropriate discharge instructions and medications are provided in this document.

## 2023-03-27 NOTE — PATIENT PROFILE ADULT - PATIENT’S MOTHER’S MAIDEN LAST NAME (INFO USED BY THE IMMUNIZATION REGISTRY):
Detail Level: Zone
Render In Strict Bullet Format?: No
Continue Regimen: Doxycycline or Targadox pill (depending on cost and availability);  Curology compound (tretinoin, clindamycin, azelaic acid) topical QOHS; topical Rx clindamycin QAM
Kushal

## 2023-03-27 NOTE — OCCUPATIONAL THERAPY INITIAL EVALUATION ADULT - MANUAL MUSCLE TESTING RESULTS, REHAB EVAL
b/l UE and RLE at least 3+/5, L hip not tested 2* surgery, L knee at least 3/5/grossly assessed due to

## 2023-03-27 NOTE — PHYSICAL THERAPY INITIAL EVALUATION ADULT - BED MOBILITY TRAINING, PT EVAL
Patient will perform bed mobility with cg within 1-2 days to promote independence with functional mobility.

## 2023-03-27 NOTE — OCCUPATIONAL THERAPY INITIAL EVALUATION ADULT - WEIGHT-BEARING RESTRICTIONS: SIT/STAND, REHAB EVAL
Audrey Natarajan is a 47 y.o. female patient.    Follow for electrolyte imbalance    No new c/o, comfortable    Scheduled Meds:   apixaban  5 mg Oral BID    aspirin  81 mg Oral Daily    cetirizine  5 mg Oral Daily    ciprofloxacin HCl  500 mg Oral Q12H    divalproex  1,250 mg Oral QHS    divalproex  500 mg Oral Daily    fludrocortisone  100 mcg Oral Daily    fluticasone furoate-vilanteroL  1 puff Inhalation Daily    fluticasone propionate  2 spray Each Nostril Daily    hydrOXYzine pamoate  50 mg Oral TID    metoprolol tartrate  50 mg Oral BID    metronidazole  500 mg Intravenous Q8H    pantoprazole  40 mg Oral Daily    polyethylene glycol  17 g Oral Daily    pravastatin  40 mg Oral QHS    risperiDONE  2 mg Oral Daily    risperiDONE  3 mg Oral QHS    senna-docusate 8.6-50 mg  2 tablet Oral BID    sodium chloride  1 g Oral QID    sodium zirconium cyclosilicate  5 g Oral Daily    vancomycin (VANCOCIN) IVPB  1,500 mg Intravenous Q12H       Review of patient's allergies indicates:   Allergen Reactions    Morphine Other (See Comments)     Patient had a psychotic episode after taking Morphine  Agitation, hallucinations    Penicillins Anaphylaxis     itching    Januvia [sitagliptin] Hives    Carbamazepine Other (See Comments)     hyponatremia         Vital Signs Range (Last 24H):  Temp:  [98.2 °F (36.8 °C)-100 °F (37.8 °C)]   Pulse:  [68-89]   Resp:  [18-20]   BP: (132-175)/(59-78)   SpO2:  [95 %-98 %]     I & O (Last 24H):    Intake/Output Summary (Last 24 hours) at 5/10/2022 0937  Last data filed at 5/10/2022 0825  Gross per 24 hour   Intake 482 ml   Output --   Net 482 ml           Physical Exam:  General appearance: well developed, well nourished, no distress, morbidly obese  Lungs:  clear to auscultation bilaterally and normal respiratory effort  Heart: regular rate and rhythm  Abdomen: soft, non-tender non-distented; bowel sounds normal; no masses,  no organomegaly  Extremities:  edema (+)    Laboratory:  I have reviewed all pertinent lab results within the past 24 hours.  CBC:   Recent Labs   Lab 05/10/22  0458   WBC 15.49*   RBC 3.13*   HGB 8.4*   HCT 26.7*   *   MCV 85   MCH 26.8*   MCHC 31.5*     CMP:   Recent Labs   Lab 05/10/22  0458   *   CALCIUM 8.5*   ALBUMIN 2.3*   PROT 6.3   *   K 5.6*   CO2 23      BUN 11   CREATININE 0.8   ALKPHOS 96   ALT 11   AST 7*   BILITOT 0.2     Microbiology Results (last 7 days)     Procedure Component Value Units Date/Time    Aerobic culture [[id number]] Collected: 05/06/22 1249    Order Status: Completed Specimen: Bone from Toe, Right Foot Updated: 05/10/22 0734     Aerobic Bacterial Culture No growth    AFB Culture & Smear [[id number]] Collected: 05/06/22 1249    Order Status: Completed Specimen: Bone from Toe, Right Foot Updated: 05/09/22 1429     AFB Culture & Smear Culture in progress     AFB CULTURE STAIN No acid fast bacilli seen.    Blood culture [[id number]] Collected: 05/06/22 1155    Order Status: Completed Specimen: Blood from Antecubital, Right Hand Updated: 05/09/22 1303     Blood Culture, Routine No Growth to date      No Growth to date      No Growth to date      No Growth to date    Blood culture [[id number]] Collected: 05/06/22 1155    Order Status: Completed Specimen: Blood from Peripheral, Left Hand Updated: 05/09/22 1303     Blood Culture, Routine No Growth to date      No Growth to date      No Growth to date      No Growth to date    Blood culture x two cultures. Draw prior to antibiotics. [[id number]] Collected: 05/04/22 0947    Order Status: Completed Specimen: Blood from Peripheral, Antecubital, Left Updated: 05/08/22 1103     Blood Culture, Routine No Growth after 4 days.     Narrative:      Aerobic and anaerobic    Blood culture x two cultures. Draw prior to antibiotics. [[id number]]  (Abnormal)  (Susceptibility) Collected: 05/04/22 0944    Order Status: Completed Specimen: Blood from Peripheral,  Antecubital, Right Updated: 05/07/22 0809     Blood Culture, Routine Gram stain abbe bottle: Gram positive cocci in clusters resembling Staph       Results called to and read back by: Linnette KRAMER 05/05/2022        09:58      METHICILLIN RESISTANT STAPHYLOCOCCUS AUREUS    Narrative:      Aerobic and anaerobic    Gram stain [[id number]] Collected: 05/06/22 1249    Order Status: Completed Specimen: Bone from Toe, Right Foot Updated: 05/06/22 1446     Gram Stain Result No organisms seen    Fungus culture [[id number]] Collected: 05/06/22 1249    Order Status: Sent Specimen: Bone from Toe, Right Foot Updated: 05/06/22 1302    Aerobic culture [[id number]]  (Abnormal)  (Susceptibility) Collected: 05/04/22 1500    Order Status: Completed Specimen: Wound from Foot, Right Updated: 05/06/22 1126     Aerobic Bacterial Culture Results called to and read back by: Linnette Alvarado 05/06/2022  08:40      ESCHERICHIA COLI  Many        ENTEROBACTER CLOACAE  Moderate        METHICILLIN RESISTANT STAPHYLOCOCCUS AUREUS  Many      Aerobic culture [[id number]]  (Abnormal)  (Susceptibility) Collected: 05/04/22 1500    Order Status: Completed Specimen: Wound from Foot, Left Updated: 05/06/22 0844     Aerobic Bacterial Culture Results called to and read back by: Linnette Alvarado 05/06/2022  08:40      METHICILLIN RESISTANT STAPHYLOCOCCUS AUREUS  Many            Imp/Plan    Hyponatremia - stable  Hyperkalemia - stable  DM type 2  Bipolar d/o  Infected diabetic foot ulcer (B)  Anemia    Continue present Rx  Surgery plan for later today  Continue present Rx      Marjorie Johnson  5/10/2022   LLE/weight-bearing as tolerated

## 2023-03-27 NOTE — PHYSICAL THERAPY INITIAL EVALUATION ADULT - PERTINENT HX OF CURRENT PROBLEM, REHAB EVAL
Pt is a 83 y/o female with left hip primary OA. Pt is s/p left total hip replacement anterior approach on 3/27/23.

## 2023-03-27 NOTE — DISCHARGE NOTE PROVIDER - NSDCFUADDINST_GEN_ALL_CORE_FT
You have a TORITO dressing. You may shower. Disconnect TORITO battery prior to showering. Reconnect battery after showering and press orange button to resume TORITO power. Remove TORITO dressing on post-op day #7.  Keep incision clean. DO NOT APPLY ANYTHING to incision site (salves/ointments/creams). Do not scrub incision site. Pat dry after shower.  Suture/Prineo removal 2 weeks after surgery at Surgeon's office.  - Call your doctor if you experience:  • An increase in pain not controlled by pain medication or change in activity or  position.  • Temperature greater than 101° F.  • Redness, increased swelling or foul smelling drainage from or around the  incision.  • Numbness, tingling or a change in color or temperature of the operative leg.  • Call your doctor immediately if you experience chest pain, shortness of breath or calf pain.

## 2023-03-27 NOTE — DISCHARGE NOTE PROVIDER - NSDCCPCAREPLAN_GEN_ALL_CORE_FT
Pt has epipen which expires December 2018 PRINCIPAL DISCHARGE DIAGNOSIS  Diagnosis: Degenerative joint disease (DJD) of hip  Assessment and Plan of Treatment: Left  Physical Therapy  /Occupational Therapy  Anterior Total Hip Protocol  - Ambulation  - Transfers  - Stairs &  Activities of daily living (ADLs)  Anterior THR precautions for 4 weeks   - No straight leg raise  - No external rotation of hip when extended-standing or lying flat  - No hyperextension of hip when standing (kickback)  - Ice packs to hip following  Physical Therapy     PRINCIPAL DISCHARGE DIAGNOSIS  Diagnosis: Degenerative joint disease (DJD) of hip  Assessment and Plan of Treatment: Left  Physical Therapy  /Occupational Therapy  Anterior Total Hip Protocol  - Ambulation  - Transfers  - Stairs &  Activities of daily living (ADLs)  Anterior THR precautions for 4 weeks   - No straight leg raise  - No external rotation of hip when extended-standing or lying flat  - No hyperextension of hip when standing (kickback)  - Ice packs to hip following  Physical Therapy  Post-op anemia due to acute blood loss (Hgb 8.0 on 3/27), thrombocytopena likely consumptive post-op (95k on 3/27)  - check CBC on Friday 3/31     PRINCIPAL DISCHARGE DIAGNOSIS  Diagnosis: Degenerative joint disease (DJD) of hip  Assessment and Plan of Treatment: Left  Physical Therapy  /Occupational Therapy  Anterior Total Hip Protocol  - Ambulation  - Transfers  - Stairs &  Activities of daily living (ADLs)  Anterior THR precautions for 4 weeks   - No straight leg raise  - No external rotation of hip when extended-standing or lying flat  - No hyperextension of hip when standing (kickback)  - Ice packs to hip following  Physical Therapy  • Ice 20 minutes several times daily with at least a 20 minute break in between icing sessions  Post-op anemia due to acute blood loss (Hgb 8.0 on 3/27), thrombocytopena likely consumptive post-op (95k on 3/27)  - check CBC on Friday 3/31  You have a TORITO dressing. You may shower. Disconnect TORITO battery prior to showering. Reconnect battery after showering and press orange button to resume TOIRTO power. Remove TORITO dressing on post-op day #7.  Keep incision clean. DO NOT APPLY ANYTHING to incision site (salves/ointments/creams). Do not scrub incision site. Pat dry after shower.  Prineo tape removal on/near after Post Op Day # 14 by surgeon at office

## 2023-03-27 NOTE — DISCHARGE NOTE PROVIDER - NSDCFUSCHEDAPPT_GEN_ALL_CORE_FT
[facility]  NEUROLOGY [address]  Scheduled Appointment: 03/29/2023    [facility]  ORTHOSURG [address]  Scheduled Appointment: 04/11/2023    Hossein Salcedo  [facility]  PULMMED [id number]  Scheduled Appointment: 05/09/2023    Nathanael Dinero  [facility]  ORTHOSURG [address]  Scheduled Appointment: 05/23/2023    Tyler Knott  [facility]  INTMED [id number]  Scheduled Appointment: 06/06/2023     [facility]  ORTHOSURG [address]  Scheduled Appointment: 04/11/2023    Hossein Salcedo  [facility]  [id number]  Scheduled Appointment: 05/09/2023    Nathanael Dinero  [facility]  ORTHOSURG [address]  Scheduled Appointment: 05/23/2023    Tyler Knott  [facility]  INTMED [id number]  Scheduled Appointment: 06/06/2023

## 2023-03-27 NOTE — DISCHARGE NOTE PROVIDER - NSDCCPTREATMENT_GEN_ALL_CORE_FT
PRINCIPAL PROCEDURE  Procedure: Total replacement of left hip joint by anterior approach  Findings and Treatment:      PRINCIPAL PROCEDURE  Procedure: Total replacement of left hip joint by anterior approach  Findings and Treatment: Severe DJD left hip

## 2023-03-27 NOTE — DISCHARGE NOTE PROVIDER - NSDCFUADDAPPT_GEN_ALL_CORE_FT
Follow up Primary Care  MD following discharge call to confirm appt  Patient with Hx of Increase Liver Function tests  Follow up Dr. Dinero office call office to confirm appt   Follow up Primary Care  MD following discharge call to confirm appt  Patient with Hx of Increase Liver Function tests please have primary care MD follow up   Follow up Dr. Dinero office call office to confirm appt

## 2023-03-28 LAB
ALBUMIN SERPL ELPH-MCNC: 2.6 G/DL — LOW (ref 3.3–5)
ALP SERPL-CCNC: 64 U/L — SIGNIFICANT CHANGE UP (ref 30–120)
ALT FLD-CCNC: 51 U/L DA — SIGNIFICANT CHANGE UP (ref 10–60)
ANION GAP SERPL CALC-SCNC: 7 MMOL/L — SIGNIFICANT CHANGE UP (ref 5–17)
AST SERPL-CCNC: 25 U/L — SIGNIFICANT CHANGE UP (ref 10–40)
BILIRUB DIRECT SERPL-MCNC: 0.1 MG/DL — SIGNIFICANT CHANGE UP (ref 0–0.3)
BILIRUB INDIRECT FLD-MCNC: 0.1 MG/DL — LOW (ref 0.2–1)
BILIRUB SERPL-MCNC: 0.2 MG/DL — SIGNIFICANT CHANGE UP (ref 0.2–1.2)
BUN SERPL-MCNC: 26 MG/DL — HIGH (ref 7–23)
CALCIUM SERPL-MCNC: 7.8 MG/DL — LOW (ref 8.4–10.5)
CHLORIDE SERPL-SCNC: 107 MMOL/L — SIGNIFICANT CHANGE UP (ref 96–108)
CO2 SERPL-SCNC: 26 MMOL/L — SIGNIFICANT CHANGE UP (ref 22–31)
CREAT SERPL-MCNC: 1 MG/DL — SIGNIFICANT CHANGE UP (ref 0.5–1.3)
EGFR: 56 ML/MIN/1.73M2 — LOW
GLUCOSE SERPL-MCNC: 109 MG/DL — HIGH (ref 70–99)
HCT VFR BLD CALC: 24.7 % — LOW (ref 34.5–45)
HCT VFR BLD CALC: 26.7 % — LOW (ref 34.5–45)
HGB BLD-MCNC: 8.1 G/DL — LOW (ref 11.5–15.5)
HGB BLD-MCNC: 8.8 G/DL — LOW (ref 11.5–15.5)
MCHC RBC-ENTMCNC: 30.5 PG — SIGNIFICANT CHANGE UP (ref 27–34)
MCHC RBC-ENTMCNC: 30.9 PG — SIGNIFICANT CHANGE UP (ref 27–34)
MCHC RBC-ENTMCNC: 32.8 GM/DL — SIGNIFICANT CHANGE UP (ref 32–36)
MCHC RBC-ENTMCNC: 33 GM/DL — SIGNIFICANT CHANGE UP (ref 32–36)
MCV RBC AUTO: 92.9 FL — SIGNIFICANT CHANGE UP (ref 80–100)
MCV RBC AUTO: 93.7 FL — SIGNIFICANT CHANGE UP (ref 80–100)
NRBC # BLD: 0 /100 WBCS — SIGNIFICANT CHANGE UP (ref 0–0)
NRBC # BLD: 0 /100 WBCS — SIGNIFICANT CHANGE UP (ref 0–0)
PLATELET # BLD AUTO: 101 K/UL — LOW (ref 150–400)
PLATELET # BLD AUTO: 122 K/UL — LOW (ref 150–400)
POTASSIUM SERPL-MCNC: 4.5 MMOL/L — SIGNIFICANT CHANGE UP (ref 3.5–5.3)
POTASSIUM SERPL-SCNC: 4.5 MMOL/L — SIGNIFICANT CHANGE UP (ref 3.5–5.3)
PROT SERPL-MCNC: 4.6 G/DL — LOW (ref 6–8.3)
RBC # BLD: 2.66 M/UL — LOW (ref 3.8–5.2)
RBC # BLD: 2.85 M/UL — LOW (ref 3.8–5.2)
RBC # FLD: 13 % — SIGNIFICANT CHANGE UP (ref 10.3–14.5)
RBC # FLD: 13.2 % — SIGNIFICANT CHANGE UP (ref 10.3–14.5)
SARS-COV-2 RNA SPEC QL NAA+PROBE: SIGNIFICANT CHANGE UP
SODIUM SERPL-SCNC: 140 MMOL/L — SIGNIFICANT CHANGE UP (ref 135–145)
WBC # BLD: 7.47 K/UL — SIGNIFICANT CHANGE UP (ref 3.8–10.5)
WBC # BLD: 9.12 K/UL — SIGNIFICANT CHANGE UP (ref 3.8–10.5)
WBC # FLD AUTO: 7.47 K/UL — SIGNIFICANT CHANGE UP (ref 3.8–10.5)
WBC # FLD AUTO: 9.12 K/UL — SIGNIFICANT CHANGE UP (ref 3.8–10.5)

## 2023-03-28 PROCEDURE — 99233 SBSQ HOSP IP/OBS HIGH 50: CPT

## 2023-03-28 PROCEDURE — 71045 X-RAY EXAM CHEST 1 VIEW: CPT | Mod: 26

## 2023-03-28 RX ORDER — CELECOXIB 200 MG/1
1 CAPSULE ORAL
Qty: 0 | Refills: 0 | DISCHARGE
Start: 2023-03-28

## 2023-03-28 RX ORDER — BUDESONIDE AND FORMOTEROL FUMARATE DIHYDRATE 160; 4.5 UG/1; UG/1
2 AEROSOL RESPIRATORY (INHALATION)
Refills: 0 | Status: DISCONTINUED | OUTPATIENT
Start: 2023-03-28 | End: 2023-03-29

## 2023-03-28 RX ORDER — OXYCODONE HYDROCHLORIDE 5 MG/1
1 TABLET ORAL
Qty: 0 | Refills: 0 | DISCHARGE
Start: 2023-03-28

## 2023-03-28 RX ORDER — ACETAMINOPHEN 500 MG
1 TABLET ORAL
Qty: 0 | Refills: 0 | DISCHARGE
Start: 2023-03-28

## 2023-03-28 RX ORDER — ALBUTEROL 90 UG/1
2 AEROSOL, METERED ORAL EVERY 6 HOURS
Refills: 0 | Status: DISCONTINUED | OUTPATIENT
Start: 2023-03-28 | End: 2023-03-29

## 2023-03-28 RX ORDER — SENNA PLUS 8.6 MG/1
2 TABLET ORAL
Qty: 0 | Refills: 0 | DISCHARGE
Start: 2023-03-28

## 2023-03-28 RX ORDER — ACETAMINOPHEN 500 MG
500 TABLET ORAL EVERY 8 HOURS
Refills: 0 | Status: DISCONTINUED | OUTPATIENT
Start: 2023-03-28 | End: 2023-03-29

## 2023-03-28 RX ORDER — APIXABAN 2.5 MG/1
1 TABLET, FILM COATED ORAL
Qty: 0 | Refills: 0 | DISCHARGE
Start: 2023-03-28 | End: 2023-04-25

## 2023-03-28 RX ORDER — SODIUM CHLORIDE 9 MG/ML
500 INJECTION, SOLUTION INTRAVENOUS
Refills: 0 | Status: DISCONTINUED | OUTPATIENT
Start: 2023-03-28 | End: 2023-03-28

## 2023-03-28 RX ORDER — SODIUM CHLORIDE 9 MG/ML
500 INJECTION, SOLUTION INTRAVENOUS ONCE
Refills: 0 | Status: COMPLETED | OUTPATIENT
Start: 2023-03-28 | End: 2023-03-28

## 2023-03-28 RX ADMIN — OXYCODONE HYDROCHLORIDE 5 MILLIGRAM(S): 5 TABLET ORAL at 16:48

## 2023-03-28 RX ADMIN — Medication 75 MICROGRAM(S): at 05:27

## 2023-03-28 RX ADMIN — OXYCODONE HYDROCHLORIDE 5 MILLIGRAM(S): 5 TABLET ORAL at 11:09

## 2023-03-28 RX ADMIN — SODIUM CHLORIDE 125 MILLILITER(S): 9 INJECTION, SOLUTION INTRAVENOUS at 09:55

## 2023-03-28 RX ADMIN — Medication 500 MILLIGRAM(S): at 14:53

## 2023-03-28 RX ADMIN — SENNA PLUS 2 TABLET(S): 8.6 TABLET ORAL at 21:50

## 2023-03-28 RX ADMIN — ALBUTEROL 2 PUFF(S): 90 AEROSOL, METERED ORAL at 16:07

## 2023-03-28 RX ADMIN — Medication 500 MILLIGRAM(S): at 14:37

## 2023-03-28 RX ADMIN — Medication 100 MILLIGRAM(S): at 02:10

## 2023-03-28 RX ADMIN — OXYCODONE HYDROCHLORIDE 5 MILLIGRAM(S): 5 TABLET ORAL at 14:00

## 2023-03-28 RX ADMIN — PYRIDOSTIGMINE BROMIDE 60 MILLIGRAM(S): 60 SOLUTION ORAL at 14:38

## 2023-03-28 RX ADMIN — Medication 101.6 MILLIGRAM(S): at 05:26

## 2023-03-28 RX ADMIN — PYRIDOSTIGMINE BROMIDE 60 MILLIGRAM(S): 60 SOLUTION ORAL at 05:27

## 2023-03-28 RX ADMIN — BUDESONIDE AND FORMOTEROL FUMARATE DIHYDRATE 2 PUFF(S): 160; 4.5 AEROSOL RESPIRATORY (INHALATION) at 18:49

## 2023-03-28 RX ADMIN — APIXABAN 2.5 MILLIGRAM(S): 2.5 TABLET, FILM COATED ORAL at 21:49

## 2023-03-28 RX ADMIN — PYRIDOSTIGMINE BROMIDE 180 MILLIGRAM(S): 60 SOLUTION ORAL at 12:52

## 2023-03-28 RX ADMIN — OXYCODONE HYDROCHLORIDE 5 MILLIGRAM(S): 5 TABLET ORAL at 13:30

## 2023-03-28 RX ADMIN — CELECOXIB 200 MILLIGRAM(S): 200 CAPSULE ORAL at 21:49

## 2023-03-28 RX ADMIN — Medication 100 MILLIGRAM(S): at 06:42

## 2023-03-28 RX ADMIN — OXYCODONE HYDROCHLORIDE 5 MILLIGRAM(S): 5 TABLET ORAL at 10:39

## 2023-03-28 RX ADMIN — PYRIDOSTIGMINE BROMIDE 60 MILLIGRAM(S): 60 SOLUTION ORAL at 21:49

## 2023-03-28 RX ADMIN — Medication 500 MILLIGRAM(S): at 21:49

## 2023-03-28 RX ADMIN — SODIUM CHLORIDE 500 MILLILITER(S): 9 INJECTION, SOLUTION INTRAVENOUS at 04:30

## 2023-03-28 RX ADMIN — CELECOXIB 200 MILLIGRAM(S): 200 CAPSULE ORAL at 22:15

## 2023-03-28 RX ADMIN — POLYETHYLENE GLYCOL 3350 17 GRAM(S): 17 POWDER, FOR SOLUTION ORAL at 21:50

## 2023-03-28 RX ADMIN — OXYCODONE HYDROCHLORIDE 5 MILLIGRAM(S): 5 TABLET ORAL at 16:18

## 2023-03-28 RX ADMIN — PANTOPRAZOLE SODIUM 40 MILLIGRAM(S): 20 TABLET, DELAYED RELEASE ORAL at 05:27

## 2023-03-28 RX ADMIN — APIXABAN 2.5 MILLIGRAM(S): 2.5 TABLET, FILM COATED ORAL at 09:27

## 2023-03-28 RX ADMIN — CELECOXIB 200 MILLIGRAM(S): 200 CAPSULE ORAL at 10:17

## 2023-03-28 RX ADMIN — Medication 500 MILLIGRAM(S): at 22:15

## 2023-03-28 RX ADMIN — CELECOXIB 200 MILLIGRAM(S): 200 CAPSULE ORAL at 09:27

## 2023-03-28 RX ADMIN — SODIUM CHLORIDE 500 MILLILITER(S): 9 INJECTION, SOLUTION INTRAVENOUS at 06:50

## 2023-03-28 NOTE — CONSULT NOTE ADULT - ASSESSMENT
The patient is an 82 year old female with a history of HTN, HL, MG, moderate/severe aortic stenosis who is admitted s/p THR.     Plan:  - Pre-operative ECG with nonspecific findings  - Recent outpatient echo with normal LV systolic function, mod/severe aortic stenosis  - CXR with slight interstitial infiltrate  - There is slight edema on exam. If breathing worsens, will start IV furosemide. However, blood pressure on low side. For now, would just avoid additional IV fluids.  - Hemoglobin trending down - repeat. May need transfusion.  - If needs transfusion, give furosemide 20 mg IV with it  - Hold losartan  - On apixaban - if concern for bleeding, may need to hold. Defer to ortho.
83 y/o female with PMH of mild AS, GERD, HLD, HTN, hypothyroidism, myasthenia gravis, OA left hip, reactive airway, sinus bradycardia, left hip pain and difficulty walking for the past one year here for scheduled  Left anterior total hip by Dr. Dinero on 3/27/23     Assessment and Plan:   left hip OA s/p  Left anterior total hip 3/27/23  - post op no complications  - VS stable  - abx per ortho   - c/w anti hypertensive to be restarted post op day 02 except if blood pressure goes >150 systolic   - c/w IVF x 24 hrs then reassess per ortho  - opiate induced constipation regimen   - encouraging incentive spirometry   -c/w local wound care per ortho   -DVT prophylaxis and Pain meds as per Ortho team   -PT/OT and weight bearing per ortho   fall, aspiration precautions     HTN--c/w meds as scheduled     Reactive airway, continue with albuterol PRN,     hypothyroidism --c/w synthroid     Myasthenia gravis --c/w Pyridostigmine

## 2023-03-28 NOTE — PROVIDER CONTACT NOTE (OTHER) - ASSESSMENT
Pt Is voiding but small increments.
Pt blood pressure is slightly low. Pt is sleeping at this time. Asymptomatic.
Pt has productive cough. Pt states she is itchy on hands and under breast. No redness seen, no swelling,

## 2023-03-28 NOTE — PROGRESS NOTE ADULT - SUBJECTIVE AND OBJECTIVE BOX
Post Op     HÉCTOR LYONS      82y        Female                                                                                                                 T(C): 37.3 (03-28-23 @ 08:03), Max: 37.3 (03-28-23 @ 08:03)  HR: 68 (03-28-23 @ 08:03) (56 - 94)  BP: 97/58 (03-28-23 @ 08:03) (92/50 - 155/61)  RR: 20 (03-28-23 @ 08:03) (11 - 20)  SpO2: 97% (03-28-23 @ 08:03) (96% - 100%)  Wt(kg): --    S/P   total hip left  with juanita      Patient denies shortness of breath, chest pain, dyspnea, No complaints  Pain is 3/10    Physical Exam    Extremity: Bilaterally:  No holmon                                           No Cord                                          PAS on                                          Neurovascular intact                                          Motor intact EHL/FHL                                          Sensation intact                                          Pulses intact DP/PT                                         Calves Soft                                         Dressing Clean / Dry / Intact juanita                                         Capillary refill with 5 seconds                          8.1    7.47  )-----------( 101      ( 28 Mar 2023 06:00 )             24.7       03-28    140  |  107  |  26<H>  ----------------------------<  109<H>  4.5   |  26  |  1.00    Ca    7.8<L>      28 Mar 2023 06:00    TPro  4.6<L>  /  Alb  2.6<L>  /  TBili  0.2  /  DBili  0.1  /  AST  25  /  ALT  51  /  AlkPhos  64  03-28      A/P  -- S/P total hip anterior left     -  Medicine To Follow   - DVT prophylaxis PAS eliquis  - PT & OT   - Analagesia  - Incentive Spirometry  - Discharge Planning  - Safety Precautions  -  CBC , BMP daily

## 2023-03-28 NOTE — CONSULT NOTE ADULT - SUBJECTIVE AND OBJECTIVE BOX
HPI:      PAST MEDICAL & SURGICAL HISTORY:  Hypertension      Hyperlipidemia      GERD (Gastroesophageal Reflux Disease)  hiatal hernia      Lumbar Radiculopathy      Kidney Calculi      Myasthenia gravis  dx 10/2018 symptoms: intermittent loss of voice/ weakness, negative ENT studies, now stable on Pyridostigmine      Lumbar stenosis      Diverticulitis large intestine  denies any recent exacerbations      Aortic stenosis, mild  asper daughter      Reactive airway disease that is not asthma  mild as per pulm note on [employer], patient and daughter denies any inhaler use; thought to be related to myasthena gravis      Hypothyroid      Osteoarthritis of left hip      Pneumonia due to COVID-19 virus      Sinus bradycardia      S/P Cholecystectomy      S/P Appendectomy      Bilateral Cataracts  removed      S/P Hysterectomy Partial  1974      S/P Laparoscopic Fundoplication      Prolapse, Uterovaginal  s/p sling      H/O laminectomy  cervical  1998  lumbar ,,,  with fusion      H/O shoulder replacement  b/l       S/P foot surgery      History of tonsillectomy      H/O umbilical hernia repair      S/P hip replacement, right          REVIEW OF SYSTEMS:    CONSTITUTIONAL: No fever, weight loss, or fatigue  EYES: No eye pain, visual disturbances, or discharge  ENMT:  No difficulty hearing, tinnitus, vertigo; No sinus or throat pain  NECK: No pain or stiffness  BREASTS: No pain, masses, or nipple discharge  RESPIRATORY: No cough, wheezing, chills or hemoptysis; No shortness of breath  CARDIOVASCULAR: No chest pain, palpitations, dizziness, or leg swelling  GASTROINTESTINAL: No abdominal or epigastric pain. No nausea, vomiting, or hematemesis; No diarrhea or constipation. No melena or hematochezia.  GENITOURINARY: No dysuria, frequency, hematuria, or incontinence  NEUROLOGICAL: No headaches, memory loss, loss of strength, numbness, or tremors  SKIN: No itching, burning, rashes, or lesions   LYMPH NODES: No enlarged glands  ENDOCRINE: No heat or cold intolerance; No hair loss  MUSCULOSKELETAL: No joint pain or swelling; No muscle, back, or extremity pain  PSYCHIATRIC: No depression, anxiety, mood swings, or difficulty sleeping  HEME/LYMPH: No easy bruising, or bleeding gums  ALLERGY AND IMMUNOLOGIC: No hives or eczema      MEDICATIONS  (STANDING):  ceFAZolin   IVPB 2000 milliGRAM(s) IV Intermittent every 8 hours  celecoxib 200 milliGRAM(s) Oral every 12 hours  fluticasone furoate/vilanterol 200-25 MICROgram(s) Inhaler 1 Puff(s) Inhalation daily  lactated ringers. 1000 milliLiter(s) (75 mL/Hr) IV Continuous <Continuous>  lactated ringers. 1000 milliLiter(s) (125 mL/Hr) IV Continuous <Continuous>  levothyroxine 75 MICROGram(s) Oral daily  pantoprazole    Tablet 40 milliGRAM(s) Oral before breakfast  polyethylene glycol 3350 17 Gram(s) Oral at bedtime  pyridostigmine 60 milliGRAM(s) Oral three times a day  pyridostigmine  milliGRAM(s) Oral daily  senna 2 Tablet(s) Oral at bedtime  sodium chloride 0.9% Bolus 500 milliLiter(s) IV Bolus once    MEDICATIONS  (PRN):  albuterol    90 MICROgram(s) HFA Inhaler 2 Puff(s) Inhalation every 6 hours PRN Shortness of Breath and/or Wheezing  HYDROmorphone  Injectable 0.5 milliGRAM(s) IV Push every 3 hours PRN breakthough  HYDROmorphone  Injectable 0.25 milliGRAM(s) IV Push every 10 minutes PRN Moderate Pain (4 - 6)  HYDROmorphone  Injectable 0.5 milliGRAM(s) IV Push every 10 minutes PRN Severe Pain (7 - 10)  magnesium hydroxide Suspension 30 milliLiter(s) Oral daily PRN Constipation  ondansetron Injectable 4 milliGRAM(s) IV Push every 6 hours PRN Nausea and/or Vomiting  oxyCODONE    IR 5 milliGRAM(s) Oral every 3 hours PRN Moderate Pain (4 - 6)  oxyCODONE    IR 10 milliGRAM(s) Oral every 3 hours PRN Severe Pain (7 - 10)      Allergies    IODINE (Rash)  No Known Drug Allergies  shellfish (Rash)    Intolerances        SOCIAL HISTORY:    FAMILY HISTORY:  Family history of stroke  father     FH: early death  mother  age 50s    Family history of bladder cancer  brother         Vital Signs Last 24 Hrs  T(C): 36.7 (27 Mar 2023 14:40), Max: 36.7 (27 Mar 2023 09:15)  T(F): 98.1 (27 Mar 2023 14:40), Max: 98.1 (27 Mar 2023 12:50)  HR: 62 (27 Mar 2023 14:40) (59 - 94)  BP: 120/43 (27 Mar 2023 14:40) (115/50 - 155/61)  BP(mean): --  RR: 16 (27 Mar 2023 14:40) (11 - 17)  SpO2: 96% (27 Mar 2023 14:40) (96% - 100%)    Parameters below as of 27 Mar 2023 14:40  Patient On (Oxygen Delivery Method): nasal cannula  O2 Flow (L/min): 3      PHYSICAL EXAM:    GENERAL: NAD, well-groomed, well-developed  HEAD:  Atraumatic, Normocephalic  EYES: EOMI, PERRLA, conjunctiva and sclera clear  ENMT: No tonsillar erythema, exudates, or enlargement; Moist mucous membranes, Good dentition, No lesions  NECK: Supple, No JVD, Normal thyroid  NERVOUS SYSTEM:  Alert & Oriented X3, Good concentration; Motor Strength 5/5 B/L upper and lower extremities; DTRs 2+ intact and symmetric  CHEST/LUNG: Clear to percussion bilaterally; No rales, rhonchi, wheezing, or rubs  HEART: Regular rate and rhythm; No murmurs, rubs, or gallops  ABDOMEN: Soft, Nontender, Nondistended; Bowel sounds present  EXTREMITIES:  2+ Peripheral Pulses, No clubbing, cyanosis, or edema  LYMPH: No lymphadenopathy notedRECTAL: No masses, prostate normal size and smooth, Guiac negative   BREAST: No palpatble masses, skin no lesions no retractions, no discharages. adenexa no palpable masses noted   GYN: uterus normal size, adenexa no palpable masses, no CMT, no uterine discharge   SKIN: No rashes or lesions      LABS:                RADIOLOGY & ADDITIONAL STUDIES:
History of Present Illness: The patient is an 82 year old female with a history of HTN, HL, MG, moderate/severe aortic stenosis who is admitted s/p THR. She notes not feeling fell but cannot specify. She denies dizziness, chest pain, shortness of breath. Her blood pressure was noted to be low. She notes a cough and throat irritation.    Past Medical/Surgical History:  HTN, HL, MG, moderate/severe aortic stenosis    Medications:  Home Medications:  acetaminophen 500 mg oral tablet: 1 tab(s) orally every 8 hours (28 Mar 2023 10:34)  albuterol 90 mcg/inh inhalation aerosol: 2 puff(s) inhaled every 4 hours, As needed, Shortness of Breath and/or Wheezing (27 Mar 2023 13:15)  apixaban 2.5 mg oral tablet: 1 tab(s) orally every 12 hours (28 Mar 2023 08:29)  Breo Ellipta 200 mcg-25 mcg/inh inhalation powder: 1 puff(s) inhaled once a day (27 Mar 2023 13:15)  celecoxib 200 mg oral capsule: 1 cap(s) orally every 12 hours (28 Mar 2023 08:27)  esomeprazole 40 mg oral delayed release capsule: 1 tab(s) orally once a day (27 Mar 2023 13:15)  hydroCHLOROthiazide 12.5 mg oral capsule: 1 cap(s) orally once a day (27 Mar 2023 13:15)  levothyroxine 75 mcg (0.075 mg) oral tablet: 1 tab(s) orally once a day (27 Mar 2023 13:15)  losartan 25 mg oral tablet: 1 tab(s) orally once a day (27 Mar 2023 13:15)  oxyCODONE 5 mg oral tablet: 1 tab(s) orally every 3 hours As needed Moderate Pain (4 - 6) (28 Mar 2023 08:27)  pyridostigmine 180 mg oral tablet, extended release: orally once a day (27 Mar 2023 13:15)  pyridostigmine 60 mg oral tablet: 1 tab(s) orally 3 times a day  (7am, 1pm ,6 pm ) (27 Mar 2023 13:15)  rosuvastatin 20 mg oral capsule: orally once a day (at bedtime) (27 Mar 2023 13:15)  senna leaf extract oral tablet: 2 tab(s) orally once a day (at bedtime) (28 Mar 2023 08:27)      Family History: Non-contributory family history of premature cardiovascular atherosclerotic disease    Social History: No tobacco, alcohol or drug use    Review of Systems:  General: No fevers, chills, weight gain  Skin: No rashes, color changes  Cardiovascular: No chest pain, orthopnea  Respiratory: No shortness of breath, cough  Gastrointestinal: No nausea, abdominal pain  Genitourinary: No incontinence, pain with urination  Musculoskeletal: No pain, swelling, decreased range of motion  Neurological: No headache, weakness  Psychiatric: No depression, anxiety  Endocrine: No weight gain, increased thirst  All other systems are comprehensively negative.    Physical Exam:  Vitals:        Vital Signs Last 24 Hrs  T(C): 37.3 (28 Mar 2023 08:03), Max: 37.3 (28 Mar 2023 08:03)  T(F): 99.2 (28 Mar 2023 08:03), Max: 99.2 (28 Mar 2023 08:03)  HR: 68 (28 Mar 2023 08:03) (56 - 71)  BP: 97/58 (28 Mar 2023 08:03) (92/50 - 134/72)  BP(mean): --  RR: 20 (28 Mar 2023 08:03) (15 - 20)  SpO2: 97% (28 Mar 2023 08:03) (96% - 97%)  Parameters below as of 28 Mar 2023 08:03  Patient On (Oxygen Delivery Method): nasal cannula  O2 Flow (L/min): 2  General: NAD  HEENT: MMM  Neck: No JVD, no carotid bruit  Lungs: CTAB  CV: RRR, nl S1/S2, no M/R/G  Abdomen: S/NT/ND, +BS  Extremities: 1+ LE edema, no cyanosis  Neuro: AAOx3, non-focal  Skin: No rash    Labs:                        8.1    7.47  )-----------( 101      ( 28 Mar 2023 06:00 )             24.7     03-28    140  |  107  |  26<H>  ----------------------------<  109<H>  4.5   |  26  |  1.00    Ca    7.8<L>      28 Mar 2023 06:00    TPro  4.6<L>  /  Alb  2.6<L>  /  TBili  0.2  /  DBili  0.1  /  AST  25  /  ALT  51  /  AlkPhos  64  03-28            ECG/Telemetry: NSR, nonspecific ST abnormality

## 2023-03-28 NOTE — PROGRESS NOTE ADULT - ASSESSMENT
83 y/o female with PMH of mild AS, GERD, HLD, HTN, hypothyroidism, myasthenia gravis, OA left hip, reactive airway, sinus bradycardia, left hip pain and difficulty walking for the past one year here for scheduled  Left anterior total hip by Dr. Dinero on 3/27/23     3/28 noted t be hypotensive and drop in hgb this AM ; s/p bolus IVF   clinically patient is asymptomatic   repeat CBC @ noon or 4pm today and again in AM tomorrow   hold BP meds for now and monitor BP closely     Assessment and Plan:   left hip OA s/p  Left anterior total hip 3/27/23  - post op no complications  - VS stable  - abx per ortho   - c/w anti hypertensive to be restarted post op day 02 except if blood pressure goes >150 systolic   - c/w IVF x 24 hrs then reassess per ortho  - opiate induced constipation regimen   - encouraging incentive spirometry   -c/w local wound care per ortho   -DVT prophylaxis and Pain meds as per Ortho team   -PT/OT and weight bearing per ortho   fall, aspiration precautions   dispo; Rehab     cough, non productive   afebrile, most likely d/t her reactive airway  no SOB/CP/palpitation. lungs are clear on my exam   no e/o bradycardia here   encourage incentive spirometry , albuterol and symbicort.   follow CXR   continue with anti-tussive prn   will monitor     HTN--c/w meds as scheduled with holding parameters   care to avoid hypotension     Reactive airway, continue with albuterol PRN, added symbicort 2/2   Breo Sheliata is not on formulary here     hypothyroidism --c/w synthroid     Myasthenia gravis --c/w Pyridostigmine

## 2023-03-28 NOTE — PROGRESS NOTE ADULT - SUBJECTIVE AND OBJECTIVE BOX
Discharge medication calendar:  Eliquis 2.5mg q12h x 4 weeks  APAP 1000mg q8h x 2-3 weeks  Celecoxib 200mg q12h x 2-3 weeks  Nexium 40mg QA (home med)  Narcotic PRN  Docusate 100mg TID while taking narcotic  Miralax, Senna, or Bisacodyl PRN for treatment of constipation

## 2023-03-28 NOTE — PROVIDER CONTACT NOTE (OTHER) - ACTION/TREATMENT ORDERED:
Robitussin and Benadryl to be given
RL 500cc bolus over one hour
bolus ringers lactate 500cc over an hour

## 2023-03-28 NOTE — PROVIDER CONTACT NOTE (OTHER) - SITUATION
Pt has a productive cough which she stated she had from home. Also she is complaining of feeling itchy on body.

## 2023-03-28 NOTE — SOCIAL WORK PROGRESS NOTE - NSSWPROGRESSNOTE_GEN_ALL_CORE
met with patient to confirm her plan for rehab. She said she was told she would be leaving on Wednesday. I offered to speak with her daughter who she said would be sleeping now she works nights. I sent her an email [email or web address] with list of sub acute rehab's for [facility]. Per notes, they wanted [facility], I sent referral to them.

## 2023-03-28 NOTE — PROGRESS NOTE ADULT - SUBJECTIVE AND OBJECTIVE BOX
PROGRESS NOTE:     Patient is a 82y old  Female who presents with a chief complaint of Left hip replacement (27 Mar 2023 15:03)        SUBJECTIVE & OBJECTIVE:   Pt seen and examined at bedside in AM    no overnight events.   states she has a nonproductive cough. Vitals are stable, patient is afebrile. doing well on room air. denies CP/SOB/palpitations   states she has reactive airway d/t severe COVID which landed her in the hospital for 3 months and now has post covid "lung damage"  noted t be hypotensive and drop in hgb this AM       REVIEW OF SYSTEMS: remaining ROS negative     PHYSICAL EXAM:  T(C): 37.3 (03-28-23 @ 08:03), Max: 37.3 (03-28-23 @ 08:03)  HR: 68 (03-28-23 @ 08:03) (56 - 71)  BP: 97/58 (03-28-23 @ 08:03) (92/50 - 140/60)  RR: 20 (03-28-23 @ 08:03) (11 - 20)  SpO2: 97% (03-28-23 @ 08:03) (96% - 100%)  Wt(kg): --          GENERAL: NAD,  no increased WOB, clinically nontoxic appearing, speaking in full sentences    HEAD:  Atraumatic, Normocephalic  EYES: EOMI, PERRLA, conjunctiva and sclera clear  ENMT: Moist mucous membranes  NECK: Supple, No JVD  NERVOUS SYSTEM:  Alert & Oriented X3, no focal neuro deficits   CHEST/LUNG: Clear to auscultation bilaterally; No rales, rhonchi, wheezing, or rubs  HEART: Regular rate and rhythm; No murmurs, rubs, or gallops  ABDOMEN: Soft, Nontender, Nondistended; Bowel sounds present  EXTREMITIES:  2+ Peripheral Pulses b/l, No clubbing, cyanosis, calf tenderness or edema b/l    MEDICATIONS  (STANDING):  acetaminophen     Tablet .. 500 milliGRAM(s) Oral every 8 hours  albuterol    90 MICROgram(s) HFA Inhaler 2 Puff(s) Inhalation every 6 hours  apixaban 2.5 milliGRAM(s) Oral every 12 hours  budesonide 160 MICROgram(s)/formoterol 4.5 MICROgram(s) Inhaler 2 Puff(s) Inhalation two times a day  celecoxib 200 milliGRAM(s) Oral every 12 hours  lactated ringers. 500 milliLiter(s) (500 mL/Hr) IV Continuous <Continuous>  lactated ringers. 1000 milliLiter(s) (125 mL/Hr) IV Continuous <Continuous>  levothyroxine 75 MICROGram(s) Oral daily  pantoprazole    Tablet 40 milliGRAM(s) Oral before breakfast  polyethylene glycol 3350 17 Gram(s) Oral at bedtime  pyridostigmine 60 milliGRAM(s) Oral three times a day  pyridostigmine  milliGRAM(s) Oral daily  senna 2 Tablet(s) Oral at bedtime    MEDICATIONS  (PRN):  guaiFENesin Oral Liquid (Sugar-Free) 100 milliGRAM(s) Oral every 6 hours PRN Cough  HYDROmorphone  Injectable 0.5 milliGRAM(s) IV Push every 3 hours PRN breakthough  magnesium hydroxide Suspension 30 milliLiter(s) Oral daily PRN Constipation  ondansetron Injectable 4 milliGRAM(s) IV Push every 6 hours PRN Nausea and/or Vomiting  oxyCODONE    IR 5 milliGRAM(s) Oral every 3 hours PRN Moderate Pain (4 - 6)  oxyCODONE    IR 10 milliGRAM(s) Oral every 3 hours PRN Severe Pain (7 - 10)      LABS:                        8.1    7.47  )-----------( 101      ( 28 Mar 2023 06:00 )             24.7     03-28    140  |  107  |  26<H>  ----------------------------<  109<H>  4.5   |  26  |  1.00    Ca    7.8<L>      28 Mar 2023 06:00    TPro  4.6<L>  /  Alb  2.6<L>  /  TBili  0.2  /  DBili  0.1  /  AST  25  /  ALT  51  /  AlkPhos  64  03-28          CAPILLARY BLOOD GLUCOSE                RECENT CULTURES:          RADIOLOGY & ADDITIONAL TESTS:          Imaging Personally Reviewed:  [ ] YES  [ ] NO    Consultant(s) Notes Reviewed:  [x ] YES  [ ] NO    Care Discussed with Consultants/Other Providers [x ] YES  [ ] NO  Care plan and all findings were discussed in detail with patient.  All questions and concerns addressed

## 2023-03-29 ENCOUNTER — TRANSCRIPTION ENCOUNTER (OUTPATIENT)
Age: 83
End: 2023-03-29

## 2023-03-29 ENCOUNTER — APPOINTMENT (OUTPATIENT)
Dept: NEUROLOGY | Facility: CLINIC | Age: 83
End: 2023-03-29

## 2023-03-29 VITALS
SYSTOLIC BLOOD PRESSURE: 115 MMHG | DIASTOLIC BLOOD PRESSURE: 64 MMHG | HEART RATE: 75 BPM | RESPIRATION RATE: 18 BRPM | TEMPERATURE: 98 F | OXYGEN SATURATION: 95 %

## 2023-03-29 LAB
ANION GAP SERPL CALC-SCNC: 6 MMOL/L — SIGNIFICANT CHANGE UP (ref 5–17)
BUN SERPL-MCNC: 27 MG/DL — HIGH (ref 7–23)
CALCIUM SERPL-MCNC: 8.1 MG/DL — LOW (ref 8.4–10.5)
CHLORIDE SERPL-SCNC: 108 MMOL/L — SIGNIFICANT CHANGE UP (ref 96–108)
CO2 SERPL-SCNC: 28 MMOL/L — SIGNIFICANT CHANGE UP (ref 22–31)
CREAT SERPL-MCNC: 0.98 MG/DL — SIGNIFICANT CHANGE UP (ref 0.5–1.3)
EGFR: 57 ML/MIN/1.73M2 — LOW
GLUCOSE SERPL-MCNC: 94 MG/DL — SIGNIFICANT CHANGE UP (ref 70–99)
HCT VFR BLD CALC: 24.2 % — LOW (ref 34.5–45)
HGB BLD-MCNC: 8 G/DL — LOW (ref 11.5–15.5)
MCHC RBC-ENTMCNC: 31 PG — SIGNIFICANT CHANGE UP (ref 27–34)
MCHC RBC-ENTMCNC: 33.1 GM/DL — SIGNIFICANT CHANGE UP (ref 32–36)
MCV RBC AUTO: 93.8 FL — SIGNIFICANT CHANGE UP (ref 80–100)
NRBC # BLD: 0 /100 WBCS — SIGNIFICANT CHANGE UP (ref 0–0)
PLATELET # BLD AUTO: 95 K/UL — LOW (ref 150–400)
POTASSIUM SERPL-MCNC: 4 MMOL/L — SIGNIFICANT CHANGE UP (ref 3.5–5.3)
POTASSIUM SERPL-SCNC: 4 MMOL/L — SIGNIFICANT CHANGE UP (ref 3.5–5.3)
RBC # BLD: 2.58 M/UL — LOW (ref 3.8–5.2)
RBC # FLD: 13.2 % — SIGNIFICANT CHANGE UP (ref 10.3–14.5)
SODIUM SERPL-SCNC: 142 MMOL/L — SIGNIFICANT CHANGE UP (ref 135–145)
WBC # BLD: 5.44 K/UL — SIGNIFICANT CHANGE UP (ref 3.8–10.5)
WBC # FLD AUTO: 5.44 K/UL — SIGNIFICANT CHANGE UP (ref 3.8–10.5)

## 2023-03-29 PROCEDURE — 97116 GAIT TRAINING THERAPY: CPT

## 2023-03-29 PROCEDURE — 86901 BLOOD TYPING SEROLOGIC RH(D): CPT

## 2023-03-29 PROCEDURE — 97535 SELF CARE MNGMENT TRAINING: CPT

## 2023-03-29 PROCEDURE — 99233 SBSQ HOSP IP/OBS HIGH 50: CPT

## 2023-03-29 PROCEDURE — 86900 BLOOD TYPING SEROLOGIC ABO: CPT

## 2023-03-29 PROCEDURE — 94640 AIRWAY INHALATION TREATMENT: CPT

## 2023-03-29 PROCEDURE — 80076 HEPATIC FUNCTION PANEL: CPT

## 2023-03-29 PROCEDURE — C1889: CPT

## 2023-03-29 PROCEDURE — C1776: CPT

## 2023-03-29 PROCEDURE — C1713: CPT

## 2023-03-29 PROCEDURE — 85027 COMPLETE CBC AUTOMATED: CPT

## 2023-03-29 PROCEDURE — 97110 THERAPEUTIC EXERCISES: CPT

## 2023-03-29 PROCEDURE — 97530 THERAPEUTIC ACTIVITIES: CPT

## 2023-03-29 PROCEDURE — 97165 OT EVAL LOW COMPLEX 30 MIN: CPT

## 2023-03-29 PROCEDURE — 97161 PT EVAL LOW COMPLEX 20 MIN: CPT

## 2023-03-29 PROCEDURE — 87635 SARS-COV-2 COVID-19 AMP PRB: CPT

## 2023-03-29 PROCEDURE — 86850 RBC ANTIBODY SCREEN: CPT

## 2023-03-29 PROCEDURE — 71045 X-RAY EXAM CHEST 1 VIEW: CPT

## 2023-03-29 PROCEDURE — 36415 COLL VENOUS BLD VENIPUNCTURE: CPT

## 2023-03-29 PROCEDURE — 76000 FLUOROSCOPY <1 HR PHYS/QHP: CPT

## 2023-03-29 PROCEDURE — 80048 BASIC METABOLIC PNL TOTAL CA: CPT

## 2023-03-29 RX ADMIN — APIXABAN 2.5 MILLIGRAM(S): 2.5 TABLET, FILM COATED ORAL at 10:02

## 2023-03-29 RX ADMIN — PYRIDOSTIGMINE BROMIDE 60 MILLIGRAM(S): 60 SOLUTION ORAL at 05:54

## 2023-03-29 RX ADMIN — Medication 500 MILLIGRAM(S): at 14:03

## 2023-03-29 RX ADMIN — Medication 500 MILLIGRAM(S): at 06:20

## 2023-03-29 RX ADMIN — Medication 100 MILLIGRAM(S): at 08:11

## 2023-03-29 RX ADMIN — PYRIDOSTIGMINE BROMIDE 180 MILLIGRAM(S): 60 SOLUTION ORAL at 12:05

## 2023-03-29 RX ADMIN — PANTOPRAZOLE SODIUM 40 MILLIGRAM(S): 20 TABLET, DELAYED RELEASE ORAL at 06:55

## 2023-03-29 RX ADMIN — PYRIDOSTIGMINE BROMIDE 60 MILLIGRAM(S): 60 SOLUTION ORAL at 14:03

## 2023-03-29 RX ADMIN — Medication 500 MILLIGRAM(S): at 05:54

## 2023-03-29 RX ADMIN — ALBUTEROL 2 PUFF(S): 90 AEROSOL, METERED ORAL at 12:25

## 2023-03-29 RX ADMIN — OXYCODONE HYDROCHLORIDE 5 MILLIGRAM(S): 5 TABLET ORAL at 09:47

## 2023-03-29 RX ADMIN — OXYCODONE HYDROCHLORIDE 5 MILLIGRAM(S): 5 TABLET ORAL at 13:00

## 2023-03-29 RX ADMIN — OXYCODONE HYDROCHLORIDE 5 MILLIGRAM(S): 5 TABLET ORAL at 12:30

## 2023-03-29 RX ADMIN — BUDESONIDE AND FORMOTEROL FUMARATE DIHYDRATE 2 PUFF(S): 160; 4.5 AEROSOL RESPIRATORY (INHALATION) at 08:41

## 2023-03-29 RX ADMIN — OXYCODONE HYDROCHLORIDE 5 MILLIGRAM(S): 5 TABLET ORAL at 09:17

## 2023-03-29 RX ADMIN — CELECOXIB 200 MILLIGRAM(S): 200 CAPSULE ORAL at 10:02

## 2023-03-29 RX ADMIN — CELECOXIB 200 MILLIGRAM(S): 200 CAPSULE ORAL at 10:30

## 2023-03-29 RX ADMIN — ALBUTEROL 2 PUFF(S): 90 AEROSOL, METERED ORAL at 09:19

## 2023-03-29 RX ADMIN — Medication 75 MICROGRAM(S): at 05:54

## 2023-03-29 NOTE — DISCHARGE NOTE NURSING/CASE MANAGEMENT/SOCIAL WORK - NSDCPNPNATDISSUGG_GEN_ALL_CORE
Mallampati: Class III - soft palate, base of uvula visible. ASA: Class 3 - patient with severe systemic disease. No

## 2023-03-29 NOTE — PROGRESS NOTE ADULT - ASSESSMENT
The patient is an 82 year old female with a history of HTN, HL, MG, moderate/severe aortic stenosis who is admitted s/p THR.     Plan:  - Pre-operative ECG with nonspecific findings  - Recent outpatient echo with normal LV systolic function, mod/severe aortic stenosis  - CXR with slight interstitial infiltrate  - Hemoglobin on low side. May need transfusion if trends down further.  - If needs transfusion, give furosemide 20 mg IV with it  - Hold losartan  - On apixaban - if concern for bleeding, may need to hold. Defer to ortho.

## 2023-03-29 NOTE — DISCHARGE NOTE NURSING/CASE MANAGEMENT/SOCIAL WORK - NSDCFUADDAPPT_GEN_ALL_CORE_FT
Follow up Primary Care  MD following discharge call to confirm appt  Patient with Hx of Increase Liver Function tests please have primary care MD follow up   Follow up Dr. Dinero office call office to confirm appt

## 2023-03-29 NOTE — PROGRESS NOTE ADULT - SUBJECTIVE AND OBJECTIVE BOX
POST OPERATIVE DAY #:   2  STATUS POST: Left Anterior THR                       SUBJECTIVE: Patient seen and examined while sitting in chair after AM physical therapy session. Patient complains of pain at surgical site. Also complains of cough. Denies headache, nausea, vomiting, diarrhea, dizziness, numbness, tingling.  Reported Pain score = 7/10    OBJECTIVE:     Vital Signs Last 24 Hrs  T(C): 36.4 (29 Mar 2023 07:20), Max: 37 (28 Mar 2023 15:15)  T(F): 97.6 (29 Mar 2023 07:20), Max: 98.6 (28 Mar 2023 15:15)  HR: 61 (29 Mar 2023 07:20) (58 - 62)  BP: 122/69 (29 Mar 2023 07:20) (102/55 - 122/69)  BP(mean): --  RR: 15 (29 Mar 2023 07:20) (15 - 16)  SpO2: 97% (29 Mar 2023 07:20) (95% - 98%)    Parameters below as of 29 Mar 2023 07:20  Patient On (Oxygen Delivery Method): nasal cannula        Left hip:          Dressing: clean/dry/intact    Bilateral LEs:         Sensation:  intact to light touch          Motor exam:  5/5 dorsiflexion/plantarflexion/EHL          2+ DP pulses          calf supple, NT         SCDs in place    LABS:                        8.0    5.44  )-----------( 95       ( 29 Mar 2023 06:40 )             24.2     03-29    142  |  108  |  27<H>  ----------------------------<  94  4.0   |  28  |  0.98    Ca    8.1<L>      29 Mar 2023 06:40    TPro  4.6<L>  /  Alb  2.6<L>  /  TBili  0.2  /  DBili  0.1  /  AST  25  /  ALT  51  /  AlkPhos  64  03-28          MEDICATIONS:  Anticoagulation:  apixaban 2.5 milliGRAM(s) Oral every 12 hours      Pain medications:   acetaminophen     Tablet .. 500 milliGRAM(s) Oral every 8 hours  celecoxib 200 milliGRAM(s) Oral every 12 hours  HYDROmorphone  Injectable 0.5 milliGRAM(s) IV Push every 3 hours PRN  ondansetron Injectable 4 milliGRAM(s) IV Push every 6 hours PRN  oxyCODONE    IR 5 milliGRAM(s) Oral every 3 hours PRN  oxyCODONE    IR 10 milliGRAM(s) Oral every 3 hours PRN        A/P :   s/p Left Anterior THR POD # 2  -    Pain control  -    DVT ppx: Eliquis  -    Weight bearing status: WBAT LLE  -    Continue anterior total hip precautions  -    Physical Therapy  -    Occupational Therapy  -    Discharge plan: [facility] today pending PT/OT/medical clearance

## 2023-03-29 NOTE — PROGRESS NOTE ADULT - REASON FOR ADMISSION
Patient is a 82y old  Female who presents with a chief complaint of Left hip replacement (27 Mar 2023 15:03)

## 2023-03-29 NOTE — DISCHARGE NOTE NURSING/CASE MANAGEMENT/SOCIAL WORK - NSDCPEFALRISK_GEN_ALL_CORE
For information on Fall & Injury Prevention, visit: [email or web address] OR  [email or web address] OR  https://www.cdc.gov/steadi/patient.html

## 2023-03-29 NOTE — DISCHARGE NOTE NURSING/CASE MANAGEMENT/SOCIAL WORK - PATIENT PORTAL LINK FT
You can access the FollowMyHealth Patient Portal offered by [facility] by registering at the following website: [email or web address]. By joining [facility]’s FollowMyHealth portal, you will also be able to view your health information using other applications (apps) compatible with our system.

## 2023-03-29 NOTE — PROGRESS NOTE ADULT - SUBJECTIVE AND OBJECTIVE BOX
Chief Complaint: THR    Interval Events: No events overnight.    Review of Systems:  General: No fevers, chills, weight gain  Skin: No rashes, color changes  Cardiovascular: No chest pain, orthopnea  Respiratory: No shortness of breath, cough  Gastrointestinal: No nausea, abdominal pain  Genitourinary: No incontinence, pain with urination  Musculoskeletal: No pain, swelling, decreased range of motion  Neurological: No headache, weakness  Psychiatric: No depression, anxiety  Endocrine: No weight gain, increased thirst  All other systems are comprehensively negative.    Physical Exam:  Vitals:        Vital Signs Last 24 Hrs  T(C): 36.4 (29 Mar 2023 07:20), Max: 37 (28 Mar 2023 15:15)  T(F): 97.6 (29 Mar 2023 07:20), Max: 98.6 (28 Mar 2023 15:15)  HR: 61 (29 Mar 2023 07:20) (58 - 62)  BP: 122/69 (29 Mar 2023 07:20) (102/55 - 122/69)  BP(mean): --  RR: 15 (29 Mar 2023 07:20) (15 - 16)  SpO2: 97% (29 Mar 2023 07:20) (95% - 98%)  Parameters below as of 29 Mar 2023 07:20  Patient On (Oxygen Delivery Method): nasal cannula  General: NAD  HEENT: MMM  Neck: No JVD, no carotid bruit  Lungs: CTAB  CV: RRR, nl S1/S2, no M/R/G  Abdomen: S/NT/ND, +BS  Extremities: No LE edema, no cyanosis  Neuro: AAOx3, non-focal  Skin: No rash    Labs:                        8.0    5.44  )-----------( 95       ( 29 Mar 2023 06:40 )             24.2     03-29    142  |  108  |  27<H>  ----------------------------<  94  4.0   |  28  |  0.98    Ca    8.1<L>      29 Mar 2023 06:40    TPro  4.6<L>  /  Alb  2.6<L>  /  TBili  0.2  /  DBili  0.1  /  AST  25  /  ALT  51  /  AlkPhos  64  03-28            ECG/Telemetry: Sinus rhythm

## 2023-03-29 NOTE — PROGRESS NOTE ADULT - ASSESSMENT
83 y/o female with PMH of mild AS, GERD, HLD, HTN, hypothyroidism, myasthenia gravis, OA left hip, reactive airway, sinus bradycardia, left hip pain and difficulty walking for the past one year here for scheduled  Left anterior total hip by Dr. Dinero on 3/27/23     3/28 noted t be hypotensive and drop in hgb this AM ; s/p bolus IVF   clinically patient is asymptomatic   repeat CBC @ noon or 4pm today and again in AM tomorrow   hold BP meds for now and monitor BP closely     Assessment and Plan:   left hip OA s/p  Left anterior total hip 3/27/23  - Pain control  - Bowel regimen  - PT/OT  - Incentive spirometry  - VTE PPx - eliquis  - post-op anemia due to acute blood loss - likely silvio today given POD#2, thrombocytopenia likely consumptive  - repeat CBC at rehab in 2 days  - stable for discharge from medical perspective  - [facility]    Cough  afebrile, most likely d/t her reactive airway  no SOB/CP/palpitation. lungs are clear on  exam   encourage incentive spirometry , albuterol and symbicort.   follow CXR - Slight remaining interstitial infiltrate off left hilum possibly representing patient's baseline.  continue with anti-tussive prn   monitor    HTN--c/w meds as scheduled with holding parameters   care to avoid hypotension     Reactive airway, continue with albuterol PRN, added symbicort 2/2   Brerosana Basshoda is not on formulary here     hypothyroidism --c/w synthroid     Myasthenia gravis --c/w Pyridostigmine

## 2023-03-29 NOTE — PROGRESS NOTE ADULT - SUBJECTIVE AND OBJECTIVE BOX
INTERVAL HPI/OVERNIGHT EVENTS:   Patient seen and examined.  Notes pain at surgical site and ongoing cough (occasionally productive of phlegm)  No fevers, chills, sweats, dizziness, HA, changes in vision, cp, palpitations, sob,  n/v/d, abd pain, dysuria, focal weakness, or calf pain.        REVIEW OF SYSTEMS:  See HPI,  all others negative    PHYSICAL EXAM:  Vital Signs Last 24 Hrs  T(C): 36.4 (29 Mar 2023 07:20), Max: 37 (28 Mar 2023 15:15)  T(F): 97.6 (29 Mar 2023 07:20), Max: 98.6 (28 Mar 2023 15:15)  HR: 61 (29 Mar 2023 07:20) (58 - 62)  BP: 122/69 (29 Mar 2023 07:20) (102/55 - 122/69)  BP(mean): --  RR: 15 (29 Mar 2023 07:20) (15 - 16)  SpO2: 97% (29 Mar 2023 07:20) (95% - 98%)    Parameters below as of 29 Mar 2023 07:20  Patient On (Oxygen Delivery Method): nasal cannula    GENERAL: NAD, well-groomed, well-developed, awake, alert, oriented x 3, fluent and coherent speech  EYES: EOMI,  conjunctiva and sclera clear  ENMT: No tonsillar erythema, exudates, or enlargement; Moist mucous membranes,   No lesions seen on oral mucosa  NECK: Supple, No JVD, No Cervical LAD   NERVOUS SYSTEM:  Good concentration; Moving all 4 extremities against gravity ; No gross sensory deficits, No facial droop  CHEST/LUNG: Clear to auscultation bilaterally with good air entry; No rales, rhonchi, wheezing, or rubs  HEART: Regular rate and rhythm; No murmurs, rubs, or gallops  ABDOMEN: Soft, Nontender, Nondistended, Bowel sounds present, No palpable masses or organomegaly, No bruits  EXTREMITIES:  2+ Peripheral Pulses, No clubbing, cyanosis,  no calf tenderness in either leg, trace edema b/l LE    Diagnostic Testin.0    5.44  )-----------( 95       ( 29 Mar 2023 06:40 )             24.2     29 Mar 2023 06:40    142    |  108    |  27     ----------------------------<  94     4.0     |  28     |  0.98     Ca    8.1        29 Mar 2023 06:40

## 2023-03-29 NOTE — SOCIAL WORK PROGRESS NOTE - NSSWPROGRESSNOTE_GEN_ALL_CORE
I met with patient for more choices and she asked me to call her daughter Jeni (MD) [phone]. I spoke with Jeni and explained referral process. I emailed her list of sub acute for [facility] for her to review. [email or web address]. She initially asked we make referral to [facility] but daughter wanted [facility]. She will review list and provide me with update later. Referrals made to [facility] and [facility]

## 2023-03-29 NOTE — PHARMACOTHERAPY INTERVENTION NOTE - COMMENTS
Admission medication reconciliation POD1 
Transition of Care video discharge education - medication calendar given to patient. Counseled daughter on the phone

## 2023-04-04 ENCOUNTER — APPOINTMENT (OUTPATIENT)
Dept: ORTHOPEDIC SURGERY | Facility: CLINIC | Age: 83
End: 2023-04-04

## 2023-04-04 LAB
ANION GAP SERPL CALC-SCNC: 10 MMOL/L
BASOPHILS # BLD AUTO: 0.02 K/UL
BASOPHILS NFR BLD AUTO: 0.4 %
BUN SERPL-MCNC: 22 MG/DL
CALCIUM SERPL-MCNC: 9.3 MG/DL
CHLORIDE SERPL-SCNC: 107 MMOL/L
CO2 SERPL-SCNC: 24 MMOL/L
CREAT SERPL-MCNC: 0.87 MG/DL
EGFR: 66 ML/MIN/1.73M2
EOSINOPHIL # BLD AUTO: 0.07 K/UL
EOSINOPHIL NFR BLD AUTO: 1.4 %
GLUCOSE SERPL-MCNC: 103 MG/DL
HCT VFR BLD CALC: 40.6 %
HGB BLD-MCNC: 12.8 G/DL
IMM GRANULOCYTES NFR BLD AUTO: 0.2 %
LYMPHOCYTES # BLD AUTO: 1.29 K/UL
LYMPHOCYTES NFR BLD AUTO: 25.8 %
MAN DIFF?: NORMAL
MCHC RBC-ENTMCNC: 30.7 PG
MCHC RBC-ENTMCNC: 31.5 GM/DL
MCV RBC AUTO: 97.4 FL
MONOCYTES # BLD AUTO: 0.55 K/UL
MONOCYTES NFR BLD AUTO: 11 %
NEUTROPHILS # BLD AUTO: 3.06 K/UL
NEUTROPHILS NFR BLD AUTO: 61.2 %
PLATELET # BLD AUTO: 142 K/UL
POTASSIUM SERPL-SCNC: 4.1 MMOL/L
RBC # BLD: 4.17 M/UL
RBC # FLD: 12.8 %
SODIUM SERPL-SCNC: 142 MMOL/L
WBC # FLD AUTO: 5 K/UL

## 2023-04-13 ENCOUNTER — APPOINTMENT (OUTPATIENT)
Dept: ORTHOPEDIC SURGERY | Facility: CLINIC | Age: 83
End: 2023-04-13
Payer: MEDICARE

## 2023-04-13 VITALS — DIASTOLIC BLOOD PRESSURE: 67 MMHG | SYSTOLIC BLOOD PRESSURE: 100 MMHG | HEART RATE: 80 BPM

## 2023-04-13 DIAGNOSIS — M25.559 PAIN IN UNSPECIFIED HIP: ICD-10-CM

## 2023-04-13 PROCEDURE — 73502 X-RAY EXAM HIP UNI 2-3 VIEWS: CPT | Mod: LT

## 2023-04-13 PROCEDURE — 99024 POSTOP FOLLOW-UP VISIT: CPT

## 2023-04-13 RX ORDER — CELECOXIB 200 MG/1
200 CAPSULE ORAL
Refills: 0 | Status: ACTIVE | COMMUNITY

## 2023-04-13 RX ORDER — ACETAMINOPHEN 500 MG/1
500 TABLET ORAL
Refills: 0 | Status: ACTIVE | COMMUNITY

## 2023-04-13 RX ORDER — APIXABAN 2.5 MG/1
2.5 TABLET, FILM COATED ORAL
Refills: 0 | Status: ACTIVE | COMMUNITY

## 2023-04-13 RX ORDER — AMOXICILLIN 500 MG/1
500 CAPSULE ORAL
Qty: 12 | Refills: 2 | Status: ACTIVE | COMMUNITY
Start: 2023-04-13 | End: 1900-01-01

## 2023-04-13 NOTE — HISTORY OF PRESENT ILLNESS
[7] : the patient reports pain that is 7/10 in severity [Chills] : no chills [Constipation] : no constipation [Diarrhea] : no diarrhea [Dysuria] : no dysuria [Fever] : no fever [Nausea] : no nausea [Vomiting] : no vomiting [Clean/Dry/Intact] : clean, dry and intact [Erythema] : not erythematous [Discharge] : absent of discharge [Swelling] : swollen [Dehiscence] : not dehisced [Neuro Intact] : an unremarkable neurological exam [Vascular Intact] : ~T peripheral vascular exam normal [Negative Lita's] : maneuvers demonstrated a negative Lita's sign [Xray (Date:___)] : [unfilled] Xray -  [Hardware in Good Position] : hardware in good position [No Obvious Fractures] : no obvious fractures [Good Overall Alignment] : good overall alignment [Doing Well] : is doing well [No Sign of Infection] : is showing no signs of infection [Adequate Pain Control] : has adequate pain control [de-identified] : The patient is an 82 yr old female presents today for her first post operative visit and dermabond tape removal. She is s/p Left Anterior Total Hip Replacement performed at [facility] on 3/27/2023 [de-identified] : Patient was discharged From [facility] yesterday 4/12/2023. Patient was diagnosed with Covid while residing at the rehabilitation facility and developed pneumonia. She was treated with antibiotics .\par  She presents with her daughter for evaluation. She is doing well ambulating with a cane. She will be starting outpatient physical therapy and she is performing home exercises. The patient reports pain of 7/10 wore with sitting 10/10 .She is taking Tylenol for analgesia relief on an intermittent basis.She continues to use Tylenol, applying ice and elevating the lower extremity. Patient is using Eliquis twice a day for DVT prophylaxis and prevention until 4 weeks post operative. The patient is using Celebrex 200mg twice daily for prevention of heterotrophic bone ossification until 21 days post operative. [de-identified] : The patient has stable antalgic gait utilizing a cane. The left anterior hip incision is with Dermabond tape intact. The incision site is without redness, warmth or drainage. The hip flexes to 90 degrees with minimal discomfort. The external and internal rotation of the left hip is with minimal discomfort and stiffness. Leg lengths appear equal. There is no evidence of infection or cellulitis. The calf is supple and without tenderness, warmth or redness.\par \par  [de-identified] : Radiographs, including 1 view of the  left hip and 1 view of the pelvis were obtained. X-rays reveal a well-positioned, well fit, total hip replacement in excellent alignment. No obvious evidence of fractures, dislocation or osteolysis noted.\par \par  [de-identified] : Dermabond tape was removed from the left anterior hip incision and replaced with steri-strips utilizing sterile technique. Incisional care was reviewed with the patient. The patient was advised of the nature of the healing process. Patient was advised of the importance of adhering to the DVT prophylaxis protocol utilizing Eliquis 2.5 MG twice daily until 35 days post operative. Patient was advised of the importance of continuing to take Celebrex 200 mg twice daily for prevention of heterotrophic bone ossification for a total of 21 days post operative. The patient was advised to continue with outpatient physical therapy and home exercises as recommended. Prescription was given to patient for antibiotic amoxicillin for dental prophylaxis as per Dr. Dinero's protocol. Patient will continue to use Tylenol as needed for analgesic relief. Patient will  follow up with Dr. Dinero in six weeks. Patient verbalized understanding of all instructions and all of her questions were addressed to  her satisfaction. The patient was advised to call the office with any further questions or concerns.\par \par

## 2023-04-14 ENCOUNTER — TRANSCRIPTION ENCOUNTER (OUTPATIENT)
Age: 83
End: 2023-04-14

## 2023-04-19 ENCOUNTER — APPOINTMENT (OUTPATIENT)
Dept: NEUROLOGY | Facility: CLINIC | Age: 83
End: 2023-04-19
Payer: MEDICARE

## 2023-04-19 PROCEDURE — 96365 THER/PROPH/DIAG IV INF INIT: CPT

## 2023-04-21 DIAGNOSIS — R39.9 UNSPECIFIED SYMPTOMS AND SIGNS INVOLVING THE GENITOURINARY SYSTEM: ICD-10-CM

## 2023-04-25 ENCOUNTER — APPOINTMENT (OUTPATIENT)
Dept: PULMONOLOGY | Facility: CLINIC | Age: 83
End: 2023-04-25
Payer: MEDICARE

## 2023-04-25 VITALS
OXYGEN SATURATION: 94 % | TEMPERATURE: 97.8 F | BODY MASS INDEX: 31.59 KG/M2 | SYSTOLIC BLOOD PRESSURE: 136 MMHG | WEIGHT: 146 LBS | DIASTOLIC BLOOD PRESSURE: 66 MMHG | HEART RATE: 74 BPM

## 2023-04-25 DIAGNOSIS — J45.909 UNSPECIFIED ASTHMA, UNCOMPLICATED: ICD-10-CM

## 2023-04-25 DIAGNOSIS — U09.9 POST COVID-19 CONDITION, UNSPECIFIED: ICD-10-CM

## 2023-04-25 DIAGNOSIS — R05.9 COUGH, UNSPECIFIED: ICD-10-CM

## 2023-04-25 PROCEDURE — 99214 OFFICE O/P EST MOD 30 MIN: CPT

## 2023-04-26 ENCOUNTER — APPOINTMENT (OUTPATIENT)
Dept: ORTHOPEDIC SURGERY | Facility: CLINIC | Age: 83
End: 2023-04-26
Payer: MEDICARE

## 2023-04-26 ENCOUNTER — APPOINTMENT (OUTPATIENT)
Dept: NEUROLOGY | Facility: CLINIC | Age: 83
End: 2023-04-26
Payer: MEDICARE

## 2023-04-26 VITALS — TEMPERATURE: 97.1 F | DIASTOLIC BLOOD PRESSURE: 76 MMHG | SYSTOLIC BLOOD PRESSURE: 138 MMHG | HEART RATE: 64 BPM

## 2023-04-26 PROCEDURE — 99024 POSTOP FOLLOW-UP VISIT: CPT

## 2023-04-26 PROCEDURE — 96365 THER/PROPH/DIAG IV INF INIT: CPT

## 2023-04-26 NOTE — HISTORY OF PRESENT ILLNESS
[___ Months Post Op] : [unfilled] months post op [7] : the patient reports pain that is 7/10 in severity [Chills] : no chills [Constipation] : no constipation [Diarrhea] : no diarrhea [Dysuria] : no dysuria [Fever] : no fever [Nausea] : no nausea [Vomiting] : no vomiting [Erythema] : erythematous [Clean/Dry/Intact] : clean, dry and intact [Discharge] : noted to have a ~M discharge [Scant] : scant [Clear] : clear [Serosanguineous] : serosanguineous [Swelling] : swollen [Dehiscence] : dehisced [Neuro Intact] : an unremarkable neurological exam [Vascular Intact] : ~T peripheral vascular exam normal [Negative Lita's] : maneuvers demonstrated a negative Lita's sign [Doing Well] : is doing well [No Sign of Infection] : is showing no signs of infection [Adequate Pain Control] : has adequate pain control [de-identified] : The patient is an 82 yr old female presents today for wound check  She is s/p Left Anterior Total Hip Replacement performed at [facility] on 3/27/2023 [de-identified] :  She presents with her daughter for evaluation. She presents for wound check to the left anterior hip incision. She reports pain and swelling as well as 2 open areas.She is doing well ambulating with a cane. She will be starting outpatient physical therapy and she is performing home exercises. The patient reports pain of 7/10 wore with sitting 10/10 .She is taking Tylenol for analgesia relief on an intermittent basis.She continues to use Tylenol, applying ice and elevating the lower extremity. Patient is using Macrobid 2 x day for treatment of UTI. Her last dose is tomorrow [de-identified] : The patient has stable antalgic gait utilizing a cane. The left anterior hip incision is with 3 open area approximately 1 cm long 1/2 cm deep .There is (+) swelling noted to the lateral aspect of the left hip. (+) drainage with pressure to the swollen area. The incision site is with minimal redness and warmth .. The hip flexes to 90 degrees with minimal discomfort. The external and internal rotation of the left hip is with minimal discomfort and stiffness. Leg lengths appear equal.  [de-identified] : No radiographs were obtained at todays visit [de-identified] : Tod dressing was applied to the left anterior hip incision utilizing sterile technique. Incisional care was reviewed with the patient. The patient was advised of the nature of the healing process. \par The patient was advised to hold outpatient physical therapy and to continue to perform home exercises as recommended. Patient will start Keflex 500  mg 3 x day x 7 days\par Patient will continue to use Tylenol as needed for analgesic relief. Patient will  follow up in 1 week.. Patient verbalized understanding of all instructions and all of her questions were addressed to  her satisfaction. The patient was advised to call the office with any further questions or concerns.\par \par

## 2023-04-27 LAB — BACTERIA UR CULT: ABNORMAL

## 2023-05-02 ENCOUNTER — APPOINTMENT (OUTPATIENT)
Dept: ORTHOPEDIC SURGERY | Facility: CLINIC | Age: 83
End: 2023-05-02
Payer: MEDICARE

## 2023-05-02 VITALS — HEIGHT: 57 IN | DIASTOLIC BLOOD PRESSURE: 75 MMHG | SYSTOLIC BLOOD PRESSURE: 149 MMHG | HEART RATE: 67 BPM

## 2023-05-02 PROCEDURE — 99024 POSTOP FOLLOW-UP VISIT: CPT

## 2023-05-02 NOTE — HISTORY OF PRESENT ILLNESS
[___ Months Post Op] : [unfilled] months post op [7] : the patient reports pain that is 7/10 in severity [Chills] : no chills [Constipation] : no constipation [Diarrhea] : no diarrhea [Dysuria] : no dysuria [Fever] : no fever [Nausea] : no nausea [Vomiting] : no vomiting [Clean/Dry/Intact] : clean, dry and intact [Erythema] : erythematous [Discharge] : noted to have a ~M discharge [Scant] : scant [Clear] : clear [Serosanguineous] : serosanguineous [Swelling] : swollen [Dehiscence] : dehisced [Neuro Intact] : an unremarkable neurological exam [Vascular Intact] : ~T peripheral vascular exam normal [Negative Lita's] : maneuvers demonstrated a negative Lita's sign [Doing Well] : is doing well [No Sign of Infection] : is showing no signs of infection [Adequate Pain Control] : has adequate pain control [de-identified] : The patient is an 82 yr old female presents today for wound check  She is s/p Left Anterior Total Hip Replacement performed at [facility] on 3/27/2023 [de-identified] :  She presents with her daughter for evaluation. She presents for wound check to the left anterior hip incision. The Tod dressing to the left anterior hip incision is intact - moderate drainage noted to the dressing. .She is doing well ambulating with a cane. She is performing home exercises. The patient reports pain of 7/10 wore with sitting 10/10 .She is taking Tylenol for analgesia relief on an intermittent basis.She continues to use Tylenol, applying ice and elevating the lower extremity. Patient is using Keflex 500 mg twice a day [de-identified] : The patient has stable antalgic gait utilizing a cane. The left anterior hip incision is with 3 open area approximately 1 cm long 1/2 cm deep .There is less swelling noted to the lateral aspect of the left hip. no drainage with pressure to the swollen area. The incision site is with minimal redness and warmth .. The hip flexes to 90 degrees with minimal discomfort. The external and internal rotation of the left hip is with minimal discomfort and stiffness. Leg lengths appear equal.  [de-identified] : No radiographs were obtained at todays visit [de-identified] : Juanita dressing was removed - the incisions with 3 open wounds are improving - a juanita dressing was re- applied to the left anterior hip incision utilizing sterile technique. Incisional care was reviewed with the patient. The patient was advised of the nature of the healing process. \par The patient was advised to continue to hold outpatient physical therapy and to continue to perform home exercises as recommended. Patient will stop Keflex and will start Bactrim DS 1 tab 2x day x 7 days. Dr Dinero was present to evaluate wounds and agrees with the plan of care\par Patient will continue to use Tylenol as needed for analgesic relief. Patient will  follow up in 1 week.. Patient verbalized understanding of all instructions and all of her questions were addressed to  her satisfaction. The patient was advised to call the office with any further questions or concerns.\par \par

## 2023-05-04 ENCOUNTER — APPOINTMENT (OUTPATIENT)
Dept: INTERNAL MEDICINE | Facility: CLINIC | Age: 83
End: 2023-05-04
Payer: MEDICARE

## 2023-05-04 VITALS
SYSTOLIC BLOOD PRESSURE: 159 MMHG | RESPIRATION RATE: 16 BRPM | OXYGEN SATURATION: 98 % | BODY MASS INDEX: 31.5 KG/M2 | WEIGHT: 146 LBS | HEIGHT: 57 IN | DIASTOLIC BLOOD PRESSURE: 77 MMHG | TEMPERATURE: 97.3 F | HEART RATE: 71 BPM

## 2023-05-04 VITALS — DIASTOLIC BLOOD PRESSURE: 70 MMHG | SYSTOLIC BLOOD PRESSURE: 120 MMHG

## 2023-05-04 PROCEDURE — 99214 OFFICE O/P EST MOD 30 MIN: CPT

## 2023-05-04 RX ORDER — LOSARTAN POTASSIUM 25 MG/1
25 TABLET, FILM COATED ORAL DAILY
Qty: 90 | Refills: 3 | Status: DISCONTINUED | COMMUNITY
Start: 2021-10-05 | End: 2023-05-04

## 2023-05-04 NOTE — REVIEW OF SYSTEMS
[Fatigue] : fatigue [Joint Pain] : joint pain [Joint Stiffness] : joint stiffness [Muscle Pain] : muscle pain [Unsteady Walking] : ataxia [Negative] : Heme/Lymph

## 2023-05-04 NOTE — ASSESSMENT
[FreeTextEntry1] : 82 Y OLD FEM WITH PMX OF HTN ,HYPOTHYROID MYASTHENIA GRAVIS,S/P LEFT HIP SURGERY AND S/P RECENT UTI = LABS ORDERED\par RTO 3 M

## 2023-05-04 NOTE — HISTORY OF PRESENT ILLNESS
[de-identified] : COMES FOR F/UN \par S/P LEFT HIP SURGERY ,COMPLICATED WITH COVID -19 RESP FAILURE \par RECENT UTI 2 WEEKS AGO ,RX NITROFURANTOIN

## 2023-05-04 NOTE — COUNSELING
[Fall prevention counseling provided] : Fall prevention counseling provided [Use recommended devices] : Use recommended devices [Benefits of weight loss discussed] : Benefits of weight loss discussed [Encouraged to increase physical activity] : Encouraged to increase physical activity Detail Level: Detailed Render Note In Bullet Format When Appropriate: No Consent: The patient's consent was obtained including but not limited to risks of crusting, scabbing, blistering, scarring, darker or lighter pigmentary change, recurrence, incomplete removal and infection. Show Aperture Variable?: Yes Duration Of Freeze Thaw-Cycle (Seconds): 0 Post-Care Instructions: I reviewed with the patient in detail post-care instructions. Patient is to wear sunprotection, and avoid picking at any of the treated lesions. Pt may apply Vaseline to crusted or scabbing areas.

## 2023-05-04 NOTE — PHYSICAL EXAM
[No Acute Distress] : no acute distress [Normal] : no carotid or abdominal bruits heard, no varicosities, pedal pulses are present, no peripheral edema, no extremity clubbing or cyanosis and no palpable aorta [Soft] : abdomen soft [Non Tender] : non-tender [Normal Bowel Sounds] : normal bowel sounds [Coordination Grossly Intact] : coordination grossly intact [Alert and Oriented x3] : oriented to person, place, and time

## 2023-05-05 LAB
ALBUMIN SERPL ELPH-MCNC: 4 G/DL
ALP BLD-CCNC: 102 U/L
ALT SERPL-CCNC: 19 U/L
ANION GAP SERPL CALC-SCNC: 10 MMOL/L
APPEARANCE: CLEAR
AST SERPL-CCNC: 28 U/L
BASOPHILS # BLD AUTO: 0.02 K/UL
BASOPHILS NFR BLD AUTO: 0.3 %
BILIRUB SERPL-MCNC: 0.2 MG/DL
BILIRUBIN URINE: NEGATIVE
BLOOD URINE: NEGATIVE
BUN SERPL-MCNC: 13 MG/DL
CALCIUM SERPL-MCNC: 9.2 MG/DL
CHLORIDE SERPL-SCNC: 110 MMOL/L
CHOLEST SERPL-MCNC: 155 MG/DL
CO2 SERPL-SCNC: 23 MMOL/L
COLOR: YELLOW
CREAT SERPL-MCNC: 0.83 MG/DL
EGFR: 69 ML/MIN/1.73M2
EOSINOPHIL # BLD AUTO: 0.11 K/UL
EOSINOPHIL NFR BLD AUTO: 1.8 %
GLUCOSE QUALITATIVE U: NEGATIVE MG/DL
GLUCOSE SERPL-MCNC: 95 MG/DL
HCT VFR BLD CALC: 32 %
HDLC SERPL-MCNC: 43 MG/DL
HGB BLD-MCNC: 9.7 G/DL
IMM GRANULOCYTES NFR BLD AUTO: 0.3 %
KETONES URINE: NEGATIVE MG/DL
LDLC SERPL CALC-MCNC: 90 MG/DL
LEUKOCYTE ESTERASE URINE: NEGATIVE
LYMPHOCYTES # BLD AUTO: 1.42 K/UL
LYMPHOCYTES NFR BLD AUTO: 23.3 %
MAN DIFF?: NORMAL
MCHC RBC-ENTMCNC: 29.9 PG
MCHC RBC-ENTMCNC: 30.3 GM/DL
MCV RBC AUTO: 98.8 FL
MONOCYTES # BLD AUTO: 0.48 K/UL
MONOCYTES NFR BLD AUTO: 7.9 %
NEUTROPHILS # BLD AUTO: 4.04 K/UL
NEUTROPHILS NFR BLD AUTO: 66.4 %
NITRITE URINE: NEGATIVE
NONHDLC SERPL-MCNC: 112 MG/DL
PH URINE: 6
PLATELET # BLD AUTO: 198 K/UL
POTASSIUM SERPL-SCNC: 3.8 MMOL/L
PROT SERPL-MCNC: 6.2 G/DL
PROTEIN URINE: NEGATIVE MG/DL
RBC # BLD: 3.24 M/UL
RBC # FLD: 14.1 %
SODIUM SERPL-SCNC: 143 MMOL/L
SPECIFIC GRAVITY URINE: 1.02
TRIGL SERPL-MCNC: 107 MG/DL
TSH SERPL-ACNC: 2.3 UIU/ML
UROBILINOGEN URINE: 0.2 MG/DL
WBC # FLD AUTO: 6.09 K/UL

## 2023-05-09 ENCOUNTER — APPOINTMENT (OUTPATIENT)
Dept: ORTHOPEDIC SURGERY | Facility: CLINIC | Age: 83
End: 2023-05-09
Payer: MEDICARE

## 2023-05-09 VITALS — SYSTOLIC BLOOD PRESSURE: 118 MMHG | DIASTOLIC BLOOD PRESSURE: 69 MMHG | TEMPERATURE: 98 F | HEART RATE: 64 BPM

## 2023-05-09 PROCEDURE — 99024 POSTOP FOLLOW-UP VISIT: CPT

## 2023-05-09 NOTE — HISTORY OF PRESENT ILLNESS
[Doing Well] : is doing well [___ Months Post Op] : [unfilled] months post op [7] : the patient reports pain that is 7/10 in severity [Chills] : no chills [Constipation] : no constipation [Diarrhea] : no diarrhea [Dysuria] : no dysuria [Fever] : no fever [Nausea] : no nausea [Vomiting] : no vomiting [Clean/Dry/Intact] : clean, dry and intact [Erythema] : erythematous [Discharge] : noted to have a ~M discharge [Scant] : scant [Clear] : clear [Serosanguineous] : serosanguineous [Swelling] : swollen [Dehiscence] : dehisced [Neuro Intact] : an unremarkable neurological exam [Vascular Intact] : ~T peripheral vascular exam normal [Negative Lita's] : maneuvers demonstrated a negative Lita's sign [No Sign of Infection] : is showing no signs of infection [Adequate Pain Control] : has adequate pain control [de-identified] : The patient is an 82 yr old female presents today for wound check  She is s/p Left Anterior Total Hip Replacement performed at [facility] on 3/27/2023 [de-identified] :  She presents with her daughter for evaluation. She presents for wound check to the left anterior hip incision. The Tod dressing to the left anterior hip incision is intact - moderate drainage noted to the dressing. .She is doing well ambulating with a cane. She is performing home exercises. The patient reports pain of 7/10 wore with sitting 10/10 .She is taking Tylenol for analgesia relief on an intermittent basis.She continues to use Tylenol, applying ice and elevating the lower extremity. Patient is using Bactrim DS twice a day\par Patient was evaluated with her PCP yesterday - feeeling better than last week. [de-identified] : The patient has stable antalgic gait utilizing a cane. The left anterior hip incision is with 3 open area approximately 1 cm long 1/2 cm deep .There is less swelling noted to the lateral aspect of the left hip. no drainage with pressure to the swollen area. The incision site is with minimal redness and warmth .. The hip flexes to 90 degrees with minimal discomfort. The external and internal rotation of the left hip is with minimal discomfort and stiffness. Leg lengths appear equal.  [de-identified] : No radiographs were obtained at todays visit [de-identified] : Juanita dressing was removed - the incisions with 3 open wounds are improving - a juanita dressing was re- applied to the left anterior hip incision utilizing sterile technique. Incisional care was reviewed with the patient. The patient was advised of the nature of the healing process. \par The patient was advised to continue to hold outpatient physical therapy and to continue to perform home exercises as recommended. Patient will continue Bactrim DS 1 tab 2x day for an additional 7 days. Dr Dinero was present to evaluate wounds and agrees with the plan of care\par Patient will continue to use Tylenol as needed for analgesic relief. Patient will  follow up in 1 week.. Patient verbalized understanding of all instructions and all of her questions were addressed to  her satisfaction. The patient was advised to call the office with any further questions or concerns.\par \par

## 2023-05-10 ENCOUNTER — APPOINTMENT (OUTPATIENT)
Dept: NEUROLOGY | Facility: CLINIC | Age: 83
End: 2023-05-10
Payer: MEDICARE

## 2023-05-10 PROCEDURE — 96365 THER/PROPH/DIAG IV INF INIT: CPT

## 2023-05-16 ENCOUNTER — APPOINTMENT (OUTPATIENT)
Dept: ORTHOPEDIC SURGERY | Facility: CLINIC | Age: 83
End: 2023-05-16

## 2023-05-16 ENCOUNTER — APPOINTMENT (OUTPATIENT)
Dept: ORTHOPEDIC SURGERY | Facility: CLINIC | Age: 83
End: 2023-05-16
Payer: MEDICARE

## 2023-05-16 VITALS — HEART RATE: 73 BPM | DIASTOLIC BLOOD PRESSURE: 75 MMHG | SYSTOLIC BLOOD PRESSURE: 127 MMHG | TEMPERATURE: 98.2 F

## 2023-05-16 DIAGNOSIS — T81.31XA DISRUPTION OF EXTERNAL OPERATION (SURGICAL) WOUND, NOT ELSEWHERE CLASSIFIED, INITIAL ENCOUNTER: ICD-10-CM

## 2023-05-16 PROCEDURE — 99024 POSTOP FOLLOW-UP VISIT: CPT

## 2023-05-17 ENCOUNTER — TRANSCRIPTION ENCOUNTER (OUTPATIENT)
Age: 83
End: 2023-05-17

## 2023-05-18 ENCOUNTER — INPATIENT (INPATIENT)
Facility: HOSPITAL | Age: 83
LOS: 5 days | Discharge: HOME | DRG: 857 | End: 2023-05-24
Attending: HOSPITALIST | Admitting: HOSPITALIST
Payer: MEDICARE

## 2023-05-18 ENCOUNTER — TRANSCRIPTION ENCOUNTER (OUTPATIENT)
Age: 83
End: 2023-05-18

## 2023-05-18 VITALS
SYSTOLIC BLOOD PRESSURE: 140 MMHG | RESPIRATION RATE: 14 BRPM | OXYGEN SATURATION: 98 % | HEART RATE: 62 BPM | TEMPERATURE: 98 F | HEIGHT: 57 IN | WEIGHT: 149.91 LBS | DIASTOLIC BLOOD PRESSURE: 81 MMHG

## 2023-05-18 DIAGNOSIS — Z96.641 PRESENCE OF RIGHT ARTIFICIAL HIP JOINT: Chronic | ICD-10-CM

## 2023-05-18 DIAGNOSIS — T81.40XA INFECTION FOLLOWING A PROCEDURE, UNSPECIFIED, INITIAL ENCOUNTER: ICD-10-CM

## 2023-05-18 DIAGNOSIS — Z96.619 PRESENCE OF UNSPECIFIED ARTIFICIAL SHOULDER JOINT: Chronic | ICD-10-CM

## 2023-05-18 DIAGNOSIS — Z98.890 OTHER SPECIFIED POSTPROCEDURAL STATES: Chronic | ICD-10-CM

## 2023-05-18 DIAGNOSIS — Z90.89 ACQUIRED ABSENCE OF OTHER ORGANS: Chronic | ICD-10-CM

## 2023-05-18 LAB
ALBUMIN SERPL ELPH-MCNC: 3.4 G/DL — SIGNIFICANT CHANGE UP (ref 3.3–5)
ALP SERPL-CCNC: 100 U/L — SIGNIFICANT CHANGE UP (ref 30–120)
ALT FLD-CCNC: 32 U/L DA — SIGNIFICANT CHANGE UP (ref 10–60)
ANION GAP SERPL CALC-SCNC: 6 MMOL/L — SIGNIFICANT CHANGE UP (ref 5–17)
APPEARANCE UR: CLEAR — SIGNIFICANT CHANGE UP
APTT BLD: 27.5 SEC — SIGNIFICANT CHANGE UP (ref 27.5–35.5)
AST SERPL-CCNC: 38 U/L — SIGNIFICANT CHANGE UP (ref 10–40)
BASOPHILS # BLD AUTO: 0.03 K/UL — SIGNIFICANT CHANGE UP (ref 0–0.2)
BASOPHILS NFR BLD AUTO: 0.8 % — SIGNIFICANT CHANGE UP (ref 0–2)
BILIRUB SERPL-MCNC: 0.2 MG/DL — SIGNIFICANT CHANGE UP (ref 0.2–1.2)
BILIRUB UR-MCNC: NEGATIVE — SIGNIFICANT CHANGE UP
BUN SERPL-MCNC: 24 MG/DL — HIGH (ref 7–23)
CALCIUM SERPL-MCNC: 9 MG/DL — SIGNIFICANT CHANGE UP (ref 8.4–10.5)
CHLORIDE SERPL-SCNC: 105 MMOL/L — SIGNIFICANT CHANGE UP (ref 96–108)
CO2 SERPL-SCNC: 25 MMOL/L — SIGNIFICANT CHANGE UP (ref 22–31)
COLOR SPEC: YELLOW — SIGNIFICANT CHANGE UP
CREAT SERPL-MCNC: 0.9 MG/DL — SIGNIFICANT CHANGE UP (ref 0.5–1.3)
CRP SERPL-MCNC: <3 MG/L — SIGNIFICANT CHANGE UP
DIFF PNL FLD: NEGATIVE — SIGNIFICANT CHANGE UP
EGFR: 63 ML/MIN/1.73M2 — SIGNIFICANT CHANGE UP
EOSINOPHIL # BLD AUTO: 0.09 K/UL — SIGNIFICANT CHANGE UP (ref 0–0.5)
EOSINOPHIL NFR BLD AUTO: 2.5 % — SIGNIFICANT CHANGE UP (ref 0–6)
ERYTHROCYTE [SEDIMENTATION RATE] IN BLOOD: 34 MM/HR — HIGH (ref 0–20)
GLUCOSE SERPL-MCNC: 98 MG/DL — SIGNIFICANT CHANGE UP (ref 70–99)
GLUCOSE UR QL: NEGATIVE MG/DL — SIGNIFICANT CHANGE UP
HCT VFR BLD CALC: 33.2 % — LOW (ref 34.5–45)
HGB BLD-MCNC: 10.6 G/DL — LOW (ref 11.5–15.5)
IMM GRANULOCYTES NFR BLD AUTO: 0.3 % — SIGNIFICANT CHANGE UP (ref 0–0.9)
INR BLD: 0.96 RATIO — SIGNIFICANT CHANGE UP (ref 0.88–1.16)
KETONES UR-MCNC: NEGATIVE MG/DL — SIGNIFICANT CHANGE UP
LACTATE SERPL-SCNC: 0.7 MMOL/L — SIGNIFICANT CHANGE UP (ref 0.7–2)
LEUKOCYTE ESTERASE UR-ACNC: NEGATIVE — SIGNIFICANT CHANGE UP
LYMPHOCYTES # BLD AUTO: 1.12 K/UL — SIGNIFICANT CHANGE UP (ref 1–3.3)
LYMPHOCYTES # BLD AUTO: 31 % — SIGNIFICANT CHANGE UP (ref 13–44)
MCHC RBC-ENTMCNC: 30 PG — SIGNIFICANT CHANGE UP (ref 27–34)
MCHC RBC-ENTMCNC: 31.9 GM/DL — LOW (ref 32–36)
MCV RBC AUTO: 94.1 FL — SIGNIFICANT CHANGE UP (ref 80–100)
MONOCYTES # BLD AUTO: 0.48 K/UL — SIGNIFICANT CHANGE UP (ref 0–0.9)
MONOCYTES NFR BLD AUTO: 13.3 % — SIGNIFICANT CHANGE UP (ref 2–14)
NEUTROPHILS # BLD AUTO: 1.88 K/UL — SIGNIFICANT CHANGE UP (ref 1.8–7.4)
NEUTROPHILS NFR BLD AUTO: 52.1 % — SIGNIFICANT CHANGE UP (ref 43–77)
NITRITE UR-MCNC: NEGATIVE — SIGNIFICANT CHANGE UP
NRBC # BLD: 0 /100 WBCS — SIGNIFICANT CHANGE UP (ref 0–0)
PH UR: 5 — SIGNIFICANT CHANGE UP (ref 5–8)
PLATELET # BLD AUTO: 147 K/UL — LOW (ref 150–400)
POTASSIUM SERPL-MCNC: 4.4 MMOL/L — SIGNIFICANT CHANGE UP (ref 3.5–5.3)
POTASSIUM SERPL-SCNC: 4.4 MMOL/L — SIGNIFICANT CHANGE UP (ref 3.5–5.3)
PROT SERPL-MCNC: 7.1 G/DL — SIGNIFICANT CHANGE UP (ref 6–8.3)
PROT UR-MCNC: NEGATIVE MG/DL — SIGNIFICANT CHANGE UP
PROTHROM AB SERPL-ACNC: 11 SEC — SIGNIFICANT CHANGE UP (ref 10.5–13.4)
RBC # BLD: 3.53 M/UL — LOW (ref 3.8–5.2)
RBC # FLD: 14.5 % — SIGNIFICANT CHANGE UP (ref 10.3–14.5)
SODIUM SERPL-SCNC: 136 MMOL/L — SIGNIFICANT CHANGE UP (ref 135–145)
SP GR SPEC: 1.02 — SIGNIFICANT CHANGE UP (ref 1–1.03)
UROBILINOGEN FLD QL: 0.2 MG/DL — SIGNIFICANT CHANGE UP (ref 0.2–1)
WBC # BLD: 3.61 K/UL — LOW (ref 3.8–10.5)
WBC # FLD AUTO: 3.61 K/UL — LOW (ref 3.8–10.5)

## 2023-05-18 PROCEDURE — 99222 1ST HOSP IP/OBS MODERATE 55: CPT

## 2023-05-18 PROCEDURE — 13121 CMPLX RPR S/A/L 2.6-7.5 CM: CPT | Mod: 79,LT

## 2023-05-18 PROCEDURE — ZZZZZ: CPT

## 2023-05-18 PROCEDURE — 93010 ELECTROCARDIOGRAM REPORT: CPT

## 2023-05-18 PROCEDURE — 11042 DBRDMT SUBQ TIS 1ST 20SQCM/<: CPT | Mod: 79,LT

## 2023-05-18 PROCEDURE — 71045 X-RAY EXAM CHEST 1 VIEW: CPT | Mod: 26

## 2023-05-18 PROCEDURE — 73700 CT LOWER EXTREMITY W/O DYE: CPT | Mod: 26,LT,MA

## 2023-05-18 PROCEDURE — 99285 EMERGENCY DEPT VISIT HI MDM: CPT

## 2023-05-18 DEVICE — BONE WAX 2.5GM: Type: IMPLANTABLE DEVICE | Status: FUNCTIONAL

## 2023-05-18 RX ORDER — HYDROMORPHONE HYDROCHLORIDE 2 MG/ML
1 INJECTION INTRAMUSCULAR; INTRAVENOUS; SUBCUTANEOUS
Refills: 0 | Status: DISCONTINUED | OUTPATIENT
Start: 2023-05-18 | End: 2023-05-18

## 2023-05-18 RX ORDER — ACETAMINOPHEN 500 MG
650 TABLET ORAL EVERY 6 HOURS
Refills: 0 | Status: DISCONTINUED | OUTPATIENT
Start: 2023-05-19 | End: 2023-05-24

## 2023-05-18 RX ORDER — SODIUM CHLORIDE 9 MG/ML
1000 INJECTION, SOLUTION INTRAVENOUS
Refills: 0 | Status: DISCONTINUED | OUTPATIENT
Start: 2023-05-18 | End: 2023-05-18

## 2023-05-18 RX ORDER — TRAMADOL HYDROCHLORIDE 50 MG/1
100 TABLET ORAL EVERY 6 HOURS
Refills: 0 | Status: DISCONTINUED | OUTPATIENT
Start: 2023-05-18 | End: 2023-05-24

## 2023-05-18 RX ORDER — TRANEXAMIC ACID 100 MG/ML
1000 INJECTION, SOLUTION INTRAVENOUS ONCE
Refills: 0 | Status: COMPLETED | OUTPATIENT
Start: 2023-05-18 | End: 2023-05-18

## 2023-05-18 RX ORDER — ONDANSETRON 8 MG/1
4 TABLET, FILM COATED ORAL EVERY 6 HOURS
Refills: 0 | Status: DISCONTINUED | OUTPATIENT
Start: 2023-05-18 | End: 2023-05-24

## 2023-05-18 RX ORDER — POLYETHYLENE GLYCOL 3350 17 G/17G
17 POWDER, FOR SOLUTION ORAL AT BEDTIME
Refills: 0 | Status: DISCONTINUED | OUTPATIENT
Start: 2023-05-18 | End: 2023-05-24

## 2023-05-18 RX ORDER — ACETAMINOPHEN 500 MG
1000 TABLET ORAL ONCE
Refills: 0 | Status: COMPLETED | OUTPATIENT
Start: 2023-05-19 | End: 2023-05-19

## 2023-05-18 RX ORDER — PYRIDOSTIGMINE BROMIDE 60 MG/5ML
60 SOLUTION ORAL THREE TIMES A DAY
Refills: 0 | Status: DISCONTINUED | OUTPATIENT
Start: 2023-05-18 | End: 2023-05-18

## 2023-05-18 RX ORDER — ALBUTEROL 90 UG/1
2 AEROSOL, METERED ORAL EVERY 6 HOURS
Refills: 0 | Status: DISCONTINUED | OUTPATIENT
Start: 2023-05-18 | End: 2023-05-24

## 2023-05-18 RX ORDER — ATORVASTATIN CALCIUM 80 MG/1
20 TABLET, FILM COATED ORAL AT BEDTIME
Refills: 0 | Status: DISCONTINUED | OUTPATIENT
Start: 2023-05-18 | End: 2023-05-18

## 2023-05-18 RX ORDER — IPRATROPIUM/ALBUTEROL SULFATE 18-103MCG
3 AEROSOL WITH ADAPTER (GRAM) INHALATION EVERY 6 HOURS
Refills: 0 | Status: DISCONTINUED | OUTPATIENT
Start: 2023-05-18 | End: 2023-05-24

## 2023-05-18 RX ORDER — HYDROMORPHONE HYDROCHLORIDE 2 MG/ML
0.5 INJECTION INTRAMUSCULAR; INTRAVENOUS; SUBCUTANEOUS
Refills: 0 | Status: DISCONTINUED | OUTPATIENT
Start: 2023-05-18 | End: 2023-05-18

## 2023-05-18 RX ORDER — SODIUM CHLORIDE 9 MG/ML
1000 INJECTION, SOLUTION INTRAVENOUS
Refills: 0 | Status: DISCONTINUED | OUTPATIENT
Start: 2023-05-18 | End: 2023-05-19

## 2023-05-18 RX ORDER — LOSARTAN POTASSIUM 100 MG/1
25 TABLET, FILM COATED ORAL DAILY
Refills: 0 | Status: DISCONTINUED | OUTPATIENT
Start: 2023-05-18 | End: 2023-05-18

## 2023-05-18 RX ORDER — LEVOTHYROXINE SODIUM 125 MCG
75 TABLET ORAL DAILY
Refills: 0 | Status: DISCONTINUED | OUTPATIENT
Start: 2023-05-18 | End: 2023-05-18

## 2023-05-18 RX ORDER — LOSARTAN POTASSIUM 100 MG/1
25 TABLET, FILM COATED ORAL DAILY
Refills: 0 | Status: DISCONTINUED | OUTPATIENT
Start: 2023-05-19 | End: 2023-05-24

## 2023-05-18 RX ORDER — HYDROMORPHONE HYDROCHLORIDE 2 MG/ML
0.5 INJECTION INTRAMUSCULAR; INTRAVENOUS; SUBCUTANEOUS
Refills: 0 | Status: DISCONTINUED | OUTPATIENT
Start: 2023-05-18 | End: 2023-05-24

## 2023-05-18 RX ORDER — PYRIDOSTIGMINE BROMIDE 60 MG/5ML
180 SOLUTION ORAL DAILY
Refills: 0 | Status: DISCONTINUED | OUTPATIENT
Start: 2023-05-18 | End: 2023-05-18

## 2023-05-18 RX ORDER — ONDANSETRON 8 MG/1
4 TABLET, FILM COATED ORAL ONCE
Refills: 0 | Status: DISCONTINUED | OUTPATIENT
Start: 2023-05-18 | End: 2023-05-18

## 2023-05-18 RX ORDER — PYRIDOSTIGMINE BROMIDE 60 MG/5ML
180 SOLUTION ORAL DAILY
Refills: 0 | Status: DISCONTINUED | OUTPATIENT
Start: 2023-05-18 | End: 2023-05-24

## 2023-05-18 RX ORDER — ALBUTEROL 90 UG/1
2 AEROSOL, METERED ORAL EVERY 4 HOURS
Refills: 0 | Status: DISCONTINUED | OUTPATIENT
Start: 2023-05-18 | End: 2023-05-18

## 2023-05-18 RX ORDER — OXYCODONE HYDROCHLORIDE 5 MG/1
5 TABLET ORAL
Refills: 0 | Status: DISCONTINUED | OUTPATIENT
Start: 2023-05-18 | End: 2023-05-24

## 2023-05-18 RX ORDER — CELECOXIB 200 MG/1
200 CAPSULE ORAL EVERY 12 HOURS
Refills: 0 | Status: DISCONTINUED | OUTPATIENT
Start: 2023-05-18 | End: 2023-05-24

## 2023-05-18 RX ORDER — SENNA PLUS 8.6 MG/1
2 TABLET ORAL AT BEDTIME
Refills: 0 | Status: DISCONTINUED | OUTPATIENT
Start: 2023-05-18 | End: 2023-05-18

## 2023-05-18 RX ORDER — CEFAZOLIN SODIUM 1 G
2000 VIAL (EA) INJECTION EVERY 8 HOURS
Refills: 0 | Status: DISCONTINUED | OUTPATIENT
Start: 2023-05-19 | End: 2023-05-19

## 2023-05-18 RX ORDER — SODIUM CHLORIDE 9 MG/ML
1000 INJECTION INTRAMUSCULAR; INTRAVENOUS; SUBCUTANEOUS ONCE
Refills: 0 | Status: COMPLETED | OUTPATIENT
Start: 2023-05-18 | End: 2023-05-18

## 2023-05-18 RX ORDER — PANTOPRAZOLE SODIUM 20 MG/1
40 TABLET, DELAYED RELEASE ORAL
Refills: 0 | Status: DISCONTINUED | OUTPATIENT
Start: 2023-05-18 | End: 2023-05-24

## 2023-05-18 RX ORDER — LEVOTHYROXINE SODIUM 125 MCG
75 TABLET ORAL DAILY
Refills: 0 | Status: DISCONTINUED | OUTPATIENT
Start: 2023-05-18 | End: 2023-05-24

## 2023-05-18 RX ORDER — PYRIDOSTIGMINE BROMIDE 60 MG/5ML
60 SOLUTION ORAL
Refills: 0 | Status: DISCONTINUED | OUTPATIENT
Start: 2023-05-18 | End: 2023-05-24

## 2023-05-18 RX ORDER — TRAMADOL HYDROCHLORIDE 50 MG/1
50 TABLET ORAL EVERY 4 HOURS
Refills: 0 | Status: DISCONTINUED | OUTPATIENT
Start: 2023-05-18 | End: 2023-05-24

## 2023-05-18 RX ORDER — BUDESONIDE AND FORMOTEROL FUMARATE DIHYDRATE 160; 4.5 UG/1; UG/1
2 AEROSOL RESPIRATORY (INHALATION)
Refills: 0 | Status: DISCONTINUED | OUTPATIENT
Start: 2023-05-18 | End: 2023-05-24

## 2023-05-18 RX ORDER — ACETAMINOPHEN 500 MG
650 TABLET ORAL EVERY 6 HOURS
Refills: 0 | Status: DISCONTINUED | OUTPATIENT
Start: 2023-05-18 | End: 2023-05-18

## 2023-05-18 RX ORDER — SENNA PLUS 8.6 MG/1
2 TABLET ORAL AT BEDTIME
Refills: 0 | Status: DISCONTINUED | OUTPATIENT
Start: 2023-05-18 | End: 2023-05-24

## 2023-05-18 RX ORDER — MAGNESIUM HYDROXIDE 400 MG/1
30 TABLET, CHEWABLE ORAL DAILY
Refills: 0 | Status: DISCONTINUED | OUTPATIENT
Start: 2023-05-18 | End: 2023-05-24

## 2023-05-18 RX ORDER — ATORVASTATIN CALCIUM 80 MG/1
80 TABLET, FILM COATED ORAL AT BEDTIME
Refills: 0 | Status: DISCONTINUED | OUTPATIENT
Start: 2023-05-18 | End: 2023-05-24

## 2023-05-18 RX ORDER — APIXABAN 2.5 MG/1
2.5 TABLET, FILM COATED ORAL EVERY 12 HOURS
Refills: 0 | Status: DISCONTINUED | OUTPATIENT
Start: 2023-05-19 | End: 2023-05-24

## 2023-05-18 RX ORDER — ACETAMINOPHEN 500 MG
1000 TABLET ORAL ONCE
Refills: 0 | Status: COMPLETED | OUTPATIENT
Start: 2023-05-18 | End: 2023-05-18

## 2023-05-18 RX ADMIN — ATORVASTATIN CALCIUM 80 MILLIGRAM(S): 80 TABLET, FILM COATED ORAL at 23:25

## 2023-05-18 RX ADMIN — HYDROMORPHONE HYDROCHLORIDE 0.5 MILLIGRAM(S): 2 INJECTION INTRAMUSCULAR; INTRAVENOUS; SUBCUTANEOUS at 21:21

## 2023-05-18 RX ADMIN — SENNA PLUS 2 TABLET(S): 8.6 TABLET ORAL at 23:25

## 2023-05-18 RX ADMIN — Medication 3 MILLILITER(S): at 21:21

## 2023-05-18 RX ADMIN — HYDROMORPHONE HYDROCHLORIDE 0.5 MILLIGRAM(S): 2 INJECTION INTRAMUSCULAR; INTRAVENOUS; SUBCUTANEOUS at 21:53

## 2023-05-18 RX ADMIN — SODIUM CHLORIDE 1000 MILLILITER(S): 9 INJECTION INTRAMUSCULAR; INTRAVENOUS; SUBCUTANEOUS at 07:14

## 2023-05-18 RX ADMIN — HYDROMORPHONE HYDROCHLORIDE 0.5 MILLIGRAM(S): 2 INJECTION INTRAMUSCULAR; INTRAVENOUS; SUBCUTANEOUS at 22:21

## 2023-05-18 RX ADMIN — SODIUM CHLORIDE 75 MILLILITER(S): 9 INJECTION, SOLUTION INTRAVENOUS at 21:15

## 2023-05-18 RX ADMIN — HYDROMORPHONE HYDROCHLORIDE 0.5 MILLIGRAM(S): 2 INJECTION INTRAMUSCULAR; INTRAVENOUS; SUBCUTANEOUS at 22:11

## 2023-05-18 NOTE — PRE-OP CHECKLIST - BP NONINVASIVE SYSTOLIC (MM HG)
March 31, 2020      Andrzej Shah       [address] [id number]              Dear Andrzej,     The following information is missing or is incorrect on your Advance Directive document. This information is required by the [facility] to make this a legal document after activation.    · The dates on patient and witnesses signatures are not identical as required.    Please have the missing or incorrect information corrected and sent back to Dr Grover or to any other physician or healthcare facility you have given a copy to have a correct/legal document filed in your medical record.    If you have further questions, please call [phone].    Sincerely,         Trista Grover MD  [address] [address] [phone]      [facility] offers online access to your health information via [email or web address] .   By registering for [employer] you will be able to view test results, pay your bill, send messages to your care team (not for immediate response), refill prescriptions, schedule and verify appointments.     [phone]

## 2023-05-18 NOTE — ED PROVIDER NOTE - NSICDXPASTMEDICALHX_GEN_ALL_CORE_FT
PAST MEDICAL HISTORY:  Aortic stenosis, mild asper daughter    Diverticulitis large intestine denies any recent exacerbations    GERD (Gastroesophageal Reflux Disease) hiatal hernia    Hyperlipidemia     Hypertension     Hypothyroid     Kidney Calculi     Lumbar Radiculopathy     Lumbar stenosis     Myasthenia gravis dx 10/2018 symptoms: intermittent loss of voice/ weakness, negative ENT studies, now stable on Pyridostigmine    Osteoarthritis of left hip     Pneumonia due to COVID-19 virus     Reactive airway disease that is not asthma mild as per pulm note on [employer], patient and daughter denies any inhaler use; thought to be related to myasthena gravis    Sinus bradycardia

## 2023-05-18 NOTE — ED PROVIDER NOTE - CLINICAL SUMMARY MEDICAL DECISION MAKING FREE TEXT BOX
Patient is an 82-year-old female who presents to the emergency room for admission for an infected hip and need for OR washout.  Past medical history of aortic stenosis, history of diverticulitis, GERD, hyperlipidemia, hypertension, hypothyroidism, history of lumbar radiculopathy, lumbar stenosis, myasthenia gravis, history of OA, history of reactive airway disease, history of sinus bradycardia.  Patient was last admitted to the hospital from March 27 through March 29, 2023 with severe degenerative joint disease of the left hip for elective left total hip replacement.  Patient underwent a left anterior total hip replacement by  On March 27, 2023.  Per reports patient tolerated procedure well.  She stabilized and was discharged home.  Rosette.  She has been suffering from pain for discharge and drainage from the left hip site.  She has been following regularly with orthopedics.  She was last seen in the office on the 16th.  Family reports that she has had wound vacs applied.  Per documentation from the 16th of the TORITO dressing to the left anterior hip was intact with moderate drainage.  Patient was currently on Bactrim DS at that time.  On exam at that time the left anterior hip incision was noted with 3 open areas approximately 1 cm long and half a centimeter deep.  There is increased swelling noted to the lateral hip at that time there was notable redness and warmth to the incision site.  Per documentation the open wounds were improving but the swelling and redness are worsening and Aquacel dressing was reapplied patient was instructed to continue her current Bactrim and to present to [facility] for admission for OR washout.  Patient denies any fevers chills nausea vomiting chest pain shortness of breath or abdominal pain.  She continues to report discomfort at the site of the left hip.  Denies any extremity numbness. She does endorse continued drainage. Patient is presenting to the emergency room with concern for postoperative infection following a recent total left hip replacement.  Is been following regularly with orthopedics outpatient with no improvement.  Will obtain screening labs check UA urine culture and consult with orthopedics for additional work-up and management.  Last p.o. intake was yesterday evening. Case was reviewed with orthopedics.  At this time patient does not require x-ray imaging of the hip but they are requesting a CT noncontrast of the hip to evaluate for possible abscess.  This time per orthopedics antibiotics are to be held until the patient is taken to the OR where wound cultures can be taken.  Will obtain screening septic work-up and admit for operative intervention.  Results of labs reviewed patient with a mild leukopenia no significant electrolyte abnormality.  Independent review of EKG reveals a sinus bradycardia 56 bpm.  Independent review of chest x-ray reveals no acute infiltrate.  Patient excepted to the medicine service prior to the results of the CT medicine to follow

## 2023-05-18 NOTE — ED ADULT NURSE NOTE - NSFALLHARMRISKINTERV_ED_ALL_ED

## 2023-05-18 NOTE — PROGRESS NOTE ADULT - SUBJECTIVE AND OBJECTIVE BOX
Ortho Preop Note    Patient is a 82y old  Female with h/o aortic stenosis, diverticulosis, GERD, hyperlipidemia, hypertension, hypothryroidism, lumbar stenosis, OA, and Myastehnia Gravis  who presents with a chief complaint of drainage from left hip surgical incision for the past 3 weeks. Patient had a left total hip replacement via anterior approach by Dr. Dinero on 3/27/23. She was discharged to [facility] and later seen in Dr. Dinero's office for post-op follow up visit. Patient was subsequently seen in the office when she developed drainage from her surgical incision. She was treated with bactrim and monitored on a weekly basis. Last office visit was on . Patient was sent to the ED today by Dr. Dinero for I&D of the left hip. Denies fever, chills,  chest pain, SOB.      Physical Exam    Left hip aquacell dressing intact with mild saturation noted under dressing  No surrounding erythema or ecchymosis  Calves soft/nontender bilaterally  SILT/ EHL/Ant Tibialis 5/5/ bilaterally  DP pulses +2 bilaterally                        10.6   3.61  )-----------( 147      ( 18 May 2023 07:40 )             33.2     05-18    136  |  105  |  24<H>  ----------------------------<  98  4.4   |  25  |  0.90    Ca    9.0      18 May 2023 07:40    TPro  7.1  /  Alb  3.4  /  TBili  0.2  /  DBili  x   /  AST  38  /  ALT  32  /  AlkPhos  100  05-18    PT/INR - ( 18 May 2023 07:40 )   PT: 11.0 sec;   INR: 0.96 ratio         PTT - ( 18 May 2023 07:40 )  PTT:27.5 sec  Urinalysis Basic - ( 18 May 2023 08:10 )    Color: Yellow / Appearance: Clear / S.021 / pH: x  Gluc: x / Ketone: Negative mg/dL  / Bili: Negative / Urobili: 0.2 mg/dL   Blood: x / Protein: Negative mg/dL / Nitrite: Negative   Leuk Esterase: Negative / RBC: x / WBC x   Sq Epi: x / Non Sq Epi: x / Bacteria: x           Assessment & Plan:  82y.o Female with drainage from left hip surgical incsion  -For I&D of left hip today  Type & Screen on chart  Chest X-ray on chart  EKG on chart  NPO/IVF  Hold preop antibiotics  Medical clearance on chart  Added on to OR Schedule  Anti-coagulation held

## 2023-05-18 NOTE — H&P ADULT - HISTORY OF PRESENT ILLNESS
81 y/o F with hx of mild AS, GERD, HLD, HTN, hypothyroidism, myasthenia gravis, OA left hip, reactive airway, sinus bradycardia, she is s/p Left hip replacement done in 3/27/2023, since she has joint replaced her pain never improved,  She has been following regularly with orthopedics.  She was last seen in the office on the 16th, she has had wound vacs applied.  Per documentation from the 16th of the TORITO dressing to the left anterior hip was intact with moderate drainage, she has been given Bactrim DS at that time, she has opn wound at the surgical site, she was seen by Dr Dinero in the office  and he sent her to the hospital for further management and evaluation for possible septic arthritis, likely washout, patient denies fever, chills, chest pain, sob, dizziness.

## 2023-05-18 NOTE — ED CLERICAL - NS ED CARE COORDINATION INFORMATION
This patient is eligible for (or currently enrolled in) an outpatient care management program available through [employer]. This program can coordinate outpatient follow up and assist the patient in accessing a variety of outpatient resources.  If discharged from the ED, the patient will be contacted to see if any additional resources are needed.                                                                                    Please call the Nurse Clinical Call Center at [phone] with any questions or for assistance in discharge planning.

## 2023-05-18 NOTE — PRE-OP CHECKLIST - WAS PATIENT ON BETA BLOCKER?
PA Initiation    Medication: Aram YANG Initiated  Insurance Company: [employer] - Phone [phone] Fax [phone]  Pharmacy Filling the Rx: [facility] - [place], [place] - [facility] [address]  Filling Pharmacy Phone:    Filling Pharmacy Fax:    Start Date: 3/3/2021      
Prior Authorization Approval    Authorization Effective Date: 3/3/2021  Authorization Expiration Date: 3/3/2022  Medication: Aram YANG approved  Approved Dose/Quantity: 2/28ds  Reference #:     Insurance Company: [employer] - Phone [phone] Fax [phone]  Expected CoPay: 0     CoPay Card Available: No    Foundation Assistance Needed:    Which Pharmacy is filling the prescription (Not needed for infusion/clinic administered): [facility] - [place] [address] [address]  Pharmacy Notified: Yes  Patient Notified: Yes    
No

## 2023-05-18 NOTE — H&P ADULT - NSHPPHYSICALEXAM_GEN_ALL_CORE
Vital Signs Last 24 Hrs  T(C): 36.7 (18 May 2023 08:10), Max: 36.7 (18 May 2023 08:10)  T(F): 98 (18 May 2023 08:10), Max: 98 (18 May 2023 08:10)  HR: 65 (18 May 2023 08:10) (62 - 65)  BP: 140/81 (18 May 2023 08:10) (140/81 - 140/81)  RR: 18 (18 May 2023 08:10) (14 - 18)  SpO2: 99% (18 May 2023 08:10) (98% - 99%)        PHYSICAL EXAM:    GENERAL: Elderly female looking comfortable    HEENT: PERRL, +EOMI  NECK: soft, Supple, No JVD   CHEST/LUNG: Clear to auscultate bilaterally; No wheezing  HEART: S1S2+, Regular rate and rhythm; No murmurs  ABDOMEN: Soft, Nontender, Nondistended; Bowel sounds present  EXTREMITIES:  1+ Peripheral Pulses, No edema, left hip anterior aspect surgical wound with dressings on   SKIN: No rashes or lesions  NEURO: AAOX3  PSYCH: normal mood

## 2023-05-18 NOTE — ED PROVIDER NOTE - OBJECTIVE STATEMENT
Patient is an 82-year-old female who presents to the emergency room for admission for an infected hip and need for OR washout.  Past medical history of aortic stenosis, history of diverticulitis, GERD, hyperlipidemia, hypertension, hypothyroidism, history of lumbar radiculopathy, lumbar stenosis, myasthenia gravis, history of OA, history of reactive airway disease, history of sinus bradycardia.  Patient was last admitted to the hospital from March 27 through March 29, 2023 with severe degenerative joint disease of the left hip for elective left total hip replacement.  Patient underwent a left anterior total hip replacement by  On March 27, 2023.  Per reports patient tolerated procedure well.  She stabilized and was discharged home.  Rosette.  She has been suffering from pain for discharge and drainage from the left hip site.  She has been following regularly with orthopedics.  She was last seen in the office on the 16th.  Family reports that she has had wound vacs applied.  Per documentation from the 16th of the TORITO dressing to the left anterior hip was intact with moderate drainage.  Patient was currently on Bactrim DS at that time.  On exam at that time the left anterior hip incision was noted with 3 open areas approximately 1 cm long and half a centimeter deep.  There is increased swelling noted to the lateral hip at that time there was notable redness and warmth to the incision site.  Per documentation the open wounds were improving but the swelling and redness are worsening and Aquacel dressing was reapplied patient was instructed to continue her current Bactrim and to present to [facility] for admission for OR washout.  Patient denies any fevers chills nausea vomiting chest pain shortness of breath or abdominal pain.  She continues to report discomfort at the site of the left hip.  Denies any extremity numbness. She does endorse continued drainage

## 2023-05-18 NOTE — BRIEF OPERATIVE NOTE - OPERATION/FINDINGS
Superficial abcess/tracts in skin and subcutaneous tissue with SQ compromised site. ITB fascia intact; clear hip joint fluid aspirated 13 mL.  Secure closure. Stable hip implant, No FB on fluoro images.

## 2023-05-18 NOTE — H&P ADULT - ASSESSMENT
83 y/o F with hx of mild AS, GERD, HLD, HTN, hypothyroidism, myasthenia gravis, OA left hip, reactive airway, sinus bradycardia, she is s/p Left hip replacement done in 3/27/2023, since she has joint replaced her pain never improved,  She has been following regularly with orthopedics.  She was last seen in the office on the 16th, she has had wound vacs applied.  Per documentation from the 16th of the TORITO dressing to the left anterior hip was intact with moderate drainage, she has been given Bactrim DS at that time, she has opn wound at the surgical site, she was seen by Dr Dinero in the office  and he sent her to the hospital for further management and evaluation for possible septic arthritis, likely washout.     Plan:     Left hip OA s/p  Left anterior total hip 3/27/23 now with possible septic arthritis:   Admit under medicine   Cultures obtained in the ED   Pain meds   Bowel regimen  PT/OT after wash out  Incentive spirometry  VTE PPx: will resume Eliquis after surgery once ok from the Ortho point of view.       Hx of Reactive airway disease:   Will continue with Albuterole as needed   on breo, will start symbicort while here in the hospital         HTN: On losartan 25mg daily and HCTZ 12.5 mg daily, will resume with holding parameters     hypothyroidism --c/w synthroid     Myasthenia gravis --c/w Pyridostigmine                  81 y/o F with hx of mild AS, GERD, HLD, HTN, hypothyroidism, myasthenia gravis, OA left hip, reactive airway, sinus bradycardia, she is s/p Left hip replacement done in 3/27/2023, since she has joint replaced her pain never improved,  She has been following regularly with orthopedics.  She was last seen in the office on the 16th, she has had wound vacs applied.  Per documentation from the 16th of the TORITO dressing to the left anterior hip was intact with moderate drainage, she has been given Bactrim DS at that time, she has opn wound at the surgical site, she was seen by Dr Dinero in the office  and he sent her to the hospital for further management and evaluation for possible septic arthritis, likely washout.     Plan:     Left hip OA s/p  Left anterior total hip 3/27/23 now with possible septic arthritis:   Admit under medicine   Cultures obtained in the ED   Pain meds   Bowel regimen  PT/OT after wash out  Incentive spirometry  VTE PPx: will resume Eliquis after surgery once ok from the Ortho point of view.       Hx of Reactive airway disease:   Will continue with Albuterole as needed   on breo, will start symbicort while here in the hospital         HTN: On losartan 25mg daily and HCTZ 12.5 mg daily, will resume with holding parameters     hypothyroidism --c/w synthroid     Myasthenia gravis --c/w Pyridostigmine       Acute blood loss anemia due to procedure: Iron supplement, will monitor CBC                 83 y/o F with hx of mild AS, GERD, HLD, HTN, hypothyroidism, myasthenia gravis, OA left hip, reactive airway, sinus bradycardia, she is s/p Left hip replacement done in 3/27/2023, since she has joint replaced her pain never improved,  She has been following regularly with orthopedics.  She was last seen in the office on the 16th, she has had wound vacs applied.  Per documentation from the 16th of the TORITO dressing to the left anterior hip was intact with moderate drainage, she has been given Bactrim DS at that time, she has opn wound at the surgical site, she was seen by Dr Dinero in the office  and he sent her to the hospital for further management and evaluation for possible septic arthritis, likely washout.     Plan:     Left hip OA s/p  Left anterior total hip 3/27/23 now with possible septic arthritis:   Admit under medicine   Cultures obtained in the ED   Pain meds   Bowel regimen  PT/OT after wash out  Incentive spirometry  VTE PPx: will resume Eliquis after surgery once ok from the Ortho point of view.   her labs and vitals are stable, patient is at intermediate risk for perioperative cardiovascular complications and is optimized from the medicine point of view for planned procedure.       Hx of Reactive airway disease:   Will continue with Albuterole as needed   on breo, will start symbicort while here in the hospital         HTN: On losartan 25mg daily and HCTZ 12.5 mg daily, will resume with holding parameters     hypothyroidism --c/w synthroid     Myasthenia gravis --c/w Pyridostigmine       Acute blood loss anemia due to procedure: Iron supplement, will monitor CBC

## 2023-05-18 NOTE — ED ADULT NURSE NOTE - PAIN: BODY LOCATION
Department of Emergency Medicine  FIRST PROVIDER TRIAGE NOTE             Independent MLP           3/1/22  5:32 PM EST    Date of Encounter: 3/1/22   MRN: [id number]      HPI: Shayan Emmanuel is a 77 y.o. female who presents to the ED for Chest Pain (left side of chest,left arm for the past hour)     Patient is a 29-year-old is complaining of 3 days of left-sided chest pain. Patient states is now on her left arm. Patient states wreaked across her chest. Patient states the last other 2 days she had indigestion. Patient states she is also been having a lot of diarrhea. Patient states she has taken Imodium. Patient has no history of a heart history or heart attack or stroke. Patient states she does have a history of high blood pressure and is a DM    ROS: Negative for abd pain, back pain, fever or cough. PE: Gen Appearance/Constitutional: alert  HEENT: NC/NT. PERRLA,  Airway patent. Neck: supple     Initial Plan of Care: All treatment areas with department are currently occupied. Plan to order/Initiate the following while awaiting opening in ED: labs, EKG and imaging studies.   Initiate Treatment-Testing, Proceed toTreatment Area When Bed Available for ED Attending/MLP to Continue Care    Electronically signed by Sabrina Mathur PA-C   DD: 3/1/22         Sabrina Mathur PA-C  03/01/22 [id number]
hip/Left:

## 2023-05-18 NOTE — ED ADULT NURSE NOTE - NS ED NURSE RECORD ANOTHER HT AND WT
Jazzmine Son Patient Age: 44 year old  MESSAGE: Interpreting service used: No  Patient is Macanese speaking and needs .     Obstetrics & Gynecology- Reason for call: Orders- New Order Request-      Type of order being requested: Labs-    Routine Labs  Patient states Dr Akins usually orders routine labs yearly. Patient mentions she forgot to remind Dr Akins today at her annual.     Reason order is needed: Annual Complete Exam      Appointment on 11/16/21    Patient is requesting a call back once labs have been ordered.  Is the patient currently having any symptoms? No- Routed message to provider's clinical support pool.               ALLERGIES:  Iodinated diagnostic agents  Current Outpatient Medications   Medication   • clotrimazole-betamethasone (LOTRISONE) 1-0.05 % cream   • pantoprazole (PROTONIX) 40 MG tablet   • triamcinolone (ARISTOCORT) 0.1 % ointment   • Sodium Sulfate-Mag Sulfate-KCl [id number] MG Tab   • traMADol (ULTRAM) 50 MG tablet   • meloxicam (MOBIC) 7.5 MG tablet   • ibuprofen (MOTRIN) 800 MG tablet   • cyclobenzaprine (FLEXERIL) 5 MG tablet     No current facility-administered medications for this visit.     PHARMACY to use:           Pharmacy preference(s) on file:   [id number] - [facility] [address] [place] [address] & [place]  [address] [facility] [address]  Phone: [phone] Fax: [phone]      CALL BACK INFO: Ok to leave response (including medical information) on answering machine      PCP: Lauren Mcadams DO         INS: Payor: [employer] CLAIM ADMINISTRATION / Plan: [employer] PPO / Product Type: PPO MISC   PATIENT ADDRESS:  [address] [place] [id number]     Yes

## 2023-05-18 NOTE — H&P ADULT - NSHPREVIEWOFSYSTEMS_GEN_ALL_CORE
CONSTITUTIONAL: No fever, some fatigue  RESPIRATORY: No cough, No shortness of breath  CARDIOVASCULAR: No chest pain, palpitations  GASTROINTESTINAL: No abdominal, No nausea, vomiting  NEUROLOGICAL: No headaches,  loss of strength.  MISCELLANEOUS: left hip pain

## 2023-05-18 NOTE — ED PROVIDER NOTE - DIFFERENTIAL DIAGNOSIS
Differential Diagnosis Patient is presenting to the emergency room with concern for postoperative infection following a recent total left hip replacement.  Is been following regularly with orthopedics outpatient with no improvement.  Will obtain screening labs check UA urine culture and consult with orthopedics for additional work-up and management.  Last p.o. intake was yesterday evening.

## 2023-05-18 NOTE — PRE-OP CHECKLIST - ADVANCE DIRECTIVE ADDRESSED/READDRESSED
TN sent patient's clinicals to [facility] for resumption of care and to [facility] for palliative care visits.     TN also sent patient's clinicals to [facility] for authorization. Awaiting feedback.    done

## 2023-05-19 ENCOUNTER — TRANSCRIPTION ENCOUNTER (OUTPATIENT)
Age: 83
End: 2023-05-19

## 2023-05-19 LAB
ALBUMIN SERPL ELPH-MCNC: 3 G/DL — LOW (ref 3.3–5)
ALP SERPL-CCNC: 74 U/L — SIGNIFICANT CHANGE UP (ref 30–120)
ALT FLD-CCNC: 23 U/L DA — SIGNIFICANT CHANGE UP (ref 10–60)
ANION GAP SERPL CALC-SCNC: 12 MMOL/L — SIGNIFICANT CHANGE UP (ref 5–17)
AST SERPL-CCNC: 25 U/L — SIGNIFICANT CHANGE UP (ref 10–40)
BILIRUB SERPL-MCNC: 0.3 MG/DL — SIGNIFICANT CHANGE UP (ref 0.2–1.2)
BUN SERPL-MCNC: 21 MG/DL — SIGNIFICANT CHANGE UP (ref 7–23)
CALCIUM SERPL-MCNC: 9.2 MG/DL — SIGNIFICANT CHANGE UP (ref 8.4–10.5)
CHLORIDE SERPL-SCNC: 106 MMOL/L — SIGNIFICANT CHANGE UP (ref 96–108)
CO2 SERPL-SCNC: 22 MMOL/L — SIGNIFICANT CHANGE UP (ref 22–31)
CREAT SERPL-MCNC: 0.89 MG/DL — SIGNIFICANT CHANGE UP (ref 0.5–1.3)
EGFR: 64 ML/MIN/1.73M2 — SIGNIFICANT CHANGE UP
GLUCOSE SERPL-MCNC: 145 MG/DL — HIGH (ref 70–99)
GRAM STN FLD: SIGNIFICANT CHANGE UP
GRAM STN FLD: SIGNIFICANT CHANGE UP
HCT VFR BLD CALC: 29.7 % — LOW (ref 34.5–45)
HGB BLD-MCNC: 9.6 G/DL — LOW (ref 11.5–15.5)
MCHC RBC-ENTMCNC: 29.7 PG — SIGNIFICANT CHANGE UP (ref 27–34)
MCHC RBC-ENTMCNC: 32.3 GM/DL — SIGNIFICANT CHANGE UP (ref 32–36)
MCV RBC AUTO: 92 FL — SIGNIFICANT CHANGE UP (ref 80–100)
NRBC # BLD: 0 /100 WBCS — SIGNIFICANT CHANGE UP (ref 0–0)
PLATELET # BLD AUTO: 133 K/UL — LOW (ref 150–400)
POTASSIUM SERPL-MCNC: 4.6 MMOL/L — SIGNIFICANT CHANGE UP (ref 3.5–5.3)
POTASSIUM SERPL-SCNC: 4.6 MMOL/L — SIGNIFICANT CHANGE UP (ref 3.5–5.3)
PROT SERPL-MCNC: 6.2 G/DL — SIGNIFICANT CHANGE UP (ref 6–8.3)
RBC # BLD: 3.23 M/UL — LOW (ref 3.8–5.2)
RBC # FLD: 13.9 % — SIGNIFICANT CHANGE UP (ref 10.3–14.5)
SODIUM SERPL-SCNC: 140 MMOL/L — SIGNIFICANT CHANGE UP (ref 135–145)
SPECIMEN SOURCE: SIGNIFICANT CHANGE UP
SPECIMEN SOURCE: SIGNIFICANT CHANGE UP
WBC # BLD: 3.4 K/UL — LOW (ref 3.8–10.5)
WBC # FLD AUTO: 3.4 K/UL — LOW (ref 3.8–10.5)

## 2023-05-19 PROCEDURE — 99233 SBSQ HOSP IP/OBS HIGH 50: CPT

## 2023-05-19 RX ORDER — SODIUM CHLORIDE 9 MG/ML
1000 INJECTION, SOLUTION INTRAVENOUS
Refills: 0 | Status: COMPLETED | OUTPATIENT
Start: 2023-05-19 | End: 2023-05-20

## 2023-05-19 RX ADMIN — Medication 100 MILLIGRAM(S): at 04:45

## 2023-05-19 RX ADMIN — BUDESONIDE AND FORMOTEROL FUMARATE DIHYDRATE 2 PUFF(S): 160; 4.5 AEROSOL RESPIRATORY (INHALATION) at 08:15

## 2023-05-19 RX ADMIN — Medication 650 MILLIGRAM(S): at 08:14

## 2023-05-19 RX ADMIN — CELECOXIB 200 MILLIGRAM(S): 200 CAPSULE ORAL at 07:20

## 2023-05-19 RX ADMIN — POLYETHYLENE GLYCOL 3350 17 GRAM(S): 17 POWDER, FOR SOLUTION ORAL at 21:12

## 2023-05-19 RX ADMIN — CELECOXIB 200 MILLIGRAM(S): 200 CAPSULE ORAL at 04:50

## 2023-05-19 RX ADMIN — Medication 650 MILLIGRAM(S): at 08:30

## 2023-05-19 RX ADMIN — PYRIDOSTIGMINE BROMIDE 60 MILLIGRAM(S): 60 SOLUTION ORAL at 06:48

## 2023-05-19 RX ADMIN — LOSARTAN POTASSIUM 25 MILLIGRAM(S): 100 TABLET, FILM COATED ORAL at 06:48

## 2023-05-19 RX ADMIN — PANTOPRAZOLE SODIUM 40 MILLIGRAM(S): 20 TABLET, DELAYED RELEASE ORAL at 06:48

## 2023-05-19 RX ADMIN — Medication 400 MILLIGRAM(S): at 02:00

## 2023-05-19 RX ADMIN — CELECOXIB 200 MILLIGRAM(S): 200 CAPSULE ORAL at 17:42

## 2023-05-19 RX ADMIN — Medication 3 MILLILITER(S): at 13:12

## 2023-05-19 RX ADMIN — Medication 3 MILLILITER(S): at 00:44

## 2023-05-19 RX ADMIN — Medication 75 MICROGRAM(S): at 06:48

## 2023-05-19 RX ADMIN — Medication 650 MILLIGRAM(S): at 13:20

## 2023-05-19 RX ADMIN — SENNA PLUS 2 TABLET(S): 8.6 TABLET ORAL at 21:11

## 2023-05-19 RX ADMIN — Medication 3 MILLILITER(S): at 20:15

## 2023-05-19 RX ADMIN — Medication 650 MILLIGRAM(S): at 15:19

## 2023-05-19 RX ADMIN — PYRIDOSTIGMINE BROMIDE 60 MILLIGRAM(S): 60 SOLUTION ORAL at 12:40

## 2023-05-19 RX ADMIN — APIXABAN 2.5 MILLIGRAM(S): 2.5 TABLET, FILM COATED ORAL at 21:11

## 2023-05-19 RX ADMIN — Medication 100 MILLIGRAM(S): at 20:34

## 2023-05-19 RX ADMIN — PYRIDOSTIGMINE BROMIDE 180 MILLIGRAM(S): 60 SOLUTION ORAL at 12:38

## 2023-05-19 RX ADMIN — ATORVASTATIN CALCIUM 80 MILLIGRAM(S): 80 TABLET, FILM COATED ORAL at 21:11

## 2023-05-19 RX ADMIN — BUDESONIDE AND FORMOTEROL FUMARATE DIHYDRATE 2 PUFF(S): 160; 4.5 AEROSOL RESPIRATORY (INHALATION) at 18:37

## 2023-05-19 RX ADMIN — Medication 100 MILLIGRAM(S): at 12:35

## 2023-05-19 RX ADMIN — CELECOXIB 200 MILLIGRAM(S): 200 CAPSULE ORAL at 06:49

## 2023-05-19 RX ADMIN — APIXABAN 2.5 MILLIGRAM(S): 2.5 TABLET, FILM COATED ORAL at 08:14

## 2023-05-19 RX ADMIN — PYRIDOSTIGMINE BROMIDE 60 MILLIGRAM(S): 60 SOLUTION ORAL at 17:43

## 2023-05-19 RX ADMIN — Medication 3 MILLILITER(S): at 06:31

## 2023-05-19 NOTE — DISCHARGE NOTE PROVIDER - NSDCCPCAREPLAN_GEN_ALL_CORE_FT
PRINCIPAL DISCHARGE DIAGNOSIS  Diagnosis: Post op infection  Assessment and Plan of Treatment: PT/OT Anterior Total Hip Protocol; Isometrics, Ambulation, transfers, stairs, & ADLs  Full weight bearing both legs; Walker/cane use as instructed by PT/OT  Anterior THR precautions for 6 weeks: No straight leg raise; No external rotation of hip when extended-standing or lying flat; No hyperextension of hip when standing (kickback)  Ice packs to hip for 30 min. every 3 hours   Suture removal on/near after Post Op Day # 14 in Surgeon's office.  No tub baths  Do not resume driving until you have your surgeons permission  Instruct patient to see PCP in office near 2-3 weeks from discharge from rehab for exam/labwork.       PRINCIPAL DISCHARGE DIAGNOSIS  Diagnosis: Post op infection  Assessment and Plan of Treatment: You have a hip infection. Please take your antibiotics as prescribed. Follow up with your pcp upon discharge  PT/OT Anterior Total Hip Protocol; Isometrics, Ambulation, transfers, stairs, & ADLs  Full weight bearing both legs; Walker/cane use as instructed by PT/OT  Anterior THR precautions for 6 weeks: No straight leg raise; No external rotation of hip when extended-standing or lying flat; No hyperextension of hip when standing (kickback)  Ice packs to hip for 30 min. every 3 hours   Suture removal on/near after Post Op Day # 14 in Surgeon's office.  No tub baths  Do not resume driving until you have your surgeons permission  Instruct patient to see PCP in office near 2-3 weeks from discharge from rehab for exam/labwork.        SECONDARY DISCHARGE DIAGNOSES  Diagnosis: Hypertension  Assessment and Plan of Treatment: Your blood pressure was borderline low in the hospital and your blood pressure medications were stopped. Please follow up with your pcp in 1 week to monitor your blood pressures and resume the medications as your blood pressure tolerates    Diagnosis: Anemia  Assessment and Plan of Treatment: You have anemia. Please take your iron pills as prescribed. Follow up with your pcp in 1 week to monitor your blood counts. If you have signs of bleeding, please return to the emergency department     PRINCIPAL DISCHARGE DIAGNOSIS  Diagnosis: Post op infection  Assessment and Plan of Treatment: You have a hip infection. Please take your antibiotics as prescribed. Follow up with your pcp upon discharge  PT/OT Anterior Total Hip Protocol; Isometrics, Ambulation, transfers, stairs, & ADLs  Full weight bearing both legs; Walker/cane use as instructed by PT/OT  Anterior THR precautions for 6 weeks: No straight leg raise; No external rotation of hip when extended-standing or lying flat; No hyperextension of hip when standing (kickback)  Ice packs to hip for 30 min. every 3 hours.  Silverlon dressing is waterproof.  You may shower, but do not aim shower stream at surgical site. Pat dry after shower.   Remove Silverlon dressing on postop day #7. May shower after Silverlon removal.  Suture removal will be on/near after Post Op Day # 14 in Surgeon's office.  No tub baths  Do not resume driving until you have your surgeons permission  Instruct patient to see PCP in office near 2-3 weeks from discharge from rehab for exam/labwork.        SECONDARY DISCHARGE DIAGNOSES  Diagnosis: Hypertension  Assessment and Plan of Treatment: Your blood pressure was borderline low in the hospital and your blood pressure medications were stopped. Please follow up with your pcp in 1 week to monitor your blood pressures and resume the medications as your blood pressure tolerates    Diagnosis: Anemia  Assessment and Plan of Treatment: You have anemia. Please take your iron pills as prescribed. Follow up with your pcp in 1 week to monitor your blood counts. If you have signs of bleeding, please return to the emergency department     PRINCIPAL DISCHARGE DIAGNOSIS  Diagnosis: Post op infection  Assessment and Plan of Treatment: You have a hip infection. Please take your antibiotics as prescribed. Follow up with your pcp upon discharge. Follow up with Dr Dinero in 1 week to remove dressing  PT/OT Anterior Total Hip Protocol; Isometrics, Ambulation, transfers, stairs, & ADLs  Full weight bearing both legs; Walker/cane use as instructed by PT/OT  Anterior THR precautions for 6 weeks: No straight leg raise; No external rotation of hip when extended-standing or lying flat; No hyperextension of hip when standing (kickback)  Ice packs to hip for 30 min. every 3 hours.  Silverlon dressing is waterproof.  You may shower, but do not aim shower stream at surgical site. Pat dry after shower.   Remove Silverlon dressing on postop day #7. May shower after Silverlon removal.  Suture removal will be on/near after Post Op Day # 14 in Surgeon's office.  No tub baths  Do not resume driving until you have your surgeons permission  Instruct patient to see PCP in office near 2-3 weeks from discharge from rehab for exam/labwork.        SECONDARY DISCHARGE DIAGNOSES  Diagnosis: Hypertension  Assessment and Plan of Treatment: Your blood pressure was borderline low in the hospital and your blood pressure medications were stopped. Please follow up with your pcp in 1 week to monitor your blood pressures and resume the medications as your blood pressure tolerates    Diagnosis: Anemia  Assessment and Plan of Treatment: You have anemia. Please take your iron pills as prescribed. Follow up with your pcp in 1 week to monitor your blood counts. If you have signs of bleeding, please return to the emergency department

## 2023-05-19 NOTE — DISCHARGE NOTE PROVIDER - NSDCFUADDINST_GEN_ALL_CORE_FT
For Constipation :   • Increase your water intake. Drink at least 8 glasses of water daily.  • Try adding fiber to your diet by eating fruits, vegetables and foods that are rich in grains.  • If you do experience constipation, you may take an over-the-counter stool softener/laxative such as  Colace, Senokot or  Milk of Magnesia.  For Constipation :   • Increase your water intake. Drink at least 8 glasses of water daily.  • Try adding fiber to your diet by eating fruits, vegetables and foods that are rich in grains.  • If you do experience constipation, you may take an over-the-counter stool softener/laxative such as  Colace, Senokot or  Milk of Magnesia.     - Call your doctor if you experience:  • An increase in pain not controlled by pain medication or change in activity or  position.  • Temperature greater than 101° F.  • Redness, increased swelling or foul smelling drainage from or around the  incision.  • Numbness, tingling or a change in color or temperature of the operative leg.  • Call your doctor immediately if you experience chest pain, shortness of breath or calf pain.

## 2023-05-19 NOTE — PHYSICAL THERAPY INITIAL EVALUATION ADULT - ACTIVE RANGE OF MOTION EXAMINATION, REHAB EVAL
left LE limited due to surgery and pain/bilateral lower extremity Active ROM was WNL (within normal limits)/Right LE Active ROM was WFL (within functional limits)/deficits as listed below

## 2023-05-19 NOTE — PROGRESS NOTE ADULT - SUBJECTIVE AND OBJECTIVE BOX
POST OPERATIVE DAY #:  [1 ]   STATUS POST: [x ] Left [ ] Right  [ ]TKR [ x]THR  Iand D                      Patient is a 82y old  Female who presents with a chief complaint of     Pain well controlled    OBJECTIVE:     Vital Signs Last 24 Hrs  T(C): 36.5 (19 May 2023 05:07), Max: 36.8 (18 May 2023 21:17)  T(F): 97.7 (19 May 2023 05:07), Max: 98.2 (18 May 2023 21:17)  HR: 68 (19 May 2023 06:33) (58 - 93)  BP: 124/58 (19 May 2023 05:07) (122/67 - 158/60)  BP(mean): --  RR: 18 (19 May 2023 05:07) (12 - 20)  SpO2: 98% (19 May 2023 06:33) (97% - 100%)    Parameters below as of 19 May 2023 06:33  Patient On (Oxygen Delivery Method): nasal cannula, 2        Affected extremity:         prevena Dressing:  clean/dry/intact            Sensation; intact to light touch          Motor exam: Toes warm and mobile        No calf tenderness bilateral LE's    LABS:                        9.6    3.40  )-----------( 133      ( 19 May 2023 07:53 )             29.7     05-19    140  |  106  |  21  ----------------------------<  145<H>  4.6   |  22  |  0.89    Ca    9.2      19 May 2023 07:53    TPro  6.2  /  Alb  3.0<L>  /  TBili  0.3  /  DBili  x   /  AST  25  /  ALT  23  /  AlkPhos  74  05-19            A/P :    -    Analgesics PRN  -    DVT ppx: [ ]ASA 81 bid [ ] Lovenox [ ] Coumadin   [x ] Eliquis   -    Weight bearing status: WBAT [x ]        PWB    [ ]     TTWB  [ ]      NWB  [ ]  -    Physical Therapy  -    Occupational Therapy  -    Dispo: Home [ ]     Rehab [ ]      MEENU [ ]      To be determined [ x]

## 2023-05-19 NOTE — DISCHARGE NOTE PROVIDER - NSDCMRMEDTOKEN_GEN_ALL_CORE_FT
acetaminophen 500 mg oral tablet: 1 tab(s) orally every 8 hours  albuterol 90 mcg/inh inhalation aerosol: 2 puff(s) inhaled every 4 hours, As needed, Shortness of Breath and/or Wheezing  apixaban 2.5 mg oral tablet: 1 tab(s) orally every 12 hours  Breo Ellipta 200 mcg-25 mcg/inh inhalation powder: 1 puff(s) inhaled once a day  celecoxib 200 mg oral capsule: 1 cap(s) orally every 12 hours  esomeprazole 40 mg oral delayed release capsule: 1 tab(s) orally once a day  hydroCHLOROthiazide 12.5 mg oral capsule: 1 cap(s) orally once a day  levothyroxine 75 mcg (0.075 mg) oral tablet: 1 tab(s) orally once a day  losartan 25 mg oral tablet: 1 tab(s) orally once a day  oxyCODONE 5 mg oral tablet: 1 tab(s) orally every 3 hours As needed Moderate Pain (4 - 6)  pyridostigmine 180 mg oral tablet, extended release: orally once a day  pyridostigmine 60 mg oral tablet: 1 tab(s) orally 3 times a day  (7am, 1pm ,6 pm )  rosuvastatin 20 mg oral capsule: orally once a day (at bedtime)  senna leaf extract oral tablet: 2 tab(s) orally once a day (at bedtime)   acetaminophen 500 mg oral tablet: 1 tab(s) orally every 8 hours  albuterol 90 mcg/inh inhalation aerosol: 2 puff(s) inhaled every 4 hours, As needed, Shortness of Breath and/or Wheezing  Breo Ellipta 200 mcg-25 mcg/inh inhalation powder: 1 puff(s) inhaled once a day  CeleBREX 200 mg oral capsule: 1 cap(s) orally every 12 hours 2 hours after aspirin  Eliquis 2.5 mg oral tablet: 1 tab(s) orally every 12 hours MDD: 2  hydroCHLOROthiazide 12.5 mg oral capsule: 1 cap(s) orally once a day  levoFLOXacin 500 mg oral tablet: 1 tab(s) orally once a day  levothyroxine 75 mcg (0.075 mg) oral tablet: 1 tab(s) orally once a day  losartan 25 mg oral tablet: 1 tab(s) orally once a day  pyridostigmine 180 mg oral tablet, extended release: orally once a day  pyridostigmine 60 mg oral tablet: 1 tab(s) orally 3 times a day  (7am, 1pm ,6 pm )  rosuvastatin 20 mg oral capsule: orally once a day (at bedtime)  senna leaf extract oral tablet: 2 tab(s) orally once a day (at bedtime)   acetaminophen 500 mg oral tablet: 1 tab(s) orally every 8 hours  albuterol 90 mcg/inh inhalation aerosol: 2 puff(s) inhaled every 4 hours, As needed, Shortness of Breath and/or Wheezing  Breo Ellipta 200 mcg-25 mcg/inh inhalation powder: 1 puff(s) inhaled once a day  CeleBREX 200 mg oral capsule: 1 cap(s) orally every 12 hours 2 hours after aspirin  doxycycline monohydrate 50 mg oral capsule: 2 cap(s) orally every 12 hours  Eliquis 2.5 mg oral tablet: 1 tab(s) orally every 12 hours MDD: 2  ferrous sulfate 325 mg (65 mg elemental iron) oral tablet: 1 tab(s) orally once a day  levoFLOXacin 250 mg oral tablet: 1 tab(s) orally every 24 hours  levothyroxine 75 mcg (0.075 mg) oral tablet: 1 tab(s) orally once a day  NexIUM 40 mg oral delayed release capsule: 1 cap(s) orally  polyethylene glycol 3350 oral powder for reconstitution: 17 gram(s) orally once a day (at bedtime)  pyridostigmine 180 mg oral tablet, extended release: orally once a day  pyridostigmine 60 mg oral tablet: 1 tab(s) orally 3 times a day  (7am, 1pm ,6 pm )  rosuvastatin 20 mg oral capsule: orally once a day (at bedtime)  senna leaf extract oral tablet: 2 tab(s) orally once a day (at bedtime)

## 2023-05-19 NOTE — PATIENT CHOICE NOTE. - NSPTCHOICESTATE_GEN_ALL_CORE

## 2023-05-19 NOTE — DISCHARGE NOTE PROVIDER - NSDCCPGOAL_GEN_ALL_CORE_FT
To get better and follow your care plan as instructed. To get better and follow your care plan as instructed.  Improve ambulation, ADLs and quality of life.

## 2023-05-19 NOTE — DISCHARGE NOTE PROVIDER - NSDCCPTREATMENT_GEN_ALL_CORE_FT
PRINCIPAL PROCEDURE  Procedure: Incision and drainage of left hip  Findings and Treatment: - Call your doctor if you experience:  • An increase in pain not controlled by pain medication or change in activity or  position.  • Temperature greater than 101° F.  • Redness, increased swelling or foul smelling drainage from or around the  incision.  • Numbness, tingling or a change in color or temperature of the operative extremity.  • Call your doctor immediately if you experience chest pain, shortness of breath or calf pain.

## 2023-05-19 NOTE — PHYSICAL THERAPY INITIAL EVALUATION ADULT - ADDITIONAL COMMENTS
Pt is an 83 y/o female, s/p left hip I&D, lives alone in house with 5 ROBERTO and bilateral HR.  PTA pt was independent with ambulating with SAC.  Pt does not use supplemental oxygen at home.  Pt owns [employer].  Patsy, pt's daughter, is a nurse, and stated she can take some days off to care for pt after discharge.

## 2023-05-19 NOTE — DISCHARGE NOTE PROVIDER - NSDCACTIVITY_GEN_ALL_CORE
Stairs allowed/Walking - Indoors allowed/Walking - Outdoors allowed/Follow Instructions Provided by your Surgical Team Do not drive or operate machinery/Do not make important decisions/Stairs allowed/Walking - Indoors allowed/Walking - Outdoors allowed/Follow Instructions Provided by your Surgical Team Do not drive or operate machinery/Do not make important decisions/Stairs allowed/Walking - Indoors allowed/No heavy lifting/straining/Walking - Outdoors allowed/Follow Instructions Provided by your Surgical Team

## 2023-05-19 NOTE — PATIENT CHOICE NOTE. - NSPTCHOICENOTES_GEN_ALL_CORE
CM reviewed the resources that are in the discharge folder with patient and daughter. Patient refuses home care, with daughter at bedside who is a nurse.

## 2023-05-19 NOTE — PHYSICAL THERAPY INITIAL EVALUATION ADULT - PERTINENT HX OF CURRENT PROBLEM, REHAB EVAL
Pt s/p left anterior THR 3/27/23, was discharged home, but since then pain never improved.  Pt was seen in office on 16th, had wound vac.  TORITO dressing left hip with moderate drainage, given Bactrim DS at that time.  She has open wound at surgical site, seen by Dr. Dinero in office and sent her to ED at hospital for possible septic arthritis.  PMH: sinus bradycardia, pneumonia due to COVID-19 virus, hypothyroid, GERD, reactive airway that is not asthma, myasthenia gravis, mild aortic stenosis, diverticulitis large intestine, lumbar stenosis, kidney calculi, lumbar radiculopathy, hyperlipidemia, HTN.  PSH: left THR 3/27/23, right THR, 1998 laminectomy, umbilical hernia repair, tonsillectomy, foot surgery, bilateral shoulder replacement 2015/2016, partial hysterectomy, bilateral cataracts, appendectomy, cholecystectomy.

## 2023-05-19 NOTE — DISCHARGE NOTE PROVIDER - NSDCFUSCHEDAPPT_GEN_ALL_CORE_FT
[facility]  NEUROLOGY [address] [place] [address]  Scheduled Appointment: 05/24/2023    Vernon Baugh  [facility]  NEUROLOGY [address] [place] [address]  Scheduled Appointment: 07/21/2023     Vernon Baugh  [facility]  NEUROLOGY [address] [facility]  Scheduled Appointment: 07/21/2023

## 2023-05-19 NOTE — DISCHARGE NOTE PROVIDER - HOSPITAL COURSE
This patient was admitted to [facility] with s/p Left THR and wound drainage.  Patient underwent Pre-Surgical Testing and was medically cleared to undergo an I and D  pDr. Rosette . Procedure was well tolerated.  No operative or zonia-operative complications arose during patient's hospital course.  Patient received antibiotic according to SCIP guidelines for infection prevention.  Eliquis was given for DVT prophylaxis, in addition to the use of SCDs.  Anesthesia, Medical Hospitalist, Physical Therapy and Occupational Therapy were consulted. Patient is stable for discharge with a good prognosis.  Appropriate discharge instructions and medications are provided in this document.   This patient was admitted to [facility] with s/p Left THR and wound drainage.  Patient underwent Pre-Surgical Testing and was medically cleared to undergo an I and D  pDr. Rosette . Procedure was well tolerated.  No operative or zonia-operative complications arose during patient's hospital course.  Patient received antibiotic according to SCIP guidelines for infection prevention.  Eliquis was given for DVT prophylaxis, in addition to the use of SCDs.  Anesthesia, Medical Hospitalist, Physical Therapy and Occupational Therapy were consulted Infectious Disease was consulted Patient will be discharge with the appropriate antibiotics and follow up and prevena was put in place during patient stay at [facility]. Patient is stable for discharge with a good prognosis.  Appropriate discharge instructions and medications are provided in this document.   This patient was admitted to [facility] with s/p Left THR and wound drainage.  Patient underwent Pre-Surgical Testing and was medically cleared to undergo an I and D  pDr. Rosette . Procedure was well tolerated.  No operative or zonia-operative complications arose during patient's hospital course.  Patient received antibiotic according to SCIP guidelines for infection prevention.  Eliquis was given for DVT prophylaxis, in addition to the use of SCDs.  Anesthesia, Medical Hospitalist, Physical Therapy and Occupational Therapy were consulted Infectious Disease was consulted Patient will be discharge with the appropriate antibiotics and follow up and prevena was put in place during patient stay at [facility]. Patient is stable for discharge with a good prognosis.  Appropriate discharge instructions and medications are provided in this document. Patient is going home with home care (PT/OT/Skilled Nursing)   This patient was admitted to [facility] with s/p Left THR and wound drainage.  Patient underwent Pre-Surgical Testing and was medically cleared to undergo an I and D  pDr. Rosette . Procedure was well tolerated.  No operative or zonia-operative complications arose during patient's hospital course.  Patient received antibiotic according to SCIP guidelines for infection prevention.  Eliquis was given for DVT prophylaxis, in addition to the use of SCDs.  Anesthesia, Medical Hospitalist, Physical Therapy and Occupational Therapy were consulted Infectious Disease was consulted Patient will be discharge with the appropriate antibiotics and follow up and prevena was put in place during patient stay at [facility]. Patient is stable for discharge with a good prognosis.  Appropriate discharge instructions and medications are provided in this document. Patient is going home with home care (PT/OT/Skilled Nursing)    81 y/o F with hx of mild AS, GERD, HLD, HTN, hypothyroidism, myasthenia gravis, OA left hip, reactive airway, sinus bradycardia, she is s/p Left hip replacement done in 3/27/2023, since she has joint replaced her pain never improved,  She has been following regularly with orthopedics.  She was last seen in the office on the 16th, she has had wound vacs applied.  Per documentation from the 16th of the TORITO dressing to the left anterior hip was intact with moderate drainage, she has been given Bactrim DS at that time, she has opn wound at the surgical site, she was seen by Dr Dinero in the office  and he sent her to the hospital for further management and evaluation for possible septic arthritis    Left hip OA s/p  Left anterior total hip 3/27/23 now with possible septic arthritis:   CT Small amount of subcutaneous emphysema in the subcutaneous fat  adjacent to the site of surgical wound in the left proximal thigh.  Bilateral total hip arthroplasty without CT evidence of acute   hardware complication.  S/P Incision and drainage of thigh  5/18/23   OR cultures -> pseudomonas, Staph epidermidis  discharge on levaquin and doxy for 14 days  Pain meds   Bowel regimen  PT/OT  Incentive spirometry  VTE PPx: resume Eliquis      Hx of Reactive airway disease:   continue with Albuterol as needed   on breo, symbicort while here in the hospital     HTN  Blood pressures soft, dc losartan and HCTZ. Can resume outpatient if blood pressures is high    hypothyroidism --c/w synthroid     Myasthenia gravis --c/w Pyridostigmine     Acute blood loss anemia due to procedure: monitor CBC, H/H stable, vitals stable, asymptomatic  Check for nutritional deficiencies - Iron, B12, Folate - f/u labs  Restart iron       This patient was admitted to [facility] with drainage from left hip surgical incision, patient was s/p Left THR on 3/27/23.  Patient underwent Pre-Surgical Testing and was medically cleared to undergo an I& D of left hip by Dr. Dinero . Procedure was well tolerated.  No operative or zonia-operative complications arose during patient's hospital course.  Patient received antibiotics according to SCIP guidelines for infection prevention.  Eliquis was given for DVT prophylaxis, in addition to the use of SCDs.  Anesthesia, Medical Hospitalist, Physical Therapy and Occupational Therapy were consulted, and Infectious Disease was consulted. Patient will be discharged with the appropriate antibiotics and Silverlon dressing. Patient is stable for discharge with a good prognosis.  Appropriate discharge instructions and medications are provided in this document.

## 2023-05-19 NOTE — DISCHARGE NOTE PROVIDER - CARE PROVIDER_API CALL
Nathanael Dinero)  Orthopedics  [address], [address]  [place] [address]  Phone: [phone]  Fax: [phone]  Follow Up Time: 2 weeks    Catrachito Terry)  Infectious Disease; Internal Medicine  [address]  [place] [address]  Phone: [phone]  Fax: [phone]  Follow Up Time: 2 weeks

## 2023-05-19 NOTE — DISCHARGE NOTE PROVIDER - NSDCFUADDAPPT_GEN_ALL_CORE_FT
It is advisable to follow up with your primary care provider within the next 2-3 weeks to ensure your medications are appropriate and there are no underlying problems after your procedure.  It is advisable to follow up with your primary care provider within the next 2-3 weeks to ensure your medications are appropriate and there are no underlying problems after your procedure.   Silverlon dressing to stay on  x  1 week patient to be seen  by Dr. Dinero  call office to confirm appt  Follow up Dr. Terry  call office to confirm, appt   Keep area clean and dry    It is advisable to follow up with your primary care provider within the next 2-3 weeks to ensure your medications are appropriate and there are no underlying problems after your procedure.     Please call Dr. Dinero's office to schedule a post op appointment.  Follow up with Dr. Terry- call office to confirm appt

## 2023-05-19 NOTE — PHYSICAL THERAPY INITIAL EVALUATION ADULT - PREDICTED DURATION OF THERAPY (DAYS/WKS), PT EVAL
Tranexamic Acid Counseling:  Patient advised of the small risk of bleeding problems with tranexamic acid. They were also instructed to call if they developed any nausea, vomiting or diarrhea. All of the patient's questions and concerns were addressed. 2 weeks

## 2023-05-19 NOTE — CARE COORDINATION ASSESSMENT. - PRO ARRIVE FROM
CM met with patient and daughter at bedside. Patient is alert times 3.  Patient stated she has 5 steps to enter in the house and then everything is on the first floor. Patient stated she has a cane and walker at home. patient identifies her daughter Patsy who is a nurse as her caregiver [phone]./home

## 2023-05-19 NOTE — PROGRESS NOTE ADULT - ASSESSMENT
83 y/o F with hx of mild AS, GERD, HLD, HTN, hypothyroidism, myasthenia gravis, OA left hip, reactive airway, sinus bradycardia, she is s/p Left hip replacement done in 3/27/2023, since she has joint replaced her pain never improved,  She has been following regularly with orthopedics.  She was last seen in the office on the 16th, she has had wound vacs applied.  Per documentation from the 16th of the TORITO dressing to the left anterior hip was intact with moderate drainage, she has been given Bactrim DS at that time, she has opn wound at the surgical site, she was seen by Dr Dinero in the office  and he sent her to the hospital for further management and evaluation for possible septic arthritis    Plan:   1.   Left hip OA s/p  Left anterior total hip 3/27/23 now with possible septic arthritis:   CT Small amount of subcutaneous emphysema in the subcutaneous fat  adjacent to the site of surgical wound in the left proximal thigh.  Bilateral total hip arthroplasty without CT evidence of acute   hardware complication.   S/P Incision and drainage of thigh  5/18/23   Cultures obtained in the ED  and pending   joint fluid gram stain negative   Pain meds   Bowel regimen  PT/OT  Incentive spirometry  VTE PPx: resume Eliquis          Hx of Reactive airway disease:   Will continue with Albuterole as needed   on breo, will start symbicort while here in the hospital       HTN: On losartan 25mg daily and HCTZ 12.5 mg daily, will resume with holding parameters     hypothyroidism --c/w synthroid     Myasthenia gravis --c/w Pyridostigmine       Acute blood loss anemia due to procedure: Iron supplement, will monitor CBC, H/H stable

## 2023-05-19 NOTE — OCCUPATIONAL THERAPY INITIAL EVALUATION ADULT - PERTINENT HX OF CURRENT PROBLEM, REHAB EVAL
Pt s/p left anterior THR 3/27/23, was discharged home, however, since then Pts pain never improved.  Pt was seen in office on 16th, had wound vac.  TORITO dressing left anterior hip with moderate drainage and Pt given Bactrim DS at that time.  She has open wound at surgical site, seen by Dr. Dinero in office and sent her to ED at hospital for possible septic arthritis. Pt received left hip would incision and drainage on 5/18.

## 2023-05-19 NOTE — CARE COORDINATION ASSESSMENT. - NSCAREPROVIDERS_GEN_ALL_CORE_FT
CARE PROVIDERS:  Accepting Physician: Al Rodrigez  Access Services: Melanie Holder  Administration: Mara Oakley  Administration: Tessy Paiz  Administration: Lili White  Admitting: Al Rodrigez  Attending: Al Rodrigez  Consultant: Al Rodrigez  Consultant: Nathanael Dinero  Consultant: Eliu Terry  ED Attending: Carol Sanchez  ED Nurse: Nuha Arias  Infection Control: Cleopatra Oakley  Nurse: Aravind Bailey  Nurse: Beena Patel  Ordered: Frank Santos  Ordered: Doctor, Unknown  Ordered: ServiceAccount, [facility]  Outpatient Provider: Amanuel Null  Override: Kelly Sanchez  Override: Karla Thomas  PCA/Nursing Assistant: Toussaint, Daniel  Physical Therapy: Karlee Avelar  Physical Therapy: Martinez Junior  Primary Team: Luly López  Primary Team: Nick Porter  Primary Team: Gilbert Smith  Primary Team: Jose Alberto Jha  Primary Team: Carrillo Kenney  Primary Team: Hi Carolina  Primary Team: Niko Mendoza  Quality Review: Shazia Maynard  Registered Dietitian: Anderson Bower  Respiratory Therapy: Chad Troy  : Yahaira Toribio  Student: Agnes Ly  Team: PORSCHE  Hospitalists, Team  UR// Supp. Assoc.: Marshall Merchant

## 2023-05-19 NOTE — PROGRESS NOTE ADULT - SUBJECTIVE AND OBJECTIVE BOX
RX ED Progress Note: Vancomycin Consult Acceptance      Indication for therapy: Non-necrotizing SSTI    ALLERGIES:  No Known Allergies    Most recent height and weight information:  Weight: 78.1 kg (07/20/21 1035)  Height: 5' 10\" (177.8 cm) (07/20/21 1035)    The Following are the calculated  Current Weights for Vahe Atkinson            Adjusted Ideal    75 kg 73 kg             Labs:  Serum Creatinine and Creatinine Clearance:  Creatinine clearance cannot be calculated (No successful lab value found.)    Maximum Temperature (last 24 hours)     None        WBC (K/mcL)   Date/Time Value   07/20/2021 1042 12.3 (H)     Microbiology Results     None            Assessment/Plan:  Briefly, this is a 54 year old male started on vancomycin for Non-necrotizing SSTI, indicated for one time vancomycin dose in the Emergency department. Initial dose will be Vancomycin 1500 mg once.    If vancomycin therapy is appropriate to be resumed, the inpatient provider will need to order a Pharmacy to dose and monitor vancomycin therapy consult if pharmacist monitoring of pertinent labs and drug levels is desired.    Thank you,    Queenie Ochoa RPH  7/20/2021 10:54 AM     Patient is a 82y old  Female who presents with a chief complaint of     Subjective:  INTERVAL HPI/OVERNIGHT EVENTS: Patient seen and examined at bedside.  Patient states her left hip pain is mild, no n/v/f/chills/sob/dizziness, no dysuria /abd pain   MEDICATIONS  (STANDING):  acetaminophen     Tablet .. 650 milliGRAM(s) Oral every 6 hours  albuterol/ipratropium for Nebulization 3 milliLiter(s) Nebulizer every 6 hours  apixaban 2.5 milliGRAM(s) Oral every 12 hours  atorvastatin 80 milliGRAM(s) Oral at bedtime  budesonide 160 MICROgram(s)/formoterol 4.5 MICROgram(s) Inhaler 2 Puff(s) Inhalation two times a day  ceFAZolin   IVPB 2000 milliGRAM(s) IV Intermittent every 8 hours  celecoxib 200 milliGRAM(s) Oral every 12 hours  hydrochlorothiazide 12.5 milliGRAM(s) Oral daily  lactated ringers. 1000 milliLiter(s) (50 mL/Hr) IV Continuous <Continuous>  levothyroxine 75 MICROGram(s) Oral daily  losartan 25 milliGRAM(s) Oral daily  pantoprazole    Tablet 40 milliGRAM(s) Oral before breakfast  polyethylene glycol 3350 17 Gram(s) Oral at bedtime  pyridostigmine 60 milliGRAM(s) Oral <User Schedule>  pyridostigmine  milliGRAM(s) Oral daily  senna 2 Tablet(s) Oral at bedtime    MEDICATIONS  (PRN):  albuterol    90 MICROgram(s) HFA Inhaler 2 Puff(s) Inhalation every 6 hours PRN Shortness of Breath and/or Wheezing  HYDROmorphone  Injectable 0.5 milliGRAM(s) IV Push every 3 hours PRN Breakthrough pain  magnesium hydroxide Suspension 30 milliLiter(s) Oral daily PRN Constipation  ondansetron Injectable 4 milliGRAM(s) IV Push every 6 hours PRN Nausea and/or Vomiting  oxyCODONE    IR 5 milliGRAM(s) Oral every 3 hours PRN Moderate Pain (4 - 6)  traMADol 50 milliGRAM(s) Oral every 4 hours PRN Moderate Pain (4 - 6)  traMADol 100 milliGRAM(s) Oral every 6 hours PRN Severe Pain (7 - 10)      Allergies    IODINE (Rash)  shellfish (Rash)  No Known Drug Allergies    Intolerances        REVIEW OF SYSTEMS:  CONSTITUTIONAL: No fever or chills  HEENT:  No headache, no sore throat  RESPIRATORY: No cough or shortness of breath  CARDIOVASCULAR: No chest pain or palpitations  GASTROINTESTINAL: No abd pain, nausea, vomiting, or diarrhea      Objective:  Vital Signs Last 24 Hrs  T(C): 36.8 (19 May 2023 11:51), Max: 36.8 (18 May 2023 21:17)  T(F): 98.2 (19 May 2023 11:51), Max: 98.2 (18 May 2023 21:17)  HR: 70 (19 May 2023 11:51) (58 - 93)  BP: 101/57 (19 May 2023 11:51) (101/57 - 158/60)  BP(mean): --  RR: 18 (19 May 2023 11:51) (12 - 20)  SpO2: 99% (19 May 2023 11:51) (97% - 100%)    Parameters below as of 19 May 2023 11:51  Patient On (Oxygen Delivery Method): nasal cannula  O2 Flow (L/min): 3      GENERAL: NAD, lying in bed comfortably  HEAD:  Normocephalic  EYES:  conjunctiva and sclera clear  ENT: Moist mucous membranes  NECK: Supple  CHEST/LUNG: Clear to auscultation bilaterally; No rales or rhonchi; no wheezing. Unlabored respirations  HEART: Regular rate and rhythm; S1S2+  ABDOMEN: Bowel sounds present; Soft, Nontender, Nondistended.   EXTREMITIES:  + distal Peripheral Pulses;  right hip scar from prior Sx, Left hip in dressing, clean, no excessive swelling or erythema seen   NERVOUS SYSTEM:  Alert & Oriented X3;  No gross focal deficits   MSK: moves all extremities  SKIN: No rashes     LABS:                        9.6    3.40  )-----------( 133      ( 19 May 2023 07:53 )             29.7     19 May 2023 07:53    140    |  106    |  21     ----------------------------<  145    4.6     |  22     |  0.89     Ca    9.2        19 May 2023 07:53    TPro  6.2    /  Alb  3.0    /  TBili  0.3    /  DBili  x      /  AST  25     /  ALT  23     /  AlkPhos  74     19 May 2023 07:53    PT/INR - ( 18 May 2023 07:40 )   PT: 11.0 sec;   INR: 0.96 ratio         PTT - ( 18 May 2023 07:40 )  PTT:27.5 sec  Urinalysis Basic - ( 18 May 2023 08:10 )    Color: Yellow / Appearance: Clear / S.021 / pH: x  Gluc: x / Ketone: Negative mg/dL  / Bili: Negative / Urobili: 0.2 mg/dL   Blood: x / Protein: Negative mg/dL / Nitrite: Negative   Leuk Esterase: Negative / RBC: x / WBC x   Sq Epi: x / Non Sq Epi: x / Bacteria: x      CAPILLARY BLOOD GLUCOSE            Culture - Joint Fluid (collected 23 @ 20:28)  Source: Hip Synovial Fluid  Gram Stain (23 @ 03:37):    polymorphonuclear leukocytes seen    No organisms seen    by cytocentrifuge    Culture - Tissue with Gram Stain (collected 23 @ 20:28)  Source: .Tissue Other  Gram Stain (23 @ 02:33):    No polymorphonuclear leukocytes seen per low power field    No organisms seen per oil power field        RADIOLOGY & ADDITIONAL TESTS:    Personally reviewed.     Consultant(s) Notes Reviewed:  [x] YES  [ ] NO    Plan of care discussed with patient /family: daughter at bedside; all questions answered

## 2023-05-19 NOTE — PHYSICAL THERAPY INITIAL EVALUATION ADULT - WEIGHT-BEARING RESTRICTIONS: SIT/STAND, REHAB EVAL
[TextBox_4] : before virus was having problem breathing ,  started in february off and on and congested,  thought it was just allergies,  went to emergency room, not an emergency , but wanted to figure out what was going on.  just short of breath, no fever, slight dry cough, gained   told of partially collaposed lung,  given antibitoics  on the ct there are two trivial areas of inflammation, although she sees the report of "consolidation versus sub segmental atlectasis" and interprets this as a collapsed lungs.  the findigns are benign and could represent an area of pneumoniti, it could not be seen on a chest film , but the ct was done to rule out a pe.  which there was none, but these findings are here.  she may have shortness of breath, not really clear if she has since her expression of this is somewhat ambiguous  full weight-bearing

## 2023-05-19 NOTE — CONSULT NOTE ADULT - SUBJECTIVE AND OBJECTIVE BOX
HPI:  81YO F PMH mild AS, GERD, HLD, HTN, hypothyroidism, myasthenia gravis, OA left hip, reactive airway, sinus bradycardia, s/p Left hip replacement 3/27/2023, who presented to the hospital with cc of left hip surgical wound dehiscence and drainage. Was on Bactrim. Denies fever chills n/v/d CP SOB. pt is sp incision and drainage of thigh 18-May-2023- appears to be superficial.    Infectious Disease consult was called to evaluate pt and for antibiotic management.      Past Medical & Surgical Hx:  PAST MEDICAL & SURGICAL HISTORY:  Hypertension  Hyperlipidemia  GERD (Gastroesophageal Reflux Disease)  hiatal hernia  Lumbar Radiculopathy  Kidney Calculi  Myasthenia gravis  dx 10/2018 symptoms: intermittent loss of voice/ weakness, negative ENT studies, now stable on Pyridostigmine  Lumbar stenosis  Diverticulitis large intestine  denies any recent exacerbations  Aortic stenosis, mild  asper daughter  Reactive airway disease that is not asthma  mild as per pulm note on [employer], patient and daughter denies any inhaler use; thought to be related to myasthena gravis  Hypothyroid  Osteoarthritis of left hip  Pneumonia due to COVID-19 virus  Sinus bradycardia  S/P Cholecystectomy  S/P Appendectomy  Bilateral Cataracts  removed  S/P Hysterectomy Partial  1974  S/P Laparoscopic Fundoplication  Prolapse, Uterovaginal  s/p sling  H/O laminectomy  cervical  1998  lumbar ,,,  with fusion  H/O shoulder replacement  b/l   S/P foot surgery  History of tonsillectomy  H/O umbilical hernia repair  S/P hip replacement, right      Social History--  EtOH: denies   Tobacco: denies   Drug Use: denies       FAMILY HISTORY:  Family history of stroke  father     FH: early death  mother  age 50s    Family history of bladder cancer  brother       Allergies  IODINE (Rash)  shellfish (Rash)  No Known Drug Allergies    Intolerances  NONE      Home Medications:  acetaminophen 500 mg oral tablet: 1 tab(s) orally every 8 hours (18 May 2023 07:43)  albuterol 90 mcg/inh inhalation aerosol: 2 puff(s) inhaled every 4 hours, As needed, Shortness of Breath and/or Wheezing (18 May 2023 07:43)  apixaban 2.5 mg oral tablet: 1 tab(s) orally every 12 hours (18 May 2023 07:43)  Breo Ellipta 200 mcg-25 mcg/inh inhalation powder: 1 puff(s) inhaled once a day (18 May 2023 07:43)  celecoxib 200 mg oral capsule: 1 cap(s) orally every 12 hours (18 May 2023 07:43)  esomeprazole 40 mg oral delayed release capsule: 1 tab(s) orally once a day (18 May 2023 07:43)  hydroCHLOROthiazide 12.5 mg oral capsule: 1 cap(s) orally once a day (18 May 2023 07:43)  levothyroxine 75 mcg (0.075 mg) oral tablet: 1 tab(s) orally once a day (18 May 2023 07:43)  losartan 25 mg oral tablet: 1 tab(s) orally once a day (18 May 2023 07:43)  oxyCODONE 5 mg oral tablet: 1 tab(s) orally every 3 hours As needed Moderate Pain (4 - 6) (18 May 2023 07:43)  pyridostigmine 180 mg oral tablet, extended release: orally once a day (18 May 2023 07:43)  pyridostigmine 60 mg oral tablet: 1 tab(s) orally 3 times a day  (7am, 1pm ,6 pm ) (18 May 2023 07:43)  rosuvastatin 20 mg oral capsule: orally once a day (at bedtime) (18 May 2023 07:43)  senna leaf extract oral tablet: 2 tab(s) orally once a day (at bedtime) (18 May 2023 07:43)      Current Inpatient Medications :    ANTIBIOTICS:   ceFAZolin   IVPB 2000 milliGRAM(s) IV Intermittent every 8 hours      OTHER RELEVANT MEDICATIONS :  acetaminophen     Tablet .. 650 milliGRAM(s) Oral every 6 hours  albuterol    90 MICROgram(s) HFA Inhaler 2 Puff(s) Inhalation every 6 hours PRN  albuterol/ipratropium for Nebulization 3 milliLiter(s) Nebulizer every 6 hours  apixaban 2.5 milliGRAM(s) Oral every 12 hours  atorvastatin 80 milliGRAM(s) Oral at bedtime  budesonide 160 MICROgram(s)/formoterol 4.5 MICROgram(s) Inhaler 2 Puff(s) Inhalation two times a day  celecoxib 200 milliGRAM(s) Oral every 12 hours  hydrochlorothiazide 12.5 milliGRAM(s) Oral daily  HYDROmorphone  Injectable 0.5 milliGRAM(s) IV Push every 3 hours PRN  lactated ringers. 1000 milliLiter(s) IV Continuous <Continuous>  levothyroxine 75 MICROGram(s) Oral daily  losartan 25 milliGRAM(s) Oral daily  magnesium hydroxide Suspension 30 milliLiter(s) Oral daily PRN  ondansetron Injectable 4 milliGRAM(s) IV Push every 6 hours PRN  oxyCODONE    IR 5 milliGRAM(s) Oral every 3 hours PRN  pantoprazole    Tablet 40 milliGRAM(s) Oral before breakfast  polyethylene glycol 3350 17 Gram(s) Oral at bedtime  senna 2 Tablet(s) Oral at bedtime  traMADol 50 milliGRAM(s) Oral every 4 hours PRN  traMADol 100 milliGRAM(s) Oral every 6 hours PRN      ROS:  Unable to obtain due to :     ROS:  CONSTITUTIONAL:  Negative fever or chills, feels well, good appetite  EYES:  Negative  blurry vision or double vision  CARDIOVASCULAR:  Negative for chest pain or palpitations  RESPIRATORY:  Negative for cough, wheezing, or SOB   GASTROINTESTINAL:  Negative for nausea, vomiting, diarrhea, constipation, or abdominal pain  GENITOURINARY:  Negative frequency, urgency , dysuria or hematuria   NEUROLOGIC:  No headache, confusion, dizziness, lightheadedness  All other systems were reviewed and are negative          I&O's Detail    18 May 2023 07:01  -  19 May 2023 07:00  --------------------------------------------------------  IN:    Lactated Ringers: 700 mL  Total IN: 700 mL    OUT:    Voided (mL): 200 mL  Total OUT: 200 mL    Total NET: 500 mL          Physical Exam:  Vital Signs Last 24 Hrs  T(C): 36.8 (19 May 2023 19:12), Max: 36.8 (18 May 2023 21:17)  T(F): 98.3 (19 May 2023 19:12), Max: 98.3 (19 May 2023 19:12)  HR: 77 (19 May 2023 19:12) (58 - 93)  BP: 111/60 (19 May 2023 19:12) (101/57 - 158/60)  BP(mean): --  RR: 16 (19 May 2023 19:12) (12 - 20)  SpO2: 99% (19 May 2023 19:12) (97% - 100%)    Parameters below as of 19 May 2023 19:12  Patient On (Oxygen Delivery Method): nasal cannula  O2 Flow (L/min): 3        General: well developed well nourished, in no acute distress  Neck: supple, trachea midline  Lungs: clear, no wheeze/rhonchi  Cardiovascular: regular rate and rhythm, S1 S2  Abdomen: soft, nontender, ND, bowel sounds normal  Neurological:  alert and oriented x3  Skin: no rash  Extremities: Left hip drsg c/d/i + swelling tender  minimal erythema    Labs:                         9.6    3.40  )-----------( 133      ( 19 May 2023 07:53 )             29.7   05-    140  |  106  |  21  ----------------------------<  145<H>  4.6   |  22  |  0.89    Ca    9.2      19 May 2023 07:53    TPro  6.2  /  Alb  3.0<L>  /  TBili  0.3  /  DBili  x   /  AST  25  /  ALT  23  /  AlkPhos  74  05-      RECENT CULTURES:    Culture - Joint Fluid (collected 18 May 2023 20:28)  Source: Hip Synovial Fluid  Gram Stain (19 May 2023 03:37):    polymorphonuclear leukocytes seen    No organisms seen    by cytocentrifuge  Preliminary Report (19 May 2023 18:17):    No growth    Culture - Tissue with Gram Stain (collected 18 May 2023 20:28)  Source: .Tissue Other  Gram Stain (19 May 2023 02:33):    No polymorphonuclear leukocytes seen per low power field    No organisms seen per oil power field    Culture - Blood (collected 18 May 2023 07:40)  Source: .Blood Blood-Peripheral  Preliminary Report (19 May 2023 14:01):    No growth to date.    Culture - Blood (collected 18 May 2023 07:40)  Source: .Blood Blood-Peripheral  Preliminary Report (19 May 2023 14:01):    No growth to date.      RADIOLOGY & ADDITIONAL STUDIES:    ACC: [id number] EXAM:  CT HIP ONLY LT   ORDERED BY: CARMEN RICE     PROCEDURE DATE:  2023          INTERPRETATION:  CT OF THE LEFT HIP    CLINICAL INFORMATION: Infected left hip. History of total hip   arthroplasty 3/27/2023. Evaluate for fluid collection.  TECHNIQUE: Multidetector CT of the pelvis focusing on the left hip. The   study was performed without the use of intravenous or intra-articular   contrast. Multiplanar reformats were generated for review.    COMPARISON: Intraoperative fluoroscopic images of the left hip 3/27/2023.   Left hip MRI 2023.    FINDINGS:    HARDWARE: Patient status post right total hip arthroplasty. Hardware is   intact. A small amount of periprosthetic lucency is seen along the   anterior aspectof the acetabular cup. No additional periprosthetic   lucency on the right. Transfixion screws through the acetabulum extends   into the right iliacus muscle.  Patient status post left total hip arthroplasty with placement of   cerclage wire. The hardware is intact. A small amount of subcortical   lucency is seen at the anterior margin of the acetabulum, which is   similar in appearance to preoperative MRI and likely degenerative. No   periprosthetic lucency. Transfixion screw through the acetabulum extends   into the left iliacus muscle.  Incompletely evaluated lumbar spinal fusion hardware.    BONE: No acute fracture. No new focal areas of cortical destruction.   Areas of subcortical lucency in the bilateral acetabula adjacent to the   acetabular cups, as described above.    JOINTS: Degenerative changes of the pubic symphysis. Mild degeneration of   the sacroiliac joints. Degenerative changes of the lower lumbar spine. No   large joint effusion appreciated on CT.    SOFT TISSUE: Postsurgical scarring along the lateral aspect of the left   thigh. No large subcutaneous fluid collection. Punctate focus of   subcutaneous emphysema seen at the site of bandage material (6:97).   Postsurgical scarring in the bilateral thighs. No focal muscle atrophy.   Neurovascular structures are normal in course and caliber.      IMPRESSION:  1.  Small amount of subcutaneous emphysema in the subcutaneous fat   adjacent to the site of surgical wound in the left proximal thigh. No   focal drainable fluid collections are identified on CT.  2.  Bilateral total hip arthroplasty without CT evidence of acute   hardware complication.    Assessment :   81YO F PMH mild AS, GERD, HLD, HTN, hypothyroidism, myasthenia gravis, OA left hip, reactive airway, sinus bradycardia, s/p Left hip replacement 3/27/2023, admitted with  left hip surgical wound infection with dehiscence and drainage. Sp incision and drainage of thigh 18-May-2023- appears to be superficial.      Plan :   Cont Ancef  Fu OR cultures  Trend temps and cbc  Pulm toileting  Activity per ortho    Continue with present regiment .  Approptiate use of antibiotics and adverse effects reviewed.      I have discussed the above plan of care with patient in detail. She expressed understanding of the treatment plan . Risks, benefits and alternatives discussed in detail. I have asked if she has any questions or concerns and appropriately addressed them to the best of my ability .      > 45 minutes spent in direct patient care reviewing  the notes, lab data/ imaging , discussion with multidisciplinary team. All questions were addressed and answered to the best of my capacity .    Thank you for allowing me to participate in the care of your patient .      Eliu Terry MD  Infectious Disease  [phone]

## 2023-05-19 NOTE — CARE COORDINATION ASSESSMENT. - NSDCPLANSERVICES_GEN_ALL_CORE
CM met with patient and daughter at bedside. Patient alert times 3.  Patient has a cane and walker at home. Patient refuses homecare services if they are recommended she stated. Patient and daughter stated she goes to  [facility]  for outpatient physical therapy in [facility] number [phone] [facility]./Anticipated Needs Unclear at Present

## 2023-05-19 NOTE — CARE COORDINATION ASSESSMENT. - OTHER PERTINENT DISCHARGE PLANNING INFORMATION:
83 y/o F with hx of mild AS, GERD, HLD, HTN, hypothyroidism, myasthenia gravis, OA left hip, reactive airway, sinus bradycardia, she is s/p Left hip replacement done in 3/27/2023, since she has joint replaced her pain never improved. She was seen by Dr Dinero in the office  and he sent her to the hospital for further management and evaluation for possible septic arthritis, likely washout. Met patient at bedside.  Explained role of CM, verbalized understanding. Pt was made aware a CM will remain available through hospitalization.  Contact information given in discharge/ transitions resource folder.

## 2023-05-19 NOTE — PROVIDER CONTACT NOTE (CRITICAL VALUE NOTIFICATION) - ACTION/TREATMENT ORDERED:
MD aware, will await further orders / changes to plan of care. MD aware, Ancef Q8 d/c. pt now to receive one dose of Levaquin IVPB.

## 2023-05-19 NOTE — DISCHARGE NOTE PROVIDER - PROVIDER TOKENS
PROVIDER:[TOKEN:[[id number]],FOLLOWUP:[2 weeks]],PROVIDER:[TOKEN:[[id number]],FOLLOWUP:[2 weeks]]

## 2023-05-19 NOTE — OCCUPATIONAL THERAPY INITIAL EVALUATION ADULT - ADDITIONAL COMMENTS
Pt lives alone in a private house with five stairs outside +2HR and no stairs inside. Pt reported that daughter and son visit her and daughter will be assisting Pt at home once d/c. Pt requests to go home after staying in the hospital for a few more days. Pt owns RW, SAC, commode, raised toilet seat, shower chair, and shower bars. Pt has a walk-in shower.

## 2023-05-20 LAB
-  AMIKACIN: SIGNIFICANT CHANGE UP
-  AZTREONAM: SIGNIFICANT CHANGE UP
-  CEFEPIME: SIGNIFICANT CHANGE UP
-  CEFTAZIDIME: SIGNIFICANT CHANGE UP
-  CIPROFLOXACIN: SIGNIFICANT CHANGE UP
-  GENTAMICIN: SIGNIFICANT CHANGE UP
-  IMIPENEM: SIGNIFICANT CHANGE UP
-  LEVOFLOXACIN: SIGNIFICANT CHANGE UP
-  MEROPENEM: SIGNIFICANT CHANGE UP
-  PIPERACILLIN/TAZOBACTAM: SIGNIFICANT CHANGE UP
-  TOBRAMYCIN: SIGNIFICANT CHANGE UP
ANION GAP SERPL CALC-SCNC: 8 MMOL/L — SIGNIFICANT CHANGE UP (ref 5–17)
BUN SERPL-MCNC: 21 MG/DL — SIGNIFICANT CHANGE UP (ref 7–23)
CALCIUM SERPL-MCNC: 8.8 MG/DL — SIGNIFICANT CHANGE UP (ref 8.4–10.5)
CHLORIDE SERPL-SCNC: 109 MMOL/L — HIGH (ref 96–108)
CO2 SERPL-SCNC: 24 MMOL/L — SIGNIFICANT CHANGE UP (ref 22–31)
CREAT SERPL-MCNC: 0.81 MG/DL — SIGNIFICANT CHANGE UP (ref 0.5–1.3)
EGFR: 72 ML/MIN/1.73M2 — SIGNIFICANT CHANGE UP
GLUCOSE SERPL-MCNC: 93 MG/DL — SIGNIFICANT CHANGE UP (ref 70–99)
HCT VFR BLD CALC: 27.6 % — LOW (ref 34.5–45)
HGB BLD-MCNC: 8.7 G/DL — LOW (ref 11.5–15.5)
MCHC RBC-ENTMCNC: 29.8 PG — SIGNIFICANT CHANGE UP (ref 27–34)
MCHC RBC-ENTMCNC: 31.5 GM/DL — LOW (ref 32–36)
MCV RBC AUTO: 94.5 FL — SIGNIFICANT CHANGE UP (ref 80–100)
METHOD TYPE: SIGNIFICANT CHANGE UP
NRBC # BLD: 0 /100 WBCS — SIGNIFICANT CHANGE UP (ref 0–0)
PLATELET # BLD AUTO: 111 K/UL — LOW (ref 150–400)
POTASSIUM SERPL-MCNC: 4.3 MMOL/L — SIGNIFICANT CHANGE UP (ref 3.5–5.3)
POTASSIUM SERPL-SCNC: 4.3 MMOL/L — SIGNIFICANT CHANGE UP (ref 3.5–5.3)
RBC # BLD: 2.92 M/UL — LOW (ref 3.8–5.2)
RBC # FLD: 14.4 % — SIGNIFICANT CHANGE UP (ref 10.3–14.5)
SODIUM SERPL-SCNC: 141 MMOL/L — SIGNIFICANT CHANGE UP (ref 135–145)
WBC # BLD: 4.34 K/UL — SIGNIFICANT CHANGE UP (ref 3.8–10.5)
WBC # FLD AUTO: 4.34 K/UL — SIGNIFICANT CHANGE UP (ref 3.8–10.5)

## 2023-05-20 PROCEDURE — 71046 X-RAY EXAM CHEST 2 VIEWS: CPT | Mod: 26

## 2023-05-20 PROCEDURE — 99232 SBSQ HOSP IP/OBS MODERATE 35: CPT

## 2023-05-20 RX ORDER — LACTULOSE 10 G/15ML
15 SOLUTION ORAL ONCE
Refills: 0 | Status: DISCONTINUED | OUTPATIENT
Start: 2023-05-20 | End: 2023-05-20

## 2023-05-20 RX ORDER — CEFEPIME 1 G/1
1000 INJECTION, POWDER, FOR SOLUTION INTRAMUSCULAR; INTRAVENOUS EVERY 12 HOURS
Refills: 0 | Status: DISCONTINUED | OUTPATIENT
Start: 2023-05-20 | End: 2023-05-21

## 2023-05-20 RX ORDER — FAMOTIDINE 10 MG/ML
20 INJECTION INTRAVENOUS DAILY
Refills: 0 | Status: DISCONTINUED | OUTPATIENT
Start: 2023-05-20 | End: 2023-05-24

## 2023-05-20 RX ORDER — CEFAZOLIN SODIUM 1 G
2000 VIAL (EA) INJECTION EVERY 8 HOURS
Refills: 0 | Status: DISCONTINUED | OUTPATIENT
Start: 2023-05-20 | End: 2023-05-20

## 2023-05-20 RX ADMIN — Medication 100 MILLIGRAM(S): at 08:04

## 2023-05-20 RX ADMIN — ATORVASTATIN CALCIUM 80 MILLIGRAM(S): 80 TABLET, FILM COATED ORAL at 21:53

## 2023-05-20 RX ADMIN — PYRIDOSTIGMINE BROMIDE 60 MILLIGRAM(S): 60 SOLUTION ORAL at 06:42

## 2023-05-20 RX ADMIN — PYRIDOSTIGMINE BROMIDE 180 MILLIGRAM(S): 60 SOLUTION ORAL at 11:50

## 2023-05-20 RX ADMIN — APIXABAN 2.5 MILLIGRAM(S): 2.5 TABLET, FILM COATED ORAL at 08:05

## 2023-05-20 RX ADMIN — CELECOXIB 200 MILLIGRAM(S): 200 CAPSULE ORAL at 17:09

## 2023-05-20 RX ADMIN — SENNA PLUS 2 TABLET(S): 8.6 TABLET ORAL at 21:52

## 2023-05-20 RX ADMIN — PYRIDOSTIGMINE BROMIDE 60 MILLIGRAM(S): 60 SOLUTION ORAL at 17:09

## 2023-05-20 RX ADMIN — Medication 3 MILLILITER(S): at 07:32

## 2023-05-20 RX ADMIN — POLYETHYLENE GLYCOL 3350 17 GRAM(S): 17 POWDER, FOR SOLUTION ORAL at 21:53

## 2023-05-20 RX ADMIN — PYRIDOSTIGMINE BROMIDE 60 MILLIGRAM(S): 60 SOLUTION ORAL at 13:15

## 2023-05-20 RX ADMIN — FAMOTIDINE 20 MILLIGRAM(S): 10 INJECTION INTRAVENOUS at 11:52

## 2023-05-20 RX ADMIN — Medication 75 MICROGRAM(S): at 06:43

## 2023-05-20 RX ADMIN — CELECOXIB 200 MILLIGRAM(S): 200 CAPSULE ORAL at 06:42

## 2023-05-20 RX ADMIN — BUDESONIDE AND FORMOTEROL FUMARATE DIHYDRATE 2 PUFF(S): 160; 4.5 AEROSOL RESPIRATORY (INHALATION) at 07:02

## 2023-05-20 RX ADMIN — Medication 650 MILLIGRAM(S): at 13:25

## 2023-05-20 RX ADMIN — PANTOPRAZOLE SODIUM 40 MILLIGRAM(S): 20 TABLET, DELAYED RELEASE ORAL at 06:43

## 2023-05-20 RX ADMIN — CELECOXIB 200 MILLIGRAM(S): 200 CAPSULE ORAL at 08:06

## 2023-05-20 RX ADMIN — Medication 650 MILLIGRAM(S): at 09:00

## 2023-05-20 RX ADMIN — SODIUM CHLORIDE 50 MILLILITER(S): 9 INJECTION, SOLUTION INTRAVENOUS at 00:47

## 2023-05-20 RX ADMIN — BUDESONIDE AND FORMOTEROL FUMARATE DIHYDRATE 2 PUFF(S): 160; 4.5 AEROSOL RESPIRATORY (INHALATION) at 21:53

## 2023-05-20 RX ADMIN — Medication 3 MILLILITER(S): at 20:18

## 2023-05-20 RX ADMIN — Medication 650 MILLIGRAM(S): at 22:30

## 2023-05-20 RX ADMIN — Medication 650 MILLIGRAM(S): at 21:52

## 2023-05-20 RX ADMIN — Medication 650 MILLIGRAM(S): at 14:05

## 2023-05-20 RX ADMIN — APIXABAN 2.5 MILLIGRAM(S): 2.5 TABLET, FILM COATED ORAL at 21:53

## 2023-05-20 RX ADMIN — CEFEPIME 100 MILLIGRAM(S): 1 INJECTION, POWDER, FOR SOLUTION INTRAMUSCULAR; INTRAVENOUS at 17:10

## 2023-05-20 RX ADMIN — Medication 3 MILLILITER(S): at 14:01

## 2023-05-20 RX ADMIN — Medication 650 MILLIGRAM(S): at 08:05

## 2023-05-20 RX ADMIN — LOSARTAN POTASSIUM 25 MILLIGRAM(S): 100 TABLET, FILM COATED ORAL at 11:50

## 2023-05-20 NOTE — PROGRESS NOTE ADULT - SUBJECTIVE AND OBJECTIVE BOX
Patient is a 82y old  Female who presents with a chief complaint of r/o septic joint (19 May 2023 12:19)        HPI:  83 y/o F with hx of mild AS, GERD, HLD, HTN, hypothyroidism, myasthenia gravis, OA left hip, reactive airway, sinus bradycardia, she is s/p Left hip replacement done in 3/27/2023, since she has joint replaced her pain never improved,  She has been following regularly with orthopedics.  She was last seen in the office on the 16th, she has had wound vacs applied.  Per documentation from the 16th of the TORITO dressing to the left anterior hip was intact with moderate drainage, she has been given Bactrim DS at that time, she has opn wound at the surgical site, she was seen by Dr Dinero in the office  and he sent her to the hospital for further management and evaluation for possible septic arthritis, likely washout, patient denies fever, chills, chest pain, sob, dizziness.    (18 May 2023 10:01)      SUBJECTIVE & OBJECTIVE: Pt seen and examined at bedside. nad    PHYSICAL EXAM:  T(C): 36.8 (05-20-23 @ 05:06), Max: 36.8 (05-19-23 @ 11:51)  HR: 55 (05-20-23 @ 07:32) (55 - 77)  BP: 111/61 (05-20-23 @ 06:06) (101/57 - 111/61)  RR: 18 (05-20-23 @ 05:06) (16 - 18)  SpO2: 97% (05-20-23 @ 07:32) (97% - 99%)  Wt(kg): --   GENERAL: NAD, well-groomed, well-developed  NECK: Supple, No JVD  NERVOUS SYSTEM:  Alert & Oriented X3  CHEST/LUNG: Clear to auscultation bilaterally; No rales, rhonchi, wheezing, or rubs  HEART: Regular rate and rhythm; No murmurs, rubs, or gallops  ABDOMEN: Soft, Nontender, Nondistended; Bowel sounds present  EXTREMITIES:  2+ Peripheral Pulses, No clubbing, cyanosis, or edema        MEDICATIONS  (STANDING):  acetaminophen     Tablet .. 650 milliGRAM(s) Oral every 6 hours  albuterol/ipratropium for Nebulization 3 milliLiter(s) Nebulizer every 6 hours  apixaban 2.5 milliGRAM(s) Oral every 12 hours  atorvastatin 80 milliGRAM(s) Oral at bedtime  budesonide 160 MICROgram(s)/formoterol 4.5 MICROgram(s) Inhaler 2 Puff(s) Inhalation two times a day  cefepime   IVPB 1000 milliGRAM(s) IV Intermittent every 12 hours  celecoxib 200 milliGRAM(s) Oral every 12 hours  hydrochlorothiazide 12.5 milliGRAM(s) Oral daily  levothyroxine 75 MICROGram(s) Oral daily  losartan 25 milliGRAM(s) Oral daily  pantoprazole    Tablet 40 milliGRAM(s) Oral before breakfast  polyethylene glycol 3350 17 Gram(s) Oral at bedtime  pyridostigmine 60 milliGRAM(s) Oral <User Schedule>  pyridostigmine  milliGRAM(s) Oral daily  senna 2 Tablet(s) Oral at bedtime    MEDICATIONS  (PRN):  albuterol    90 MICROgram(s) HFA Inhaler 2 Puff(s) Inhalation every 6 hours PRN Shortness of Breath and/or Wheezing  HYDROmorphone  Injectable 0.5 milliGRAM(s) IV Push every 3 hours PRN Breakthrough pain  magnesium hydroxide Suspension 30 milliLiter(s) Oral daily PRN Constipation  ondansetron Injectable 4 milliGRAM(s) IV Push every 6 hours PRN Nausea and/or Vomiting  oxyCODONE    IR 5 milliGRAM(s) Oral every 3 hours PRN Moderate Pain (4 - 6)  traMADol 50 milliGRAM(s) Oral every 4 hours PRN Moderate Pain (4 - 6)  traMADol 100 milliGRAM(s) Oral every 6 hours PRN Severe Pain (7 - 10)      LABS:                        8.7    4.34  )-----------( 111      ( 20 May 2023 06:30 )             27.6     05-20    141  |  109<H>  |  21  ----------------------------<  93  4.3   |  24  |  0.81    Ca    8.8      20 May 2023 06:30    TPro  6.2  /  Alb  3.0<L>  /  TBili  0.3  /  DBili  x   /  AST  25  /  ALT  23  /  AlkPhos  74  05-19          CAPILLARY BLOOD GLUCOSE          CAPILLARY BLOOD GLUCOSE        CAPILLARY BLOOD GLUCOSE                RECENT CULTURES:      RADIOLOGY & ADDITIONAL TESTS:                        DVT/GI ppx  Discussed with pt @ bedside

## 2023-05-20 NOTE — PROGRESS NOTE ADULT - SUBJECTIVE AND OBJECTIVE BOX
POST OPERATIVE DAY #:   2  STATUS POST: I&D Left Anterior THR                       SUBJECTIVE: Patient seen and examined. Resting in bed. C/o nausea, and a little bit of a cough/mucous production.  Reported Pain score = 2    OBJECTIVE:     Vital Signs Last 24 Hrs  T(C): 36.8 (20 May 2023 05:06), Max: 36.8 (19 May 2023 11:51)  T(F): 98.3 (20 May 2023 05:06), Max: 98.3 (19 May 2023 19:12)  HR: 55 (20 May 2023 07:32) (55 - 77)  BP: 111/61 (20 May 2023 06:06) (101/57 - 111/61)  RR: 18 (20 May 2023 05:06) (16 - 18)  SpO2: 97% (20 May 2023 07:32) (97% - 99%)    Parameters below as of 20 May 2023 07:32  Patient On (Oxygen Delivery Method): room air        Left hip:        Prevean Dressing: clean/dry/intact, functioning  Bilateral LEs:         Sensation:  intact to light touch          Motor exam:  5/5 dorsiflexion/plantarflexion/EHL          2+ DP pulses          calf supple, NT         SCDs in place    LABS:                        8.7    4.34  )-----------( 111      ( 20 May 2023 06:30 )             27.6     05-20    141  |  109<H>  |  21  ----------------------------<  93  4.3   |  24  |  0.81    Ca    8.8      20 May 2023 06:30    TPro  6.2  /  Alb  3.0<L>  /  TBili  0.3  /  DBili  x   /  AST  25  /  ALT  23  /  AlkPhos  74  05-19      Culture - Joint Fluid (collected 18 May 2023 20:28)  Source: Hip Synovial Fluid  Gram Stain (19 May 2023 03:37):    polymorphonuclear leukocytes seen    No organisms seen    by cytocentrifuge  Preliminary Report (19 May 2023 18:17):    No growth    Culture - Tissue with Gram Stain (collected 18 May 2023 20:28)  Source: .Tissue Other  Gram Stain (19 May 2023 02:33):    No polymorphonuclear leukocytes seen per low power field    No organisms seen per oil power field  Preliminary Report (19 May 2023 21:06):    Few Pseudomonas aeruginosa    Culture - Urine (collected 18 May 2023 08:10)  Source: Clean Catch Clean Catch (Midstream)  Preliminary Report (19 May 2023 23:33):    10,000 - 49,000 CFU/mL Escherichia coli    Culture - Blood (collected 18 May 2023 07:40)  Source: .Blood Blood-Peripheral  Preliminary Report (19 May 2023 14:01):    No growth to date.    Culture - Blood (collected 18 May 2023 07:40)  Source: .Blood Blood-Peripheral  Preliminary Report (19 May 2023 14:01):    No growth to date.          MEDICATIONS:  Anticoagulation:  apixaban 2.5 milliGRAM(s) Oral every 12 hours      Pain medications:   acetaminophen     Tablet .. 650 milliGRAM(s) Oral every 6 hours  celecoxib 200 milliGRAM(s) Oral every 12 hours  HYDROmorphone  Injectable 0.5 milliGRAM(s) IV Push every 3 hours PRN  oxyCODONE    IR 5 milliGRAM(s) Oral every 3 hours PRN  traMADol 50 milliGRAM(s) Oral every 4 hours PRN  traMADol 100 milliGRAM(s) Oral every 6 hours PRN        A/P :   s/p I&D Left Anterior THR POD # 2  -    Pain control  -    Pepcid ordered  -    DVT ppx: Eliquis 2.5mg q 12 h  -    Dr. Flores addressing mucous/cough  -    Weight bearing status: WBAT   -    Continue anterior total hip precautions  -    Physical Therapy  -    Occupational Therapy  -     ID note appreciated  -    Discharge plan: Home when medically cleared and home care arranged   POST OPERATIVE DAY #:   2  STATUS POST: I&D Left Anterior THR                       SUBJECTIVE: Patient seen and examined. Resting in bed. C/o nausea, and a little bit of a cough/mucous production.  Reported Pain score = 2    OBJECTIVE:     Vital Signs Last 24 Hrs  T(C): 36.8 (20 May 2023 05:06), Max: 36.8 (19 May 2023 11:51)  T(F): 98.3 (20 May 2023 05:06), Max: 98.3 (19 May 2023 19:12)  HR: 55 (20 May 2023 07:32) (55 - 77)  BP: 111/61 (20 May 2023 06:06) (101/57 - 111/61)  RR: 18 (20 May 2023 05:06) (16 - 18)  SpO2: 97% (20 May 2023 07:32) (97% - 99%)    Parameters below as of 20 May 2023 07:32  Patient On (Oxygen Delivery Method): room air        Left hip:        Prevean Dressing: clean/dry/intact, functioning  Bilateral LEs:         Sensation:  intact to light touch          Motor exam:  5/5 dorsiflexion/plantarflexion/EHL          2+ DP pulses          calf supple, NT         SCDs in place    LABS:                        8.7    4.34  )-----------( 111      ( 20 May 2023 06:30 )             27.6     05-20    141  |  109<H>  |  21  ----------------------------<  93  4.3   |  24  |  0.81    Ca    8.8      20 May 2023 06:30    TPro  6.2  /  Alb  3.0<L>  /  TBili  0.3  /  DBili  x   /  AST  25  /  ALT  23  /  AlkPhos  74  05-19      Culture - Joint Fluid (collected 18 May 2023 20:28)  Source: Hip Synovial Fluid  Gram Stain (19 May 2023 03:37):    polymorphonuclear leukocytes seen    No organisms seen    by cytocentrifuge  Preliminary Report (19 May 2023 18:17):    No growth    Culture - Tissue with Gram Stain (collected 18 May 2023 20:28)  Source: .Tissue Other  Gram Stain (19 May 2023 02:33):    No polymorphonuclear leukocytes seen per low power field    No organisms seen per oil power field  Preliminary Report (19 May 2023 21:06):    Few Pseudomonas aeruginosa    Culture - Urine (collected 18 May 2023 08:10)  Source: Clean Catch Clean Catch (Midstream)  Preliminary Report (19 May 2023 23:33):    10,000 - 49,000 CFU/mL Escherichia coli    Culture - Blood (collected 18 May 2023 07:40)  Source: .Blood Blood-Peripheral  Preliminary Report (19 May 2023 14:01):    No growth to date.    Culture - Blood (collected 18 May 2023 07:40)  Source: .Blood Blood-Peripheral  Preliminary Report (19 May 2023 14:01):    No growth to date.          MEDICATIONS:  Anticoagulation:  apixaban 2.5 milliGRAM(s) Oral every 12 hours      Pain medications:   acetaminophen     Tablet .. 650 milliGRAM(s) Oral every 6 hours  celecoxib 200 milliGRAM(s) Oral every 12 hours  HYDROmorphone  Injectable 0.5 milliGRAM(s) IV Push every 3 hours PRN  oxyCODONE    IR 5 milliGRAM(s) Oral every 3 hours PRN  traMADol 50 milliGRAM(s) Oral every 4 hours PRN  traMADol 100 milliGRAM(s) Oral every 6 hours PRN        A/P :   s/p I&D Left Anterior THR POD # 2  -    Pain control  -    Pepcid ordered  -    DVT ppx: Eliquis 2.5mg q 12 h  -    Dr. Crouch addressing mucous/cough  -    Weight bearing status: WBAT   -    Continue anterior total hip precautions  -    Physical Therapy  -    Occupational Therapy  -     ID note appreciated  -    Discharge plan: Home when medically cleared and home care arranged

## 2023-05-20 NOTE — PROGRESS NOTE ADULT - SUBJECTIVE AND OBJECTIVE BOX
HÉCTOR LYONS is a 82yFemale , patient examined and chart reviewed.     INTERVAL HPI/ OVERNIGHT EVENTS:   In chair. No events.  Afebrile.    PAST MEDICAL & SURGICAL HISTORY:  Hypertension  Hyperlipidemia  GERD (Gastroesophageal Reflux Disease)  hiatal hernia  Lumbar Radiculopathy  Kidney Calculi  Myasthenia gravis  dx 10/2018 symptoms: intermittent loss of voice/ weakness, negative ENT studies, now stable on Pyridostigmine  Lumbar stenosis  Diverticulitis large intestine  denies any recent exacerbations  Aortic stenosis, mild  asper daughter  Reactive airway disease that is not asthma  mild as per pulm note on [employer], patient and daughter denies any inhaler use; thought to be related to myasthena gravis  Hypothyroid  Osteoarthritis of left hip  Pneumonia due to COVID-19 virus  Sinus bradycardia  S/P Cholecystectomy  S/P Appendectomy  Bilateral Cataracts  removed  S/P Hysterectomy Partial  1974  S/P Laparoscopic Fundoplication  Prolapse, Uterovaginal  s/p sling  H/O laminectomy  cervical  1998  lumbar 1986,1998,2000, 2/19 with fusion  H/O shoulder replacement  b/l 2015/2016  S/P foot surgery  History of tonsillectomy  H/O umbilical hernia repair  S/P hip replacement, right        For details regarding the patient's social history, family history, and other miscellaneous elements, please refer the initial infectious diseases consultation and/or the admitting history and physical examination for this admission.      ROS:  CONSTITUTIONAL:  Negative fever or chills  EYES:  Negative  blurry vision or double vision  CARDIOVASCULAR:  Negative for chest pain or palpitations  RESPIRATORY:  Negative for cough, wheezing, or SOB   GASTROINTESTINAL:  Negative for nausea, vomiting, diarrhea, constipation, or abdominal pain  GENITOURINARY:  Negative frequency, urgency or dysuria  NEUROLOGIC:  No headache, confusion, dizziness, lightheadedness  All other systems were reviewed and are negative     IODINE (Rash)  shellfish (Rash)  No Known Drug Allergies      Current inpatient medications :    ANTIBIOTICS/RELEVANT:  ceFAZolin   IVPB 2000 milliGRAM(s) IV Intermittent every 8 hours  pyridostigmine 60 milliGRAM(s) Oral <User Schedule>  pyridostigmine  milliGRAM(s) Oral daily      acetaminophen     Tablet .. 650 milliGRAM(s) Oral every 6 hours  albuterol    90 MICROgram(s) HFA Inhaler 2 Puff(s) Inhalation every 6 hours PRN  albuterol/ipratropium for Nebulization 3 milliLiter(s) Nebulizer every 6 hours  apixaban 2.5 milliGRAM(s) Oral every 12 hours  atorvastatin 80 milliGRAM(s) Oral at bedtime  budesonide 160 MICROgram(s)/formoterol 4.5 MICROgram(s) Inhaler 2 Puff(s) Inhalation two times a day  celecoxib 200 milliGRAM(s) Oral every 12 hours  hydrochlorothiazide 12.5 milliGRAM(s) Oral daily  HYDROmorphone  Injectable 0.5 milliGRAM(s) IV Push every 3 hours PRN  levothyroxine 75 MICROGram(s) Oral daily  losartan 25 milliGRAM(s) Oral daily  magnesium hydroxide Suspension 30 milliLiter(s) Oral daily PRN  ondansetron Injectable 4 milliGRAM(s) IV Push every 6 hours PRN  oxyCODONE    IR 5 milliGRAM(s) Oral every 3 hours PRN  pantoprazole    Tablet 40 milliGRAM(s) Oral before breakfast  polyethylene glycol 3350 17 Gram(s) Oral at bedtime  senna 2 Tablet(s) Oral at bedtime  traMADol 50 milliGRAM(s) Oral every 4 hours PRN  traMADol 100 milliGRAM(s) Oral every 6 hours PRN      Objective:    05-19 @ 07:01  -  05-20 @ 07:00  --------------------------------------------------------  IN: 450 mL / OUT: 500 mL / NET: -50 mL      T(C): 36.8 (05-20-23 @ 05:06), Max: 36.8 (05-19-23 @ 11:51)  HR: 55 (05-20-23 @ 07:32) (55 - 77)  BP: 111/61 (05-20-23 @ 06:06) (101/57 - 111/61)  RR: 18 (05-20-23 @ 05:06) (16 - 18)  SpO2: 97% (05-20-23 @ 07:32) (97% - 99%)  Wt(kg): --      Physical Exam:  General: no acute distress  Neck: supple, trachea midline  Lungs: clear, no wheeze/rhonchi  Cardiovascular: regular rate and rhythm, S1 S2  Abdomen: soft, nontender,  bowel sounds normal  Neurological: alert and oriented x3  Skin: no rash  Extremities: Left hip drsg c/d/i        LABS:                          8.7    4.34  )-----------( 111      ( 20 May 2023 06:30 )             27.6       05-20    141  |  109<H>  |  21  ----------------------------<  93  4.3   |  24  |  0.81    Ca    8.8      20 May 2023 06:30    TPro  6.2  /  Alb  3.0<L>  /  TBili  0.3  /  DBili  x   /  AST  25  /  ALT  23  /  AlkPhos  74  05-19    MICROBIOLOGY:    Culture - Joint Fluid (collected 18 May 2023 20:28)  Source: Hip Synovial Fluid  Gram Stain (19 May 2023 03:37):    polymorphonuclear leukocytes seen    No organisms seen    by cytocentrifuge  Preliminary Report (19 May 2023 18:17):    No growth    Culture - Tissue with Gram Stain (collected 18 May 2023 20:28)  Source: .Tissue Other  Gram Stain (19 May 2023 02:33):    No polymorphonuclear leukocytes seen per low power field    No organisms seen per oil power field  Preliminary Report (19 May 2023 21:06):    Few Pseudomonas aeruginosa    Culture - Urine (collected 18 May 2023 08:10)  Source: Clean Catch Clean Catch (Midstream)  Preliminary Report (19 May 2023 23:33):    10,000 - 49,000 CFU/mL Escherichia coli    Culture - Blood (collected 18 May 2023 07:40)  Source: .Blood Blood-Peripheral  Preliminary Report (19 May 2023 14:01):    No growth to date.    Culture - Blood (collected 18 May 2023 07:40)  Source: .Blood Blood-Peripheral  Preliminary Report (19 May 2023 14:01):    No growth to date.    RADIOLOGY & ADDITIONAL STUDIES:    ACC: [id number] EXAM:  CT HIP ONLY LT   ORDERED BY: CARMEN RICE     PROCEDURE DATE:  05/18/2023          INTERPRETATION:  CT OF THE LEFT HIP    CLINICAL INFORMATION: Infected left hip. History of total hip   arthroplasty 3/27/2023. Evaluate for fluid collection.  TECHNIQUE: Multidetector CT of the pelvis focusing on the left hip. The   study was performed without the use of intravenous or intra-articular   contrast. Multiplanar reformats were generated for review.    COMPARISON: Intraoperative fluoroscopic images of the left hip 3/27/2023.   Left hip MRI 1/4/2023.    FINDINGS:    HARDWARE: Patient status post right total hip arthroplasty. Hardware is   intact. A small amount of periprosthetic lucency is seen along the   anterior aspectof the acetabular cup. No additional periprosthetic   lucency on the right. Transfixion screws through the acetabulum extends   into the right iliacus muscle.  Patient status post left total hip arthroplasty with placement of   cerclage wire. The hardware is intact. A small amount of subcortical   lucency is seen at the anterior margin of the acetabulum, which is   similar in appearance to preoperative MRI and likely degenerative. No   periprosthetic lucency. Transfixion screw through the acetabulum extends   into the left iliacus muscle.  Incompletely evaluated lumbar spinal fusion hardware.    BONE: No acute fracture. No new focal areas of cortical destruction.   Areas of subcortical lucency in the bilateral acetabula adjacent to the   acetabular cups, as described above.    JOINTS: Degenerative changes of the pubic symphysis. Mild degeneration of   the sacroiliac joints. Degenerative changes of the lower lumbar spine. No   large joint effusion appreciated on CT.    SOFT TISSUE: Postsurgical scarring along the lateral aspect of the left   thigh. No large subcutaneous fluid collection. Punctate focus of   subcutaneous emphysema seen at the site of bandage material (6:97).   Postsurgical scarring in the bilateral thighs. No focal muscle atrophy.   Neurovascular structures are normal in course and caliber.      IMPRESSION:  1.  Small amount of subcutaneous emphysema in the subcutaneous fat   adjacent to the site of surgical wound in the left proximal thigh. No   focal drainable fluid collections are identified on CT.  2.  Bilateral total hip arthroplasty without CT evidence of acute   hardware complication.    Assessment :   83YO F PMH mild AS, GERD, HLD, HTN, hypothyroidism, myasthenia gravis, OA left hip, reactive airway, sinus bradycardia, s/p Left hip replacement 3/27/2023, admitted with left hip surgical wound infection with dehiscence and drainage. Sp incision and drainage of thigh 18-May-2023- appears to be superficial.  Prelim OR culture with pseudomonas  Ucx noted- Asymptomatic bacteruria    Plan :   Change Ancef t Cefepime  Fu OR cultures  Trend temps and cbc  Pulm toileting  Activity per ortho  Stable from ID standpoint      Continue with present regiment.  Appropriate use of antibiotics and adverse effects reviewed.      > 35 minutes were spent in direct patient care reviewing notes, medications ,labs data/ imaging , discussion with multidisciplinary team.    Thank you for allowing me to participate in care of your patient .    Eliu Terry MD  Infectious Disease  [phone]

## 2023-05-20 NOTE — PROGRESS NOTE ADULT - ASSESSMENT
81 y/o F with hx of mild AS, GERD, HLD, HTN, hypothyroidism, myasthenia gravis, OA left hip, reactive airway, sinus bradycardia, she is s/p Left hip replacement done in 3/27/2023, since she has joint replaced her pain never improved,  She has been following regularly with orthopedics.  She was last seen in the office on the 16th, she has had wound vacs applied.  Per documentation from the 16th of the TORITO dressing to the left anterior hip was intact with moderate drainage, she has been given Bactrim DS at that time, she has opn wound at the surgical site, she was seen by Dr Dinero in the office  and he sent her to the hospital for further management and evaluation for possible septic arthritis          Left hip OA s/p  Left anterior total hip 3/27/23 now with possible septic arthritis:   CT Small amount of subcutaneous emphysema in the subcutaneous fat  adjacent to the site of surgical wound in the left proximal thigh.  Bilateral total hip arthroplasty without CT evidence of acute   hardware complication.   S/P Incision and drainage of thigh  5/18/23   joint fluid gram stain negative   continue on Cefepime , pending final cultures   Pain meds   Bowel regimen  PT/OT  Incentive spirometry  VTE PPx: resume Eliquis          Hx of Reactive airway disease:   Will continue with Albuterole as needed   on breo, will start symbicort while here in the hospital       HTN: On losartan 25mg daily and HCTZ 12.5 mg daily, will resume with holding parameters     hypothyroidism --c/w synthroid     Myasthenia gravis --c/w Pyridostigmine       Acute blood loss anemia due to procedure: Iron supplement, will monitor CBC, H/H stable

## 2023-05-21 LAB
-  AMIKACIN: SIGNIFICANT CHANGE UP
-  AMIKACIN: SIGNIFICANT CHANGE UP
-  AMOXICILLIN/CLAVULANIC ACID: SIGNIFICANT CHANGE UP
-  AMPICILLIN/SULBACTAM: SIGNIFICANT CHANGE UP
-  AMPICILLIN: SIGNIFICANT CHANGE UP
-  AZTREONAM: SIGNIFICANT CHANGE UP
-  AZTREONAM: SIGNIFICANT CHANGE UP
-  CEFAZOLIN: SIGNIFICANT CHANGE UP
-  CEFEPIME: SIGNIFICANT CHANGE UP
-  CEFEPIME: SIGNIFICANT CHANGE UP
-  CEFTAZIDIME: SIGNIFICANT CHANGE UP
-  CEFTRIAXONE: SIGNIFICANT CHANGE UP
-  CEFUROXIME: SIGNIFICANT CHANGE UP
-  CIPROFLOXACIN: SIGNIFICANT CHANGE UP
-  CIPROFLOXACIN: SIGNIFICANT CHANGE UP
-  ERTAPENEM: SIGNIFICANT CHANGE UP
-  GENTAMICIN: SIGNIFICANT CHANGE UP
-  GENTAMICIN: SIGNIFICANT CHANGE UP
-  IMIPENEM: SIGNIFICANT CHANGE UP
-  IMIPENEM: SIGNIFICANT CHANGE UP
-  LEVOFLOXACIN: SIGNIFICANT CHANGE UP
-  LEVOFLOXACIN: SIGNIFICANT CHANGE UP
-  MEROPENEM: SIGNIFICANT CHANGE UP
-  MEROPENEM: SIGNIFICANT CHANGE UP
-  NITROFURANTOIN: SIGNIFICANT CHANGE UP
-  PIPERACILLIN/TAZOBACTAM: SIGNIFICANT CHANGE UP
-  PIPERACILLIN/TAZOBACTAM: SIGNIFICANT CHANGE UP
-  TOBRAMYCIN: SIGNIFICANT CHANGE UP
-  TOBRAMYCIN: SIGNIFICANT CHANGE UP
-  TRIMETHOPRIM/SULFAMETHOXAZOLE: SIGNIFICANT CHANGE UP
ALBUMIN SERPL ELPH-MCNC: 2.7 G/DL — LOW (ref 3.3–5)
ALP SERPL-CCNC: 66 U/L — SIGNIFICANT CHANGE UP (ref 30–120)
ALT FLD-CCNC: 10 U/L DA — SIGNIFICANT CHANGE UP (ref 10–60)
ANION GAP SERPL CALC-SCNC: 7 MMOL/L — SIGNIFICANT CHANGE UP (ref 5–17)
AST SERPL-CCNC: 17 U/L — SIGNIFICANT CHANGE UP (ref 10–40)
BILIRUB SERPL-MCNC: 0.4 MG/DL — SIGNIFICANT CHANGE UP (ref 0.2–1.2)
BLD GP AB SCN SERPL QL: SIGNIFICANT CHANGE UP
BUN SERPL-MCNC: 16 MG/DL — SIGNIFICANT CHANGE UP (ref 7–23)
CALCIUM SERPL-MCNC: 8.7 MG/DL — SIGNIFICANT CHANGE UP (ref 8.4–10.5)
CHLORIDE SERPL-SCNC: 108 MMOL/L — SIGNIFICANT CHANGE UP (ref 96–108)
CO2 SERPL-SCNC: 26 MMOL/L — SIGNIFICANT CHANGE UP (ref 22–31)
CREAT SERPL-MCNC: 0.73 MG/DL — SIGNIFICANT CHANGE UP (ref 0.5–1.3)
CULTURE RESULTS: SIGNIFICANT CHANGE UP
EGFR: 81 ML/MIN/1.73M2 — SIGNIFICANT CHANGE UP
FOLATE SERPL-MCNC: 17.5 NG/ML — SIGNIFICANT CHANGE UP
GLUCOSE SERPL-MCNC: 106 MG/DL — HIGH (ref 70–99)
HCT VFR BLD CALC: 27.1 % — LOW (ref 34.5–45)
HGB BLD-MCNC: 8.6 G/DL — LOW (ref 11.5–15.5)
IRON SATN MFR SERPL: 12 UG/DL — LOW (ref 30–160)
IRON SATN MFR SERPL: 5 % — LOW (ref 14–50)
MCHC RBC-ENTMCNC: 29.8 PG — SIGNIFICANT CHANGE UP (ref 27–34)
MCHC RBC-ENTMCNC: 31.7 GM/DL — LOW (ref 32–36)
MCV RBC AUTO: 93.8 FL — SIGNIFICANT CHANGE UP (ref 80–100)
METHOD TYPE: SIGNIFICANT CHANGE UP
METHOD TYPE: SIGNIFICANT CHANGE UP
NRBC # BLD: 0 /100 WBCS — SIGNIFICANT CHANGE UP (ref 0–0)
ORGANISM # SPEC MICROSCOPIC CNT: SIGNIFICANT CHANGE UP
ORGANISM # SPEC MICROSCOPIC CNT: SIGNIFICANT CHANGE UP
PLATELET # BLD AUTO: 103 K/UL — LOW (ref 150–400)
POTASSIUM SERPL-MCNC: 3.8 MMOL/L — SIGNIFICANT CHANGE UP (ref 3.5–5.3)
POTASSIUM SERPL-SCNC: 3.8 MMOL/L — SIGNIFICANT CHANGE UP (ref 3.5–5.3)
PROT SERPL-MCNC: 5.9 G/DL — LOW (ref 6–8.3)
RBC # BLD: 2.89 M/UL — LOW (ref 3.8–5.2)
RBC # FLD: 14.6 % — HIGH (ref 10.3–14.5)
SODIUM SERPL-SCNC: 141 MMOL/L — SIGNIFICANT CHANGE UP (ref 135–145)
SPECIMEN SOURCE: SIGNIFICANT CHANGE UP
TIBC SERPL-MCNC: 261 UG/DL — SIGNIFICANT CHANGE UP (ref 220–430)
UIBC SERPL-MCNC: 249 UG/DL — SIGNIFICANT CHANGE UP (ref 110–370)
VIT B12 SERPL-MCNC: 685 PG/ML — SIGNIFICANT CHANGE UP (ref 232–1245)
WBC # BLD: 4.91 K/UL — SIGNIFICANT CHANGE UP (ref 3.8–10.5)
WBC # FLD AUTO: 4.91 K/UL — SIGNIFICANT CHANGE UP (ref 3.8–10.5)

## 2023-05-21 PROCEDURE — 99232 SBSQ HOSP IP/OBS MODERATE 35: CPT

## 2023-05-21 RX ORDER — MEROPENEM 1 G/30ML
INJECTION INTRAVENOUS
Refills: 0 | Status: DISCONTINUED | OUTPATIENT
Start: 2023-05-21 | End: 2023-05-24

## 2023-05-21 RX ORDER — MEROPENEM 1 G/30ML
1000 INJECTION INTRAVENOUS ONCE
Refills: 0 | Status: COMPLETED | OUTPATIENT
Start: 2023-05-21 | End: 2023-05-21

## 2023-05-21 RX ORDER — LANOLIN ALCOHOL/MO/W.PET/CERES
3 CREAM (GRAM) TOPICAL ONCE
Refills: 0 | Status: COMPLETED | OUTPATIENT
Start: 2023-05-21 | End: 2023-05-21

## 2023-05-21 RX ORDER — MEROPENEM 1 G/30ML
1000 INJECTION INTRAVENOUS EVERY 8 HOURS
Refills: 0 | Status: DISCONTINUED | OUTPATIENT
Start: 2023-05-22 | End: 2023-05-24

## 2023-05-21 RX ORDER — ERTAPENEM SODIUM 1 G/1
1000 INJECTION, POWDER, LYOPHILIZED, FOR SOLUTION INTRAMUSCULAR; INTRAVENOUS ONCE
Refills: 0 | Status: COMPLETED | OUTPATIENT
Start: 2023-05-21 | End: 2023-05-21

## 2023-05-21 RX ORDER — ERTAPENEM SODIUM 1 G/1
INJECTION, POWDER, LYOPHILIZED, FOR SOLUTION INTRAMUSCULAR; INTRAVENOUS
Refills: 0 | Status: DISCONTINUED | OUTPATIENT
Start: 2023-05-21 | End: 2023-05-21

## 2023-05-21 RX ADMIN — LOSARTAN POTASSIUM 25 MILLIGRAM(S): 100 TABLET, FILM COATED ORAL at 05:59

## 2023-05-21 RX ADMIN — CELECOXIB 200 MILLIGRAM(S): 200 CAPSULE ORAL at 05:58

## 2023-05-21 RX ADMIN — PYRIDOSTIGMINE BROMIDE 60 MILLIGRAM(S): 60 SOLUTION ORAL at 13:47

## 2023-05-21 RX ADMIN — CELECOXIB 200 MILLIGRAM(S): 200 CAPSULE ORAL at 06:42

## 2023-05-21 RX ADMIN — Medication 3 MILLILITER(S): at 19:24

## 2023-05-21 RX ADMIN — Medication 650 MILLIGRAM(S): at 21:16

## 2023-05-21 RX ADMIN — CEFEPIME 100 MILLIGRAM(S): 1 INJECTION, POWDER, FOR SOLUTION INTRAMUSCULAR; INTRAVENOUS at 06:08

## 2023-05-21 RX ADMIN — CELECOXIB 200 MILLIGRAM(S): 200 CAPSULE ORAL at 17:36

## 2023-05-21 RX ADMIN — CEFEPIME 100 MILLIGRAM(S): 1 INJECTION, POWDER, FOR SOLUTION INTRAMUSCULAR; INTRAVENOUS at 17:35

## 2023-05-21 RX ADMIN — ERTAPENEM SODIUM 1000 MILLIGRAM(S): 1 INJECTION, POWDER, LYOPHILIZED, FOR SOLUTION INTRAMUSCULAR; INTRAVENOUS at 22:12

## 2023-05-21 RX ADMIN — PYRIDOSTIGMINE BROMIDE 60 MILLIGRAM(S): 60 SOLUTION ORAL at 17:35

## 2023-05-21 RX ADMIN — BUDESONIDE AND FORMOTEROL FUMARATE DIHYDRATE 2 PUFF(S): 160; 4.5 AEROSOL RESPIRATORY (INHALATION) at 18:58

## 2023-05-21 RX ADMIN — Medication 75 MICROGRAM(S): at 05:59

## 2023-05-21 RX ADMIN — BUDESONIDE AND FORMOTEROL FUMARATE DIHYDRATE 2 PUFF(S): 160; 4.5 AEROSOL RESPIRATORY (INHALATION) at 06:09

## 2023-05-21 RX ADMIN — CELECOXIB 200 MILLIGRAM(S): 200 CAPSULE ORAL at 18:39

## 2023-05-21 RX ADMIN — Medication 3 MILLIGRAM(S): at 21:46

## 2023-05-21 RX ADMIN — Medication 650 MILLIGRAM(S): at 22:00

## 2023-05-21 RX ADMIN — FAMOTIDINE 20 MILLIGRAM(S): 10 INJECTION INTRAVENOUS at 11:57

## 2023-05-21 RX ADMIN — Medication 650 MILLIGRAM(S): at 08:52

## 2023-05-21 RX ADMIN — PANTOPRAZOLE SODIUM 40 MILLIGRAM(S): 20 TABLET, DELAYED RELEASE ORAL at 05:59

## 2023-05-21 RX ADMIN — Medication 650 MILLIGRAM(S): at 13:48

## 2023-05-21 RX ADMIN — Medication 650 MILLIGRAM(S): at 09:50

## 2023-05-21 RX ADMIN — APIXABAN 2.5 MILLIGRAM(S): 2.5 TABLET, FILM COATED ORAL at 21:16

## 2023-05-21 RX ADMIN — PYRIDOSTIGMINE BROMIDE 180 MILLIGRAM(S): 60 SOLUTION ORAL at 11:57

## 2023-05-21 RX ADMIN — Medication 650 MILLIGRAM(S): at 14:30

## 2023-05-21 RX ADMIN — APIXABAN 2.5 MILLIGRAM(S): 2.5 TABLET, FILM COATED ORAL at 08:51

## 2023-05-21 RX ADMIN — ATORVASTATIN CALCIUM 80 MILLIGRAM(S): 80 TABLET, FILM COATED ORAL at 21:16

## 2023-05-21 RX ADMIN — Medication 3 MILLILITER(S): at 14:50

## 2023-05-21 RX ADMIN — Medication 3 MILLILITER(S): at 07:36

## 2023-05-21 RX ADMIN — SENNA PLUS 2 TABLET(S): 8.6 TABLET ORAL at 21:16

## 2023-05-21 RX ADMIN — PYRIDOSTIGMINE BROMIDE 60 MILLIGRAM(S): 60 SOLUTION ORAL at 05:59

## 2023-05-21 NOTE — PROGRESS NOTE ADULT - ASSESSMENT
83 y/o F with hx of mild AS, GERD, HLD, HTN, hypothyroidism, myasthenia gravis, OA left hip, reactive airway, sinus bradycardia, she is s/p Left hip replacement done in 3/27/2023, since she has joint replaced her pain never improved,  She has been following regularly with orthopedics.  She was last seen in the office on the 16th, she has had wound vacs applied.  Per documentation from the 16th of the TORITO dressing to the left anterior hip was intact with moderate drainage, she has been given Bactrim DS at that time, she has opn wound at the surgical site, she was seen by Dr Dinero in the office  and he sent her to the hospital for further management and evaluation for possible septic arthritis       Left hip OA s/p  Left anterior total hip 3/27/23 now with possible septic arthritis:   CT Small amount of subcutaneous emphysema in the subcutaneous fat  adjacent to the site of surgical wound in the left proximal thigh.  Bilateral total hip arthroplasty without CT evidence of acute   hardware complication.  S/P Incision and drainage of thigh  5/18/23   OR cultures -> pseudomonas  continue on Cefepime, pending final cultures   Pain meds   Bowel regimen  PT/OT  Incentive spirometry  VTE PPx: resume Eliquis          Hx of Reactive airway disease:   continue with Albuterole as needed   on breo,   symbicort while here in the hospital       HTN  On losartan 25mg daily and HCTZ 12.5 mg daily, resume with holding parameters     hypothyroidism --c/w synthroid     Myasthenia gravis --c/w Pyridostigmine       Acute blood loss anemia due to procedure: Iron supplement, will monitor CBC, H/H stable     35 min spent               83 y/o F with hx of mild AS, GERD, HLD, HTN, hypothyroidism, myasthenia gravis, OA left hip, reactive airway, sinus bradycardia, she is s/p Left hip replacement done in 3/27/2023, since she has joint replaced her pain never improved,  She has been following regularly with orthopedics.  She was last seen in the office on the 16th, she has had wound vacs applied.  Per documentation from the 16th of the TORITO dressing to the left anterior hip was intact with moderate drainage, she has been given Bactrim DS at that time, she has opn wound at the surgical site, she was seen by Dr Dinero in the office  and he sent her to the hospital for further management and evaluation for possible septic arthritis       Left hip OA s/p  Left anterior total hip 3/27/23 now with possible septic arthritis:   CT Small amount of subcutaneous emphysema in the subcutaneous fat  adjacent to the site of surgical wound in the left proximal thigh.  Bilateral total hip arthroplasty without CT evidence of acute   hardware complication.  S/P Incision and drainage of thigh  5/18/23   OR cultures -> pseudomonas  continue on Cefepime, pending final cultures   Pain meds   Bowel regimen  PT/OT  Incentive spirometry  VTE PPx: resume Eliquis          Hx of Reactive airway disease:   continue with Albuterole as needed   on breo,   symbicort while here in the hospital       HTN  On losartan 25mg daily and HCTZ 12.5 mg daily, resume with holding parameters     hypothyroidism --c/w synthroid     Myasthenia gravis --c/w Pyridostigmine       Acute blood loss anemia due to procedure: monitor CBC, H/H stable, vitals stable, asymptomatic  Check for nutritional deficiencies - Iron, B12, Folate - f/u labs    35 min spent

## 2023-05-21 NOTE — PROVIDER CONTACT NOTE (CRITICAL VALUE NOTIFICATION) - RECOMMENDATIONS
aware, no further intervention right now. Infectious disease is on the case.  aware, change antibiotic to INVANZ 1000MG q 24 hours. Infectious disease is on the case.

## 2023-05-21 NOTE — PROGRESS NOTE ADULT - SUBJECTIVE AND OBJECTIVE BOX
HÉCTOR LYONS is a 82yFemale , patient examined and chart reviewed.     INTERVAL HPI/ OVERNIGHT EVENTS:   NAD. No events.  Afebrile.    PAST MEDICAL & SURGICAL HISTORY:  Hypertension  Hyperlipidemia  GERD (Gastroesophageal Reflux Disease)  hiatal hernia  Lumbar Radiculopathy  Kidney Calculi  Myasthenia gravis  dx 10/2018 symptoms: intermittent loss of voice/ weakness, negative ENT studies, now stable on Pyridostigmine  Lumbar stenosis  Diverticulitis large intestine  denies any recent exacerbations  Aortic stenosis, mild  asper daughter  Reactive airway disease that is not asthma  mild as per pulm note on [employer], patient and daughter denies any inhaler use; thought to be related to myasthena gravis  Hypothyroid  Osteoarthritis of left hip  Pneumonia due to COVID-19 virus  Sinus bradycardia  S/P Cholecystectomy  S/P Appendectomy  Bilateral Cataracts  removed  S/P Hysterectomy Partial  1974  S/P Laparoscopic Fundoplication  Prolapse, Uterovaginal  s/p sling  H/O laminectomy  cervical  1998  lumbar 1986,1998,2000, 2/19 with fusion  H/O shoulder replacement  b/l 2015/2016  S/P foot surgery  History of tonsillectomy  H/O umbilical hernia repair  S/P hip replacement, right        For details regarding the patient's social history, family history, and other miscellaneous elements, please refer the initial infectious diseases consultation and/or the admitting history and physical examination for this admission.      ROS:  CONSTITUTIONAL:  Negative fever or chills  EYES:  Negative  blurry vision or double vision  CARDIOVASCULAR:  Negative for chest pain or palpitations  RESPIRATORY:  Negative for cough, wheezing, or SOB   GASTROINTESTINAL:  Negative for nausea, vomiting, diarrhea, constipation, or abdominal pain  GENITOURINARY:  Negative frequency, urgency or dysuria  NEUROLOGIC:  No headache, confusion, dizziness, lightheadedness  All other systems were reviewed and are negative     IODINE (Rash)  shellfish (Rash)  No Known Drug Allergies      Current inpatient medications :    ANTIBIOTICS/RELEVANT:  ertapenem  IVPB       MEDICATIONS  (STANDING):  acetaminophen     Tablet .. 650 milliGRAM(s) Oral every 6 hours  albuterol/ipratropium for Nebulization 3 milliLiter(s) Nebulizer every 6 hours  apixaban 2.5 milliGRAM(s) Oral every 12 hours  atorvastatin 80 milliGRAM(s) Oral at bedtime  budesonide 160 MICROgram(s)/formoterol 4.5 MICROgram(s) Inhaler 2 Puff(s) Inhalation two times a day  celecoxib 200 milliGRAM(s) Oral every 12 hours   famotidine    Tablet 20 milliGRAM(s) Oral daily  hydrochlorothiazide 12.5 milliGRAM(s) Oral daily  levothyroxine 75 MICROGram(s) Oral daily  losartan 25 milliGRAM(s) Oral daily  pantoprazole    Tablet 40 milliGRAM(s) Oral before breakfast  polyethylene glycol 3350 17 Gram(s) Oral at bedtime  pyridostigmine 60 milliGRAM(s) Oral <User Schedule>  pyridostigmine  milliGRAM(s) Oral daily  senna 2 Tablet(s) Oral at bedtime    MEDICATIONS  (PRN):  albuterol    90 MICROgram(s) HFA Inhaler 2 Puff(s) Inhalation every 6 hours PRN Shortness of Breath and/or Wheezing  HYDROmorphone  Injectable 0.5 milliGRAM(s) IV Push every 3 hours PRN Breakthrough pain  magnesium hydroxide Suspension 30 milliLiter(s) Oral daily PRN Constipation  ondansetron Injectable 4 milliGRAM(s) IV Push every 6 hours PRN Nausea and/or Vomiting  oxyCODONE    IR 5 milliGRAM(s) Oral every 3 hours PRN Moderate Pain (4 - 6)  traMADol 50 milliGRAM(s) Oral every 4 hours PRN Moderate Pain (4 - 6)  traMADol 100 milliGRAM(s) Oral every 6 hours PRN Severe Pain (7 - 10)        Objective:  Vital Signs Last 24 Hrs  T(C): 36.7 (21 May 2023 22:00), Max: 36.9 (21 May 2023 10:24)  T(F): 98 (21 May 2023 22:00), Max: 98.5 (21 May 2023 10:24)  HR: 65 (21 May 2023 22:00) (58 - 86)  BP: 119/71 (21 May 2023 22:00) (104/59 - 133/79)  RR: 18 (21 May 2023 22:00) (18 - 19)  SpO2: 96% (21 May 2023 22:00) (94% - 99%)    Parameters below as of 21 May 2023 22:00  Patient On (Oxygen Delivery Method): room air      Physical Exam:  General: no acute distress  Neck: supple, trachea midline  Lungs: clear, no wheeze/rhonchi  Cardiovascular: regular rate and rhythm, S1 S2  Abdomen: soft, nontender,  bowel sounds normal  Neurological: alert and oriented x3  Skin: no rash  Extremities: Left hip drsg c/d/i        LABS:                        8.6    4.91  )-----------( 103      ( 21 May 2023 06:30 )             27.1   05-21    141  |  108  |  16  ----------------------------<  106<H>  3.8   |  26  |  0.73    Ca    8.7      21 May 2023 06:30    TPro  5.9<L>  /  Alb  2.7<L>  /  TBili  0.4  /  DBili  x   /  AST  17  /  ALT  10  /  AlkPhos  66  05-21      MICROBIOLOGY:    Culture - Joint Fluid (collected 18 May 2023 20:28)  Source: Hip Synovial Fluid  Gram Stain (19 May 2023 03:37):    polymorphonuclear leukocytes seen    No organisms seen    by cytocentrifuge  Preliminary Report (19 May 2023 18:17):    No growth    Culture - Tissue with Gram Stain (05.18.23 @ 20:28)    -  Ciprofloxacin: S <=0.25   -  Gentamicin: S <=2   -  Imipenem: S 2   -  Levofloxacin: S <=0.5   -  Meropenem: S <=1   -  Piperacillin/Tazobactam: S <=8   -  Tobramycin: S <=2   Gram Stain:   No polymorphonuclear leukocytes seen per low power field  No organisms seen per oil power field   -  Amikacin: S <=16   -  Aztreonam: S <=4   -  Cefepime: S <=2   -  Ceftazidime: S <=1   Specimen Source: .Tissue Other   Culture Results:   Few Pseudomonas aeruginosa   Organism Identification: Pseudomonas aeruginosa   Organism: Pseudomonas aeruginosa   Method Type: MAMADOU    Culture - Surgical Swab (05.18.23 @ 20:28)    -  Ceftazidime: S 4   -  Cefepime: S <=2   -  Aztreonam: S 8   -  Amikacin: S <=16   -  Piperacillin/Tazobactam: S <=8   -  Tobramycin: S <=2   -  Levofloxacin: S <=0.5   -  Meropenem: S <=1   -  Gentamicin: S 4   -  Imipenem: S 2   -  Ciprofloxacin: S <=0.25   Specimen Source: .Surgical Swab Surgical Swab   Culture Results:   Rare Pseudomonas aeruginosa  Few Staphylococcus epidermidis Susceptibility to follow.   Organism Identification: Pseudomonas aeruginosa   Organism: Pseudomonas aeruginosa   Method Type: MAMADOU      Culture - Blood (collected 18 May 2023 07:40)  Source: .Blood Blood-Peripheral  Preliminary Report (19 May 2023 14:01):    No growth to date.    Culture - Blood (collected 18 May 2023 07:40)  Source: .Blood Blood-Peripheral  Preliminary Report (19 May 2023 14:01):    No growth to date.    RADIOLOGY & ADDITIONAL STUDIES:    ACC: [id number] EXAM:  CT HIP ONLY LT   ORDERED BY: CARMEN RICE     PROCEDURE DATE:  05/18/2023          INTERPRETATION:  CT OF THE LEFT HIP    CLINICAL INFORMATION: Infected left hip. History of total hip   arthroplasty 3/27/2023. Evaluate for fluid collection.  TECHNIQUE: Multidetector CT of the pelvis focusing on the left hip. The   study was performed without the use of intravenous or intra-articular   contrast. Multiplanar reformats were generated for review.    COMPARISON: Intraoperative fluoroscopic images of the left hip 3/27/2023.   Left hip MRI 1/4/2023.    FINDINGS:    HARDWARE: Patient status post right total hip arthroplasty. Hardware is   intact. A small amount of periprosthetic lucency is seen along the   anterior aspectof the acetabular cup. No additional periprosthetic   lucency on the right. Transfixion screws through the acetabulum extends   into the right iliacus muscle.  Patient status post left total hip arthroplasty with placement of   cerclage wire. The hardware is intact. A small amount of subcortical   lucency is seen at the anterior margin of the acetabulum, which is   similar in appearance to preoperative MRI and likely degenerative. No   periprosthetic lucency. Transfixion screw through the acetabulum extends   into the left iliacus muscle.  Incompletely evaluated lumbar spinal fusion hardware.    BONE: No acute fracture. No new focal areas of cortical destruction.   Areas of subcortical lucency in the bilateral acetabula adjacent to the   acetabular cups, as described above.    JOINTS: Degenerative changes of the pubic symphysis. Mild degeneration of   the sacroiliac joints. Degenerative changes of the lower lumbar spine. No   large joint effusion appreciated on CT.    SOFT TISSUE: Postsurgical scarring along the lateral aspect of the left   thigh. No large subcutaneous fluid collection. Punctate focus of   subcutaneous emphysema seen at the site of bandage material (6:97).   Postsurgical scarring in the bilateral thighs. No focal muscle atrophy.   Neurovascular structures are normal in course and caliber.      IMPRESSION:  1.  Small amount of subcutaneous emphysema in the subcutaneous fat   adjacent to the site of surgical wound in the left proximal thigh. No   focal drainable fluid collections are identified on CT.  2.  Bilateral total hip arthroplasty without CT evidence of acute   hardware complication.    Assessment :   81YO F PMH mild AS, GERD, HLD, HTN, hypothyroidism, myasthenia gravis, OA left hip, reactive airway, sinus bradycardia, s/p Left hip replacement 3/27/2023, admitted with left hip surgical wound infection with dehiscence and drainage. Sp incision and drainage of thigh 18-May-2023- appears to be superficial.  Prelim OR culture with pseudomonas  Ucx noted- Asymptomatic bacteruria    Plan :   Change antibiotic to Meropenam  Fu OR cultures  Trend temps and cbc  Pulm toileting  Activity per ortho  Stable from ID standpoint      Continue with present regiment.  Appropriate use of antibiotics and adverse effects reviewed.      > 35 minutes were spent in direct patient care reviewing notes, medications ,labs data/ imaging , discussion with multidisciplinary team.    Thank you for allowing me to participate in care of your patient .    Eliu Terry MD  Infectious Disease  [phone]

## 2023-05-21 NOTE — PROGRESS NOTE ADULT - SUBJECTIVE AND OBJECTIVE BOX
INTERVAL HPI/OVERNIGHT EVENTS:   Patient seen and examined.  Had breakfast.  Gets intermittent sharp pain in left groin.      REVIEW OF SYSTEMS:  See HPI,  all others negative    PHYSICAL EXAM:  Vital Signs Last 24 Hrs  T(C): 36.8 (21 May 2023 05:00), Max: 36.8 (20 May 2023 17:28)  T(F): 98.2 (21 May 2023 05:00), Max: 98.3 (20 May 2023 17:28)  HR: 59 (21 May 2023 07:36) (56 - 70)  BP: 121/61 (21 May 2023 05:00) (101/56 - 126/73)  BP(mean): --  RR: 18 (21 May 2023 05:00) (16 - 18)  SpO2: 95% (21 May 2023 07:36) (94% - 98%)    Parameters below as of 21 May 2023 07:36  Patient On (Oxygen Delivery Method): room air    GENERAL: NAD, well-groomed, well-developed, awake, alert, oriented x 3, fluent and coherent speech  EYES: EOMI, conjunctiva and sclera clear  ENMT: No tonsillar erythema, exudates, or enlargement; Moist mucous membranes,  No lesions seen on oral mucosa  NECK: Supple, No JVD, No Cervical LAD, No thyromegaly, No thyroid nodules felt  NERVOUS SYSTEM:  Good concentration; Moving all 4 extremities against gravity; No gross sensory deficits, No facial droop  CHEST/LUNG: Clear to auscultation bilaterally with good air entry; No rales, rhonchi, wheezing, or rubs  HEART: Regular rate and rhythm; No murmurs, rubs, or gallops  ABDOMEN: Soft, Nontender, Nondistended, Bowel sounds present, No palpable masses or organomegaly, No bruits  EXTREMITIES:  2+ Peripheral Pulses, No clubbing, cyanosis, or edema, no calf tenderness in either leg  LEFT HIP: dressing c/d/i, mild periwound erythema/edema    Diagnostic Testin.6    4.91  )-----------( 103      ( 21 May 2023 06:30 )             27.1     21 May 2023 06:30    141    |  108    |  16     ----------------------------<  106    3.8     |  26     |  0.73     Ca    8.7        21 May 2023 06:30    TPro  5.9    /  Alb  2.7    /  TBili  0.4    /  DBili  x      /  AST  17     /  ALT  10     /  AlkPhos  66     21 May 2023 06:30

## 2023-05-21 NOTE — PROGRESS NOTE ADULT - SUBJECTIVE AND OBJECTIVE BOX
Post Op     HÉCTOR LYONS      82y        Female                                                                                                                 T(C): 36.9 (05-21-23 @ 10:24), Max: 36.9 (05-21-23 @ 10:24)  HR: 86 (05-21-23 @ 10:24) (56 - 86)  BP: 133/79 (05-21-23 @ 10:24) (101/56 - 133/79)  RR: 19 (05-21-23 @ 10:24) (16 - 19)  SpO2: 97% (05-21-23 @ 10:24) (94% - 98%)  Wt(kg): --    S/P   i and  left hip with prevena    Patient denies shortness of breath, chest pain, dyspnea, No complaints  Pain is 3 /10    Physical Exam    Extremity: Bilaterally:  No holmon                                           No Cord                                          PAS on                                          Neurovascular intact                                          Motor intact EHL/FHL                                          Sensation intact                                          Pulses intact DP/PT                                         Calves Soft                                         Dressing Clean / Dry / Intact mini drainage    prevena  functioning mild thigh swelling                                         Capillary refill with 5 seconds                          8.6    4.91  )-----------( 103      ( 21 May 2023 06:30 )             27.1       05-21    141  |  108  |  16  ----------------------------<  106<H>  3.8   |  26  |  0.73    Ca    8.7      21 May 2023 06:30    TPro  5.9<L>  /  Alb  2.7<L>  /  TBili  0.4  /  DBili  x   /  AST  17  /  ALT  10  /  AlkPhos  66  05-21      A/P  -- S/P I and D hip with prevena    -  Medicine To Follow   - DVT prophylaxis PAS eliquis  - PT & OT   - Analagesia  - Incentive Spirometry  - Discharge Planning  - Safety Precautions  -  CBC , BMP daily    ID  note appreciated  rx changed to cefepime 1000 q12  discussed with Dr. Dinero  and Dr. Mosher  monitor h/h

## 2023-05-21 NOTE — PROVIDER CONTACT NOTE (CRITICAL VALUE NOTIFICATION) - ASSESSMENT
A,OX4, NAD,s/p Left hip I&D ,wound vac in place ,patient is now on cefepime 1000mg q 12 hours
a&ox4, NAD, s/p L hip I&D, woundvac in place. pt on Q8 Ancef.

## 2023-05-22 LAB
-  AMPICILLIN/SULBACTAM: SIGNIFICANT CHANGE UP
-  CEFAZOLIN: SIGNIFICANT CHANGE UP
-  CLINDAMYCIN: SIGNIFICANT CHANGE UP
-  ERYTHROMYCIN: SIGNIFICANT CHANGE UP
-  GENTAMICIN: SIGNIFICANT CHANGE UP
-  OXACILLIN: SIGNIFICANT CHANGE UP
-  PENICILLIN: SIGNIFICANT CHANGE UP
-  RIFAMPIN: SIGNIFICANT CHANGE UP
-  TETRACYCLINE: SIGNIFICANT CHANGE UP
-  TRIMETHOPRIM/SULFAMETHOXAZOLE: SIGNIFICANT CHANGE UP
-  VANCOMYCIN: SIGNIFICANT CHANGE UP
ANION GAP SERPL CALC-SCNC: 8 MMOL/L — SIGNIFICANT CHANGE UP (ref 5–17)
BUN SERPL-MCNC: 19 MG/DL — SIGNIFICANT CHANGE UP (ref 7–23)
CALCIUM SERPL-MCNC: 8.7 MG/DL — SIGNIFICANT CHANGE UP (ref 8.4–10.5)
CHLORIDE SERPL-SCNC: 106 MMOL/L — SIGNIFICANT CHANGE UP (ref 96–108)
CO2 SERPL-SCNC: 27 MMOL/L — SIGNIFICANT CHANGE UP (ref 22–31)
CREAT SERPL-MCNC: 0.76 MG/DL — SIGNIFICANT CHANGE UP (ref 0.5–1.3)
EGFR: 77 ML/MIN/1.73M2 — SIGNIFICANT CHANGE UP
GLUCOSE SERPL-MCNC: 101 MG/DL — HIGH (ref 70–99)
HCT VFR BLD CALC: 27.9 % — LOW (ref 34.5–45)
HGB BLD-MCNC: 8.9 G/DL — LOW (ref 11.5–15.5)
MCHC RBC-ENTMCNC: 29.7 PG — SIGNIFICANT CHANGE UP (ref 27–34)
MCHC RBC-ENTMCNC: 31.9 GM/DL — LOW (ref 32–36)
MCV RBC AUTO: 93 FL — SIGNIFICANT CHANGE UP (ref 80–100)
METHOD TYPE: SIGNIFICANT CHANGE UP
NRBC # BLD: 0 /100 WBCS — SIGNIFICANT CHANGE UP (ref 0–0)
PLATELET # BLD AUTO: 116 K/UL — LOW (ref 150–400)
POTASSIUM SERPL-MCNC: 4.1 MMOL/L — SIGNIFICANT CHANGE UP (ref 3.5–5.3)
POTASSIUM SERPL-SCNC: 4.1 MMOL/L — SIGNIFICANT CHANGE UP (ref 3.5–5.3)
RBC # BLD: 3 M/UL — LOW (ref 3.8–5.2)
RBC # FLD: 14.3 % — SIGNIFICANT CHANGE UP (ref 10.3–14.5)
SODIUM SERPL-SCNC: 141 MMOL/L — SIGNIFICANT CHANGE UP (ref 135–145)
WBC # BLD: 4.35 K/UL — SIGNIFICANT CHANGE UP (ref 3.8–10.5)
WBC # FLD AUTO: 4.35 K/UL — SIGNIFICANT CHANGE UP (ref 3.8–10.5)

## 2023-05-22 PROCEDURE — 99232 SBSQ HOSP IP/OBS MODERATE 35: CPT

## 2023-05-22 RX ORDER — LANOLIN ALCOHOL/MO/W.PET/CERES
3 CREAM (GRAM) TOPICAL AT BEDTIME
Refills: 0 | Status: DISCONTINUED | OUTPATIENT
Start: 2023-05-22 | End: 2023-05-24

## 2023-05-22 RX ADMIN — Medication 3 MILLILITER(S): at 06:35

## 2023-05-22 RX ADMIN — APIXABAN 2.5 MILLIGRAM(S): 2.5 TABLET, FILM COATED ORAL at 21:33

## 2023-05-22 RX ADMIN — PANTOPRAZOLE SODIUM 40 MILLIGRAM(S): 20 TABLET, DELAYED RELEASE ORAL at 05:38

## 2023-05-22 RX ADMIN — CELECOXIB 200 MILLIGRAM(S): 200 CAPSULE ORAL at 17:28

## 2023-05-22 RX ADMIN — Medication 650 MILLIGRAM(S): at 22:05

## 2023-05-22 RX ADMIN — Medication 650 MILLIGRAM(S): at 21:33

## 2023-05-22 RX ADMIN — MEROPENEM 100 MILLIGRAM(S): 1 INJECTION INTRAVENOUS at 21:34

## 2023-05-22 RX ADMIN — BUDESONIDE AND FORMOTEROL FUMARATE DIHYDRATE 2 PUFF(S): 160; 4.5 AEROSOL RESPIRATORY (INHALATION) at 05:39

## 2023-05-22 RX ADMIN — PYRIDOSTIGMINE BROMIDE 60 MILLIGRAM(S): 60 SOLUTION ORAL at 05:38

## 2023-05-22 RX ADMIN — MEROPENEM 100 MILLIGRAM(S): 1 INJECTION INTRAVENOUS at 13:10

## 2023-05-22 RX ADMIN — PYRIDOSTIGMINE BROMIDE 60 MILLIGRAM(S): 60 SOLUTION ORAL at 12:15

## 2023-05-22 RX ADMIN — Medication 650 MILLIGRAM(S): at 08:26

## 2023-05-22 RX ADMIN — MEROPENEM 100 MILLIGRAM(S): 1 INJECTION INTRAVENOUS at 05:38

## 2023-05-22 RX ADMIN — ATORVASTATIN CALCIUM 80 MILLIGRAM(S): 80 TABLET, FILM COATED ORAL at 21:33

## 2023-05-22 RX ADMIN — CELECOXIB 200 MILLIGRAM(S): 200 CAPSULE ORAL at 06:15

## 2023-05-22 RX ADMIN — Medication 650 MILLIGRAM(S): at 09:26

## 2023-05-22 RX ADMIN — SENNA PLUS 2 TABLET(S): 8.6 TABLET ORAL at 21:33

## 2023-05-22 RX ADMIN — Medication 75 MICROGRAM(S): at 05:38

## 2023-05-22 RX ADMIN — Medication 3 MILLIGRAM(S): at 21:33

## 2023-05-22 RX ADMIN — Medication 3 MILLILITER(S): at 19:49

## 2023-05-22 RX ADMIN — Medication 650 MILLIGRAM(S): at 13:27

## 2023-05-22 RX ADMIN — APIXABAN 2.5 MILLIGRAM(S): 2.5 TABLET, FILM COATED ORAL at 08:26

## 2023-05-22 RX ADMIN — Medication 650 MILLIGRAM(S): at 14:00

## 2023-05-22 RX ADMIN — BUDESONIDE AND FORMOTEROL FUMARATE DIHYDRATE 2 PUFF(S): 160; 4.5 AEROSOL RESPIRATORY (INHALATION) at 17:28

## 2023-05-22 RX ADMIN — CELECOXIB 200 MILLIGRAM(S): 200 CAPSULE ORAL at 05:38

## 2023-05-22 RX ADMIN — FAMOTIDINE 20 MILLIGRAM(S): 10 INJECTION INTRAVENOUS at 12:16

## 2023-05-22 RX ADMIN — PYRIDOSTIGMINE BROMIDE 60 MILLIGRAM(S): 60 SOLUTION ORAL at 17:28

## 2023-05-22 RX ADMIN — CELECOXIB 200 MILLIGRAM(S): 200 CAPSULE ORAL at 18:18

## 2023-05-22 RX ADMIN — Medication 3 MILLILITER(S): at 13:10

## 2023-05-22 RX ADMIN — PYRIDOSTIGMINE BROMIDE 180 MILLIGRAM(S): 60 SOLUTION ORAL at 12:15

## 2023-05-22 RX ADMIN — LOSARTAN POTASSIUM 25 MILLIGRAM(S): 100 TABLET, FILM COATED ORAL at 05:38

## 2023-05-22 NOTE — PROGRESS NOTE ADULT - SUBJECTIVE AND OBJECTIVE BOX
HÉCTOR LYONS is a 82yFemale , patient examined and chart reviewed.     INTERVAL HPI/ OVERNIGHT EVENTS:   NAD. No events.  Afebrile. In chair.    PAST MEDICAL & SURGICAL HISTORY:  Hypertension  Hyperlipidemia  GERD (Gastroesophageal Reflux Disease)  hiatal hernia  Lumbar Radiculopathy  Kidney Calculi  Myasthenia gravis  dx 10/2018 symptoms: intermittent loss of voice/ weakness, negative ENT studies, now stable on Pyridostigmine  Lumbar stenosis  Diverticulitis large intestine  denies any recent exacerbations  Aortic stenosis, mild  asper daughter  Reactive airway disease that is not asthma  mild as per pulm note on [employer], patient and daughter denies any inhaler use; thought to be related to myasthena gravis  Hypothyroid  Osteoarthritis of left hip  Pneumonia due to COVID-19 virus  Sinus bradycardia  S/P Cholecystectomy  S/P Appendectomy  Bilateral Cataracts  removed  S/P Hysterectomy Partial  1974  S/P Laparoscopic Fundoplication  Prolapse, Uterovaginal  s/p sling  H/O laminectomy  cervical  1998  lumbar 1986,1998,2000, 2/19 with fusion  H/O shoulder replacement  b/l 2015/2016  S/P foot surgery  History of tonsillectomy  H/O umbilical hernia repair  S/P hip replacement, right        For details regarding the patient's social history, family history, and other miscellaneous elements, please refer the initial infectious diseases consultation and/or the admitting history and physical examination for this admission.      ROS:  CONSTITUTIONAL:  Negative fever or chills  EYES:  Negative  blurry vision or double vision  CARDIOVASCULAR:  Negative for chest pain or palpitations  RESPIRATORY:  Negative for cough, wheezing, or SOB   GASTROINTESTINAL:  Negative for nausea, vomiting, diarrhea, constipation, or abdominal pain  GENITOURINARY:  Negative frequency, urgency or dysuria  NEUROLOGIC:  No headache, confusion, dizziness, lightheadedness  All other systems were reviewed and are negative     IODINE (Rash)  shellfish (Rash)  No Known Drug Allergies      Current inpatient medications :    ANTIBIOTICS/RELEVANT:  meropenem  IVPB 1000 milliGRAM(s) IV Intermittent every 8 hours    MEDICATIONS  (STANDING):  acetaminophen     Tablet .. 650 milliGRAM(s) Oral every 6 hours  albuterol/ipratropium for Nebulization 3 milliLiter(s) Nebulizer every 6 hours  apixaban 2.5 milliGRAM(s) Oral every 12 hours  atorvastatin 80 milliGRAM(s) Oral at bedtime  budesonide 160 MICROgram(s)/formoterol 4.5 MICROgram(s) Inhaler 2 Puff(s) Inhalation two times a day  celecoxib 200 milliGRAM(s) Oral every 12 hours  famotidine    Tablet 20 milliGRAM(s) Oral daily  hydrochlorothiazide 12.5 milliGRAM(s) Oral daily  levothyroxine 75 MICROGram(s) Oral daily  losartan 25 milliGRAM(s) Oral daily  pantoprazole    Tablet 40 milliGRAM(s) Oral before breakfast  polyethylene glycol 3350 17 Gram(s) Oral at bedtime  pyridostigmine 60 milliGRAM(s) Oral <User Schedule>  pyridostigmine  milliGRAM(s) Oral daily  senna 2 Tablet(s) Oral at bedtime    MEDICATIONS  (PRN):  albuterol    90 MICROgram(s) HFA Inhaler 2 Puff(s) Inhalation every 6 hours PRN Shortness of Breath and/or Wheezing  HYDROmorphone  Injectable 0.5 milliGRAM(s) IV Push every 3 hours PRN Breakthrough pain  magnesium hydroxide Suspension 30 milliLiter(s) Oral daily PRN Constipation  ondansetron Injectable 4 milliGRAM(s) IV Push every 6 hours PRN Nausea and/or Vomiting  oxyCODONE    IR 5 milliGRAM(s) Oral every 3 hours PRN Moderate Pain (4 - 6)  traMADol 50 milliGRAM(s) Oral every 4 hours PRN Moderate Pain (4 - 6)  traMADol 100 milliGRAM(s) Oral every 6 hours PRN Severe Pain (7 - 10)      Objective:  Vital Signs Last 24 Hrs  T(C): 36.7 (22 May 2023 10:21), Max: 36.8 (21 May 2023 13:51)  T(F): 98 (22 May 2023 10:21), Max: 98.2 (21 May 2023 13:51)  HR: 70 (22 May 2023 10:21) (58 - 74)  BP: 102/59 (22 May 2023 10:21) (102/59 - 129/64)  RR: 18 (22 May 2023 10:21) (18 - 19)  SpO2: 97% (22 May 2023 10:21) (96% - 99%)    Parameters below as of 22 May 2023 10:21  Patient On (Oxygen Delivery Method): room air      Physical Exam:  General: no acute distress  Neck: supple, trachea midline  Lungs: clear, no wheeze/rhonchi  Cardiovascular: regular rate and rhythm, S1 S2  Abdomen: soft, nontender,  bowel sounds normal  Neurological: alert and oriented x3  Skin: no rash  Extremities: Left hip drsg c/d/i        LABS:                        8.9    4.35  )-----------( 116      ( 22 May 2023 06:30 )             27.9   05-22    141  |  106  |  19  ----------------------------<  101<H>  4.1   |  27  |  0.76    Ca    8.7      22 May 2023 06:30    TPro  5.9<L>  /  Alb  2.7<L>  /  TBili  0.4  /  DBili  x   /  AST  17  /  ALT  10  /  AlkPhos  66  05-21    MICROBIOLOGY:  Culture - Joint Fluid (collected 18 May 2023 20:28)  Source: Hip Synovial Fluid  Gram Stain (19 May 2023 03:37):    polymorphonuclear leukocytes seen    No organisms seen    by cytocentrifuge  Preliminary Report (19 May 2023 18:17):    No growth    Culture - Tissue with Gram Stain (05.18.23 @ 20:28)    -  Ciprofloxacin: S <=0.25   -  Gentamicin: S <=2   -  Imipenem: S 2   -  Levofloxacin: S <=0.5   -  Meropenem: S <=1   -  Piperacillin/Tazobactam: S <=8   -  Tobramycin: S <=2   Gram Stain:   No polymorphonuclear leukocytes seen per low power field  No organisms seen per oil power field   -  Amikacin: S <=16   -  Aztreonam: S <=4   -  Cefepime: S <=2   -  Ceftazidime: S <=1   Specimen Source: .Tissue Other   Culture Results:   Few Pseudomonas aeruginosa   Organism Identification: Pseudomonas aeruginosa   Organism: Pseudomonas aeruginosa   Method Type: MAMADOU    Culture - Surgical Swab (05.18.23 @ 20:28)    -  Ceftazidime: S 4   -  Cefepime: S <=2   -  Aztreonam: S 8   -  Amikacin: S <=16   -  Piperacillin/Tazobactam: S <=8   -  Tobramycin: S <=2   -  Levofloxacin: S <=0.5   -  Meropenem: S <=1   -  Gentamicin: S 4   -  Imipenem: S 2   -  Ciprofloxacin: S <=0.25   Specimen Source: .Surgical Swab Surgical Swab   Culture Results:   Rare Pseudomonas aeruginosa  Few Staphylococcus epidermidis Susceptibility to follow.   Organism Identification: Pseudomonas aeruginosa   Organism: Pseudomonas aeruginosa   Method Type: MAMADOU      Culture - Blood (collected 18 May 2023 07:40)  Source: .Blood Blood-Peripheral  Preliminary Report (19 May 2023 14:01):    No growth to date.    Culture - Blood (collected 18 May 2023 07:40)  Source: .Blood Blood-Peripheral  Preliminary Report (19 May 2023 14:01):    No growth to date.    RADIOLOGY & ADDITIONAL STUDIES:    ACC: [id number] EXAM:  CT HIP ONLY LT   ORDERED BY: CARMEN RICE     PROCEDURE DATE:  05/18/2023          INTERPRETATION:  CT OF THE LEFT HIP    CLINICAL INFORMATION: Infected left hip. History of total hip   arthroplasty 3/27/2023. Evaluate for fluid collection.  TECHNIQUE: Multidetector CT of the pelvis focusing on the left hip. The   study was performed without the use of intravenous or intra-articular   contrast. Multiplanar reformats were generated for review.    COMPARISON: Intraoperative fluoroscopic images of the left hip 3/27/2023.   Left hip MRI 1/4/2023.    FINDINGS:    HARDWARE: Patient status post right total hip arthroplasty. Hardware is   intact. A small amount of periprosthetic lucency is seen along the   anterior aspectof the acetabular cup. No additional periprosthetic   lucency on the right. Transfixion screws through the acetabulum extends   into the right iliacus muscle.  Patient status post left total hip arthroplasty with placement of   cerclage wire. The hardware is intact. A small amount of subcortical   lucency is seen at the anterior margin of the acetabulum, which is   similar in appearance to preoperative MRI and likely degenerative. No   periprosthetic lucency. Transfixion screw through the acetabulum extends   into the left iliacus muscle.  Incompletely evaluated lumbar spinal fusion hardware.    BONE: No acute fracture. No new focal areas of cortical destruction.   Areas of subcortical lucency in the bilateral acetabula adjacent to the   acetabular cups, as described above.    JOINTS: Degenerative changes of the pubic symphysis. Mild degeneration of   the sacroiliac joints. Degenerative changes of the lower lumbar spine. No   large joint effusion appreciated on CT.    SOFT TISSUE: Postsurgical scarring along the lateral aspect of the left   thigh. No large subcutaneous fluid collection. Punctate focus of   subcutaneous emphysema seen at the site of bandage material (6:97).   Postsurgical scarring in the bilateral thighs. No focal muscle atrophy.   Neurovascular structures are normal in course and caliber.      IMPRESSION:  1.  Small amount of subcutaneous emphysema in the subcutaneous fat   adjacent to the site of surgical wound in the left proximal thigh. No   focal drainable fluid collections are identified on CT.  2.  Bilateral total hip arthroplasty without CT evidence of acute   hardware complication.    Assessment :   81YO F PMH mild AS, GERD, HLD, HTN, hypothyroidism, myasthenia gravis, OA left hip, reactive airway, sinus bradycardia, s/p Left hip replacement 3/27/2023, admitted with left hip surgical wound infection with dehiscence and drainage. Sp incision and drainage of thigh 18-May-2023- appears to be superficial.  OR culture with pseudomonas and staph epi  Ucx noted- Asymptomatic bacteruria  Anemia H/H stable    Plan :   Cont Meropenam  Will speak to Ortho Dr Dinero if ok to transition to po antibiotic when cleared for dc  Fu final OR cultures  Trend temps and cbc  Pulm toileting  Activity per ortho  Stable from ID standpoint      Continue with present regiment.  Appropriate use of antibiotics and adverse effects reviewed.      > 35 minutes were spent in direct patient care reviewing notes, medications ,labs data/ imaging , discussion with multidisciplinary team.    Thank you for allowing me to participate in care of your patient .    Eliu Terry MD  Infectious Disease  [phone]

## 2023-05-22 NOTE — DIETITIAN INITIAL EVALUATION ADULT - ORAL INTAKE PTA/DIET HISTORY
admitted from home - consumes regular diet, dtr prepares meals often, consumes 3x meals/d, smaller portions overall

## 2023-05-22 NOTE — DIETITIAN INITIAL EVALUATION ADULT - PERTINENT MEDS FT
MEDICATIONS  (STANDING):  acetaminophen     Tablet .. 650 milliGRAM(s) Oral every 6 hours  albuterol/ipratropium for Nebulization 3 milliLiter(s) Nebulizer every 6 hours  apixaban 2.5 milliGRAM(s) Oral every 12 hours  atorvastatin 80 milliGRAM(s) Oral at bedtime  budesonide 160 MICROgram(s)/formoterol 4.5 MICROgram(s) Inhaler 2 Puff(s) Inhalation two times a day  celecoxib 200 milliGRAM(s) Oral every 12 hours  famotidine    Tablet 20 milliGRAM(s) Oral daily  hydrochlorothiazide 12.5 milliGRAM(s) Oral daily  levothyroxine 75 MICROGram(s) Oral daily  losartan 25 milliGRAM(s) Oral daily  meropenem  IVPB 1000 milliGRAM(s) IV Intermittent every 8 hours  meropenem  IVPB      pantoprazole    Tablet 40 milliGRAM(s) Oral before breakfast  polyethylene glycol 3350 17 Gram(s) Oral at bedtime  pyridostigmine 60 milliGRAM(s) Oral <User Schedule>  pyridostigmine  milliGRAM(s) Oral daily  senna 2 Tablet(s) Oral at bedtime    MEDICATIONS  (PRN):  albuterol    90 MICROgram(s) HFA Inhaler 2 Puff(s) Inhalation every 6 hours PRN Shortness of Breath and/or Wheezing  HYDROmorphone  Injectable 0.5 milliGRAM(s) IV Push every 3 hours PRN Breakthrough pain  magnesium hydroxide Suspension 30 milliLiter(s) Oral daily PRN Constipation  ondansetron Injectable 4 milliGRAM(s) IV Push every 6 hours PRN Nausea and/or Vomiting  oxyCODONE    IR 5 milliGRAM(s) Oral every 3 hours PRN Moderate Pain (4 - 6)  traMADol 50 milliGRAM(s) Oral every 4 hours PRN Moderate Pain (4 - 6)  traMADol 100 milliGRAM(s) Oral every 6 hours PRN Severe Pain (7 - 10)

## 2023-05-22 NOTE — CASE MANAGEMENT PROGRESS NOTE - NSCMPROGRESSNOTE_GEN_ALL_CORE
Per treatment team rounds pt. is still acute with a  UTI, s/p I&D of hip and  pt. on IVAB Meropenem and awaits final c/s results. Has a  Preveena vac per ortho PA notes, Lyn from PT states recommending  MEENU vs HC/PT with assist at all times.  CM met briefly with pt and   DTR Patsy at bedside dtr said she can stay with pt.    States pt. has a RW/ Cane Shower chair and Commode at home.  Pharmacy is [facility] [place].  PCP is Hedy [phone]  CM team available if needed for home care referral.

## 2023-05-22 NOTE — DIETITIAN INITIAL EVALUATION ADULT - OTHER INFO
Per H&P, pt is a "83 y/o F with hx of mild AS, GERD, HLD, HTN, hypothyroidism, myasthenia gravis, OA left hip, reactive airway, sinus bradycardia, she is s/p Left hip replacement done in 3/27/2023, since she has joint replaced her pain never improved, She has been following regularly with orthopedics.  She was last seen in the office on the 16th, she has had wound vacs applied.  Per documentation from the 16th of the TORITO dressing to the left anterior hip was intact with moderate drainage, she has been given Bactrim DS at that time, she has opn wound at the surgical site, she was seen by Dr Dinero in the office  and he sent her to the hospital for further management and evaluation for possible septic arthritis, likely washout, patient denies fever, chills, chest pain, sob, dizziness."    Pt seen for nutrition assessment secondary to length of stay policy.  Pt presently on regular diet, shellfish allergy noted.  Pt admitted with L hip infections - reports she had hip replaced in 3/2023 and was sent to rehab.  Per pt, she has been home for a few weeks - lives with son and dtr who is an RN is very involved in patients care as well, provides prepares meals for patient often.  Pt denies any recent wt changes, however, does not weigh herself regularly.  Pt endorses ongoing decreased appetite but states she generally eats 3x meals per day, although portions are small (breakfast is usually cereal with milk and coffee, lunch/dinner is variable - pasta/veg, turkey; fairly good intake of fluids).  PO intake variable throughout admission, generally fair (50-75% of meal).  Encouraged po intake as tolerated and reinforced importance of consuming protein rich foods first to optimize nutrition status.  RD to follow up and will continue to monitor pt's nutrition status.

## 2023-05-22 NOTE — PROGRESS NOTE ADULT - SUBJECTIVE AND OBJECTIVE BOX
POST OPERATIVE DAY #: 4   STATUS POST: I&D Left Anterior THR                       SUBJECTIVE: Patient seen and examined.  Resting in bed.   Reported Pain score = 3-5 depending on movement    OBJECTIVE:     Vital Signs Last 24 Hrs  T(C): 36.7 (22 May 2023 10:21), Max: 36.8 (21 May 2023 13:51)  T(F): 98 (22 May 2023 10:21), Max: 98.2 (21 May 2023 13:51)  HR: 68 (22 May 2023 13:11) (58 - 74)  BP: 102/59 (22 May 2023 10:21) (102/59 - 129/64)  RR: 18 (22 May 2023 10:21) (18 - 19)  SpO2: 97% (22 May 2023 13:11) (96% - 99%)    Parameters below as of 22 May 2023 13:11  Patient On (Oxygen Delivery Method): room air        Left hip:          Dressing: clean/dry/intact    Bilateral LEs:         Sensation:  intact to light touch          Motor exam:  /5 dorsiflexion/plantarflexion/EHL          2+ DP pulses          calf supple, NT         Abduction pillow in place         SCDs in place    LABS:                        8.9    4.35  )-----------( 116      ( 22 May 2023 06:30 )             27.9     05-22    141  |  106  |  19  ----------------------------<  101<H>  4.1   |  27  |  0.76    Ca    8.7      22 May 2023 06:30    TPro  5.9<L>  /  Alb  2.7<L>  /  TBili  0.4  /  DBili  x   /  AST  17  /  ALT  10  /  AlkPhos  66  05-21          MEDICATIONS:  Anticoagulation:  apixaban 2.5 milliGRAM(s) Oral every 12 hours      Pain medications:   acetaminophen     Tablet .. 650 milliGRAM(s) Oral every 6 hours  celecoxib 200 milliGRAM(s) Oral every 12 hours  HYDROmorphone  Injectable 0.5 milliGRAM(s) IV Push every 3 hours PRN  oxyCODONE    IR 5 milliGRAM(s) Oral every 3 hours PRN  traMADol 50 milliGRAM(s) Oral every 4 hours PRN  traMADol 100 milliGRAM(s) Oral every 6 hours PRN        A/P :   s/p I&D Left Anterior THR POD # 4  -    Pain control  -    DVT ppx: Eliquis 2.5mg q 12 h   -    Weight bearing status: WBAT   -    Physical Therapy  -    Occupational Therapy  -    ID note appreciated. Awaiting final discussion with Dr. Dinero for discharge plan  -    Discharge plan:  home on abx

## 2023-05-22 NOTE — PROGRESS NOTE ADULT - SUBJECTIVE AND OBJECTIVE BOX
PROGRESS NOTE:   Authored by Dr. Steven Landers MD, Available on MS Teams    Patient is a 82y old  Female who presents with a chief complaint of Infection following a procedure, unspecified, initial encounter     (22 May 2023 12:16)      SUBJECTIVE / OVERNIGHT EVENTS: Patient has L hip pain. No chest pain or shortness of breath. No nausea, vomiting, abdominal pain.     ADDITIONAL REVIEW OF SYSTEMS:    MEDICATIONS  (STANDING):  acetaminophen     Tablet .. 650 milliGRAM(s) Oral every 6 hours  albuterol/ipratropium for Nebulization 3 milliLiter(s) Nebulizer every 6 hours  apixaban 2.5 milliGRAM(s) Oral every 12 hours  atorvastatin 80 milliGRAM(s) Oral at bedtime  budesonide 160 MICROgram(s)/formoterol 4.5 MICROgram(s) Inhaler 2 Puff(s) Inhalation two times a day  celecoxib 200 milliGRAM(s) Oral every 12 hours  famotidine    Tablet 20 milliGRAM(s) Oral daily  hydrochlorothiazide 12.5 milliGRAM(s) Oral daily  levothyroxine 75 MICROGram(s) Oral daily  losartan 25 milliGRAM(s) Oral daily  meropenem  IVPB      meropenem  IVPB 1000 milliGRAM(s) IV Intermittent every 8 hours  pantoprazole    Tablet 40 milliGRAM(s) Oral before breakfast  polyethylene glycol 3350 17 Gram(s) Oral at bedtime  pyridostigmine 60 milliGRAM(s) Oral <User Schedule>  pyridostigmine  milliGRAM(s) Oral daily  senna 2 Tablet(s) Oral at bedtime    MEDICATIONS  (PRN):  albuterol    90 MICROgram(s) HFA Inhaler 2 Puff(s) Inhalation every 6 hours PRN Shortness of Breath and/or Wheezing  HYDROmorphone  Injectable 0.5 milliGRAM(s) IV Push every 3 hours PRN Breakthrough pain  magnesium hydroxide Suspension 30 milliLiter(s) Oral daily PRN Constipation  ondansetron Injectable 4 milliGRAM(s) IV Push every 6 hours PRN Nausea and/or Vomiting  oxyCODONE    IR 5 milliGRAM(s) Oral every 3 hours PRN Moderate Pain (4 - 6)  traMADol 50 milliGRAM(s) Oral every 4 hours PRN Moderate Pain (4 - 6)  traMADol 100 milliGRAM(s) Oral every 6 hours PRN Severe Pain (7 - 10)      CAPILLARY BLOOD GLUCOSE        I&O's Summary    21 May 2023 07:01  -  22 May 2023 07:00  --------------------------------------------------------  IN: 700 mL / OUT: 800 mL / NET: -100 mL        PHYSICAL EXAM:  Vital Signs Last 24 Hrs  T(C): 36.4 (22 May 2023 13:34), Max: 36.7 (21 May 2023 22:00)  T(F): 97.5 (22 May 2023 13:34), Max: 98 (21 May 2023 22:00)  HR: 62 (22 May 2023 13:34) (58 - 70)  BP: 103/60 (22 May 2023 13:34) (102/59 - 127/70)  BP(mean): --  RR: 18 (22 May 2023 13:34) (18 - 19)  SpO2: 97% (22 May 2023 13:34) (96% - 99%)    Parameters below as of 22 May 2023 13:34  Patient On (Oxygen Delivery Method): room air        CONSTITUTIONAL: NAD, well-developed  RESPIRATORY: Normal respiratory effort; lungs are clear to auscultation bilaterally  CARDIOVASCULAR: Regular rate and rhythm, normal S1 and S2, no murmur/rub/gallop; No lower extremity edema  ABDOMEN: Nontender to palpation, normoactive bowel sounds, no rebound/guarding  MUSCLOSKELETAL: L thigh dressing in tact, tenderness to palpation. Dec ROM of LLE due to pain. Wound vac in place  PSYCH: A+O to person, place, and time; affect appropriate    LABS:                        8.9    4.35  )-----------( 116      ( 22 May 2023 06:30 )             27.9     05-22    141  |  106  |  19  ----------------------------<  101<H>  4.1   |  27  |  0.76    Ca    8.7      22 May 2023 06:30    TPro  5.9<L>  /  Alb  2.7<L>  /  TBili  0.4  /  DBili  x   /  AST  17  /  ALT  10  /  AlkPhos  66  05-21

## 2023-05-22 NOTE — PROGRESS NOTE ADULT - ASSESSMENT
81 y/o F with hx of mild AS, GERD, HLD, HTN, hypothyroidism, myasthenia gravis, OA left hip, reactive airway, sinus bradycardia, she is s/p Left hip replacement done in 3/27/2023, since she has joint replaced her pain never improved,  She has been following regularly with orthopedics.  She was last seen in the office on the 16th, she has had wound vacs applied.  Per documentation from the 16th of the TORITO dressing to the left anterior hip was intact with moderate drainage, she has been given Bactrim DS at that time, she has opn wound at the surgical site, she was seen by Dr Dinero in the office  and he sent her to the hospital for further management and evaluation for possible septic arthritis       Left hip OA s/p  Left anterior total hip 3/27/23 now with possible septic arthritis:   CT Small amount of subcutaneous emphysema in the subcutaneous fat  adjacent to the site of surgical wound in the left proximal thigh.  Bilateral total hip arthroplasty without CT evidence of acute   hardware complication.  S/P Incision and drainage of thigh  5/18/23   OR cultures -> pseudomonas, Staph epidermidis  continue on meropenem, pending final cultures   Pain meds   Bowel regimen  PT/OT  Incentive spirometry  VTE PPx: resume Eliquis      Hx of Reactive airway disease:   continue with Albuterol as needed   on breo,  symbicort while here in the hospital     HTN  On losartan 25mg daily and HCTZ 12.5 mg daily, resume with holding parameters     hypothyroidism --c/w synthroid     Myasthenia gravis --c/w Pyridostigmine     Acute blood loss anemia due to procedure: monitor CBC, H/H stable, vitals stable, asymptomatic  Check for nutritional deficiencies - Iron, B12, Folate - f/u labs

## 2023-05-22 NOTE — DIETITIAN INITIAL EVALUATION ADULT - PERTINENT LABORATORY DATA
05-22    141  |  106  |  19  ----------------------------<  101<H>  4.1   |  27  |  0.76    Ca    8.7      22 May 2023 06:30    TPro  5.9<L>  /  Alb  2.7<L>  /  TBili  0.4  /  DBili  x   /  AST  17  /  ALT  10  /  AlkPhos  66  05-21  A1C with Estimated Average Glucose Result: 5.3 % (02-28-23 @ 10:50)

## 2023-05-23 ENCOUNTER — APPOINTMENT (OUTPATIENT)
Dept: ORTHOPEDIC SURGERY | Facility: CLINIC | Age: 83
End: 2023-05-23

## 2023-05-23 LAB
ANION GAP SERPL CALC-SCNC: 8 MMOL/L — SIGNIFICANT CHANGE UP (ref 5–17)
BLD GP AB SCN SERPL QL: SIGNIFICANT CHANGE UP
BUN SERPL-MCNC: 17 MG/DL — SIGNIFICANT CHANGE UP (ref 7–23)
CALCIUM SERPL-MCNC: 8.9 MG/DL — SIGNIFICANT CHANGE UP (ref 8.4–10.5)
CHLORIDE SERPL-SCNC: 106 MMOL/L — SIGNIFICANT CHANGE UP (ref 96–108)
CO2 SERPL-SCNC: 28 MMOL/L — SIGNIFICANT CHANGE UP (ref 22–31)
CREAT SERPL-MCNC: 0.92 MG/DL — SIGNIFICANT CHANGE UP (ref 0.5–1.3)
CULTURE RESULTS: SIGNIFICANT CHANGE UP
CULTURE RESULTS: SIGNIFICANT CHANGE UP
EGFR: 61 ML/MIN/1.73M2 — SIGNIFICANT CHANGE UP
FERRITIN SERPL-MCNC: 78 NG/ML — SIGNIFICANT CHANGE UP (ref 15–150)
GLUCOSE SERPL-MCNC: 96 MG/DL — SIGNIFICANT CHANGE UP (ref 70–99)
HCT VFR BLD CALC: 28.5 % — LOW (ref 34.5–45)
HGB BLD-MCNC: 9 G/DL — LOW (ref 11.5–15.5)
MAGNESIUM SERPL-MCNC: 2 MG/DL — SIGNIFICANT CHANGE UP (ref 1.6–2.6)
MCHC RBC-ENTMCNC: 29.3 PG — SIGNIFICANT CHANGE UP (ref 27–34)
MCHC RBC-ENTMCNC: 31.6 GM/DL — LOW (ref 32–36)
MCV RBC AUTO: 92.8 FL — SIGNIFICANT CHANGE UP (ref 80–100)
NRBC # BLD: 0 /100 WBCS — SIGNIFICANT CHANGE UP (ref 0–0)
PHOSPHATE SERPL-MCNC: 4.1 MG/DL — SIGNIFICANT CHANGE UP (ref 2.5–4.5)
PLATELET # BLD AUTO: 132 K/UL — LOW (ref 150–400)
POTASSIUM SERPL-MCNC: 4.1 MMOL/L — SIGNIFICANT CHANGE UP (ref 3.5–5.3)
POTASSIUM SERPL-SCNC: 4.1 MMOL/L — SIGNIFICANT CHANGE UP (ref 3.5–5.3)
RBC # BLD: 3.07 M/UL — LOW (ref 3.8–5.2)
RBC # FLD: 14.1 % — SIGNIFICANT CHANGE UP (ref 10.3–14.5)
SODIUM SERPL-SCNC: 142 MMOL/L — SIGNIFICANT CHANGE UP (ref 135–145)
SPECIMEN SOURCE: SIGNIFICANT CHANGE UP
SPECIMEN SOURCE: SIGNIFICANT CHANGE UP
WBC # BLD: 3.62 K/UL — LOW (ref 3.8–10.5)
WBC # FLD AUTO: 3.62 K/UL — LOW (ref 3.8–10.5)

## 2023-05-23 PROCEDURE — 99232 SBSQ HOSP IP/OBS MODERATE 35: CPT

## 2023-05-23 RX ORDER — CELECOXIB 200 MG/1
1 CAPSULE ORAL
Qty: 60 | Refills: 0
Start: 2023-05-23

## 2023-05-23 RX ORDER — ESOMEPRAZOLE MAGNESIUM 40 MG/1
1 CAPSULE, DELAYED RELEASE ORAL
Qty: 0 | Refills: 0 | DISCHARGE

## 2023-05-23 RX ORDER — FERROUS SULFATE 325(65) MG
325 TABLET ORAL DAILY
Refills: 0 | Status: DISCONTINUED | OUTPATIENT
Start: 2023-05-23 | End: 2023-05-24

## 2023-05-23 RX ORDER — APIXABAN 2.5 MG/1
1 TABLET, FILM COATED ORAL
Qty: 56 | Refills: 0
Start: 2023-05-23 | End: 2023-06-19

## 2023-05-23 RX ORDER — LEVOFLOXACIN 5 MG/ML
1 INJECTION, SOLUTION INTRAVENOUS
Qty: 14 | Refills: 0
Start: 2023-05-23 | End: 2023-06-05

## 2023-05-23 RX ADMIN — Medication 650 MILLIGRAM(S): at 15:38

## 2023-05-23 RX ADMIN — PANTOPRAZOLE SODIUM 40 MILLIGRAM(S): 20 TABLET, DELAYED RELEASE ORAL at 06:13

## 2023-05-23 RX ADMIN — CELECOXIB 200 MILLIGRAM(S): 200 CAPSULE ORAL at 18:23

## 2023-05-23 RX ADMIN — FAMOTIDINE 20 MILLIGRAM(S): 10 INJECTION INTRAVENOUS at 12:09

## 2023-05-23 RX ADMIN — APIXABAN 2.5 MILLIGRAM(S): 2.5 TABLET, FILM COATED ORAL at 09:08

## 2023-05-23 RX ADMIN — BUDESONIDE AND FORMOTEROL FUMARATE DIHYDRATE 2 PUFF(S): 160; 4.5 AEROSOL RESPIRATORY (INHALATION) at 09:08

## 2023-05-23 RX ADMIN — Medication 650 MILLIGRAM(S): at 16:38

## 2023-05-23 RX ADMIN — Medication 650 MILLIGRAM(S): at 10:08

## 2023-05-23 RX ADMIN — CELECOXIB 200 MILLIGRAM(S): 200 CAPSULE ORAL at 05:26

## 2023-05-23 RX ADMIN — Medication 3 MILLILITER(S): at 07:29

## 2023-05-23 RX ADMIN — Medication 75 MICROGRAM(S): at 05:27

## 2023-05-23 RX ADMIN — ATORVASTATIN CALCIUM 80 MILLIGRAM(S): 80 TABLET, FILM COATED ORAL at 21:28

## 2023-05-23 RX ADMIN — LOSARTAN POTASSIUM 25 MILLIGRAM(S): 100 TABLET, FILM COATED ORAL at 05:27

## 2023-05-23 RX ADMIN — MEROPENEM 100 MILLIGRAM(S): 1 INJECTION INTRAVENOUS at 21:29

## 2023-05-23 RX ADMIN — SENNA PLUS 2 TABLET(S): 8.6 TABLET ORAL at 21:29

## 2023-05-23 RX ADMIN — APIXABAN 2.5 MILLIGRAM(S): 2.5 TABLET, FILM COATED ORAL at 21:29

## 2023-05-23 RX ADMIN — BUDESONIDE AND FORMOTEROL FUMARATE DIHYDRATE 2 PUFF(S): 160; 4.5 AEROSOL RESPIRATORY (INHALATION) at 18:23

## 2023-05-23 RX ADMIN — PYRIDOSTIGMINE BROMIDE 60 MILLIGRAM(S): 60 SOLUTION ORAL at 06:13

## 2023-05-23 RX ADMIN — Medication 3 MILLILITER(S): at 19:49

## 2023-05-23 RX ADMIN — Medication 3 MILLILITER(S): at 13:22

## 2023-05-23 RX ADMIN — Medication 650 MILLIGRAM(S): at 21:58

## 2023-05-23 RX ADMIN — Medication 3 MILLIGRAM(S): at 21:28

## 2023-05-23 RX ADMIN — MEROPENEM 100 MILLIGRAM(S): 1 INJECTION INTRAVENOUS at 05:26

## 2023-05-23 RX ADMIN — PYRIDOSTIGMINE BROMIDE 60 MILLIGRAM(S): 60 SOLUTION ORAL at 18:23

## 2023-05-23 RX ADMIN — Medication 650 MILLIGRAM(S): at 09:08

## 2023-05-23 RX ADMIN — Medication 650 MILLIGRAM(S): at 21:28

## 2023-05-23 RX ADMIN — PYRIDOSTIGMINE BROMIDE 60 MILLIGRAM(S): 60 SOLUTION ORAL at 12:09

## 2023-05-23 RX ADMIN — Medication 325 MILLIGRAM(S): at 12:09

## 2023-05-23 RX ADMIN — MEROPENEM 100 MILLIGRAM(S): 1 INJECTION INTRAVENOUS at 15:39

## 2023-05-23 RX ADMIN — POLYETHYLENE GLYCOL 3350 17 GRAM(S): 17 POWDER, FOR SOLUTION ORAL at 21:29

## 2023-05-23 RX ADMIN — PYRIDOSTIGMINE BROMIDE 180 MILLIGRAM(S): 60 SOLUTION ORAL at 12:09

## 2023-05-23 NOTE — CASE MANAGEMENT PROGRESS NOTE - NSCMPROGRESSNOTE_GEN_ALL_CORE
CM called  and contacted daughter  Patsy [phone] to make her aware that her mother is going to be discharge tomorrow 5/24/23.  Will continue to follow.  CM called  and contacted daughter  Patsy [phone] to make her aware that her mother is going to be discharge tomorrow 5/24/23.   [facility].  Daughter stated she thinks her mother might prefer outpatient PT. Will continue to follow.

## 2023-05-23 NOTE — PROGRESS NOTE ADULT - SUBJECTIVE AND OBJECTIVE BOX
HÉCTOR LYONS is a 82yFemale , patient examined and chart reviewed.     INTERVAL HPI/ OVERNIGHT EVENTS:   NAD. No events.  Afebrile.     PAST MEDICAL & SURGICAL HISTORY:  Hypertension  Hyperlipidemia  GERD (Gastroesophageal Reflux Disease)  hiatal hernia  Lumbar Radiculopathy  Kidney Calculi  Myasthenia gravis  dx 10/2018 symptoms: intermittent loss of voice/ weakness, negative ENT studies, now stable on Pyridostigmine  Lumbar stenosis  Diverticulitis large intestine  denies any recent exacerbations  Aortic stenosis, mild  asper daughter  Reactive airway disease that is not asthma  mild as per pulm note on [employer], patient and daughter denies any inhaler use; thought to be related to myasthena gravis  Hypothyroid  Osteoarthritis of left hip  Pneumonia due to COVID-19 virus  Sinus bradycardia  S/P Cholecystectomy  S/P Appendectomy  Bilateral Cataracts  removed  S/P Hysterectomy Partial  1974  S/P Laparoscopic Fundoplication  Prolapse, Uterovaginal  s/p sling  H/O laminectomy  cervical  1998  lumbar 1986,1998,2000, 2/19 with fusion  H/O shoulder replacement  b/l 2015/2016  S/P foot surgery  History of tonsillectomy  H/O umbilical hernia repair  S/P hip replacement, right        For details regarding the patient's social history, family history, and other miscellaneous elements, please refer the initial infectious diseases consultation and/or the admitting history and physical examination for this admission.      ROS:  CONSTITUTIONAL:  Negative fever or chills  EYES:  Negative  blurry vision or double vision  CARDIOVASCULAR:  Negative for chest pain or palpitations  RESPIRATORY:  Negative for cough, wheezing, or SOB   GASTROINTESTINAL:  Negative for nausea, vomiting, diarrhea, constipation, or abdominal pain  GENITOURINARY:  Negative frequency, urgency or dysuria  NEUROLOGIC:  No headache, confusion, dizziness, lightheadedness  All other systems were reviewed and are negative     IODINE (Rash)  shellfish (Rash)  No Known Drug Allergies      Current inpatient medications :    ANTIBIOTICS/RELEVANT:  meropenem  IVPB 1000 milliGRAM(s) IV Intermittent every 8 hours    MEDICATIONS  (STANDING):  acetaminophen     Tablet .. 650 milliGRAM(s) Oral every 6 hours  albuterol/ipratropium for Nebulization 3 milliLiter(s) Nebulizer every 6 hours  apixaban 2.5 milliGRAM(s) Oral every 12 hours  atorvastatin 80 milliGRAM(s) Oral at bedtime  budesonide 160 MICROgram(s)/formoterol 4.5 MICROgram(s) Inhaler 2 Puff(s) Inhalation two times a day  celecoxib 200 milliGRAM(s) Oral every 12 hours  famotidine    Tablet 20 milliGRAM(s) Oral daily  ferrous    sulfate 325 milliGRAM(s) Oral daily  hydrochlorothiazide 12.5 milliGRAM(s) Oral daily  levothyroxine 75 MICROGram(s) Oral daily  losartan 25 milliGRAM(s) Oral daily  pantoprazole    Tablet 40 milliGRAM(s) Oral before breakfast  polyethylene glycol 3350 17 Gram(s) Oral at bedtime  pyridostigmine 60 milliGRAM(s) Oral <User Schedule>  pyridostigmine  milliGRAM(s) Oral daily  senna 2 Tablet(s) Oral at bedtime    MEDICATIONS  (PRN):  albuterol    90 MICROgram(s) HFA Inhaler 2 Puff(s) Inhalation every 6 hours PRN Shortness of Breath and/or Wheezing  HYDROmorphone  Injectable 0.5 milliGRAM(s) IV Push every 3 hours PRN Breakthrough pain  magnesium hydroxide Suspension 30 milliLiter(s) Oral daily PRN Constipation  melatonin 3 milliGRAM(s) Oral at bedtime PRN Insomnia  ondansetron Injectable 4 milliGRAM(s) IV Push every 6 hours PRN Nausea and/or Vomiting  oxyCODONE    IR 5 milliGRAM(s) Oral every 3 hours PRN Moderate Pain (4 - 6)  traMADol 50 milliGRAM(s) Oral every 4 hours PRN Moderate Pain (4 - 6)  traMADol 100 milliGRAM(s) Oral every 6 hours PRN Severe Pain (7 - 10)      Objective:  Vital Signs Last 24 Hrs  T(C): 36.5 (23 May 2023 09:04), Max: 36.6 (22 May 2023 17:29)  T(F): 97.7 (23 May 2023 09:04), Max: 97.9 (22 May 2023 17:29)  HR: 61 (23 May 2023 09:04) (61 - 71)  BP: 109/63 (23 May 2023 09:04) (103/60 - 124/70)  RR: 18 (23 May 2023 09:04) (18 - 18)  SpO2: 99% (23 May 2023 07:50) (95% - 100%)    Parameters below as of 23 May 2023 09:04  Patient On (Oxygen Delivery Method): room air      Physical Exam:  General: no acute distress  Neck: supple, trachea midline  Lungs: clear, no wheeze/rhonchi  Cardiovascular: regular rate and rhythm, S1 S2  Abdomen: soft, nontender,  bowel sounds normal  Neurological: alert and oriented x3  Skin: no rash  Extremities: Left hip drsg c/d/i        LABS:                        9.0    3.62  )-----------( 132      ( 23 May 2023 08:02 )             28.5   05-23    142  |  106  |  17  ----------------------------<  96  4.1   |  28  |  0.92    Ca    8.9      23 May 2023 08:02  Phos  4.1     05-23  Mg     2.0     05-23      MICROBIOLOGY:  Culture - Joint Fluid (collected 18 May 2023 20:28)  Source: Hip Synovial Fluid  Gram Stain (19 May 2023 03:37):    polymorphonuclear leukocytes seen    No organisms seen    by cytocentrifuge  Preliminary Report (19 May 2023 18:17):    No growth    Culture - Tissue with Gram Stain (05.18.23 @ 20:28)    -  Ciprofloxacin: S <=0.25   -  Gentamicin: S <=2   -  Imipenem: S 2   -  Levofloxacin: S <=0.5   -  Meropenem: S <=1   -  Piperacillin/Tazobactam: S <=8   -  Tobramycin: S <=2   Gram Stain:   No polymorphonuclear leukocytes seen per low power field  No organisms seen per oil power field   -  Amikacin: S <=16   -  Aztreonam: S <=4   -  Cefepime: S <=2   -  Ceftazidime: S <=1   Specimen Source: .Tissue Other   Culture Results:   Few Pseudomonas aeruginosa   Organism Identification: Pseudomonas aeruginosa   Organism: Pseudomonas aeruginosa   Method Type: MAMADOU    Culture - Surgical Swab (05.18.23 @ 20:28)    -  Ceftazidime: S 4   -  Cefepime: S <=2   -  Aztreonam: S 8   -  Amikacin: S <=16   -  Piperacillin/Tazobactam: S <=8   -  Tobramycin: S <=2   -  Levofloxacin: S <=0.5   -  Meropenem: S <=1   -  Gentamicin: S 4   -  Imipenem: S 2   -  Ciprofloxacin: S <=0.25   Specimen Source: .Surgical Swab Surgical Swab   Culture Results:   Rare Pseudomonas aeruginosa  Few Staphylococcus epidermidis Susceptibility to follow.   Organism Identification: Pseudomonas aeruginosa   Organism: Pseudomonas aeruginosa   Method Type: MAMADOU      Culture - Blood (collected 18 May 2023 07:40)  Source: .Blood Blood-Peripheral  Preliminary Report (19 May 2023 14:01):    No growth to date.    Culture - Blood (collected 18 May 2023 07:40)  Source: .Blood Blood-Peripheral  Preliminary Report (19 May 2023 14:01):    No growth to date.    RADIOLOGY & ADDITIONAL STUDIES:    ACC: [id number] EXAM:  CT HIP ONLY LT   ORDERED BY: CARMEN RICE     PROCEDURE DATE:  05/18/2023          INTERPRETATION:  CT OF THE LEFT HIP    CLINICAL INFORMATION: Infected left hip. History of total hip   arthroplasty 3/27/2023. Evaluate for fluid collection.  TECHNIQUE: Multidetector CT of the pelvis focusing on the left hip. The   study was performed without the use of intravenous or intra-articular   contrast. Multiplanar reformats were generated for review.    COMPARISON: Intraoperative fluoroscopic images of the left hip 3/27/2023.   Left hip MRI 1/4/2023.    FINDINGS:    HARDWARE: Patient status post right total hip arthroplasty. Hardware is   intact. A small amount of periprosthetic lucency is seen along the   anterior aspectof the acetabular cup. No additional periprosthetic   lucency on the right. Transfixion screws through the acetabulum extends   into the right iliacus muscle.  Patient status post left total hip arthroplasty with placement of   cerclage wire. The hardware is intact. A small amount of subcortical   lucency is seen at the anterior margin of the acetabulum, which is   similar in appearance to preoperative MRI and likely degenerative. No   periprosthetic lucency. Transfixion screw through the acetabulum extends   into the left iliacus muscle.  Incompletely evaluated lumbar spinal fusion hardware.    BONE: No acute fracture. No new focal areas of cortical destruction.   Areas of subcortical lucency in the bilateral acetabula adjacent to the   acetabular cups, as described above.    JOINTS: Degenerative changes of the pubic symphysis. Mild degeneration of   the sacroiliac joints. Degenerative changes of the lower lumbar spine. No   large joint effusion appreciated on CT.    SOFT TISSUE: Postsurgical scarring along the lateral aspect of the left   thigh. No large subcutaneous fluid collection. Punctate focus of   subcutaneous emphysema seen at the site of bandage material (6:97).   Postsurgical scarring in the bilateral thighs. No focal muscle atrophy.   Neurovascular structures are normal in course and caliber.      IMPRESSION:  1.  Small amount of subcutaneous emphysema in the subcutaneous fat   adjacent to the site of surgical wound in the left proximal thigh. No   focal drainable fluid collections are identified on CT.  2.  Bilateral total hip arthroplasty without CT evidence of acute   hardware complication.    Assessment :   83YO F PMH mild AS, GERD, HLD, HTN, hypothyroidism, myasthenia gravis, OA left hip, reactive airway, sinus bradycardia, s/p Left hip replacement 3/27/2023, admitted with left hip surgical wound infection with dehiscence and drainage. Sp incision and drainage of thigh 18-May-2023- appears to be superficial.  OR culture with pseudomonas and staph epi However hip joint fluid NGTD  Ucx noted- Asymptomatic bacteruria  Anemia H/H stable    Plan :   Cont Meropenam today  Will transition to po Levaquin 500mg po daily x 14 days for dc tmrw  Trend temps and cbc  Pulm toileting  Activity per ortho  Will d/w Ortho Dr Dinero -left message yesterday awaiting call back  Stable from ID standpoint    Continue with present regiment.  Appropriate use of antibiotics and adverse effects reviewed.      > 35 minutes were spent in direct patient care reviewing notes, medications ,labs data/ imaging , discussion with multidisciplinary team.    Thank you for allowing me to participate in care of your patient .    Eliu Terry MD  Infectious Disease  [phone]

## 2023-05-23 NOTE — PROGRESS NOTE ADULT - SUBJECTIVE AND OBJECTIVE BOX
PROGRESS NOTE:   Authored by Dr. Steven Landers MD, Available on MS Teams    Patient is a 82y old  Female who presents with a chief complaint of L hip wound (22 May 2023 14:05)      SUBJECTIVE / OVERNIGHT EVENTS: No acute events overnight. Patient feels weak. No chest pain or shortness of breath. Has some L hip pain.     ADDITIONAL REVIEW OF SYSTEMS:    MEDICATIONS  (STANDING):  acetaminophen     Tablet .. 650 milliGRAM(s) Oral every 6 hours  albuterol/ipratropium for Nebulization 3 milliLiter(s) Nebulizer every 6 hours  apixaban 2.5 milliGRAM(s) Oral every 12 hours  atorvastatin 80 milliGRAM(s) Oral at bedtime  budesonide 160 MICROgram(s)/formoterol 4.5 MICROgram(s) Inhaler 2 Puff(s) Inhalation two times a day  celecoxib 200 milliGRAM(s) Oral every 12 hours  famotidine    Tablet 20 milliGRAM(s) Oral daily  hydrochlorothiazide 12.5 milliGRAM(s) Oral daily  levothyroxine 75 MICROGram(s) Oral daily  losartan 25 milliGRAM(s) Oral daily  meropenem  IVPB 1000 milliGRAM(s) IV Intermittent every 8 hours  meropenem  IVPB      pantoprazole    Tablet 40 milliGRAM(s) Oral before breakfast  polyethylene glycol 3350 17 Gram(s) Oral at bedtime  pyridostigmine 60 milliGRAM(s) Oral <User Schedule>  pyridostigmine  milliGRAM(s) Oral daily  senna 2 Tablet(s) Oral at bedtime    MEDICATIONS  (PRN):  albuterol    90 MICROgram(s) HFA Inhaler 2 Puff(s) Inhalation every 6 hours PRN Shortness of Breath and/or Wheezing  HYDROmorphone  Injectable 0.5 milliGRAM(s) IV Push every 3 hours PRN Breakthrough pain  magnesium hydroxide Suspension 30 milliLiter(s) Oral daily PRN Constipation  melatonin 3 milliGRAM(s) Oral at bedtime PRN Insomnia  ondansetron Injectable 4 milliGRAM(s) IV Push every 6 hours PRN Nausea and/or Vomiting  oxyCODONE    IR 5 milliGRAM(s) Oral every 3 hours PRN Moderate Pain (4 - 6)  traMADol 50 milliGRAM(s) Oral every 4 hours PRN Moderate Pain (4 - 6)  traMADol 100 milliGRAM(s) Oral every 6 hours PRN Severe Pain (7 - 10)      CAPILLARY BLOOD GLUCOSE        I&O's Summary      PHYSICAL EXAM:  Vital Signs Last 24 Hrs  T(C): 36.5 (23 May 2023 09:04), Max: 36.7 (22 May 2023 10:21)  T(F): 97.7 (23 May 2023 09:04), Max: 98 (22 May 2023 10:21)  HR: 61 (23 May 2023 09:04) (61 - 71)  BP: 109/63 (23 May 2023 09:04) (102/59 - 124/70)  BP(mean): --  RR: 18 (23 May 2023 09:04) (18 - 18)  SpO2: 99% (23 May 2023 07:50) (95% - 100%)    Parameters below as of 23 May 2023 09:04  Patient On (Oxygen Delivery Method): room air        CONSTITUTIONAL: NAD, well-developed  RESPIRATORY: Normal respiratory effort; lungs are clear to auscultation bilaterally  CARDIOVASCULAR: Regular rate and rhythm, normal S1 and S2, no murmur/rub/gallop; No lower extremity edema  ABDOMEN: Nontender to palpation, normoactive bowel sounds, no rebound/guarding  MUSCLOSKELETAL: no clubbing or cyanosis of digits; L hip/thigh tenderness, wound vac in place.   PSYCH: A+O to person, place, and time; affect appropriate    LABS:                        9.0    3.62  )-----------( 132      ( 23 May 2023 08:02 )             28.5     05-23    142  |  106  |  17  ----------------------------<  96  4.1   |  28  |  0.92    Ca    8.9      23 May 2023 08:02  Phos  4.1     05-23  Mg     2.0     05-23      Discussed with Dr Terry, will likely dc with po antibiotics after discussion with ortho

## 2023-05-23 NOTE — CASE MANAGEMENT PROGRESS NOTE - NSCMPROGRESSNOTE_GEN_ALL_CORE
CM called DR. Landers to see if patient ready for discharge today as per hospitalist have to switch to oral antibiotics today  and patient will be ready for discharge tomorrow 5/24/23, and possible wound vac coming off tomorrow up to [facility]. Will continue to follow patient.

## 2023-05-23 NOTE — PROGRESS NOTE ADULT - SUBJECTIVE AND OBJECTIVE BOX
Post Op     HÉCTOR LYONS      82y        Female                                                                                                                 T(C): 36.5 (05-23-23 @ 09:04), Max: 36.7 (05-22-23 @ 10:21)  HR: 61 (05-23-23 @ 09:04) (61 - 71)  BP: 109/63 (05-23-23 @ 09:04) (102/59 - 124/70)  RR: 18 (05-23-23 @ 09:04) (18 - 18)  SpO2: 99% (05-23-23 @ 07:50) (95% - 100%)  Wt(kg): --    S/P incision and drainage left hip  with prevena    Patient denies shortness of breath, chest pain, dyspnea, No complaints  Pain is 3 /10    Physical Exam    Extremity: Bilaterally:  No holmon                                           No Cord                                          PAS on                                          Neurovascular intact                                          Motor intact EHL/FHL                                          Sensation intact                                          Pulses intact DP/PT                                         Calves Soft                                         Dressing Clean / Dry / Intact prevena  functioning min drainage in vac                                         Capillary refill with 5 seconds                          9.0    3.62  )-----------( 132      ( 23 May 2023 08:02 )             28.5       05-23    142  |  106  |  17  ----------------------------<  96  4.1   |  28  |  0.92    Ca    8.9      23 May 2023 08:02  Phos  4.1     05-23  Mg     2.0     05-23        A/P  -- S/P incision and drainage left hip  with prevena  -  Medicine To Follow   - DVT prophylaxis PAS eliquis  - PT & OT   - Analagesia  - Incentive Spirometry  - Discharge Planning  - Safety Precautions  -  CBC , BMP daily

## 2023-05-23 NOTE — CASE MANAGEMENT PROGRESS NOTE - NSCMPROGRESSNOTE_GEN_ALL_CORE
CM met with patient and she  alert times 3. Patient is refusing homecare  services.   CM review with patient the benefits of home care but patient still refuses it. Will continue to follow patient.

## 2023-05-23 NOTE — PROGRESS NOTE ADULT - SUBJECTIVE AND OBJECTIVE BOX
Post Op     HÉCTOR LYONS      82y        Female                                                                                                                 T(C): 36.5 (05-23-23 @ 09:04), Max: 36.6 (05-22-23 @ 17:29)  HR: 61 (05-23-23 @ 09:04) (61 - 71)  BP: 109/63 (05-23-23 @ 09:04) (103/60 - 124/70)  RR: 18 (05-23-23 @ 09:04) (18 - 18)  SpO2: 99% (05-23-23 @ 07:50) (95% - 100%)  Wt(kg): --    S/P   I  And D  hip left with prevena       Patient denies shortness of breath, chest pain, dyspnea, No complaints  Pain is 3/10    Physical Exam    Extremity: Bilaterally:  No holmon                                           No Cord                                          PAS on                                          Neurovascular intact                                          Motor intact EHL/FHL                                          Sensation intact                                          Pulses intact DP/PT                                         Calves Soft                                         Dressing Clean / Dry / Intact prevena  removed  silverlone applied  nylon sutures noted no drainage                                          Capillary refill with 5 seconds                          9.0    3.62  )-----------( 132      ( 23 May 2023 08:02 )             28.5       05-23    142  |  106  |  17  ----------------------------<  96  4.1   |  28  |  0.92    Ca    8.9      23 May 2023 08:02  Phos  4.1     05-23  Mg     2.0     05-23        A/P  -- S/P  I and D  hip     -  Medicine To Follow   - DVT prophylaxis PAS elquis  - PT & OT   - Analagesia  - Incentive Spirometry  - Discharge Planning  - Safety Precautions  -  CBC , BMP daily    no draingage in  vac  home on oral antibiotics levaquin per infectious disease

## 2023-05-23 NOTE — PROGRESS NOTE ADULT - ASSESSMENT
81 y/o F with hx of mild AS, GERD, HLD, HTN, hypothyroidism, myasthenia gravis, OA left hip, reactive airway, sinus bradycardia, she is s/p Left hip replacement done in 3/27/2023, since she has joint replaced her pain never improved,  She has been following regularly with orthopedics.  She was last seen in the office on the 16th, she has had wound vacs applied.  Per documentation from the 16th of the TORITO dressing to the left anterior hip was intact with moderate drainage, she has been given Bactrim DS at that time, she has opn wound at the surgical site, she was seen by Dr Dinero in the office  and he sent her to the hospital for further management and evaluation for possible septic arthritis       Left hip OA s/p  Left anterior total hip 3/27/23 now with possible septic arthritis:   CT Small amount of subcutaneous emphysema in the subcutaneous fat  adjacent to the site of surgical wound in the left proximal thigh.  Bilateral total hip arthroplasty without CT evidence of acute   hardware complication.  S/P Incision and drainage of thigh  5/18/23   OR cultures -> pseudomonas, Staph epidermidis  continue on meropenem, pending final cultures   Pain meds   Bowel regimen  PT/OT  Incentive spirometry  VTE PPx: resume Eliquis      Hx of Reactive airway disease:   continue with Albuterol as needed   on breo, symbicort while here in the hospital     HTN  On losartan 25mg daily and HCTZ 12.5 mg daily, resume with holding parameters     hypothyroidism --c/w synthroid     Myasthenia gravis --c/w Pyridostigmine     Acute blood loss anemia due to procedure: monitor CBC, H/H stable, vitals stable, asymptomatic  Check for nutritional deficiencies - Iron, B12, Folate - f/u labs  Restart iron    discussed with daughter Patsy

## 2023-05-24 ENCOUNTER — APPOINTMENT (OUTPATIENT)
Dept: NEUROLOGY | Facility: CLINIC | Age: 83
End: 2023-05-24

## 2023-05-24 ENCOUNTER — TRANSCRIPTION ENCOUNTER (OUTPATIENT)
Age: 83
End: 2023-05-24

## 2023-05-24 VITALS
TEMPERATURE: 98 F | RESPIRATION RATE: 18 BRPM | OXYGEN SATURATION: 98 % | DIASTOLIC BLOOD PRESSURE: 67 MMHG | HEART RATE: 72 BPM | SYSTOLIC BLOOD PRESSURE: 111 MMHG

## 2023-05-24 LAB
CULTURE RESULTS: SIGNIFICANT CHANGE UP
CULTURE RESULTS: SIGNIFICANT CHANGE UP
HCT VFR BLD CALC: 29.1 % — LOW (ref 34.5–45)
HGB BLD-MCNC: 9.2 G/DL — LOW (ref 11.5–15.5)
MCHC RBC-ENTMCNC: 29.3 PG — SIGNIFICANT CHANGE UP (ref 27–34)
MCHC RBC-ENTMCNC: 31.6 GM/DL — LOW (ref 32–36)
MCV RBC AUTO: 92.7 FL — SIGNIFICANT CHANGE UP (ref 80–100)
NRBC # BLD: 0 /100 WBCS — SIGNIFICANT CHANGE UP (ref 0–0)
ORGANISM # SPEC MICROSCOPIC CNT: SIGNIFICANT CHANGE UP
PLATELET # BLD AUTO: 134 K/UL — LOW (ref 150–400)
RBC # BLD: 3.14 M/UL — LOW (ref 3.8–5.2)
RBC # FLD: 13.7 % — SIGNIFICANT CHANGE UP (ref 10.3–14.5)
SPECIMEN SOURCE: SIGNIFICANT CHANGE UP
WBC # BLD: 3.21 K/UL — LOW (ref 3.8–10.5)
WBC # FLD AUTO: 3.21 K/UL — LOW (ref 3.8–10.5)

## 2023-05-24 PROCEDURE — 97530 THERAPEUTIC ACTIVITIES: CPT

## 2023-05-24 PROCEDURE — 80053 COMPREHEN METABOLIC PANEL: CPT

## 2023-05-24 PROCEDURE — 87040 BLOOD CULTURE FOR BACTERIA: CPT

## 2023-05-24 PROCEDURE — 71045 X-RAY EXAM CHEST 1 VIEW: CPT

## 2023-05-24 PROCEDURE — 73700 CT LOWER EXTREMITY W/O DYE: CPT | Mod: MA

## 2023-05-24 PROCEDURE — 87205 SMEAR GRAM STAIN: CPT

## 2023-05-24 PROCEDURE — 87077 CULTURE AEROBIC IDENTIFY: CPT

## 2023-05-24 PROCEDURE — 85027 COMPLETE CBC AUTOMATED: CPT

## 2023-05-24 PROCEDURE — 97535 SELF CARE MNGMENT TRAINING: CPT

## 2023-05-24 PROCEDURE — 80048 BASIC METABOLIC PNL TOTAL CA: CPT

## 2023-05-24 PROCEDURE — 97161 PT EVAL LOW COMPLEX 20 MIN: CPT

## 2023-05-24 PROCEDURE — 87186 SC STD MICRODIL/AGAR DIL: CPT

## 2023-05-24 PROCEDURE — 94640 AIRWAY INHALATION TREATMENT: CPT

## 2023-05-24 PROCEDURE — 84100 ASSAY OF PHOSPHORUS: CPT

## 2023-05-24 PROCEDURE — 76000 FLUOROSCOPY <1 HR PHYS/QHP: CPT

## 2023-05-24 PROCEDURE — 97110 THERAPEUTIC EXERCISES: CPT

## 2023-05-24 PROCEDURE — 97116 GAIT TRAINING THERAPY: CPT

## 2023-05-24 PROCEDURE — 83605 ASSAY OF LACTIC ACID: CPT

## 2023-05-24 PROCEDURE — 83550 IRON BINDING TEST: CPT

## 2023-05-24 PROCEDURE — 86900 BLOOD TYPING SEROLOGIC ABO: CPT

## 2023-05-24 PROCEDURE — 85730 THROMBOPLASTIN TIME PARTIAL: CPT

## 2023-05-24 PROCEDURE — 99239 HOSP IP/OBS DSCHRG MGMT >30: CPT

## 2023-05-24 PROCEDURE — 83735 ASSAY OF MAGNESIUM: CPT

## 2023-05-24 PROCEDURE — 97165 OT EVAL LOW COMPLEX 30 MIN: CPT

## 2023-05-24 PROCEDURE — 87086 URINE CULTURE/COLONY COUNT: CPT

## 2023-05-24 PROCEDURE — 87075 CULTR BACTERIA EXCEPT BLOOD: CPT

## 2023-05-24 PROCEDURE — C1889: CPT

## 2023-05-24 PROCEDURE — 83540 ASSAY OF IRON: CPT

## 2023-05-24 PROCEDURE — 82746 ASSAY OF FOLIC ACID SERUM: CPT

## 2023-05-24 PROCEDURE — 85652 RBC SED RATE AUTOMATED: CPT

## 2023-05-24 PROCEDURE — 85610 PROTHROMBIN TIME: CPT

## 2023-05-24 PROCEDURE — 82728 ASSAY OF FERRITIN: CPT

## 2023-05-24 PROCEDURE — 82607 VITAMIN B-12: CPT

## 2023-05-24 PROCEDURE — 86901 BLOOD TYPING SEROLOGIC RH(D): CPT

## 2023-05-24 PROCEDURE — 87070 CULTURE OTHR SPECIMN AEROBIC: CPT

## 2023-05-24 PROCEDURE — 81003 URINALYSIS AUTO W/O SCOPE: CPT

## 2023-05-24 PROCEDURE — 99285 EMERGENCY DEPT VISIT HI MDM: CPT

## 2023-05-24 PROCEDURE — 86140 C-REACTIVE PROTEIN: CPT

## 2023-05-24 PROCEDURE — 36415 COLL VENOUS BLD VENIPUNCTURE: CPT

## 2023-05-24 PROCEDURE — 93005 ELECTROCARDIOGRAM TRACING: CPT

## 2023-05-24 PROCEDURE — 86850 RBC ANTIBODY SCREEN: CPT

## 2023-05-24 PROCEDURE — 85025 COMPLETE CBC W/AUTO DIFF WBC: CPT

## 2023-05-24 PROCEDURE — 94664 DEMO&/EVAL PT USE INHALER: CPT

## 2023-05-24 PROCEDURE — 71046 X-RAY EXAM CHEST 2 VIEWS: CPT

## 2023-05-24 RX ORDER — HYDROCHLOROTHIAZIDE 25 MG
1 TABLET ORAL
Qty: 0 | Refills: 0 | DISCHARGE

## 2023-05-24 RX ORDER — LEVOFLOXACIN 5 MG/ML
1 INJECTION, SOLUTION INTRAVENOUS
Qty: 14 | Refills: 0
Start: 2023-05-24 | End: 2023-06-06

## 2023-05-24 RX ORDER — POLYETHYLENE GLYCOL 3350 17 G/17G
17 POWDER, FOR SOLUTION ORAL
Qty: 0 | Refills: 0 | DISCHARGE
Start: 2023-05-24

## 2023-05-24 RX ORDER — FERROUS SULFATE 325(65) MG
1 TABLET ORAL
Qty: 0 | Refills: 0 | DISCHARGE
Start: 2023-05-24

## 2023-05-24 RX ORDER — ESOMEPRAZOLE MAGNESIUM 40 MG/1
1 CAPSULE, DELAYED RELEASE ORAL
Qty: 30 | Refills: 0
Start: 2023-05-24 | End: 2023-06-22

## 2023-05-24 RX ORDER — LEVOFLOXACIN 5 MG/ML
250 INJECTION, SOLUTION INTRAVENOUS EVERY 24 HOURS
Refills: 0 | Status: DISCONTINUED | OUTPATIENT
Start: 2023-05-25 | End: 2023-05-24

## 2023-05-24 RX ORDER — LOSARTAN POTASSIUM 100 MG/1
1 TABLET, FILM COATED ORAL
Qty: 0 | Refills: 0 | DISCHARGE

## 2023-05-24 RX ADMIN — PANTOPRAZOLE SODIUM 40 MILLIGRAM(S): 20 TABLET, DELAYED RELEASE ORAL at 05:59

## 2023-05-24 RX ADMIN — FAMOTIDINE 20 MILLIGRAM(S): 10 INJECTION INTRAVENOUS at 12:09

## 2023-05-24 RX ADMIN — PYRIDOSTIGMINE BROMIDE 180 MILLIGRAM(S): 60 SOLUTION ORAL at 12:09

## 2023-05-24 RX ADMIN — Medication 3 MILLILITER(S): at 08:07

## 2023-05-24 RX ADMIN — CELECOXIB 200 MILLIGRAM(S): 200 CAPSULE ORAL at 06:29

## 2023-05-24 RX ADMIN — CELECOXIB 200 MILLIGRAM(S): 200 CAPSULE ORAL at 05:59

## 2023-05-24 RX ADMIN — APIXABAN 2.5 MILLIGRAM(S): 2.5 TABLET, FILM COATED ORAL at 08:55

## 2023-05-24 RX ADMIN — Medication 650 MILLIGRAM(S): at 02:35

## 2023-05-24 RX ADMIN — Medication 650 MILLIGRAM(S): at 09:56

## 2023-05-24 RX ADMIN — PYRIDOSTIGMINE BROMIDE 60 MILLIGRAM(S): 60 SOLUTION ORAL at 05:59

## 2023-05-24 RX ADMIN — Medication 650 MILLIGRAM(S): at 08:56

## 2023-05-24 RX ADMIN — Medication 75 MICROGRAM(S): at 05:59

## 2023-05-24 RX ADMIN — Medication 325 MILLIGRAM(S): at 12:09

## 2023-05-24 RX ADMIN — Medication 650 MILLIGRAM(S): at 03:05

## 2023-05-24 RX ADMIN — BUDESONIDE AND FORMOTEROL FUMARATE DIHYDRATE 2 PUFF(S): 160; 4.5 AEROSOL RESPIRATORY (INHALATION) at 05:58

## 2023-05-24 RX ADMIN — MEROPENEM 100 MILLIGRAM(S): 1 INJECTION INTRAVENOUS at 05:59

## 2023-05-24 RX ADMIN — PYRIDOSTIGMINE BROMIDE 60 MILLIGRAM(S): 60 SOLUTION ORAL at 12:09

## 2023-05-24 NOTE — PROGRESS NOTE ADULT - SUBJECTIVE AND OBJECTIVE BOX
HÉCTOR LYONS is a 82yFemale , patient examined and chart reviewed.     INTERVAL HPI/ OVERNIGHT EVENTS:   NAD. No events.  Afebrile.     PAST MEDICAL & SURGICAL HISTORY:  Hypertension  Hyperlipidemia  GERD (Gastroesophageal Reflux Disease)  hiatal hernia  Lumbar Radiculopathy  Kidney Calculi  Myasthenia gravis  dx 10/2018 symptoms: intermittent loss of voice/ weakness, negative ENT studies, now stable on Pyridostigmine  Lumbar stenosis  Diverticulitis large intestine  denies any recent exacerbations  Aortic stenosis, mild  asper daughter  Reactive airway disease that is not asthma  mild as per pulm note on [employer], patient and daughter denies any inhaler use; thought to be related to myasthena gravis  Hypothyroid  Osteoarthritis of left hip  Pneumonia due to COVID-19 virus  Sinus bradycardia  S/P Cholecystectomy  S/P Appendectomy  Bilateral Cataracts  removed  S/P Hysterectomy Partial  1974  S/P Laparoscopic Fundoplication  Prolapse, Uterovaginal  s/p sling  H/O laminectomy  cervical  1998  lumbar 1986,1998,2000, 2/19 with fusion  H/O shoulder replacement  b/l 2015/2016  S/P foot surgery  History of tonsillectomy  H/O umbilical hernia repair  S/P hip replacement, right        For details regarding the patient's social history, family history, and other miscellaneous elements, please refer the initial infectious diseases consultation and/or the admitting history and physical examination for this admission.      ROS:  CONSTITUTIONAL:  Negative fever or chills  EYES:  Negative  blurry vision or double vision  CARDIOVASCULAR:  Negative for chest pain or palpitations  RESPIRATORY:  Negative for cough, wheezing, or SOB   GASTROINTESTINAL:  Negative for nausea, vomiting, diarrhea, constipation, or abdominal pain  GENITOURINARY:  Negative frequency, urgency or dysuria  NEUROLOGIC:  No headache, confusion, dizziness, lightheadedness  All other systems were reviewed and are negative     IODINE (Rash)  shellfish (Rash)  No Known Drug Allergies      Current inpatient medications :    ANTIBIOTICS/RELEVANT:    MEDICATIONS  (STANDING):  acetaminophen     Tablet .. 650 milliGRAM(s) Oral every 6 hours  albuterol/ipratropium for Nebulization 3 milliLiter(s) Nebulizer every 6 hours  apixaban 2.5 milliGRAM(s) Oral every 12 hours  atorvastatin 80 milliGRAM(s) Oral at bedtime  budesonide 160 MICROgram(s)/formoterol 4.5 MICROgram(s) Inhaler 2 Puff(s) Inhalation two times a day  celecoxib 200 milliGRAM(s) Oral every 12 hours  famotidine    Tablet 20 milliGRAM(s) Oral daily  ferrous    sulfate 325 milliGRAM(s) Oral daily  levothyroxine 75 MICROGram(s) Oral daily  pantoprazole    Tablet 40 milliGRAM(s) Oral before breakfast  polyethylene glycol 3350 17 Gram(s) Oral at bedtime  pyridostigmine 60 milliGRAM(s) Oral <User Schedule>  pyridostigmine  milliGRAM(s) Oral daily  senna 2 Tablet(s) Oral at bedtime    MEDICATIONS  (PRN):  albuterol    90 MICROgram(s) HFA Inhaler 2 Puff(s) Inhalation every 6 hours PRN Shortness of Breath and/or Wheezing  HYDROmorphone  Injectable 0.5 milliGRAM(s) IV Push every 3 hours PRN Breakthrough pain  magnesium hydroxide Suspension 30 milliLiter(s) Oral daily PRN Constipation  melatonin 3 milliGRAM(s) Oral at bedtime PRN Insomnia  ondansetron Injectable 4 milliGRAM(s) IV Push every 6 hours PRN Nausea and/or Vomiting  oxyCODONE    IR 5 milliGRAM(s) Oral every 3 hours PRN Moderate Pain (4 - 6)  traMADol 50 milliGRAM(s) Oral every 4 hours PRN Moderate Pain (4 - 6)  traMADol 100 milliGRAM(s) Oral every 6 hours PRN Severe Pain (7 - 10)      Objective:  Vital Signs Last 24 Hrs  T(C): 36.5 (23 May 2023 09:04), Max: 36.6 (22 May 2023 17:29)  T(F): 97.7 (23 May 2023 09:04), Max: 97.9 (22 May 2023 17:29)  HR: 61 (23 May 2023 09:04) (61 - 71)  BP: 109/63 (23 May 2023 09:04) (103/60 - 124/70)  RR: 18 (23 May 2023 09:04) (18 - 18)  SpO2: 99% (23 May 2023 07:50) (95% - 100%)    Parameters below as of 23 May 2023 09:04  Patient On (Oxygen Delivery Method): room air      Physical Exam:  General: no acute distress  Neck: supple, trachea midline  Lungs: clear, no wheeze/rhonchi  Cardiovascular: regular rate and rhythm, S1 S2  Abdomen: soft, nontender,  bowel sounds normal  Neurological: alert and oriented x3  Skin: no rash  Extremities: Left hip drsg c/d/i        LABS:                        9.2    3.21  )-----------( 134      ( 24 May 2023 11:02 )             29.1   05-23    142  |  106  |  17  ----------------------------<  96  4.1   |  28  |  0.92    Ca    8.9      23 May 2023 08:02  Phos  4.1     05-23  Mg     2.0     05-23    MICROBIOLOGY:  Culture - Joint Fluid (collected 18 May 2023 20:28)  Source: Hip Synovial Fluid  Gram Stain (19 May 2023 03:37):    polymorphonuclear leukocytes seen    No organisms seen    by cytocentrifuge  Preliminary Report (19 May 2023 18:17):    No growth    Culture - Tissue with Gram Stain (05.18.23 @ 20:28)    -  Ciprofloxacin: S <=0.25   -  Gentamicin: S <=2   -  Imipenem: S 2   -  Levofloxacin: S <=0.5   -  Meropenem: S <=1   -  Piperacillin/Tazobactam: S <=8   -  Tobramycin: S <=2   Gram Stain:   No polymorphonuclear leukocytes seen per low power field  No organisms seen per oil power field   -  Amikacin: S <=16   -  Aztreonam: S <=4   -  Cefepime: S <=2   -  Ceftazidime: S <=1   Specimen Source: .Tissue Other   Culture Results:   Few Pseudomonas aeruginosa   Organism Identification: Pseudomonas aeruginosa   Organism: Pseudomonas aeruginosa   Method Type: MAMADOU    Culture - Surgical Swab (05.18.23 @ 20:28)    -  Ceftazidime: S 4   -  Cefepime: S <=2   -  Aztreonam: S 8   -  Amikacin: S <=16   -  Piperacillin/Tazobactam: S <=8   -  Tobramycin: S <=2   -  Levofloxacin: S <=0.5   -  Meropenem: S <=1   -  Gentamicin: S 4   -  Imipenem: S 2   -  Ciprofloxacin: S <=0.25   Specimen Source: .Surgical Swab Surgical Swab   Culture Results:   Rare Pseudomonas aeruginosa  Few Staphylococcus epidermidis Susceptibility to follow.   Organism Identification: Pseudomonas aeruginosa   Organism: Pseudomonas aeruginosa   Method Type: MAMADOU      Culture - Blood (collected 18 May 2023 07:40)  Source: .Blood Blood-Peripheral  Preliminary Report (19 May 2023 14:01):    No growth to date.    Culture - Blood (collected 18 May 2023 07:40)  Source: .Blood Blood-Peripheral  Preliminary Report (19 May 2023 14:01):    No growth to date.    RADIOLOGY & ADDITIONAL STUDIES:    ACC: [id number] EXAM:  CT HIP ONLY LT   ORDERED BY: CARMEN RICE     PROCEDURE DATE:  05/18/2023          INTERPRETATION:  CT OF THE LEFT HIP    CLINICAL INFORMATION: Infected left hip. History of total hip   arthroplasty 3/27/2023. Evaluate for fluid collection.  TECHNIQUE: Multidetector CT of the pelvis focusing on the left hip. The   study was performed without the use of intravenous or intra-articular   contrast. Multiplanar reformats were generated for review.    COMPARISON: Intraoperative fluoroscopic images of the left hip 3/27/2023.   Left hip MRI 1/4/2023.    FINDINGS:    HARDWARE: Patient status post right total hip arthroplasty. Hardware is   intact. A small amount of periprosthetic lucency is seen along the   anterior aspectof the acetabular cup. No additional periprosthetic   lucency on the right. Transfixion screws through the acetabulum extends   into the right iliacus muscle.  Patient status post left total hip arthroplasty with placement of   cerclage wire. The hardware is intact. A small amount of subcortical   lucency is seen at the anterior margin of the acetabulum, which is   similar in appearance to preoperative MRI and likely degenerative. No   periprosthetic lucency. Transfixion screw through the acetabulum extends   into the left iliacus muscle.  Incompletely evaluated lumbar spinal fusion hardware.    BONE: No acute fracture. No new focal areas of cortical destruction.   Areas of subcortical lucency in the bilateral acetabula adjacent to the   acetabular cups, as described above.    JOINTS: Degenerative changes of the pubic symphysis. Mild degeneration of   the sacroiliac joints. Degenerative changes of the lower lumbar spine. No   large joint effusion appreciated on CT.    SOFT TISSUE: Postsurgical scarring along the lateral aspect of the left   thigh. No large subcutaneous fluid collection. Punctate focus of   subcutaneous emphysema seen at the site of bandage material (6:97).   Postsurgical scarring in the bilateral thighs. No focal muscle atrophy.   Neurovascular structures are normal in course and caliber.      IMPRESSION:  1.  Small amount of subcutaneous emphysema in the subcutaneous fat   adjacent to the site of surgical wound in the left proximal thigh. No   focal drainable fluid collections are identified on CT.  2.  Bilateral total hip arthroplasty without CT evidence of acute   hardware complication.    Assessment :   83YO F PMH mild AS, GERD, HLD, HTN, hypothyroidism, myasthenia gravis, OA left hip, reactive airway, sinus bradycardia, s/p Left hip replacement 3/27/2023, admitted with left hip surgical wound infection with dehiscence and drainage. Sp incision and drainage of thigh 18-May-2023- appears to be superficial.  OR culture with pseudomonas and staph epi However hip joint fluid NGTD  Ucx noted- Asymptomatic bacteruria  Anemia H/H stable    Plan :   Off Meropenam   Will transition to po Levaquin 500mg po daily and Doxycyline 100mg po bid x 14 days for dc  Trend temps and cbc  Pulm toileting  Activity per ortho  Stable from ID standpoint  Dc home today    D/w Dr Landers    Continue with present regiment.  Appropriate use of antibiotics and adverse effects reviewed.      > 35 minutes were spent in direct patient care reviewing notes, medications ,labs data/ imaging , discussion with multidisciplinary team.    Thank you for allowing me to participate in care of your patient .    Eliu Terry MD  Infectious Disease  [phone]

## 2023-05-24 NOTE — DISCHARGE NOTE NURSING/CASE MANAGEMENT/SOCIAL WORK - PATIENT PORTAL LINK FT
You can access the FollowMyHealth Patient Portal offered by [facility] by registering at the following website: [email or web address]. By joining [facility]’s FollowMyHealth portal, you will also be able to view your health information using other applications (apps) compatible with our system.

## 2023-05-24 NOTE — DISCHARGE NOTE NURSING/CASE MANAGEMENT/SOCIAL WORK - NSSCTYPOFSERV_GEN_ALL_CORE
You are declining the Home care physical therapy offered and treatment team is aware.  Follow up with Dr. Dinero's office in one week as per Orthopedic.

## 2023-05-24 NOTE — CASE MANAGEMENT PROGRESS NOTE - NSCMPROGRESSNOTE_GEN_ALL_CORE
Per Dr. Mcghee and Ortho PA Jo Ann Pt. cleared for transition to home today and PT recommending HC/PT with assist.    Pt/ dtr are declining the Home care physical therapy offered and treatment team is aware. Per Ortho PA Dr. Dinero does not want pt. to go to outpatient PT as yet and she will  Follow up with Dr. Dinero's office in one week and have Silverlon dressing removed, as per Orthopedic.    Pt. owns RW, SAC, commode, raised toilet seat, shower chair, and shower bars. CM met with pt. and daughter at bedside and they are aware of above and in agreement with transition home today with daughter transport.  IMM review/ signed. CM available if needs change.

## 2023-05-24 NOTE — DISCHARGE NOTE NURSING/CASE MANAGEMENT/SOCIAL WORK - NSDCPEFALRISK_GEN_ALL_CORE
For information on Fall & Injury Prevention, visit: [email or web address] OR  [email or web address] OR  https://www.cdc.gov/steadi/patient.html

## 2023-05-24 NOTE — PROGRESS NOTE ADULT - PROVIDER SPECIALTY LIST ADULT
Hospitalist
Infectious Disease
Internal Medicine
Internal Medicine
Orthopedics
Internal Medicine
Orthopedics
Hospitalist
Hospitalist

## 2023-05-24 NOTE — DISCHARGE NOTE NURSING/CASE MANAGEMENT/SOCIAL WORK - NSDCFUADDAPPT_GEN_ALL_CORE_FT
It is advisable to follow up with your primary care provider within the next 2-3 weeks to ensure your medications are appropriate and there are no underlying problems after your procedure.   Silverlon dressing to stay on  x  1 week patient to be seen  by Dr. Dinero  call office to confirm appt  Follow up Dr. Terry  call office to confirm, appt   Keep area clean and dry

## 2023-05-24 NOTE — PROGRESS NOTE ADULT - REASON FOR ADMISSION
r/o septic joint
L hip wound
Patient is a 82y old  Female who presents with a chief complaint of r/o septic joint (19 May 2023 12:19)
hip infection
hip infection

## 2023-05-24 NOTE — PROGRESS NOTE ADULT - SUBJECTIVE AND OBJECTIVE BOX
PROGRESS NOTE:   Authored by Dr. Steven Landers MD, Available on MS Teams    Patient is a 82y old  Female who presents with a chief complaint of hip infection (23 May 2023 10:04)      SUBJECTIVE / OVERNIGHT EVENTS: Patient feels okay, has some abdominal pain. No chest pain or shortness of breath. Has some L hip pain with movement.     ADDITIONAL REVIEW OF SYSTEMS:    MEDICATIONS  (STANDING):  acetaminophen     Tablet .. 650 milliGRAM(s) Oral every 6 hours  albuterol/ipratropium for Nebulization 3 milliLiter(s) Nebulizer every 6 hours  apixaban 2.5 milliGRAM(s) Oral every 12 hours  atorvastatin 80 milliGRAM(s) Oral at bedtime  budesonide 160 MICROgram(s)/formoterol 4.5 MICROgram(s) Inhaler 2 Puff(s) Inhalation two times a day  celecoxib 200 milliGRAM(s) Oral every 12 hours  doxycycline monohydrate Capsule 100 milliGRAM(s) Oral every 12 hours  famotidine    Tablet 20 milliGRAM(s) Oral daily  ferrous    sulfate 325 milliGRAM(s) Oral daily  levothyroxine 75 MICROGram(s) Oral daily  pantoprazole    Tablet 40 milliGRAM(s) Oral before breakfast  polyethylene glycol 3350 17 Gram(s) Oral at bedtime  pyridostigmine 60 milliGRAM(s) Oral <User Schedule>  pyridostigmine  milliGRAM(s) Oral daily  senna 2 Tablet(s) Oral at bedtime    MEDICATIONS  (PRN):  albuterol    90 MICROgram(s) HFA Inhaler 2 Puff(s) Inhalation every 6 hours PRN Shortness of Breath and/or Wheezing  HYDROmorphone  Injectable 0.5 milliGRAM(s) IV Push every 3 hours PRN Breakthrough pain  magnesium hydroxide Suspension 30 milliLiter(s) Oral daily PRN Constipation  melatonin 3 milliGRAM(s) Oral at bedtime PRN Insomnia  ondansetron Injectable 4 milliGRAM(s) IV Push every 6 hours PRN Nausea and/or Vomiting  oxyCODONE    IR 5 milliGRAM(s) Oral every 3 hours PRN Moderate Pain (4 - 6)  traMADol 50 milliGRAM(s) Oral every 4 hours PRN Moderate Pain (4 - 6)  traMADol 100 milliGRAM(s) Oral every 6 hours PRN Severe Pain (7 - 10)      CAPILLARY BLOOD GLUCOSE        I&O's Summary    23 May 2023 07:01  -  24 May 2023 07:00  --------------------------------------------------------  IN: 0 mL / OUT: 1000 mL / NET: -1000 mL        PHYSICAL EXAM:  Vital Signs Last 24 Hrs  T(C): 36.7 (24 May 2023 10:30), Max: 36.7 (24 May 2023 10:30)  T(F): 98 (24 May 2023 10:30), Max: 98 (24 May 2023 10:30)  HR: 64 (24 May 2023 10:30) (60 - 85)  BP: 103/67 (24 May 2023 10:30) (94/56 - 107/63)  BP(mean): --  RR: 16 (24 May 2023 10:30) (16 - 17)  SpO2: 96% (24 May 2023 10:30) (96% - 99%)    Parameters below as of 24 May 2023 10:30  Patient On (Oxygen Delivery Method): room air        CONSTITUTIONAL: NAD, well-developed  RESPIRATORY: Normal respiratory effort; lungs are clear to auscultation bilaterally  CARDIOVASCULAR: Regular rate and rhythm, normal S1 and S2, no murmur/rub/gallop; No lower extremity edema  ABDOMEN: Nontender to palpation, normoactive bowel sounds, no rebound/guarding  MUSCLOSKELETAL: no clubbing or cyanosis of digits; L thigh with dressing in tact  PSYCH: A+O to person, place, and time; affect appropriate    LABS:                        9.0    3.62  )-----------( 132      ( 23 May 2023 08:02 )             28.5     05-23    142  |  106  |  17  ----------------------------<  96  4.1   |  28  |  0.92    Ca    8.9      23 May 2023 08:02  Phos  4.1     05-23  Mg     2.0     05-23

## 2023-05-24 NOTE — H&P ADULT - DOES THIS PATIENT HAVE A HISTORY OF OR HAS BEEN DX WITH HEART FAILURE?
unknown Valtrex Pregnancy And Lactation Text: this medication is Pregnancy Category B and is considered safe during pregnancy. This medication is not directly found in breast milk but it's metabolite acyclovir is present.

## 2023-05-24 NOTE — PROGRESS NOTE ADULT - ASSESSMENT
81 y/o F with hx of mild AS, GERD, HLD, HTN, hypothyroidism, myasthenia gravis, OA left hip, reactive airway, sinus bradycardia, she is s/p Left hip replacement done in 3/27/2023, since she has joint replaced her pain never improved,  She has been following regularly with orthopedics.  She was last seen in the office on the 16th, she has had wound vacs applied.  Per documentation from the 16th of the TORITO dressing to the left anterior hip was intact with moderate drainage, she has been given Bactrim DS at that time, she has opn wound at the surgical site, she was seen by Dr Dinero in the office and he sent her to the hospital for further management and evaluation for possible septic arthritis      Left hip OA s/p  Left anterior total hip 3/27/23 now with possible septic arthritis:   CT Small amount of subcutaneous emphysema in the subcutaneous fat  adjacent to the site of surgical wound in the left proximal thigh.  Bilateral total hip arthroplasty without CT evidence of acute   hardware complication.  S/P Incision and drainage of thigh  5/18/23   OR cultures -> pseudomonas, Staph epidermidis  dc on levaquin and doxy for 14 day course  Pain meds   Bowel regimen  PT/OT  Incentive spirometry  VTE PPx: resume Eliquis      Hx of Reactive airway disease:   continue with Albuterol as needed   on breo, symbicort while here in the hospital     HTN  On losartan 25mg daily and HCTZ 12.5 mg daily, holding due to soft pressures    hypothyroidism --c/w synthroid     Myasthenia gravis --c/w Pyridostigmine     Acute blood loss anemia due to procedure: monitor CBC, H/H stable, vitals stable, asymptomatic  Check for nutritional deficiencies - Iron, B12, Folate - f/u labs  Restart iron    discussed with daughter Patsy

## 2023-05-25 LAB
CULTURE RESULTS: SIGNIFICANT CHANGE UP
SPECIMEN SOURCE: SIGNIFICANT CHANGE UP
SPECIMEN SOURCE: SIGNIFICANT CHANGE UP

## 2023-05-30 ENCOUNTER — APPOINTMENT (OUTPATIENT)
Dept: ORTHOPEDIC SURGERY | Facility: CLINIC | Age: 83
End: 2023-05-30
Payer: MEDICARE

## 2023-05-30 VITALS — SYSTOLIC BLOOD PRESSURE: 132 MMHG | HEART RATE: 70 BPM | DIASTOLIC BLOOD PRESSURE: 70 MMHG

## 2023-05-30 PROCEDURE — 73502 X-RAY EXAM HIP UNI 2-3 VIEWS: CPT

## 2023-05-30 PROCEDURE — 99024 POSTOP FOLLOW-UP VISIT: CPT

## 2023-05-30 NOTE — HISTORY OF PRESENT ILLNESS
[___ Days Post Op] : post op day #[unfilled] [0] : no pain reported [Doing Well] : is doing well [Chills] : no chills [Fever] : no fever [de-identified] : S/P Left hip THR DOS 5/18/23 [de-identified] : Patient is an 82-year-old female who presents today for an evaluation regarding her left hip.  She is status post I&D and revision of the left scar on her left hip following a hip replacement on May 18, 2023 she states that she is doing very well with little complaints of discomfort but does have some stiffness and groin pain.  She states that originally after the first bandage was removed.  A Silverlon dressing was then applied.  She states that 3 days ago while at home she felt a pop with a lot of fluid being discharged.  She states that she reinforced the bandage and presented to the office today.  Currently she states that she has been doing fairly well.  Still takes the Tylenol and Celebrex as well as the antibiotics as advised.  She does not receive physical therapy at the present time. [de-identified] : On physical examination of the left hip.  Patient is status post revision of a left total hip replacement scar with I&D.  The Silverlon dressing does have some dried drainage.  No active drainage was noted.  There are sutures in place.  The skin is clean dry and intact with no redness heat discharge or fever noted.  There is some mild diffuse postoperative tenderness noted.  Overall the patient does have adequate range of motion passively with no evidence of limb length discrepancy.  No evidence of any muscle atrophy.  No evidence of any motor or sensory deficit.  Patient has good distal pulses and no calf tenderness. [de-identified] : X-rays of the left hip reveal status post I&D with revision of a left total hip replacement scar.  The hardware is in place and shows excellent alignment as well as fixation.  There is no evidence of any fracture dislocation or loosening of any hardware. [de-identified] : Status post I&D with revision of a left hip replacement scar. [de-identified] : Plan for the patient is to continue with the present activities.  A new Aquacel dressing was applied.  And advised the patient to not remove it for the next week.  She is also advised to continue with the antibiotics which she is presently being prescribed for.  Ice packs/moist heat and anti-inflammatories as needed.  Patient is suggested to return back to the office in another week for reevaluation and management.  However if she experiences any increase in redness swelling heat discharge fever she is to notify the office.\par \par I, Dr. Dinero, personally performed the evaluation and management services for this established patient who presents today with an orthopedic condition. The evaluation and management includes conducting the conditions and establishing a plan of care.\par \par Today, my [facility], was here to assist with the patient in my evaluation and management services for this condition which will be followed going forward.\par \par 25 minutes were spent, face to face, in direct consultation with the patient. This includes reviewing the natural history of their Dx., eliciting the history, performing an orthopedic exam, review of the x-ray findings, forming a differential Dx and discussing all treatment options. This Includes both surgical and non-surgical treatments. I also reviewed all the risks and benefits of non-operative & operative Tx options, future impact into orthopedic functions/problems, activity restrictions both at home and at work, and all follow up requirements.\par \par

## 2023-05-30 NOTE — END OF VISIT
[Time Spent: ___ minutes] : I have spent [unfilled] minutes of time on the encounter. [FreeTextEntry3] : I, Dr. Nathanael Dinero MD, personally performed the evaluation and management services for this established patient who presented today with a new problem/exacerbation of an existing condition. That E/M includes conducting the examination, assessing all new/exacerbated conditions, and establishing a new plan of care. Today, MY ACP was here to assist with recording the history in my evaluation and management services of this new problem/exacerbated condition to be followed going forward.\par

## 2023-05-31 ENCOUNTER — APPOINTMENT (OUTPATIENT)
Dept: NEUROLOGY | Facility: CLINIC | Age: 83
End: 2023-05-31
Payer: MEDICARE

## 2023-05-31 PROCEDURE — 96365 THER/PROPH/DIAG IV INF INIT: CPT

## 2023-06-01 LAB
CULTURE RESULTS: SIGNIFICANT CHANGE UP
CULTURE RESULTS: SIGNIFICANT CHANGE UP
ORGANISM # SPEC MICROSCOPIC CNT: SIGNIFICANT CHANGE UP
ORGANISM # SPEC MICROSCOPIC CNT: SIGNIFICANT CHANGE UP
SPECIMEN SOURCE: SIGNIFICANT CHANGE UP
SPECIMEN SOURCE: SIGNIFICANT CHANGE UP

## 2023-06-06 ENCOUNTER — APPOINTMENT (OUTPATIENT)
Dept: INTERNAL MEDICINE | Facility: CLINIC | Age: 83
End: 2023-06-06

## 2023-06-06 ENCOUNTER — APPOINTMENT (OUTPATIENT)
Dept: ORTHOPEDIC SURGERY | Facility: CLINIC | Age: 83
End: 2023-06-06
Payer: MEDICARE

## 2023-06-06 VITALS
SYSTOLIC BLOOD PRESSURE: 131 MMHG | WEIGHT: 142 LBS | BODY MASS INDEX: 30.73 KG/M2 | HEART RATE: 57 BPM | DIASTOLIC BLOOD PRESSURE: 74 MMHG

## 2023-06-06 VITALS — TEMPERATURE: 98.1 F

## 2023-06-06 PROCEDURE — 99024 POSTOP FOLLOW-UP VISIT: CPT

## 2023-06-06 NOTE — HISTORY OF PRESENT ILLNESS
[___ Weeks Post Op] : [unfilled] weeks post op [Clean/Dry/Intact] : clean, dry and intact [Erythema] : not erythematous [Discharge] : noted to have a ~M discharge [Swelling] : swollen [Dehiscence] : not dehisced [Neuro Intact] : an unremarkable neurological exam [Vascular Intact] : ~T peripheral vascular exam normal [Negative Lita's] : maneuvers demonstrated a negative Lita's sign [Doing Well] : is doing well [No Sign of Infection] : is showing no signs of infection [Adequate Pain Control] : has adequate pain control [de-identified] : Revision Left THR\par The patient is an 82 yr old female presents today for wound check.\par The patient is s/p Left Total Hip Revision performed at [facility] on 5/18/2023 [de-identified] : Patient was discharged home with Home care service. She presents with her daughter for wound evaluation. She is doing well ambulating with a cane. She is performing home exercises. The patient reports pain of 3/10 .She is taking Doxycycline 100 mg twice a day and Levoflaxacin 250 mg daily .She continues to use Tylenol, applying ice and elevating the lower extremity. Patient is using eliquis twice a day for DVT prophylaxis and prevention until 6 weeks post operative. The patient is using Celebrex for anti-inflammatory and pain modality. [de-identified] : The patient has stable antalgic gait utilizing a [cane/walker]. The left hip is with sutures / Aquacel bandaid  intact. The incision site is without redness, warmth; minimal serosanguineous  drainage noted on the dressing and (+) induration noted to the lateral aspect of the left hip. The hip flexes to 90 degrees with minimal discomfort. The external and internal rotation of the left hip is with minimal discomfort and stiffness. Leg lengths appear equal. There is no evidence of infection or cellulitis. The calf is supple and without tenderness, warmth or redness.\par \par  [de-identified] : No radiographs obtained at todays visit [de-identified] : The sutures were  removed from the left anterior  hip incision Except the 5 sutures at the  proximal end of the incision and replaced with Aquacel Bandaid utilizing sterile technique. Incisional care was reviewed with the patient. The patient was advised of the nature of the healing process. Patient was advised of the importance of adhering to the DVT prophylaxis protocol utilizing Eliquis 2.5 MG BID until 35 days post operative. Patient was advised of the importance of continuing to take Celebrex as ordered for prevention of heterotrophic bone ossification for a total of 21 days post operative. The patient was advised to continue to hold physical therapy but continue with home exercises as recommended. \par Patient will complete the antibiotics as ordered. Patient will follow up in 1 week for wound check and the remainder of the sutures removed. Patient verbalized understanding of all instructions and all of her questions were addressed to her satisfaction. The patient was advised to call the office with any further questions or concerns.\par Dr Dinero was in to evaluate the patient and agrees with the plan of care.\par My cumulative time spent on this patient's visit included: Preparation for the visit, review of the medical records, review of pertinent diagnostic studies, examination and counseling of the patient on the above diagnosis, treatment plan and prognosis, orders of diagnostic tests, medications and/or appropriate procedures and documentation in the medical records of today's visit. \par Not including time spent for procedures\par \par

## 2023-06-07 ENCOUNTER — APPOINTMENT (OUTPATIENT)
Dept: NEUROLOGY | Facility: CLINIC | Age: 83
End: 2023-06-07
Payer: MEDICARE

## 2023-06-07 PROCEDURE — 96365 THER/PROPH/DIAG IV INF INIT: CPT

## 2023-06-13 ENCOUNTER — APPOINTMENT (OUTPATIENT)
Dept: ORTHOPEDIC SURGERY | Facility: CLINIC | Age: 83
End: 2023-06-13
Payer: MEDICARE

## 2023-06-13 VITALS — SYSTOLIC BLOOD PRESSURE: 132 MMHG | HEART RATE: 60 BPM | DIASTOLIC BLOOD PRESSURE: 70 MMHG | TEMPERATURE: 97.5 F

## 2023-06-13 DIAGNOSIS — Z96.642 PRESENCE OF LEFT ARTIFICIAL HIP JOINT: ICD-10-CM

## 2023-06-13 PROCEDURE — 99024 POSTOP FOLLOW-UP VISIT: CPT

## 2023-06-13 NOTE — HISTORY OF PRESENT ILLNESS
[5] : the patient reports pain that is 5/10 in severity [Doing Well] : is doing well [___ Weeks Post Op] : [unfilled] weeks post op [Chills] : no chills [Fever] : no fever [Clean/Dry/Intact] : clean, dry and intact [Erythema] : not erythematous [Discharge] : noted to have a ~M discharge [Swelling] : swollen [Dehiscence] : not dehisced [Neuro Intact] : an unremarkable neurological exam [Vascular Intact] : ~T peripheral vascular exam normal [Negative Lita's] : maneuvers demonstrated a negative Lita's sign [No Sign of Infection] : is showing no signs of infection [Adequate Pain Control] : has adequate pain control [de-identified] : Revision Left THR\par The patient is an 82 yr old female presents today for wound check.\par The patient is s/p Left Total Hip Revision performed at [facility] on 5/18/2023 [de-identified] : Patient was discharged home with Home care service. She presents with her daughter for wound evaluation. She is doing well ambulating with a cane. She is performing home exercises. The patient reports pain of 3/10 .She has completed antibiotics.She continues to use Tylenol, applying ice and elevating the lower extremity. Patient is using eliquis twice a day for DVT prophylaxis and prevention until 4 weeks post operative. The patient is using Celebrex for anti-inflammatory and pain modality. [de-identified] : The patient has stable antalgic gait utilizing a cane The left hip is with sutures / Aquacel bandaid  intact. The incision site is without redness, warmth; minimal serosanguineous  drainage noted on the dressing and (+) induration noted to the lateral aspect of the left hip has decreased. The hip flexes to 90 degrees with minimal discomfort. The external and internal rotation of the left hip is with minimal discomfort and stiffness. Leg lengths appear equal. There is no evidence of infection or cellulitis. The calf is supple and without tenderness, warmth or redness.\par \par  [de-identified] : No radiographs obtained at todays visit [de-identified] : The 5 sutures at the  proximal end of the incision were removed and replaced with Aquacel Bandaid utilizing sterile technique. Incisional care was reviewed with the patient. The patient was advised of the nature of the healing process.  The patient was advised to continue to restart physical therapy and continue with home exercises as recommended. \par  Patient will follow up in 1 month\par  Patient verbalized understanding of all instructions and all of her questions were addressed to her satisfaction. The patient was advised to call the office with any further questions or concerns.\par Dr Dinero was in to evaluate the patient and agrees with the plan of care.\par My cumulative time spent on this patient's visit included: Preparation for the visit, review of the medical records, review of pertinent diagnostic studies, examination and counseling of the patient on the above diagnosis, treatment plan and prognosis, orders of diagnostic tests, medications and/or appropriate procedures and documentation in the medical records of today's visit. \par Not including time spent for procedures\par \par

## 2023-06-14 ENCOUNTER — APPOINTMENT (OUTPATIENT)
Dept: CARDIOLOGY | Facility: CLINIC | Age: 83
End: 2023-06-14

## 2023-06-14 ENCOUNTER — TRANSCRIPTION ENCOUNTER (OUTPATIENT)
Age: 83
End: 2023-06-14

## 2023-06-20 ENCOUNTER — APPOINTMENT (OUTPATIENT)
Dept: NEUROLOGY | Facility: CLINIC | Age: 83
End: 2023-06-20

## 2023-06-28 ENCOUNTER — APPOINTMENT (OUTPATIENT)
Dept: NEUROLOGY | Facility: CLINIC | Age: 83
End: 2023-06-28
Payer: MEDICARE

## 2023-06-28 PROCEDURE — 96365 THER/PROPH/DIAG IV INF INIT: CPT

## 2023-07-06 NOTE — ED PROVIDER NOTE - CARE PLAN
Management per Neuro. Continue ASA, statin . Recommend lifestyle modifications. Principal Discharge DX:	Wound infection after surgery

## 2023-07-07 ENCOUNTER — APPOINTMENT (OUTPATIENT)
Dept: NEUROLOGY | Facility: CLINIC | Age: 83
End: 2023-07-07
Payer: MEDICARE

## 2023-07-07 PROCEDURE — 96365 THER/PROPH/DIAG IV INF INIT: CPT

## 2023-07-09 PROBLEM — J45.909 AIRWAY HYPERREACTIVITY: Status: ACTIVE | Noted: 2023-02-04

## 2023-07-09 PROBLEM — R05.9 COUGH: Status: ACTIVE | Noted: 2018-10-23

## 2023-07-09 PROBLEM — U09.9 POST-ACUTE COVID-19 SYNDROME: Status: ACTIVE | Noted: 2023-07-09

## 2023-07-09 NOTE — HISTORY OF PRESENT ILLNESS
[Never] : never [TextBox_4] : 83 yo female with hx of PRN cough and MAXWELL, presents for follow up. The patient had THR last month, with subsequent covid 19 infection while in rehab. She complained of fatigue, fever and diarrhea at the time. She was treated with antibiotic for "pneumonia". She was also given symbicort BID with PRN albuterol MDI. Presently she feels "tired and cold" with PRN cough. [TextBox_29] : Denies snoring, daytime somnolence, apneic episodes, AM headaches

## 2023-07-09 NOTE — REVIEW OF SYSTEMS
[Fatigue] : fatigue [Cough] : cough [Sputum] : no sputum [Dyspnea] : no dyspnea [SOB on Exertion] : sob on exertion [GERD] : gerd [Negative] : Endocrine

## 2023-07-09 NOTE — PHYSICAL EXAM
[No Acute Distress] : no acute distress [Normal Oropharynx] : normal oropharynx [Normal Appearance] : normal appearance [No Neck Mass] : no neck mass [Normal Rate/Rhythm] : normal rate/rhythm [Murmur ___ / 6] : murmur [unfilled] / 6 [Normal S1, S2] : normal s1, s2 [No Resp Distress] : no resp distress [Wheeze] : wheeze [No Abnormalities] : no abnormalities [Benign] : benign [No Clubbing] : no clubbing [No Cyanosis] : no cyanosis [No Edema] : no edema [FROM] : FROM [Normal Color/ Pigmentation] : normal color/ pigmentation [No Focal Deficits] : no focal deficits [Oriented x3] : oriented x3 [Normal Affect] : normal affect [TextBox_99] : Ambulates with cane

## 2023-07-09 NOTE — DISCUSSION/SUMMARY
[FreeTextEntry1] : 82-year-old female with stable pulmonary exam despite recent COVID-19 infection.  She is continue Breo daily with occasional albuterol use.  Treatment adjustment will depend on symptomatic needs.  She is to follow-up with her PMD as before.

## 2023-07-19 ENCOUNTER — APPOINTMENT (OUTPATIENT)
Dept: NEUROLOGY | Facility: CLINIC | Age: 83
End: 2023-07-19
Payer: MEDICARE

## 2023-07-19 PROCEDURE — 96365 THER/PROPH/DIAG IV INF INIT: CPT

## 2023-07-21 ENCOUNTER — APPOINTMENT (OUTPATIENT)
Dept: NEUROLOGY | Facility: CLINIC | Age: 83
End: 2023-07-21
Payer: MEDICARE

## 2023-07-21 ENCOUNTER — RESULT REVIEW (OUTPATIENT)
Age: 83
End: 2023-07-21

## 2023-07-21 ENCOUNTER — APPOINTMENT (OUTPATIENT)
Dept: CT IMAGING | Facility: CLINIC | Age: 83
End: 2023-07-21
Payer: MEDICARE

## 2023-07-21 ENCOUNTER — NON-APPOINTMENT (OUTPATIENT)
Age: 83
End: 2023-07-21

## 2023-07-21 ENCOUNTER — OUTPATIENT (OUTPATIENT)
Dept: OUTPATIENT SERVICES | Facility: HOSPITAL | Age: 83
LOS: 1 days | End: 2023-07-21

## 2023-07-21 VITALS
SYSTOLIC BLOOD PRESSURE: 146 MMHG | BODY MASS INDEX: 31.5 KG/M2 | HEART RATE: 55 BPM | HEIGHT: 57 IN | WEIGHT: 146 LBS | DIASTOLIC BLOOD PRESSURE: 96 MMHG

## 2023-07-21 DIAGNOSIS — S09.90XA UNSPECIFIED INJURY OF HEAD, INITIAL ENCOUNTER: ICD-10-CM

## 2023-07-21 DIAGNOSIS — Z90.89 ACQUIRED ABSENCE OF OTHER ORGANS: Chronic | ICD-10-CM

## 2023-07-21 DIAGNOSIS — Z96.619 PRESENCE OF UNSPECIFIED ARTIFICIAL SHOULDER JOINT: Chronic | ICD-10-CM

## 2023-07-21 DIAGNOSIS — Z98.890 OTHER SPECIFIED POSTPROCEDURAL STATES: Chronic | ICD-10-CM

## 2023-07-21 DIAGNOSIS — Z96.641 PRESENCE OF RIGHT ARTIFICIAL HIP JOINT: Chronic | ICD-10-CM

## 2023-07-21 PROCEDURE — 99214 OFFICE O/P EST MOD 30 MIN: CPT

## 2023-07-21 PROCEDURE — 70480 CT ORBIT/EAR/FOSSA W/O DYE: CPT | Mod: 26,59,MH

## 2023-07-21 PROCEDURE — 70450 CT HEAD/BRAIN W/O DYE: CPT | Mod: 26,MH

## 2023-07-21 NOTE — HISTORY OF PRESENT ILLNESS
[FreeTextEntry1] : Mrs. Alanna Mahan returned to the office having been last seen on November 15, 2022.  She is an 82-year-old right-handed patient who was under the care of Dr. Baron Fitzgerald since September 20, 2019.  She was accompanied to the office by her daughter Patsy ([phone]).\par \par In approximately 2018, she developed hoarseness, dysphagia, mastication fatigue and limb weakness.  She was diagnosed with myasthenia gravis by Dr. Long Reyes and was treated with pyridostigmine.  In August 2019, she developed myasthenic crisis prompting admission to [facility].  She was treated with IVIG and prednisone.  Pyridostigmine was continued.  She was then vaccinated against meningococcus and begun on eculizumab.\par \par When seen on November 15, 2022, she was on pyridostigmine 180 mg every other day and 60 mg 3 times a day.  She was receiving eculizumab every 2 weeks.  She complained of occasional hand weakness and chronic low back pain.  She was evaluated by pain management and orthopedics and was administered an epidural steroid injection.\par \par She was well until early March 2023 when she was admitted for an elective left hip replacement.  The procedure was aborted when she developed bradycardia.  She underwent a cardiac evaluation and was cleared.  She underwent left hip replacement on March 27, 2023.  She was discharged to rehabilitation after 2 days where she contracted COVID-19 pneumonia.  She was treated with antibiotics.  She subsequently developed a wound infection.  On May 18, she underwent debridement of a superficial infection and was treated with intravenous antibiotics.\par \par She reports feeling very fatigued.  She is having difficulty feeding herself because her hands and arms become weak.  Her legs are also fatigued.  She complains of occasional limb numbness.  She denies diplopia, ptosis or dysphagia.\par \par Past surgical history is notable for bilateral cataract extractions, bilateral carpal tunnel release, hysterectomy, bladder resuspension, tonsillectomy, bilateral shoulder replacements, 4 lumbar and 1 cervical procedure, right hip replacement, procedure on her left foot, cholecystectomy, herniorrhaphy and excision of squamous and basal carcinomas.\par \par Medical problems include hypertension, hyperlipidemia, hypothyroidism and COVID-19 in 2020.\par \par Medications include pyridostigmine extended release 180 mg daily, pyridostigmine 60 mg 3 times a day, levothyroxine 75 mcg daily, Cozaar 50 mg daily, rosuvastatin 40 mg/day and eculizumab infusions every 2 weeks.  She received her last dose of eculizumab 2 days ago.\par \par She has an allergy to iodine.\par \par She is  and is a retired seamstress.  She is a non-smoker and drinks wine.  Family history is notable for stroke thyroid disease and bladder cancer.

## 2023-07-21 NOTE — REVIEW OF SYSTEMS
[Facial Weakness] : facial weakness [Arm Weakness] : arm weakness [Difficulty Walking] : difficulty walking [Negative] : Heme/Lymph [Hand Weakness] :  hand weakness [Leg Weakness] : leg weakness

## 2023-07-21 NOTE — PHYSICAL EXAM
[FreeTextEntry1] : Constitutional:  Patient was well-developed, well-nourished and in no acute distress. \par \par Head:  Normocephalic. Tympanic membranes were clear.  There was a left periorbital ecchymosis and conjunctival injection.\par \par Neck:  Supple with limited movement.\par \par Cardiovascular:  Cardiac rhythm was regular without murmur. There were no carotid bruits. Peripheral pulses were full and symmetric. \par \par Respiratory:  Lungs were clear. \par \par Abdomen:  Soft and nontender. \par \par Spine:  Nontender. \par \par Skin:  There were no rashes. \par \par NEUROLOGICAL EXAMINATION:\par \par Mental Status: Patient was alert and oriented. Speech was fluent. There was no dysarthria. \par \par Cranial Nerves: \par \par II: She could finger count bilaterally.  Pupils were surgical but reactive. Visual fields were full. Funduscopic examination was normal. \par \par III, IV, VI:  Eye movements were full without nystagmus. \par \par V: Facial sensation was intact. \par \par VII: Facial strength was normal except for bilateral rather pronounced orbicularis oculi weakness. \par \par VIII: Hearing was equal. \par \par IX, X: Palatal movement was normal. Phonation was normal. \par \par XI: Neck flexion was quite weak but extension strong.\par \par XII: Tongue was midline and movements normal. There was no lingual atrophy or fasciculations. \par \par Motor Examination: Muscle bulk, tone and strength were normal.\par \par Sensory Examination: Pinprick, vibration and joint position sense were intact. \par \par Reflexes: DTRs were 2 throughout except for absent ankle jerks. \par \par Plantar Responses: Plantar responses were flexor. \par \par Coordination/Cerebellar Function: There was no dysmetria on finger to nose testing. \par \par Gait/Stance: Gait was mildly small stepped and stable.  Romberg was negative.  Tandem was not tested.\par

## 2023-07-21 NOTE — ASSESSMENT
[FreeTextEntry1] : Mrs. Mahan is an 82-year-old woman who suffers from acetylcholine receptor positive myasthenia gravis.  She is maintained on pyridostigmine and eculizumab monotherapy.  She recently underwent an elective left hip replacement.  Her course was complicated by COVID-19 infection requiring antibiotics and subsequently wound infection apparently superficial.  She is no longer on antibiotics.  She presents with impending myasthenic crisis with diffuse limb weakness.\par \par I do not believe that she is cholinergic.  I would continue eculizumab.  I suggested treatment with IVIG 2 g/kg over 5 days.  If this cannot be accomplished as an outpatient, I suggested admission to [facility].  In view of her recent head trauma, I will order a CT of the brain and orbits without contrast.  Further management will depend upon clinical course.

## 2023-07-24 ENCOUNTER — INPATIENT (INPATIENT)
Facility: HOSPITAL | Age: 83
LOS: 3 days | Discharge: HOME | DRG: 57 | End: 2023-07-28
Attending: STUDENT IN AN ORGANIZED HEALTH CARE EDUCATION/TRAINING PROGRAM | Admitting: STUDENT IN AN ORGANIZED HEALTH CARE EDUCATION/TRAINING PROGRAM
Payer: MEDICARE

## 2023-07-24 VITALS
DIASTOLIC BLOOD PRESSURE: 72 MMHG | HEIGHT: 58 IN | OXYGEN SATURATION: 98 % | SYSTOLIC BLOOD PRESSURE: 147 MMHG | WEIGHT: 145.95 LBS | RESPIRATION RATE: 18 BRPM | HEART RATE: 72 BPM | TEMPERATURE: 98 F

## 2023-07-24 DIAGNOSIS — Z90.89 ACQUIRED ABSENCE OF OTHER ORGANS: Chronic | ICD-10-CM

## 2023-07-24 DIAGNOSIS — Z96.641 PRESENCE OF RIGHT ARTIFICIAL HIP JOINT: Chronic | ICD-10-CM

## 2023-07-24 DIAGNOSIS — Z98.890 OTHER SPECIFIED POSTPROCEDURAL STATES: Chronic | ICD-10-CM

## 2023-07-24 DIAGNOSIS — G70.00 MYASTHENIA GRAVIS WITHOUT (ACUTE) EXACERBATION: ICD-10-CM

## 2023-07-24 DIAGNOSIS — Z96.619 PRESENCE OF UNSPECIFIED ARTIFICIAL SHOULDER JOINT: Chronic | ICD-10-CM

## 2023-07-24 LAB
ALBUMIN SERPL ELPH-MCNC: 3.9 G/DL — SIGNIFICANT CHANGE UP (ref 3.3–5)
ALP SERPL-CCNC: 77 U/L — SIGNIFICANT CHANGE UP (ref 40–120)
ALT FLD-CCNC: 22 U/L — SIGNIFICANT CHANGE UP (ref 10–45)
ANION GAP SERPL CALC-SCNC: 8 MMOL/L — SIGNIFICANT CHANGE UP (ref 5–17)
AST SERPL-CCNC: 27 U/L — SIGNIFICANT CHANGE UP (ref 10–40)
BASOPHILS # BLD AUTO: 0.02 K/UL — SIGNIFICANT CHANGE UP (ref 0–0.2)
BASOPHILS NFR BLD AUTO: 0.5 % — SIGNIFICANT CHANGE UP (ref 0–2)
BILIRUB SERPL-MCNC: 0.2 MG/DL — SIGNIFICANT CHANGE UP (ref 0.2–1.2)
BUN SERPL-MCNC: 17 MG/DL — SIGNIFICANT CHANGE UP (ref 7–23)
CALCIUM SERPL-MCNC: 9.2 MG/DL — SIGNIFICANT CHANGE UP (ref 8.4–10.5)
CHLORIDE SERPL-SCNC: 112 MMOL/L — HIGH (ref 96–108)
CK SERPL-CCNC: 241 U/L — HIGH (ref 25–170)
CO2 SERPL-SCNC: 23 MMOL/L — SIGNIFICANT CHANGE UP (ref 22–31)
CREAT SERPL-MCNC: 0.76 MG/DL — SIGNIFICANT CHANGE UP (ref 0.5–1.3)
EGFR: 77 ML/MIN/1.73M2 — SIGNIFICANT CHANGE UP
EOSINOPHIL # BLD AUTO: 0.06 K/UL — SIGNIFICANT CHANGE UP (ref 0–0.5)
EOSINOPHIL NFR BLD AUTO: 1.4 % — SIGNIFICANT CHANGE UP (ref 0–6)
GLUCOSE SERPL-MCNC: 83 MG/DL — SIGNIFICANT CHANGE UP (ref 70–99)
HCT VFR BLD CALC: 34.2 % — LOW (ref 34.5–45)
HGB BLD-MCNC: 10.7 G/DL — LOW (ref 11.5–15.5)
IMM GRANULOCYTES NFR BLD AUTO: 0.5 % — SIGNIFICANT CHANGE UP (ref 0–0.9)
LYMPHOCYTES # BLD AUTO: 0.99 K/UL — LOW (ref 1–3.3)
LYMPHOCYTES # BLD AUTO: 22.5 % — SIGNIFICANT CHANGE UP (ref 13–44)
MCHC RBC-ENTMCNC: 28.5 PG — SIGNIFICANT CHANGE UP (ref 27–34)
MCHC RBC-ENTMCNC: 31.3 GM/DL — LOW (ref 32–36)
MCV RBC AUTO: 91 FL — SIGNIFICANT CHANGE UP (ref 80–100)
MONOCYTES # BLD AUTO: 0.51 K/UL — SIGNIFICANT CHANGE UP (ref 0–0.9)
MONOCYTES NFR BLD AUTO: 11.6 % — SIGNIFICANT CHANGE UP (ref 2–14)
NEUTROPHILS # BLD AUTO: 2.8 K/UL — SIGNIFICANT CHANGE UP (ref 1.8–7.4)
NEUTROPHILS NFR BLD AUTO: 63.5 % — SIGNIFICANT CHANGE UP (ref 43–77)
NRBC # BLD: 0 /100 WBCS — SIGNIFICANT CHANGE UP (ref 0–0)
PLATELET # BLD AUTO: 129 K/UL — LOW (ref 150–400)
POTASSIUM SERPL-MCNC: 4.2 MMOL/L — SIGNIFICANT CHANGE UP (ref 3.5–5.3)
POTASSIUM SERPL-SCNC: 4.2 MMOL/L — SIGNIFICANT CHANGE UP (ref 3.5–5.3)
PROT SERPL-MCNC: 6.3 G/DL — SIGNIFICANT CHANGE UP (ref 6–8.3)
RAPID RVP RESULT: SIGNIFICANT CHANGE UP
RBC # BLD: 3.76 M/UL — LOW (ref 3.8–5.2)
RBC # FLD: 15 % — HIGH (ref 10.3–14.5)
SARS-COV-2 RNA SPEC QL NAA+PROBE: SIGNIFICANT CHANGE UP
SODIUM SERPL-SCNC: 143 MMOL/L — SIGNIFICANT CHANGE UP (ref 135–145)
TSH SERPL-MCNC: 2.02 UIU/ML — SIGNIFICANT CHANGE UP (ref 0.27–4.2)
WBC # BLD: 4.4 K/UL — SIGNIFICANT CHANGE UP (ref 3.8–10.5)
WBC # FLD AUTO: 4.4 K/UL — SIGNIFICANT CHANGE UP (ref 3.8–10.5)

## 2023-07-24 PROCEDURE — 71046 X-RAY EXAM CHEST 2 VIEWS: CPT | Mod: 26

## 2023-07-24 PROCEDURE — 99285 EMERGENCY DEPT VISIT HI MDM: CPT | Mod: GC

## 2023-07-24 RX ORDER — PYRIDOSTIGMINE BROMIDE 60 MG/5ML
0 SOLUTION ORAL
Qty: 0 | Refills: 0 | DISCHARGE

## 2023-07-24 RX ORDER — ACETAMINOPHEN 500 MG
650 TABLET ORAL DAILY
Refills: 0 | Status: COMPLETED | OUTPATIENT
Start: 2023-07-24 | End: 2023-07-28

## 2023-07-24 RX ORDER — ATORVASTATIN CALCIUM 80 MG/1
80 TABLET, FILM COATED ORAL AT BEDTIME
Refills: 0 | Status: DISCONTINUED | OUTPATIENT
Start: 2023-07-24 | End: 2023-07-28

## 2023-07-24 RX ORDER — PYRIDOSTIGMINE BROMIDE 60 MG/5ML
180 SOLUTION ORAL AT BEDTIME
Refills: 0 | Status: DISCONTINUED | OUTPATIENT
Start: 2023-07-24 | End: 2023-07-24

## 2023-07-24 RX ORDER — IMMUNE GLOBULIN (HUMAN) 10 G/100ML
132 INJECTION INTRAVENOUS; SUBCUTANEOUS DAILY
Refills: 0 | Status: DISCONTINUED | OUTPATIENT
Start: 2023-07-24 | End: 2023-07-24

## 2023-07-24 RX ORDER — SODIUM CHLORIDE 9 MG/ML
1000 INJECTION INTRAMUSCULAR; INTRAVENOUS; SUBCUTANEOUS
Refills: 0 | Status: DISCONTINUED | OUTPATIENT
Start: 2023-07-24 | End: 2023-07-28

## 2023-07-24 RX ORDER — ENOXAPARIN SODIUM 100 MG/ML
40 INJECTION SUBCUTANEOUS EVERY 24 HOURS
Refills: 0 | Status: DISCONTINUED | OUTPATIENT
Start: 2023-07-24 | End: 2023-07-28

## 2023-07-24 RX ORDER — PYRIDOSTIGMINE BROMIDE 60 MG/5ML
180 SOLUTION ORAL DAILY
Refills: 0 | Status: DISCONTINUED | OUTPATIENT
Start: 2023-07-24 | End: 2023-07-24

## 2023-07-24 RX ORDER — PYRIDOSTIGMINE BROMIDE 60 MG/5ML
180 SOLUTION ORAL DAILY
Refills: 0 | Status: DISCONTINUED | OUTPATIENT
Start: 2023-07-24 | End: 2023-07-26

## 2023-07-24 RX ORDER — ROSUVASTATIN CALCIUM 5 MG/1
0 TABLET ORAL
Qty: 0 | Refills: 0 | DISCHARGE

## 2023-07-24 RX ORDER — PANTOPRAZOLE SODIUM 20 MG/1
40 TABLET, DELAYED RELEASE ORAL
Refills: 0 | Status: DISCONTINUED | OUTPATIENT
Start: 2023-07-24 | End: 2023-07-28

## 2023-07-24 RX ORDER — POLYETHYLENE GLYCOL 3350 17 G/17G
17 POWDER, FOR SOLUTION ORAL ONCE
Refills: 0 | Status: DISCONTINUED | OUTPATIENT
Start: 2023-07-24 | End: 2023-07-28

## 2023-07-24 RX ORDER — LEVOTHYROXINE SODIUM 125 MCG
1 TABLET ORAL
Refills: 0 | DISCHARGE

## 2023-07-24 RX ORDER — PYRIDOSTIGMINE BROMIDE 60 MG/5ML
1 SOLUTION ORAL
Refills: 0 | DISCHARGE

## 2023-07-24 RX ORDER — PYRIDOSTIGMINE BROMIDE 60 MG/5ML
1 SOLUTION ORAL
Qty: 0 | Refills: 0 | DISCHARGE

## 2023-07-24 RX ORDER — LEVOTHYROXINE SODIUM 125 MCG
1 TABLET ORAL
Qty: 0 | Refills: 0 | DISCHARGE

## 2023-07-24 RX ORDER — PYRIDOSTIGMINE BROMIDE 60 MG/5ML
60 SOLUTION ORAL THREE TIMES A DAY
Refills: 0 | Status: DISCONTINUED | OUTPATIENT
Start: 2023-07-24 | End: 2023-07-28

## 2023-07-24 RX ORDER — DIPHENHYDRAMINE HCL 50 MG
25 CAPSULE ORAL DAILY
Refills: 0 | Status: DISCONTINUED | OUTPATIENT
Start: 2023-07-24 | End: 2023-07-28

## 2023-07-24 RX ORDER — LOSARTAN POTASSIUM 100 MG/1
25 TABLET, FILM COATED ORAL DAILY
Refills: 0 | Status: DISCONTINUED | OUTPATIENT
Start: 2023-07-24 | End: 2023-07-28

## 2023-07-24 RX ORDER — IMMUNE GLOBULIN (HUMAN) 10 G/100ML
20 INJECTION INTRAVENOUS; SUBCUTANEOUS DAILY
Refills: 0 | Status: COMPLETED | OUTPATIENT
Start: 2023-07-24 | End: 2023-07-28

## 2023-07-24 RX ORDER — LEVOTHYROXINE SODIUM 125 MCG
75 TABLET ORAL DAILY
Refills: 0 | Status: DISCONTINUED | OUTPATIENT
Start: 2023-07-24 | End: 2023-07-28

## 2023-07-24 RX ORDER — LOSARTAN POTASSIUM 100 MG/1
1 TABLET, FILM COATED ORAL
Refills: 0 | DISCHARGE

## 2023-07-24 RX ORDER — ACETAMINOPHEN 500 MG
650 TABLET ORAL DAILY
Refills: 0 | Status: DISCONTINUED | OUTPATIENT
Start: 2023-07-24 | End: 2023-07-24

## 2023-07-24 RX ADMIN — PYRIDOSTIGMINE BROMIDE 180 MILLIGRAM(S): 60 SOLUTION ORAL at 14:07

## 2023-07-24 RX ADMIN — Medication 25 MILLIGRAM(S): at 16:05

## 2023-07-24 RX ADMIN — IMMUNE GLOBULIN (HUMAN) 33.33 GRAM(S): 10 INJECTION INTRAVENOUS; SUBCUTANEOUS at 16:06

## 2023-07-24 RX ADMIN — ENOXAPARIN SODIUM 40 MILLIGRAM(S): 100 INJECTION SUBCUTANEOUS at 21:38

## 2023-07-24 RX ADMIN — ATORVASTATIN CALCIUM 80 MILLIGRAM(S): 80 TABLET, FILM COATED ORAL at 21:38

## 2023-07-24 RX ADMIN — PYRIDOSTIGMINE BROMIDE 60 MILLIGRAM(S): 60 SOLUTION ORAL at 21:36

## 2023-07-24 RX ADMIN — LOSARTAN POTASSIUM 25 MILLIGRAM(S): 100 TABLET, FILM COATED ORAL at 14:10

## 2023-07-24 RX ADMIN — Medication 75 MICROGRAM(S): at 14:07

## 2023-07-24 RX ADMIN — Medication 650 MILLIGRAM(S): at 14:07

## 2023-07-24 RX ADMIN — PYRIDOSTIGMINE BROMIDE 60 MILLIGRAM(S): 60 SOLUTION ORAL at 16:05

## 2023-07-24 NOTE — H&P ADULT - NSHPREVIEWOFSYSTEMS_GEN_ALL_CORE
REVIEW OF SYSTEMS:    CONSTITUTIONAL: +weakness, No fevers or chills  EYES/ENT: No visual changes;  No vertigo or throat pain   NECK: No pain or stiffness  RESPIRATORY: No cough, wheezing, hemoptysis; No shortness of breath  CARDIOVASCULAR: No chest pain or palpitations  GASTROINTESTINAL: No abdominal or epigastric pain. No nausea, vomiting, or hematemesis; No diarrhea or constipation. No melena or hematochezia.  GENITOURINARY: No dysuria, frequency or hematuria  NEUROLOGICAL: No numbness or change in sensation. +Weakness in all 4 extremities.   SKIN: No itching, burning, rashes, or lesions   All other review of systems is negative unless indicated above.

## 2023-07-24 NOTE — ED ADULT NURSE NOTE - CAS EDN DISCHARGE ASSESSMENT
FLASHES AND FLOATERS    You've noticed something new in your field of vision, possibly one or more movable spots or squiggles that you can't remember seeing before. Here are some points to follow and some information about this condition:    Located in the hollow interior of the eye is a transparent gel called the VITREOUS. It is composed of microscopic fibers, large molecules, and fluid-like water.  Most people also have small pieces of material suspended within the vitreous, which under ideal lighting conditions will cast a shadow on the retina and become visible as moving spots, which are called vitreous floaters. Floaters become more common with age.    During life the vitreous fibers condense together and the gel becomes more fluid. Fibrous clumps can form which may become large enough to be seen as floaters. As the fibers condense the liquid portion escapes, allowing the vitreous to shrink, separate from the retina, and collapse forward in the eye. While shrinking, the vitreous gel can pull against the retina causing episodes of light flashes or arcs of light.  The retina does not have pain sensors, and its only response is flashes of light. People often describe these flashes as lightning streaks or fireworks.     A concern is that traction on the retina from the shrinking vitreous may pull a tear in the retina, which could evolve into a retinal detachment.  Therefore persistent flashes do require urgent evaluation. Other symptoms that require urgent evaluation are a large shadow, curtain, or blank spot in your vision, or a sudden shower of dozens or hundreds of tiny floaters resembling pepper or soot.    To summarize, nearly everyone has floaters, but a sudden shower of dots, persistent light flashes in one eye, or a curtain or shadow in your vision require urgent consultation.  If these occur, please let us know immediately.       ==============================================    CATARACT    Symptoms and  Signs:  A cataract starts out small, and at first has little effect on your vision. You may notice that your vision is blurred a little, like looking through a cloudy piece of glass or viewing an impressionist painting. A cataract may make light from the sun or a lamp seem too bright or glaring. Or you may notice when you drive at night that the oncoming headlights cause more glare than before. Colors may not appear as bright as they once did.  The type of cataract you have will affect exactly which symptoms you experience and how soon they will occur. When a nuclear cataract first develops it can bring about a temporary improvement in your near vision, called second sight. Unfortunately, the improved vision is short-lived and will disappear as the cataract worsens. Meanwhile, a sub-capsular cataract may not produce any symptoms until it's well-developed.    Causes:  No one knows for sure why the eye's lens changes as we age, forming cataracts. Researchers are gradually identifying factors that may cause cataracts - and information that may help to prevent them.  Many studies suggest that exposure to ultraviolet light is associated with cataracts, so eye care practitioners recommend wearing sunglasses and a wide-brimmed hat to lessen your exposure.  Other studies suggest people with diabetes are at risk for developing a cataract.   Some eye care practitioners believe that a diet high in antioxidants, such as beta-carotene (vitamin A), selenium and vitamins C and E, may forestall cataracts.  The most important of these is probably vitamin C; it might be helpful to supplement the diet with an extra Vitamin C tablet.  Meanwhile, eating a lot of salt may increase your risk.  Other risk factors include cigarette smoke, air pollution and heavy alcohol consumption.  We simply recommend that you be careful to use sunglasses and to take Vitamin C.    Treatment:  When symptoms begin to appear, we can improve your vision for a  while using new glasses, strong bifocals, magnification, appropriate lighting or other visual aids.  This is true in your case; your cataract does not impact your vision very much at this time. If you experience any of the symptoms we described you can return at any time. Otherwise it is fine to see you in 1 year.    Alert and oriented to person, place and time

## 2023-07-24 NOTE — ED PROVIDER NOTE - OBJECTIVE STATEMENT
82-year-old female past medical history of mild aortic stenosis, CAD, hyperlipidemia, hypertension, hypothyroidism, myasthenia gravis, reactive airway, sinus bradycardia presents emergency department with 2 weeks of increased weakness.  Patient was sent in by her neurologist, Dr. Baugh for IVIG treatment 2g/kg 5days. patient was seen by Dr. baugh on 7/21/23. patient take eculizumab (solaris) every 2 weeks, last treatment7/19/23, pyridostigmine 180mg daily, 60mg TID (taking this dosage for at least the last 3 months). patient denies diplopia, ptosis, dysphagia, hoarsness. original presentation of MG included hoarseness, dysphagia. patient fell last week- negative outpatient CTH. denies recent URI.

## 2023-07-24 NOTE — ED PROVIDER NOTE - TOBACCO USE
Discharge instructions reviewed with patient and all questions addressed. Patient alert and oriented x4 at time of discharge. Patient ambulatory and steady gait.       Radha Jeronimo RN  11/20/22 9972 Unknown if ever smoked

## 2023-07-24 NOTE — ED ADULT NURSE NOTE - NSFALLHARMRISKINTERV_ED_ALL_ED

## 2023-07-24 NOTE — H&P ADULT - ATTENDING COMMENTS
Date of service: 7/25/23    81 yo F w/ hx of MG (on mestinon and soliris, followed by Dr Baugh) admitted for 2-3 week hx of progressive proximal UE and LE weakness and fall x 1, concern for possible MG exacerbation, and Dr Baugh recommended IVIG treatment.    Her hx is significant for L hip replacement surgery in 3/2023 and subsequently developed COVID-19 infection and then admitted 5/2023 for L septic hip joint.     S/O: She completed 1st dose of IVIG yesterday, tolerated it well. Reports she feels a little stronger.   On exam, she is alert, attentive and conversational memory is intact (able to provide medical history). No facial asymmetry, eyelid closure weakness, no ptosis, L eye ecchymosis, no dysphonia, EOMI. Deltoids and biceps 4/5 and hand  5/5, HF 4+/5 and distally 5/5 bilaterally.     Imp:- MG exacerbation  Plan:-  [] Continue IVIG 2g/kg divided over 5 days, 1st dose 7/24.   [] UA to rule out UTI  [] PT/OT

## 2023-07-24 NOTE — ED PROVIDER NOTE - PHYSICAL EXAMINATION
PHYSICAL EXAM:  CONSTITUTIONAL: Well appearing, awake, alert, oriented to person, place, time/situation and in no apparent distress.  HEAD: Atraumatic  EYES: Clear bilaterally, pupils equal, round and reactive to light. EOMI. resolving ecchymosis L eye. no ptosis.   ENMT: Airway patent, Nasal mucosa clear. Mouth with normal mucosa. Uvula is midline.   CARDIAC: Normal rate, regular rhythm. +S1/S2. No murmurs, rubs or gallops.  RESPIRATORY: Breathing unlabored. Breath sounds clear and equal bilaterally.  ABDOMEN:  Soft, nontender, nondistended. No rebound tenderness or guarding.  NEUROLOGICAL: Alert and oriented, no focal deficits, no motor or sensory deficits. CN2-12 intact. Sensation intact x4 extremities.  SKIN: Skin warm and dry. No evidence of rashes or lesions.

## 2023-07-24 NOTE — H&P ADULT - HISTORY OF PRESENT ILLNESS
82y F with Hx HTN, HLD, hypothyroidism, who presents w/ CC of weakness. She follows w/ Dr. Baugh for myasthenia gravis. She is prescribed Mestinon 60mg TID and 180mg q2d. However, since her L hip surgery in 3/2023, she has been taking the Mestinon 180mg every morning to counteract generalized weakness 82y F with Hx HTN, HLD, hypothyroidism, who presents w/ CC of weakness. She follows w/ Dr. Baugh for myasthenia gravis. She is prescribed Mestinon 60mg TID and 180mg q2d. However, since her L hip surgery in 3/2023, she has been taking the Mestinon 180mg every morning to counteract generalized weakness. Patient was diagnosed with MG in 10/2018. In the last 2 weeks she has been feeling progressively worsening generalized weakness, in upper extremities bilaterally > lower extremities bilaterally. Patient also had a fall with no LOC hitting her left eye (bruised and erythematous on exam). Patient denies any dysuria, hematuria, fever/chills, cough, weight loss, visual symptoms/diplopia/blurry vision, or other symptoms. Patient per daughter was able to ambulate without a cane prior to this (only using a cane for a few weeks post hip surgery) but then acutely decompensated requiring assistance with rising from a chair and ambulating over the last 2 weeks. Dr. Baugh saw the patient in the outpatient setting and recommended that she be admitted for a five day course of IVIG 2 g/kg and to continue with treatment with mestinon, 60 mg TID and 180 mg qhs. Respiratory therapy also consulted for NIF/VC. Patient also of note had Eculizumab (solaris) infusion on 7/19, has been receiving Solaris q2 weeks for MG.

## 2023-07-24 NOTE — H&P ADULT - ASSESSMENT
82 y old f/ w PMHX of HTN, HLD, hypothyroidism, MG (diag in 10/2018), who presented with 2 weeks of progressively worsening generalized weakness, admitted for IVIG treatment for 5 day course as per recommendations by her outpatient neurologist Dr. Baugh ([facility]). Patient was receiving Solaris every 2 weeks (eculizumab, last infusion 7/19), and on Mestinon 60 mg TID and 180 mg qhs.     Impression: generalized weakness likely secondary to myasthenia gravis exacerbation, rule out other infectious, toxic, metabolic etiology       Recommendations:  - CXR, UA to r/o any infectious etiology  - Continue with Mestinon 60 mg TID and 180 mg qhs  - Admit to Neurology service   - Neuro checks q4  - Start IVIG (2g/kg x 5days)  - NIF and VC  - Cardiac monitoring with telemetry  - Avoid medications like fluoroquinonles, macrolides, amnioglycosides, chloroquines. Consider avoiding anti hypertensives such as beta blockers, CCB, and magnesium.    Patient case discussed Dr. Pollard. Final recommendations regarding management to be confirmed upon attending attestation.      82 y old f/ w PMHX of HTN, HLD, hypothyroidism, MG (diag in 10/2018), who presented with 2 weeks of progressively worsening generalized weakness, admitted for IVIG treatment for 5 day course as per recommendations by her outpatient neurologist Dr. Baugh ([facility]). Patient was receiving Solaris every 2 weeks (eculizumab, last infusion 7/19), and on Mestinon 60 mg TID and 180 mg qhs.     Impression: generalized weakness likely secondary to myasthenia gravis exacerbation, rule out other infectious, toxic, metabolic etiology       Recommendations:  - CXR, UA to r/o any infectious etiology  - Continue with Mestinon 60 mg TID and 180 mg qhs  - Admit to Neurology service   - Neuro checks q4  - Start IVIG (2g/kg x 5days)  - NIF and VC  - Check T3/T4, TSH, continue w/ home synthroid  - Continue with statin --> converted from rosuvastatin to atorvastatin 80 mg qhs  - Continue with home losartan w/ hold parameters  - Cardiac monitoring with telemetry  - Avoid medications like fluoroquinonles, macrolides, amnioglycosides, chloroquines. Consider avoiding anti hypertensives such as beta blockers, CCB, and magnesium.    Patient case discussed Dr. Pollard. Final recommendations regarding management to be confirmed upon attending attestation.      82 y old f/ w PMHX of HTN, HLD, hypothyroidism, MG (diag in 10/2018), who presented with 2 weeks of progressively worsening generalized weakness, admitted for IVIG treatment for 5 day course as per recommendations by her outpatient neurologist Dr. Baugh ([facility]). Patient was receiving Solaris every 2 weeks (eculizumab, last infusion 7/19), and on Mestinon 60 mg TID and 180 mg qhs.     Impression: generalized weakness likely secondary to myasthenia gravis exacerbation, rule out other infectious, toxic, metabolic etiology       Recommendations:  - CXR, UA to r/o any infectious etiology  - Continue with Mestinon 60 mg TID and 180 mg qhs  - Admit to Neurology service   - Neuro checks q4  - Start IVIG (2g/kg x 5days): Please premedicate with Benadryl and Tylenol  - NIF and VC  - Check T3/T4, TSH, continue w/ home synthroid  - Continue with statin --> converted from rosuvastatin to atorvastatin 80 mg qhs  - Continue with home losartan w/ hold parameters  - Cardiac monitoring with telemetry  - Avoid medications like fluoroquinonles, macrolides, amnioglycosides, chloroquines. Consider avoiding anti hypertensives such as beta blockers, CCB, and magnesium.    Patient case discussed Dr. Pollard. Final recommendations regarding management to be confirmed upon attending attestation.      82 y old f/ w PMHX of HTN, HLD, hypothyroidism, MG (diag in 10/2018), who presented with 2 weeks of progressively worsening generalized weakness, admitted for IVIG treatment for 5 day course as per recommendations by her outpatient neurologist Dr. Baugh ([facility]). Patient was receiving Solaris every 2 weeks (eculizumab, last infusion 7/19), and on Mestinon 60 mg TID and 180 mg qhs.     Impression: generalized weakness likely secondary to myasthenia gravis exacerbation, rule out other infectious, toxic, metabolic etiology       Recommendations:  - CXR, UA to r/o any infectious etiology  - Continue with Mestinon 60 mg TID and 180 mg qhs  - Admit to Neurology service   - Neuro checks q4  - Start IVIG (142g divided over 5 days, based on ideal body weight): Please premedicate with Benadryl and Tylenol  - NIF and VC q12h  - Check T3/T4, TSH, continue w/ home synthroid  - Continue with statin --> converted from rosuvastatin to atorvastatin 80 mg qhs  - Continue with home losartan w/ hold parameters  - Cardiac monitoring with telemetry  - Avoid medications like fluoroquinonles, macrolides, amnioglycosides, chloroquines. Consider avoiding anti hypertensives such as beta blockers, CCB, and magnesium.    Patient case discussed Dr. Pollard. Final recommendations regarding management to be confirmed upon attending attestation.

## 2023-07-24 NOTE — ED ADULT NURSE NOTE - OBJECTIVE STATEMENT
0840 82 yr old WF brought to ER by daughter for further and tx of increasing weakness. PMH Myasthenia Gravis. Advised by Dr Scherer to come to ER TBA for IVIG. Was diagnosed with Myasthenia 4 yrs ago and received IVIG at that time. Walks with a cane currently since hip replacement surgery 4 months ago.  Ecchymosis left periorbital region and left sclera red. Pt states she was walking up the  steps and felt weak 1 week ago, falling forward, striking face. No LOC. Daughter states work up was done at that time with no significant finding. Pt A&Ox4. MAEx4. c/o upper and lower extremities ffeling weak. Denies dizziness, HA or numbness. Fall risk precautions maintained 0840 82 yr old WF brought to ER by daughter for further and tx of increasing weakness. PMH Myasthenia Gravis. Advised by Dr Scherer to come to ER TBA for IVIG. Was diagnosed with Myasthenia 4 yrs ago and received IVIG at that time. Walks with a cane currently since hip replacement surgery 4 months ago.  Ecchymosis left periorbital region and left sclera red. Pt states she was walking up the  steps and felt weak 1 week ago, falling forward, striking face. No LOC. Daughter states work up was done at that time with no significant finding. Pt A&Ox4. MAEx4. c/o upper and lower extremities feelling weak. Denies dizziness, HA or numbness. Fall risk precautions maintained

## 2023-07-24 NOTE — ED PROVIDER NOTE - ATTENDING CONTRIBUTION TO CARE
Attending MD Mcmullen:  I personally have seen and examined this patient.  Resident note reviewed and agree on plan of care and except where noted.  Please see my MDM for further details.

## 2023-07-24 NOTE — ED ADULT NURSE REASSESSMENT NOTE - NS ED NURSE REASSESS COMMENT FT1
1215 PERRL. MAEx4. No HA or dizziness. States no change in strength of extremities since arrival to ER. Bilat equal hand . Was brought to bathroom via wheelchair to void.

## 2023-07-24 NOTE — ED PROVIDER NOTE - PROGRESS NOTE DETAILS
[place] PGY3   Patient is a 82-year-old female with history of CAD, hyperlipidemia, hypertension, hypothyroidism, myasthenia gravis sent in by neurology for IVIG infusion in the setting of increasing lower extremity weakness.  Accompanied by daughter at bedside.  States that she has been feeling weaker in the last 3 weeks, evaluated by Dr. Baugh and recommended that she come into the hospital for IVIG infusion.  Denies fever, chills, nausea, vomiting, chest pain, difficulty breathing, shortness of breath, abdominal pain, urinary or bowel complaints.  Patient states that this is very similar to her previous myasthenia crisis.  Patient is not in acute respiratory distress, able to take a deep breath, lungs are clear.  Currently hemodynamically stable.  Likely myasthenia crisis.  Will obtain NIF and vital capacity though low suspicion for respiratory compromise at this time.  Will obtain labs to evaluate for possible infectious versus metabolic etiologies contributing to weakness.  And consult neurology. Janice Mcneil MD (PGY-2 EM): NIF: - 38; VC: 1.24L

## 2023-07-24 NOTE — H&P ADULT - NSHPSOURCEINFOTX_GEN_ALL_CORE
daughter at bedside
CONSTITUTIONAL: Well-appearing;  in no apparent distress.   HEAD: Normocephalic; atraumatic.   EYES: PERRL; EOM intact; conjunctiva and sclera clear  ENT: normal nose; no rhinorrhea; normal pharynx with no erythema or lesions.   NECK: Supple; non-tender; no LAD  CARDIOVASCULAR: Normal S1, S2; No audible murmurs. Regular rate and rhythm.   RESPIRATORY: Breathing easily; breath sounds clear and equal bilaterally; no wheezes, rhonchi, or rales.  GI: Soft; non-distended; +RLQ ttp   MSK: FROM at all extremities, normal tone   EXT: No cyanosis or edema; N/V intact  SKIN: Normal for age and race; warm; dry; good turgor; no apparent lesions or rash.   NEURO: A & O x 3; face symmetric; grossly unremarkable.   PSYCHOLOGICAL: The patient’s mood and manner are appropriate.

## 2023-07-24 NOTE — ED ADULT NURSE REASSESSMENT NOTE - NSFALLHARMRISKINTERV_ED_ALL_ED

## 2023-07-24 NOTE — ED PROVIDER NOTE - CLINICAL SUMMARY MEDICAL DECISION MAKING FREE TEXT BOX
82-year-old female past medical history of mild aortic stenosis, CAD, hyperlipidemia, hypertension, hypothyroidism, myasthenia gravis presenting with 2 weeks of increased weakness. Concern for MG crisis vs cholinergic overload vs hypothyroidism vs statin usage. ordered labs, TSH, RVP, CK, CXR, CK, NIF, consulted neuro. will re-eval. patient is currently stable but will most likely require admission. 82-year-old female past medical history of mild aortic stenosis, CAD, hyperlipidemia, hypertension, hypothyroidism, myasthenia gravis presenting with 2 weeks of increased weakness. Concern for MG crisis vs cholinergic overload vs hypothyroidism vs statin usage. ordered labs, TSH, RVP, CK, CXR, CK, NIF, consulted neuro. will re-eval. patient is currently stable but will most likely require admission.    Attending MD Mcmullen: 82y F with Hx HTN, HLD, hypothyroidism, who presents with daughter with complaint of weakness. She follows w/ Dr. Baugh for myasthenia gravis. She is prescribed Mestinon 60mg TID and 180mg q2d. However, since her L hip surgery in 3/2023, she has been taking the Mestinon 180mg every morning.   Recent fall with head trauma and negative head CT done at that time.  No new trauma.  Motor equal and symmetric bilateral 5/5  NIF -38 VC 1.24.  Plan:  admit for IVIG and further management.

## 2023-07-24 NOTE — H&P ADULT - NSHPPHYSICALEXAM_GEN_ALL_CORE
Constitutional: well-developed, elderly female  Eyes: Redness noted in L eye, 2/2 to fall  Cardiovascular: No edema noted throughout.     Neurological Examination:    - Mental Status: Alert, awake, oriented to person, age, place, and time; speech is fluent with intact naming, repetition, and follows 1-step and 3-step mid-line crossing commands.    - Cranial Nerves: visual fields are full to confrontation; pupils are equal, round, and reactive to light; appears to have some mild difficulty with upward gaze in bilateral eyes though no ptosis overtly noted; facial sensation is intact in the V1-V3 distribution bilaterally; face is symmetric with normal eye closure and smile; hearing is intact to conversation; uvula is midline and soft palate rises symmetrically; shoulder shrug intact; tongue protrudes in the midline.    - Motor/Strength Testing:                                 Right           Left  Deltoid                     5                 5  Biceps                      4                 4  Triceps                    4                 5  Wrist Ext (radial)       4                4  Hand                  4              4  Hip Flex                   4                 4  Knee Ext	     4                  4  Dorsiflex                  4               4  Plantarflex               5                  5    - There is no pronator drift. Normal muscle bulk and tone throughout.    - Reflexes:   Bicep (C5/C6):                  R 2+ --- L 2+   Brachioradialis (C5/C6) :   R 2+ --- L 2+   Patella (L3/L4) :                 R 2+ --- L 2+   Ankle (S1) :                       R 2+ --- L 2+       - Sensory: Intact throughout to light touch.   - Coordination: Finger-nose-finger intact without dysmetria.

## 2023-07-24 NOTE — H&P ADULT - NSHPLABSRESULTS_GEN_ALL_CORE
LABS: Personally reviewed labs, imaging, and ECG                          10.7   4.40  )-----------( 129      ( 24 Jul 2023 09:15 )             34.2       07-24    143  |  112<H>  |  17  ----------------------------<  83  4.2   |  23  |  0.76    Ca    9.2      24 Jul 2023 09:16    TPro  6.3  /  Alb  3.9  /  TBili  0.2  /  DBili  x   /  AST  27  /  ALT  22  /  AlkPhos  77  07-24       LIVER FUNCTIONS - ( 24 Jul 2023 09:16 )  Alb: 3.9 g/dL / Pro: 6.3 g/dL / ALK PHOS: 77 U/L / ALT: 22 U/L / AST: 27 U/L / GGT: x                    Urinalysis Basic - ( 24 Jul 2023 09:16 )    Color: x / Appearance: x / SG: x / pH: x  Gluc: 83 mg/dL / Ketone: x  / Bili: x / Urobili: x   Blood: x / Protein: x / Nitrite: x   Leuk Esterase: x / RBC: x / WBC x   Sq Epi: x / Non Sq Epi: x / Bacteria: x            Lactate Trend      CARDIAC MARKERS ( 24 Jul 2023 09:16 )  x     / x     / 241 U/L / x     / x            CAPILLARY BLOOD GLUCOSE                RADIOLOGY & ADDITIONAL TESTS:

## 2023-07-25 LAB
ALBUMIN SERPL ELPH-MCNC: 3.5 G/DL — SIGNIFICANT CHANGE UP (ref 3.3–5)
ALP SERPL-CCNC: 67 U/L — SIGNIFICANT CHANGE UP (ref 40–120)
ALT FLD-CCNC: 21 U/L — SIGNIFICANT CHANGE UP (ref 10–45)
ANION GAP SERPL CALC-SCNC: 10 MMOL/L — SIGNIFICANT CHANGE UP (ref 5–17)
APPEARANCE UR: CLEAR — SIGNIFICANT CHANGE UP
APTT BLD: 35 SEC — SIGNIFICANT CHANGE UP (ref 27.5–35.5)
AST SERPL-CCNC: 27 U/L — SIGNIFICANT CHANGE UP (ref 10–40)
BACTERIA # UR AUTO: ABNORMAL
BASOPHILS # BLD AUTO: 0.01 K/UL — SIGNIFICANT CHANGE UP (ref 0–0.2)
BASOPHILS NFR BLD AUTO: 0.2 % — SIGNIFICANT CHANGE UP (ref 0–2)
BILIRUB SERPL-MCNC: 0.3 MG/DL — SIGNIFICANT CHANGE UP (ref 0.2–1.2)
BILIRUB UR-MCNC: NEGATIVE — SIGNIFICANT CHANGE UP
BUN SERPL-MCNC: 16 MG/DL — SIGNIFICANT CHANGE UP (ref 7–23)
CALCIUM SERPL-MCNC: 8.8 MG/DL — SIGNIFICANT CHANGE UP (ref 8.4–10.5)
CHLORIDE SERPL-SCNC: 111 MMOL/L — HIGH (ref 96–108)
CO2 SERPL-SCNC: 21 MMOL/L — LOW (ref 22–31)
COLOR SPEC: SIGNIFICANT CHANGE UP
CREAT SERPL-MCNC: 0.78 MG/DL — SIGNIFICANT CHANGE UP (ref 0.5–1.3)
DIFF PNL FLD: ABNORMAL
EGFR: 75 ML/MIN/1.73M2 — SIGNIFICANT CHANGE UP
EOSINOPHIL # BLD AUTO: 0.05 K/UL — SIGNIFICANT CHANGE UP (ref 0–0.5)
EOSINOPHIL NFR BLD AUTO: 1.2 % — SIGNIFICANT CHANGE UP (ref 0–6)
EPI CELLS # UR: 0 /HPF — SIGNIFICANT CHANGE UP
GLUCOSE SERPL-MCNC: 83 MG/DL — SIGNIFICANT CHANGE UP (ref 70–99)
GLUCOSE UR QL: NEGATIVE — SIGNIFICANT CHANGE UP
HCT VFR BLD CALC: 34.4 % — LOW (ref 34.5–45)
HCT VFR BLD CALC: 35.6 % — SIGNIFICANT CHANGE UP (ref 34.5–45)
HGB BLD-MCNC: 11 G/DL — LOW (ref 11.5–15.5)
HGB BLD-MCNC: 11 G/DL — LOW (ref 11.5–15.5)
HYALINE CASTS # UR AUTO: 2 /LPF — SIGNIFICANT CHANGE UP (ref 0–2)
IMM GRANULOCYTES NFR BLD AUTO: 0.2 % — SIGNIFICANT CHANGE UP (ref 0–0.9)
INR BLD: 1.03 RATIO — SIGNIFICANT CHANGE UP (ref 0.88–1.16)
KETONES UR-MCNC: NEGATIVE — SIGNIFICANT CHANGE UP
LEUKOCYTE ESTERASE UR-ACNC: ABNORMAL
LYMPHOCYTES # BLD AUTO: 1.36 K/UL — SIGNIFICANT CHANGE UP (ref 1–3.3)
LYMPHOCYTES # BLD AUTO: 33.4 % — SIGNIFICANT CHANGE UP (ref 13–44)
MAGNESIUM SERPL-MCNC: 2 MG/DL — SIGNIFICANT CHANGE UP (ref 1.6–2.6)
MCHC RBC-ENTMCNC: 28.1 PG — SIGNIFICANT CHANGE UP (ref 27–34)
MCHC RBC-ENTMCNC: 28.8 PG — SIGNIFICANT CHANGE UP (ref 27–34)
MCHC RBC-ENTMCNC: 30.9 GM/DL — LOW (ref 32–36)
MCHC RBC-ENTMCNC: 32 GM/DL — SIGNIFICANT CHANGE UP (ref 32–36)
MCV RBC AUTO: 90.1 FL — SIGNIFICANT CHANGE UP (ref 80–100)
MCV RBC AUTO: 91 FL — SIGNIFICANT CHANGE UP (ref 80–100)
MONOCYTES # BLD AUTO: 0.52 K/UL — SIGNIFICANT CHANGE UP (ref 0–0.9)
MONOCYTES NFR BLD AUTO: 12.8 % — SIGNIFICANT CHANGE UP (ref 2–14)
NEUTROPHILS # BLD AUTO: 2.12 K/UL — SIGNIFICANT CHANGE UP (ref 1.8–7.4)
NEUTROPHILS NFR BLD AUTO: 52.2 % — SIGNIFICANT CHANGE UP (ref 43–77)
NITRITE UR-MCNC: NEGATIVE — SIGNIFICANT CHANGE UP
NRBC # BLD: 0 /100 WBCS — SIGNIFICANT CHANGE UP (ref 0–0)
NRBC # BLD: 0 /100 WBCS — SIGNIFICANT CHANGE UP (ref 0–0)
PH UR: 6 — SIGNIFICANT CHANGE UP (ref 5–8)
PHOSPHATE SERPL-MCNC: 4.1 MG/DL — SIGNIFICANT CHANGE UP (ref 2.5–4.5)
PLATELET # BLD AUTO: 110 K/UL — LOW (ref 150–400)
PLATELET # BLD AUTO: 117 K/UL — LOW (ref 150–400)
POTASSIUM SERPL-MCNC: 3.8 MMOL/L — SIGNIFICANT CHANGE UP (ref 3.5–5.3)
POTASSIUM SERPL-SCNC: 3.8 MMOL/L — SIGNIFICANT CHANGE UP (ref 3.5–5.3)
PROT SERPL-MCNC: 6.2 G/DL — SIGNIFICANT CHANGE UP (ref 6–8.3)
PROT UR-MCNC: SIGNIFICANT CHANGE UP
PROTHROM AB SERPL-ACNC: 11.8 SEC — SIGNIFICANT CHANGE UP (ref 10.5–13.4)
RBC # BLD: 3.82 M/UL — SIGNIFICANT CHANGE UP (ref 3.8–5.2)
RBC # BLD: 3.91 M/UL — SIGNIFICANT CHANGE UP (ref 3.8–5.2)
RBC # FLD: 14.7 % — HIGH (ref 10.3–14.5)
RBC # FLD: 14.8 % — HIGH (ref 10.3–14.5)
RBC CASTS # UR COMP ASSIST: 2 /HPF — SIGNIFICANT CHANGE UP (ref 0–4)
SODIUM SERPL-SCNC: 142 MMOL/L — SIGNIFICANT CHANGE UP (ref 135–145)
SP GR SPEC: 1.01 — SIGNIFICANT CHANGE UP (ref 1.01–1.02)
UROBILINOGEN FLD QL: NEGATIVE — SIGNIFICANT CHANGE UP
WBC # BLD: 2.95 K/UL — LOW (ref 3.8–10.5)
WBC # BLD: 4.07 K/UL — SIGNIFICANT CHANGE UP (ref 3.8–10.5)
WBC # FLD AUTO: 2.95 K/UL — LOW (ref 3.8–10.5)
WBC # FLD AUTO: 4.07 K/UL — SIGNIFICANT CHANGE UP (ref 3.8–10.5)
WBC UR QL: 37 /HPF — HIGH (ref 0–5)

## 2023-07-25 PROCEDURE — 99223 1ST HOSP IP/OBS HIGH 75: CPT

## 2023-07-25 RX ORDER — ACETAMINOPHEN 500 MG
650 TABLET ORAL EVERY 6 HOURS
Refills: 0 | Status: DISCONTINUED | OUTPATIENT
Start: 2023-07-25 | End: 2023-07-28

## 2023-07-25 RX ADMIN — ATORVASTATIN CALCIUM 80 MILLIGRAM(S): 80 TABLET, FILM COATED ORAL at 21:07

## 2023-07-25 RX ADMIN — PANTOPRAZOLE SODIUM 40 MILLIGRAM(S): 20 TABLET, DELAYED RELEASE ORAL at 05:47

## 2023-07-25 RX ADMIN — ENOXAPARIN SODIUM 40 MILLIGRAM(S): 100 INJECTION SUBCUTANEOUS at 21:07

## 2023-07-25 RX ADMIN — PYRIDOSTIGMINE BROMIDE 180 MILLIGRAM(S): 60 SOLUTION ORAL at 11:46

## 2023-07-25 RX ADMIN — Medication 650 MILLIGRAM(S): at 21:55

## 2023-07-25 RX ADMIN — PYRIDOSTIGMINE BROMIDE 60 MILLIGRAM(S): 60 SOLUTION ORAL at 05:57

## 2023-07-25 RX ADMIN — Medication 75 MICROGRAM(S): at 05:47

## 2023-07-25 RX ADMIN — Medication 650 MILLIGRAM(S): at 22:40

## 2023-07-25 RX ADMIN — LOSARTAN POTASSIUM 25 MILLIGRAM(S): 100 TABLET, FILM COATED ORAL at 05:48

## 2023-07-25 RX ADMIN — PYRIDOSTIGMINE BROMIDE 60 MILLIGRAM(S): 60 SOLUTION ORAL at 15:07

## 2023-07-25 RX ADMIN — PYRIDOSTIGMINE BROMIDE 60 MILLIGRAM(S): 60 SOLUTION ORAL at 21:07

## 2023-07-25 RX ADMIN — IMMUNE GLOBULIN (HUMAN) 33.33 GRAM(S): 10 INJECTION INTRAVENOUS; SUBCUTANEOUS at 11:47

## 2023-07-25 RX ADMIN — Medication 650 MILLIGRAM(S): at 11:46

## 2023-07-25 NOTE — OCCUPATIONAL THERAPY INITIAL EVALUATION ADULT - DIAGNOSIS, OT EVAL
Pt p/w impaired balance, strength, endurance, vision impacting independence with ADLs and functional mobility.

## 2023-07-25 NOTE — PHYSICAL THERAPY INITIAL EVALUATION ADULT - GAIT DISTANCE, PT EVAL
x2, pt ambulated 5ft with straight cane and close supervision / contact guard and fair/fair- balance/50 feet

## 2023-07-25 NOTE — OCCUPATIONAL THERAPY INITIAL EVALUATION ADULT - LIVES WITH, PROFILE
Pt lives in  [address] [facility] and first floor setup with son. Pt reports independence with all aspects of self care and functional mobility with cane. Pt owns RW, shower chair, commode.

## 2023-07-25 NOTE — PHYSICAL THERAPY INITIAL EVALUATION ADULT - PLANNED THERAPY INTERVENTIONS, PT EVAL
stair training: GOAL: (to be met in 4 wks) negotiate 1 flight of stairs with 1 rail and appropriate assistive device with step over step pattern independently/balance training/bed mobility training/gait training/strengthening/transfer training

## 2023-07-25 NOTE — PHYSICAL THERAPY INITIAL EVALUATION ADULT - ADDITIONAL COMMENTS
lives with son in private house with 5 steps to enter with bilateral rails (far apart), right hand dominant, uses straight cane for ambulation

## 2023-07-25 NOTE — OCCUPATIONAL THERAPY INITIAL EVALUATION ADULT - LEVEL OF CONSCIOUSNESS, OT EVAL
Onset: 1 week ago    Location / description: about 15 minutes ago pt reports a BP of 188/121 about at pick and save.     While on a cruise this week, he wasn't feeling \"right\".   He was dizzy and his heart was racing and he felt anxious. States he was admitted to the hosptial overnight to help bring down his BP, does not have the name of this hospital.     Precipitating Factors: unknown    Pain Scale (1 - 10), 10 highest: denies    Associated Symptoms: asymptomatic    What improves/worsens symptoms: NA    Symptom specific medications: Nifedine 60 mg per pt, states he was taking it once daily, but told to take an extra dose if needed.    Recent Care: 01/11/18 cardiology  01/09/18 PCP    Disposition: Immediate office eval. No openings on PCP schedule to add pt this afternoon. He is off work and can come in today, please call pt to schedule.     Reason for Disposition  • [1] BP  >= 200/120  AND [2] having NO cardiac or neurologic symptoms    Protocols used: HIGH BLOOD PRESSURE-A-AH      
Patient Name: Eric Ibrahim  Specialist or PCP:dr davison  Pregnant (If Yes, how long?):na  Symptoms:high blood pressure  Call Back #:[phone]  Is the patient’s permanent residence located in [place], [place], or a [facility]? Yes [facility] [id number]  Call Center Account #:          
alert

## 2023-07-25 NOTE — OCCUPATIONAL THERAPY INITIAL EVALUATION ADULT - PERTINENT HX OF CURRENT PROBLEM, REHAB EVAL
82F with Hx HTN, HLD, hypothyroidism, who presents w/ CC of weakness. She follows w/ Dr. Baugh for myasthenia gravis. She is prescribed Mestinon 60mg TID and 180mg q2d. However, since her L hip surgery in 3/2023, she has been taking the Mestinon 180mg every morning to counteract generalized weakness. Patient was diagnosed with MG in 10/2018. In the last 2 weeks she has been feeling progressively worsening generalized weakness, in upper extremities bilaterally > lower extremities bilaterally. Patient also had a fall with no LOC hitting her left eye (bruised and erythematous on exam). Patient denies any dysuria, hematuria, fever/chills, cough, weight loss, visual symptoms/diplopia/blurry vision, or other symptoms. Patient per daughter was able to ambulate without a cane prior to this (only using a cane for a few weeks post hip surgery) but then acutely decompensated requiring assistance with rising from a chair and ambulating over the last 2 weeks. Dr. Baugh saw the patient in the outpatient setting and recommended that she be admitted for a five day course of IVIG 2 g/kg and to continue with treatment with mestinon, 60 mg TID and 180 mg qhs. Respiratory therapy also consulted for NIF/VC. Patient also of note had Eculizumab (solaris) infusion on 7/19, has been receiving Solaris q2 weeks for MG.

## 2023-07-25 NOTE — PHYSICAL THERAPY INITIAL EVALUATION ADULT - PERTINENT HX OF CURRENT PROBLEM, REHAB EVAL
PMHx: HTN, HLD, hypothyroidism, myasthenia gravis. who presents w/ CC of weakness. In the last 2 weeks she has been feeling progressively worsening generalized weakness, in BUE>BLE. Pt also had a fall with no LOC hitting her left eye (bruised and erythematous on exam). Patient denies any dysuria, hematuria, fever/chills, cough, weight loss, visual symptoms/diplopia/blurry vision, or other symptoms. Per daughter, pt was able to ambulate without a cane prior to this (only using a cane for a few weeks post hip surgery) but then acutely decompensated requiring assistance with rising from a chair and ambulating over the last 2 weeks. Dr. Baugh saw the patient in the outpatient setting and recommended that she be admitted for a five day course of IVIG 2 g/kg and to continue with treatment with mestinon, 60 mg TID and 180 mg qhs. Respiratory therapy also consulted for NIF/VC. Patient also of note had Eculizumab (solaris) infusion on 7/19, has been receiving Solaris q2 weeks for MG.

## 2023-07-26 LAB
ANION GAP SERPL CALC-SCNC: 8 MMOL/L — SIGNIFICANT CHANGE UP (ref 5–17)
BUN SERPL-MCNC: 15 MG/DL — SIGNIFICANT CHANGE UP (ref 7–23)
CALCIUM SERPL-MCNC: 9.1 MG/DL — SIGNIFICANT CHANGE UP (ref 8.4–10.5)
CHLORIDE SERPL-SCNC: 109 MMOL/L — HIGH (ref 96–108)
CO2 SERPL-SCNC: 25 MMOL/L — SIGNIFICANT CHANGE UP (ref 22–31)
CREAT SERPL-MCNC: 0.76 MG/DL — SIGNIFICANT CHANGE UP (ref 0.5–1.3)
EGFR: 77 ML/MIN/1.73M2 — SIGNIFICANT CHANGE UP
GLUCOSE SERPL-MCNC: 92 MG/DL — SIGNIFICANT CHANGE UP (ref 70–99)
HCT VFR BLD CALC: 34.7 % — SIGNIFICANT CHANGE UP (ref 34.5–45)
HGB BLD-MCNC: 11 G/DL — LOW (ref 11.5–15.5)
MCHC RBC-ENTMCNC: 28.5 PG — SIGNIFICANT CHANGE UP (ref 27–34)
MCHC RBC-ENTMCNC: 31.7 GM/DL — LOW (ref 32–36)
MCV RBC AUTO: 89.9 FL — SIGNIFICANT CHANGE UP (ref 80–100)
NRBC # BLD: 0 /100 WBCS — SIGNIFICANT CHANGE UP (ref 0–0)
PLATELET # BLD AUTO: 109 K/UL — LOW (ref 150–400)
POTASSIUM SERPL-MCNC: 3.9 MMOL/L — SIGNIFICANT CHANGE UP (ref 3.5–5.3)
POTASSIUM SERPL-SCNC: 3.9 MMOL/L — SIGNIFICANT CHANGE UP (ref 3.5–5.3)
RBC # BLD: 3.86 M/UL — SIGNIFICANT CHANGE UP (ref 3.8–5.2)
RBC # FLD: 14.7 % — HIGH (ref 10.3–14.5)
SODIUM SERPL-SCNC: 142 MMOL/L — SIGNIFICANT CHANGE UP (ref 135–145)
WBC # BLD: 2.69 K/UL — LOW (ref 3.8–10.5)
WBC # FLD AUTO: 2.69 K/UL — LOW (ref 3.8–10.5)

## 2023-07-26 PROCEDURE — 99232 SBSQ HOSP IP/OBS MODERATE 35: CPT

## 2023-07-26 RX ORDER — PYRIDOSTIGMINE BROMIDE 60 MG/5ML
180 SOLUTION ORAL
Refills: 0 | Status: DISCONTINUED | OUTPATIENT
Start: 2023-07-26 | End: 2023-07-28

## 2023-07-26 RX ADMIN — PYRIDOSTIGMINE BROMIDE 60 MILLIGRAM(S): 60 SOLUTION ORAL at 05:19

## 2023-07-26 RX ADMIN — ATORVASTATIN CALCIUM 80 MILLIGRAM(S): 80 TABLET, FILM COATED ORAL at 21:31

## 2023-07-26 RX ADMIN — ENOXAPARIN SODIUM 40 MILLIGRAM(S): 100 INJECTION SUBCUTANEOUS at 21:30

## 2023-07-26 RX ADMIN — LOSARTAN POTASSIUM 25 MILLIGRAM(S): 100 TABLET, FILM COATED ORAL at 05:20

## 2023-07-26 RX ADMIN — Medication 650 MILLIGRAM(S): at 12:23

## 2023-07-26 RX ADMIN — PANTOPRAZOLE SODIUM 40 MILLIGRAM(S): 20 TABLET, DELAYED RELEASE ORAL at 05:20

## 2023-07-26 RX ADMIN — IMMUNE GLOBULIN (HUMAN) 33.33 GRAM(S): 10 INJECTION INTRAVENOUS; SUBCUTANEOUS at 12:56

## 2023-07-26 RX ADMIN — PYRIDOSTIGMINE BROMIDE 60 MILLIGRAM(S): 60 SOLUTION ORAL at 21:31

## 2023-07-26 RX ADMIN — Medication 25 MILLIGRAM(S): at 12:24

## 2023-07-26 RX ADMIN — Medication 75 MICROGRAM(S): at 05:19

## 2023-07-26 RX ADMIN — Medication 650 MILLIGRAM(S): at 21:30

## 2023-07-26 RX ADMIN — PYRIDOSTIGMINE BROMIDE 180 MILLIGRAM(S): 60 SOLUTION ORAL at 12:24

## 2023-07-26 NOTE — PROGRESS NOTE ADULT - ASSESSMENT
82 y old f/ w PMHX of HTN, HLD, hypothyroidism, MG (diag in 10/2018), who presented with 2 weeks of progressively worsening generalized weakness, admitted for IVIG treatment for 5 day course as per recommendations by her outpatient neurologist Dr. Baugh ([facility]). Patient was receiving Solaris every 2 weeks (eculizumab, last infusion 7/19), and on Mestinon 60 mg TID and 180 mg qhs.     Impression: generalized weakness likely secondary to myasthenia gravis exacerbation, rule out other infectious, toxic, metabolic etiology       Recommendations:  - CXR, UA to r/o any infectious etiology  - Continue with Mestinon 60 mg TID and 180 mg qhs  - Admit to Neurology service   - Neuro checks q4  - Start IVIG (142g divided over 5 days, based on ideal body weight): Please premedicate with Benadryl and Tylenol  - NIF and VC q12h  - Check T3/T4, TSH, continue w/ home synthroid  - Continue with statin --> converted from rosuvastatin to atorvastatin 80 mg qhs  - Continue with home losartan w/ hold parameters  - Cardiac monitoring with telemetry  - Avoid medications like fluoroquinonles, macrolides, amnioglycosides, chloroquines. Consider avoiding anti hypertensives such as beta blockers, CCB, and magnesium.    Patient case discussed Dr. Pollard. Final recommendations regarding management to be confirmed upon attending attestation.      82 y old f/ w PMHX of HTN, HLD, hypothyroidism, MG (diag in 10/2018), who presented with 2 weeks of progressively worsening generalized weakness, admitted for IVIG treatment for 5 day course as per recommendations by her outpatient neurologist Dr. Baugh ([facility]). Patient was receiving Solaris every 2 weeks (eculizumab, last infusion 7/19), and on Mestinon 60 mg TID and 180 mg qhs.     Impression: generalized weakness likely secondary to myasthenia gravis exacerbation, rule out other infectious, toxic, metabolic etiology       Recommendations:  - CXR, UA to r/o any infectious etiology  - Continue with Mestinon 60 mg TID and 180 mg qhs  - Admit to Neurology service   - Neuro checks q4  - IVIG (142g divided over 5 days, based on ideal body weight): Please premedicate with Benadryl and Tylenol  - NIF and VC q12h  - Check T3/T4, TSH, continue w/ home synthroid  - Continue with statin --> converted from rosuvastatin to atorvastatin 80 mg qhs  - Continue with home losartan w/ hold parameters  - Cardiac monitoring with telemetry  - Avoid medications like fluoroquinonles, macrolides, amnioglycosides, chloroquines. Consider avoiding anti hypertensives such as beta blockers, CCB, and magnesium.    Patient case discussed Dr. Pollard. Final recommendations regarding management to be confirmed upon attending attestation.      82 y old f/ w PMHX of HTN, HLD, hypothyroidism, MG (diag in 10/2018), who presented with 2 weeks of progressively worsening generalized weakness, admitted for IVIG treatment for 5 day course as per recommendations by her outpatient neurologist Dr. Baugh ([facility]). Patient was receiving Solaris every 2 weeks (eculizumab, last infusion 7/19), and on Mestinon 60 mg TID and 180 mg qhs.     Impression: generalized weakness likely secondary to myasthenia gravis exacerbation, rule out other infectious, toxic, metabolic etiology       Recommendations:  [x] Admitted to neurology service  [] CXR, UA to r/o any infectious etiology  [] Continue with Mestinon 60 mg TID and 180 mg qhs   [] IVIG (142g divided over 5 days, based on ideal body weight): Please premedicate with Benadryl and Tylenol  [] Please give IVIG course as early as possible on Friday morning   [] NIF and VC q12h  [] Check T3/T4, TSH, continue w/ home synthroid  [] Continue with statin --> converted from rosuvastatin to atorvastatin 80 mg qhs  [] Continue with home losartan w/ hold parameters  [] Cardiac monitoring with telemetry  [] Avoid medications like fluoroquinonles, macrolides, amnioglycosides, chloroquines. Consider avoiding anti hypertensives such as beta blockers, CCB, and magnesium.    Patient case discussed Dr. Pollard. Final recommendations regarding management to be confirmed upon attending attestation.      82 y old f/ w PMHX of HTN, HLD, hypothyroidism, MG (diag in 10/2018), who presented with 2 weeks of progressively worsening generalized weakness, admitted for IVIG treatment for 5 day course as per recommendations by her outpatient neurologist Dr. Baugh ([facility]). Patient had a left hip surgery in March 2023 and medical history prior to this admission has been complicated by infections (COVID, septic arthritis). Patient was receiving Solaris every 2 weeks (eculizumab, last infusion 7/19), and on Mestinon 60 mg TID and 180 mg qhs.     Impression: generalized weakness likely secondary to myasthenia gravis exacerbation, rule out other infectious, toxic, metabolic etiology       Plan  Neurology:  [x] Admitted to neurology service  [x] CXR - WNL (7/24)  [x] UA positive for luekocyte esterase and bacteria but patient has no symptoms (7/25)  [] Continue with Mestinon 60 mg TID and 180 mg qhs   [] IVIG (142g divided over 5 days, based on ideal body weight): Please premedicate with Benadryl and Tylenol (7/24, 7/25 done)  [] Please give IVIG course as early as possible on Friday morning for 3pm d/c  [] NIF and VC (7/25 VC : 1.5L, NIF -50)  [] TSH (2.02 on 7/24) , continue w/ home synthroid  [] Continue with statin --> converted from rosuvastatin to atorvastatin 80 mg qhs  [] Continue with home losartan w/ hold parameters  [] Cardiac monitoring with telemetry  [] Avoid medications like fluoroquinonles, macrolides, amnioglycosides, chloroquines. Consider avoiding anti hypertensives such as beta blockers, CCB, and magnesium.    Preventative:  [] DVT - Lovenox     Discharge:  [] Outpatient PT    Patient case discussed Dr. Pollard. Final recommendations regarding management to be confirmed upon attending attestation.      82 y old f/ w PMHX of HTN, HLD, hypothyroidism, MG (diag in 10/2018), who presented with 2 weeks of progressively worsening generalized weakness, admitted for IVIG treatment for 5 day course as per recommendations by her outpatient neurologist Dr. Baugh ([facility]). Patient had a left hip surgery in March 2023 and medical history prior to this admission has been complicated by infections (COVID, septic arthritis). Patient was receiving Solaris every 2 weeks (eculizumab, last infusion 7/19), and on Mestinon 60 mg TID and 180 mg qhs.     Impression: generalized weakness likely secondary to myasthenia gravis exacerbation, rule out other infectious, toxic, metabolic etiology     Plan:  [x] CXR - WNL (7/24)  [x] UA positive for luekocyte esterase and bacteria but patient has no symptoms (7/25)  [x] Continue with Mestinon 60 mg TID and 180 mg qhs   [x] IVIG (142g divided over 5 days, based on ideal body weight): Please premedicate with Benadryl and Tylenol (7/24, 7/25 done)  [x] Please give IVIG course as early as possible on Friday morning for 3pm d/c  [x] NIF and VC (7/25 VC : 1.5L, NIF -50)  [x] TSH (2.02 on 7/24) , continue w/ home synthroid  [x] Continue with statin --> converted from rosuvastatin to atorvastatin 80 mg qhs  [x] Continue with home losartan w/ hold parameters  [x] Cardiac monitoring with telemetry  [x] Avoid medications like fluoroquinonles, macrolides, amnioglycosides, chloroquines. Consider avoiding anti hypertensives such as beta blockers, CCB, and magnesium.    Preventative:  [] DVT - Lovenox     Discharge:  [] Outpatient PT    Patient case discussed Dr. Pollard. Final recommendations regarding management to be confirmed upon attending attestation.

## 2023-07-26 NOTE — PROGRESS NOTE ADULT - SUBJECTIVE AND OBJECTIVE BOX
Neurology Progress Note    SUBJECTIVE/OBJECTIVE/INTERVAL EVENTS: Patient seen and examined at bedside w/ neuro attending and team.     INTERVAL HISTORY:    REVIEW OF SYSTEMS: Otherwise denies fever, chills, headaches, vision changes, nausea, vomiting, hearing change, focal weakness, focal numbness, bowel/ bladder incontinence. Few questions of a 10-system ROS was performed and is negative except for those items noted above and/or in the HPI.    VITALS & EXAMINATION:  Vital Signs Last 24 Hrs  T(C): 36.7 (26 Jul 2023 05:20), Max: 36.7 (25 Jul 2023 10:06)  T(F): 98 (26 Jul 2023 05:20), Max: 98.1 (25 Jul 2023 16:00)  HR: 55 (26 Jul 2023 05:20) (55 - 84)  BP: 148/65 (26 Jul 2023 05:20) (115/70 - 158/72)  BP(mean): --  RR: 18 (26 Jul 2023 05:20) (18 - 18)  SpO2: 98% (26 Jul 2023 05:20) (96% - 98%)    Parameters below as of 26 Jul 2023 05:20  Patient On (Oxygen Delivery Method): room air        General:  Constitutional: Female, appears stated age, nontoxic, not in distress,    Head: Normocephalic;   Eyes: clear sclera;   Extremities: No cyanosis;   Resp: breathing comfortably     Neurological (>12):  MS: Awake, alert.  Oriented person place situation. Follows commands. Attends to examiner  Language: Speech is hypophonic, clear, fluent, good repetition,  comprehension, registration of words.  CNs: PERRL (R 3mm, L 3mm). VFF. EOMI. No disconjugate gaze, skew. V1-3 intact LT, No facial asymmetry b/l. Hearing grossly normal b/l. Tongue midline.     Motor - Normal bulk and tone throughout. No pronator drift.    L/R (out of 5)       Deltoid  5/5    Biceps   5/5      Triceps  5/5         Wrist Extension 5/5   Wrist Flexion  5/5   Interossei 5/5     5/5   L/R (out of 5)       Hip Flexion  5/5    Hip Extension  5/5  Knee Extension  5/5  Dorsiflexion  5/5      Plantar Flexion 5/5     Sensation: Intact to LT b/l. Cortical: Extinction on DSS (neglect): none  Reflexes L/R:  Biceps(C5) 2/2  BR(C6) 2/2   Triceps(C7)  2/2 Patellar(L4)   2/2   Toes: mute  Coordination: No dysmetria to FTN b/l UE  Gait: Normal Romberg. No postural instability. Normal stance.    LABORATORY:  CBC                       11.0   2.69  )-----------( 109      ( 26 Jul 2023 05:44 )             34.7     Chem 07-26    142  |  109<H>  |  15  ----------------------------<  92  3.9   |  25  |  0.76    Ca    9.1      26 Jul 2023 05:45  Phos  4.1     07-25  Mg     2.0     07-25    TPro  6.2  /  Alb  3.5  /  TBili  0.3  /  DBili  x   /  AST  27  /  ALT  21  /  AlkPhos  67  07-25    LFTs LIVER FUNCTIONS - ( 25 Jul 2023 06:24 )  Alb: 3.5 g/dL / Pro: 6.2 g/dL / ALK PHOS: 67 U/L / ALT: 21 U/L / AST: 27 U/L / GGT: x           Coagulopathy PT/INR - ( 25 Jul 2023 06:26 )   PT: 11.8 sec;   INR: 1.03 ratio         PTT - ( 25 Jul 2023 06:26 )  PTT:35.0 sec  Lipid Panel   A1c   Cardiac enzymes CARDIAC MARKERS ( 24 Jul 2023 09:16 )  x     / x     / 241 U/L / x     / x          U/A Urinalysis Basic - ( 26 Jul 2023 05:45 )    Color: x / Appearance: x / SG: x / pH: x  Gluc: 92 mg/dL / Ketone: x  / Bili: x / Urobili: x   Blood: x / Protein: x / Nitrite: x   Leuk Esterase: x / RBC: x / WBC x   Sq Epi: x / Non Sq Epi: x / Bacteria: x      CSF  Immunological  Other    STUDIES & IMAGING: (EEG, CT, MR, U/S, TTE/MAYURI): Neurology Progress Note    SUBJECTIVE/OBJECTIVE/INTERVAL EVENTS: Patient seen and examined at bedside w/ neuro attending and team.     INTERVAL HISTORY: No acute events overnight.     REVIEW OF SYSTEMS: Otherwise denies fever, chills, headaches, vision changes, nausea, vomiting, hearing change, focal weakness, focal numbness, bowel/ bladder incontinence. Few questions of a 10-system ROS was performed and is negative except for those items noted above and/or in the HPI.    VITALS & EXAMINATION:  Vital Signs Last 24 Hrs  T(C): 36.7 (26 Jul 2023 05:20), Max: 36.7 (25 Jul 2023 10:06)  T(F): 98 (26 Jul 2023 05:20), Max: 98.1 (25 Jul 2023 16:00)  HR: 55 (26 Jul 2023 05:20) (55 - 84)  BP: 148/65 (26 Jul 2023 05:20) (115/70 - 158/72)  BP(mean): --  RR: 18 (26 Jul 2023 05:20) (18 - 18)  SpO2: 98% (26 Jul 2023 05:20) (96% - 98%)    Parameters below as of 26 Jul 2023 05:20  Patient On (Oxygen Delivery Method): room air        General:  Constitutional: Female, appears stated age, nontoxic, not in distress,    Head: Normocephalic;   Eyes: clear sclera;   Extremities: No cyanosis;   Resp: breathing comfortably     Neurological (>12):  MS: Awake, alert.  Oriented person place situation. Follows commands. Attends to examiner  Language: Speech is hypophonic, clear, fluent, good repetition,  comprehension, registration of words.  CNs: PERRL (R 3mm, L 3mm). VFF. EOMI. No disconjugate gaze, skew. V1-3 intact LT, No facial asymmetry b/l. Hearing grossly normal b/l. Tongue midline.     Motor - Normal bulk and tone throughout. No pronator drift.    L/R (out of 5)       Deltoid  5/5    Biceps   5/5      Triceps  5/5         Wrist Extension 5/5   Wrist Flexion  5/5   Interossei 5/5     5/5   L/R (out of 5)       Hip Flexion  5/5    Hip Extension  5/5  Knee Extension  5/5  Dorsiflexion  5/5      Plantar Flexion 5/5     Sensation: Intact to LT b/l. Cortical: Extinction on DSS (neglect): none  Reflexes L/R:  Biceps(C5) 2/2  BR(C6) 2/2   Triceps(C7)  2/2 Patellar(L4)   2/2   Toes: mute  Coordination: No dysmetria to FTN b/l UE  Gait: Normal Romberg. No postural instability. Normal stance.    LABORATORY:  CBC                       11.0   2.69  )-----------( 109      ( 26 Jul 2023 05:44 )             34.7     Chem 07-26    142  |  109<H>  |  15  ----------------------------<  92  3.9   |  25  |  0.76    Ca    9.1      26 Jul 2023 05:45  Phos  4.1     07-25  Mg     2.0     07-25    TPro  6.2  /  Alb  3.5  /  TBili  0.3  /  DBili  x   /  AST  27  /  ALT  21  /  AlkPhos  67  07-25    LFTs LIVER FUNCTIONS - ( 25 Jul 2023 06:24 )  Alb: 3.5 g/dL / Pro: 6.2 g/dL / ALK PHOS: 67 U/L / ALT: 21 U/L / AST: 27 U/L / GGT: x           Coagulopathy PT/INR - ( 25 Jul 2023 06:26 )   PT: 11.8 sec;   INR: 1.03 ratio         PTT - ( 25 Jul 2023 06:26 )  PTT:35.0 sec  Lipid Panel   A1c   Cardiac enzymes CARDIAC MARKERS ( 24 Jul 2023 09:16 )  x     / x     / 241 U/L / x     / x          U/A Urinalysis Basic - ( 26 Jul 2023 05:45 )    Color: x / Appearance: x / SG: x / pH: x  Gluc: 92 mg/dL / Ketone: x  / Bili: x / Urobili: x   Blood: x / Protein: x / Nitrite: x   Leuk Esterase: x / RBC: x / WBC x   Sq Epi: x / Non Sq Epi: x / Bacteria: x      CSF  Immunological  Other    STUDIES & IMAGING: (EEG, CT, MR, U/S, TTE/MAYURI): Neurology Progress Note    SUBJECTIVE/OBJECTIVE/INTERVAL EVENTS: Patient seen and examined at bedside w/ neuro attending and team.     INTERVAL HISTORY: No acute events overnight. Patient tolerated yesterday's IVIG treatment well.     REVIEW OF SYSTEMS: Otherwise denies fever, chills, headaches, vision changes, nausea, vomiting, hearing change, focal weakness, focal numbness, bowel/ bladder incontinence. Few questions of a 10-system ROS was performed and is negative except for those items noted above and/or in the HPI.    VITALS & EXAMINATION:  Vital Signs Last 24 Hrs  T(C): 36.7 (26 Jul 2023 05:20), Max: 36.7 (25 Jul 2023 10:06)  T(F): 98 (26 Jul 2023 05:20), Max: 98.1 (25 Jul 2023 16:00)  HR: 55 (26 Jul 2023 05:20) (55 - 84)  BP: 148/65 (26 Jul 2023 05:20) (115/70 - 158/72)  BP(mean): --  RR: 18 (26 Jul 2023 05:20) (18 - 18)  SpO2: 98% (26 Jul 2023 05:20) (96% - 98%)    Parameters below as of 26 Jul 2023 05:20  Patient On (Oxygen Delivery Method): room air        General:  Constitutional: Female, appears stated age, nontoxic, not in distress,    Head: Normocephalic;   Eyes: clear sclera;   Extremities: No cyanosis;   Resp: breathing comfortably     Neurological (>12):  MS: Awake, alert.  Oriented person place situation. Follows commands. Attends to examiner  Language: Speech is hypophonic, clear, fluent, good repetition,  comprehension, registration of words.  CNs: PERRL (R 3mm, L 3mm). VFF. EOMI. No disconjugate gaze, skew. V1-3 intact LT, No facial asymmetry b/l. Hearing grossly normal b/l. Tongue midline.     Motor - Normal bulk and tone throughout. No pronator drift.    L/R (out of 5)       Deltoid  5/5    Biceps   5/5      Triceps  5/5         Wrist Extension 5/5   Wrist Flexion  5/5   Interossei 5/5     5/5   L/R (out of 5)       Hip Flexion  5/5    Hip Extension  5/5  Knee Extension  5/5  Dorsiflexion  5/5      Plantar Flexion 5/5     Sensation: Intact to LT b/l. Cortical: Extinction on DSS (neglect): none  Reflexes L/R:  Biceps(C5) 2/2  BR(C6) 2/2   Triceps(C7)  2/2 Patellar(L4)   2/2   Toes: mute  Coordination: No dysmetria to FTN b/l UE  Gait: Normal Romberg. No postural instability. Normal stance.    LABORATORY:  CBC                       11.0   2.69  )-----------( 109      ( 26 Jul 2023 05:44 )             34.7     Chem 07-26    142  |  109<H>  |  15  ----------------------------<  92  3.9   |  25  |  0.76    Ca    9.1      26 Jul 2023 05:45  Phos  4.1     07-25  Mg     2.0     07-25    TPro  6.2  /  Alb  3.5  /  TBili  0.3  /  DBili  x   /  AST  27  /  ALT  21  /  AlkPhos  67  07-25    LFTs LIVER FUNCTIONS - ( 25 Jul 2023 06:24 )  Alb: 3.5 g/dL / Pro: 6.2 g/dL / ALK PHOS: 67 U/L / ALT: 21 U/L / AST: 27 U/L / GGT: x           Coagulopathy PT/INR - ( 25 Jul 2023 06:26 )   PT: 11.8 sec;   INR: 1.03 ratio         PTT - ( 25 Jul 2023 06:26 )  PTT:35.0 sec  Lipid Panel   A1c   Cardiac enzymes CARDIAC MARKERS ( 24 Jul 2023 09:16 )  x     / x     / 241 U/L / x     / x          U/A Urinalysis Basic - ( 26 Jul 2023 05:45 )    Color: x / Appearance: x / SG: x / pH: x  Gluc: 92 mg/dL / Ketone: x  / Bili: x / Urobili: x   Blood: x / Protein: x / Nitrite: x   Leuk Esterase: x / RBC: x / WBC x   Sq Epi: x / Non Sq Epi: x / Bacteria: x      CSF  Immunological  Other    STUDIES & IMAGING: (EEG, CT, MR, U/S, TTE/MAYURI): Neurology Progress Note    SUBJECTIVE/OBJECTIVE/INTERVAL EVENTS: Patient seen and examined at bedside w/ neuro attending and team.     INTERVAL HISTORY: No acute events overnight. Patient tolerated yesterday's IVIG treatment well.     REVIEW OF SYSTEMS: Otherwise denies fever, chills, headaches, vision changes, nausea, vomiting, hearing change, focal weakness, focal numbness, bowel/ bladder incontinence. Few questions of a 10-system ROS was performed and is negative except for those items noted above and/or in the HPI.    VITALS & EXAMINATION:  Vital Signs Last 24 Hrs  T(C): 36.7 (26 Jul 2023 05:20), Max: 36.7 (25 Jul 2023 10:06)  T(F): 98 (26 Jul 2023 05:20), Max: 98.1 (25 Jul 2023 16:00)  HR: 55 (26 Jul 2023 05:20) (55 - 84)  BP: 148/65 (26 Jul 2023 05:20) (115/70 - 158/72)  BP(mean): --  RR: 18 (26 Jul 2023 05:20) (18 - 18)  SpO2: 98% (26 Jul 2023 05:20) (96% - 98%)    Parameters below as of 26 Jul 2023 05:20  Patient On (Oxygen Delivery Method): room air    Constitutional: Female, appears stated age, nontoxic, not in distress,    Head: Normocephalic;   Eyes: clear sclera;   Extremities: No cyanosis;   Resp: breathing comfortably     Neurological (>12):  MS: Awake, alert.  Oriented person place situation. Follows commands. Attends to examiner  Language: Speech is hypophonic, clear, fluent, good repetition,  comprehension, registration of words.  CNs: PERRL (R 3mm, L 3mm). VFF. EOMI. No disconjugate gaze, skew. V1-3 intact LT, No facial asymmetry b/l. Hearing grossly normal b/l. Tongue midline.     Motor - Normal bulk and tone throughout. No pronator drift.    L/R (out of 5)       Deltoid  5/5    Biceps   4/4      Triceps  4/5         Wrist Extension 4/4   Wrist Flexion  4/4     4/4   L/R (out of 5)       Hip Flexion  4/4    Hip Extension  4/4  Knee Extension  4/4  Dorsiflexion  4/4      Plantar Flexion 5/5     Sensation: Intact to LT b/l. Cortical: Extinction on DSS (neglect): none  Reflexes L/R:  Biceps(C5) 2/2  BR(C6) 2/2   Triceps(C7)  2/2 Patellar(L4)   2/2   Coordination: No dysmetria to FTN b/l UE  Gait: Normal Romberg. No postural instability. Normal stance.    LABORATORY:  CBC                       11.0   2.69  )-----------( 109      ( 26 Jul 2023 05:44 )             34.7     Chem 07-26    142  |  109<H>  |  15  ----------------------------<  92  3.9   |  25  |  0.76    Ca    9.1      26 Jul 2023 05:45  Phos  4.1     07-25  Mg     2.0     07-25    TPro  6.2  /  Alb  3.5  /  TBili  0.3  /  DBili  x   /  AST  27  /  ALT  21  /  AlkPhos  67  07-25    LFTs LIVER FUNCTIONS - ( 25 Jul 2023 06:24 )  Alb: 3.5 g/dL / Pro: 6.2 g/dL / ALK PHOS: 67 U/L / ALT: 21 U/L / AST: 27 U/L / GGT: x           Coagulopathy PT/INR - ( 25 Jul 2023 06:26 )   PT: 11.8 sec;   INR: 1.03 ratio         PTT - ( 25 Jul 2023 06:26 )  PTT:35.0 sec  Lipid Panel   A1c   Cardiac enzymes CARDIAC MARKERS ( 24 Jul 2023 09:16 )  x     / x     / 241 U/L / x     / x          U/A Urinalysis Basic - ( 26 Jul 2023 05:45 )    Color: x / Appearance: x / SG: x / pH: x  Gluc: 92 mg/dL / Ketone: x  / Bili: x / Urobili: x   Blood: x / Protein: x / Nitrite: x   Leuk Esterase: x / RBC: x / WBC x   Sq Epi: x / Non Sq Epi: x / Bacteria: x Neurology Progress Note    SUBJECTIVE/OBJECTIVE/INTERVAL EVENTS: Patient seen and examined at bedside w/ neuro attending and team.     INTERVAL HISTORY: No acute events overnight. Patient tolerated yesterday's IVIG treatment well.     REVIEW OF SYSTEMS: Otherwise denies fever, chills, headaches, vision changes, nausea, vomiting, hearing change, focal weakness, focal numbness, bowel/ bladder incontinence. Few questions of a 10-system ROS was performed and is negative except for those items noted above and/or in the HPI.    VITALS & EXAMINATION:  Vital Signs Last 24 Hrs  T(C): 36.7 (26 Jul 2023 05:20), Max: 36.7 (25 Jul 2023 10:06)  T(F): 98 (26 Jul 2023 05:20), Max: 98.1 (25 Jul 2023 16:00)  HR: 55 (26 Jul 2023 05:20) (55 - 84)  BP: 148/65 (26 Jul 2023 05:20) (115/70 - 158/72)  BP(mean): --  RR: 18 (26 Jul 2023 05:20) (18 - 18)  SpO2: 98% (26 Jul 2023 05:20) (96% - 98%)    Parameters below as of 26 Jul 2023 05:20  Patient On (Oxygen Delivery Method): room air    Constitutional: Female, appears stated age, nontoxic, not in distress,    Head: Normocephalic;   Eyes: clear sclera;   Extremities: No cyanosis;   Resp: breathing comfortably     Neurological (>12):  MS: Awake, alert.  Oriented person place situation. Follows commands. Attends to examiner  Language: Speech is hypophonic, clear, fluent, good repetition,  comprehension, registration of words.  CNs: PERRL (R 3mm, L 3mm). VFF. EOMI. No disconjugate gaze, skew. V1-3 intact LT, No facial asymmetry b/l. Hearing grossly normal b/l. Tongue midline.   Motor - Normal bulk and tone throughout. No pronator drift.  L/R (out of 5)       Deltoid  5/5    Biceps   4/4      Triceps  4/5         Wrist Extension 4/4   Wrist Flexion  4/4     4/4   L/R (out of 5)       Hip Flexion  4/4    Hip Extension  4/4  Knee Extension  4/4  Dorsiflexion  4/4      Plantar Flexion 5/5   Sensation: Intact to LT b/l.      LABORATORY:  CBC                       11.0   2.69  )-----------( 109      ( 26 Jul 2023 05:44 )             34.7     Chem 07-26    142  |  109<H>  |  15  ----------------------------<  92  3.9   |  25  |  0.76    Ca    9.1      26 Jul 2023 05:45  Phos  4.1     07-25  Mg     2.0     07-25    TPro  6.2  /  Alb  3.5  /  TBili  0.3  /  DBili  x   /  AST  27  /  ALT  21  /  AlkPhos  67  07-25    LFTs LIVER FUNCTIONS - ( 25 Jul 2023 06:24 )  Alb: 3.5 g/dL / Pro: 6.2 g/dL / ALK PHOS: 67 U/L / ALT: 21 U/L / AST: 27 U/L / GGT: x           Coagulopathy PT/INR - ( 25 Jul 2023 06:26 )   PT: 11.8 sec;   INR: 1.03 ratio         PTT - ( 25 Jul 2023 06:26 )  PTT:35.0 sec  Lipid Panel   A1c   Cardiac enzymes CARDIAC MARKERS ( 24 Jul 2023 09:16 )  x     / x     / 241 U/L / x     / x          U/A Urinalysis Basic - ( 26 Jul 2023 05:45 )    Color: x / Appearance: x / SG: x / pH: x  Gluc: 92 mg/dL / Ketone: x  / Bili: x / Urobili: x   Blood: x / Protein: x / Nitrite: x   Leuk Esterase: x / RBC: x / WBC x   Sq Epi: x / Non Sq Epi: x / Bacteria: x

## 2023-07-27 ENCOUNTER — TRANSCRIPTION ENCOUNTER (OUTPATIENT)
Age: 83
End: 2023-07-27

## 2023-07-27 LAB
ANION GAP SERPL CALC-SCNC: 9 MMOL/L — SIGNIFICANT CHANGE UP (ref 5–17)
BUN SERPL-MCNC: 17 MG/DL — SIGNIFICANT CHANGE UP (ref 7–23)
CALCIUM SERPL-MCNC: 8.8 MG/DL — SIGNIFICANT CHANGE UP (ref 8.4–10.5)
CHLORIDE SERPL-SCNC: 108 MMOL/L — SIGNIFICANT CHANGE UP (ref 96–108)
CO2 SERPL-SCNC: 21 MMOL/L — LOW (ref 22–31)
CREAT SERPL-MCNC: 0.7 MG/DL — SIGNIFICANT CHANGE UP (ref 0.5–1.3)
EGFR: 85 ML/MIN/1.73M2 — SIGNIFICANT CHANGE UP
FERRITIN SERPL-MCNC: 36 NG/ML — SIGNIFICANT CHANGE UP (ref 13–330)
GLUCOSE SERPL-MCNC: 90 MG/DL — SIGNIFICANT CHANGE UP (ref 70–99)
HCT VFR BLD CALC: 33.4 % — LOW (ref 34.5–45)
HGB BLD-MCNC: 10.6 G/DL — LOW (ref 11.5–15.5)
IRON SATN MFR SERPL: 13 % — LOW (ref 14–50)
IRON SATN MFR SERPL: 35 UG/DL — SIGNIFICANT CHANGE UP (ref 30–160)
MCHC RBC-ENTMCNC: 28.6 PG — SIGNIFICANT CHANGE UP (ref 27–34)
MCHC RBC-ENTMCNC: 31.7 GM/DL — LOW (ref 32–36)
MCV RBC AUTO: 90.3 FL — SIGNIFICANT CHANGE UP (ref 80–100)
NRBC # BLD: 0 /100 WBCS — SIGNIFICANT CHANGE UP (ref 0–0)
PLATELET # BLD AUTO: 105 K/UL — LOW (ref 150–400)
POTASSIUM SERPL-MCNC: 4 MMOL/L — SIGNIFICANT CHANGE UP (ref 3.5–5.3)
POTASSIUM SERPL-SCNC: 4 MMOL/L — SIGNIFICANT CHANGE UP (ref 3.5–5.3)
RBC # BLD: 3.7 M/UL — LOW (ref 3.8–5.2)
RBC # FLD: 14.7 % — HIGH (ref 10.3–14.5)
SODIUM SERPL-SCNC: 138 MMOL/L — SIGNIFICANT CHANGE UP (ref 135–145)
TIBC SERPL-MCNC: 279 UG/DL — SIGNIFICANT CHANGE UP (ref 220–430)
UIBC SERPL-MCNC: 243 UG/DL — SIGNIFICANT CHANGE UP (ref 110–370)
WBC # BLD: 2.61 K/UL — LOW (ref 3.8–10.5)
WBC # FLD AUTO: 2.61 K/UL — LOW (ref 3.8–10.5)

## 2023-07-27 PROCEDURE — 99232 SBSQ HOSP IP/OBS MODERATE 35: CPT

## 2023-07-27 RX ORDER — FERROUS SULFATE 325(65) MG
325 TABLET ORAL DAILY
Refills: 0 | Status: DISCONTINUED | OUTPATIENT
Start: 2023-07-27 | End: 2023-07-28

## 2023-07-27 RX ADMIN — PYRIDOSTIGMINE BROMIDE 180 MILLIGRAM(S): 60 SOLUTION ORAL at 05:12

## 2023-07-27 RX ADMIN — PYRIDOSTIGMINE BROMIDE 60 MILLIGRAM(S): 60 SOLUTION ORAL at 13:58

## 2023-07-27 RX ADMIN — PYRIDOSTIGMINE BROMIDE 60 MILLIGRAM(S): 60 SOLUTION ORAL at 21:13

## 2023-07-27 RX ADMIN — PYRIDOSTIGMINE BROMIDE 60 MILLIGRAM(S): 60 SOLUTION ORAL at 05:13

## 2023-07-27 RX ADMIN — Medication 650 MILLIGRAM(S): at 11:04

## 2023-07-27 RX ADMIN — LOSARTAN POTASSIUM 25 MILLIGRAM(S): 100 TABLET, FILM COATED ORAL at 05:12

## 2023-07-27 RX ADMIN — ENOXAPARIN SODIUM 40 MILLIGRAM(S): 100 INJECTION SUBCUTANEOUS at 21:13

## 2023-07-27 RX ADMIN — ATORVASTATIN CALCIUM 80 MILLIGRAM(S): 80 TABLET, FILM COATED ORAL at 21:13

## 2023-07-27 RX ADMIN — PANTOPRAZOLE SODIUM 40 MILLIGRAM(S): 20 TABLET, DELAYED RELEASE ORAL at 05:12

## 2023-07-27 RX ADMIN — IMMUNE GLOBULIN (HUMAN) 33.33 GRAM(S): 10 INJECTION INTRAVENOUS; SUBCUTANEOUS at 12:33

## 2023-07-27 RX ADMIN — Medication 650 MILLIGRAM(S): at 21:13

## 2023-07-27 RX ADMIN — Medication 650 MILLIGRAM(S): at 22:00

## 2023-07-27 RX ADMIN — Medication 75 MICROGRAM(S): at 05:12

## 2023-07-27 NOTE — DISCHARGE NOTE PROVIDER - NSDCMRMEDTOKEN_GEN_ALL_CORE_FT
albuterol 90 mcg/inh inhalation aerosol: 2 puff(s) inhaled every 4 hours, As needed, Shortness of Breath and/or Wheezing  Breo Ellipta 200 mcg-25 mcg/inh inhalation powder: 1 puff(s) inhaled once a day  losartan 25 mg oral tablet: 1 tab(s) orally once a day  polyethylene glycol 3350 oral powder for reconstitution: 17 gram(s) orally once a day (at bedtime)  pyridostigmine 180 mg oral tablet, extended release: orally once a day  pyridostigmine 60 mg oral tablet: 1 tab(s) orally 3 times a day  (7am, 1pm ,6 pm )  rosuvastatin 20 mg oral capsule: orally once a day (at bedtime)  Synthroid 75 mcg (0.075 mg) oral tablet: 1 tab(s) orally once a day   ferrous sulfate 325 mg (65 mg elemental iron) oral tablet: 1 tab(s) orally once a day MDD: 325mg  losartan 25 mg oral tablet: 1 tab(s) orally once a day  polyethylene glycol 3350 oral powder for reconstitution: 17 gram(s) orally once a day (at bedtime)  pyridostigmine 180 mg oral tablet, extended release: orally once a day  pyridostigmine 60 mg oral tablet: 1 tab(s) orally 3 times a day  (7am, 1pm ,6 pm )  rosuvastatin 20 mg oral capsule: orally once a day (at bedtime)  Synthroid 75 mcg (0.075 mg) oral tablet: 1 tab(s) orally once a day

## 2023-07-27 NOTE — DISCHARGE NOTE PROVIDER - PROVIDER TOKENS
PROVIDER:[TOKEN:[[id number]],FOLLOWUP:[2 weeks],ESTABLISHEDPATIENT:[T]] PROVIDER:[TOKEN:[[id number]],FOLLOWUP:[2 weeks],ESTABLISHEDPATIENT:[T]],PROVIDER:[TOKEN:[[id number]],FOLLOWUP:[1 month]]

## 2023-07-27 NOTE — DISCHARGE NOTE PROVIDER - CARE PROVIDER_API CALL
Vernon Baugh  Neurology  [address], [address]  [place] [address]  Phone: [phone]  Fax: [phone]  Established Patient  Follow Up Time: 2 weeks   Vernon Baugh  Neurology  [address], [address]  [place] [address]  Phone: [phone]  Fax: [phone]  Established Patient  Follow Up Time: 2 weeks    Elsa Bates  Hematology  [address] [place] [address]  Phone: [phone]  Fax: [phone]  Follow Up Time: 1 month

## 2023-07-27 NOTE — DISCHARGE NOTE PROVIDER - NSDCFUSCHEDAPPT_GEN_ALL_CORE_FT
Deidra Nichole  [facility]  ORTHOSURG [address]  Scheduled Appointment: 08/01/2023    [facility]  NEUROLOGY [address]  Scheduled Appointment: 08/02/2023    [facility]  NEUROLOGY [address]  Scheduled Appointment: 08/16/2023    Hossein Salcedo  [facility]  PULMMED [id number]  Scheduled Appointment: 08/29/2023    [facility]  NEUROLOGY [address]  Scheduled Appointment: 08/30/2023    Tyler Knott  [facility]  INTMED [id number]  Scheduled Appointment: 09/08/2023    [facility]  NEUROLOGY [address]  Scheduled Appointment: 09/13/2023    Leonor Montes  [facility]  CARDIOLOGY [id number]  Scheduled Appointment: 09/20/2023    [facility]  NEUROLOGY [address]  Scheduled Appointment: 09/27/2023    [facility]  NEUROLOGY [address]  Scheduled Appointment: 10/11/2023     Deidra Nichole  [facility]  ORTHOSURG [address]  Scheduled Appointment: 08/01/2023    [facility]  NEUROLOGY [address]  Scheduled Appointment: 08/02/2023    [facility]  NEUROLOGY [address]  Scheduled Appointment: 08/16/2023    Hossein Salcedo  [facility]  PULMMED [id number]  Scheduled Appointment: 08/29/2023    [facility]  NEUROLOGY [address]  Scheduled Appointment: 08/30/2023    Tyler Knott  [facility]  INTMED [id number]  Scheduled Appointment: 09/08/2023    [facility]  NEUROLOGY [address]  Scheduled Appointment: 09/13/2023    Leonor Montes  [facility]  CARDIOLOGY [id number]  Scheduled Appointment: 09/20/2023    [facility]  NEUROLOGY [address]  Scheduled Appointment: 09/27/2023    [facility]  NEUROLOGY [address]  Scheduled Appointment: 10/11/2023    [facility]  NEUROLOGY [address]  Scheduled Appointment: 10/25/2023

## 2023-07-27 NOTE — PROGRESS NOTE ADULT - SUBJECTIVE AND OBJECTIVE BOX
Neurology Progress Note    SUBJECTIVE/OBJECTIVE/INTERVAL EVENTS: Patient seen and examined at bedside w/ neuro attending and team.     INTERVAL HISTORY:    REVIEW OF SYSTEMS: Otherwise denies fever, chills, headaches, vision changes, nausea, vomiting, hearing change, focal weakness, focal numbness, bowel/ bladder incontinence. Few questions of a 10-system ROS was performed and is negative except for those items noted above and/or in the HPI.    VITALS & EXAMINATION:  Vital Signs Last 24 Hrs  T(C): 36.4 (27 Jul 2023 05:00), Max: 36.7 (26 Jul 2023 08:33)  T(F): 97.6 (27 Jul 2023 05:00), Max: 98 (26 Jul 2023 08:33)  HR: 54 (27 Jul 2023 05:00) (54 - 69)  BP: 147/74 (27 Jul 2023 05:00) (132/60 - 151/71)  BP(mean): --  RR: 18 (27 Jul 2023 05:00) (18 - 18)  SpO2: 97% (27 Jul 2023 05:00) (97% - 99%)    Parameters below as of 27 Jul 2023 05:00  Patient On (Oxygen Delivery Method): room air        General:  Constitutional: Female, appears stated age, nontoxic, not in distress,    Head: Normocephalic;   Eyes: clear sclera;   Extremities: No cyanosis;   Resp: breathing comfortably     Neurological (>12):  MS: Awake, alert.  Oriented person place situation. Follows commands. Attends to examiner  Language: Speech is hypophonic, clear, fluent, good repetition,  comprehension, registration of words.  CNs: PERRL (R 3mm, L 3mm). VFF. EOMI. No disconjugate gaze, skew. V1-3 intact LT, No facial asymmetry b/l. Hearing grossly normal b/l. Tongue midline.     Motor - Normal bulk and tone throughout. No pronator drift.    L/R (out of 5)       Deltoid  5/5    Biceps   5/5      Triceps  5/5         Wrist Extension 5/5   Wrist Flexion  5/5   Interossei 5/5     5/5   L/R (out of 5)       Hip Flexion  5/5    Hip Extension  5/5  Knee Extension  5/5  Dorsiflexion  5/5      Plantar Flexion 5/5     Sensation: Intact to LT b/l. Cortical: Extinction on DSS (neglect): none  Reflexes L/R:  Biceps(C5) 2/2  BR(C6) 2/2   Triceps(C7)  2/2 Patellar(L4)   2/2   Toes: mute  Coordination: No dysmetria to FTN b/l UE  Gait: Normal Romberg. No postural instability. Normal stance.    LABORATORY:  CBC                       10.6   2.61  )-----------( 105      ( 27 Jul 2023 06:03 )             33.4     Chem 07-27    138  |  108  |  17  ----------------------------<  90  4.0   |  21<L>  |  0.70    Ca    8.8      27 Jul 2023 06:03      LFTs   Coagulopathy   Lipid Panel   A1c   Cardiac enzymes     U/A Urinalysis Basic - ( 27 Jul 2023 06:03 )    Color: x / Appearance: x / SG: x / pH: x  Gluc: 90 mg/dL / Ketone: x  / Bili: x / Urobili: x   Blood: x / Protein: x / Nitrite: x   Leuk Esterase: x / RBC: x / WBC x   Sq Epi: x / Non Sq Epi: x / Bacteria: x      CSF  Immunological  Other    STUDIES & IMAGING: (EEG, CT, MR, U/S, TTE/MAYURI): Neurology Progress Note    SUBJECTIVE/OBJECTIVE/INTERVAL EVENTS: Patient seen and examined at bedside w/ neuro attending and team.     INTERVAL HISTORY: No acute events overnight. Patient continues to deny urinary symptoms. She tolerated her 3rd dosage of IVIG yesterday well.     REVIEW OF SYSTEMS: Otherwise denies fever, chills, headaches, vision changes, nausea, vomiting, hearing change, focal weakness, focal numbness, bowel/ bladder incontinence. Few questions of a 10-system ROS was performed and is negative except for those items noted above and/or in the HPI.    VITALS & EXAMINATION:  Vital Signs Last 24 Hrs  T(C): 36.4 (27 Jul 2023 05:00), Max: 36.7 (26 Jul 2023 08:33)  T(F): 97.6 (27 Jul 2023 05:00), Max: 98 (26 Jul 2023 08:33)  HR: 54 (27 Jul 2023 05:00) (54 - 69)  BP: 147/74 (27 Jul 2023 05:00) (132/60 - 151/71)  BP(mean): --  RR: 18 (27 Jul 2023 05:00) (18 - 18)  SpO2: 97% (27 Jul 2023 05:00) (97% - 99%)    Parameters below as of 27 Jul 2023 05:00  Patient On (Oxygen Delivery Method): room air    Constitutional: Female, appears stated age, nontoxic, not in distress,    Head: Normocephalic;   Eyes: clear sclera;   Extremities: No cyanosis;   Resp: breathing comfortably     Neurological (>12):  MS: Awake, alert.  Oriented person place situation. Follows commands. Attends to examiner  Language: Speech is hypophonic, clear, fluent, good repetition,  comprehension, registration of words.  CNs: PERRL (R 3mm, L 3mm). VFF. EOMI. No disconjugate gaze, skew. V1-3 intact LT, No facial asymmetry b/l. Hearing grossly normal b/l. Tongue midline.   Motor - Normal bulk and tone throughout. No pronator drift.  L/R (out of 5)       Deltoid  5/5    Biceps   4/4      Triceps  4/5         Wrist Extension 4/4   Wrist Flexion  4/4     4/4   L/R (out of 5)       Hip Flexion  4/4    Hip Extension  4/4  Knee Extension  4/4  Dorsiflexion  4/4      Plantar Flexion 5/5   Sensation: Intact to LT b/l.  Coordination  Gait    LABORATORY:  CBC                       10.6   2.61  )-----------( 105      ( 27 Jul 2023 06:03 )             33.4     Chem 07-27    138  |  108  |  17  ----------------------------<  90  4.0   |  21<L>  |  0.70    Ca    8.8      27 Jul 2023 06:03    U/A Urinalysis Basic - ( 27 Jul 2023 06:03 )    Color: x / Appearance: x / SG: x / pH: x  Gluc: 90 mg/dL / Ketone: x  / Bili: x / Urobili: x   Blood: x / Protein: x / Nitrite: x   Leuk Esterase: x / RBC: x / WBC x   Sq Epi: x / Non Sq Epi: x / Bacteria: x Neurology Progress Note    SUBJECTIVE/OBJECTIVE/INTERVAL EVENTS: Patient seen and examined at bedside w/ neuro attending and team.     INTERVAL HISTORY: No acute events overnight. She tolerated her 3rd dosage of IVIG yesterday well.     REVIEW OF SYSTEMS: Otherwise denies fever, chills, headaches, vision changes, nausea, vomiting, hearing change, focal weakness, focal numbness, bowel/ bladder incontinence. Few questions of a 10-system ROS was performed and is negative except for those items noted above and/or in the HPI.    VITALS & EXAMINATION:  Vital Signs Last 24 Hrs  T(C): 36.4 (27 Jul 2023 05:00), Max: 36.7 (26 Jul 2023 08:33)  T(F): 97.6 (27 Jul 2023 05:00), Max: 98 (26 Jul 2023 08:33)  HR: 54 (27 Jul 2023 05:00) (54 - 69)  BP: 147/74 (27 Jul 2023 05:00) (132/60 - 151/71)  BP(mean): --  RR: 18 (27 Jul 2023 05:00) (18 - 18)  SpO2: 97% (27 Jul 2023 05:00) (97% - 99%)    Parameters below as of 27 Jul 2023 05:00  Patient On (Oxygen Delivery Method): room air    Constitutional: Female, appears stated age, nontoxic, not in distress,    Head: Normocephalic;   Eyes: clear sclera;   Extremities: No cyanosis;   Resp: breathing comfortably     Neurological (>12):  MS: Awake, alert.  Oriented person place situation. Follows commands. Attends to examiner  Language: Speech is hypophonic, clear, fluent, good repetition,  comprehension, registration of words.  CNs: PERRL (R 3mm, L 3mm). VFF. EOMI. No disconjugate gaze, skew. V1-3 intact LT, No facial asymmetry b/l. Hearing grossly normal b/l. Tongue midline.   Motor - Normal bulk and tone throughout. No pronator drift.  L/R (out of 5)       Deltoid  5/5    Biceps   4/4      Triceps  4/5         Wrist Extension 4/4   Wrist Flexion  4/4     4/4   L/R (out of 5)       Hip Flexion  4+/4    Hip Extension  4/4  Knee Extension  4/4  Dorsiflexion  4/4      Plantar Flexion 5/5   Sensation: Intact to LT b/l.    LABORATORY:  CBC                       10.6   2.61  )-----------( 105      ( 27 Jul 2023 06:03 )             33.4     Chem 07-27    138  |  108  |  17  ----------------------------<  90  4.0   |  21<L>  |  0.70    Ca    8.8      27 Jul 2023 06:03    U/A Urinalysis Basic - ( 27 Jul 2023 06:03 )    Color: x / Appearance: x / SG: x / pH: x  Gluc: 90 mg/dL / Ketone: x  / Bili: x / Urobili: x   Blood: x / Protein: x / Nitrite: x   Leuk Esterase: x / RBC: x / WBC x   Sq Epi: x / Non Sq Epi: x / Bacteria: x

## 2023-07-27 NOTE — DISCHARGE NOTE PROVIDER - HOSPITAL COURSE
HPI : 82y F with Hx HTN, HLD, hypothyroidism, who presents w/ CC of weakness. She follows w/ Dr. Baugh for myasthenia gravis. She is prescribed Mestinon 60mg TID and 180mg q2d. However, since her L hip surgery in 3/2023, she has been taking the Mestinon 180mg every morning to counteract generalized weakness. Patient was diagnosed with MG in 10/2018. In the last 2 weeks she has been feeling progressively worsening generalized weakness, in upper extremities bilaterally > lower extremities bilaterally. Patient also had a fall with no LOC hitting her left eye (bruised and erythematous on exam). Patient denies any dysuria, hematuria, fever/chills, cough, weight loss, visual symptoms/diplopia/blurry vision, or other symptoms. Patient per daughter was able to ambulate without a cane prior to this (only using a cane for a few weeks post hip surgery) but then acutely decompensated requiring assistance with rising from a chair and ambulating over the last 2 weeks. Dr. Baugh saw the patient in the outpatient setting and recommended that she be admitted for a five day course of IVIG 2 g/kg and to continue with treatment with mestinon, 60 mg TID and 180 mg qhs. Respiratory therapy also consulted for NIF/VC. Patient also of note had Eculizumab (solaris) infusion on 7/19, has been receiving Solaris q2 weeks for MG since 2019 per patient.    HOSPITAL COURSE : Patient was admitted to inpatient neurology service for a 5 day course of IVIG (2g/kg). Patient was continued on her home medications of mestinon 60mg TID and 180mg QHS. Patient was noted to be pancytopenic through hospital stay and hematology recommended iron studies and follow up outpatient. Patient had a positive UA but was asymptomatic throughout hospital stay. Patient is safe for discharge.      HPI : 82y F with Hx HTN, HLD, hypothyroidism, who presents w/ CC of weakness. She follows w/ Dr. Baugh for myasthenia gravis. She is prescribed Mestinon 60mg TID and 180mg q2d. However, since her L hip surgery in 3/2023, she has been taking the Mestinon 180mg every morning to counteract generalized weakness. Patient was diagnosed with MG in 10/2018. In the last 2 weeks she has been feeling progressively worsening generalized weakness, in upper extremities bilaterally > lower extremities bilaterally. Patient also had a fall with no LOC hitting her left eye (bruised and erythematous on exam). Patient denies any dysuria, hematuria, fever/chills, cough, weight loss, visual symptoms/diplopia/blurry vision, or other symptoms. Patient per daughter was able to ambulate without a cane prior to this (only using a cane for a few weeks post hip surgery) but then acutely decompensated requiring assistance with rising from a chair and ambulating over the last 2 weeks. Dr. Baugh saw the patient in the outpatient setting and recommended that she be admitted for a five day course of IVIG 2 g/kg and to continue with treatment with mestinon, 60 mg TID and 180 mg qhs. Respiratory therapy also consulted for NIF/VC. Patient also of note had Eculizumab (solaris) infusion on 7/19, has been receiving Solaris q2 weeks for MG since 2019 per patient.    HOSPITAL COURSE : Patient was admitted to inpatient neurology service for a 5 day course of IVIG (2g/kg). Patient was continued on her home medications of mestinon 60mg TID and 180mg QHS. Patient was noted to be pancytopenic through hospital stay and hematology recommended iron studies and follow up outpatient. Patient had a positive UA but was asymptomatic throughout hospital stay. Patient's symptoms improved throughout the hospital course and is deemed is safe for discharge.      HPI : 82y F with Hx HTN, HLD, hypothyroidism, who presents w/ CC of weakness. She follows w/ Dr. Baugh for myasthenia gravis. She is prescribed Mestinon 60mg TID and 180mg q2d. However, since her L hip surgery in 3/2023, she has been taking the Mestinon 180mg every morning to counteract generalized weakness. Patient was diagnosed with MG in 10/2018. In the last 2 weeks she has been feeling progressively worsening generalized weakness, in upper extremities bilaterally > lower extremities bilaterally. Patient also had a fall with no LOC hitting her left eye (bruised and erythematous on exam). Patient denies any dysuria, hematuria, fever/chills, cough, weight loss, visual symptoms/diplopia/blurry vision, or other symptoms. Patient per daughter was able to ambulate without a cane prior to this (only using a cane for a few weeks post hip surgery) but then acutely decompensated requiring assistance with rising from a chair and ambulating over the last 2 weeks. Dr. Baugh saw the patient in the outpatient setting and recommended that she be admitted for a five day course of IVIG 2 g/kg and to continue with treatment with mestinon, 60 mg TID and 180 mg qhs. Respiratory therapy also consulted for NIF/VC. Patient also of note had Eculizumab (solaris) infusion on 7/19, has been receiving Solaris q2 weeks for MG since 2019 per patient.    HOSPITAL COURSE : Patient was admitted to inpatient neurology service for a 5 day course of IVIG (2g/kg). Patient tolerated the 5 day course well. Patient was continued on her home medications of mestinon 60mg TID and 180mg QHS. Patient was noted to be pancytopenic through hospital stay and hematology recommended iron studies and follow up outpatient. Patient had a positive UA but was asymptomatic throughout hospital stay. Patient's symptoms improved throughout the hospital course and is deemed is safe for discharge.      HPI : 82y F with Hx HTN, HLD, hypothyroidism, who presents w/ CC of weakness. She follows w/ Dr. Baugh for myasthenia gravis. She is prescribed Mestinon 60mg TID and 180mg q2d. However, since her L hip surgery in 3/2023, she has been taking the Mestinon 180mg every morning to counteract generalized weakness. Patient was diagnosed with MG in 10/2018. In the last 2 weeks she has been feeling progressively worsening generalized weakness, in upper extremities bilaterally > lower extremities bilaterally. Patient also had a fall with no LOC hitting her left eye (bruised and erythematous on exam). Patient denies any dysuria, hematuria, fever/chills, cough, weight loss, visual symptoms/diplopia/blurry vision, or other symptoms. Patient per daughter was able to ambulate without a cane prior to this (only using a cane for a few weeks post hip surgery) but then acutely decompensated requiring assistance with rising from a chair and ambulating over the last 2 weeks. Dr. Baugh saw the patient in the outpatient setting and recommended that she be admitted for a five day course of IVIG 2 g/kg and to continue with treatment with mestinon, 60 mg TID and 180 mg qhs. Respiratory therapy also consulted for NIF/VC. Patient also of note had Eculizumab (solaris) infusion on 7/19, has been receiving Solaris q2 weeks for MG since 2019 per patient.    HOSPITAL COURSE : Patient was admitted to inpatient neurology service for a 5 day course of IVIG (2g/kg) for myasthenia gravis exacerbation. Patient tolerated the 5 day course well. Patient was continued on her home medications of mestinon 60mg TID and 180mg QHS. Patient was noted to be pancytopenic through hospital stay and hematology recommended iron studies and follow up outpatient. Patient had a positive UA but was asymptomatic throughout hospital stay. Patient's symptoms improved throughout the hospital course and is deemed is safe for discharge.      HPI:   82y F with Hx HTN, HLD, hypothyroidism, who presents w/ CC of weakness. She follows w/ Dr. Baugh for myasthenia gravis. She is prescribed Mestinon 60mg TID and 180mg q2d. However, since her L hip surgery in 3/2023, she has been taking the Mestinon 180mg every morning to counteract generalized weakness. Patient was diagnosed with MG in 10/2018. In the last 2 weeks she has been feeling progressively worsening generalized weakness, in upper extremities bilaterally > lower extremities bilaterally. Patient also had a fall with no LOC hitting her left eye (bruised and erythematous on exam). Patient denies any dysuria, hematuria, fever/chills, cough, weight loss, visual symptoms/diplopia/blurry vision, or other symptoms. Patient per daughter was able to ambulate without a cane prior to this (only using a cane for a few weeks post hip surgery) but then acutely decompensated requiring assistance with rising from a chair and ambulating over the last 2 weeks. Dr. Baugh saw the patient in the outpatient setting and recommended that she be admitted for a five day course of IVIG 2 g/kg and to continue with treatment with Mestinon, 60 mg TID and 180 mg qhs. Respiratory therapy also consulted for NIF/VC. Patient also of note had Eculizumab (solaris) infusion on 7/19, has been receiving Solaris q2 weeks for MG since 2019 per patient.    HOSPITAL COURSE:   Patient was admitted to inpatient neurology service for a 5 day course of IVIG (2g/kg) for myasthenia gravis exacerbation. Patient tolerated the 5 day course well. Patient was continued on her home medications of Mestinon 60mg TID and 180mg QHS. Patient was noted to be pancytopenic through hospital stay and hematology recommended iron studies and follow up outpatient. Patient had a positive UA but was asymptomatic throughout hospital stay. Therefore, no antibiotics were started. Patient's symptoms improved throughout the hospital course and was deemed safe for discharge.

## 2023-07-27 NOTE — PROGRESS NOTE ADULT - ASSESSMENT
82 y old f/ w PMHX of HTN, HLD, hypothyroidism, MG (diag in 10/2018), who presented with 2 weeks of progressively worsening generalized weakness, admitted for IVIG treatment for 5 day course as per recommendations by her outpatient neurologist Dr. Baugh ([facility]). Patient had a left hip surgery in March 2023 and medical history prior to this admission has been complicated by infections (COVID, septic arthritis). Patient was receiving Solaris every 2 weeks (eculizumab, last infusion 7/19), and on Mestinon 60 mg TID and 180 mg qhs.     Impression: generalized weakness likely secondary to myasthenia gravis exacerbation, rule out other infectious, toxic, metabolic etiology     Plan:  [x] CXR - WNL (7/24)  [x] UA positive for luekocyte esterase and bacteria but patient has no symptoms (7/25)  [x] Continue with Mestinon 60 mg TID and 180 mg qhs   [x] IVIG (142g divided over 5 days, based on ideal body weight): Please premedicate with Benadryl and Tylenol (7/24, 7/25, 7/26 done)  [x] Please give IVIG course as early as possible on Friday morning for 3pm d/c  [x] NIF and VC (7/26 VC : 1.38 NIF -50)  [x] TSH (2.02 on 7/24) , continue w/ home synthroid  [x] Continue with statin --> converted from rosuvastatin to atorvastatin 80 mg qhs  [x] Continue with home losartan w/ hold parameters  [x] Cardiac monitoring with telemetry  [x] Avoid medications like fluoroquinonles, macrolides, amnioglycosides, chloroquines. Consider avoiding anti hypertensives such as beta blockers, CCB, and magnesium.    Preventative:  [] DVT - Lovenox     Discharge:  [] Outpatient PT    Patient case discussed Dr. Pollard. Final recommendations regarding management to be confirmed upon attending attestation.      82 y old f/ w PMHX of HTN, HLD, hypothyroidism, MG (diag in 10/2018), who presented with 2 weeks of progressively worsening generalized weakness, admitted for IVIG treatment for 5 day course as per recommendations by her outpatient neurologist Dr. Baugh ([facility]). Patient had a left hip surgery in March 2023 and medical history prior to this admission has been complicated by infections (COVID, septic arthritis). Patient was receiving Solaris every 2 weeks (eculizumab, last infusion 7/19), and on Mestinon 60 mg TID and 180 mg qhs.     Impression: generalized weakness likely secondary to myasthenia gravis exacerbation, rule out other infectious, toxic, metabolic etiology     Plan:  [x] CXR - WNL (7/24)  [x] UA positive for luekocyte esterase and bacteria but patient has no symptoms (7/25)  [x] Continue with Mestinon 60 mg TID and 180 mg qhs   [x] IVIG (142g divided over 5 days, based on ideal body weight): Please premedicate with Benadryl and Tylenol (7/24, 7/25, 7/26 done)  [x] Please give IVIG course as early as possible on Friday morning for 3pm d/c  [x] NIF and VC (7/26 VC : 1.38 NIF -50)  [x] TSH (2.02 on 7/24) , continue w/ home synthroid  [x] Continue with statin --> converted from rosuvastatin to atorvastatin 80 mg qhs  [x] Continue with home losartan w/ hold parameters  [x] Cardiac monitoring with telemetry  [x] Avoid medications like fluoroquinonles, macrolides, amnioglycosides, chloroquines. Consider avoiding anti hypertensives such as beta blockers, CCB, and magnesium  [ ] Heme consult for pancytopenia    Preventative:  [] DVT - Lovenox     Discharge:  [] Outpatient PT    Patient case discussed Dr. Pollard. Final recommendations regarding management to be confirmed upon attending attestation.      82 y old f/ w PMHX of HTN, HLD, hypothyroidism, MG (diag in 10/2018), who presented with 2 weeks of progressively worsening generalized weakness, admitted for IVIG treatment for 5 day course as per recommendations by her outpatient neurologist Dr. Baugh ([facility]). Patient had a left hip surgery in March 2023 and medical history prior to this admission has been complicated by infections (COVID, septic arthritis). Patient was receiving Solaris every 2 weeks (eculizumab, last infusion 7/19), and on Mestinon 60 mg TID and 180 mg qhs.     Impression: generalized weakness likely secondary to myasthenia gravis exacerbation, rule out other infectious, toxic, metabolic etiology     Plan:  [x] CXR - WNL (7/24)  [x] UA positive for luekocyte esterase and bacteria but patient has no symptoms (7/25)  [x] Continue with Mestinon 60 mg TID and 180 mg qhs   [x] IVIG (142g divided over 5 days, based on ideal body weight): Please premedicate with Benadryl and Tylenol (7/24, 7/25, 7/26 done)  [x] Please give IVIG course as early as possible on Friday morning for 3pm d/c  [x] NIF and VC (7/26 VC : 1.38 NIF -50)  [x] TSH (2.02 on 7/24) , continue w/ home synthroid  [x] Continue with statin --> converted from rosuvastatin to atorvastatin 80 mg qhs  [x] Continue with home losartan w/ hold parameters  [x] Cardiac monitoring with telemetry  [x] Avoid medications like fluoroquinonles, macrolides, amnioglycosides, chloroquines. Consider avoiding anti hypertensives such as beta blockers, CCB, and magnesium  [ ] Heme consult for pancytopenia - recommend repeat iron studies and supplemental iron with follow up outpatient     Preventative:  [] DVT - Lovenox     Discharge:  [] Outpatient PT    Patient case discussed Dr. Pollard. Final recommendations regarding management to be confirmed upon attending attestation.

## 2023-07-27 NOTE — DISCHARGE NOTE PROVIDER - NSDCCPGOAL_GEN_ALL_CORE_FT
Left voice mail on providers voice mail. Office number left on voice mail.    To get better and follow your care plan as instructed.

## 2023-07-27 NOTE — DISCHARGE NOTE PROVIDER - NSDCCPCAREPLAN_GEN_ALL_CORE_FT
PRINCIPAL DISCHARGE DIAGNOSIS  Diagnosis: Myasthenia gravis  Assessment and Plan of Treatment: You were treated for an exacebration of mysathenia gravis with a 5 day course of IVIG while being maintained on your home medications. Should you have any new or worsening symptoms, please return to your nearest emergency room.     PRINCIPAL DISCHARGE DIAGNOSIS  Diagnosis: Myasthenia gravis  Assessment and Plan of Treatment: You were treated for an exacebration of mysathenia gravis with a 5 day course of IVIG while being maintained on your home medications. You will follow up with your outpatient neurologist, Dr. Baugh. Should you have any new or worsening symptoms, please return to your nearest emergency room.     PRINCIPAL DISCHARGE DIAGNOSIS  Diagnosis: Myasthenia gravis  Assessment and Plan of Treatment: You were treated for an exacebration of mysathenia gravis with a 5 day course of IVIG while being maintained on your home medications. You tolerated the 5 day course well. You will follow up with your outpatient neurologist, Dr. Baugh. Should you have any new or worsening symptoms, please return to your nearest emergency room.     PRINCIPAL DISCHARGE DIAGNOSIS  Diagnosis: Myasthenia gravis  Assessment and Plan of Treatment: You were treated for an exacebration of mysathenia gravis with a 5 day course of IVIG while being maintained on your home medications. You tolerated the 5-day course well. You will follow-up with your outpatient neurologist, Dr. Baugh. Should you have any new or worsening symptoms, please return to your nearest emergency room.

## 2023-07-28 ENCOUNTER — TRANSCRIPTION ENCOUNTER (OUTPATIENT)
Age: 83
End: 2023-07-28

## 2023-07-28 VITALS
DIASTOLIC BLOOD PRESSURE: 66 MMHG | TEMPERATURE: 98 F | RESPIRATION RATE: 18 BRPM | SYSTOLIC BLOOD PRESSURE: 142 MMHG | OXYGEN SATURATION: 96 % | HEART RATE: 77 BPM

## 2023-07-28 LAB
HCT VFR BLD CALC: 29.4 % — LOW (ref 34.5–45)
HGB BLD-MCNC: 9.4 G/DL — LOW (ref 11.5–15.5)
MCHC RBC-ENTMCNC: 28.7 PG — SIGNIFICANT CHANGE UP (ref 27–34)
MCHC RBC-ENTMCNC: 32 GM/DL — SIGNIFICANT CHANGE UP (ref 32–36)
MCV RBC AUTO: 89.9 FL — SIGNIFICANT CHANGE UP (ref 80–100)
NRBC # BLD: 0 /100 WBCS — SIGNIFICANT CHANGE UP (ref 0–0)
PLATELET # BLD AUTO: 95 K/UL — LOW (ref 150–400)
RBC # BLD: 3.27 M/UL — LOW (ref 3.8–5.2)
RBC # FLD: 14.6 % — HIGH (ref 10.3–14.5)
WBC # BLD: 2.85 K/UL — LOW (ref 3.8–10.5)
WBC # FLD AUTO: 2.85 K/UL — LOW (ref 3.8–10.5)

## 2023-07-28 PROCEDURE — 97161 PT EVAL LOW COMPLEX 20 MIN: CPT

## 2023-07-28 PROCEDURE — 85730 THROMBOPLASTIN TIME PARTIAL: CPT

## 2023-07-28 PROCEDURE — 99232 SBSQ HOSP IP/OBS MODERATE 35: CPT

## 2023-07-28 PROCEDURE — 94150 VITAL CAPACITY TEST: CPT

## 2023-07-28 PROCEDURE — 70480 CT ORBIT/EAR/FOSSA W/O DYE: CPT

## 2023-07-28 PROCEDURE — 84443 ASSAY THYROID STIM HORMONE: CPT

## 2023-07-28 PROCEDURE — 82962 GLUCOSE BLOOD TEST: CPT

## 2023-07-28 PROCEDURE — 80048 BASIC METABOLIC PNL TOTAL CA: CPT

## 2023-07-28 PROCEDURE — 80053 COMPREHEN METABOLIC PANEL: CPT

## 2023-07-28 PROCEDURE — 83735 ASSAY OF MAGNESIUM: CPT

## 2023-07-28 PROCEDURE — 70450 CT HEAD/BRAIN W/O DYE: CPT

## 2023-07-28 PROCEDURE — 82550 ASSAY OF CK (CPK): CPT

## 2023-07-28 PROCEDURE — 81001 URINALYSIS AUTO W/SCOPE: CPT

## 2023-07-28 PROCEDURE — 85027 COMPLETE CBC AUTOMATED: CPT

## 2023-07-28 PROCEDURE — 83540 ASSAY OF IRON: CPT

## 2023-07-28 PROCEDURE — 97116 GAIT TRAINING THERAPY: CPT

## 2023-07-28 PROCEDURE — 36415 COLL VENOUS BLD VENIPUNCTURE: CPT

## 2023-07-28 PROCEDURE — 71046 X-RAY EXAM CHEST 2 VIEWS: CPT

## 2023-07-28 PROCEDURE — 97530 THERAPEUTIC ACTIVITIES: CPT

## 2023-07-28 PROCEDURE — 84100 ASSAY OF PHOSPHORUS: CPT

## 2023-07-28 PROCEDURE — 0225U NFCT DS DNA&RNA 21 SARSCOV2: CPT

## 2023-07-28 PROCEDURE — 83550 IRON BINDING TEST: CPT

## 2023-07-28 PROCEDURE — 97165 OT EVAL LOW COMPLEX 30 MIN: CPT

## 2023-07-28 PROCEDURE — 99285 EMERGENCY DEPT VISIT HI MDM: CPT

## 2023-07-28 PROCEDURE — 93005 ELECTROCARDIOGRAM TRACING: CPT

## 2023-07-28 PROCEDURE — 85025 COMPLETE CBC W/AUTO DIFF WBC: CPT

## 2023-07-28 PROCEDURE — 85610 PROTHROMBIN TIME: CPT

## 2023-07-28 PROCEDURE — 97535 SELF CARE MNGMENT TRAINING: CPT

## 2023-07-28 PROCEDURE — 82728 ASSAY OF FERRITIN: CPT

## 2023-07-28 RX ORDER — FLUTICASONE FUROATE AND VILANTEROL TRIFENATATE 100; 25 UG/1; UG/1
1 POWDER RESPIRATORY (INHALATION)
Qty: 0 | Refills: 0 | DISCHARGE

## 2023-07-28 RX ORDER — FERROUS SULFATE 325(65) MG
1 TABLET ORAL
Qty: 30 | Refills: 0
Start: 2023-07-28

## 2023-07-28 RX ADMIN — Medication 75 MICROGRAM(S): at 05:11

## 2023-07-28 RX ADMIN — PYRIDOSTIGMINE BROMIDE 60 MILLIGRAM(S): 60 SOLUTION ORAL at 14:04

## 2023-07-28 RX ADMIN — Medication 650 MILLIGRAM(S): at 10:00

## 2023-07-28 RX ADMIN — LOSARTAN POTASSIUM 25 MILLIGRAM(S): 100 TABLET, FILM COATED ORAL at 05:11

## 2023-07-28 RX ADMIN — Medication 325 MILLIGRAM(S): at 11:35

## 2023-07-28 RX ADMIN — Medication 650 MILLIGRAM(S): at 08:47

## 2023-07-28 RX ADMIN — PANTOPRAZOLE SODIUM 40 MILLIGRAM(S): 20 TABLET, DELAYED RELEASE ORAL at 05:11

## 2023-07-28 RX ADMIN — PYRIDOSTIGMINE BROMIDE 60 MILLIGRAM(S): 60 SOLUTION ORAL at 05:11

## 2023-07-28 RX ADMIN — PYRIDOSTIGMINE BROMIDE 180 MILLIGRAM(S): 60 SOLUTION ORAL at 05:11

## 2023-07-28 RX ADMIN — IMMUNE GLOBULIN (HUMAN) 33.33 GRAM(S): 10 INJECTION INTRAVENOUS; SUBCUTANEOUS at 09:32

## 2023-07-28 NOTE — PROGRESS NOTE ADULT - ASSESSMENT
82 y old f/ w PMHX of HTN, HLD, hypothyroidism, MG (diag in 10/2018), who presented with 2 weeks of progressively worsening generalized weakness, admitted for IVIG treatment for 5 day course as per recommendations by her outpatient neurologist Dr. Baugh ([facility]). Patient had a left hip surgery in March 2023 and medical history prior to this admission has been complicated by infections (COVID, septic arthritis). Patient was receiving Solaris every 2 weeks (eculizumab, last infusion 7/19), and on Mestinon 60 mg TID and 180 mg qhs.     Impression: generalized weakness likely secondary to myasthenia gravis exacerbation, rule out other infectious, toxic, metabolic etiology     Plan:  [x] CXR - WNL (7/24)  [x] UA positive for luekocyte esterase and bacteria but patient has no symptoms (7/25)  [x] Continue with Mestinon 60 mg TID and 180 mg qhs   [x] IVIG (142g divided over 5 days, based on ideal body weight): Please premedicate with Benadryl and Tylenol (7/24, 7/25, 7/26, 7/27 done)  [x] Please give IVIG course as early as possible on Friday morning for 3pm d/c  [x] NIF and VC (7/26 VC : 1.38 NIF -50)  [x] TSH (2.02 on 7/24) , continue w/ home synthroid  [x] Continue with statin --> converted from rosuvastatin to atorvastatin 80 mg qhs  [x] Continue with home losartan w/ hold parameters  [x] Cardiac monitoring with telemetry  [x] Avoid medications like fluoroquinonles, macrolides, amnioglycosides, chloroquines. Consider avoiding anti hypertensives such as beta blockers, CCB, and magnesium  [ ] Heme consult for pancytopenia - recommend repeat iron studies and supplemental iron with follow up outpatient     Preventative:  [] DVT - Lovenox     Discharge:  [] Outpatient PT    Patient case discussed Dr. Pollard. Final recommendations regarding management to be confirmed upon attending attestation.      82 y old f/ w PMHX of HTN, HLD, hypothyroidism, MG (diag in 10/2018), who presented with 2 weeks of progressively worsening generalized weakness, admitted for IVIG treatment for 5 day course as per recommendations by her outpatient neurologist Dr. Baugh ([facility]). Patient had a left hip surgery in March 2023 and medical history prior to this admission has been complicated by infections (COVID, septic arthritis). Patient was receiving Solaris every 2 weeks (eculizumab, last infusion 7/19), and on Mestinon 60 mg TID and 180 mg qhs.     Impression: generalized weakness likely secondary to myasthenia gravis exacerbation, rule out other infectious, toxic, metabolic etiology     Plan:  [x] CXR - WNL (7/24)  [x] UA positive for luekocyte esterase and bacteria but patient has no symptoms (7/25)  [x] Continue with Mestinon 60 mg TID and 180 mg qhs   [x] IVIG (142g divided over 5 days, based on ideal body weight): Please premedicate with Benadryl and Tylenol (7/24, 7/25, 7/26, 7/27 done)  [x] Please give IVIG course as early as possible on Friday morning for 3pm d/c  [x] NIF and VC (7/27 VC : 1.51 NIF -50)  [x] TSH (2.02 on 7/24) , continue w/ home synthroid  [x] Continue with statin --> converted from rosuvastatin to atorvastatin 80 mg qhs  [x] Continue with home losartan w/ hold parameters  [x] Cardiac monitoring with telemetry  [x] Avoid medications like fluoroquinonles, macrolides, amnioglycosides, chloroquines. Consider avoiding anti hypertensives such as beta blockers, CCB, and magnesium  [x ] Heme consult for pancytopenia - recommend repeat iron studies and supplemental iron with follow up outpatient     Preventative:  [] DVT - Lovenox     Discharge:  [] Outpatient PT    Patient case discussed Dr. Pollard. Final recommendations regarding management to be confirmed upon attending attestation.

## 2023-07-28 NOTE — DISCHARGE NOTE NURSING/CASE MANAGEMENT/SOCIAL WORK - PATIENT PORTAL LINK FT
You can access the FollowMyHealth Patient Portal offered by [facility] by registering at the following website: [email or web address]. By joining [facility]’s FollowMyHealth portal, you will also be able to view your health information using other applications (apps) compatible with our system.

## 2023-07-28 NOTE — PROGRESS NOTE ADULT - SUBJECTIVE AND OBJECTIVE BOX
Neurology Progress Note    SUBJECTIVE/OBJECTIVE/INTERVAL EVENTS: Patient seen and examined at bedside w/ neuro attending and team.     INTERVAL HISTORY: No acute events overnight. Patient tolerated yesterday's IVIG treatment well. Today is last day of IVIG.    REVIEW OF SYSTEMS: Otherwise denies fever, chills, headaches, vision changes, nausea, vomiting, hearing change, focal weakness, focal numbness, bowel/ bladder incontinence. Few questions of a 10-system ROS was performed and is negative except for those items noted above and/or in the HPI.    VITALS & EXAMINATION:  Vital Signs Last 24 Hrs  T(C): 36.6 (28 Jul 2023 04:51), Max: 36.9 (27 Jul 2023 16:30)  T(F): 97.9 (28 Jul 2023 04:51), Max: 98.4 (27 Jul 2023 16:30)  HR: 55 (28 Jul 2023 04:51) (55 - 65)  BP: 134/64 (28 Jul 2023 04:51) (129/69 - 166/77)  BP(mean): --  RR: 18 (28 Jul 2023 04:51) (18 - 18)  SpO2: 96% (28 Jul 2023 04:51) (96% - 98%)    Parameters below as of 28 Jul 2023 04:51  Patient On (Oxygen Delivery Method): room air    Constitutional: Female, appears stated age, nontoxic, not in distress,    Head: Normocephalic;   Eyes: clear sclera;   Extremities: No cyanosis;   Resp: breathing comfortably     Neurological (>12):  MS: Awake, alert.  Oriented person place situation. Follows commands. Attends to examiner  Language: Speech is hypophonic, clear, fluent, good repetition,  comprehension, registration of words.  CNs: PERRL (R 3mm, L 3mm). VFF. EOMI. No disconjugate gaze, skew. V1-3 intact LT, No facial asymmetry b/l. Hearing grossly normal b/l. Tongue midline.     Motor - Normal bulk and tone throughout. No pronator drift.    L/R (out of 5)       Deltoid  5/5    Biceps   5/5      Triceps  5/5         Wrist Extension 5/5   Wrist Flexion  5/5   Interossei 5/5     5/5   L/R (out of 5)       Hip Flexion  5/5    Hip Extension  5/5  Knee Extension  5/5  Dorsiflexion  5/5      Plantar Flexion 5/5     Sensation: Intact to LT b/l. Cortical: Extinction on DSS (neglect): none  Reflexes L/R:  Biceps(C5) 2/2  BR(C6) 2/2   Triceps(C7)  2/2 Patellar(L4)   2/2   Toes: mute  Coordination: No dysmetria to FTN b/l UE      LABORATORY:  CBC                       9.4    2.85  )-----------( 95       ( 28 Jul 2023 06:42 )             29.4     Chem 07-27    138  |  108  |  17  ----------------------------<  90  4.0   |  21<L>  |  0.70    Ca    8.8      27 Jul 2023 06:03    Iron study  Ferritin (07.27.23 @ 12:52) Ferritin: 36 ng/mL  % Saturation, Iron: 13 %  Iron - Total Binding Capacity.: 279 ug/dL  Iron Total: 35 ug/dL  Unsaturated Iron Binding Capacity: 243 ug/dL    U/A Urinalysis Basic - ( 27 Jul 2023 06:03 )    Color: x / Appearance: x / SG: x / pH: x  Gluc: 90 mg/dL / Ketone: x  / Bili: x / Urobili: x   Blood: x / Protein: x / Nitrite: x   Leuk Esterase: x / RBC: x / WBC x   Sq Epi: x / Non Sq Epi: x / Bacteria: x   Neurology Progress Note    SUBJECTIVE/OBJECTIVE/INTERVAL EVENTS: Patient seen and examined at bedside w/ neuro attending and team.     INTERVAL HISTORY: No acute events overnight. Patient tolerated yesterday's IVIG treatment well. Today is last day of IVIG.    REVIEW OF SYSTEMS: Otherwise denies fever, chills, headaches, vision changes, nausea, vomiting, hearing change, focal weakness, focal numbness, bowel/ bladder incontinence. Few questions of a 10-system ROS was performed and is negative except for those items noted above and/or in the HPI.    VITALS & EXAMINATION:  Vital Signs Last 24 Hrs  T(C): 36.6 (28 Jul 2023 04:51), Max: 36.9 (27 Jul 2023 16:30)  T(F): 97.9 (28 Jul 2023 04:51), Max: 98.4 (27 Jul 2023 16:30)  HR: 55 (28 Jul 2023 04:51) (55 - 65)  BP: 134/64 (28 Jul 2023 04:51) (129/69 - 166/77)  BP(mean): --  RR: 18 (28 Jul 2023 04:51) (18 - 18)  SpO2: 96% (28 Jul 2023 04:51) (96% - 98%)    Parameters below as of 28 Jul 2023 04:51  Patient On (Oxygen Delivery Method): room air    Constitutional: Female, appears stated age, nontoxic, not in distress,    Head: Normocephalic;   Eyes: clear sclera;   Extremities: No cyanosis;   Resp: breathing comfortably     Neurological (>12):  MS: Awake, alert.  Oriented person place situation. Follows commands. Attends to examiner  Language: Speech is hypophonic, clear, fluent, good repetition,  comprehension, registration of words.  CNs: PERRL (R 3mm, L 3mm). VFF. EOMI. No disconjugate gaze, skew. V1-3 intact LT, No facial asymmetry b/l. Hearing grossly normal b/l. Tongue midline.     Motor - Normal bulk and tone throughout. No pronator drift.    L/R (out of 5)       Deltoid  4+/-      4/4   L/R (out of 5)       Hip Flexion  4+/4+     Sensation: Intact to LT b/l. Cortical: Extinction on DSS (neglect): none  Coordination: No dysmetria to FTN b/l UE      LABORATORY:  CBC                       9.4    2.85  )-----------( 95       ( 28 Jul 2023 06:42 )             29.4     Chem 07-27    138  |  108  |  17  ----------------------------<  90  4.0   |  21<L>  |  0.70    Ca    8.8      27 Jul 2023 06:03    Iron study  Ferritin (07.27.23 @ 12:52) Ferritin: 36 ng/mL  % Saturation, Iron: 13 %  Iron - Total Binding Capacity.: 279 ug/dL  Iron Total: 35 ug/dL  Unsaturated Iron Binding Capacity: 243 ug/dL    U/A Urinalysis Basic - ( 27 Jul 2023 06:03 )    Color: x / Appearance: x / SG: x / pH: x  Gluc: 90 mg/dL / Ketone: x  / Bili: x / Urobili: x   Blood: x / Protein: x / Nitrite: x   Leuk Esterase: x / RBC: x / WBC x   Sq Epi: x / Non Sq Epi: x / Bacteria: x

## 2023-07-28 NOTE — PROGRESS NOTE ADULT - ATTENDING COMMENTS
81 yo F w/ hx of MG (on mestinon and soliris, followed by Dr Baugh) admitted for 2-3 week hx of progressive proximal UE and LE weakness and fall x 1, concern for MG exacerbation and sent in by Dr Baugh for IVIG treatment.     Her hx is significant for L hip replacement surgery in 3/2023 and subsequently developed COVID-19 infection and then admitted 5/2023 for L septic hip joint.     S/O: Pt stable, resting in bed, receiving last dose of IVIG this AM. Doing well and feels stronger, eager to go home.     On exam, she is alert, attentive and conversational memory is intact. No facial asymmetry, + eyelid closure weakness, no ptosis, L eye ecchymosis - improved (recent fall), no dysphonia, EOMI.   Motor strength: R deltoid (pt deferred due to IV line), L deltoid 4+/5, hand  4/5 b/l                          b/l HF 4/5, DF 5/5    Imp:- MG exacerbation, likely in the setting of recent infections?  Plan:-  Last dose of IVIG this AM.   Chronic pancytopenia, consulted hem/onc, recommend iron supplements and outpatient work up.     Dispo: Home with outpatient PT. Stable for DC home today after IVIG. Will follow up with Dr Baugh outpatient.
83 yo F w/ hx of MG (on mestinon and soliris, followed by Dr Baugh) admitted for 2-3 week hx of progressive proximal UE and LE weakness and fall x 1, concern for MG exacerbation and sent in by Dr Baugh for IVIG treatment.     Her hx is significant for L hip replacement surgery in 3/2023 and subsequently developed COVID-19 infection and then admitted 5/2023 for L septic hip joint.     S/O: Pt stable. Feels stronger in legs. Labs significant for pancytopenia- ? chronic (since 2019)    On exam, she is alert, attentive and conversational memory is intact. No facial asymmetry, + eyelid closure weakness, no ptosis, L eye ecchymosis (recent fall), no dysphonia, EOMI. Deltoids and biceps 4/5 and hand  5/5, HF 4+/5 and distally 5/5 bilaterally.     Imp:- MG exacerbation, likely in the setting of recent infections?  Plan:-  [] Continue IVIG 2g/kg divided over 5 days, 1st dose 7/24.   [] PT/OT  [] Pancytopenia- ? 2/2 Soliris. Will consult hematology.     Dispo: Home with outpatient PT, after completion of IVIG on 7/28 (Friday)
83 yo F w/ hx of MG (on mestinon and soliris, followed by Dr Baugh) admitted for 2-3 week hx of progressive proximal UE and LE weakness and fall x 1, concern for possible MG exacerbation, and Dr Baugh recommended IVIG treatment.    Her hx is significant for L hip replacement surgery in 3/2023 and subsequently developed COVID-19 infection and then admitted 5/2023 for L septic hip joint.     S/O: She remains stable, does endorse feeling stronger. Tolerating IVIG. Fluctuating leukopenia, likely 2/2 dilution - will continue to monitor. UA with large LE, many bacteria and 37 wbc; she denies any symptoms of UTI- will monitor, will follow culture.     On exam, she is alert, attentive and conversational memory is intact (able to provide medical history). No facial asymmetry, + eyelid closure weakness, no ptosis, L eye ecchymosis (recent fall), no dysphonia, EOMI. Deltoids and biceps 4/5 and hand  5/5, HF 4+/5 and distally 5/5 bilaterally.     Imp:- MG exacerbation, likely in the setting of recent infections?  Plan:-  [] Continue IVIG 2g/kg divided over 5 days, 1st dose 7/24.   [] PT/OT  [] UA culture pending. No symptoms or UTI.     Dispo: Home with outpatient PT, after completion of IVIG on 7/28 (Friday)

## 2023-07-31 ENCOUNTER — NON-APPOINTMENT (OUTPATIENT)
Age: 83
End: 2023-07-31

## 2023-08-01 ENCOUNTER — APPOINTMENT (OUTPATIENT)
Dept: ORTHOPEDIC SURGERY | Facility: CLINIC | Age: 83
End: 2023-08-01
Payer: MEDICARE

## 2023-08-01 VITALS
DIASTOLIC BLOOD PRESSURE: 75 MMHG | HEART RATE: 75 BPM | SYSTOLIC BLOOD PRESSURE: 137 MMHG | TEMPERATURE: 97.9 F | WEIGHT: 144 LBS | BODY MASS INDEX: 31.07 KG/M2 | HEIGHT: 57 IN

## 2023-08-01 DIAGNOSIS — Z51.89 ENCOUNTER FOR OTHER SPECIFIED AFTERCARE: ICD-10-CM

## 2023-08-01 PROCEDURE — 99213 OFFICE O/P EST LOW 20 MIN: CPT

## 2023-08-02 ENCOUNTER — APPOINTMENT (OUTPATIENT)
Dept: NEUROLOGY | Facility: CLINIC | Age: 83
End: 2023-08-02
Payer: MEDICARE

## 2023-08-02 VITALS
SYSTOLIC BLOOD PRESSURE: 117 MMHG | HEART RATE: 56 BPM | WEIGHT: 144 LBS | BODY MASS INDEX: 31.07 KG/M2 | DIASTOLIC BLOOD PRESSURE: 56 MMHG | HEIGHT: 57 IN

## 2023-08-02 PROBLEM — Z51.89 VISIT FOR WOUND CHECK: Status: ACTIVE | Noted: 2023-06-06

## 2023-08-02 PROCEDURE — 96365 THER/PROPH/DIAG IV INF INIT: CPT

## 2023-08-02 PROCEDURE — 99213 OFFICE O/P EST LOW 20 MIN: CPT | Mod: 25

## 2023-08-02 NOTE — REVIEW OF SYSTEMS
[Facial Weakness] : facial weakness [Arm Weakness] : arm weakness [Hand Weakness] :  hand weakness [Leg Weakness] : leg weakness [Difficulty Walking] : difficulty walking [Negative] : Heme/Lymph

## 2023-08-02 NOTE — ASSESSMENT
[FreeTextEntry1] : Mrs. Mahan is an 82-year-old woman who suffers from acetylcholine receptor positive myasthenia gravis.  She has been maintained on pyridostigmine and eculizumab with excellent results.  Following a hip replacement a few months ago, she appeared to be failing a bit prompting treatment with IVIG last week.  She feels very well.  Her MG ADL score today was 4.  I suggested continuing her current pyridostigmine regimen and eculizumab infusions every 2 weeks.  She will be reassessed in 2 weeks for worsening.  If so, IVIG will be administered again.  Further management will depend upon her clinical course.

## 2023-08-02 NOTE — HISTORY OF PRESENT ILLNESS
[___ Months Post Op] : [unfilled] months post op [Doing Well] : is doing well [No Sign of Infection] : is showing no signs of infection [Adequate Pain Control] : has adequate pain control [de-identified] : Revision left total hip replacement 5/18/2023 The patient is a 82 yr old female presents today for incisional evaluation. She is s/p Revision of the Left anterior Total hip Replacement on 5/18/2023 [de-identified] : The patient is doing well except she is feeling pinching from the sutures. She reports she feels 4-5 sutures that are coming through the incision.  She would also like to start PT.  [de-identified] :  The incision site is without redness, warmth or drainage. There are 4-5 sutures trying to come through the incision (+) swelling to the proximal end of the incision The hip flexes to 90 degrees with minimal discomfort. The external and internal rotation of the left hip is with minimal discomfort and stiffness. Leg lengths appear equal. There is no evidence of infection or cellulitis. The calf is supple and without tenderness, warmth or redness.  [de-identified] : No radiographs were obtained [de-identified] : Dr Dinero Was present for the evaluation and he removed the undissolved suture from the proximal end of the incision. A dry dressing was applied. The remaining sutures should come through on their own or the patient will return to the office. The patient was advised of the nature of the healing process.  The patient was advised to continue with outpatient physical therapy and home exercises as recommended. A PT referral was provided  Patient will make an appointment to follow up as needed  Patient verbalized understanding of all instructions and all of her questions were addressed to her satisfaction. The patient was advised to call the office with any further questions or concerns. My cumulative time spent on this patient's visit included: Preparation for the visit, review of the medical records, review of pertinent diagnostic studies, examination and counseling of the patient on the above diagnosis, treatment plan and prognosis, orders of diagnostic tests, medications and/or appropriate procedures and documentation in the medical records of today's visit.  Not including time spent for procedures

## 2023-08-02 NOTE — PHYSICAL EXAM
[FreeTextEntry1] : Constitutional:  Patient was well-developed, well-nourished and in no acute distress.   Head:  Normocephalic. Tympanic membranes were clear.  There was a left periorbital ecchymosis and conjunctival injection.  Neck:  Supple with limited movement.  Cardiovascular:  Cardiac rhythm was regular without murmur. There were no carotid bruits. Peripheral pulses were full and symmetric.   Respiratory:  Lungs were clear.   Abdomen:  Soft and nontender.   Spine:  Nontender.   Skin:  There were no rashes.   NEUROLOGICAL EXAMINATION:  Mental Status: Patient was alert and oriented. Speech was fluent. There was no dysarthria.   Cranial Nerves:   II: She could finger count bilaterally.  Pupils were surgical but reactive. Visual fields were full.  Fundi were not examined.  III, IV, VI:  Eye movements were full without nystagmus.   V: Facial sensation was intact.   VII: Facial strength was normal except for bilateral rather pronounced orbicularis oculi weakness.   VIII: Hearing was equal.   IX, X: Palatal movement was normal. Phonation was normal.   XI: Neck flexion was quite weak but extension strong.  XII: Tongue was midline and movements normal. There was no lingual atrophy or fasciculations.   Motor Examination: Muscle bulk, tone and strength were normal.  Sensory Examination: Pinprick, vibration and joint position sense were intact.   Reflexes: DTRs were 2 throughout except for absent ankle jerks.   Plantar Responses: Plantar responses were flexor.   Coordination/Cerebellar Function: There was no dysmetria on finger to nose testing.   Gait/Stance: Gait was mildly small stepped and stable.

## 2023-08-02 NOTE — HISTORY OF PRESENT ILLNESS
[FreeTextEntry1] : Mrs. Alanna Mahna returned to the office having been last seen on July 21, 2023.  She is an 82-year-old right-handed patient who was under the care of Dr. Baron Fitzgerald since September 20, 2019.  She was accompanied to the office by her daughter Patsy ([phone]).  In approximately 2018, she developed hoarseness, dysphagia, mastication fatigue and limb weakness.  She was diagnosed with myasthenia gravis by Dr. Long Reyes and was treated with pyridostigmine.  In August 2019, she developed myasthenic crisis prompting admission to [facility].  She was treated with IVIG and prednisone.  Pyridostigmine was continued.  She was then vaccinated against meningococcus and begun on eculizumab.  When seen on November 15, 2022, she was on pyridostigmine 180 mg every other day and 60 mg 3 times a day.  She was receiving eculizumab every 2 weeks.  She complained of occasional hand weakness and chronic low back pain.  She was evaluated by pain management and orthopedics and was administered an epidural steroid injection.  She was well until early March 2023 when she was admitted for an elective left hip replacement.  The procedure was aborted when she developed bradycardia.  She underwent a cardiac evaluation and was cleared.  She underwent left hip replacement on March 27, 2023.  She was discharged to rehabilitation after 2 days where she contracted COVID-19 pneumonia.  She was treated with antibiotics.  She subsequently developed a wound infection.  On May 18, she underwent debridement of a superficial infection and was treated with intravenous antibiotics.  At her July 21, 2023 visit, she complained of fatigue.  She was having difficulty feeding herself because her hands and arms had become weak.  Her legs were also fatigued.  She complained of occasional limb numbness.  She denied diplopia, ptosis or dysphagia.  She was administered 2 g/kg of IVIG from July 24 to July 28.  She tolerated the infusion well.  She reports feeling stronger.  Today, her MG ADL score was 4.  She had difficulty rising from a seated position and complained of rare episodes of choking.  She received eculizumab infusion earlier today.  Past surgical history is notable for bilateral cataract extractions, bilateral carpal tunnel release, hysterectomy, bladder resuspension, tonsillectomy, bilateral shoulder replacements, 4 lumbar and 1 cervical procedure, right hip replacement, procedure on her left foot, cholecystectomy, herniorrhaphy and excision of squamous and basal carcinomas.  Medical problems include hypertension, hyperlipidemia, hypothyroidism and COVID-19 in 2020.  Medications include pyridostigmine extended release 180 mg daily, pyridostigmine 60 mg 3 times a day, levothyroxine 75 mcg daily, Cozaar 50 mg daily, rosuvastatin 40 mg/day and eculizumab infusions every 2 weeks.    She has an allergy to iodine.  She is  and is a retired seamstress.  She is a non-smoker and drinks wine.  Family history is notable for stroke thyroid disease and bladder cancer.

## 2023-08-16 ENCOUNTER — APPOINTMENT (OUTPATIENT)
Dept: NEUROLOGY | Facility: CLINIC | Age: 83
End: 2023-08-16
Payer: MEDICARE

## 2023-08-16 VITALS — SYSTOLIC BLOOD PRESSURE: 158 MMHG | DIASTOLIC BLOOD PRESSURE: 61 MMHG | HEART RATE: 58 BPM

## 2023-08-16 PROCEDURE — 96413 CHEMO IV INFUSION 1 HR: CPT

## 2023-08-16 PROCEDURE — 99214 OFFICE O/P EST MOD 30 MIN: CPT | Mod: 25

## 2023-08-16 NOTE — PHYSICAL EXAM
[FreeTextEntry1] : Constitutional:  Patient was well-developed, well-nourished and in no acute distress.   Head:  Normocephalic. Tympanic membranes were clear.  There was a left periorbital ecchymosis and conjunctival injection.  Neck:  Supple with limited movement.  Cardiovascular:  Cardiac rhythm was regular without murmur. There were no carotid bruits. Peripheral pulses were full and symmetric.   Respiratory:  Lungs were clear.   Abdomen:  Soft and nontender.   Spine:  Nontender.   Skin:  There were no rashes.   NEUROLOGICAL EXAMINATION:  Mental Status: Patient was alert and oriented. Speech was fluent. There was no dysarthria.   Cranial Nerves:   II: She could finger count bilaterally.  Pupils were surgical but reactive. Visual fields were full.  Fundi were not examined.  III, IV, VI:  Eye movements were full without nystagmus.   V: Facial sensation was intact.   VII: Facial strength was normal except for bilateral rather pronounced orbicularis oculi weakness.   VIII: Hearing was equal.   IX, X: Palatal movement was normal. Phonation was normal.   XI: Neck flexion was quite weak but extension strong.  XII: Tongue was midline and movements normal. There was no lingual atrophy or fasciculations.   Motor Examination: Muscle bulk, tone and strength were normal.  Sensory Examination: Pinprick, vibration and joint position sense were intact.   Reflexes: DTRs were 2 throughout except for absent ankle jerks.   Plantar Responses: Plantar responses were flexor.   Coordination/Cerebellar Function: There was no dysmetria on finger to nose testing.   Gait/Stance:Not tested

## 2023-08-16 NOTE — ASSESSMENT
[FreeTextEntry1] : Mrs. Mahan is an 82-year-old woman who suffers from acetylcholine receptor positive myasthenia gravis.  Her neuromuscular status appears relatively stable on a regimen of eculizumab every 2 weeks and pyridostigmine 360 mg/day in divided doses.  In view of her relative stability, I suggested cautious observation.  I will not administer IVIG at this time.  Her MG ADL score today was 6.  If her condition deteriorates between today and her next visit on August 30, I will recommend IVIG administration.  I discussed with Patsy other potential treatment options including prednisone, mycophenolate or azathioprine.  She is not very enthusiastic about these options.  I will encourage Mrs. Mahan to pursue physical therapy as tolerated.  Further management will depend upon her clinical course.

## 2023-08-16 NOTE — HISTORY OF PRESENT ILLNESS
[FreeTextEntry1] : Mrs. Alanna Mahan returned to the office having been last seen onAugust 2, 2023.  She is an 82-year-old right-handed patient who was under the care of Dr. Baron Fitzgerald since September 20, 2019.  She was evaluated in the infusion unit.  I spoke to her daughter, Patsy by telephone([phone]).  In approximately 2018, she developed hoarseness, dysphagia, mastication fatigue and limb weakness.  She was diagnosed with myasthenia gravis by Dr. Long Reyes and was treated with pyridostigmine.  In August 2019, she developed myasthenic crisis prompting admission to [facility].  She was treated with IVIG and prednisone.  Pyridostigmine was continued.  She was then vaccinated against meningococcus and begun on eculizumab.  When seen on November 15, 2022, she was on pyridostigmine 180 mg every other day and 60 mg 3 times a day.  She was receiving eculizumab every 2 weeks.  She complained of occasional hand weakness and chronic low back pain.  She was evaluated by pain management and orthopedics and was administered an epidural steroid injection.  She was well until early March 2023 when she was admitted for an elective left hip replacement.  The procedure was aborted when she developed bradycardia.  She underwent a cardiac evaluation and was cleared.  She underwent left hip replacement on March 27, 2023.  She was discharged to rehabilitation after 2 days where she contracted COVID-19 pneumonia.  She was treated with antibiotics.  She subsequently developed a wound infection.  On May 18, she underwent debridement of a superficial infection and was treated with intravenous antibiotics.  At her July 21, 2023 visit, she complained of fatigue.  She was having difficulty feeding herself because her hands and arms had become weak.  Her legs were also fatigued.  She complained of occasional limb numbness.  She denied diplopia, ptosis or dysphagia.  She was administered 2 g/kg of IVIG from July 24 to July 28.  She tolerated the infusion well.  She reports feeling stronger. At her August 2 visit, her MG ADL score was 4.  She had difficulty rising from a seated position and complained of rare episodes of choking.  She received eculizumab infusion earlier on August 2, 2023.  The patient has multiple complaints.  She feels diffusely weak.  She complained of rare episodes of choking, shortness of breath with exertion, requiring rest.'s to brush her teeth and comb her hair, and use of her arms to rise from a seated position.  I spoke to Patsy and she reported that her mother is doing fairly well with ADLs at home.  She has not undergone physical therapy after undergoing a hip replacement.  She is hesitant to go outdoors.  Otherwise, she is doing fairly well.  Past surgical history is notable for bilateral cataract extractions, bilateral carpal tunnel release, hysterectomy, bladder resuspension, tonsillectomy, bilateral shoulder replacements, 4 lumbar and 1 cervical procedure, right hip replacement, procedure on her left foot, cholecystectomy, herniorrhaphy and excision of squamous and basal carcinomas.  Medical problems include hypertension, hyperlipidemia, hypothyroidism and COVID-19 in 2020.  Medications include pyridostigmine extended release 180 mg daily, pyridostigmine 60 mg 3 times a day, levothyroxine 75 mcg daily, Cozaar 50 mg daily, rosuvastatin 40 mg/day and eculizumab infusions every 2 weeks.    She has an allergy to iodine.  She is  and is a retired seamstress.  She is a non-smoker and drinks wine.  Family history is notable for stroke thyroid disease and bladder cancer.

## 2023-08-29 ENCOUNTER — APPOINTMENT (OUTPATIENT)
Dept: PULMONOLOGY | Facility: CLINIC | Age: 83
End: 2023-08-29

## 2023-08-30 ENCOUNTER — APPOINTMENT (OUTPATIENT)
Dept: NEUROLOGY | Facility: CLINIC | Age: 83
End: 2023-08-30
Payer: MEDICARE

## 2023-08-30 VITALS — HEART RATE: 61 BPM | DIASTOLIC BLOOD PRESSURE: 68 MMHG | SYSTOLIC BLOOD PRESSURE: 133 MMHG | RESPIRATION RATE: 16 BRPM

## 2023-08-30 PROCEDURE — 96365 THER/PROPH/DIAG IV INF INIT: CPT

## 2023-08-30 PROCEDURE — 99214 OFFICE O/P EST MOD 30 MIN: CPT | Mod: 25

## 2023-08-30 RX ORDER — PYRIDOSTIGMINE BROMIDE 60 MG/1
60 TABLET ORAL 3 TIMES DAILY
Qty: 180 | Refills: 3 | Status: ACTIVE | COMMUNITY
Start: 2023-08-30 | End: 1900-01-01

## 2023-08-30 RX ORDER — MYCOPHENOLATE MOFETIL 500 MG/1
500 TABLET ORAL TWICE DAILY
Qty: 360 | Refills: 3 | Status: ACTIVE | COMMUNITY
Start: 2023-08-30 | End: 1900-01-01

## 2023-08-30 RX ORDER — PYRIDOSTIGMINE BROMIDE 180 MG/1
180 TABLET ORAL
Qty: 90 | Refills: 3 | Status: ACTIVE | COMMUNITY
Start: 2023-08-30 | End: 1900-01-01

## 2023-09-01 ENCOUNTER — NON-APPOINTMENT (OUTPATIENT)
Age: 83
End: 2023-09-01

## 2023-09-01 LAB
ALBUMIN SERPL ELPH-MCNC: 4.1 G/DL
ALP BLD-CCNC: 86 U/L
ALT SERPL-CCNC: 25 U/L
ANION GAP SERPL CALC-SCNC: 9 MMOL/L
AST SERPL-CCNC: 28 U/L
BASOPHILS # BLD AUTO: 0.02 K/UL
BASOPHILS NFR BLD AUTO: 0.4 %
BILIRUB SERPL-MCNC: 0.2 MG/DL
BUN SERPL-MCNC: 20 MG/DL
CALCIUM SERPL-MCNC: 9.2 MG/DL
CHLORIDE SERPL-SCNC: 110 MMOL/L
CO2 SERPL-SCNC: 24 MMOL/L
CREAT SERPL-MCNC: 0.83 MG/DL
EGFR: 69 ML/MIN/1.73M2
EOSINOPHIL # BLD AUTO: 0.06 K/UL
EOSINOPHIL NFR BLD AUTO: 1.2 %
GLUCOSE SERPL-MCNC: 80 MG/DL
HCT VFR BLD CALC: 35 %
HGB BLD-MCNC: 11.5 G/DL
IMM GRANULOCYTES NFR BLD AUTO: 0.2 %
LYMPHOCYTES # BLD AUTO: 1.38 K/UL
LYMPHOCYTES NFR BLD AUTO: 27.3 %
MAN DIFF?: NORMAL
MCHC RBC-ENTMCNC: 30 PG
MCHC RBC-ENTMCNC: 32.9 GM/DL
MCV RBC AUTO: 91.4 FL
MONOCYTES # BLD AUTO: 0.57 K/UL
MONOCYTES NFR BLD AUTO: 11.3 %
NEUTROPHILS # BLD AUTO: 3.01 K/UL
NEUTROPHILS NFR BLD AUTO: 59.6 %
PLATELET # BLD AUTO: 133 K/UL
POTASSIUM SERPL-SCNC: 4.2 MMOL/L
PROT SERPL-MCNC: 6.4 G/DL
RBC # BLD: 3.83 M/UL
RBC # FLD: 15.9 %
SODIUM SERPL-SCNC: 142 MMOL/L
TSH SERPL-ACNC: 2.92 UIU/ML
WBC # FLD AUTO: 5.05 K/UL

## 2023-09-05 RX ORDER — MYCOPHENOLATE MOFETIL 500 MG/1
500 TABLET ORAL TWICE DAILY
Qty: 360 | Refills: 3 | Status: ACTIVE | COMMUNITY
Start: 2023-09-05 | End: 1900-01-01

## 2023-09-06 RX ORDER — MYCOPHENOLATE MOFETIL 500 MG/1
500 TABLET ORAL
Qty: 360 | Refills: 3 | Status: ACTIVE | COMMUNITY
Start: 2023-09-06 | End: 1900-01-01

## 2023-09-06 NOTE — ASSESSMENT
[FreeTextEntry1] : Mrs. Mahan is an 82-year-old woman who suffers from acetylcholine receptor positive myasthenia gravis.  Despite treatment with pyridostigmine 360 mg/day and eculizumab, she continues to complain of significant weakness.  MG ADL score today was 6.  I will discussion with her regarding other treatment options.  In view of her advanced age, I am hesitant to begin steroids.  I suggested gradually introducing mycophenolate to her regimen.  This will require frequent determinations of CBC.  If her condition deteriorates, IVIG will be ordered again.  In the meantime, she will continue pyridostigmine and eculizumab every 2 weeks.  She will begin mycophenolate 500 mg daily.  If tolerated after a week, the dose will be increased to 1 tablet twice a day.  Further management will depend upon her clinical course.

## 2023-09-06 NOTE — CONSULT LETTER
[Dear  ___] : Dear  [unfilled], [Consult Letter:] : I had the pleasure of evaluating your patient, [unfilled]. [Please see my note below.] : Please see my note below. [Consult Closing:] : Thank you very much for allowing me to participate in the care of this patient.  If you have any questions, please do not hesitate to contact me. [Sincerely,] : Sincerely, [FreeTextEntry3] : Vernon Baugh MD

## 2023-09-06 NOTE — HISTORY OF PRESENT ILLNESS
[FreeTextEntry1] : Mrs. Alanna Mahan returned to the office having been last seen on August 16, 2023.  She is an 82-year-old right-handed patient who was under the care of Dr. Baron Fitzgerald since September 20, 2019.  She was evaluated in the infusion unit.  I spoke to her daughter, Patsy by telephone([phone]).  In approximately 2018, she developed hoarseness, dysphagia, mastication fatigue and limb weakness.  She was diagnosed with myasthenia gravis by Dr. Long Reyes and was treated with pyridostigmine.  In August 2019, she developed myasthenic crisis prompting admission to [facility].  She was treated with IVIG and prednisone.  Pyridostigmine was continued.  She was then vaccinated against meningococcus and begun on eculizumab.  When seen on November 15, 2022, she was on pyridostigmine 180 mg every other day and 60 mg 3 times a day.  She was receiving eculizumab every 2 weeks.  She complained of occasional hand weakness and chronic low back pain.  She was evaluated by pain management and orthopedics and was administered an epidural steroid injection.  She was well until early March 2023 when she was admitted for an elective left hip replacement.  The procedure was aborted when she developed bradycardia.  She underwent a cardiac evaluation and was cleared.  She underwent left hip replacement on March 27, 2023.  She was discharged to rehabilitation after 2 days where she contracted COVID-19 pneumonia.  She was treated with antibiotics.  She subsequently developed a wound infection.  On May 18, she underwent debridement of a superficial infection and was treated with intravenous antibiotics.  At her July 21, 2023 visit, she complained of fatigue.  She was having difficulty feeding herself because her hands and arms had become weak.  Her legs were also fatigued.  She complained of occasional limb numbness.  She denied diplopia, ptosis or dysphagia.  She was administered 2 g/kg of IVIG from July 24 to July 28.  She tolerated the infusion well.  She reports feeling stronger. At her August 2 visit, her MG ADL score was 4.  She had difficulty rising from a seated position and complained of rare episodes of choking.  She received eculizumab infusion earlier on August 2, 2023.  At her August 16, 2023 visit, she has multiple complaints.  She felt diffusely weak.  She complained of rare episodes of choking, shortness of breath with exertion, requiring rest periods while brushing her teeth and combing her hair, and required the use of her arms to rise from a seated position.  I spoke to Patsy and she reported that her mother was doing fairly well with ADLs at home.  She had not undergone physical therapy after undergoing a hip replacement.  She was hesitant to go outdoors.  Otherwise, she was doing fairly well.  She last received IVIG and late July.  She seems relatively stable.  She is on pyridostigmine 60 mg 3 times daily and extended release 180 once daily.  She is receiving eculizumab infusions every 2 weeks.  She complains of fatigue when chewing, occasional difficulty swallowing, dyspnea on exertion and requiring the use of her arms when standing.  MG ADL score was 6.  Past surgical history is notable for bilateral cataract extractions, bilateral carpal tunnel release, hysterectomy, bladder resuspension, tonsillectomy, bilateral shoulder replacements, 4 lumbar and 1 cervical procedure, right hip replacement, procedure on her left foot, cholecystectomy, herniorrhaphy and excision of squamous and basal carcinomas.  Medical problems include hypertension, hyperlipidemia, hypothyroidism and COVID-19 in 2020.  Medications include pyridostigmine extended release 180 mg daily, pyridostigmine 60 mg 3 times a day, levothyroxine 75 mcg daily, Cozaar 50 mg daily, rosuvastatin 40 mg/day and eculizumab infusions every 2 weeks.    She has an allergy to iodine.  She is  and is a retired seamstress.  She is a non-smoker and drinks wine.  Family history is notable for stroke thyroid disease and bladder cancer.

## 2023-09-06 NOTE — PHYSICAL EXAM
[FreeTextEntry1] : Constitutional:  Patient was well-developed, well-nourished and in no acute distress.   Head:  Normocephalic.  Tympanic membranes were not examined.  Neck:  Supple with limited movement.  Cardiovascular:  Cardiac rhythm was regular without murmur. There were no carotid bruits. Peripheral pulses were full and symmetric.   Respiratory:  Lungs were clear.   Abdomen:  Soft and nontender.   Spine:  Nontender.   Skin:  There were no rashes.   NEUROLOGICAL EXAMINATION:  Mental Status: Patient was alert and oriented. Speech was fluent. There was no dysarthria.   Cranial Nerves:   II: She could finger count bilaterally.  Pupils were surgical but reactive. Visual fields were full.  Fundi were not examined.  III, IV, VI:  Eye movements were full without nystagmus.   V: Facial sensation was intact.   VII: Facial strength was normal except for bilateral rather pronounced orbicularis oculi weakness.   VIII: Hearing was equal.   IX, X: Palatal movement was normal. Phonation was normal.   XI: Neck flexion was quite weak but extension strong.  XII: Tongue was midline and movements normal. There was no lingual atrophy or fasciculations.   Motor Examination: Muscle bulk, tone and strength were normal.  Sensory Examination: Pinprick, vibration and joint position sense were intact.   Reflexes: DTRs were 2 throughout except for absent ankle jerks.   Plantar Responses: Plantar responses were flexor.   Coordination/Cerebellar Function: There was no dysmetria on finger to nose testing.   Gait/Stance: Not tested

## 2023-09-08 ENCOUNTER — APPOINTMENT (OUTPATIENT)
Dept: INTERNAL MEDICINE | Facility: CLINIC | Age: 83
End: 2023-09-08
Payer: MEDICARE

## 2023-09-08 VITALS
HEART RATE: 67 BPM | OXYGEN SATURATION: 96 % | HEIGHT: 57 IN | BODY MASS INDEX: 32.36 KG/M2 | RESPIRATION RATE: 16 BRPM | TEMPERATURE: 96.4 F | DIASTOLIC BLOOD PRESSURE: 75 MMHG | SYSTOLIC BLOOD PRESSURE: 150 MMHG | WEIGHT: 150 LBS

## 2023-09-08 VITALS — SYSTOLIC BLOOD PRESSURE: 130 MMHG | DIASTOLIC BLOOD PRESSURE: 70 MMHG

## 2023-09-08 DIAGNOSIS — G70.00 MYASTHENIA GRAVIS W/OUT (ACUTE) EXACERBATION: ICD-10-CM

## 2023-09-08 DIAGNOSIS — D64.9 ANEMIA, UNSPECIFIED: ICD-10-CM

## 2023-09-08 LAB
CHOLEST SERPL-MCNC: 174 MG/DL
HDLC SERPL-MCNC: 47 MG/DL
IRON SATN MFR SERPL: 22 %
IRON SERPL-MCNC: 66 UG/DL
LDLC SERPL CALC-MCNC: 108 MG/DL
NONHDLC SERPL-MCNC: 128 MG/DL
TIBC SERPL-MCNC: 293 UG/DL
TRIGL SERPL-MCNC: 111 MG/DL
UIBC SERPL-MCNC: 227 UG/DL
VIT B12 SERPL-MCNC: 677 PG/ML

## 2023-09-08 PROCEDURE — 99214 OFFICE O/P EST MOD 30 MIN: CPT

## 2023-09-08 RX ORDER — SULFAMETHOXAZOLE AND TRIMETHOPRIM 400; 80 MG/1; MG/1
400-80 TABLET ORAL TWICE DAILY
Qty: 14 | Refills: 0 | Status: DISCONTINUED | COMMUNITY
Start: 2023-05-02 | End: 2023-09-08

## 2023-09-08 RX ORDER — NITROFURANTOIN (MONOHYDRATE/MACROCRYSTALS) 25; 75 MG/1; MG/1
100 CAPSULE ORAL
Qty: 14 | Refills: 0 | Status: DISCONTINUED | COMMUNITY
Start: 2023-04-21 | End: 2023-09-08

## 2023-09-08 RX ORDER — CEPHALEXIN 500 MG/1
500 CAPSULE ORAL 3 TIMES DAILY
Qty: 21 | Refills: 0 | Status: DISCONTINUED | COMMUNITY
Start: 2023-04-26 | End: 2023-09-08

## 2023-09-08 NOTE — ASSESSMENT
[FreeTextEntry1] : 82 Y OLD FEM WITH PMX OF M GRAVIS = AS PER NEURO  ANEMIA ,DYSLIPIDEMIA = LABS  HTN AND GERD = STABLE ON CURRENT MEDS  HYPOTHYROID = TSH STABLE ON CURRENT DOSE RTO 3 M

## 2023-09-08 NOTE — REVIEW OF SYSTEMS
[Fatigue] : fatigue [Joint Stiffness] : joint stiffness [Muscle Weakness] : muscle weakness [Back Pain] : back pain [Negative] : Heme/Lymph

## 2023-09-08 NOTE — PHYSICAL EXAM
[No Acute Distress] : no acute distress [Normal Sclera/Conjunctiva] : normal sclera/conjunctiva [Normal Outer Ear/Nose] : the outer ears and nose were normal in appearance [Normal] : normal rate, regular rhythm, normal S1 and S2 and no murmur heard [Soft] : abdomen soft [Non Tender] : non-tender [Normal Bowel Sounds] : normal bowel sounds [No CVA Tenderness] : no CVA  tenderness [No Rash] : no rash [Coordination Grossly Intact] : coordination grossly intact [No Focal Deficits] : no focal deficits [Alert and Oriented x3] : oriented to person, place, and time [de-identified] : OGLA 2/6

## 2023-09-12 ENCOUNTER — APPOINTMENT (OUTPATIENT)
Dept: ORTHOPEDIC SURGERY | Facility: CLINIC | Age: 83
End: 2023-09-12
Payer: MEDICARE

## 2023-09-12 VITALS — DIASTOLIC BLOOD PRESSURE: 71 MMHG | SYSTOLIC BLOOD PRESSURE: 121 MMHG | HEART RATE: 66 BPM

## 2023-09-12 DIAGNOSIS — T81.89XA OTHER COMPLICATIONS OF PROCEDURES, NOT ELSEWHERE CLASSIFIED, INITIAL ENCOUNTER: ICD-10-CM

## 2023-09-12 DIAGNOSIS — Z18.9 OTHER COMPLICATIONS OF PROCEDURES, NOT ELSEWHERE CLASSIFIED, INITIAL ENCOUNTER: ICD-10-CM

## 2023-09-12 PROCEDURE — 99213 OFFICE O/P EST LOW 20 MIN: CPT

## 2023-09-12 RX ORDER — MYCOPHENOLATE MOFETIL 500 MG/1
500 TABLET ORAL
Qty: 360 | Refills: 3 | Status: ACTIVE | COMMUNITY
Start: 2023-09-12 | End: 1900-01-01

## 2023-09-12 RX ORDER — LIDOCAINE 5% 700 MG/1
5 PATCH TOPICAL
Qty: 1 | Refills: 1 | Status: ACTIVE | COMMUNITY
Start: 2023-09-12 | End: 1900-01-01

## 2023-09-13 ENCOUNTER — APPOINTMENT (OUTPATIENT)
Dept: NEUROLOGY | Facility: CLINIC | Age: 83
End: 2023-09-13
Payer: MEDICARE

## 2023-09-13 PROBLEM — T81.89XA RETAINED SUTURE: Status: ACTIVE | Noted: 2023-09-13

## 2023-09-13 PROCEDURE — 96365 THER/PROPH/DIAG IV INF INIT: CPT

## 2023-09-14 LAB
HCT VFR BLD CALC: 37.4 %
HGB BLD-MCNC: 11.5 G/DL
MCHC RBC-ENTMCNC: 29 PG
MCHC RBC-ENTMCNC: 30.7 GM/DL
MCV RBC AUTO: 94.2 FL
PLATELET # BLD AUTO: 142 K/UL
RBC # BLD: 3.97 M/UL
RBC # FLD: 16.1 %
WBC # FLD AUTO: 4.37 K/UL

## 2023-09-20 ENCOUNTER — APPOINTMENT (OUTPATIENT)
Dept: CARDIOLOGY | Facility: CLINIC | Age: 83
End: 2023-09-20
Payer: MEDICARE

## 2023-09-20 ENCOUNTER — NON-APPOINTMENT (OUTPATIENT)
Age: 83
End: 2023-09-20

## 2023-09-20 VITALS
RESPIRATION RATE: 14 BRPM | TEMPERATURE: 97.3 F | HEART RATE: 68 BPM | SYSTOLIC BLOOD PRESSURE: 133 MMHG | WEIGHT: 151 LBS | BODY MASS INDEX: 32.68 KG/M2 | OXYGEN SATURATION: 95 % | DIASTOLIC BLOOD PRESSURE: 72 MMHG

## 2023-09-20 DIAGNOSIS — R06.09 OTHER FORMS OF DYSPNEA: ICD-10-CM

## 2023-09-20 PROCEDURE — 99213 OFFICE O/P EST LOW 20 MIN: CPT

## 2023-09-20 PROCEDURE — 93000 ELECTROCARDIOGRAM COMPLETE: CPT

## 2023-09-27 ENCOUNTER — APPOINTMENT (OUTPATIENT)
Dept: NEUROLOGY | Facility: CLINIC | Age: 83
End: 2023-09-27
Payer: MEDICARE

## 2023-09-27 PROCEDURE — 96365 THER/PROPH/DIAG IV INF INIT: CPT

## 2023-10-06 ENCOUNTER — APPOINTMENT (OUTPATIENT)
Dept: INTERNAL MEDICINE | Facility: CLINIC | Age: 83
End: 2023-10-06
Payer: MEDICARE

## 2023-10-06 PROCEDURE — G0008: CPT

## 2023-10-06 PROCEDURE — 90662 IIV NO PRSV INCREASED AG IM: CPT

## 2023-10-06 NOTE — CONSULT NOTE ADULT - SUBJECTIVE AND OBJECTIVE BOX
Addendum  created 10/05/23 1635 by Rosa Lopez MD    Clinical Note Signed HPI:  Magda Jimenez NP  [phone]  Mon-Fri  Please page [phone]  Sat-Sun    DUE TO COVID 19 AND IN ORDER TO DECREASE PROVIDERS EXPOSURE AND USAGE OF PPE, H&P ROS OR PE WAS TAKEN FROM PRIMARY TEAM NOTE. THIS WRITER WORKING REMOTELY AND THEREFORE ASSESSMENT , MANAGEMENT AND PLAN HAS BEEN DISCUSSED AT LENGTH WITH RN/NP/MD AND FAMILY              H&P from chart:    79 F PMH Myasthenia gravis on pyridostigmine, reactive airway disease, GERD, HTN, HLD, hypothyroid, spinal stenosis s/p laminectomy/fusion, coming in with day 7 of difficulty breathing and not feeling well.  + chills, no documented fevers at home.  No chest pain.  Shortness of breath worse when she tries to walk around or speak.  Mild runny nose and sore throat.  No abd pain, no nausea or vomiting, no diarrhea.  No one is sick at home. Per daughter patient had been started on z-pack as OP with minimal relief. Has had prior intubations for elective surgeries but no reported emergency intubations for MG crises. Per daughter patient would want trial of intubation.    In ED: 100.2, 119/169/53, 36, 98 2L NC    given KCl 40meq po x 1, tylenol x 2 (31 Mar 2020 09:00)    PERTINENT PM/SXH:   Osteoarthritis  Hypothyroid  Reactive airway disease that is not asthma  Near syncope  Aortic stenosis, mild  Diverticulitis large intestine  Lumbar stenosis  Myasthenia gravis  Carpal Tunnel Syndrome Right  Gastric Ulcer  Hammertoe Right foot  Kidney Calculi  Lumbar Radiculopathy  GERD (Gastroesophageal Reflux Disease)  Hyperlipidemia  Genital Ulcer, Female  Hypertension    H/O umbilical hernia repair  History of tonsillectomy  S/P foot surgery  H/O shoulder replacement  H/O laminectomy  Prolapse, Uterovaginal  S/P Laparoscopic Fundoplication  S/P Hysterectomy Partial  Bilateral Cataracts  S/P Laminectomy with Spinal Fusion cervcical and lumbar  S/P Appendectomy  S/P Hernia Repair Umbikical  S/P Cholecystectomy    FAMILY HISTORY:  Family history of bladder cancer: brother   FH: early death: mother  age 50s  Family history of stroke: father     ITEMS NOT CHECKED ARE NOT PRESENT    SOCIAL HISTORY:   Significant other/partner[ ]  Children[x ]  Yarsani/Spirituality:Episcopal   Substance hx:  [ ]   Tobacco hx:  [ ]   Alcohol hx: [ ]   Home Opioid hx:  [ ] I-Stop Reference No:  Living Situation: [ x]Home  [ ]Long term care  [ ]Rehab [ ]Other    ADVANCE DIRECTIVES:    DNR  Yes  MOLST  [x ]  Living Will  [ ]   DECISION MAKER(s):  [ ] Health Care Proxy(s)  [x ] Surrogate(s)  [ ] Guardian           Name(s): Phone Number(s):  cathi Stevenson [phone]  BASELINE (I)ADL(s) (prior to admission):  [place]: [x ]Total  [ ] Moderate [ ]Dependent    Allergies    IODINE (Rash)  No Known Drug Allergies  shellfish (Rash)    Intolerances    MEDICATIONS  (STANDING):  atorvastatin 20 milliGRAM(s) Oral at bedtime  cholecalciferol 2000 Unit(s) Oral daily  enoxaparin Injectable 40 milliGRAM(s) SubCutaneous daily  HYDROmorphone  Injectable 0.2 milliGRAM(s) IV Push at bedtime  levothyroxine 50 MICROGram(s) Oral daily  melatonin 5 milliGRAM(s) Oral at bedtime  pantoprazole    Tablet 40 milliGRAM(s) Oral before breakfast  polyethylene glycol 3350 17 Gram(s) Oral daily  predniSONE   Tablet 10 milliGRAM(s) Oral daily  pyridostigmine 60 milliGRAM(s) Oral three times a day  pyridostigmine  milliGRAM(s) Oral daily  zinc oxide 40% Ointment 1 Application(s) Topical daily    MEDICATIONS  (PRN):  acetaminophen   Tablet .. 650 milliGRAM(s) Oral every 4 hours PRN Temp greater or equal to 38C (100.4F), Moderate Pain (4 - 6)  acetaminophen   Tablet .. 650 milliGRAM(s) Oral every 6 hours PRN Mild Pain (1 - 3)  ALBUTerol    90 MICROgram(s) HFA Inhaler 2 Puff(s) Inhalation every 4 hours PRN Shortness of Breath and/or Wheezing  benzonatate 100 milliGRAM(s) Oral three times a day PRN Cough  HYDROmorphone  Injectable 0.2 milliGRAM(s) IV Push every 2 hours PRN Severe Pain (7 - 10)  senna 2 Tablet(s) Oral at bedtime PRN Constipation  sodium chloride 0.65% Nasal 1 Spray(s) Both Nostrils two times a day PRN Nasal Congestion    PRESENT SYMPTOMS: [ ]Unable to obtain due to poor mentation   Source if other than patient:  [ ]Family   [ ]Team     Pain: [x ]yes [ ]no  AS DISCUSSED WITH EDER BATISTA  QOL impact -   Location -       LOWER BACK         Aggravating factors -WHILE IN PRONE POSITION     Quality -  DULL  Radiation -   Timing-  Severity (0-10 scale)   Minimal acceptable level (0-10 scale):     PAIN AD Score:     [email or web address] (press ctrl +  left click to view)    Dyspnea:                           [ ]Mild [ ]Moderate [ ]Severe  Anxiety:                             [ ]Mild [ ]Moderate [ ]Severe  Fatigue:                             [ ]Mild [ ]Moderate [ ]Severe  Nausea:                             [ ]Mild [ ]Moderate [ ]Severe  Loss of appetite:              [ ]Mild [ ]Moderate [ ]Severe  Constipation:                    [ ]Mild [ ]Moderate [ ]Severe    Other Symptoms:  [X ]All other review of systems negative     Palliative Performance Status Version 2:   30      %    [email or web address]  PHYSICAL EXAM:  Vital Signs Last 24 Hrs  T(C): 36.8 (2020 13:39), Max: 36.8 (2020 13:39)  T(F): 98.2 (2020 13:39), Max: 98.2 (2020 13:39)  HR: 61 (2020 15:44) (61 - 89)  BP: 127/76 (2020 15:44) (96/63 - 134/74)  BP(mean): --  RR: 20 (2020 15:44) (20 - 22)  SpO2: 97% (2020 15:44) (73% - 100%) I&O's Summary    GENERAL:  [Z ]Alert  [Z ]Oriented x3   [ ]Lethargic  [ ]Cachexia  [ ]Unarousable  [Z ]Verbal  [ ]Non-Verbal  Behavioral:   [ ] Anxiety  [ ] Delirium [ ] Agitation [ ] Other  HEENT:  [ ]Normal   [ ]Dry mouth   [ ]ET Tube/Trach  [ ]Oral lesions  PULMONARY:   [ ]Clear [X ]Tachypnea  [ ]Audible excessive secretions   [ ]Rhonchi        [ ]Right [ ]Left [ ]Bilateral  [ ]Crackles        [ ]Right [ ]Left [ ]Bilateral  [ ]Wheezing     [ ]Right [ ]Left [ ]Bilatera  [ ]Diminished breath sounds [ ]right [ ]left [ ]bilateral  CARDIOVASCULAR:    [ ]Regular [X ]Irregular [ ]Tachy  [ ]Brian [ ]Murmur [ ]Other  GASTROINTESTINAL:  [ ]Soft  [ ]Distended   [ ]+BS  [ ]Non tender [ ]Tender  [ ]PEG [ ]OGT/ NGT  Last BM:     GENITOURINARY:  [X ]Normal [ ] Incontinent   [ ]Oliguria/Anuria   [ ]Miller  MUSCULOSKELETAL:   [ ]Normal   [X ]Weakness  [X ]Bed/Wheelchair bound [ ]Edema  NEUROLOGIC:   [X ]No focal deficits  [ ]Cognitive impairment  [ ]Dysphagia [ ]Dysarthria [ ]Paresis [ ]Other   SKIN:   [X ]Normal    [ ]Rash  [ ]Pressure ulcer(s)       Present on admission [ ]y [ ]n    CRITICAL CARE:  [ ] Shock Present  [ ]Septic [ ]Cardiogenic [ ]Neurologic [ ]Hypovolemic  [ ]  Vasopressors [ ]  Inotropes   [X ]Respiratory failure present [ ]Mechanical ventilation [ X]Non-invasive ventilatory support [ ]High flow  [X ]Acute  [ ]Chronic [ ]Hypoxic  [ ]Hypercarbic [ ]Other  [ ]Other organ failure     LABS:                        8.3    8.46  )-----------( 237      ( 2020 06:21 )             28.4   04-23    140  |  100  |  18  ----------------------------<  94  4.1   |  27  |  0.74    Ca    8.7      2020 06:21  Phos  3.0     04-23  Mg     1.8     04-23    TPro  6.1  /  Alb  2.5<L>  /  TBili  0.3  /  DBili  x   /  AST  31  /  ALT  27  /  AlkPhos  83  04-23  PT/INR - ( 2020 06:21 )   PT: 13.2 sec;   INR: 1.14 ratio         PTT - ( 2020 06:21 )  PTT:29.2 sec      RADIOLOGY & ADDITIONAL STUDIES:    < from: Xray Chest 1 View- PORTABLE-Urgent (20 @ 11:32) >    INTERPRETATION:  XR CHEST URGENT. AP view.    INDICATION: covid 19    COMPARISON: 3/31/2020    FINDINGS/  IMPRESSION:    Bilateral opacities are increased.    Bilateral shoulder arthroplasty and lumbar spine fusion.      < end of copied text >        PROTEIN CALORIE MALNUTRITION PRESENT: [ ]mild [ ]moderate [ ]severe [ ]underweight [ ]morbid obesity  https://www.andeal.org/vault/2440/web/files/ONC/Table_Clinical%20Characteristics%20to%20Document%20Malnutrition-White%20JV%20et%20al%2020.pdf    Height (cm): 144.8 (20 @ 21:10), 147.32 (20 @ 08:35), 175.26 (19 @ 06:05)  Weight (kg): 66.1 (20 @ 21:10), 65.8 (20 @ 08:35), 65 (19 @ :05)  BMI (kg/m2): 31.5 (20 @ 21:10), 30.3 (20 @ 08:35), 21.2 (19 @ 06:05)    [ ]PPSV2 < or = to 30% [ ]significant weight loss  [ ]poor nutritional intake  [ ]anasarca     Albumin, Serum: 2.5 g/dL (20 @ 06:21)   [ ]Artificial Nutrition      REFERRALS:   [ ]Chaplaincy  [ ]Hospice  [ ]Child Life  [ ]Social Work  [ ]Case management [ ]Holistic Therapy     Goals of Care Document: 02-Aug-2020 20:21:09

## 2023-10-11 ENCOUNTER — APPOINTMENT (OUTPATIENT)
Dept: NEUROLOGY | Facility: CLINIC | Age: 83
End: 2023-10-11
Payer: MEDICARE

## 2023-10-11 VITALS
WEIGHT: 151 LBS | SYSTOLIC BLOOD PRESSURE: 156 MMHG | HEART RATE: 59 BPM | DIASTOLIC BLOOD PRESSURE: 77 MMHG | HEIGHT: 57 IN | BODY MASS INDEX: 32.58 KG/M2

## 2023-10-11 PROCEDURE — 99214 OFFICE O/P EST MOD 30 MIN: CPT | Mod: 25

## 2023-10-11 PROCEDURE — 96365 THER/PROPH/DIAG IV INF INIT: CPT

## 2023-10-25 ENCOUNTER — APPOINTMENT (OUTPATIENT)
Dept: NEUROLOGY | Facility: CLINIC | Age: 83
End: 2023-10-25
Payer: MEDICARE

## 2023-10-25 PROCEDURE — 96365 THER/PROPH/DIAG IV INF INIT: CPT

## 2023-11-08 ENCOUNTER — APPOINTMENT (OUTPATIENT)
Dept: NEUROLOGY | Facility: CLINIC | Age: 83
End: 2023-11-08
Payer: MEDICARE

## 2023-11-08 PROCEDURE — 96365 THER/PROPH/DIAG IV INF INIT: CPT

## 2023-11-14 ENCOUNTER — APPOINTMENT (OUTPATIENT)
Dept: ORTHOPEDIC SURGERY | Facility: CLINIC | Age: 83
End: 2023-11-14
Payer: MEDICARE

## 2023-11-14 VITALS — HEART RATE: 66 BPM | SYSTOLIC BLOOD PRESSURE: 130 MMHG | DIASTOLIC BLOOD PRESSURE: 72 MMHG

## 2023-11-14 DIAGNOSIS — M25.452 EFFUSION, LEFT HIP: ICD-10-CM

## 2023-11-14 DIAGNOSIS — Z96.649 PRESENCE OF UNSPECIFIED ARTIFICIAL HIP JOINT: ICD-10-CM

## 2023-11-14 PROCEDURE — 73502 X-RAY EXAM HIP UNI 2-3 VIEWS: CPT | Mod: LT

## 2023-11-14 PROCEDURE — 99213 OFFICE O/P EST LOW 20 MIN: CPT

## 2023-11-15 LAB
HCT VFR BLD CALC: 37.9 %
HGB BLD-MCNC: 12.3 G/DL
MCHC RBC-ENTMCNC: 30.8 PG
MCHC RBC-ENTMCNC: 32.5 GM/DL
MCV RBC AUTO: 94.8 FL
PLATELET # BLD AUTO: 154 K/UL
RBC # BLD: 4 M/UL
RBC # FLD: 13.7 %
WBC # FLD AUTO: 4.96 K/UL

## 2023-11-16 LAB
CRP SERPL-MCNC: <3 MG/L
ERYTHROCYTE [SEDIMENTATION RATE] IN BLOOD BY WESTERGREN METHOD: 23 MM/HR

## 2023-11-17 ENCOUNTER — APPOINTMENT (OUTPATIENT)
Dept: MRI IMAGING | Facility: CLINIC | Age: 83
End: 2023-11-17
Payer: MEDICARE

## 2023-11-17 ENCOUNTER — OUTPATIENT (OUTPATIENT)
Dept: OUTPATIENT SERVICES | Facility: HOSPITAL | Age: 83
LOS: 1 days | End: 2023-11-17
Payer: MEDICARE

## 2023-11-17 DIAGNOSIS — Z96.641 PRESENCE OF RIGHT ARTIFICIAL HIP JOINT: Chronic | ICD-10-CM

## 2023-11-17 DIAGNOSIS — Z90.89 ACQUIRED ABSENCE OF OTHER ORGANS: Chronic | ICD-10-CM

## 2023-11-17 DIAGNOSIS — Z98.890 OTHER SPECIFIED POSTPROCEDURAL STATES: Chronic | ICD-10-CM

## 2023-11-17 DIAGNOSIS — M25.452 EFFUSION, LEFT HIP: ICD-10-CM

## 2023-11-17 DIAGNOSIS — Z96.649 PRESENCE OF UNSPECIFIED ARTIFICIAL HIP JOINT: ICD-10-CM

## 2023-11-17 DIAGNOSIS — Z96.642 PRESENCE OF LEFT ARTIFICIAL HIP JOINT: ICD-10-CM

## 2023-11-17 DIAGNOSIS — Z96.619 PRESENCE OF UNSPECIFIED ARTIFICIAL SHOULDER JOINT: Chronic | ICD-10-CM

## 2023-11-17 PROCEDURE — 73721 MRI JNT OF LWR EXTRE W/O DYE: CPT | Mod: 26,LT,MH

## 2023-11-17 PROCEDURE — 73721 MRI JNT OF LWR EXTRE W/O DYE: CPT

## 2023-11-22 ENCOUNTER — APPOINTMENT (OUTPATIENT)
Dept: NEUROLOGY | Facility: CLINIC | Age: 83
End: 2023-11-22
Payer: MEDICARE

## 2023-11-22 PROCEDURE — 96365 THER/PROPH/DIAG IV INF INIT: CPT

## 2023-12-06 ENCOUNTER — APPOINTMENT (OUTPATIENT)
Dept: NEUROLOGY | Facility: CLINIC | Age: 83
End: 2023-12-06
Payer: MEDICARE

## 2023-12-06 PROCEDURE — 96365 THER/PROPH/DIAG IV INF INIT: CPT

## 2023-12-20 ENCOUNTER — APPOINTMENT (OUTPATIENT)
Dept: NEUROLOGY | Facility: CLINIC | Age: 83
End: 2023-12-20
Payer: MEDICARE

## 2023-12-20 VITALS
SYSTOLIC BLOOD PRESSURE: 139 MMHG | HEART RATE: 60 BPM | HEIGHT: 57 IN | DIASTOLIC BLOOD PRESSURE: 62 MMHG | BODY MASS INDEX: 32.58 KG/M2 | WEIGHT: 151 LBS

## 2023-12-20 PROCEDURE — 99213 OFFICE O/P EST LOW 20 MIN: CPT | Mod: 25

## 2023-12-20 PROCEDURE — 96365 THER/PROPH/DIAG IV INF INIT: CPT

## 2024-01-10 ENCOUNTER — APPOINTMENT (OUTPATIENT)
Dept: NEUROLOGY | Facility: CLINIC | Age: 84
End: 2024-01-10

## 2024-02-07 ENCOUNTER — APPOINTMENT (OUTPATIENT)
Dept: NEUROLOGY | Facility: CLINIC | Age: 84
End: 2024-02-07
Payer: MEDICARE

## 2024-02-07 VITALS
SYSTOLIC BLOOD PRESSURE: 153 MMHG | RESPIRATION RATE: 18 BRPM | TEMPERATURE: 99.5 F | DIASTOLIC BLOOD PRESSURE: 63 MMHG | HEART RATE: 54 BPM

## 2024-02-07 VITALS
SYSTOLIC BLOOD PRESSURE: 168 MMHG | TEMPERATURE: 97.4 F | DIASTOLIC BLOOD PRESSURE: 72 MMHG | HEART RATE: 59 BPM | RESPIRATION RATE: 18 BRPM

## 2024-02-07 PROCEDURE — 96365 THER/PROPH/DIAG IV INF INIT: CPT

## 2024-02-07 RX ORDER — PYRIDOSTIGMINE BROMIDE 180 MG/1
180 TABLET ORAL
Qty: 90 | Refills: 3 | Status: ACTIVE | COMMUNITY
Start: 2024-02-07 | End: 1900-01-01

## 2024-02-07 RX ORDER — PYRIDOSTIGMINE BROMIDE 60 MG/1
60 TABLET ORAL 3 TIMES DAILY
Qty: 180 | Refills: 3 | Status: ACTIVE | COMMUNITY
Start: 2024-02-07 | End: 1900-01-01

## 2024-02-14 RX ORDER — ECULIZUMAB 300 MG/30ML
300 INJECTION, SOLUTION, CONCENTRATE INTRAVENOUS
Qty: 8 | Refills: 11 | Status: ACTIVE | COMMUNITY
Start: 2019-09-10 | End: 1900-01-01

## 2024-02-21 ENCOUNTER — APPOINTMENT (OUTPATIENT)
Dept: NEUROLOGY | Facility: CLINIC | Age: 84
End: 2024-02-21
Payer: MEDICARE

## 2024-02-21 PROCEDURE — 96365 THER/PROPH/DIAG IV INF INIT: CPT

## 2024-02-21 NOTE — HISTORY OF PRESENT ILLNESS
[N/A] : N/A [Denies] : Denies [No] : No [Yes] : Yes [Start Time: ___] : Medication Start Time: [unfilled] [22g] : 22g [End Time: ___] : Medication End Time: [unfilled] [Medication Name: ___] : Medication Name: [unfilled] [IV discontinued. Intact. No signs or symptoms of IV complications noted. Time: ___] : IV discontinued. Intact. No signs or symptoms of IV complications noted. Time: [unfilled] [Patient  instructed to seek medical attention with signs and symptoms of adverse effects] : Patient  instructed to seek medical attention with signs and symptoms of adverse effects [Patient left unit in no acute distress] : Patient left unit in no acute distress [Medications administered as ordered and tolerated well.] : Medications administered as ordered and tolerated well. [de-identified] : L AC [de-identified] : [id number] [de-identified] : Patient tolerated infusion well without adverse reaction. Patient stable for discharge.

## 2024-02-29 ENCOUNTER — APPOINTMENT (OUTPATIENT)
Dept: INTERNAL MEDICINE | Facility: CLINIC | Age: 84
End: 2024-02-29
Payer: COMMERCIAL

## 2024-02-29 ENCOUNTER — LABORATORY RESULT (OUTPATIENT)
Age: 84
End: 2024-02-29

## 2024-02-29 VITALS
RESPIRATION RATE: 12 BRPM | DIASTOLIC BLOOD PRESSURE: 80 MMHG | OXYGEN SATURATION: 99 % | HEIGHT: 57 IN | BODY MASS INDEX: 31.93 KG/M2 | HEART RATE: 63 BPM | SYSTOLIC BLOOD PRESSURE: 170 MMHG | WEIGHT: 148 LBS | TEMPERATURE: 97 F

## 2024-02-29 VITALS — DIASTOLIC BLOOD PRESSURE: 80 MMHG | SYSTOLIC BLOOD PRESSURE: 155 MMHG

## 2024-02-29 DIAGNOSIS — Z00.00 ENCOUNTER FOR GENERAL ADULT MEDICAL EXAMINATION W/OUT ABNORMAL FINDINGS: ICD-10-CM

## 2024-02-29 LAB
APPEARANCE: ABNORMAL
BILIRUBIN URINE: NEGATIVE
BLOOD URINE: ABNORMAL
COLOR: YELLOW
GLUCOSE QUALITATIVE U: NEGATIVE MG/DL
KETONES URINE: NEGATIVE MG/DL
LEUKOCYTE ESTERASE URINE: ABNORMAL
NITRITE URINE: NEGATIVE
PH URINE: 6
PROTEIN URINE: 30 MG/DL
SPECIFIC GRAVITY URINE: 1.02
UROBILINOGEN URINE: 0.2 MG/DL

## 2024-02-29 PROCEDURE — 99397 PER PM REEVAL EST PAT 65+ YR: CPT | Mod: GY

## 2024-02-29 RX ORDER — HYDROCHLOROTHIAZIDE 12.5 MG/1
12.5 TABLET ORAL
Qty: 90 | Refills: 1 | Status: ACTIVE | COMMUNITY
Start: 2022-02-22 | End: 1900-01-01

## 2024-02-29 NOTE — HEALTH RISK ASSESSMENT
[Fair] :  ~his/her~ mood as fair [No] : No [No falls in past year] : Patient reported no falls in the past year [0] : 2) Feeling down, depressed, or hopeless: Not at all (0) [PHQ-2 Negative - No further assessment needed] : PHQ-2 Negative - No further assessment needed [Patient reported mammogram was normal] : Patient reported mammogram was normal [Patient reported bone density results were normal] : Patient reported bone density results were normal [Patient reported colonoscopy was normal] : Patient reported colonoscopy was normal [With Family] : lives with family [Less Than High School] : less than high school [] :  [Feels Safe at Home] : Feels safe at home [Smoke Detector] : smoke detector [Safety elements used in home] : safety elements used in home [Seat Belt] :  uses seat belt [Never] : Never [Change in mental status noted] : No change in mental status noted [Sexually Active] : not sexually active [Reports changes in hearing] : Reports no changes in hearing [Reports changes in vision] : Reports no changes in vision [MammogramComments] : Pt states she go it long time ago. [BoneDensityComments] : Pt states she go it long time ago. [ColonoscopyComments] : Pt states she go it long time ago.

## 2024-02-29 NOTE — COUNSELING
[Benefits of weight loss discussed] : Benefits of weight loss discussed [Fall prevention counseling provided] : Fall prevention counseling provided [Encouraged to increase physical activity] : Encouraged to increase physical activity

## 2024-02-29 NOTE — ASSESSMENT
[FreeTextEntry1] : CPE OF 83 Y OLD FEM WITH PMX OF MITRAL AND AORTIC VALVE DISEASE= F/U CARDIO  HTN = STABLE  HYPOTHYROID AND DYSLIPIDEMIA = LABS  MYASTHENIA GRAVIS = AS PER NEURO  RTO 3M

## 2024-02-29 NOTE — HISTORY OF PRESENT ILLNESS
[de-identified] : COMES FOR CPE  OFFERS NO NEW COMPLAINS  INCREASE APPETITE AND URINARY FREQUENCY ON /OFF

## 2024-02-29 NOTE — PHYSICAL EXAM
[No Acute Distress] : no acute distress [Normal Sclera/Conjunctiva] : normal sclera/conjunctiva [Normal Outer Ear/Nose] : the outer ears and nose were normal in appearance [No JVD] : no jugular venous distention [Normal Rate] : normal rate  [Normal] : no respiratory distress, lungs were clear to auscultation bilaterally and no accessory muscle use [No Edema] : there was no peripheral edema [Regular Rhythm] : with a regular rhythm [Soft] : abdomen soft [Non Tender] : non-tender [Normal Bowel Sounds] : normal bowel sounds [Normal Anterior Cervical Nodes] : no anterior cervical lymphadenopathy [No CVA Tenderness] : no CVA  tenderness [Coordination Grossly Intact] : coordination grossly intact [No Rash] : no rash [No Focal Deficits] : no focal deficits [Alert and Oriented x3] : oriented to person, place, and time

## 2024-03-01 LAB
25(OH)D3 SERPL-MCNC: 41.7 NG/ML
ALBUMIN SERPL ELPH-MCNC: 4.4 G/DL
ALP BLD-CCNC: 81 U/L
ALT SERPL-CCNC: 25 U/L
ANION GAP SERPL CALC-SCNC: 10 MMOL/L
AST SERPL-CCNC: 29 U/L
BILIRUB SERPL-MCNC: 0.4 MG/DL
BUN SERPL-MCNC: 19 MG/DL
CALCIUM SERPL-MCNC: 9.7 MG/DL
CHLORIDE SERPL-SCNC: 107 MMOL/L
CHOLEST SERPL-MCNC: 178 MG/DL
CO2 SERPL-SCNC: 27 MMOL/L
CREAT SERPL-MCNC: 0.83 MG/DL
EGFR: 69 ML/MIN/1.73M2
GLUCOSE SERPL-MCNC: 91 MG/DL
HDLC SERPL-MCNC: 48 MG/DL
LDLC SERPL CALC-MCNC: 112 MG/DL
NONHDLC SERPL-MCNC: 130 MG/DL
POTASSIUM SERPL-SCNC: 5.2 MMOL/L
PROT SERPL-MCNC: 6.8 G/DL
SODIUM SERPL-SCNC: 144 MMOL/L
TRIGL SERPL-MCNC: 99 MG/DL
TSH SERPL-ACNC: 4.36 UIU/ML

## 2024-03-02 ENCOUNTER — RX RENEWAL (OUTPATIENT)
Age: 84
End: 2024-03-02

## 2024-03-06 ENCOUNTER — APPOINTMENT (OUTPATIENT)
Dept: NEUROLOGY | Facility: CLINIC | Age: 84
End: 2024-03-06
Payer: MEDICARE

## 2024-03-06 VITALS — SYSTOLIC BLOOD PRESSURE: 156 MMHG | HEART RATE: 57 BPM | DIASTOLIC BLOOD PRESSURE: 67 MMHG | RESPIRATION RATE: 18 BRPM

## 2024-03-06 VITALS — RESPIRATION RATE: 18 BRPM | SYSTOLIC BLOOD PRESSURE: 137 MMHG | DIASTOLIC BLOOD PRESSURE: 74 MMHG | HEART RATE: 59 BPM

## 2024-03-06 PROCEDURE — 96365 THER/PROPH/DIAG IV INF INIT: CPT

## 2024-03-06 NOTE — HISTORY OF PRESENT ILLNESS
[5] : 5 [N/A] : N/A [Denies] : Denies [No] : No [Yes] : Yes [22g] : 22g [Start Time: ___] : Medication Start Time: [unfilled] [Medication Name: ___] : Medication Name: [unfilled] [de-identified] : Lower back [de-identified] : Postmenopausal [End Time: ___] : Medication End Time: [unfilled] [IV discontinued. Intact. No signs or symptoms of IV complications noted. Time: ___] : IV discontinued. Intact. No signs or symptoms of IV complications noted. Time: [unfilled] [Patient  instructed to seek medical attention with signs and symptoms of adverse effects] : Patient  instructed to seek medical attention with signs and symptoms of adverse effects [Patient left unit in no acute distress] : Patient left unit in no acute distress [Medications administered as ordered and tolerated well.] : Medications administered as ordered and tolerated well. [de-identified] : R wrist [de-identified] : 7435 [de-identified] :  Generic: Eculizumab  Dose: 1200 mg  Infusion Rate: 480 ml/hr in 240 ml 0.9% NS  NDC#: 86163-380-00  Lot#: [id number]  Exp: 2/2026  Was this medication buy and bill? No  Specialty Pharmacy: [facility]  Dx:  MD Prescriber: Dr. Baugh    CPT:  [id number]  Next Appointment: 3/20/24

## 2024-03-07 LAB
HCT VFR BLD CALC: 40.2 %
HGB BLD-MCNC: 13.1 G/DL
MCHC RBC-ENTMCNC: 30.6 PG
MCHC RBC-ENTMCNC: 32.6 GM/DL
MCV RBC AUTO: 93.9 FL
PLATELET # BLD AUTO: 143 K/UL
RBC # BLD: 4.28 M/UL
RBC # FLD: 13.7 %
WBC # FLD AUTO: 5.56 K/UL

## 2024-03-20 ENCOUNTER — APPOINTMENT (OUTPATIENT)
Dept: NEUROLOGY | Facility: CLINIC | Age: 84
End: 2024-03-20
Payer: MEDICARE

## 2024-03-20 VITALS — RESPIRATION RATE: 18 BRPM | DIASTOLIC BLOOD PRESSURE: 71 MMHG | HEART RATE: 60 BPM | SYSTOLIC BLOOD PRESSURE: 157 MMHG

## 2024-03-20 VITALS — RESPIRATION RATE: 18 BRPM | HEART RATE: 55 BPM | DIASTOLIC BLOOD PRESSURE: 62 MMHG | SYSTOLIC BLOOD PRESSURE: 143 MMHG

## 2024-03-20 PROCEDURE — 96365 THER/PROPH/DIAG IV INF INIT: CPT

## 2024-03-20 NOTE — HISTORY OF PRESENT ILLNESS
[Denies] : Denies [No] : No [22g] : 22g [End Time: ___] : Medication End Time: [unfilled] [Start Time: ___] : Medication Start Time: [unfilled] [Medication Name: ___] : Medication Name: [unfilled] [IV discontinued. Intact. No signs or symptoms of IV complications noted. Time: ___] : IV discontinued. Intact. No signs or symptoms of IV complications noted. Time: [unfilled] [Patient left unit in no acute distress] : Patient left unit in no acute distress [Patient  instructed to seek medical attention with signs and symptoms of adverse effects] : Patient  instructed to seek medical attention with signs and symptoms of adverse effects [Medications administered as ordered and tolerated well.] : Medications administered as ordered and tolerated well. [Yes] : Yes [8] : 8 [de-identified] : Lower back [de-identified] : R wrist [de-identified] : 2769 [de-identified] :  Generic: eculizumab   Dose: 1200 mg   Infusion Rate:  480__ ml/hr in _240____ ml 0.9% NS   NDC#: 16614-779-26   Lot#: [id number]   Exp: 01/26   Was this medication buy and bill? No   Specialty Pharmacy: [facility]   Dx:    MD Prescriber: Dr. Baugh       CPT:  [id number]   Next Appointment: [facility]

## 2024-03-20 NOTE — PROGRESS NOTE ADULT - PROBLEM SELECTOR PROBLEM 8
[FreeTextEntry1] : 59-year-old male returns for follow-up with persistent perianal discomfort and hemorrhoids.  Continues to have some area bowels with occasional constipation versus a bowel movements.  Has significant irritation of the perianal skin and occasional mild bleeding.  Has not yet had any treatments for hemorrhoids besides medical management.  He has been following with Dr. Hines and consideration of bariatric surgery.  He is currently on Mounjaro but has not had significant weight loss.  He remains hopeful for natural weight loss without surgery but is considering his options.  Has continued to try Desitin occasionally but does not have significant long-term relief of perianal discomfort.
Gastroesophageal reflux disease, esophagitis presence not specified

## 2024-04-02 NOTE — PHYSICAL EXAM
[FreeTextEntry1] : Constitutional:  Patient was well-developed, well-nourished and in no acute distress.   Head:  Normocephalic.  Tympanic membranes were not examined.  Neck:  Supple with limited movement.  Cardiovascular:  Cardiac rhythm was regular without murmur. There were no carotid bruits. Peripheral pulses were full and symmetric.   Respiratory:  Lungs were clear.   Abdomen:  Soft and nontender.   Spine:  Nontender.   Skin:  There were no rashes.   NEUROLOGICAL EXAMINATION:  Mental Status: Patient was alert and oriented. Speech was fluent. There was no dysarthria.   Cranial Nerves:   II: She could finger count bilaterally.  Pupils were surgical but reactive. Visual fields were full.  Fundi were not examined.  III, IV, VI:  Eye movements were full without nystagmus.   V: Facial sensation was intact.   VII: Facial strength was normal except for bilateral rather pronounced orbicularis oculi weakness.   VIII: Hearing was equal.   IX, X: Palatal movement was normal. Phonation was normal.   XI: Neck flexion and extension muscles were strong.  XII: Tongue was midline and movements normal. There was no lingual atrophy or fasciculations.   Motor Examination: Muscle bulk, tone and strength were normal but she had difficulty abducting her shoulders possibly due to intrinsic disease  Sensory Examination: Pinprick, vibration and joint position sense were intact.   Reflexes: DTRs were 2 throughout except for absent ankle jerks.   Plantar Responses: Plantar responses were flexor.   Coordination/Cerebellar Function: There was no dysmetria on finger to nose testing.   Gait/Stance: Gait was slow but relatively stable.

## 2024-04-02 NOTE — ASSESSMENT
[FreeTextEntry1] : Mrs. Mahan is an 83-year-old with acetylcholine receptor positive myasthenia gravis.  She has persistent myasthenic symptoms despite treatment with pyridostigmine 360 mg/day and eculizumab infusions every 2 weeks.  Her MG ADL score today was 4, half the score of her last visit.  MGFA IIa.  Since last seen, she received meningitis A and B vaccine boosters.  In view of her clinical stability, I suggested no modification in her treatment regimen.  Her insurance carrier refused to allow her to transition to [facility].  She will be reevaluated on March 27, 2023.  Telephone contact will be maintained in the meantime.

## 2024-04-02 NOTE — HISTORY OF PRESENT ILLNESS
[FreeTextEntry1] : Mrs. Alanna Mahan returned to the office having been last seen on October 11, 2023.  She is an 83-year-old right-handed patient who was under the care of Dr. Baron Fitzgerald since September 20, 2019.  She was evaluated in the infusion unit.  I spoke to her daughter, Patsy by telephone([phone]).  In approximately 2018, she developed hoarseness, dysphagia, mastication fatigue and limb weakness.  She was diagnosed with myasthenia gravis by Dr. Long Reyes and was treated with pyridostigmine.  In August 2019, she developed myasthenic crisis prompting admission to [facility].  She was treated with IVIG and prednisone.  Pyridostigmine was continued.  She was then vaccinated against meningococcus and begun on eculizumab.  When seen on November 15, 2022, she was on pyridostigmine 180 mg every other day and 60 mg 3 times a day.  She was receiving eculizumab every 2 weeks.  She complained of occasional hand weakness and chronic low back pain.  She was evaluated by pain management and orthopedics and was administered an epidural steroid injection.  She was well until early March 2023 when she was admitted for an elective left hip replacement.  The procedure was aborted when she developed bradycardia.  She underwent a cardiac evaluation and was cleared.  She underwent left hip replacement on March 27, 2023.  She was discharged to rehabilitation after 2 days where she contracted COVID-19 pneumonia.  She was treated with antibiotics.  She subsequently developed a wound infection.  On May 18, she underwent debridement of a superficial infection and was treated with intravenous antibiotics.  At her July 21, 2023 visit, she complained of fatigue.  She was having difficulty feeding herself because her hands and arms had become weak.  Her legs were also fatigued.  She complained of occasional limb numbness.  She denied diplopia, ptosis or dysphagia.  She was administered 2 g/kg of IVIG from July 24 to July 28.  She tolerated the infusion well.  She reports feeling stronger. At her August 2 visit, her MG ADL score was 4.  She had difficulty rising from a seated position and complained of rare episodes of choking.  She received eculizumab infusion earlier on August 2, 2023.  At her August 16, 2023 visit, she has multiple complaints.  She felt diffusely weak.  She complained of rare episodes of choking, shortness of breath with exertion, requiring rest periods while brushing her teeth and combing her hair, and required the use of her arms to rise from a seated position.  I spoke to Patsy and she reported that her mother was doing fairly well with ADLs at home.  She had not undergone physical therapy after undergoing a hip replacement.  She was hesitant to go outdoors.  Otherwise, she was doing fairly well.  When seen on August 30, 2023, she had last received IVIG in late July.  She seemed relatively stable.  She was on pyridostigmine 60 mg 3 times daily and extended release 180 once daily.  She was receiving eculizumab infusions every 2 weeks.  She complained of fatigue when chewing, occasional difficulty swallowing, dyspnea on exertion and requiring the use of her arms when standing.  MG ADL score was 6.  In view of her advanced age, I was hesitant to begin steroids and suggested treatment with mycophenolate.  Her platelet count was mildly low and her daughter did not begin that medication.  She complained of persistent myasthenic symptoms.  Her MG ADL score on October 11, 2023 was 8.  She continues to complain of fatigue.  MG ADL score today was 4.  MGFA IIa.  she complained of rare episodes of dysphagia, shortness of breath on exertion and requiring use of her arms to rise from seated position.  Past surgical history is notable for bilateral cataract extractions, bilateral carpal tunnel release, hysterectomy, bladder resuspension, tonsillectomy, bilateral shoulder replacements, 4 lumbar and 1 cervical procedure, right hip replacement, procedure on her left foot, cholecystectomy, herniorrhaphy and excision of squamous and basal carcinomas.  Medical problems include hypertension, hyperlipidemia, hypothyroidism and COVID-19 in 2020.  Medications include pyridostigmine extended release 180 mg daily, pyridostigmine 60 mg 3 times a day, levothyroxine 75 mcg daily, Cozaar 50 mg daily, rosuvastatin 40 mg/day and eculizumab infusions every 2 weeks.    She has an allergy to iodine.  She is  and is a retired seamstress.  She is a non-smoker and drinks wine.  Family history is notable for stroke thyroid disease and bladder cancer.

## 2024-04-03 ENCOUNTER — NON-APPOINTMENT (OUTPATIENT)
Age: 84
End: 2024-04-03

## 2024-04-03 ENCOUNTER — APPOINTMENT (OUTPATIENT)
Dept: CARDIOLOGY | Facility: CLINIC | Age: 84
End: 2024-04-03
Payer: MEDICARE

## 2024-04-03 VITALS
SYSTOLIC BLOOD PRESSURE: 144 MMHG | RESPIRATION RATE: 14 BRPM | HEART RATE: 65 BPM | OXYGEN SATURATION: 100 % | TEMPERATURE: 97.7 F | DIASTOLIC BLOOD PRESSURE: 79 MMHG

## 2024-04-03 PROCEDURE — 93306 TTE W/DOPPLER COMPLETE: CPT

## 2024-04-03 PROCEDURE — G2211 COMPLEX E/M VISIT ADD ON: CPT

## 2024-04-03 PROCEDURE — 93000 ELECTROCARDIOGRAM COMPLETE: CPT

## 2024-04-03 PROCEDURE — 99214 OFFICE O/P EST MOD 30 MIN: CPT

## 2024-04-03 NOTE — REVIEW OF SYSTEMS
[SOB] : shortness of breath [Dyspnea on exertion] : dyspnea during exertion [Negative] : Heme/Lymph [Chest Discomfort] : no chest discomfort [Leg Claudication] : no intermittent leg claudication [Lower Ext Edema] : no extremity edema [Palpitations] : no palpitations [Orthopnea] : no orthopnea [PND] : no PND [Syncope] : no syncope

## 2024-04-03 NOTE — DISCUSSION/SUMMARY
[FreeTextEntry1] : The patient is an 83-year-old female myasthenia gravis, HTN, HLD, hypothyroid, s/p COVID with fibrosis, AS with reactive htn. #1 AS- euvolemic on exam and asymptomatic, 2/2023 ECHO SANJAY =1.0 unchanged, PVC now off toprol,  #2 Pulm- s/p COVID pneumonia 2020 with prolonged hospitalization, and with scarring, on inhaler, f/u Dr. Salcedo #3 HTN- c/w losartan 25mg, HCTZ prn, write down how often using it. #4 HLD- c/w crestor 20mg, unable to tolerate higher dose #5 Hypothyroid- c/w levothyroxine #6 MG- on pyridostigmine, mycophenolate, daughter supportive, received COVID vaccines, also now on IV soliris #7 Ortho- s/p THR, ambulates with cane, very anxious [EKG obtained to assist in diagnosis and management of assessed problem(s)] : EKG obtained to assist in diagnosis and management of assessed problem(s)

## 2024-04-03 NOTE — PHYSICAL EXAM
[Well Developed] : well developed [Well Nourished] : well nourished [Normal Conjunctiva] : normal conjunctiva [No Acute Distress] : no acute distress [Normal Venous Pressure] : normal venous pressure [Normal S1, S2] : normal S1, S2 [No Carotid Bruit] : no carotid bruit [No Gallop] : no gallop [No Rub] : no rub [Murmur] : murmur [Good Air Entry] : good air entry [Clear Lung Fields] : clear lung fields [Soft] : abdomen soft [No Respiratory Distress] : no respiratory distress  [No Masses/organomegaly] : no masses/organomegaly [Non Tender] : non-tender [Normal Bowel Sounds] : normal bowel sounds [No Edema] : no edema [Normal Gait] : normal gait [No Clubbing] : no clubbing [No Cyanosis] : no cyanosis [No Varicosities] : no varicosities [No Rash] : no rash [No Skin Lesions] : no skin lesions [Moves all extremities] : moves all extremities [No Focal Deficits] : no focal deficits [Normal Speech] : normal speech [Alert and Oriented] : alert and oriented [Normal memory] : normal memory [de-identified] : 2/6 systolic

## 2024-04-03 NOTE — HISTORY OF PRESENT ILLNESS
[FreeTextEntry1] : Alanna is very anxious. She reports a few times in the morning that she has coffee then headache and BP very high so takes the HCTZ on those occasions and BP improves. Son states very anxious. No CP, palpitations or dizziness.

## 2024-04-03 NOTE — REASON FOR VISIT
[Family Member] : family member [Structural Heart and Valve Disease] : structural heart and valve disease

## 2024-04-04 ENCOUNTER — APPOINTMENT (OUTPATIENT)
Dept: NEUROLOGY | Facility: CLINIC | Age: 84
End: 2024-04-04
Payer: MEDICARE

## 2024-04-04 VITALS — HEART RATE: 59 BPM | DIASTOLIC BLOOD PRESSURE: 73 MMHG | RESPIRATION RATE: 18 BRPM | SYSTOLIC BLOOD PRESSURE: 131 MMHG

## 2024-04-04 VITALS
HEART RATE: 75 BPM | HEIGHT: 57 IN | BODY MASS INDEX: 31.5 KG/M2 | DIASTOLIC BLOOD PRESSURE: 78 MMHG | SYSTOLIC BLOOD PRESSURE: 163 MMHG | WEIGHT: 146 LBS

## 2024-04-04 VITALS — SYSTOLIC BLOOD PRESSURE: 150 MMHG | DIASTOLIC BLOOD PRESSURE: 89 MMHG | HEART RATE: 69 BPM | RESPIRATION RATE: 18 BRPM

## 2024-04-04 DIAGNOSIS — G70.00 MYASTHENIA GRAVIS W/OUT (ACUTE) EXACERBATION: ICD-10-CM

## 2024-04-04 PROCEDURE — 96365 THER/PROPH/DIAG IV INF INIT: CPT

## 2024-04-04 PROCEDURE — 99214 OFFICE O/P EST MOD 30 MIN: CPT | Mod: 25

## 2024-04-04 RX ORDER — EFGARTIGIMOD ALFA 20 MG/ML
400 INJECTION INTRAVENOUS
Qty: 2 | Refills: 24 | Status: ACTIVE | COMMUNITY
Start: 2024-04-04 | End: 1900-01-01

## 2024-04-04 NOTE — ASSESSMENT
[FreeTextEntry1] : Mrs. Mahan is an 83-year-old with acetylcholine receptor positive myasthenia gravis.  She has moderate motor symptoms with MG ADL score of 8.  She is on pyridostigmine and a C5 inhibitor.  She is not on any treatment to decrease her acetylcholine receptor antibodies.   She is debating whether she wants to remain in her current state or possibly attempt to decrease her acetylcholine receptor antibodies.  I am hesitant to use steroids given her advanced age.  I will investigate treatment with a  Fc receptor antagonist or IVIG.  Further management will depend upon her clinical course.  MG ADL 8  MGFA clinical classification IIIA

## 2024-04-04 NOTE — HISTORY OF PRESENT ILLNESS
[FreeTextEntry1] : Mrs. Alanna Mahan returned to the office having been last seen on December 20, 2023.  She is an 83-year-old right-handed patient who was under the care of Dr. Baron Fitzgerald since September 20, 2019.  She was accompanied to the office by her son Forrest ([phone]).  In approximately 2018, she developed hoarseness, dysphagia, mastication fatigue and limb weakness.  She was diagnosed with myasthenia gravis by Dr. Long Reyes and was treated with pyridostigmine.  In August 2019, she developed myasthenic crisis prompting admission to [facility].  She was treated with IVIG and prednisone.  Pyridostigmine was continued.  She was then vaccinated against meningococcus and begun on eculizumab.  When seen on November 15, 2022, she was on pyridostigmine 180 mg every other day and 60 mg 3 times a day.  She was receiving eculizumab every 2 weeks.  She complained of occasional hand weakness and chronic low back pain.  She was evaluated by pain management and orthopedics and was administered an epidural steroid injection.  She was well until early March 2023 when she was admitted for an elective left hip replacement.  The procedure was aborted when she developed bradycardia.  She underwent a cardiac evaluation and was cleared.  She underwent left hip replacement on March 27, 2023.  She was discharged to rehabilitation after 2 days where she contracted COVID-19 pneumonia.  She was treated with antibiotics.  She subsequently developed a wound infection.  On May 18, she underwent debridement of a superficial infection and was treated with intravenous antibiotics.  At her July 21, 2023 visit, she complained of fatigue.  She was having difficulty feeding herself because her hands and arms had become weak.  Her legs were also fatigued.  She complained of occasional limb numbness.  She denied diplopia, ptosis or dysphagia.  She was administered 2 g/kg of IVIG from July 24 to July 28.  She tolerated the infusion well.  She reports feeling stronger. At her August 2 visit, her MG ADL score was 4.  She had difficulty rising from a seated position and complained of rare episodes of choking.  She received eculizumab infusion earlier on August 2, 2023.  At her August 16, 2023 visit, she has multiple complaints.  She felt diffusely weak.  She complained of rare episodes of choking, shortness of breath with exertion, requiring rest periods while brushing her teeth and combing her hair, and required the use of her arms to rise from a seated position.  I spoke to Patsy and she reported that her mother was doing fairly well with ADLs at home.  She had not undergone physical therapy after undergoing a hip replacement.  She was hesitant to go outdoors.  Otherwise, she was doing fairly well.  When seen on August 30, 2023, she had last received IVIG in late July.  She seemed relatively stable.  She was on pyridostigmine 60 mg 3 times daily and extended release 180 once daily.  She was receiving eculizumab infusions every 2 weeks.  She complained of fatigue when chewing, occasional difficulty swallowing, dyspnea on exertion and requiring the use of her arms when standing.  MG ADL score was 6.  In view of her advanced age, I was hesitant to begin steroids and suggested treatment with mycophenolate.  Her platelet count was mildly low and her daughter did not begin that medication.  She complained of persistent myasthenic symptoms.  Her MG ADL score on October 11, 2023 was 8.  At her December 2023 visit, she complained of persistent fatigue.  MG ADL scorewas 4.  MGFA IIa.  She complained of rare episodes of dysphagia, shortness of breath on exertion and requiring use of her arms to rise from seated position.  She remains on pyridostigmine extended release 180 mg at 1 PM and 60 mg twice a day.  She receives eculizumab infusions every 2 weeks.  Her MG ADL score was 8.  She complained of intermittent slurred speech, rare episodes of choking, shortness of breath, difficulty combing her hair and rising from a seated position.  Past surgical history is notable for bilateral cataract extractions, bilateral carpal tunnel release, hysterectomy, bladder resuspension, tonsillectomy, bilateral shoulder replacements, 4 lumbar and 1 cervical procedure, right hip replacement, procedure on her left foot, cholecystectomy, herniorrhaphy and excision of squamous and basal carcinomas.  Medical problems include hypertension, hyperlipidemia, hypothyroidism and COVID-19 in 2020.  Medications include pyridostigmine extended release 180 mg daily, pyridostigmine 60 mg 2 times a day, levothyroxine 75 mcg daily, Cozaar 50 mg daily, rosuvastatin 40 mg/day and eculizumab infusions every 2 weeks.    She has an allergy to iodine.  She is  and is a retired seamstress.  She is a non-smoker and drinks wine.  Family history is notable for stroke thyroid disease and bladder cancer.

## 2024-04-04 NOTE — ADDENDUM
[FreeTextEntry1] : Order for Vyvgart 660 mg weekly for 4 weeks written.  Repeat cycle every 50 days.

## 2024-04-04 NOTE — HISTORY OF PRESENT ILLNESS
[Denies] : Denies [No] : No [Yes] : Yes [Right upper extremity] : Right upper extremity [22g] : 22g [Start Time: ___] : Medication Start Time: [unfilled] [Medication Name: ___] : Medication Name: [unfilled] [Total Amount Administered: ___] : Total Amount Administered: [unfilled] [End Time: ___] : Medication End Time: [unfilled] [IV discontinued. Intact. No signs or symptoms of IV complications noted. Time: ___] : IV discontinued. Intact. No signs or symptoms of IV complications noted. Time: [unfilled] [Patient  instructed to seek medical attention with signs and symptoms of adverse effects] : Patient  instructed to seek medical attention with signs and symptoms of adverse effects [Patient left unit in no acute distress] : Patient left unit in no acute distress [Medications administered as ordered and tolerated well.] : Medications administered as ordered and tolerated well. [de-identified] : wrist [de-identified] : 8:44am [de-identified] : Generic: Eculizumab Dose: 1200mg in 120mL 0.9% NS (total 240ml) Infusion Rate: 480mL/hr        NDC#: 46673-010-06 [id number]#: [id number] Exp: Jan 26 Was this medication buy and bill? No Specialty Pharmacy: [facility] Specialty Dx: Myasthenia Gravis MD Prescriber: Dr. Baugh  CPT: [id number] Next Appointment: 4/17/24

## 2024-04-17 ENCOUNTER — APPOINTMENT (OUTPATIENT)
Dept: NEUROLOGY | Facility: CLINIC | Age: 84
End: 2024-04-17
Payer: MEDICARE

## 2024-04-17 VITALS — SYSTOLIC BLOOD PRESSURE: 161 MMHG | HEART RATE: 63 BPM | DIASTOLIC BLOOD PRESSURE: 72 MMHG | RESPIRATION RATE: 18 BRPM

## 2024-04-17 VITALS — RESPIRATION RATE: 18 BRPM | HEART RATE: 61 BPM | DIASTOLIC BLOOD PRESSURE: 63 MMHG | SYSTOLIC BLOOD PRESSURE: 132 MMHG

## 2024-04-17 PROCEDURE — 96365 THER/PROPH/DIAG IV INF INIT: CPT

## 2024-04-17 NOTE — HISTORY OF PRESENT ILLNESS
[Denies] : Denies [Yes] : Yes [Left upper extremity] : Left upper extremity [22g] : 22g [Start Time: ___] : Medication Start Time: [unfilled] [End Time: ___] : Medication End Time: [unfilled] [Medication Name: ___] : Medication Name: [unfilled] [Total Amount Administered: ___] : Total Amount Administered: [unfilled] [IV discontinued. Intact. No signs or symptoms of IV complications noted. Time: ___] : IV discontinued. Intact. No signs or symptoms of IV complications noted. Time: [unfilled] [Patient  instructed to seek medical attention with signs and symptoms of adverse effects] : Patient  instructed to seek medical attention with signs and symptoms of adverse effects [Patient left unit in no acute distress] : Patient left unit in no acute distress [Medications administered as ordered and tolerated well.] : Medications administered as ordered and tolerated well. [de-identified] : hand [de-identified] : Generic: Eculizumab Dose: 1200mg in 120mL 0.9% NS (total 240ml) Infusion Rate: 480mL/hr           NDC#: 02164-261-28 Lot#: [id number] Exp: Feb 26 Was this medication buy and bill? No Specialty Pharmacy: [facility] Dx: Myasthenia Gravis MD Prescriber: Dr. Baugh  CPT: 06899 Next Appointment: 5/1/2024

## 2024-05-01 ENCOUNTER — APPOINTMENT (OUTPATIENT)
Dept: NEUROLOGY | Facility: CLINIC | Age: 84
End: 2024-05-01
Payer: MEDICARE

## 2024-05-01 VITALS — RESPIRATION RATE: 18 BRPM | DIASTOLIC BLOOD PRESSURE: 68 MMHG | HEART RATE: 63 BPM | SYSTOLIC BLOOD PRESSURE: 156 MMHG

## 2024-05-01 VITALS — DIASTOLIC BLOOD PRESSURE: 70 MMHG | HEART RATE: 54 BPM | RESPIRATION RATE: 18 BRPM | SYSTOLIC BLOOD PRESSURE: 137 MMHG

## 2024-05-01 PROCEDURE — 96365 THER/PROPH/DIAG IV INF INIT: CPT

## 2024-05-01 NOTE — HISTORY OF PRESENT ILLNESS
[N/A] : N/A [Denies] : Denies [No] : No [Yes] : Yes [Declined] : Declined [Left upper extremity] : Left upper extremity [22g] : 22g [Start Time: ___] : Medication Start Time: [unfilled] [Medication Name: ___] : Medication Name: [unfilled] [End Time: ___] : Medication End Time: [unfilled] [Total Amount Administered: ___] : Total Amount Administered: [unfilled] [IV discontinued. Intact. No signs or symptoms of IV complications noted. Time: ___] : IV discontinued. Intact. No signs or symptoms of IV complications noted. Time: [unfilled] [de-identified] : post menopause [de-identified] : a/c [de-identified] : 8:45am [de-identified] : Generic: Eculizumab Dose: 120mg in 120mL 0.9% NS (total 240ml) Infusion Rate:  480ml/hr          NDC#: 87737-184-40 Lot#: [id number] Exp: Apr 26 Was this medication buy and bill? No Specialty Pharmacy: [facility] Specialty Dx: Myasthenia gravis MD Prescriber: Dr. Baugh  CPT: 48472 Next Appointment: 5/15/24

## 2024-05-02 RX ORDER — EFGARTIGIMOD ALFA 20 MG/ML
400 INJECTION INTRAVENOUS
Qty: 8 | Refills: 8 | Status: ACTIVE | COMMUNITY
Start: 2024-05-02 | End: 1900-01-01

## 2024-05-15 ENCOUNTER — APPOINTMENT (OUTPATIENT)
Dept: NEUROLOGY | Facility: CLINIC | Age: 84
End: 2024-05-15
Payer: MEDICARE

## 2024-05-15 VITALS — SYSTOLIC BLOOD PRESSURE: 116 MMHG | HEART RATE: 61 BPM | RESPIRATION RATE: 18 BRPM | DIASTOLIC BLOOD PRESSURE: 72 MMHG

## 2024-05-15 VITALS — SYSTOLIC BLOOD PRESSURE: 134 MMHG | DIASTOLIC BLOOD PRESSURE: 80 MMHG | HEART RATE: 69 BPM | RESPIRATION RATE: 18 BRPM

## 2024-05-15 PROCEDURE — 96365 THER/PROPH/DIAG IV INF INIT: CPT

## 2024-05-15 RX ORDER — PYRIDOSTIGMINE BROMIDE 180 MG/1
180 TABLET ORAL
Qty: 90 | Refills: 3 | Status: ACTIVE | COMMUNITY
Start: 2024-05-15 | End: 1900-01-01

## 2024-05-15 RX ORDER — PYRIDOSTIGMINE BROMIDE 60 MG/1
60 TABLET ORAL
Qty: 270 | Refills: 3 | Status: ACTIVE | COMMUNITY
Start: 2024-05-15 | End: 1900-01-01

## 2024-05-15 NOTE — HISTORY OF PRESENT ILLNESS
[Denies] : Denies [No] : No [Yes] : Yes [22g] : 22g [Start Time: ___] : Medication Start Time: [unfilled] [Medication Name: ___] : Medication Name: [unfilled] [End Time: ___] : Medication End Time: [unfilled] [IV discontinued. Intact. No signs or symptoms of IV complications noted. Time: ___] : IV discontinued. Intact. No signs or symptoms of IV complications noted. Time: [unfilled] [Patient  instructed to seek medical attention with signs and symptoms of adverse effects] : Patient  instructed to seek medical attention with signs and symptoms of adverse effects [Patient left unit in no acute distress] : Patient left unit in no acute distress [Medications administered as ordered and tolerated well.] : Medications administered as ordered and tolerated well. [de-identified] : R wrist [de-identified] : 9591 [de-identified] :  Generic: eculizumab   Dose: 1200 mg   Infusion Rate: _480_ ml/hr in __250___ ml 0.9% NS   NDC#: 71228-639-49   Lot#: [id number]   Exp: 4/26/24   Was this medication buy and bill? No   [facility]   Dx: Myasthenia gravis   MD Prescriber: Dr. Baugh      CPT:  83802   Next Appointment: 2 weeks

## 2024-05-22 RX ORDER — PYRIDOSTIGMINE BROMIDE 180 MG/1
180 TABLET ORAL
Qty: 90 | Refills: 3 | Status: ACTIVE | COMMUNITY
Start: 2024-05-22 | End: 1900-01-01

## 2024-05-22 RX ORDER — PYRIDOSTIGMINE BROMIDE 60 MG/1
60 TABLET ORAL
Qty: 180 | Refills: 3 | Status: ACTIVE | COMMUNITY
Start: 2024-05-22 | End: 1900-01-01

## 2024-05-29 ENCOUNTER — APPOINTMENT (OUTPATIENT)
Dept: NEUROLOGY | Facility: CLINIC | Age: 84
End: 2024-05-29

## 2024-05-29 RX ORDER — PYRIDOSTIGMINE BROMIDE 60 MG/1
60 TABLET ORAL
Qty: 180 | Refills: 3 | Status: ACTIVE | COMMUNITY
Start: 2024-05-29 | End: 1900-01-01

## 2024-05-30 ENCOUNTER — NON-APPOINTMENT (OUTPATIENT)
Age: 84
End: 2024-05-30

## 2024-05-30 ENCOUNTER — APPOINTMENT (OUTPATIENT)
Dept: INTERNAL MEDICINE | Facility: CLINIC | Age: 84
End: 2024-05-30
Payer: MEDICARE

## 2024-05-30 VITALS
BODY MASS INDEX: 32.79 KG/M2 | RESPIRATION RATE: 22 BRPM | WEIGHT: 152 LBS | HEART RATE: 88 BPM | OXYGEN SATURATION: 95 % | DIASTOLIC BLOOD PRESSURE: 82 MMHG | SYSTOLIC BLOOD PRESSURE: 172 MMHG | HEIGHT: 57 IN

## 2024-05-30 DIAGNOSIS — E78.5 HYPERLIPIDEMIA, UNSPECIFIED: ICD-10-CM

## 2024-05-30 DIAGNOSIS — I10 ESSENTIAL (PRIMARY) HYPERTENSION: ICD-10-CM

## 2024-05-30 DIAGNOSIS — E03.9 HYPOTHYROIDISM, UNSPECIFIED: ICD-10-CM

## 2024-05-30 DIAGNOSIS — I35.0 NONRHEUMATIC AORTIC (VALVE) STENOSIS: ICD-10-CM

## 2024-05-30 DIAGNOSIS — K21.9 GASTRO-ESOPHAGEAL REFLUX DISEASE W/OUT ESOPHAGITIS: ICD-10-CM

## 2024-05-30 PROCEDURE — 99214 OFFICE O/P EST MOD 30 MIN: CPT

## 2024-05-30 RX ORDER — ROSUVASTATIN CALCIUM 20 MG/1
20 TABLET, FILM COATED ORAL
Qty: 90 | Refills: 1 | Status: ACTIVE | COMMUNITY
Start: 1900-01-01 | End: 1900-01-01

## 2024-05-30 RX ORDER — LOSARTAN POTASSIUM 25 MG/1
25 TABLET, FILM COATED ORAL
Qty: 90 | Refills: 1 | Status: ACTIVE | COMMUNITY
Start: 2023-06-06 | End: 1900-01-01

## 2024-05-30 RX ORDER — LEVOTHYROXINE SODIUM 0.07 MG/1
75 TABLET ORAL
Qty: 90 | Refills: 1 | Status: ACTIVE | COMMUNITY
Start: 2019-02-07 | End: 1900-01-01

## 2024-05-30 NOTE — ASSESSMENT
[FreeTextEntry1] : 83 Y OLD FEM WITH PMX OF HTN = LOSARTAN 25 MG DAILY  DYSLIPIDEMIA = LABS ADN CRESTOR 20 MG ORDERED HYPOTHYROID = LEVOTHYROXINE 75 MCG DAILY  MG = F/U NEURO  RTO 3 M

## 2024-05-31 ENCOUNTER — APPOINTMENT (OUTPATIENT)
Dept: NEUROLOGY | Facility: CLINIC | Age: 84
End: 2024-05-31

## 2024-05-31 VITALS — DIASTOLIC BLOOD PRESSURE: 67 MMHG | SYSTOLIC BLOOD PRESSURE: 146 MMHG | HEART RATE: 54 BPM | RESPIRATION RATE: 18 BRPM

## 2024-05-31 VITALS — HEART RATE: 61 BPM | DIASTOLIC BLOOD PRESSURE: 71 MMHG | RESPIRATION RATE: 18 BRPM | SYSTOLIC BLOOD PRESSURE: 155 MMHG

## 2024-05-31 LAB
ALBUMIN SERPL ELPH-MCNC: 4.1 G/DL
ALP BLD-CCNC: 80 U/L
ALT SERPL-CCNC: 29 U/L
ANION GAP SERPL CALC-SCNC: 11 MMOL/L
AST SERPL-CCNC: 36 U/L
BILIRUB SERPL-MCNC: 0.4 MG/DL
BUN SERPL-MCNC: 17 MG/DL
CALCIUM SERPL-MCNC: 9.5 MG/DL
CHLORIDE SERPL-SCNC: 107 MMOL/L
CHOLEST SERPL-MCNC: 171 MG/DL
CO2 SERPL-SCNC: 24 MMOL/L
CREAT SERPL-MCNC: 0.8 MG/DL
EGFR: 72 ML/MIN/1.73M2
GLUCOSE SERPL-MCNC: 92 MG/DL
HDLC SERPL-MCNC: 43 MG/DL
LDLC SERPL CALC-MCNC: 111 MG/DL
NONHDLC SERPL-MCNC: 128 MG/DL
POTASSIUM SERPL-SCNC: 4.7 MMOL/L
PROT SERPL-MCNC: 6.4 G/DL
SODIUM SERPL-SCNC: 142 MMOL/L
TRIGL SERPL-MCNC: 90 MG/DL

## 2024-05-31 PROCEDURE — 96365 THER/PROPH/DIAG IV INF INIT: CPT

## 2024-05-31 NOTE — HISTORY OF PRESENT ILLNESS
[N/A] : N/A [Denies] : Denies [No] : No [Yes] : Yes [22g] : 22g [Start Time: ___] : Medication Start Time: [unfilled] [End Time: ___] : Medication End Time: [unfilled] [Medication Name: ___] : Medication Name: [unfilled] [IV discontinued. Intact. No signs or symptoms of IV complications noted. Time: ___] : IV discontinued. Intact. No signs or symptoms of IV complications noted. Time: [unfilled] [Patient  instructed to seek medical attention with signs and symptoms of adverse effects] : Patient  instructed to seek medical attention with signs and symptoms of adverse effects [Patient left unit in no acute distress] : Patient left unit in no acute distress [Medications administered as ordered and tolerated well.] : Medications administered as ordered and tolerated well. [de-identified] : R wrist [de-identified] : 8547 [de-identified] :  Generic: eculizumab   Dose: 1200 mg   Infusion Rate: 480_ ml/hr in _240__ ml 0.9% NS   NDC#: 58572-835-21   Lot#: [id number]   Exp: 05/2026   Was this medication buy and bill? No   [facility]   Dx: Myasthenia Gravis   MD Prescriber: Dr. Baugh      CPT:  68340   Next Appointment: 6/12/24

## 2024-06-12 ENCOUNTER — APPOINTMENT (OUTPATIENT)
Dept: NEUROLOGY | Facility: CLINIC | Age: 84
End: 2024-06-12

## 2024-06-12 VITALS — SYSTOLIC BLOOD PRESSURE: 149 MMHG | DIASTOLIC BLOOD PRESSURE: 72 MMHG | HEART RATE: 62 BPM | RESPIRATION RATE: 17 BRPM

## 2024-06-12 VITALS — HEART RATE: 66 BPM | SYSTOLIC BLOOD PRESSURE: 127 MMHG | DIASTOLIC BLOOD PRESSURE: 73 MMHG | RESPIRATION RATE: 17 BRPM

## 2024-06-12 PROCEDURE — 96365 THER/PROPH/DIAG IV INF INIT: CPT

## 2024-06-12 NOTE — HISTORY OF PRESENT ILLNESS
[Denies] : Denies [No] : No [Yes] : Yes [Informed consent documented in EHR.] : Informed consent documented in EHR. [Left upper extremity] : Left upper extremity [22g] : 22g [Start Time: ___] : Medication Start Time: [unfilled] [Medication Name: ___] : Medication Name: [unfilled] [Total Amount Administered: ___] : Total Amount Administered: [unfilled] [End Time: ___] : Medication End Time: [unfilled] [IV discontinued. Intact. No signs or symptoms of IV complications noted. Time: ___] : IV discontinued. Intact. No signs or symptoms of IV complications noted. Time: [unfilled] [Patient  instructed to seek medical attention with signs and symptoms of adverse effects] : Patient  instructed to seek medical attention with signs and symptoms of adverse effects [Patient left unit in no acute distress] : Patient left unit in no acute distress [Medications administered as ordered and tolerated well.] : Medications administered as ordered and tolerated well. 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 n/a Action Taken: n/a  CPT: 55470 Next Appointment: Every 2 weeks. Marcia velasquezed.

## 2024-06-26 ENCOUNTER — APPOINTMENT (OUTPATIENT)
Dept: NEUROLOGY | Facility: CLINIC | Age: 84
End: 2024-06-26

## 2024-06-26 VITALS — RESPIRATION RATE: 17 BRPM | SYSTOLIC BLOOD PRESSURE: 140 MMHG | HEART RATE: 54 BPM | DIASTOLIC BLOOD PRESSURE: 73 MMHG

## 2024-06-26 VITALS — SYSTOLIC BLOOD PRESSURE: 130 MMHG | HEART RATE: 61 BPM | DIASTOLIC BLOOD PRESSURE: 70 MMHG | RESPIRATION RATE: 16 BRPM

## 2024-06-26 PROCEDURE — 96365 THER/PROPH/DIAG IV INF INIT: CPT

## 2024-07-09 NOTE — ED ADULT TRIAGE NOTE - BP NONINVASIVE DIASTOLIC (MM HG)
FOLLOW UP VISIT    Subjective:    Denny Guevara (: 1963) is a 60 y.o., male,   Chief Complaint   Patient presents with    Annual Exam       HPI:    3 weeks ago he had fever for about 3 days, was recommended to go to the ED however patient refused, regardless he is feeling better no more fever.    Patient reports he was started on new medication for anger by oncology however he does not know what the medication is.  He has been on Cymbalta and is also on BuSpar.    DM2: taking semglee 12 U nightly.  He brought a log of his fasting sugars which have been around 140-150 has been forgetting to check more recently although last month he was doing a good job of checking    HCM: discussed checking with oncology team regarding timing of PNA vaccine.  Colonoscopy  showed benign colon polyps.  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  Will hold off on PSA screening.    The following portions of the patient's history were reviewed and updated as appropriate:      Current Outpatient Medications   Medication Sig Dispense Refill    potassium chloride (KLOR-CON M20) 20 MEQ extended release tablet Take 1 tablet by mouth daily 90 tablet 3    insulin glargine (SEMGLEE) 100 UNIT/ML injection pen Inject 15 Units into the skin nightly 5 Adjustable Dose Pre-filled Pen Syringe 5    DULoxetine (CYMBALTA) 60 MG extended release capsule Take 1 capsule by mouth daily 90 capsule 3    busPIRone (BUSPAR) 7.5 MG tablet Take 1 tablet by mouth daily 90 tablet 3    rosuvastatin (CRESTOR) 10 MG tablet TAKE 1 TABLET BY MOUTH EVERY DAY 90 tablet 4    gabapentin (NEURONTIN) 400 MG capsule Take 1 capsule by mouth 3 times daily as needed (pain) for up to 180 days. 180 capsule 2    magnesium oxide (MAG-OX) 400 MG tablet Take 1 tablet by mouth in the morning, at noon, and at bedtime 90 tablet 2    blood glucose monitor strips OneTouch Verio Testing Strips check blood sugar 3-4 times daily as 
81

## 2024-07-10 ENCOUNTER — APPOINTMENT (OUTPATIENT)
Dept: NEUROLOGY | Facility: CLINIC | Age: 84
End: 2024-07-10

## 2024-07-10 VITALS — DIASTOLIC BLOOD PRESSURE: 63 MMHG | HEART RATE: 63 BPM | SYSTOLIC BLOOD PRESSURE: 143 MMHG | RESPIRATION RATE: 16 BRPM

## 2024-07-10 VITALS — SYSTOLIC BLOOD PRESSURE: 114 MMHG | HEART RATE: 57 BPM | DIASTOLIC BLOOD PRESSURE: 64 MMHG | RESPIRATION RATE: 16 BRPM

## 2024-07-10 PROCEDURE — 96365 THER/PROPH/DIAG IV INF INIT: CPT

## 2024-07-12 ENCOUNTER — APPOINTMENT (OUTPATIENT)
Dept: NEUROLOGY | Facility: CLINIC | Age: 84
End: 2024-07-12

## 2024-07-24 ENCOUNTER — APPOINTMENT (OUTPATIENT)
Dept: NEUROLOGY | Facility: CLINIC | Age: 84
End: 2024-07-24

## 2024-07-24 PROCEDURE — 96365 THER/PROPH/DIAG IV INF INIT: CPT

## 2024-07-24 NOTE — HISTORY OF PRESENT ILLNESS
[Denies] : Denies [Yes] : Yes [Informed consent documented in EHR.] : Informed consent documented in EHR. [Right upper extremity] : Right upper extremity [24g] : 24g [Start Time: ___] : Medication Start Time: [unfilled] [End Time: ___] : Medication End Time: [unfilled] [Medication Name: ___] : Medication Name: [unfilled] [Total Amount Administered: ___] : Total Amount Administered: [unfilled] [IV discontinued. Intact. No signs or symptoms of IV complications noted. Time: ___] : IV discontinued. Intact. No signs or symptoms of IV complications noted. Time: [unfilled] [Patient  instructed to seek medical attention with signs and symptoms of adverse effects] : Patient  instructed to seek medical attention with signs and symptoms of adverse effects [Patient left unit in no acute distress] : Patient left unit in no acute distress [Medications administered as ordered and tolerated well.] : Medications administered as ordered and tolerated well. 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 n/a Action Taken: n/a  CPT: 55780 Next Appointment: 08/07/24 0800

## 2024-08-07 ENCOUNTER — APPOINTMENT (OUTPATIENT)
Dept: NEUROLOGY | Facility: CLINIC | Age: 84
End: 2024-08-07

## 2024-08-07 PROCEDURE — 96365 THER/PROPH/DIAG IV INF INIT: CPT

## 2024-08-07 NOTE — HISTORY OF PRESENT ILLNESS
[Denies] : Denies [Yes] : Yes [de-identified] : Cane [Right upper extremity] : Right upper extremity [24g] : 24g [Start Time: ___] : Medication Start Time: [unfilled] [End Time: ___] : Medication End Time: [unfilled] [Medication Name: ___] : Medication Name: [unfilled] [Total Amount Administered: ___] : Total Amount Administered: [unfilled] [IV discontinued. Intact. No signs or symptoms of IV complications noted. Time: ___] : IV discontinued. Intact. No signs or symptoms of IV complications noted. Time: [unfilled] [Patient  instructed to seek medical attention with signs and symptoms of adverse effects] : Patient  instructed to seek medical attention with signs and symptoms of adverse effects [Patient left unit in no acute distress] : Patient left unit in no acute distress [Medications administered as ordered and tolerated well.] : Medications administered as ordered and tolerated well. [de-identified] : Hand [de-identified] : 4096 [de-identified] : Multiple access attempts required. Veins would blow after saline flush.  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 n/a Action Taken: n/a  CPT: 18454 Next Appointment: 8/21/24 at 0800

## 2024-08-21 ENCOUNTER — APPOINTMENT (OUTPATIENT)
Dept: NEUROLOGY | Facility: CLINIC | Age: 84
End: 2024-08-21

## 2024-08-21 PROCEDURE — 96365 THER/PROPH/DIAG IV INF INIT: CPT

## 2024-08-21 RX ORDER — PYRIDOSTIGMINE BROMIDE 180 MG/1
180 TABLET ORAL
Qty: 90 | Refills: 3 | Status: ACTIVE | COMMUNITY
Start: 2024-08-21 | End: 1900-01-01

## 2024-08-21 RX ORDER — PYRIDOSTIGMINE BROMIDE 60 MG/1
60 TABLET ORAL 3 TIMES DAILY
Qty: 180 | Refills: 3 | Status: ACTIVE | COMMUNITY
Start: 2024-08-21 | End: 1900-01-01

## 2024-08-21 NOTE — HISTORY OF PRESENT ILLNESS
Psychiatry History and Physical    Subjective:     Date of Evaluation:  6/23/2020    Reason for Referral:  Meghann Santoyo was referred to the examiners from [facility] ED  for SI. History of Presenting Problem: Meghann Santoyo is a 49 yo AAF with PMH of depression who was admitted from the ED for SI. She reports SI and a plan to \"cut her wrist because she is a  and has lots of sharp knives. \" She denies HI. She has had a change in medications and out of lithium the last several weeks. Her tox screen was negative, EtOH positive. She reports tightness in her neck and headache from stress. She denies any other physical complaints. There are no active problems to display for this patient. Past Medical History:   Diagnosis Date    Depression     Migraine     Psychiatric disorder     Schizoaffective disorder ([facility] [address]     Sleep disorder      No past surgical history on file. No family history on file. Social History     Tobacco Use    Smoking status: Never Smoker    Smokeless tobacco: Never Used   Substance Use Topics    Alcohol use: No     Prior to Admission medications    Medication Sig Start Date End Date Taking? Authorizing Provider   omeprazole (PRILOSEC) 20 mg capsule Take 20 mg by mouth daily. Dru Dunham MD   diltiazem HCl (DILT-XR PO) Take  by mouth. Dru Dunham MD   atorvastatin calcium (ATORVASTATIN PO) Take  by mouth. Dru Dunham MD   prazosin (MINIPRESS) 5 mg capsule Take 15 mg by mouth nightly. Dru Dunham MD   ibuprofen (MOTRIN) 800 mg tablet Take 800 mg by mouth every eight (8) hours as needed for Pain. Dru Dunham MD   ZOLMitriptan (ZOMIG-ZMT) 5 mg disintegrating tablet Take 5 mg by mouth as needed for Migraine. Dru Duhnam MD   methocarbamol (ROBAXIN-750) 750 mg tablet Take 1 Tab by mouth four (4) times daily. 3/28/19   Lynnette Younger PA   ARIPiprazole (ABILIFY) 30 mg tablet Take 30 mg by mouth daily.     Dru Dunham MD   valACYclovir (VALTREX) 500 mg tablet [Denies] : Denies Take 500 mg by mouth daily. Dru Dunham MD   lithium carbonate 150 mg capsule Take 450 mg by mouth daily. Dru Dunham MD   DULoxetine (CYMBALTA) 30 mg capsule Take 60 mg by mouth daily. Dru Dunham MD   trazodone HCl (TRAZODONE PO) Take 100 mg by mouth nightly. Dru Dunham MD   zolpidem (AMBIEN) 5 mg tablet Take 5 mg by mouth nightly as needed. Dru Dunham MD     Allergies   Allergen Reactions    Contrast Agent [Iodine] Nausea and Vomiting     Patient States not allergic to Iodine and denies any Allergies.          Review of Systems - History obtained from chart review and the patient  General ROS: negative  Psychological ROS: positive for - depression and suicidal ideation  Ophthalmic ROS: negative  ENT ROS: negative  Respiratory ROS: no cough, shortness of breath, or wheezing  Cardiovascular ROS: no chest pain or dyspnea on exertion  Gastrointestinal ROS: no abdominal pain, change in bowel habits, or black or bloody stools  Musculoskeletal ROS: positive for - muscle aches  Neurological ROS: negative  Dermatological ROS: negative      Objective:     Patient Vitals for the past 8 hrs:   BP Temp Pulse Resp Height Weight   06/23/20 1031 123/75 97.9 °F (36.6 °C) 72 18 5' 9\" (1.753 m) 108.4 kg (239 lb)       Mental Status exam: WNL except for    Sensorium  oriented to time, place and person   Orientation person, place, time/date and situation   Relations cooperative   Eye Contact appropriate   Appearance:  within normal Limits   Motor Behavior:  within normal limits   Speech:  hyperverbal   Vocabulary average   Thought Process: disorganized   Thought Content free of delusions and free of hallucinations   Suicidal ideations plan and intention   Homicidal ideations no plan  and no intention   Mood:  depressed   Affect:  stable   Memory recent  adequate   Memory remote:  adequate   Concentration:  adequate   Abstraction:  concrete   Insight:  fair   Reliability fair   Judgment:  fair         Physical [Yes] : Yes Exam:   Visit Vitals  /75 (BP 1 Location: Right arm, BP Patient Position: Sitting)   Pulse 72   Temp 97.9 °F (36.6 °C)   Resp 18   Ht 5' 9\" (1.753 m)   Wt 108.4 kg (239 lb)   BMI 35.29 kg/m²     General appearance: alert, cooperative, no distress, appears stated age  Head: Normocephalic, without obvious abnormality, atraumatic  Eyes: negative  Ears: normal TM's and external ear canals AU  Nose: Nares normal. Septum midline. Mucosa normal. No drainage or sinus tenderness. Throat: Lips, mucosa, and tongue normal. Teeth and gums normal  Neck: supple, symmetrical, trachea midline and no adenopathy  Lungs: clear to auscultation bilaterally  Heart: regular rate and rhythm, S1, S2 normal, no murmur, click, rub or gallop  Abdomen: soft, non-tender. Bowel sounds normal. No masses,  no organomegaly  Extremities: extremities normal, atraumatic, no cyanosis or edema  Pulses: 2+ and symmetric  Skin: Skin color, texture, turgor normal. No rashes or lesions  Neurologic: Grossly normal        Impression:      Active Problems:    * No active hospital problems. *        Plan:     Recommendations for Treatment/Conditions:  Psychiatric treatment recommended while in hospital  Admit to inpatient psych for SI    Referral To: Inpatient psychiatric care    Competency Statement: At the current time, the patient is competent to make informed consent regarding their current medical care and discharge planning and/or financial decisions.       [place], [place]   6/23/2020 4:47 PM [de-identified] : Cane [Right upper extremity] : Right upper extremity [24g] : 24g [Start Time: ___] : Medication Start Time: [unfilled] [End Time: ___] : Medication End Time: [unfilled] [Medication Name: ___] : Medication Name: [unfilled] [Total Amount Administered: ___] : Total Amount Administered: [unfilled] [IV discontinued. Intact. No signs or symptoms of IV complications noted. Time: ___] : IV discontinued. Intact. No signs or symptoms of IV complications noted. Time: [unfilled] [Patient  instructed to seek medical attention with signs and symptoms of adverse effects] : Patient  instructed to seek medical attention with signs and symptoms of adverse effects [Patient left unit in no acute distress] : Patient left unit in no acute distress [Medications administered as ordered and tolerated well.] : Medications administered as ordered and tolerated well. 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 n/a Action Taken: n/a  CPT: 33095 Next Appointment: 09/04 at 0800

## 2024-08-28 RX ORDER — PYRIDOSTIGMINE BROMIDE 60 MG/1
60 TABLET ORAL 3 TIMES DAILY
Qty: 180 | Refills: 3 | Status: ACTIVE | COMMUNITY
Start: 2024-08-28 | End: 1900-01-01

## 2024-08-28 RX ORDER — PYRIDOSTIGMINE BROMIDE 180 MG/1
180 TABLET ORAL
Qty: 90 | Refills: 3 | Status: ACTIVE | COMMUNITY
Start: 2024-08-28 | End: 1900-01-01

## 2024-09-04 ENCOUNTER — APPOINTMENT (OUTPATIENT)
Dept: NEUROLOGY | Facility: CLINIC | Age: 84
End: 2024-09-04

## 2024-09-04 PROCEDURE — 96365 THER/PROPH/DIAG IV INF INIT: CPT

## 2024-09-04 RX ORDER — PYRIDOSTIGMINE BROMIDE 180 MG/1
180 TABLET ORAL
Qty: 90 | Refills: 3 | Status: ACTIVE | COMMUNITY
Start: 2024-09-04 | End: 1900-01-01

## 2024-09-04 RX ORDER — PYRIDOSTIGMINE BROMIDE 60 MG/1
60 TABLET ORAL 3 TIMES DAILY
Qty: 180 | Refills: 3 | Status: ACTIVE | COMMUNITY
Start: 2024-09-04 | End: 1900-01-01

## 2024-09-04 NOTE — HISTORY OF PRESENT ILLNESS
[Denies] : Denies [No] : No [Yes] : Yes [Left upper extremity] : Left upper extremity [24g] : 24g [Start Time: ___] : Medication Start Time: [unfilled] [End Time: ___] : Medication End Time: [unfilled] [Medication Name: ___] : Medication Name: [unfilled] [Total Amount Administered: ___] : Total Amount Administered: [unfilled] [IV discontinued. Intact. No signs or symptoms of IV complications noted. Time: ___] : IV discontinued. Intact. No signs or symptoms of IV complications noted. Time: [unfilled] [Patient  instructed to seek medical attention with signs and symptoms of adverse effects] : Patient  instructed to seek medical attention with signs and symptoms of adverse effects [Patient left unit in no acute distress] : Patient left unit in no acute distress [Medications administered as ordered and tolerated well.] : Medications administered as ordered and tolerated well. 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 n/a Action Taken: n/a  CPT: 71407 Next Appointment: Patient made appointments 1 year in advance  [de-identified] : 4561

## 2024-09-05 ENCOUNTER — APPOINTMENT (OUTPATIENT)
Dept: CARDIOLOGY | Facility: CLINIC | Age: 84
End: 2024-09-05

## 2024-09-05 NOTE — DISCUSSION/SUMMARY
[FreeTextEntry1] : The patient is an 83-year-old female myasthenia gravis, HTN, HLD, hypothyroid, s/p COVID with fibrosis, AS with reactive htn. #1 AS- euvolemic on exam and asymptomatic, 2/2023 ECHO SANJAY =1.0 unchanged, PVC now off toprol,  #2 Pulm- s/p COVID pneumonia 2020 with prolonged hospitalization, and with scarring, on inhaler, f/u Dr. Salcedo #3 HTN- c/w losartan 25mg, HCTZ prn, write down how often using it. #4 HLD- c/w crestor 20mg, unable to tolerate higher dose #5 Hypothyroid- c/w levothyroxine #6 MG- on pyridostigmine, mycophenolate, daughter supportive, received COVID vaccines, tolerating  IV soliris (Eculizumab) #7 Ortho- s/p THR, ambulates with cane, very anxious

## 2024-09-05 NOTE — REVIEW OF SYSTEMS
[Dyspnea on exertion] : dyspnea during exertion [SOB] : shortness of breath [Negative] : Heme/Lymph [Chest Discomfort] : no chest discomfort [Lower Ext Edema] : no extremity edema [Leg Claudication] : no intermittent leg claudication [Palpitations] : no palpitations [Orthopnea] : no orthopnea [PND] : no PND [Syncope] : no syncope

## 2024-09-05 NOTE — PHYSICAL EXAM
[Well Developed] : well developed [Well Nourished] : well nourished [No Acute Distress] : no acute distress [Normal Conjunctiva] : normal conjunctiva [No Carotid Bruit] : no carotid bruit [Normal Venous Pressure] : normal venous pressure [Normal S1, S2] : normal S1, S2 [No Rub] : no rub [No Gallop] : no gallop [Murmur] : murmur [Clear Lung Fields] : clear lung fields [Good Air Entry] : good air entry [No Respiratory Distress] : no respiratory distress  [Soft] : abdomen soft [No Masses/organomegaly] : no masses/organomegaly [Non Tender] : non-tender [Normal Bowel Sounds] : normal bowel sounds [Normal Gait] : normal gait [No Edema] : no edema [No Cyanosis] : no cyanosis [No Clubbing] : no clubbing [No Varicosities] : no varicosities [No Rash] : no rash [No Skin Lesions] : no skin lesions [Moves all extremities] : moves all extremities [No Focal Deficits] : no focal deficits [Normal Speech] : normal speech [Alert and Oriented] : alert and oriented [Normal memory] : normal memory [de-identified] : 2/6 systolic

## 2024-09-18 ENCOUNTER — APPOINTMENT (OUTPATIENT)
Dept: NEUROLOGY | Facility: CLINIC | Age: 84
End: 2024-09-18

## 2024-09-18 PROCEDURE — 96365 THER/PROPH/DIAG IV INF INIT: CPT

## 2024-09-18 NOTE — HISTORY OF PRESENT ILLNESS
[Denies] : Denies [Yes] : Yes [de-identified] : Cane  [Right upper extremity] : Right upper extremity [24g] : 24g [Start Time: ___] : Medication Start Time: [unfilled] [End Time: ___] : Medication End Time: [unfilled] [Medication Name: ___] : Medication Name: [unfilled] [Total Amount Administered: ___] : Total Amount Administered: [unfilled] [IV discontinued. Intact. No signs or symptoms of IV complications noted. Time: ___] : IV discontinued. Intact. No signs or symptoms of IV complications noted. Time: [unfilled] [Patient  instructed to seek medical attention with signs and symptoms of adverse effects] : Patient  instructed to seek medical attention with signs and symptoms of adverse effects [Patient left unit in no acute distress] : Patient left unit in no acute distress [Medications administered as ordered and tolerated well.] : Medications administered as ordered and tolerated well. 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 n/a Action Taken: n/a  CPT: 92064 Next Appointment: 10/2/24 at 0800

## 2024-09-27 ENCOUNTER — APPOINTMENT (OUTPATIENT)
Dept: INTERNAL MEDICINE | Facility: CLINIC | Age: 84
End: 2024-09-27
Payer: MEDICARE

## 2024-09-27 VITALS
HEIGHT: 57 IN | SYSTOLIC BLOOD PRESSURE: 177 MMHG | OXYGEN SATURATION: 96 % | DIASTOLIC BLOOD PRESSURE: 85 MMHG | TEMPERATURE: 97.3 F | RESPIRATION RATE: 16 BRPM | HEART RATE: 65 BPM

## 2024-09-27 VITALS — DIASTOLIC BLOOD PRESSURE: 78 MMHG | SYSTOLIC BLOOD PRESSURE: 160 MMHG

## 2024-09-27 DIAGNOSIS — G70.00 MYASTHENIA GRAVIS W/OUT (ACUTE) EXACERBATION: ICD-10-CM

## 2024-09-27 DIAGNOSIS — E78.5 HYPERLIPIDEMIA, UNSPECIFIED: ICD-10-CM

## 2024-09-27 DIAGNOSIS — E03.9 HYPOTHYROIDISM, UNSPECIFIED: ICD-10-CM

## 2024-09-27 DIAGNOSIS — K21.9 GASTRO-ESOPHAGEAL REFLUX DISEASE W/OUT ESOPHAGITIS: ICD-10-CM

## 2024-09-27 DIAGNOSIS — I35.0 NONRHEUMATIC AORTIC (VALVE) STENOSIS: ICD-10-CM

## 2024-09-27 DIAGNOSIS — I10 ESSENTIAL (PRIMARY) HYPERTENSION: ICD-10-CM

## 2024-09-27 LAB
ALBUMIN SERPL ELPH-MCNC: 4.3 G/DL
ALP BLD-CCNC: 83 U/L
ALT SERPL-CCNC: 23 U/L
ANION GAP SERPL CALC-SCNC: 12 MMOL/L
AST SERPL-CCNC: 28 U/L
BILIRUB SERPL-MCNC: 0.3 MG/DL
BUN SERPL-MCNC: 15 MG/DL
CALCIUM SERPL-MCNC: 9.7 MG/DL
CHLORIDE SERPL-SCNC: 106 MMOL/L
CHOLEST SERPL-MCNC: 191 MG/DL
CO2 SERPL-SCNC: 24 MMOL/L
CREAT SERPL-MCNC: 0.81 MG/DL
EGFR: 71 ML/MIN/1.73M2
GLUCOSE SERPL-MCNC: 92 MG/DL
HDLC SERPL-MCNC: 48 MG/DL
LDLC SERPL CALC-MCNC: 122 MG/DL
NONHDLC SERPL-MCNC: 142 MG/DL
POTASSIUM SERPL-SCNC: 5.2 MMOL/L
PROT SERPL-MCNC: 6.6 G/DL
SODIUM SERPL-SCNC: 143 MMOL/L
TRIGL SERPL-MCNC: 111 MG/DL

## 2024-09-27 PROCEDURE — G0008: CPT

## 2024-09-27 PROCEDURE — 90662 IIV NO PRSV INCREASED AG IM: CPT

## 2024-09-27 PROCEDURE — 99214 OFFICE O/P EST MOD 30 MIN: CPT

## 2024-09-27 NOTE — PHYSICAL EXAM
[No Acute Distress] : no acute distress [Normal Sclera/Conjunctiva] : normal sclera/conjunctiva [Normal Outer Ear/Nose] : the outer ears and nose were normal in appearance [No JVD] : no jugular venous distention [Normal] : normal rate, regular rhythm, normal S1 and S2 and no murmur heard [Soft] : abdomen soft [Non Tender] : non-tender [Normal Bowel Sounds] : normal bowel sounds [Coordination Grossly Intact] : coordination grossly intact [No Focal Deficits] : no focal deficits [Alert and Oriented x3] : oriented to person, place, and time

## 2024-09-27 NOTE — ASSESSMENT
[FreeTextEntry1] : 83 Y OLD FEM WITH M GRAVIS = F/U NEURO  HTN = LOSARTAN AND HCTZ RX SENT  DYSLIPIDEMIA = LABS AND ROSUVASTATIN RX SENT  CONSTIPATION AND GERD = NEXIUM AND LINZESS RX SENT  RTO 4 M  INFLUENZA VACCINE ORDERED

## 2024-10-02 ENCOUNTER — APPOINTMENT (OUTPATIENT)
Dept: NEUROLOGY | Facility: CLINIC | Age: 84
End: 2024-10-02
Payer: MEDICARE

## 2024-10-02 VITALS
HEIGHT: 57 IN | DIASTOLIC BLOOD PRESSURE: 89 MMHG | WEIGHT: 150 LBS | SYSTOLIC BLOOD PRESSURE: 162 MMHG | HEART RATE: 64 BPM | BODY MASS INDEX: 32.36 KG/M2

## 2024-10-02 DIAGNOSIS — G70.00 MYASTHENIA GRAVIS W/OUT (ACUTE) EXACERBATION: ICD-10-CM

## 2024-10-02 PROCEDURE — 99213 OFFICE O/P EST LOW 20 MIN: CPT | Mod: 25

## 2024-10-02 PROCEDURE — 96365 THER/PROPH/DIAG IV INF INIT: CPT

## 2024-10-02 NOTE — HISTORY OF PRESENT ILLNESS
[Denies] : Denies [Yes] : Yes [de-identified] : Cane  [Right upper extremity] : Right upper extremity [24g] : 24g [IV discontinued. Intact. No signs or symptoms of IV complications noted. Time: ___] : IV discontinued. Intact. No signs or symptoms of IV complications noted. Time: [unfilled] [Patient  instructed to seek medical attention with signs and symptoms of adverse effects] : Patient  instructed to seek medical attention with signs and symptoms of adverse effects [Patient left unit in no acute distress] : Patient left unit in no acute distress [Medications administered as ordered and tolerated well.] : Medications administered as ordered and tolerated well. [de-identified] : Hand [de-identified] : 4274 [de-identified] : Generic: Eculizumab  Billing unit 10mg Dx: Myasthenia gravis, AChR antibody positive (G70.00) Total Dose Administered: 1200mg            Start: 0809              Stop: 0904 Amount of drug wasted: 0mL IV insertion time: 0809              IV D/C time: 0906 Infusion rate: 411mL/hr in 120mL 0.9%NS 0804  162/89, 64, 18 0906  142/63, 54, 17 NDC# 52112-336-61                 Lot# [id number]                  Exp: 09/2026 Buy and Bill: N                            Pharmacy: [facility] Dx: Myasthenia gravis, 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 n/a Action Taken: n/a  CPT: 07060 Next Appointment: 10/16/24 at 0800.

## 2024-10-02 NOTE — PHYSICAL EXAM
[FreeTextEntry1] : Constitutional:  Patient was well-developed, well-nourished and in no acute distress.   Head:  Normocephalic.  Tympanic membranes were not examined.  Neck:  Supple with limited movement.  Cardiovascular:  Cardiac rhythm was regular without murmur. There were no carotid bruits. Peripheral pulses were full and symmetric.   Respiratory:  Lungs were clear.   Abdomen:  Soft and nontender.   Spine:  Nontender.   Skin:  There were no rashes.   NEUROLOGICAL EXAMINATION:  Mental Status: Patient was alert and oriented. Speech was fluent. There was no dysarthria.  She scored 23 out of 25 on MMSE.  Spelling was omitted due to illiteracy.  She recalled 3 of 3 words after several minutes  Cranial Nerves:   II: She could finger count bilaterally.  Pupils were surgical but reactive. Visual fields were full.  Fundi were not examined.  III, IV, VI:  Eye movements were full without nystagmus.   V: Facial sensation was intact.   VII: Facial strength was normal except for bilateral rather moderate orbicularis oculi weakness.   VIII: Hearing was equal.   IX, X: Palatal movement was normal. Phonation was normal.   XI: Neck flexion and extension muscles were strong.  XII: Tongue was midline and movements normal. There was no lingual atrophy or fasciculations.   Motor Examination: Muscle bulk, tone and strength were normal but she had difficulty abducting her shoulders possibly due to intrinsic disease  Sensory Examination: Pinprick, vibration and joint position sense were intact.   Reflexes: DTRs were 2 throughout except for absent ankle jerks.   Plantar Responses: Plantar responses were flexor.   Coordination/Cerebellar Function: There was no dysmetria on finger to nose testing.   Gait/Stance: Gait was slow but relatively stable.

## 2024-10-02 NOTE — ASSESSMENT
[FreeTextEntry1] : Mrs. Mahan is an 83-year-old with acetylcholine receptor positive myasthenia gravis.  She is doing very well on a regimen of pyridostigmine and eculizumab.  In the absence of worsening, I would suggest continuing her current treatment regimen.  I will confirm that she is up-to-date on her meningitis boosters.    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  A CT of the brain performed a year ago was unremarkable except for atrophy and minimal white matter change.  I would suggest cautious observation.  She will return to the office in 4 months for routine follow-up.  Telephone contact will be maintained in the meantime.  MG ADL 4 MGFA IIa Meningitis A and meningitis B boosters administered October 2023

## 2024-10-02 NOTE — HISTORY OF PRESENT ILLNESS
[FreeTextEntry1] : Mrs. Alanna Mahan returned to the office having been last seen on December 20, 2023.  She was accompanied to the office by her son Forrest ([phone]).  She is an 83-year-old right-handed patient who was under the care of Dr. Baron Fitzgerald since September 20, 2019.  In approximately 2018, she developed hoarseness, dysphagia, mastication fatigue and limb weakness.  She was diagnosed with myasthenia gravis by Dr. Long Reyes and was treated with pyridostigmine.  In August 2019, she developed myasthenic crisis prompting admission to [facility].  She was treated with IVIG and prednisone.  Pyridostigmine was continued.  She was then vaccinated against meningococcus and begun on eculizumab.  When seen on November 15, 2022, she was on pyridostigmine 180 mg every other day and 60 mg 3 times a day.  She was receiving eculizumab every 2 weeks.  She complained of occasional hand weakness and chronic low back pain.  She was evaluated by pain management and orthopedics and was administered an epidural steroid injection.  She was well until early March 2023 when she was admitted for an elective left hip replacement.  The procedure was aborted when she developed bradycardia.  She underwent a cardiac evaluation and was cleared.  She underwent left hip replacement on March 27, 2023.  She was discharged to rehabilitation after 2 days where she contracted COVID-19 pneumonia.  She was treated with antibiotics.  She subsequently developed a wound infection.  On May 18, she underwent debridement of a superficial infection and was treated with intravenous antibiotics.  At her July 21, 2023 visit, she complained of fatigue.  She was having difficulty feeding herself because her hands and arms had become weak.  Her legs were also fatigued.  She complained of occasional limb numbness.  She denied diplopia, ptosis or dysphagia.  She was administered 2 g/kg of IVIG from July 24 to July 28.  She tolerated the infusion well.  She reports feeling stronger. At her August 2 visit, her MG ADL score was 4.  She had difficulty rising from a seated position and complained of rare episodes of choking.  She received eculizumab infusion earlier on August 2, 2023.  At her August 16, 2023 visit, she has multiple complaints.  She felt diffusely weak.  She complained of rare episodes of choking, shortness of breath with exertion, requiring rest periods while brushing her teeth and combing her hair, and required the use of her arms to rise from a seated position.  I spoke to Patsy and she reported that her mother was doing fairly well with ADLs at home.  She had not undergone physical therapy after undergoing a hip replacement.  She was hesitant to go outdoors.  Otherwise, she was doing fairly well.  When seen on August 30, 2023, she had last received IVIG in late July.  She seemed relatively stable.  She was on pyridostigmine 60 mg 3 times daily and extended release 180 once daily.  She was receiving eculizumab infusions every 2 weeks.  She complained of fatigue when chewing, occasional difficulty swallowing, dyspnea on exertion and requiring the use of her arms when standing.  MG ADL score was 6.  In view of her advanced age, I was hesitant to begin steroids and suggested treatment with mycophenolate.  Her platelet count was mildly low and her daughter did not begin that medication.  She complained of persistent myasthenic symptoms.  Her MG ADL score on October 11, 2023 was 8.  Her December 2023 visit, she complained of fatigue.  MG ADL score was 4.  MGFA IIa.  she complained of rare episodes of dysphagia, shortness of breath on exertion and requiring use of her arms to rise from seated position.  She continues to do well on a regimen of eculizumab every 2 weeks and pyridostigmine extended release 180 mg at noon and 60 mg twice a day.  Her MG ADL score today was 4.  She complained of intermittent slurred speech, mild dysphagia, dyspnea on exertion and mild difficulty rising from a seated position requiring the use of her arms.  Her children are concerned that she is a bit paranoid and loses things.  She is otherwise very functional.  She resides with her son.  Past surgical history is notable for bilateral cataract extractions, bilateral carpal tunnel release, hysterectomy, bladder resuspension, tonsillectomy, bilateral shoulder replacements, 4 lumbar and 1 cervical procedure, right hip replacement, procedure on her left foot, cholecystectomy, herniorrhaphy and excision of squamous and basal carcinomas.  Medical problems include hypertension, hyperlipidemia, hypothyroidism and COVID-19 in 2020.  Medications include pyridostigmine extended release 180 mg daily, pyridostigmine 60 mg 3 times a day, levothyroxine 75 mcg daily, Cozaar 50 mg daily, rosuvastatin 40 mg/day and eculizumab infusions every 2 weeks.    She has an allergy to iodine.  She is  and is a retired seamstress.  She is a non-smoker and drinks wine.  Family history is notable for stroke thyroid disease and bladder cancer.

## 2024-10-02 NOTE — HISTORY OF PRESENT ILLNESS
[FreeTextEntry1] : Mrs. Alanna Mahan returned to the office having been last seen on December 20, 2023.  She was accompanied to the office by her son Forrest ([phone]).  She is an 83-year-old right-handed patient who was under the care of Dr. Baron Fitzgerald since September 20, 2019.  In approximately 2018, she developed hoarseness, dysphagia, mastication fatigue and limb weakness.  She was diagnosed with myasthenia gravis by Dr. Long Reyes and was treated with pyridostigmine.  In August 2019, she developed myasthenic crisis prompting admission to [facility].  She was treated with IVIG and prednisone.  Pyridostigmine was continued.  She was then vaccinated against meningococcus and begun on eculizumab.  When seen on November 15, 2022, she was on pyridostigmine 180 mg every other day and 60 mg 3 times a day.  She was receiving eculizumab every 2 weeks.  She complained of occasional hand weakness and chronic low back pain.  She was evaluated by pain management and orthopedics and was administered an epidural steroid injection.  She was well until early March 2023 when she was admitted for an elective left hip replacement.  The procedure was aborted when she developed bradycardia.  She underwent a cardiac evaluation and was cleared.  She underwent left hip replacement on March 27, 2023.  She was discharged to rehabilitation after 2 days where she contracted COVID-19 pneumonia.  She was treated with antibiotics.  She subsequently developed a wound infection.  On May 18, she underwent debridement of a superficial infection and was treated with intravenous antibiotics.  At her July 21, 2023 visit, she complained of fatigue.  She was having difficulty feeding herself because her hands and arms had become weak.  Her legs were also fatigued.  She complained of occasional limb numbness.  She denied diplopia, ptosis or dysphagia.  She was administered 2 g/kg of IVIG from July 24 to July 28.  She tolerated the infusion well.  She reports feeling stronger. At her August 2 visit, her MG ADL score was 4.  She had difficulty rising from a seated position and complained of rare episodes of choking.  She received eculizumab infusion earlier on August 2, 2023.  At her August 16, 2023 visit, she has multiple complaints.  She felt diffusely weak.  She complained of rare episodes of choking, shortness of breath with exertion, requiring rest periods while brushing her teeth and combing her hair, and required the use of her arms to rise from a seated position.  I spoke to Patsy and she reported that her mother was doing fairly well with ADLs at home.  She had not undergone physical therapy after undergoing a hip replacement.  She was hesitant to go outdoors.  Otherwise, she was doing fairly well.  When seen on August 30, 2023, she had last received IVIG in late July.  She seemed relatively stable.  She was on pyridostigmine 60 mg 3 times daily and extended release 180 once daily.  She was receiving eculizumab infusions every 2 weeks.  She complained of fatigue when chewing, occasional difficulty swallowing, dyspnea on exertion and requiring the use of her arms when standing.  MG ADL score was 6.  In view of her advanced age, I was hesitant to begin steroids and suggested treatment with mycophenolate.  Her platelet count was mildly low and her daughter did not begin that medication.  She complained of persistent myasthenic symptoms.  Her MG ADL score on October 11, 2023 was 8.  Her December 2023 visit, she complained of fatigue.  MG ADL score was 4.  MGFA IIa.  she complained of rare episodes of dysphagia, shortness of breath on exertion and requiring use of her arms to rise from seated position.  She continues to do well on a regimen of eculizumab every 2 weeks and pyridostigmine extended release 180 mg at noon and 60 mg twice a day.  Her MG ADL score today was 4.  She complained of intermittent slurred speech, mild dysphagia, dyspnea on exertion and mild difficulty rising from a seated position requiring the use of her arms.  Her children are concerned that she is a bit paranoid and loses things.  She is otherwise very functional.  She resides with her son.  Past surgical history is notable for bilateral cataract extractions, bilateral carpal tunnel release, hysterectomy, bladder resuspension, tonsillectomy, bilateral shoulder replacements, 4 lumbar and 1 cervical procedure, right hip replacement, procedure on her left foot, cholecystectomy, herniorrhaphy and excision of squamous and basal carcinomas.  Medical problems include hypertension, hyperlipidemia, hypothyroidism and COVID-19 in 2020.  Medications include pyridostigmine extended release 180 mg daily, pyridostigmine 60 mg 3 times a day, levothyroxine 75 mcg daily, Cozaar 50 mg daily, rosuvastatin 40 mg/day and eculizumab infusions every 2 weeks.    She has an allergy to iodine.  She is  and is a retired seamstress.  She is a non-smoker and drinks wine.  Family history is notable for stroke thyroid disease and bladder cancer.

## 2024-10-09 ENCOUNTER — NON-APPOINTMENT (OUTPATIENT)
Age: 84
End: 2024-10-09

## 2024-10-09 ENCOUNTER — APPOINTMENT (OUTPATIENT)
Dept: CARDIOLOGY | Facility: CLINIC | Age: 84
End: 2024-10-09
Payer: MEDICARE

## 2024-10-09 VITALS
HEIGHT: 57 IN | RESPIRATION RATE: 19 BRPM | DIASTOLIC BLOOD PRESSURE: 73 MMHG | SYSTOLIC BLOOD PRESSURE: 149 MMHG | OXYGEN SATURATION: 97 % | WEIGHT: 150 LBS | HEART RATE: 63 BPM | BODY MASS INDEX: 32.36 KG/M2

## 2024-10-09 DIAGNOSIS — I35.0 NONRHEUMATIC AORTIC (VALVE) STENOSIS: ICD-10-CM

## 2024-10-09 DIAGNOSIS — R55 SYNCOPE AND COLLAPSE: ICD-10-CM

## 2024-10-09 PROCEDURE — G2211 COMPLEX E/M VISIT ADD ON: CPT

## 2024-10-09 PROCEDURE — 93000 ELECTROCARDIOGRAM COMPLETE: CPT

## 2024-10-09 PROCEDURE — 99214 OFFICE O/P EST MOD 30 MIN: CPT

## 2024-10-09 NOTE — CONSULT NOTE ADULT - SUBJECTIVE AND OBJECTIVE BOX
Patient is a 79y old  Female who presents with a chief complaint of   this is a 80 y/o female who has had right hip, low back, groin and leg pain for several years; tests show bone on bone; takes tramadol for pain; to have right hip replacement  HPI:  Patient is seen ans examined.      PAST MEDICAL & SURGICAL HISTORY:  Osteoarthritis  Hypothyroid  Reactive airway disease that is not asthma: mild as per pulm note on [employer], patient and daughter denies any inhaler use; thought to be related to myasthena gravis  Near syncope: 2018 negative ENT work up, negative cardiac work ups as per daughter  Aortic stenosis, mild: asper daughter  Diverticulitis large intestine: denies any recent exacerbations  Lumbar stenosis  Myasthenia gravis: dx 10/2018 symptoms: intermittent loss of voice/ weakness, negative ENT studies, now stable on Pyridostigmine  Carpal Tunnel Syndrome Right: release 4/10 and left  Hammertoe Right foot  Kidney Calculi  Lumbar Radiculopathy  GERD (Gastroesophageal Reflux Disease): hiatal hernia  Hyperlipidemia  Genital Ulcer, Female  Hypertension  H/O umbilical hernia repair  History of tonsillectomy  S/P foot surgery  H/O shoulder replacement: b/l   H/O laminectomy: cervical  1998  lumbar ,,,  with fusion  Prolapse, Uterovaginal: s/p sling  S/P Laparoscopic Fundoplication  S/P Hysterectomy Partial:   Bilateral Cataracts: removed  S/P Appendectomy  S/P Cholecystectomy    MEDICATIONS  (STANDING):  acetaminophen  IVPB .. 1000 milliGRAM(s) IV Intermittent every 6 hours  atorvastatin 20 milliGRAM(s) Oral at bedtime  ceFAZolin   IVPB 2000 milliGRAM(s) IV Intermittent every 8 hours  celecoxib 200 milliGRAM(s) Oral every 12 hours  hydrocortisone sodium succinate Injectable 50 milliGRAM(s) IV Push every 8 hours  lactated ringers. 1000 milliLiter(s) (75 mL/Hr) IV Continuous <Continuous>  lactated ringers. 1000 milliLiter(s) (75 mL/Hr) IV Continuous <Continuous>  levothyroxine 50 MICROGram(s) Oral daily  pantoprazole    Tablet 40 milliGRAM(s) Oral before breakfast  polyethylene glycol 3350 17 Gram(s) Oral daily  pyridostigmine 120 milliGRAM(s) Oral three times a day  pyridostigmine  milliGRAM(s) Oral daily  ranitidine  Oral Tab/Cap - Peds 150 milliGRAM(s) Oral daily    MEDICATIONS  (PRN):  aluminum hydroxide/magnesium hydroxide/simethicone Suspension 30 milliLiter(s) Oral four times a day PRN Indigestion  aluminum hydroxide/magnesium hydroxide/simethicone Suspension 30 milliLiter(s) Oral four times a day PRN Indigestion  HYDROmorphone  Injectable 0.5 milliGRAM(s) IV Push every 3 hours PRN Severe Pain (7 - 10)  linaclotide 145 MICROGram(s) Oral before breakfast PRN Constipation  magnesium hydroxide Suspension 30 milliLiter(s) Oral once PRN Constipation  magnesium hydroxide Suspension 30 milliLiter(s) Oral daily PRN Constipation  ondansetron Injectable 4 milliGRAM(s) IV Push every 6 hours PRN Nausea and/or Vomiting  oxyCODONE    IR 5 milliGRAM(s) Oral every 3 hours PRN Mild Pain (1 - 3)  oxyCODONE    IR 10 milliGRAM(s) Oral every 3 hours PRN Moderate Pain (4 - 6)  senna 2 Tablet(s) Oral at bedtime PRN Constipation      Allergies    iodine (Rash)  shellfish (Rash)    Intolerances  SOCIAL HISTORY:  Smoker:  YES / NO        PACK YEARS:                         WHEN QUIT?  ETOH use:  YES / NO               FREQUENCY / QUANTITY:  Ilicit Drug use:  YES / NO  Occupation:  Assisted device use (Cane / Walker):  Live with:      FAMILY HISTORY:  Family history of bladder cancer: brother   FH: early death: mother  age 50s  Family history of stroke: father       Vital Signs Last 24 Hrs  T(C): 36.6 (30 Dec 2019 12:11), Max: 36.9 (30 Dec 2019 06:05)  T(F): 97.8 (30 Dec 2019 12:11), Max: 98.4 (30 Dec 2019 06:05)  HR: 54 (30 Dec 2019 12:11) (45 - 85)  BP: 116/55 (30 Dec 2019 12:11) (105/44 - 150/70)  BP(mean): --  RR: 16 (30 Dec 2019 12:11) (13 - 16)  SpO2: 95% (30 Dec 2019 12:11) (95% - 100%)          RADIOLOGY & ADDITIONAL STUDIES: 97 Patient is a 79y old  Female who presents with a chief complaint of   this is a 80 y/o female who has had right hip, low back, groin and leg pain for several years; tests show bone on bone; takes tramadol for pain; to have right hip replacement  HPI:  Patient is seen ans examined.  Pain is controlled with pain meds.      PAST MEDICAL & SURGICAL HISTORY:  Osteoarthritis  Hypothyroid  Reactive airway disease that is not asthma: mild as per pulm note on [employer], patient and daughter denies any inhaler use; thought to be related to myasthena gravis  Near syncope: 2018 negative ENT work up, negative cardiac work ups as per daughter  Aortic stenosis, mild: asper daughter  Diverticulitis large intestine: denies any recent exacerbations  Lumbar stenosis  Myasthenia gravis: dx 10/2018 symptoms: intermittent loss of voice/ weakness, negative ENT studies, now stable on Pyridostigmine  Carpal Tunnel Syndrome Right: release 4/10 and left  Hammertoe Right foot  Kidney Calculi  Lumbar Radiculopathy  GERD (Gastroesophageal Reflux Disease): hiatal hernia  Hyperlipidemia  Genital Ulcer, Female  Hypertension  H/O umbilical hernia repair  History of tonsillectomy  S/P foot surgery  H/O shoulder replacement: b/l   H/O laminectomy: cervical  1998  lumbar ,,,  with fusion  Prolapse, Uterovaginal: s/p sling  S/P Laparoscopic Fundoplication  S/P Hysterectomy Partial:   Bilateral Cataracts: removed  S/P Appendectomy  S/P Cholecystectomy    MEDICATIONS  (STANDING):  acetaminophen  IVPB .. 1000 milliGRAM(s) IV Intermittent every 6 hours  atorvastatin 20 milliGRAM(s) Oral at bedtime  ceFAZolin   IVPB 2000 milliGRAM(s) IV Intermittent every 8 hours  celecoxib 200 milliGRAM(s) Oral every 12 hours  hydrocortisone sodium succinate Injectable 50 milliGRAM(s) IV Push every 8 hours  lactated ringers. 1000 milliLiter(s) (75 mL/Hr) IV Continuous <Continuous>  lactated ringers. 1000 milliLiter(s) (75 mL/Hr) IV Continuous <Continuous>  levothyroxine 50 MICROGram(s) Oral daily  pantoprazole    Tablet 40 milliGRAM(s) Oral before breakfast  polyethylene glycol 3350 17 Gram(s) Oral daily  pyridostigmine 120 milliGRAM(s) Oral three times a day  pyridostigmine  milliGRAM(s) Oral daily  ranitidine  Oral Tab/Cap - Peds 150 milliGRAM(s) Oral daily    MEDICATIONS  (PRN):  aluminum hydroxide/magnesium hydroxide/simethicone Suspension 30 milliLiter(s) Oral four times a day PRN Indigestion  aluminum hydroxide/magnesium hydroxide/simethicone Suspension 30 milliLiter(s) Oral four times a day PRN Indigestion  HYDROmorphone  Injectable 0.5 milliGRAM(s) IV Push every 3 hours PRN Severe Pain (7 - 10)  linaclotide 145 MICROGram(s) Oral before breakfast PRN Constipation  magnesium hydroxide Suspension 30 milliLiter(s) Oral once PRN Constipation  magnesium hydroxide Suspension 30 milliLiter(s) Oral daily PRN Constipation  ondansetron Injectable 4 milliGRAM(s) IV Push every 6 hours PRN Nausea and/or Vomiting  oxyCODONE    IR 5 milliGRAM(s) Oral every 3 hours PRN Mild Pain (1 - 3)  oxyCODONE    IR 10 milliGRAM(s) Oral every 3 hours PRN Moderate Pain (4 - 6)  senna 2 Tablet(s) Oral at bedtime PRN Constipation      Allergies    iodine (Rash)  shellfish (Rash)    Intolerances  SOCIAL HISTORY:  Smoker:   NO        PACK YEARS:                         WHEN QUIT?  ETOH use:  NO               FREQUENCY / QUANTITY:  Ilicit Drug use:  NO      FAMILY HISTORY:  Family history of bladder cancer: brother   FH: early death: mother  age 50s  Family history of stroke: father       Vital Signs Last 24 Hrs  T(C): 36.6 (30 Dec 2019 12:11), Max: 36.9 (30 Dec 2019 06:05)  T(F): 97.8 (30 Dec 2019 12:11), Max: 98.4 (30 Dec 2019 06:05)  HR: 54 (30 Dec 2019 12:11) (45 - 85)  BP: 116/55 (30 Dec 2019 12:11) (105/44 - 150/70)  BP(mean): --  RR: 16 (30 Dec 2019 12:11) (13 - 16)  SpO2: 95% (30 Dec 2019 12:11) (95% - 100%)

## 2024-10-16 ENCOUNTER — APPOINTMENT (OUTPATIENT)
Dept: NEUROLOGY | Facility: CLINIC | Age: 84
End: 2024-10-16

## 2024-10-16 PROCEDURE — 96365 THER/PROPH/DIAG IV INF INIT: CPT

## 2024-10-30 ENCOUNTER — APPOINTMENT (OUTPATIENT)
Dept: NEUROLOGY | Facility: CLINIC | Age: 84
End: 2024-10-30

## 2024-10-30 PROCEDURE — 96365 THER/PROPH/DIAG IV INF INIT: CPT

## 2024-11-06 NOTE — PHYSICAL THERAPY INITIAL EVALUATION ADULT - WEIGHT-BEARING RESTRICTIONS: SIT/STAND, REHAB EVAL
Mr. Ramirez is a 59 y/o man from home w/ PMH of HTN and ETOH use presents to the hospital w/ 3 days of jaundice.  He had vomiting x1,  2 weeks ago but nothing ongoing. Patient reports that he retired in May'24 and since then he has been drinking 1 pint of vodka every day- his last drink was on Oct 31st. Pt was started on Mounjaro 6 months ago.  Of note, patient came to [facility] ED on Nov 2nd, he was admitted for hyperbilirubinemia but signed out AMA due to an Emergency.    Labs reviewed:                                9.5    9.99  )-----------( 251      ( 06 Nov 2024 07:03 )             26.6   11-06    132[L]  |  101  |  15  ----------------------------<  148[H]  4.3   |  23  |  1.22    Ca    8.8      06 Nov 2024 07:03  Phos  2.8     11-05  Mg     1.8     11-05    TPro  7.6  /  Alb  2.1[L]  /  TBili  17.0[H]  /  DBili  x   /  AST  134[H]  /  ALT  45  /  AlkPhos  163[H]  11-06    Imaging reviewed:   CT:   Cirrhotic liver morphology. Enlarged liver, 24 cm in craniocaudal   dimension. Markedly heterogeneous attenuation of the liver parenchyma.   Liver lesions cannot be excluded. Recommend contrast enhanced abdominal   MRI for further evaluation. Correlate with AFP level. Sequelae of portal   hypertension including upper abdominal gastroesophageal and anterior   abdominal varices. Trace to small ascites.    Cholelithiasis with mild gallbladder wall edema and surrounding   pericholecystic fluid/ascites in the setting of cirrhosis. Cholecystitis   cannot be excluded.    3.2 x 3.0 cm abdominal aortic aneurysm. Right common iliac artery is   ectatic measuring 1.6 cm. Follow-up CT abdomen and pelvis is recommended   6 months for reevaluation. Multivessel coronary artery calcifications,   aortic root/valve calcification, and mitral annular calcification.    Enlarged prostate.  I independently reviewed:     Hepatomegaly. Suspect a combination of hepatocellular disease and fatty   liver.  Cholelithiasis and distended gallbladder. Possibility of a stone impacted   in the neck of the gallbladder. No sonographic finding to suggest acute   cholecystitis considering negative Traylor's sign.  Mildly prominent CBD. Consider correlation with MRCP.    Cirrhosis with splenomegaly and portal venous collaterals including the   lower esophageal varices. Trace ascites.  Periportal distribution of hepatic steatosis. No focal hepatic mass   lesion.  Cholelithiasis and adenomyomatosis.  No biliary obstruction.    #Hyperbilirubinemia- suspect obstructive 2/2  impacted GB neck stone vs alcoholic hepatitis  #Liver Cirrhosis  #Hyponatremia  #AAA  #HTN  #DVT ppx    Plan:  - Suspect a combination of hepatocellular disease and fatty liver. Cholelithiasis and distended gallbladder. Possibility of a stone impacted  in the neck of the gallbladder. No sonographic finding to suggest acute cholecystitis. No obstruction on MRCP  -Discussed with GI and Hepatology , will discuss whether to start steroids and NAC and schedule of EGD  -Follow hepatitis panel, rafi, anti-smooth muscle Ab, antimitochondrial ab, AFP , leptospira Ab  -f/u blood cx, UA unremarkable, dc abx  -discontinue IV ceftriaxone  -Given ETOH use, there is also a possibility of alcoholic hepatitis. MDF- 57.8- suggestive of poor prognosis.  Hepatologist Dr. Gillis consulted  -No concern of KEREN given baseline mental status  -Given cirrhosis, patient would also need EGD for EV screening.  -Sodium low, no s/s of volume overload per se. Patient w/ pedal edema- chronic. s/p 2L IVF Bolus on 2nd Nov in ED- sodium improved . Monitor Na closely  -BP stable, cont amlodipine  for now. Hold valsartan given latrell. baseline Scr unknown  -C/w Lovenox SC .       Total Time Spent 50 minutes  This includes chart review, patient assessment, discussion and collaboration with interdisciplinary team members, excluding ACP.     weight-bearing as tolerated

## 2024-11-13 ENCOUNTER — APPOINTMENT (OUTPATIENT)
Dept: NEUROLOGY | Facility: CLINIC | Age: 84
End: 2024-11-13

## 2024-11-13 PROCEDURE — 96365 THER/PROPH/DIAG IV INF INIT: CPT

## 2024-11-27 ENCOUNTER — APPOINTMENT (OUTPATIENT)
Dept: NEUROLOGY | Facility: CLINIC | Age: 84
End: 2024-11-27

## 2024-11-27 PROCEDURE — 96365 THER/PROPH/DIAG IV INF INIT: CPT

## 2024-11-27 RX ORDER — PYRIDOSTIGMINE BROMIDE 60 MG/1
60 TABLET ORAL 3 TIMES DAILY
Qty: 270 | Refills: 3 | Status: ACTIVE | COMMUNITY
Start: 2024-11-27 | End: 1900-01-01

## 2024-12-06 NOTE — DIETITIAN INITIAL EVALUATION ADULT - BODY MASS INDEX
Detail Level: Zone Inadequate Response - Schedule Further Pdt Text: The patient has had an inadequate response to photodynamic therapy.  The remaining precancerous lesions will be treated by further photodynamic therapy. Inadequate Response - Cryotherapy Performed Today Text: The patient has had an inadequate response to photodynamic therapy.  The remaining precancerous lesions will be treated with cryotherapy today. Partial Response - Schedule Further Pdt Text: The patient has had a partial response to photodynamic therapy.  The remaining precancerous lesions will be treated by further photodynamic therapy. Partial Response - Prescribe Picato Text: The patient has had a partial response to photodynamic therapy.  The remaining precancerous lesions will be treated with topical field therapy using Picato gel. Partial Response - Prescribe Efudex Text: The patient has had a partial response to photodynamic therapy.  The remaining precancerous lesions will be treated with topical field therapy using 5-fluorouracil cream. Partial Response - Prescribe Aldara Text: The patient has had a partial response to photodynamic therapy.  The remaining precancerous lesions will be treated with topical field therapy using imiquimod cream. Complete Response Text: The patient has had a complete resolution of their actinic keratoses with photodynamic therapy. Inadequate Response - Prescribe Zyclara Text: The patient has had an inadequate response to photodynamic therapy.  The remaining precancerous lesions will be treated with topical field therapy using Zyclara cream. Inadequate Response - Prescribe Picato Text: The patient has had an inadequate response to photodynamic therapy.  The remaining precancerous lesions will be treated with topical field therapy using Picato gel. Partial Response - Prescribe Zyclara Text: The patient has had a partial response to photodynamic therapy.  The remaining precancerous lesions will be treated with topical field therapy using Zyclara cream. Inadequate Response - Prescribe Aldara Text: The patient has had an inadequate response to photodynamic therapy.  The remaining precancerous lesions will be treated with topical field therapy using imiquimod cream. Partial Response - Cryotherapy Performed Today Text: The patient has had a partial response to photodynamic therapy.  The remaining precancerous lesions will be treated with cryotherapy today. Response To Pdt: Complete Response Inadequate Response - Prescribe Efudex Text: The patient has had an inadequate response to photodynamic therapy.  The remaining precancerous lesions will be treated with topical field therapy using 5-fluorouracil cream. 31.1

## 2024-12-12 ENCOUNTER — APPOINTMENT (OUTPATIENT)
Dept: NEUROLOGY | Facility: CLINIC | Age: 84
End: 2024-12-12

## 2024-12-12 PROCEDURE — 96365 THER/PROPH/DIAG IV INF INIT: CPT

## 2024-12-18 ENCOUNTER — APPOINTMENT (OUTPATIENT)
Dept: CARDIOLOGY | Facility: CLINIC | Age: 84
End: 2024-12-18
Payer: MEDICARE

## 2024-12-18 ENCOUNTER — NON-APPOINTMENT (OUTPATIENT)
Age: 84
End: 2024-12-18

## 2024-12-18 VITALS
DIASTOLIC BLOOD PRESSURE: 79 MMHG | HEART RATE: 54 BPM | HEIGHT: 57 IN | RESPIRATION RATE: 22 BRPM | SYSTOLIC BLOOD PRESSURE: 160 MMHG | WEIGHT: 150 LBS | BODY MASS INDEX: 32.36 KG/M2 | OXYGEN SATURATION: 97 %

## 2024-12-18 DIAGNOSIS — I35.0 NONRHEUMATIC AORTIC (VALVE) STENOSIS: ICD-10-CM

## 2024-12-18 DIAGNOSIS — I10 ESSENTIAL (PRIMARY) HYPERTENSION: ICD-10-CM

## 2024-12-18 PROCEDURE — 93000 ELECTROCARDIOGRAM COMPLETE: CPT

## 2024-12-18 PROCEDURE — 99214 OFFICE O/P EST MOD 30 MIN: CPT

## 2024-12-18 PROCEDURE — G2211 COMPLEX E/M VISIT ADD ON: CPT

## 2024-12-18 RX ORDER — LOSARTAN POTASSIUM AND HYDROCHLOROTHIAZIDE 12.5; 5 MG/1; MG/1
50-12.5 TABLET ORAL DAILY
Qty: 90 | Refills: 3 | Status: ACTIVE | COMMUNITY
Start: 2024-12-18 | End: 1900-01-01

## 2024-12-26 ENCOUNTER — APPOINTMENT (OUTPATIENT)
Dept: NEUROLOGY | Facility: CLINIC | Age: 84
End: 2024-12-26
Payer: MEDICARE

## 2024-12-26 PROCEDURE — 96365 THER/PROPH/DIAG IV INF INIT: CPT

## 2025-01-08 ENCOUNTER — APPOINTMENT (OUTPATIENT)
Dept: NEUROLOGY | Facility: CLINIC | Age: 85
End: 2025-01-08

## 2025-01-08 PROCEDURE — 96365 THER/PROPH/DIAG IV INF INIT: CPT

## 2025-01-22 ENCOUNTER — APPOINTMENT (OUTPATIENT)
Dept: NEUROLOGY | Facility: CLINIC | Age: 85
End: 2025-01-22
Payer: MEDICARE

## 2025-01-22 ENCOUNTER — APPOINTMENT (OUTPATIENT)
Dept: NEUROLOGY | Facility: CLINIC | Age: 85
End: 2025-01-22

## 2025-01-22 VITALS
HEIGHT: 57 IN | DIASTOLIC BLOOD PRESSURE: 79 MMHG | HEART RATE: 75 BPM | WEIGHT: 150 LBS | SYSTOLIC BLOOD PRESSURE: 172 MMHG | BODY MASS INDEX: 32.36 KG/M2

## 2025-01-22 DIAGNOSIS — G70.00 MYASTHENIA GRAVIS W/OUT (ACUTE) EXACERBATION: ICD-10-CM

## 2025-01-22 PROCEDURE — 96365 THER/PROPH/DIAG IV INF INIT: CPT

## 2025-01-22 PROCEDURE — 99213 OFFICE O/P EST LOW 20 MIN: CPT

## 2025-01-30 NOTE — DISCHARGE NOTE NURSING/CASE MANAGEMENT/SOCIAL WORK - NSDCDPATPORTLINK_GEN_ALL_CORE
You can access the [facility] Patient Portal, offered by [facility], by registering with the following website: [email or web address] unable to answer, patient poor historian

## 2025-02-03 NOTE — H&P PST ADULT - SKIN/BREAST
[FreeTextEntry1] : EXAM Right hand with no swelling or erythema. Able to make fist, oppose thumb to small finger with good thenar bulk. No intrinsic atrophy. Tender along distal palmar crease, most notable at middle finger. No overt locking with catching palpable. Sensation intact throughout with <2sec cap refill.   ASSESSMENT & PLAN Right middle finger trigger - Discussed with patient the pathoanatomy of trigger and options going forward. Risks/benefits discussed as well as natural progression that injection will provide some relief but symptoms may recur. Discussed that pain may worsen in coming days but will subside. Discussed that we can repeat an injection or that operative intervention may be indicated down the line. After discussion of risks/benefits, including glucose intolerance in the diabetic population, patient elected to proceed with CSI to the A1 pulley.  Procedure 1 - 0.5cc 1% lidocaine + 0.5cc 40mg/cc Kenalog administered to the right middle finger A1 pulley following sterilization with Betadine. Patient tolerated this well.  F/u 3months negative

## 2025-02-05 ENCOUNTER — APPOINTMENT (OUTPATIENT)
Dept: NEUROLOGY | Facility: CLINIC | Age: 85
End: 2025-02-05

## 2025-02-08 ENCOUNTER — RX RENEWAL (OUTPATIENT)
Age: 85
End: 2025-02-08

## 2025-02-12 ENCOUNTER — APPOINTMENT (OUTPATIENT)
Dept: NEUROLOGY | Facility: CLINIC | Age: 85
End: 2025-02-12
Payer: MEDICARE

## 2025-02-12 PROCEDURE — 96365 THER/PROPH/DIAG IV INF INIT: CPT

## 2025-02-19 ENCOUNTER — APPOINTMENT (OUTPATIENT)
Dept: NEUROLOGY | Facility: CLINIC | Age: 85
End: 2025-02-19

## 2025-02-26 ENCOUNTER — APPOINTMENT (OUTPATIENT)
Dept: NEUROLOGY | Facility: CLINIC | Age: 85
End: 2025-02-26
Payer: MEDICARE

## 2025-02-26 VITALS — SYSTOLIC BLOOD PRESSURE: 150 MMHG | DIASTOLIC BLOOD PRESSURE: 70 MMHG | HEART RATE: 64 BPM

## 2025-02-26 DIAGNOSIS — G70.00 MYASTHENIA GRAVIS W/OUT (ACUTE) EXACERBATION: ICD-10-CM

## 2025-02-26 PROCEDURE — 96365 THER/PROPH/DIAG IV INF INIT: CPT

## 2025-02-26 PROCEDURE — 99213 OFFICE O/P EST LOW 20 MIN: CPT

## 2025-02-26 PROCEDURE — 99213 OFFICE O/P EST LOW 20 MIN: CPT | Mod: 25

## 2025-02-26 RX ORDER — PYRIDOSTIGMINE BROMIDE 180 MG/1
180 TABLET ORAL
Qty: 180 | Refills: 3 | Status: ACTIVE | COMMUNITY
Start: 2025-02-26 | End: 1900-01-01

## 2025-03-05 ENCOUNTER — APPOINTMENT (OUTPATIENT)
Dept: NEUROLOGY | Facility: CLINIC | Age: 85
End: 2025-03-05

## 2025-03-12 ENCOUNTER — APPOINTMENT (OUTPATIENT)
Dept: NEUROLOGY | Facility: CLINIC | Age: 85
End: 2025-03-12

## 2025-03-12 PROCEDURE — 96365 THER/PROPH/DIAG IV INF INIT: CPT

## 2025-03-17 RX ORDER — PYRIDOSTIGMINE BROMIDE 60 MG/1
60 TABLET ORAL EVERY 8 HOURS
Qty: 540 | Refills: 3 | Status: ACTIVE | COMMUNITY
Start: 2025-03-17 | End: 1900-01-01

## 2025-03-19 ENCOUNTER — APPOINTMENT (OUTPATIENT)
Dept: NEUROLOGY | Facility: CLINIC | Age: 85
End: 2025-03-19

## 2025-03-19 ENCOUNTER — NON-APPOINTMENT (OUTPATIENT)
Age: 85
End: 2025-03-19

## 2025-03-19 ENCOUNTER — APPOINTMENT (OUTPATIENT)
Dept: CARDIOLOGY | Facility: CLINIC | Age: 85
End: 2025-03-19
Payer: MEDICARE

## 2025-03-19 VITALS
SYSTOLIC BLOOD PRESSURE: 129 MMHG | OXYGEN SATURATION: 94 % | RESPIRATION RATE: 22 BRPM | WEIGHT: 152 LBS | HEART RATE: 60 BPM | HEIGHT: 57 IN | DIASTOLIC BLOOD PRESSURE: 78 MMHG | BODY MASS INDEX: 32.79 KG/M2

## 2025-03-19 DIAGNOSIS — I35.0 NONRHEUMATIC AORTIC (VALVE) STENOSIS: ICD-10-CM

## 2025-03-19 DIAGNOSIS — E78.5 HYPERLIPIDEMIA, UNSPECIFIED: ICD-10-CM

## 2025-03-19 DIAGNOSIS — I10 ESSENTIAL (PRIMARY) HYPERTENSION: ICD-10-CM

## 2025-03-19 PROCEDURE — 93000 ELECTROCARDIOGRAM COMPLETE: CPT

## 2025-03-19 PROCEDURE — G2211 COMPLEX E/M VISIT ADD ON: CPT

## 2025-03-19 PROCEDURE — 99214 OFFICE O/P EST MOD 30 MIN: CPT

## 2025-03-26 ENCOUNTER — APPOINTMENT (OUTPATIENT)
Dept: NEUROLOGY | Facility: CLINIC | Age: 85
End: 2025-03-26

## 2025-03-26 PROCEDURE — 96365 THER/PROPH/DIAG IV INF INIT: CPT

## 2025-04-02 ENCOUNTER — APPOINTMENT (OUTPATIENT)
Dept: NEUROLOGY | Facility: CLINIC | Age: 85
End: 2025-04-02

## 2025-04-09 ENCOUNTER — APPOINTMENT (OUTPATIENT)
Dept: NEUROLOGY | Facility: CLINIC | Age: 85
End: 2025-04-09
Payer: MEDICARE

## 2025-04-09 PROCEDURE — 96413 CHEMO IV INFUSION 1 HR: CPT

## 2025-04-14 NOTE — ED ADULT TRIAGE NOTE - SOURCE OF INFORMATION
----- Message from Claribel sent at 4/14/2025  1:48 PM CDT -----  Regarding: auth concerns  Name of Who is Calling: Pura What is the request in detail: Patient is requesting a call back in regards to her procedure that is not authorized because the insurance never received anything.  Can the clinic reply by [facility]: Yes [facility]  [phone]   Patient

## 2025-04-16 ENCOUNTER — APPOINTMENT (OUTPATIENT)
Dept: NEUROLOGY | Facility: CLINIC | Age: 85
End: 2025-04-16

## 2025-04-22 ENCOUNTER — APPOINTMENT (OUTPATIENT)
Dept: INTERNAL MEDICINE | Facility: CLINIC | Age: 85
End: 2025-04-22
Payer: MEDICARE

## 2025-04-22 ENCOUNTER — LABORATORY RESULT (OUTPATIENT)
Age: 85
End: 2025-04-22

## 2025-04-22 VITALS
HEART RATE: 79 BPM | TEMPERATURE: 97.2 F | OXYGEN SATURATION: 96 % | HEIGHT: 57 IN | RESPIRATION RATE: 22 BRPM | SYSTOLIC BLOOD PRESSURE: 128 MMHG | WEIGHT: 150 LBS | BODY MASS INDEX: 32.36 KG/M2 | DIASTOLIC BLOOD PRESSURE: 75 MMHG

## 2025-04-22 DIAGNOSIS — G70.00 MYASTHENIA GRAVIS W/OUT (ACUTE) EXACERBATION: ICD-10-CM

## 2025-04-22 DIAGNOSIS — I10 ESSENTIAL (PRIMARY) HYPERTENSION: ICD-10-CM

## 2025-04-22 DIAGNOSIS — E03.9 HYPOTHYROIDISM, UNSPECIFIED: ICD-10-CM

## 2025-04-22 DIAGNOSIS — Z00.00 ENCOUNTER FOR GENERAL ADULT MEDICAL EXAMINATION W/OUT ABNORMAL FINDINGS: ICD-10-CM

## 2025-04-22 LAB
APPEARANCE: CLEAR
BILIRUBIN URINE: NEGATIVE
BLOOD URINE: NEGATIVE
COLOR: YELLOW
GLUCOSE QUALITATIVE U: NEGATIVE MG/DL
HCT VFR BLD CALC: 36.9 %
HGB BLD-MCNC: 11.9 G/DL
KETONES URINE: NEGATIVE MG/DL
LEUKOCYTE ESTERASE URINE: ABNORMAL
MCHC RBC-ENTMCNC: 30.2 PG
MCHC RBC-ENTMCNC: 32.2 G/DL
MCV RBC AUTO: 93.7 FL
NITRITE URINE: NEGATIVE
PH URINE: 6.5
PLATELET # BLD AUTO: 156 K/UL
PROTEIN URINE: NEGATIVE MG/DL
RBC # BLD: 3.94 M/UL
RBC # FLD: 13.4 %
SPECIFIC GRAVITY URINE: 1.02
UROBILINOGEN URINE: 0.2 MG/DL
WBC # FLD AUTO: 4.63 K/UL

## 2025-04-22 PROCEDURE — G0439: CPT

## 2025-04-23 ENCOUNTER — APPOINTMENT (OUTPATIENT)
Dept: NEUROLOGY | Facility: CLINIC | Age: 85
End: 2025-04-23
Payer: MEDICARE

## 2025-04-23 LAB
25(OH)D3 SERPL-MCNC: 41.8 NG/ML
ALBUMIN SERPL ELPH-MCNC: 3.9 G/DL
ALP BLD-CCNC: 78 U/L
ALT SERPL-CCNC: 23 U/L
ANION GAP SERPL CALC-SCNC: 14 MMOL/L
AST SERPL-CCNC: 34 U/L
BILIRUB SERPL-MCNC: 0.3 MG/DL
BUN SERPL-MCNC: 20 MG/DL
CALCIUM SERPL-MCNC: 8.9 MG/DL
CHLORIDE SERPL-SCNC: 107 MMOL/L
CO2 SERPL-SCNC: 23 MMOL/L
CREAT SERPL-MCNC: 0.87 MG/DL
EGFRCR SERPLBLD CKD-EPI 2021: 65 ML/MIN/1.73M2
GLUCOSE SERPL-MCNC: 83 MG/DL
POTASSIUM SERPL-SCNC: 4 MMOL/L
PROT SERPL-MCNC: 6 G/DL
SODIUM SERPL-SCNC: 143 MMOL/L
TSH SERPL-ACNC: 2.93 UIU/ML
VIT B12 SERPL-MCNC: 684 PG/ML

## 2025-04-23 PROCEDURE — 96413 CHEMO IV INFUSION 1 HR: CPT

## 2025-04-29 LAB
CHOLEST SERPL-MCNC: 160 MG/DL
HDLC SERPL-MCNC: 41 MG/DL
LDLC SERPL-MCNC: 88 MG/DL
NONHDLC SERPL-MCNC: 119 MG/DL
TRIGL SERPL-MCNC: 177 MG/DL

## 2025-04-30 ENCOUNTER — APPOINTMENT (OUTPATIENT)
Dept: NEUROLOGY | Facility: CLINIC | Age: 85
End: 2025-04-30

## 2025-05-07 ENCOUNTER — APPOINTMENT (OUTPATIENT)
Dept: NEUROLOGY | Facility: CLINIC | Age: 85
End: 2025-05-07

## 2025-05-07 PROCEDURE — 96413 CHEMO IV INFUSION 1 HR: CPT

## 2025-05-21 ENCOUNTER — APPOINTMENT (OUTPATIENT)
Dept: NEUROLOGY | Facility: CLINIC | Age: 85
End: 2025-05-21

## 2025-05-21 PROCEDURE — 96413 CHEMO IV INFUSION 1 HR: CPT

## 2025-05-29 NOTE — PHYSICAL EXAM
PRE-SEDATION ASSESSMENT    CONSENT  Risks, benefits, and alternatives have been discussed with the patient/patient representative, and patient/patient representative agrees to proceed: Yes    MEDICAL HISTORY       PHYSICAL EXAM  History and Physical Reviewed: Patient has valid H&P within 30 days. I have reviewed and there are no changes.  Airway Risk History: No previous complications  Airway Anatomy : Class II  Heart : Normal  Lungs : Normal  LOC/Mental Status : Normal    OTHER FINDINGS  Reviewed current medications and allergies: Yes  Pertinent lab/diagnostic test reviewed: Yes    SEDATION RISK ASSESSMENT  Risk Status ASA: Class III - Severe systemic disease, limits activity, is not incapacitated  Plan for Sedation: Moderate Sedation  EKG Monitoring: Yes    NARRATIVE FINDINGS      [FreeTextEntry1] : Constitutional:  Patient was well-developed, well-nourished and in no acute distress.   Head:  Normocephalic.  Tympanic membranes were not examined.  Neck:  Supple with limited movement.  Cardiovascular:  Cardiac rhythm was regular without murmur. There were no carotid bruits. Peripheral pulses were full and symmetric.   Respiratory:  Lungs were clear.   Abdomen:  Soft and nontender.   Spine:  Nontender.   Skin:  There were no rashes.   NEUROLOGICAL EXAMINATION:  Mental Status: Patient was alert and oriented. Speech was fluent. There was no dysarthria.   Cranial Nerves:   II: She could finger count bilaterally.  Pupils were surgical but reactive. Visual fields were full.  Fundi were not examined.  III, IV, VI:  Eye movements were full without nystagmus.   V: Facial sensation was intact.   VII: Facial strength was normal except for bilateral rather pronounced orbicularis oculi weakness.   VIII: Hearing was equal.   IX, X: Palatal movement was normal. Phonation was normal.   XI: Neck flexion and extension muscles were strong.  XII: Tongue was midline and movements normal. There was no lingual atrophy or fasciculations.   Motor Examination: Muscle bulk, tone and strength were normal but she had difficulty abducting her shoulders possibly due to intrinsic disease  Sensory Examination: Pinprick, vibration and joint position sense were intact.   Reflexes: DTRs were 2 throughout except for absent ankle jerks.   Plantar Responses: Plantar responses were flexor.   Coordination/Cerebellar Function: There was no dysmetria on finger to nose testing.   Gait/Stance: Gait was slow but relatively stable.

## 2025-06-04 ENCOUNTER — APPOINTMENT (OUTPATIENT)
Dept: NEUROLOGY | Facility: CLINIC | Age: 85
End: 2025-06-04

## 2025-06-04 PROCEDURE — 96365 THER/PROPH/DIAG IV INF INIT: CPT

## 2025-06-18 ENCOUNTER — APPOINTMENT (OUTPATIENT)
Dept: NEUROLOGY | Facility: CLINIC | Age: 85
End: 2025-06-18
Payer: MEDICARE

## 2025-06-18 PROCEDURE — 96413 CHEMO IV INFUSION 1 HR: CPT

## 2025-07-02 ENCOUNTER — APPOINTMENT (OUTPATIENT)
Dept: NEUROLOGY | Facility: CLINIC | Age: 85
End: 2025-07-02
Payer: MEDICARE

## 2025-07-02 PROCEDURE — 96413 CHEMO IV INFUSION 1 HR: CPT

## 2025-07-16 ENCOUNTER — APPOINTMENT (OUTPATIENT)
Dept: NEUROLOGY | Facility: CLINIC | Age: 85
End: 2025-07-16
Payer: MEDICARE

## 2025-07-16 PROCEDURE — 96413 CHEMO IV INFUSION 1 HR: CPT

## 2025-07-30 ENCOUNTER — APPOINTMENT (OUTPATIENT)
Dept: NEUROLOGY | Facility: CLINIC | Age: 85
End: 2025-07-30
Payer: MEDICARE

## 2025-07-30 PROCEDURE — 96413 CHEMO IV INFUSION 1 HR: CPT

## 2025-08-13 ENCOUNTER — APPOINTMENT (OUTPATIENT)
Dept: NEUROLOGY | Facility: CLINIC | Age: 85
End: 2025-08-13
Payer: MEDICARE

## 2025-08-13 PROCEDURE — 96413 CHEMO IV INFUSION 1 HR: CPT

## 2025-08-20 ENCOUNTER — APPOINTMENT (OUTPATIENT)
Dept: CARDIOLOGY | Facility: CLINIC | Age: 85
End: 2025-08-20
Payer: MEDICARE

## 2025-08-20 ENCOUNTER — NON-APPOINTMENT (OUTPATIENT)
Age: 85
End: 2025-08-20

## 2025-08-20 VITALS
WEIGHT: 154 LBS | SYSTOLIC BLOOD PRESSURE: 138 MMHG | BODY MASS INDEX: 33.22 KG/M2 | DIASTOLIC BLOOD PRESSURE: 77 MMHG | HEART RATE: 58 BPM | HEIGHT: 57 IN | OXYGEN SATURATION: 98 % | RESPIRATION RATE: 19 BRPM

## 2025-08-20 DIAGNOSIS — I10 ESSENTIAL (PRIMARY) HYPERTENSION: ICD-10-CM

## 2025-08-20 DIAGNOSIS — I35.0 NONRHEUMATIC AORTIC (VALVE) STENOSIS: ICD-10-CM

## 2025-08-20 DIAGNOSIS — I49.3 VENTRICULAR PREMATURE DEPOLARIZATION: ICD-10-CM

## 2025-08-20 PROCEDURE — 93000 ELECTROCARDIOGRAM COMPLETE: CPT

## 2025-08-20 PROCEDURE — G2211 COMPLEX E/M VISIT ADD ON: CPT

## 2025-08-20 PROCEDURE — 99214 OFFICE O/P EST MOD 30 MIN: CPT

## 2025-08-27 ENCOUNTER — APPOINTMENT (OUTPATIENT)
Dept: NEUROLOGY | Facility: CLINIC | Age: 85
End: 2025-08-27
Payer: MEDICARE

## 2025-08-27 PROCEDURE — 96413 CHEMO IV INFUSION 1 HR: CPT

## 2025-09-10 ENCOUNTER — APPOINTMENT (OUTPATIENT)
Dept: NEUROLOGY | Facility: CLINIC | Age: 85
End: 2025-09-10
Payer: MEDICARE

## 2025-09-10 PROCEDURE — 96413 CHEMO IV INFUSION 1 HR: CPT

## 2025-09-10 RX ORDER — ECULIZUMAB 300 MG/30ML
300 INJECTION, SOLUTION, CONCENTRATE INTRAVENOUS
Qty: 8 | Refills: 11 | Status: ACTIVE | COMMUNITY
Start: 2025-09-10 | End: 1900-01-01

## 2025-09-10 RX ORDER — ECULIZUMAB 300 MG/30ML
300 INJECTION, SOLUTION, CONCENTRATE INTRAVENOUS
Qty: 8 | Refills: 11 | Status: ACTIVE | OUTPATIENT
Start: 2025-09-10

## (undated) DEVICE — SYR LUER LOK 50CC

## (undated) DEVICE — NDL SPINAL 18G X 3.5" (PINK)

## (undated) DEVICE — HOOD FLYTE STRYKER HELMET SHIELD

## (undated) DEVICE — SUT STRATAFIX SPIRAL MONOCRYL PLUS 4-0 14CM PS-2 UNDYED

## (undated) DEVICE — WARMING BLANKET UPPER ADULT

## (undated) DEVICE — SUT VICRYL PLUS 1 27" CP UNDYED

## (undated) DEVICE — POSITIONER POSITIONING ROLL  5X17"

## (undated) DEVICE — DRSG COBAN 6"

## (undated) DEVICE — DRAPE TOP SHEET 53" X 101"

## (undated) DEVICE — SOL IRR POUR H2O 1500ML

## (undated) DEVICE — VENODYNE/SCD SLEEVE CALF MEDIUM

## (undated) DEVICE — DRAPE C ARM 41X140"

## (undated) DEVICE — HANDPIECE INTERPULSE W MULTI TIP

## (undated) DEVICE — ELCTR AQUAMANTYS BIPOLAR SEALER 6.0

## (undated) DEVICE — DRSG STOCKINETTE TUBULAR COTTON 2PLY 6X72"

## (undated) DEVICE — DRSG PREVENA PLUS SYSTEM

## (undated) DEVICE — SOL IRR BAG NS 0.9% 3000ML

## (undated) DEVICE — SYR LUER LOK 20CC

## (undated) DEVICE — SOL IRR POUR NS 0.9% 500ML

## (undated) DEVICE — PACK TOTAL HIP

## (undated) DEVICE — DRSG DERMABOND PRINEO 60CM

## (undated) DEVICE — SAW BLADE STRYKER SAGITTAL AGGRESSIVE 25X86.5X1.32MM

## (undated) DEVICE — DRSG STOCKINETTE IMPERVIOUS XL

## (undated) DEVICE — WOUND IRR IRRISEPT W 0.5 CHG

## (undated) DEVICE — WARMING BLANKET FULL ADULT

## (undated) DEVICE — DRAPE MAYO STAND 30"

## (undated) DEVICE — DRAPE IOBAN 10" X 8"

## (undated) DEVICE — DRAPE 3/4 SHEET W REINFORCEMENT 56X77"

## (undated) DEVICE — ELCTR ROCKER SWITCH PENCIL BLUE 10FT

## (undated) DEVICE — DRAPE XL SHEET 77X98"

## (undated) DEVICE — DRAPE BILATERAL LIMB 72" X 120"